# Patient Record
Sex: FEMALE | Race: WHITE | Employment: OTHER | URBAN - METROPOLITAN AREA
[De-identification: names, ages, dates, MRNs, and addresses within clinical notes are randomized per-mention and may not be internally consistent; named-entity substitution may affect disease eponyms.]

---

## 2018-09-13 ENCOUNTER — APPOINTMENT (OUTPATIENT)
Dept: CT IMAGING | Age: 73
DRG: 682 | End: 2018-09-13
Payer: MEDICARE

## 2018-09-13 ENCOUNTER — HOSPITAL ENCOUNTER (INPATIENT)
Age: 73
LOS: 4 days | Discharge: NURSING, REHAB OR LONG-TERM CARE | DRG: 682 | End: 2018-09-18
Attending: EMERGENCY MEDICINE | Admitting: FAMILY MEDICINE
Payer: MEDICARE

## 2018-09-13 ENCOUNTER — APPOINTMENT (OUTPATIENT)
Dept: GENERAL RADIOLOGY | Age: 73
DRG: 682 | End: 2018-09-13
Payer: MEDICARE

## 2018-09-13 DIAGNOSIS — R41.82 ALTERED MENTAL STATUS, UNSPECIFIED ALTERED MENTAL STATUS TYPE: Primary | ICD-10-CM

## 2018-09-13 DIAGNOSIS — R29.6 FALLS FREQUENTLY: ICD-10-CM

## 2018-09-13 DIAGNOSIS — F10.939 ALCOHOL WITHDRAWAL SYNDROME WITH COMPLICATION (HCC): ICD-10-CM

## 2018-09-13 DIAGNOSIS — E86.0 DEHYDRATION: ICD-10-CM

## 2018-09-13 DIAGNOSIS — M17.0 PRIMARY OSTEOARTHRITIS OF BOTH KNEES: ICD-10-CM

## 2018-09-13 PROBLEM — F10.10 ETOH ABUSE: Status: ACTIVE | Noted: 2018-09-13

## 2018-09-13 PROBLEM — R06.03 ACUTE RESPIRATORY DISTRESS: Status: ACTIVE | Noted: 2018-09-13

## 2018-09-13 PROBLEM — R65.10 SIRS (SYSTEMIC INFLAMMATORY RESPONSE SYNDROME) (HCC): Status: ACTIVE | Noted: 2018-09-13

## 2018-09-13 PROBLEM — I27.20 PULMONARY HYPERTENSION (HCC): Status: ACTIVE | Noted: 2018-09-13

## 2018-09-13 PROBLEM — N17.9 AKI (ACUTE KIDNEY INJURY) (HCC): Status: ACTIVE | Noted: 2018-09-13

## 2018-09-13 PROBLEM — Z87.19 HISTORY OF GI BLEED: Status: ACTIVE | Noted: 2018-09-13

## 2018-09-13 PROBLEM — I48.91 A-FIB (HCC): Status: ACTIVE | Noted: 2018-09-13

## 2018-09-13 PROBLEM — D64.9 NORMOCYTIC ANEMIA: Status: ACTIVE | Noted: 2018-09-13

## 2018-09-13 PROBLEM — J44.9 COPD (CHRONIC OBSTRUCTIVE PULMONARY DISEASE) (HCC): Status: ACTIVE | Noted: 2018-09-13

## 2018-09-13 PROBLEM — G93.40 ACUTE ENCEPHALOPATHY: Status: ACTIVE | Noted: 2018-09-13
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
## 2018-09-13 LAB
-: ABNORMAL
ABSOLUTE EOS #: 0 K/UL (ref 0–0.4)
ABSOLUTE IMMATURE GRANULOCYTE: ABNORMAL K/UL (ref 0–0.3)
ABSOLUTE LYMPH #: 0.94 K/UL (ref 1–4.8)
ABSOLUTE MONO #: 1.4 K/UL (ref 0.1–1.3)
ACETAMINOPHEN LEVEL: <5 UG/ML (ref 10–30)
ALBUMIN SERPL-MCNC: 3.7 G/DL (ref 3.5–5.2)
ALBUMIN/GLOBULIN RATIO: ABNORMAL (ref 1–2.5)
ALLEN TEST: ABNORMAL
ALP BLD-CCNC: 136 U/L (ref 35–104)
ALT SERPL-CCNC: 11 U/L (ref 5–33)
AMMONIA: 24 UMOL/L (ref 11–51)
AMORPHOUS: ABNORMAL
ANION GAP SERPL CALCULATED.3IONS-SCNC: 13 MMOL/L (ref 9–17)
AST SERPL-CCNC: 18 U/L
BACTERIA: ABNORMAL
BASOPHILS # BLD: 1 % (ref 0–2)
BASOPHILS ABSOLUTE: 0.12 K/UL (ref 0–0.2)
BILIRUB SERPL-MCNC: 0.49 MG/DL (ref 0.3–1.2)
BILIRUBIN URINE: NEGATIVE
BNP INTERPRETATION: ABNORMAL
BUN BLDV-MCNC: 37 MG/DL (ref 8–23)
BUN/CREAT BLD: ABNORMAL (ref 9–20)
C-REACTIVE PROTEIN: 255.4 MG/L (ref 0–5)
CALCIUM SERPL-MCNC: 9.2 MG/DL (ref 8.6–10.4)
CARBOXYHEMOGLOBIN: 2.4 % (ref 0–5)
CASTS UA: ABNORMAL /LPF
CHLORIDE BLD-SCNC: 96 MMOL/L (ref 98–107)
CO2: 24 MMOL/L (ref 20–31)
COLOR: YELLOW
COMMENT UA: ABNORMAL
CREAT SERPL-MCNC: 1.67 MG/DL (ref 0.5–0.9)
CRYSTALS, UA: ABNORMAL /HPF
DIFFERENTIAL TYPE: ABNORMAL
EKG ATRIAL RATE: 241 BPM
EKG Q-T INTERVAL: 290 MS
EKG QRS DURATION: 64 MS
EKG QTC CALCULATION (BAZETT): 372 MS
EKG R AXIS: 54 DEGREES
EKG T AXIS: 64 DEGREES
EKG VENTRICULAR RATE: 99 BPM
EOSINOPHILS RELATIVE PERCENT: 0 % (ref 0–4)
EPITHELIAL CELLS UA: ABNORMAL /HPF
ETHANOL PERCENT: <0.01 %
ETHANOL PERCENT: <0.01 %
ETHANOL: <10 MG/DL
ETHANOL: <10 MG/DL
FIO2: ABNORMAL
GFR AFRICAN AMERICAN: 37 ML/MIN
GFR NON-AFRICAN AMERICAN: 30 ML/MIN
GFR SERPL CREATININE-BSD FRML MDRD: ABNORMAL ML/MIN/{1.73_M2}
GFR SERPL CREATININE-BSD FRML MDRD: ABNORMAL ML/MIN/{1.73_M2}
GLUCOSE BLD-MCNC: 115 MG/DL (ref 70–99)
GLUCOSE URINE: NEGATIVE
HCO3 ARTERIAL: 24.9 MMOL/L (ref 22–26)
HCT VFR BLD CALC: 28.7 % (ref 36–46)
HCT VFR BLD CALC: 29.2 % (ref 36–46)
HCT VFR BLD CALC: 30.6 % (ref 36–46)
HEMOGLOBIN: 8.7 G/DL (ref 12–16)
HEMOGLOBIN: 8.8 G/DL (ref 12–16)
HEMOGLOBIN: 9.4 G/DL (ref 12–16)
IMMATURE GRANULOCYTES: ABNORMAL %
INR BLD: 1
KETONES, URINE: NEGATIVE
LACTIC ACID, WHOLE BLOOD: NORMAL MMOL/L (ref 0.7–2.1)
LACTIC ACID: 0.8 MMOL/L (ref 0.5–2.2)
LEUKOCYTE ESTERASE, URINE: NEGATIVE
LYMPHOCYTES # BLD: 8 % (ref 24–44)
MAGNESIUM: 2 MG/DL (ref 1.6–2.6)
MCH RBC QN AUTO: 26.4 PG (ref 26–34)
MCHC RBC AUTO-ENTMCNC: 30.6 G/DL (ref 31–37)
MCV RBC AUTO: 86.4 FL (ref 80–100)
METHEMOGLOBIN: 0.6 % (ref 0–1.9)
MODE: ABNORMAL
MONOCYTES # BLD: 12 % (ref 1–7)
MORPHOLOGY: ABNORMAL
MUCUS: ABNORMAL
NEGATIVE BASE EXCESS, ART: 1 MMOL/L (ref 0–2)
NITRITE, URINE: NEGATIVE
NOTIFICATION TIME: ABNORMAL
NOTIFICATION: ABNORMAL
NRBC AUTOMATED: ABNORMAL PER 100 WBC
O2 DEVICE/FLOW/%: ABNORMAL
O2 SAT, ARTERIAL: 93 % (ref 95–98)
OTHER OBSERVATIONS UA: ABNORMAL
OXYHEMOGLOBIN: ABNORMAL % (ref 95–98)
PARTIAL THROMBOPLASTIN TIME: 31.1 SEC (ref 23–31)
PATIENT TEMP: 37
PCO2 ARTERIAL: 47.3 MMHG (ref 35–45)
PCO2, ART, TEMP ADJ: ABNORMAL (ref 35–45)
PDW BLD-RTO: 24.3 % (ref 11.5–14.9)
PEEP/CPAP: ABNORMAL
PH ARTERIAL: 7.33 (ref 7.35–7.45)
PH UA: 5.5 (ref 5–8)
PH, ART, TEMP ADJ: ABNORMAL (ref 7.35–7.45)
PLATELET # BLD: 452 K/UL (ref 150–450)
PLATELET ESTIMATE: ABNORMAL
PMV BLD AUTO: 8.3 FL (ref 6–12)
PO2 ARTERIAL: 74.8 MMHG (ref 80–100)
PO2, ART, TEMP ADJ: ABNORMAL MMHG (ref 80–100)
POSITIVE BASE EXCESS, ART: ABNORMAL MMOL/L (ref 0–2)
POTASSIUM SERPL-SCNC: 4 MMOL/L (ref 3.7–5.3)
PRO-BNP: 2256 PG/ML
PROCALCITONIN: 0.2 NG/ML
PROTEIN UA: ABNORMAL
PROTHROMBIN TIME: 10.8 SEC (ref 9.7–12)
PSV: ABNORMAL
PT. POSITION: ABNORMAL
RBC # BLD: 3.54 M/UL (ref 4–5.2)
RBC # BLD: ABNORMAL 10*6/UL
RBC UA: ABNORMAL /HPF
RENAL EPITHELIAL, UA: ABNORMAL /HPF
RESPIRATORY RATE: 18
SALICYLATE LEVEL: <1 MG/DL (ref 3–10)
SAMPLE SITE: ABNORMAL
SEG NEUTROPHILS: 79 % (ref 36–66)
SEGMENTED NEUTROPHILS ABSOLUTE COUNT: 9.24 K/UL (ref 1.3–9.1)
SET RATE: ABNORMAL
SODIUM BLD-SCNC: 133 MMOL/L (ref 135–144)
SPECIFIC GRAVITY UA: 1.01 (ref 1–1.03)
T4 TOTAL: 4.9 UG/DL (ref 4.5–12)
TEXT FOR RESPIRATORY: ABNORMAL
TOTAL HB: ABNORMAL G/DL (ref 12–16)
TOTAL PROTEIN: 6.8 G/DL (ref 6.4–8.3)
TOTAL RATE: ABNORMAL
TRICHOMONAS: ABNORMAL
TRICYCLIC ANTIDEP,URINE: NEGATIVE
TROPONIN INTERP: NORMAL
TROPONIN T: <0.03 NG/ML
TSH SERPL DL<=0.05 MIU/L-ACNC: 2.21 MIU/L (ref 0.3–5)
TURBIDITY: ABNORMAL
URINE HGB: NEGATIVE
UROBILINOGEN, URINE: NORMAL
VT: ABNORMAL
WBC # BLD: 11.7 K/UL (ref 3.5–11)
WBC # BLD: ABNORMAL 10*3/UL
WBC UA: ABNORMAL /HPF
YEAST: ABNORMAL

## 2018-09-13 PROCEDURE — 82805 BLOOD GASES W/O2 SATURATION: CPT

## 2018-09-13 PROCEDURE — G0378 HOSPITAL OBSERVATION PER HR: HCPCS

## 2018-09-13 PROCEDURE — 99285 EMERGENCY DEPT VISIT HI MDM: CPT

## 2018-09-13 PROCEDURE — 85018 HEMOGLOBIN: CPT

## 2018-09-13 PROCEDURE — 80053 COMPREHEN METABOLIC PANEL: CPT

## 2018-09-13 PROCEDURE — 2580000003 HC RX 258: Performed by: NURSE PRACTITIONER

## 2018-09-13 PROCEDURE — 85730 THROMBOPLASTIN TIME PARTIAL: CPT

## 2018-09-13 PROCEDURE — 6360000002 HC RX W HCPCS: Performed by: NURSE PRACTITIONER

## 2018-09-13 PROCEDURE — 80307 DRUG TEST PRSMV CHEM ANLYZR: CPT

## 2018-09-13 PROCEDURE — 93970 EXTREMITY STUDY: CPT

## 2018-09-13 PROCEDURE — 87086 URINE CULTURE/COLONY COUNT: CPT

## 2018-09-13 PROCEDURE — 70450 CT HEAD/BRAIN W/O DYE: CPT

## 2018-09-13 PROCEDURE — 84484 ASSAY OF TROPONIN QUANT: CPT

## 2018-09-13 PROCEDURE — 83735 ASSAY OF MAGNESIUM: CPT

## 2018-09-13 PROCEDURE — 94761 N-INVAS EAR/PLS OXIMETRY MLT: CPT

## 2018-09-13 PROCEDURE — 96367 TX/PROPH/DG ADDL SEQ IV INF: CPT

## 2018-09-13 PROCEDURE — 85025 COMPLETE CBC W/AUTO DIFF WBC: CPT

## 2018-09-13 PROCEDURE — 83880 ASSAY OF NATRIURETIC PEPTIDE: CPT

## 2018-09-13 PROCEDURE — 81001 URINALYSIS AUTO W/SCOPE: CPT

## 2018-09-13 PROCEDURE — 82140 ASSAY OF AMMONIA: CPT

## 2018-09-13 PROCEDURE — 84443 ASSAY THYROID STIM HORMONE: CPT

## 2018-09-13 PROCEDURE — 84436 ASSAY OF TOTAL THYROXINE: CPT

## 2018-09-13 PROCEDURE — 93005 ELECTROCARDIOGRAM TRACING: CPT

## 2018-09-13 PROCEDURE — 86140 C-REACTIVE PROTEIN: CPT

## 2018-09-13 PROCEDURE — 2580000003 HC RX 258: Performed by: FAMILY MEDICINE

## 2018-09-13 PROCEDURE — 85014 HEMATOCRIT: CPT

## 2018-09-13 PROCEDURE — 36600 WITHDRAWAL OF ARTERIAL BLOOD: CPT

## 2018-09-13 PROCEDURE — G0480 DRUG TEST DEF 1-7 CLASSES: HCPCS

## 2018-09-13 PROCEDURE — 96366 THER/PROPH/DIAG IV INF ADDON: CPT

## 2018-09-13 PROCEDURE — 6370000000 HC RX 637 (ALT 250 FOR IP): Performed by: FAMILY MEDICINE

## 2018-09-13 PROCEDURE — 36415 COLL VENOUS BLD VENIPUNCTURE: CPT

## 2018-09-13 PROCEDURE — 2580000003 HC RX 258: Performed by: EMERGENCY MEDICINE

## 2018-09-13 PROCEDURE — 83605 ASSAY OF LACTIC ACID: CPT

## 2018-09-13 PROCEDURE — 94640 AIRWAY INHALATION TREATMENT: CPT

## 2018-09-13 PROCEDURE — 96365 THER/PROPH/DIAG IV INF INIT: CPT

## 2018-09-13 PROCEDURE — 94664 DEMO&/EVAL PT USE INHALER: CPT

## 2018-09-13 PROCEDURE — 71046 X-RAY EXAM CHEST 2 VIEWS: CPT

## 2018-09-13 PROCEDURE — 84145 PROCALCITONIN (PCT): CPT

## 2018-09-13 PROCEDURE — 85610 PROTHROMBIN TIME: CPT

## 2018-09-13 RX ORDER — LORAZEPAM 1 MG/1
3 TABLET ORAL
Status: DISCONTINUED | OUTPATIENT
Start: 2018-09-13 | End: 2018-09-18

## 2018-09-13 RX ORDER — LORAZEPAM 2 MG/ML
4 INJECTION INTRAMUSCULAR
Status: DISCONTINUED | OUTPATIENT
Start: 2018-09-13 | End: 2018-09-18

## 2018-09-13 RX ORDER — 0.9 % SODIUM CHLORIDE 0.9 %
1000 INTRAVENOUS SOLUTION INTRAVENOUS ONCE
Status: COMPLETED | OUTPATIENT
Start: 2018-09-13 | End: 2018-09-13

## 2018-09-13 RX ORDER — ONDANSETRON 2 MG/ML
4 INJECTION INTRAMUSCULAR; INTRAVENOUS EVERY 6 HOURS PRN
Status: DISCONTINUED | OUTPATIENT
Start: 2018-09-13 | End: 2018-09-18 | Stop reason: HOSPADM

## 2018-09-13 RX ORDER — POTASSIUM CHLORIDE 20MEQ/15ML
40 LIQUID (ML) ORAL PRN
Status: DISCONTINUED | OUTPATIENT
Start: 2018-09-13 | End: 2018-09-18 | Stop reason: HOSPADM

## 2018-09-13 RX ORDER — SODIUM CHLORIDE 0.9 % (FLUSH) 0.9 %
10 SYRINGE (ML) INJECTION PRN
Status: DISCONTINUED | OUTPATIENT
Start: 2018-09-13 | End: 2018-09-18 | Stop reason: HOSPADM

## 2018-09-13 RX ORDER — SODIUM CHLORIDE 9 MG/ML
INJECTION, SOLUTION INTRAVENOUS CONTINUOUS
Status: DISCONTINUED | OUTPATIENT
Start: 2018-09-13 | End: 2018-09-16

## 2018-09-13 RX ORDER — LORAZEPAM 2 MG/ML
3 INJECTION INTRAMUSCULAR
Status: DISCONTINUED | OUTPATIENT
Start: 2018-09-13 | End: 2018-09-18

## 2018-09-13 RX ORDER — LORAZEPAM 2 MG/ML
2 INJECTION INTRAMUSCULAR
Status: DISCONTINUED | OUTPATIENT
Start: 2018-09-13 | End: 2018-09-18

## 2018-09-13 RX ORDER — VENLAFAXINE HYDROCHLORIDE 150 MG/1
150 CAPSULE, EXTENDED RELEASE ORAL DAILY
Status: DISCONTINUED | OUTPATIENT
Start: 2018-09-13 | End: 2018-09-18 | Stop reason: HOSPADM

## 2018-09-13 RX ORDER — BACLOFEN 10 MG/1
10 TABLET ORAL 3 TIMES DAILY
COMMUNITY
End: 2018-12-13 | Stop reason: SDUPTHER

## 2018-09-13 RX ORDER — LORAZEPAM 1 MG/1
2 TABLET ORAL
Status: DISCONTINUED | OUTPATIENT
Start: 2018-09-13 | End: 2018-09-18

## 2018-09-13 RX ORDER — ACETAMINOPHEN 325 MG/1
650 TABLET ORAL EVERY 4 HOURS PRN
Status: DISCONTINUED | OUTPATIENT
Start: 2018-09-13 | End: 2018-09-18 | Stop reason: HOSPADM

## 2018-09-13 RX ORDER — FERROUS SULFATE 325(65) MG
325 TABLET ORAL
Status: ON HOLD | COMMUNITY
End: 2018-09-18

## 2018-09-13 RX ORDER — SODIUM CHLORIDE 0.9 % (FLUSH) 0.9 %
10 SYRINGE (ML) INJECTION EVERY 12 HOURS SCHEDULED
Status: DISCONTINUED | OUTPATIENT
Start: 2018-09-13 | End: 2018-09-18 | Stop reason: HOSPADM

## 2018-09-13 RX ORDER — THIAMINE HYDROCHLORIDE 100 MG/ML
100 INJECTION, SOLUTION INTRAMUSCULAR; INTRAVENOUS DAILY
Status: DISCONTINUED | OUTPATIENT
Start: 2018-09-13 | End: 2018-09-18 | Stop reason: HOSPADM

## 2018-09-13 RX ORDER — VENLAFAXINE HYDROCHLORIDE 150 MG/1
150 CAPSULE, EXTENDED RELEASE ORAL DAILY
COMMUNITY
End: 2019-04-04

## 2018-09-13 RX ORDER — IPRATROPIUM BROMIDE AND ALBUTEROL SULFATE 2.5; .5 MG/3ML; MG/3ML
1 SOLUTION RESPIRATORY (INHALATION) EVERY 4 HOURS
Status: DISCONTINUED | OUTPATIENT
Start: 2018-09-13 | End: 2018-09-18

## 2018-09-13 RX ORDER — FAMOTIDINE 20 MG/1
20 TABLET, FILM COATED ORAL 2 TIMES DAILY
Status: DISCONTINUED | OUTPATIENT
Start: 2018-09-13 | End: 2018-09-18 | Stop reason: HOSPADM

## 2018-09-13 RX ORDER — LORAZEPAM 2 MG/ML
1 INJECTION INTRAMUSCULAR
Status: DISCONTINUED | OUTPATIENT
Start: 2018-09-13 | End: 2018-09-18

## 2018-09-13 RX ORDER — POTASSIUM CHLORIDE 20 MEQ/1
40 TABLET, EXTENDED RELEASE ORAL PRN
Status: DISCONTINUED | OUTPATIENT
Start: 2018-09-13 | End: 2018-09-18 | Stop reason: HOSPADM

## 2018-09-13 RX ORDER — LORAZEPAM 1 MG/1
4 TABLET ORAL
Status: DISCONTINUED | OUTPATIENT
Start: 2018-09-13 | End: 2018-09-18

## 2018-09-13 RX ORDER — ALBUTEROL SULFATE 2.5 MG/3ML
2.5 SOLUTION RESPIRATORY (INHALATION) EVERY 4 HOURS PRN
COMMUNITY
End: 2020-08-19 | Stop reason: ALTCHOICE

## 2018-09-13 RX ORDER — LEVOTHYROXINE SODIUM 0.05 MG/1
50 TABLET ORAL DAILY
Status: DISCONTINUED | OUTPATIENT
Start: 2018-09-13 | End: 2018-09-13

## 2018-09-13 RX ORDER — FERROUS SULFATE 325(65) MG
325 TABLET ORAL
Status: DISCONTINUED | OUTPATIENT
Start: 2018-09-14 | End: 2018-09-18

## 2018-09-13 RX ORDER — LEVOTHYROXINE SODIUM 0.05 MG/1
50 TABLET ORAL DAILY
Status: DISCONTINUED | OUTPATIENT
Start: 2018-09-13 | End: 2018-09-18 | Stop reason: HOSPADM

## 2018-09-13 RX ORDER — POTASSIUM CHLORIDE 7.45 MG/ML
10 INJECTION INTRAVENOUS PRN
Status: DISCONTINUED | OUTPATIENT
Start: 2018-09-13 | End: 2018-09-18 | Stop reason: HOSPADM

## 2018-09-13 RX ORDER — LORAZEPAM 1 MG/1
1 TABLET ORAL
Status: DISCONTINUED | OUTPATIENT
Start: 2018-09-13 | End: 2018-09-18

## 2018-09-13 RX ADMIN — IPRATROPIUM BROMIDE AND ALBUTEROL SULFATE 1 AMPULE: .5; 3 SOLUTION RESPIRATORY (INHALATION) at 15:58

## 2018-09-13 RX ADMIN — SODIUM CHLORIDE 1000 ML: 9 INJECTION, SOLUTION INTRAVENOUS at 06:59

## 2018-09-13 RX ADMIN — VENLAFAXINE HYDROCHLORIDE 150 MG: 150 CAPSULE, EXTENDED RELEASE ORAL at 18:33

## 2018-09-13 RX ADMIN — CEFTRIAXONE SODIUM 1 G: 1 INJECTION, POWDER, FOR SOLUTION INTRAMUSCULAR; INTRAVENOUS at 16:53

## 2018-09-13 RX ADMIN — IPRATROPIUM BROMIDE AND ALBUTEROL SULFATE 1 AMPULE: .5; 3 SOLUTION RESPIRATORY (INHALATION) at 11:52

## 2018-09-13 RX ADMIN — AZITHROMYCIN MONOHYDRATE 500 MG: 500 INJECTION, POWDER, LYOPHILIZED, FOR SOLUTION INTRAVENOUS at 18:38

## 2018-09-13 RX ADMIN — IPRATROPIUM BROMIDE AND ALBUTEROL SULFATE 1 AMPULE: .5; 3 SOLUTION RESPIRATORY (INHALATION) at 19:11

## 2018-09-13 RX ADMIN — FAMOTIDINE 20 MG: 20 TABLET ORAL at 20:32

## 2018-09-13 RX ADMIN — SODIUM CHLORIDE: 9 INJECTION, SOLUTION INTRAVENOUS at 16:59

## 2018-09-13 RX ADMIN — LEVOTHYROXINE SODIUM 50 MCG: 0.05 TABLET ORAL at 18:33

## 2018-09-13 ASSESSMENT — ENCOUNTER SYMPTOMS
BLOOD IN STOOL: 0
TROUBLE SWALLOWING: 0
COLOR CHANGE: 0
EYE PAIN: 0
SHORTNESS OF BREATH: 0
DIARRHEA: 0
SORE THROAT: 0
CONSTIPATION: 0
RHINORRHEA: 0
SINUS PRESSURE: 0
COUGH: 0
WHEEZING: 0
ABDOMINAL PAIN: 0
EYE DISCHARGE: 0
FACIAL SWELLING: 0
VOMITING: 0
BACK PAIN: 0
EYE REDNESS: 0
NAUSEA: 0
CHEST TIGHTNESS: 0

## 2018-09-13 ASSESSMENT — PAIN SCALES - GENERAL
PAINLEVEL_OUTOF10: 0
PAINLEVEL_OUTOF10: 0

## 2018-09-13 ASSESSMENT — PAIN SCALES - WONG BAKER: WONGBAKER_NUMERICALRESPONSE: 0

## 2018-09-13 NOTE — CONSULTS
Weight: Weight: 180 lb (81.6 kg)  afebrile  General Appearance: alert, more cooperative, restless  Head: Normocephalic, without obvious abnormality, atraumatic  Neck: no adenopathy, no JVD, supple, symmetrical, trachea midline  Lungs: fair air entry bilaterally; breath sounds - vesicular, 97% on 3 l nc  Heart: : regular rate and rhythm, S1, S2 normal, no murmur, click, rub or gallop  Abdomen: soft, non-tender; bowel sounds normal; no masses,  no organomegaly  Extremities: extremities normal, atraumatic, no cyanosis or edema  Skin: skin color, texture, turgor normal. No rashes or lesions  Neurologic: Grossly normal    Recent labs, Imaging studies reviewed    Data ReviewCBC:   Recent Labs      09/13/18   0555   WBC  11.7*   RBC  3.54*   HGB  9.4*   HCT  30.6*   PLT  452*     BMP:   Recent Labs      09/13/18   0555   GLUCOSE  115*   NA  133*   K  4.0   BUN  37*   CREATININE  1.67*   CALCIUM  9.2     ABGs:   Recent Labs      09/13/18   0920   PHART  7.330*   PO2ART  74.8*   [id number]  47.3*   [id number]  24.9   [id number]  93.0*      PT/INR:  No results found for: PTINR      ASSESSMENT / PLAN:  Dx respiratory failure       Encephalopathy  - improving       Dehydration, creatinine 1.67, baseline 0.78       PNA       afib       Falls  Hx COPD, ETOH  S/P OP surgery on 9-11-18 for total left knee  Continue BD  Add ABx  Pain control  IS  PT    Plan of care discussed with Dr Kody Mercedes  Electronically signed by Renetta Kasper on 09/13/18

## 2018-09-13 NOTE — PROGRESS NOTES
Physical Therapy  DATE: 2018    NAME: Giacomo Cuellar  MRN: [id number]   : 1945    Patient not seen this date for Physical Therapy due to:  [] Blood transfusion in progress  [] Hemodialysis  []  Patient Declined  [] Spine Precautions   [] Strict Bedrest  [] Surgery/ Procedure  [] Testing      [x] Other 18 at 4151- hold PT, results are still not posted for venous scan. Will check for results tomorrow. [] PT being discontinued at this time. Patient independent. No further needs. [] PT being discontinued at this time as the patient has been transferred to palliative care. No further needs.     Abel Jeffries, PT

## 2018-09-13 NOTE — PROGRESS NOTES
Physical Therapy  DATE: 2018    NAME: Kamila Singletary  MRN: [id number]   : 1945    Patient not seen this date for Physical Therapy due to:  [] Blood transfusion in progress  [] Hemodialysis  []  Patient Declined  [] Spine Precautions   [] Strict Bedrest  [] Surgery/ Procedure  [x] Testing 18 at 930- venous dopplers ordered for bilateral LEs. Will hold PT evaluation until results are posted. [] Other        [] PT being discontinued at this time. Patient independent. No further needs. [] PT being discontinued at this time as the patient has been transferred to palliative care. No further needs.     Niharika Coates, PT

## 2018-09-13 NOTE — ED NOTES
Per son pt has had multiple falls since being home, son reports that pt has been taking one more percocet per day then she is prescribed, son reports that mother also likes to drink, son unsure if she had gotten into the alcohol at all during the evening.       Jemal Hernández RN  09/13/18 Shiv Aranda  09/13/18 0203

## 2018-09-13 NOTE — PROGRESS NOTES
Dr. Jose De Jesus Soria office has been notified of patient consult, they said patient will be seen tomorrow just make sure she is on the list.

## 2018-09-13 NOTE — H&P
History and Physical      Name: Teresa Tian  MRN: [id number]     Acct: [de-identified]  Room: [address] [place] [address]    Admit Date: 9/13/2018  PCP: Pranav Valentin MD      Chief Complaint:     Chief Complaint   Patient presents with    Altered Mental Status    Knee Pain     left       History Obtained From:     patient, family member - Son, electronic medical record    History of Present Illness: Teresa Tian is a  72 y.o.  female  with PMH as noted below as well as atrial fibrillation, hypothyroidism, and pulmonary hypertension who presents with Altered Mental Status and Knee Pain (left)   patient had recent left total knee arthroplasty osteoarthritis at [facility] on 09/11/2018 by Dr. Fernando Harrell. Apparently patient had difficult time postanesthesia and stated low be longer in the PACU than usual before being discharged home. Since that time patient has remained somnolent with altered mental status and has been falling quite frequently at home. Patient and family denies any head trauma. Apparently patient has been taking pain medication mother usual.  Patient has history of alcohol abuse even though she claims she has been cutting down, her son said he found 3   Bottles of vodka in her house. Due to worsening mental status and unable to walk and frequent falls she was brought to the ER further evaluation. Patient reports chills and knee pain, but no fever, chest pain, shortness of breath, nausea, vomiting diarrhea, constipation, headaches, palpitations. In the ER, her initial vital signs were stable except for mild tachycardia. CT of the brain showed no acute process. Labs were significant for mild leukocytosis 11.7, hemoglobin 9.4 ( worsen iron study from outside hospital was showed low iron, high TIBC and low ferritin; recent vit b12 and folate were normal on 8/28/18) creatinine elevated 1.67 down from 1.84 on 08/28/2018.   Alcohol and ammonia levels were normal.  Troponin checked this morning was CRP were elevated 255 TSH normal 2.21. EKG showed AFib RVR rate 104. ABG showed 7.33/47.3/74.8/24.9. UA was negative. Magnesium normal 2.0  Patient had history of acute anemia suspected for GI bleed last month  08/28/2018 were hemoglobin dropped to 6.7 prior to the surgery and required blood transfusion. Hemoglobin was 14.1 on 06/21/2018. Past Medical History:     Past Medical History:   Diagnosis Date    Anxiety     Chronic back pain     Hyperlipidemia     Hypertension     Leg pain     Osteoarthritis         Past Surgical History:     Past Surgical History:   Procedure Laterality Date    COLONOSCOPY      EYE SURGERY Bilateral     cataracts        Medications Prior to Admission:       Prior to Admission medications    Medication Sig Start Date End Date Taking? Authorizing Provider   HYDROcodone-acetaminophen (NORCO) 7.5-325 MG per tablet Take 1 tablet by mouth every 6 hours as needed for Pain 5/11/15   Louis Oliver, DO   carisoprodol (SOMA) 250 MG tablet TAKE ONE & ONE-HALF TABLETS BY MOUTH 4 TIMES DAILY 4/14/15   Ari Oliver DO   albuterol (PROAIR HFA) 108 (90 BASE) MCG/ACT inhaler Inhale 2 puffs into the lungs every 6 hours as needed for Wheezing. 3/3/15   Louis Oliver DO   clonazePAM (KLONOPIN) 0.5 MG tablet TAKE ONE TABLET BY MOUTH THREE TIMES DAILY AS NEEDED 12/23/14   Louis Oliver DO   lisinopril-hydrochlorothiazide (PRINZIDE;ZESTORETIC) 20-12.5 MG per tablet Take 1 tablet by mouth daily. 8/5/14   Ari Oliver DO   meloxicam (MOBIC) 15 MG tablet Take 1 tablet by mouth daily. 7/1/14   Louis Oliver, DO   levothyroxine (SYNTHROID) 50 MCG tablet Take 1 tablet by mouth daily. 5/27/14   Ari Oliver DO   traMADol (ULTRAM) 50 MG tablet Take 50 mg by mouth every 6 hours as needed for Pain. Historical Provider, MD   loperamide (IMODIUM) 2 MG capsule Take 2 mg by mouth 2 times daily as needed for Diarrhea.     Historical Provider, MD   acetaminophen (TYLENOL) 500 11 - 51 umol/L   CBC Auto Differential    Collection Time: 09/13/18  5:55 AM   Result Value Ref Range    WBC 11.7 (H) 3.5 - 11.0 k/uL    RBC 3.54 (L) 4.0 - 5.2 m/uL    Hemoglobin 9.4 (L) 12.0 - 16.0 g/dL    Hematocrit 30.6 (L) 36 - 46 %    MCV 86.4 80 - 100 fL    MCH 26.4 26 - 34 pg    MCHC 30.6 (L) 31 - 37 g/dL    RDW 24.3 (H) 11.5 - 14.9 %    Platelets 209 (H) 655 - 450 k/uL    MPV 8.3 6.0 - 12.0 fL    NRBC Automated NOT REPORTED per 100 WBC    Differential Type NOT REPORTED     Immature Granulocytes NOT REPORTED 0 %    Absolute Immature Granulocyte NOT REPORTED 0.00 - 0.30 k/uL    WBC Morphology NOT REPORTED     RBC Morphology NOT REPORTED     Platelet Estimate NOT REPORTED     Seg Neutrophils 79 (H) 36 - 66 %    Lymphocytes 8 (L) 24 - 44 %    Monocytes 12 (H) 1 - 7 %    Eosinophils % 0 0 - 4 %    Basophils 1 0 - 2 %    Segs Absolute 9.24 (H) 1.3 - 9.1 k/uL    Absolute Lymph # 0.94 (L) 1.0 - 4.8 k/uL    Absolute Mono # 1.40 (H) 0.1 - 1.3 k/uL    Absolute Eos # 0.00 0.0 - 0.4 k/uL    Basophils # 0.12 0.0 - 0.2 k/uL    Morphology ANISOCYTOSIS PRESENT    Comprehensive Metabolic Panel    Collection Time: 09/13/18  5:55 AM   Result Value Ref Range    Glucose 115 (H) 70 - 99 mg/dL    BUN 37 (H) 8 - 23 mg/dL    CREATININE 1.67 (H) 0.50 - 0.90 mg/dL    Bun/Cre Ratio NOT REPORTED 9 - 20    Calcium 9.2 8.6 - 10.4 mg/dL    Sodium 133 (L) 135 - 144 mmol/L    Potassium 4.0 3.7 - 5.3 mmol/L    Chloride 96 (L) 98 - 107 mmol/L    CO2 24 20 - 31 mmol/L    Anion Gap 13 9 - 17 mmol/L    Alkaline Phosphatase 136 (H) 35 - 104 U/L    ALT 11 5 - 33 U/L    AST 18 <32 U/L    Total Bilirubin 0.49 0.3 - 1.2 mg/dL    Total Protein 6.8 6.4 - 8.3 g/dL    Alb 3.7 3.5 - 5.2 g/dL    Albumin/Globulin Ratio NOT REPORTED 1.0 - 2.5    GFR Non-African American 30 (L) >60 mL/min    GFR  37 (L) >60 mL/min    GFR Comment          GFR Staging NOT REPORTED    TOX SCR, BLD, ED    Collection Time: 09/13/18  5:55 AM   Result Value Ref Range Ethanol <10 <10 mg/dL    Ethanol percent <6.590 %    Salicylate Lvl <1 (L) 3 - 10 mg/dL    Acetaminophen Level <5 (L) 10 - 30 ug/mL   EKG 12 Lead    Collection Time: 09/13/18  6:04 AM   Result Value Ref Range    Ventricular Rate 99 BPM    Atrial Rate 241 BPM    QRS Duration 64 ms    Q-T Interval 290 ms    QTc Calculation (Bazett) 372 ms    R Axis 54 degrees    T Axis 64 degrees   Urinalysis    Collection Time: 09/13/18  6:34 AM   Result Value Ref Range    Color, UA YELLOW YEL    Turbidity UA CLOUDY (A) CLEAR    Glucose, Ur NEGATIVE NEG    Bilirubin Urine NEGATIVE NEG    Ketones, Urine NEGATIVE NEG    Specific [place], UA 1.015 1.000 - 1.030    Urine Hgb NEGATIVE NEG    pH, UA 5.5 5.0 - 8.0    Protein, UA TRACE (A) NEG    Urobilinogen, Urine Normal NORM    Nitrite, Urine NEGATIVE NEG    Leukocyte Esterase, Urine NEGATIVE NEG    Urinalysis Comments NOT REPORTED    Drug screen, tricyclic    Collection Time: 09/13/18  6:34 AM   Result Value Ref Range    Tricyclic Antidep,Urine NEGATIVE NEG   Microscopic Urinalysis    Collection Time: 09/13/18  6:34 AM   Result Value Ref Range    -          WBC, UA 2 TO 5 /HPF    RBC, UA 0 TO 2 /HPF    Casts UA NOT REPORTED /LPF    Crystals UA NOT REPORTED NONE /HPF    Epithelial Cells UA 0 TO 2 /HPF    Renal Epithelial, Urine NOT REPORTED 0 /HPF    Bacteria, UA FEW (A) NONE    Mucus, UA NOT REPORTED NONE    Trichomonas, UA NOT REPORTED NONE    Amorphous, UA 2+ (A) NONE    Other Observations UA NOT REPORTED NREQ    Yeast, UA NOT REPORTED NONE   Arterial Blood Gases    Collection Time: 09/13/18  9:20 AM   Result Value Ref Range    pH, Arterial 7.330 (L) 7.350 - 7.450    pCO2, Arterial 47.3 (H) 35.0 - 45.0 mmHg    pO2, Arterial 74.8 (L) 80.0 - 100.0 mmHg    HCO3, Arterial 24.9 22.0 - 26.0 mmol/L    Positive Base Excess, Art NOT REPORTED 0.0 - 2.0 mmol/L    Negative Base Excess, Art 1.0 0.0 - 2.0 mmol/L    O2 Sat, Arterial 93.0 (L) 95 - 98 %    Total Hb NOT REPORTED 12.0 - 16.0 g/dl Oxyhemoglobin NOT REPORTED 95.0 - 98.0 %    Carboxyhemoglobin 2.4 0 - 5 %    Methemoglobin 0.6 0.0 - 1.9 %    Pt Temp 37.0     pH, Art, Temp Adj NOT REPORTED 7.350 - 7.450    pCO2, Art, Temp Adj NOT REPORTED 35.0 - 45.0    pO2, Art, Temp Adj NOT REPORTED 80.0 - 100.0 mmHg    O2 Device/Flow/% Cannula     Respiratory Rate 18     [place]ila Test PASS     Sample Site Right Radial Artery     Pt. Position LEFT SIDE     Mode NOT REPORTED     Set Rate NOT REPORTED     Total Rate NOT REPORTED     VT NOT REPORTED     FIO2 3 L N/C     Peep/Cpap NOT REPORTED     PSV NOT REPORTED     Text for Respiratory RESULTS TO RN     NOTIFICATION NOT REPORTED     NOTIFICATION TIME NOT REPORTED    Lactic acid, plasma    Collection Time: 09/13/18  9:29 AM   Result Value Ref Range    Lactic Acid 0.8 0.5 - 2.2 mmol/L    Lactic Acid, Whole Blood NOT REPORTED 0.7 - 2.1 mmol/L   ETHANOL    Collection Time: 09/13/18  9:29 AM   Result Value Ref Range    Ethanol <10 <10 mg/dL    Ethanol percent <0.010 %   C-Reactive Protein    Collection Time: 09/13/18  9:29 AM   Result Value Ref Range    .4 (H) 0.0 - 5.0 mg/L   Magnesium    Collection Time: 09/13/18  9:29 AM   Result Value Ref Range    Magnesium 2.0 1.6 - 2.6 mg/dL   Troponin    Collection Time: 09/13/18  9:29 AM   Result Value Ref Range    Troponin T <0.03 <0.03 ng/mL    Troponin Interp         TSH without Reflex    Collection Time: 09/13/18  9:29 AM   Result Value Ref Range    TSH 2.21 0.30 - 5.00 mIU/L   PROTIME-INR    Collection Time: 09/13/18  9:32 AM   Result Value Ref Range    Protime 10.8 9.7 - 12.0 sec    INR 1.0    APTT    Collection Time: 09/13/18  9:32 AM   Result Value Ref Range    PTT 31.1 (H) 23.0 - 31.0 sec   EKG 12 Lead    Collection Time: 09/13/18  9:35 AM   Result Value Ref Range    Ventricular Rate 104 BPM    Atrial Rate 182 BPM    QRS Duration 64 ms    Q-T Interval 318 ms    QTc Calculation (Bazett) 418 ms    R Axis 60 degrees    T Axis 77 degrees       Imaging--Reviewed:   No

## 2018-09-13 NOTE — ED PROVIDER NOTES
SOCIAL HISTORY      reports that she has quit smoking. Her smoking use included Cigarettes. She has a 78.00 pack-year smoking history. She has never used smokeless tobacco. She reports that she drinks alcohol. She reports that she does not use drugs. PHYSICAL EXAM     INITIAL VITALS: /63   Pulse 110   Temp 98.6 °F (37 °C) (Oral)   Resp 21   Ht 5' 7\" (1.702 m)   Wt 180 lb (81.6 kg)   SpO2 97%   BMI 28.19 kg/m²      Physical Exam   Constitutional: She appears well-developed and well-nourished. No distress. HENT:   Head: Normocephalic and atraumatic. Eyes: Pupils are equal, round, and reactive to light. Conjunctivae and EOM are normal. Right eye exhibits no discharge. Left eye exhibits no discharge. No scleral icterus. Cardiovascular: Normal rate, regular rhythm and normal heart sounds. Exam reveals no gallop and no friction rub. No murmur heard. Pulmonary/Chest: Effort normal and breath sounds normal. No respiratory distress. She has no wheezes. She has no rales. She exhibits no tenderness. Abdominal: Soft. Bowel sounds are normal. She exhibits no distension and no mass. There is no tenderness. There is no rebound and no guarding. Musculoskeletal: Normal range of motion. She exhibits no edema or tenderness. Left knee is fully bandaged due to the recent surgery. patient does have good pulses in that leg. There is edema. Neurological: She is alert. She is disoriented. No cranial nerve deficit or sensory deficit. She exhibits normal muscle tone. Skin: Skin is warm and dry. No rash noted. She is not diaphoretic. No erythema. No pallor. Psychiatric: She has a normal mood and affect. Her behavior is normal. Judgment and thought content normal.   Nursing note and vitals reviewed.       MEDICAL DECISION MAKING:     This point time patient is too early for postop infection to be causing this most likely she is either having effects of the anesthesia pain medication or just problems This Encounter   Medications    0.9 % sodium chloride bolus       -------------------------  7:33 AM  Patient is still in the same condition. Patient's workup is read relatively unremarkable other than some dehydration. I discussed this with the son. They are okay with the patient staying here for further evaluation. I spoke with Dr. Tez Martinez for the PCP who will admit the patient. CRITICAL CARE:   none    CONSULTS:  IP CONSULT TO PRIMARY CARE PROVIDER    PROCEDURES:  none    FINAL IMPRESSION      1. Altered mental status, unspecified altered mental status type    2. Dehydration    3. Falls frequently    4. Alcohol withdrawal syndrome with complication [facility])          DISPOSITION/PLAN   DISPOSITION Admitted 09/13/2018 07:33:35 AM      PATIENT REFERRED TO:  Raynaldo Nyhan, MD [address] Yahir [place].  [place] [address]  [phone]            DISCHARGE MEDICATIONS:  New Prescriptions    No medications on file       (Please note that portions of this note were completed with a voice recognition program.  Efforts were made to edit the dictations but occasionally words are mis-transcribed.)    Abby Jean MD  Attending Emergency Physician                      Abby Jean MD  09/13/18 [id number]

## 2018-09-13 NOTE — CONSULTS
Melody, DO   meloxicam (MOBIC) 15 MG tablet Take 1 tablet by mouth daily. 7/1/14   Ino Oliver, DO   levothyroxine (SYNTHROID) 50 MCG tablet Take 1 tablet by mouth daily. 5/27/14   Ari Oliver, DO   traMADol (ULTRAM) 50 MG tablet Take 50 mg by mouth every 6 hours as needed for Pain. Historical Provider, MD   loperamide (IMODIUM) 2 MG capsule Take 2 mg by mouth 2 times daily as needed for Diarrhea. Historical Provider, MD   acetaminophen (TYLENOL) 500 MG tablet Take 500 mg by mouth every 6 hours as needed for Pain. Historical Provider, MD   diphenhydrAMINE-APAP, sleep, (TYLENOL PM EXTRA STRENGTH)  MG tablet Take 1 tablet by mouth nightly as needed for Sleep. Historical Provider, MD   amLODIPine (NORVASC) 10 MG tablet Take 1 tablet by mouth daily.  2/27/14   Flo Lemus DO        [facility] Meds:    Current Facility-Administered Medications   Medication Dose Route Frequency Provider Last Rate Last Dose    sodium chloride flush 0.9 % injection 10 mL  10 mL Intravenous 2 times per day Erika Santos MD        sodium chloride flush 0.9 % injection 10 mL  10 mL Intravenous PRN Erika Santos MD        acetaminophen (TYLENOL) tablet 650 mg  650 mg Oral Q4H PRN Erika Santos MD        0.9 % sodium chloride infusion   Intravenous Continuous Erika Santos MD        levothyroxine (SYNTHROID) tablet 50 mcg  50 mcg Oral Daily Erika Santos MD        sodium chloride flush 0.9 % injection 10 mL  10 mL Intravenous 2 times per day Erika Santos MD        sodium chloride flush 0.9 % injection 10 mL  10 mL Intravenous PRN Erika Santos MD        magnesium hydroxide (MILK OF MAGNESIA) 400 MG/5ML suspension 30 mL  30 mL Oral Daily PRN Erika Santos MD        ondansetron [facility]) injection 4 mg  4 mg Intravenous Q6H PRN Erika Santos MD        potassium chloride (KLOR-CON M) extended release tablet 40 mEq  40 mEq Oral PRN Erika Santos MD        Or    potassium chloride 20 MEQ/15ML Topics    Smoking status: Former Smoker     Packs/day: 3.00     Years: 26.00     Types: Cigarettes    Smokeless tobacco: Never Used    Alcohol use Yes      Comment: 1-2 bears weekly    Drug use: No    Sexual activity: Not on file     Other Topics Concern    Not on file     Social History Narrative    No narrative on file         Family Histroy:                Family History   Problem Relation Age of Onset    Emphysema Sister     Cancer Other         Cousin - breast cancer    Heart Disease Mother     COPD Mother        Review of Systems:   · Constitutional: No Fevers/Chills, No recent weight gain weight loss, Positive fatigue. · Eyes: No visual changes or diplopia. · ENT: No Headaches, hearing loss or vertigo. · Cardiovascular: Per HPI  · Respiratory: No cough or wheezing, no sputum production. No hematemesis. · Gastrointestinal: No Nausea, Vomiting, Diarrhea, or Constipation  · Genitourinary: No dysuria,hematuria. · Musculoskeletal:  No gait disturbance, weakness or joint complaints. · Integumentary: No rash or pruritis. · Neurological: Positive confusion  · Psychiatric: No anxiety, or depression. · Endocrine: No temperature intolerance. · Hematologic/Lymphatic: No abnormal bruising or bleeding     Physical Exam   Vital Signs: BP (!) 118/44   Pulse 74   Temp 98.3 °F (36.8 °C) (Oral)   Resp 20   Ht 5' 7\" (1.702 m)   Wt 180 lb (81.6 kg)   SpO2 96%   BMI 28.19 kg/m²  O2 Flow Rate (L/min): 3 L/min     Admission Weight: 180 lb (81.6 kg)     General appearance: cooperative. In no acute distress    Skin:  Warm and Dry to touch    Head: Normocephalic, without obvious abnormality, atraumatic    Eyes: Conjunctivae unremarkable, EOM's intact. Neck: No JVD, No carotid bruit. Neck supple, trachea midline    Lungs: Diminished      Heart: s1, s2, irregular    Abdomen: Soft, non-tender.  Bowel sounds normal.    Extremities: No edema    Neurologic: Oriented to time, person and place, affect kidney injury) ([facility] [address]    Acute encephalopathy    Acute respiratory distress    ETOH abuse    SIRS (systemic inflammatory response syndrome) (HCC)    Normocytic anemia    Pulmonary hypertension (HCC)    COPD (chronic obstructive pulmonary disease) ([facility] [address]    History of GI bleed    Fall  Resolved Problems:    * No resolved hospital problems. *      Plan:    Impression:  #1 patient has confusion and dehydration after her knee surgery. There are no acute cardiac events. He has rate controlled atrial fibrillation and she resumed her anticoagulation. She has normal left ventricular systolic function, and a stress test that showed possible artifact but no convincing areas of large reversible perfusion defects. I do not think there is any acute cardiac issue that caused her confusion and dehydration. I would continue her cardiac medications. He can have routine follow-up with us in the office. Please call us as needed.     Electronically signed by Cato Gosselin, MD on 9/13/2018 at 12:26 PM

## 2018-09-14 PROBLEM — R41.0 ACUTE DELIRIUM: Status: ACTIVE | Noted: 2018-09-14

## 2018-09-14 PROBLEM — N17.0 ACUTE RENAL FAILURE WITH TUBULAR NECROSIS (HCC): Status: ACTIVE | Noted: 2018-09-14

## 2018-09-14 LAB
ABSOLUTE EOS #: 0.1 K/UL (ref 0–0.4)
ABSOLUTE IMMATURE GRANULOCYTE: ABNORMAL K/UL (ref 0–0.3)
ABSOLUTE LYMPH #: 0.69 K/UL (ref 1–4.8)
ABSOLUTE MONO #: 1.19 K/UL (ref 0.1–1.3)
AMPHETAMINE SCREEN URINE: NEGATIVE
ANION GAP SERPL CALCULATED.3IONS-SCNC: 12 MMOL/L (ref 9–17)
BARBITURATE SCREEN URINE: NEGATIVE
BASOPHILS # BLD: 1 % (ref 0–2)
BASOPHILS ABSOLUTE: 0.1 K/UL (ref 0–0.2)
BENZODIAZEPINE SCREEN, URINE: POSITIVE
BUN BLDV-MCNC: 15 MG/DL (ref 8–23)
BUN/CREAT BLD: ABNORMAL (ref 9–20)
BUPRENORPHINE URINE: ABNORMAL
CALCIUM SERPL-MCNC: 8.9 MG/DL (ref 8.6–10.4)
CANNABINOID SCREEN URINE: NEGATIVE
CHLORIDE BLD-SCNC: 100 MMOL/L (ref 98–107)
CO2: 26 MMOL/L (ref 20–31)
COCAINE METABOLITE, URINE: NEGATIVE
CREAT SERPL-MCNC: 0.72 MG/DL (ref 0.5–0.9)
CULTURE: NO GROWTH
DIFFERENTIAL TYPE: ABNORMAL
EKG ATRIAL RATE: 182 BPM
EKG Q-T INTERVAL: 318 MS
EKG QRS DURATION: 64 MS
EKG QTC CALCULATION (BAZETT): 418 MS
EKG R AXIS: 60 DEGREES
EKG T AXIS: 77 DEGREES
EKG VENTRICULAR RATE: 104 BPM
EOSINOPHILS RELATIVE PERCENT: 1 % (ref 0–4)
FERRITIN: 93 UG/L (ref 13–150)
GFR AFRICAN AMERICAN: >60 ML/MIN
GFR NON-AFRICAN AMERICAN: >60 ML/MIN
GFR SERPL CREATININE-BSD FRML MDRD: ABNORMAL ML/MIN/{1.73_M2}
GFR SERPL CREATININE-BSD FRML MDRD: ABNORMAL ML/MIN/{1.73_M2}
GLUCOSE BLD-MCNC: 122 MG/DL (ref 70–99)
HCT VFR BLD CALC: 28.1 % (ref 36–46)
HCT VFR BLD CALC: 28.3 % (ref 36–46)
HEMOGLOBIN: 8.7 G/DL (ref 12–16)
HEMOGLOBIN: 8.7 G/DL (ref 12–16)
IMMATURE GRANULOCYTES: ABNORMAL %
IRON SATURATION: 5 % (ref 20–55)
IRON: 16 UG/DL (ref 37–145)
LYMPHOCYTES # BLD: 7 % (ref 24–44)
Lab: NORMAL
MAGNESIUM: 2.1 MG/DL (ref 1.6–2.6)
MCH RBC QN AUTO: 26.5 PG (ref 26–34)
MCHC RBC AUTO-ENTMCNC: 30.8 G/DL (ref 31–37)
MCV RBC AUTO: 85.9 FL (ref 80–100)
MDMA URINE: ABNORMAL
METHADONE SCREEN, URINE: NEGATIVE
METHAMPHETAMINE, URINE: ABNORMAL
MONOCYTES # BLD: 12 % (ref 1–7)
MORPHOLOGY: ABNORMAL
NRBC AUTOMATED: ABNORMAL PER 100 WBC
OPIATES, URINE: NEGATIVE
OXYCODONE SCREEN URINE: POSITIVE
PDW BLD-RTO: 24.8 % (ref 11.5–14.9)
PHENCYCLIDINE, URINE: NEGATIVE
PLATELET # BLD: 428 K/UL (ref 150–450)
PLATELET ESTIMATE: ABNORMAL
PMV BLD AUTO: 8.4 FL (ref 6–12)
POTASSIUM SERPL-SCNC: 3.4 MMOL/L (ref 3.7–5.3)
PROPOXYPHENE, URINE: ABNORMAL
RBC # BLD: 3.29 M/UL (ref 4–5.2)
RBC # BLD: ABNORMAL 10*6/UL
SEG NEUTROPHILS: 79 % (ref 36–66)
SEGMENTED NEUTROPHILS ABSOLUTE COUNT: 7.82 K/UL (ref 1.3–9.1)
SODIUM BLD-SCNC: 138 MMOL/L (ref 135–144)
SPECIMEN DESCRIPTION: NORMAL
STATUS: NORMAL
TEST INFORMATION: ABNORMAL
THYROXINE, FREE: 0.99 NG/DL (ref 0.93–1.7)
TOTAL IRON BINDING CAPACITY: 303 UG/DL (ref 250–450)
TRICYCLIC ANTIDEPRESSANTS, UR: ABNORMAL
TSH SERPL DL<=0.05 MIU/L-ACNC: 1.38 MIU/L (ref 0.3–5)
UNSATURATED IRON BINDING CAPACITY: 287 UG/DL (ref 112–347)
WBC # BLD: 9.9 K/UL (ref 3.5–11)
WBC # BLD: ABNORMAL 10*3/UL

## 2018-09-14 PROCEDURE — 80048 BASIC METABOLIC PNL TOTAL CA: CPT

## 2018-09-14 PROCEDURE — 83550 IRON BINDING TEST: CPT

## 2018-09-14 PROCEDURE — G8979 MOBILITY GOAL STATUS: HCPCS

## 2018-09-14 PROCEDURE — 94760 N-INVAS EAR/PLS OXIMETRY 1: CPT

## 2018-09-14 PROCEDURE — G8988 SELF CARE GOAL STATUS: HCPCS

## 2018-09-14 PROCEDURE — 97166 OT EVAL MOD COMPLEX 45 MIN: CPT

## 2018-09-14 PROCEDURE — 6360000002 HC RX W HCPCS: Performed by: NURSE PRACTITIONER

## 2018-09-14 PROCEDURE — 2500000003 HC RX 250 WO HCPCS: Performed by: FAMILY MEDICINE

## 2018-09-14 PROCEDURE — 6370000000 HC RX 637 (ALT 250 FOR IP): Performed by: FAMILY MEDICINE

## 2018-09-14 PROCEDURE — 85025 COMPLETE CBC W/AUTO DIFF WBC: CPT

## 2018-09-14 PROCEDURE — 84443 ASSAY THYROID STIM HORMONE: CPT

## 2018-09-14 PROCEDURE — 94640 AIRWAY INHALATION TREATMENT: CPT

## 2018-09-14 PROCEDURE — 85014 HEMATOCRIT: CPT

## 2018-09-14 PROCEDURE — 85018 HEMOGLOBIN: CPT

## 2018-09-14 PROCEDURE — 97162 PT EVAL MOD COMPLEX 30 MIN: CPT

## 2018-09-14 PROCEDURE — 94761 N-INVAS EAR/PLS OXIMETRY MLT: CPT

## 2018-09-14 PROCEDURE — 6360000002 HC RX W HCPCS: Performed by: FAMILY MEDICINE

## 2018-09-14 PROCEDURE — 96366 THER/PROPH/DIAG IV INF ADDON: CPT

## 2018-09-14 PROCEDURE — 83735 ASSAY OF MAGNESIUM: CPT

## 2018-09-14 PROCEDURE — 82728 ASSAY OF FERRITIN: CPT

## 2018-09-14 PROCEDURE — 96376 TX/PRO/DX INJ SAME DRUG ADON: CPT

## 2018-09-14 PROCEDURE — 83540 ASSAY OF IRON: CPT

## 2018-09-14 PROCEDURE — 36415 COLL VENOUS BLD VENIPUNCTURE: CPT

## 2018-09-14 PROCEDURE — 2700000000 HC OXYGEN THERAPY PER DAY

## 2018-09-14 PROCEDURE — 2580000003 HC RX 258: Performed by: FAMILY MEDICINE

## 2018-09-14 PROCEDURE — 97116 GAIT TRAINING THERAPY: CPT

## 2018-09-14 PROCEDURE — 95816 EEG AWAKE AND DROWSY: CPT

## 2018-09-14 PROCEDURE — G8978 MOBILITY CURRENT STATUS: HCPCS

## 2018-09-14 PROCEDURE — 2060000000 HC ICU INTERMEDIATE R&B

## 2018-09-14 PROCEDURE — 84439 ASSAY OF FREE THYROXINE: CPT

## 2018-09-14 PROCEDURE — 2580000003 HC RX 258: Performed by: NURSE PRACTITIONER

## 2018-09-14 PROCEDURE — 97530 THERAPEUTIC ACTIVITIES: CPT

## 2018-09-14 PROCEDURE — G8987 SELF CARE CURRENT STATUS: HCPCS

## 2018-09-14 RX ORDER — OXYCODONE HYDROCHLORIDE AND ACETAMINOPHEN 5; 325 MG/1; MG/1
1 TABLET ORAL EVERY 6 HOURS PRN
Status: DISCONTINUED | OUTPATIENT
Start: 2018-09-14 | End: 2018-09-18 | Stop reason: HOSPADM

## 2018-09-14 RX ORDER — HYDROCODONE BITARTRATE AND ACETAMINOPHEN 5; 325 MG/1; MG/1
1 TABLET ORAL EVERY 6 HOURS PRN
Status: DISCONTINUED | OUTPATIENT
Start: 2018-09-14 | End: 2018-09-14

## 2018-09-14 RX ORDER — TRAMADOL HYDROCHLORIDE 50 MG/1
50 TABLET ORAL EVERY 6 HOURS PRN
Status: DISCONTINUED | OUTPATIENT
Start: 2018-09-14 | End: 2018-09-18 | Stop reason: HOSPADM

## 2018-09-14 RX ADMIN — IPRATROPIUM BROMIDE AND ALBUTEROL SULFATE 1 AMPULE: .5; 3 SOLUTION RESPIRATORY (INHALATION) at 07:30

## 2018-09-14 RX ADMIN — VENLAFAXINE HYDROCHLORIDE 150 MG: 150 CAPSULE, EXTENDED RELEASE ORAL at 11:21

## 2018-09-14 RX ADMIN — FAMOTIDINE 20 MG: 20 TABLET ORAL at 20:32

## 2018-09-14 RX ADMIN — TRAMADOL HYDROCHLORIDE 50 MG: 50 TABLET, FILM COATED ORAL at 20:32

## 2018-09-14 RX ADMIN — FOLIC ACID: 5 INJECTION, SOLUTION INTRAMUSCULAR; INTRAVENOUS; SUBCUTANEOUS at 20:27

## 2018-09-14 RX ADMIN — Medication 10 ML: at 20:34

## 2018-09-14 RX ADMIN — AZITHROMYCIN MONOHYDRATE 500 MG: 500 INJECTION, POWDER, LYOPHILIZED, FOR SOLUTION INTRAVENOUS at 14:58

## 2018-09-14 RX ADMIN — ACETAMINOPHEN 650 MG: 325 TABLET, FILM COATED ORAL at 05:46

## 2018-09-14 RX ADMIN — IPRATROPIUM BROMIDE AND ALBUTEROL SULFATE 1 AMPULE: .5; 3 SOLUTION RESPIRATORY (INHALATION) at 20:08

## 2018-09-14 RX ADMIN — FAMOTIDINE 20 MG: 20 TABLET ORAL at 11:18

## 2018-09-14 RX ADMIN — FERROUS SULFATE TAB 325 MG (65 MG ELEMENTAL FE) 325 MG: 325 (65 FE) TAB at 11:18

## 2018-09-14 RX ADMIN — Medication 10 ML: at 20:37

## 2018-09-14 RX ADMIN — IPRATROPIUM BROMIDE AND ALBUTEROL SULFATE 1 AMPULE: .5; 3 SOLUTION RESPIRATORY (INHALATION) at 23:22

## 2018-09-14 RX ADMIN — CEFTRIAXONE SODIUM 1 G: 1 INJECTION, POWDER, FOR SOLUTION INTRAMUSCULAR; INTRAVENOUS at 11:27

## 2018-09-14 RX ADMIN — POTASSIUM CHLORIDE 40 MEQ: 20 TABLET, EXTENDED RELEASE ORAL at 06:23

## 2018-09-14 RX ADMIN — Medication 10 ML: at 20:36

## 2018-09-14 RX ADMIN — IPRATROPIUM BROMIDE AND ALBUTEROL SULFATE 1 AMPULE: .5; 3 SOLUTION RESPIRATORY (INHALATION) at 11:18

## 2018-09-14 RX ADMIN — IPRATROPIUM BROMIDE AND ALBUTEROL SULFATE 1 AMPULE: .5; 3 SOLUTION RESPIRATORY (INHALATION) at 14:40

## 2018-09-14 RX ADMIN — IPRATROPIUM BROMIDE AND ALBUTEROL SULFATE 1 AMPULE: .5; 3 SOLUTION RESPIRATORY (INHALATION) at 04:29

## 2018-09-14 RX ADMIN — IPRATROPIUM BROMIDE AND ALBUTEROL SULFATE 1 AMPULE: .5; 3 SOLUTION RESPIRATORY (INHALATION) at 00:51

## 2018-09-14 RX ADMIN — LEVOTHYROXINE SODIUM 50 MCG: 0.05 TABLET ORAL at 05:43

## 2018-09-14 RX ADMIN — SODIUM CHLORIDE: 9 INJECTION, SOLUTION INTRAVENOUS at 04:18

## 2018-09-14 RX ADMIN — HYDROCODONE BITARTRATE AND ACETAMINOPHEN 1 TABLET: 5; 325 TABLET ORAL at 18:48

## 2018-09-14 ASSESSMENT — PAIN DESCRIPTION - FREQUENCY
FREQUENCY: CONTINUOUS
FREQUENCY: CONTINUOUS

## 2018-09-14 ASSESSMENT — PAIN DESCRIPTION - PAIN TYPE
TYPE: ACUTE PAIN
TYPE: ACUTE PAIN;SURGICAL PAIN
TYPE: ACUTE PAIN;SURGICAL PAIN

## 2018-09-14 ASSESSMENT — PAIN DESCRIPTION - PROGRESSION

## 2018-09-14 ASSESSMENT — PAIN DESCRIPTION - LOCATION
LOCATION: KNEE
LOCATION: KNEE
LOCATION: BACK

## 2018-09-14 ASSESSMENT — PAIN SCALES - GENERAL
PAINLEVEL_OUTOF10: 8
PAINLEVEL_OUTOF10: 8
PAINLEVEL_OUTOF10: 6
PAINLEVEL_OUTOF10: 9
PAINLEVEL_OUTOF10: 3
PAINLEVEL_OUTOF10: 8
PAINLEVEL_OUTOF10: 8

## 2018-09-14 ASSESSMENT — PAIN DESCRIPTION - ONSET: ONSET: ON-GOING

## 2018-09-14 ASSESSMENT — PAIN DESCRIPTION - ORIENTATION
ORIENTATION: LEFT
ORIENTATION: LOWER
ORIENTATION: LEFT

## 2018-09-14 ASSESSMENT — PAIN DESCRIPTION - DESCRIPTORS
DESCRIPTORS: BURNING
DESCRIPTORS: BURNING

## 2018-09-14 NOTE — PROGRESS NOTES
[address] [facility]    Occupational Therapy Evaluation  Date: 18  Patient Name: Carlito Girard       Room:   MRN: [id number]  Account: [de-identified]   : 1945  (72 y.o.) Gender: female     Discharge Recommendations:  [address]    Referring Practitioner: Dr. Chayito Adams  Diagnosis: Dehydration with L TKA on  at [facility]    Treatment Diagnosis: Impaired self-care. Past Medical History:  has a past medical history of Anxiety; Chronic back pain; Hyperlipidemia; Hypertension; Hypothyroid; Leg pain; and Osteoarthritis. Past Surgical History:   has a past surgical history that includes eye surgery (Bilateral); Colonoscopy; and joint replacement. Restrictions  Restrictions/Precautions: Weight Bearing, Surgical Protocols, Fall Risk (L TKA 18)  Implants present? : Metal implants (Left TKA)  Left Lower Extremity Weight Bearing: Weight Bearing As Tolerated  Required Braces or Orthoses?: No     Vitals  Temp: 99.3 °F (37.4 °C)  Pulse: 102  Resp: 16  BP: (!) 123/57  Height: 5' 7\" (170.2 cm)  Weight: 180 lb (81.6 kg)  BMI (Calculated): 28.3  Oxygen Therapy  SpO2: 96 %  Pulse Oximeter Device Mode: Intermittent  Pulse Oximeter Device Location: Finger  O2 Device: Nasal cannula  O2 Flow Rate (L/min): 2 L/min  Blood Gas  Performed?: No  Level of Consciousness: Alert    Subjective  Subjective: \"I fell about three times\"  Comments: Pt reports falling at home after being discharged same day of L TKA.      Vision  Vision: Impaired  Vision Exceptions: Wears glasses at all times  Hearing  Hearing: Within functional limits  Social/Functional History  Lives With: Alone  Type of Home: House  Home Layout: One level  Home Access: Stairs to enter without rails  Entrance Stairs - Number of Steps: 1  Bathroom Shower/Tub: Tub/Shower unit, Shower chair without back, Curtain  Bathroom Toilet: Standard  Bathroom Equipment: Grab bars around toilet, Toilet raiser, Grab bars in shower, [employer] chair  Bathroom Accessibility: Accessible  Home Equipment: Standard walker, Reacher, Grab bars, [employer] Help From: Family (2 sons)  ADL Assistance: Independent  Homemaking Assistance: Independent  Homemaking Responsibilities: Yes  Ambulation Assistance: Independent (Pt used cane prior to L TKA)  Transfer Assistance: Independent  Active : Yes  Mode of Transportation: SUV  Occupation: Retired  Leisure & Hobbies: Pt enjoys spending time with grandchildren  Additional Comments: Pt reports two sons and daughter-in-law can provide A PRN. Pt had L TKA at [facility] on 9/11 and left the same day. Pt reports feeling tired and \"passing out\" on Thursday prior to admission to [facility]. Pt reports falling on Thursday in kitchen, but is unsure of why she fell. Pt had home health therapy once home after surgery. Objective  Sensation  Overall Sensation Status: WFL (denies)   ADL  Feeding: Independent  Grooming: Setup  UE Bathing: Setup  LE Bathing: Moderate assistance  UE Dressing: Setup  LE Dressing: Maximum assistance  Toileting: Maximum assistance  Additional Comments: Pt donned R sock with SBA while seated EOB. Pt required A to don L sock. Pt engaged in functional mobility with RW and Min A for 30 feet, 15 feet, and 20 feet. Pt seated in recliner at end of OT session.     UE Function  LUE Strength  Gross LUE Strength: WFL  L Hand Grasp: 4/5  LUE Strength Comment: Shoulder 4-/5, elbow 4/5     LUE Tone: Normotonic     LUE AROM (degrees)  LUE AROM : WFL     Left Hand AROM (degrees)  Left Hand AROM: WFL  RUE Strength  Gross RUE Strength: WFL  R Hand Grasp: 4/5  RUE Strength Comment: Shoulder 4-/5, elbow 4/5      RUE Tone: Normotonic     RUE AROM (degrees)  RUE AROM : WFL     Right Hand AROM (degrees)  Right Hand AROM: WFL    Fine Motor Skills  Coordination  Movements Are Fluid And Coordinated: Yes     Mobility  Supine to Sit: Minimal assistance       Balance  Sitting Balance: Stand by assistance (SBA seated EOB)  Standing Balance: Minimal assistance  Standing Balance  Time: ~4 minutes  Activity: functional mobility  Sit to stand: Moderate assistance (x2)  Stand to sit: Minimal assistance  Functional Mobility  Functional - Mobility Device: Rolling Walker  Activity: Other (30 feet, 15 feet, 20 feet)  Assist Level: Minimal assistance  Functional Mobility Comments: VCs for hand placement and safety  Bed mobility  Rolling to Right: Minimal assistance  Supine to Sit: Minimal assistance  Scooting: Minimal assistance  Comment: dangled at the EOB w/ close supervision     Transfers  Sit to stand: Moderate assistance (x2)  Stand to sit: Minimal assistance      Assessment  Performance deficits / Impairments: Decreased functional mobility , Decreased ADL status, Decreased safe awareness, Decreased endurance, Decreased balance, Decreased high-level IADLs, Decreased strength  Treatment Diagnosis: Impaired self-care. Prognosis: Good  Decision Making: Medium Complexity  Patient Education: OT POC, d/c rec: SNF  REQUIRES OT FOLLOW UP: Yes  Discharge Recommendations: Subacute/Skilled Nursing Facility  Activity Tolerance: Patient Tolerated treatment well, Patient limited by fatigue, Patient limited by pain    G CODE  OT G-codes  Functional Assessment Tool Used: AM-PAC  Score: 15/24  Functional Limitation: Self care  Self Care Current Status (): At least 40 percent but less than 60 percent impaired, limited or restricted  Self Care Goal Status (): At least 20 percent but less than 40 percent impaired, limited or restricted    Goals  Patient Goals   Patient goals : To go to SNF prior to d/c home  Short term goals  Time Frame for Short term goals: By discharge  Short term goal 1: Pt will V/D good understanding of AE/DME/modified technqiues for functional ADLs. Short term goal 2: Pt will perform lower body dressing/bathing and toileting tasks with Min A.   Short term goal 3: Pt will stand for 5+ minute with 0-1 UE support and CGA while engaging in functional activity of choice. Short term goal 4: Pt will participate in 15+ minutes of therapeutic exercise/functional activities to promote increased IND with self-care and mobility. Short term goal 5: Pt will perform functional transfers and mobility with RW, CGA, and good safety awareness.     Plan  Safety Devices  Safety Devices in place: Yes  Type of devices: Call light within reach, All fall risk precautions in place, Gait belt, Left in chair, Nurse notified     Plan  Times per week: 5  Times per day: Daily  Current Treatment Recommendations: Balance Training, Functional Mobility Training, Strengthening, Endurance Training, Safety Education & Training, Patient/Caregiver Education & Training, Equipment Evaluation, Education, & procurement, Self-Care / ADL, Home Management Training    Equipment Recommendations  Equipment Needed:  (TBD)  OT Individual Minutes  Time In: 0940  Time Out: 1024  Minutes: 44    Electronically signed by Eduar Zapien OT on 9/14/18 at 11:34 AM

## 2018-09-14 NOTE — CONSULTS
[place] [address] of Neurology      Requesting Physician:  Kwaku Horne MD/Dylan Costa MD     CHIEF COMPLAINT:     Altered mental status. Multiple falls. This lady had her surgery on 9/11/2018. Left knee joint repair at the [facility].  Discharge 2 days later. On multiple sedative hypnotic analgesics. Multiple bouts of collapse. Needed help to get up. Confused disoriented and not her usual self. Now in the hospital.  No cranial or spinal injury. Medical problems listed. Previous smoker. Drinks heavy liquor in small quantities Every night. Moderate stage and pleasant middle-aged lady sitting in a chair. Awake alert and cooperative in presence of her daughter-in-law. Orientation ×3. Moderately good insight. Recall appropriate. Extraocular movements are full. Minimal flattening left nasal labial fold. Tongue palate uvula midline. Upper extremity and right lower extremity 5/5 strength. Proximal and distal left lower extremity 5 minus. 1+ reflexes. Snout palmomental and grasp reflexes absent. Cardiac and carotid auscultation is unremarkable. Assessment:  Transient encephalopathy from medication effect. According to patient's current mental status and daughter-in-law's confirmation, she is back to her usual self. Workup will include EEG to rule out any ictal activity for her collapse. CT brain already unremarkable. Pattern profile will be checked. Urinalysis is unremarkable. She may have already received IV vitamin infusion. Can be discharged at your discretion.     HISTORY OF PRESENT ILLNESS:                 The patient is a 72 y.o. female who presents with     Allergies:  Tizanidine    Current Medications:   Scheduled Meds:   sodium chloride flush  10 mL Intravenous 2 times per day    sodium chloride flush  10 mL Intravenous 2 times per day    famotidine  20 mg Oral BID    sodium chloride flush  10 mL Intravenous 2 times per day    thiamine  100 mg Intramuscular Eyes: Negative for pain, discharge and redness. Respiratory: Negative for cough, chest tightness, shortness of breath and wheezing. Cardiovascular: Negative for chest pain, palpitations and leg swelling. Gastrointestinal: Negative for abdominal pain, blood in stool, constipation, diarrhea, nausea and vomiting. Genitourinary: Negative for difficulty urinating, dysuria, flank pain, frequency, genital sores and hematuria. Musculoskeletal: Negative for arthralgias, back pain, gait problem, joint swelling, myalgias and neck pain. Multiple falls with left knee pain   Skin: Negative for color change, pallor, rash and wound. Neurological: Negative for dizziness, tremors, seizures, syncope, speech difficulty, weakness, numbness and headaches. Psychiatric/Behavioral: Positive for confusion. Negative for decreased concentration, hallucinations, self-injury, sleep disturbance and suicidal ideas.            PHYSICAL EXAM:       BP (!) 123/57   Pulse 102   Temp 99.3 °F (37.4 °C) (Oral)   Resp 16   Ht 5' 7\" (1.702 m)   Wt 180 lb (81.6 kg)   SpO2 96%   BMI 28.19 kg/m²       LABS AND IMAGING:       Admission on 09/13/2018   No results displayed because visit has over 200 results. Radiology Review:  Xr Chest Standard (2 Vw)    Result Date: 9/13/2018  EXAMINATION: TWO VIEWS OF THE CHEST 9/13/2018 11:05 am COMPARISON: Radiographs from March 18, 2011 HISTORY: ORDERING SYSTEM PROVIDED HISTORY: [facility] TECHNOLOGIST PROVIDED HISTORY: [facility] Ordering Physician Provided Reason for Exam: Pt states dizziness; Hx COPD Acuity: Acute Type of Exam: Unknown Additional signs and symptoms: Pt states dizziness; Hx COPD FINDINGS: The heart is enlarged. Mild vascular congestion is present. There is a left-sided pleural effusion. Obscured left hemidiaphragm likely due to the presence of effusion. No focal consolidation is seen on lateral view. Osseous structures are intact.      Left-sided pleural effusion obscuring the left hemidiaphragm. Cardiomegaly and vascular congestion raise the possibility of early CHF decompensation. Ct Head Wo Contrast    Result Date: 9/13/2018  EXAMINATION: CT OF THE HEAD WITHOUT CONTRAST  9/13/2018 6:46 am TECHNIQUE: CT of the head was performed without the administration of intravenous contrast. Dose modulation, iterative reconstruction, and/or weight based adjustment of the mA/kV was utilized to reduce the radiation dose to as low as reasonably achievable. COMPARISON: None. HISTORY: ORDERING SYSTEM PROVIDED HISTORY: Dizziness, fall, AMS TECHNOLOGIST PROVIDED HISTORY: Ordering Physician Provided Reason for Exam: ams Acuity: Unknown Type of Exam: Unknown FINDINGS: BRAIN/VENTRICLES: There is mild prominence of the ventricles and cortical sulci consistent with cortical volume loss. No midline shift. Basal cisterns are normally outlined. There is no evidence for acute infarct. No acute intra or extra-axial hemorrhage. ORBITS: The visualized portion of the orbits demonstrate no acute abnormality. SINUSES: The visualized paranasal sinuses and mastoid air cells demonstrate no acute abnormality. SOFT TISSUES/SKULL:  No acute abnormality of the visualized skull or soft tissues. No acute intracranial abnormality. Mild cerebral atrophy. ASSESSMENT AND PLAN:       Patient Active Problem List   Diagnosis    Hypertension    Osteoarthritis of both knees    Dehydration    A-fib ([address]    SUNSHINE (acute kidney injury) ([address]    Acute encephalopathy    Acute respiratory distress    ETOH abuse    SIRS (systemic inflammatory response syndrome) (HCC)    Normocytic anemia    Pulmonary hypertension (HCC)    COPD (chronic obstructive pulmonary disease) ([address]    History of GI bleed    Fall    Acute renal failure with tubular necrosis ([address]    Acute delirium         Mary Kate M.D.   Neurology  9/14/2018  12:30 PM

## 2018-09-14 NOTE — PROGRESS NOTES
absent    Current Laboratory, Radiologic, Microbiologic, and Diagnostic studies reviewed  Data ReviewCBC:   Recent Labs      09/13/18   0555  09/13/18   1807  09/13/18   1950  09/14/18   0508   WBC  11.7*   --    --   9.9   RBC  3.54*   --    --   3.29*   HGB  9.4*  8.7*  8.8*  8.7*   HCT  30.6*  29.2*  28.7*  28.3*   PLT  452*   --    --   428     BMP:   Recent Labs      09/13/18   0555  09/14/18   0508   GLUCOSE  115*  122*   NA  133*  138   K  4.0  3.4*   BUN  37*  15   CREATININE  1.67*  0.72   CALCIUM  9.2  8.9     ABGs:   Recent Labs      09/13/18   0920   PHART  7.330*   PO2ART  74.8*   [id number]  47.3*   [id number]  24.9   [id number]  93.0*      PT/INR:  No results found for: PTINR    ASSESSMENT / PLAN:    Dx respiratory failure       Encephalopathy  - improving       Dehydration, creatinine 1.67, baseline 0.78 - improving       PNA       afib       Falls  Hx COPD, ETOH  S/P OP surgery on 9-11-18 for total left knee  Continue BD  Suspected pneumonia - on ABx  Pain control  IS  PT    Electronically signed by J Luis Benjamin on 09/14/18

## 2018-09-14 NOTE — PROGRESS NOTES
Progress Note    9/14/2018   8:49 AM    Name:  Etelvina Soria  MRN:    [id number]     Acct:     [de-identified]   Room:  [facility] [place] [address] Day: 0     Admit Date: 9/13/2018  5:43 AM  PCP: Jerman Gomes MD    Subjective:     C/C:   Chief Complaint   Patient presents with   [facility] Altered Mental Status    Knee Pain     left       Interval History: Status: not changed. Patient still very drowsy   confused   complaining of left knee pain    ROS:  Constitutional: negative for chills, fevers and sweats  Respiratory: negative for cough, dyspnea on exertion, hemoptysis, shortness of breath and wheezing  Cardiovascular: negative for chest pain, chest pressure/discomfort, dyspnea, lower extremity edema and palpitations  Gastrointestinal: negative for abdominal pain, constipation, diarrhea, nausea and vomiting  Neurological: negative for dizziness and headaches    Medications: Allergies:    Allergies   Allergen Reactions    Tizanidine Other (See Comments)     Patient states it makes her loopy       Current Meds:   sodium chloride flush 0.9 % injection 10 mL 2 times per day   sodium chloride flush 0.9 % injection 10 mL PRN   acetaminophen (TYLENOL) tablet 650 mg Q4H PRN   0.9 % sodium chloride infusion Continuous   sodium chloride flush 0.9 % injection 10 mL 2 times per day   sodium chloride flush 0.9 % injection 10 mL PRN   magnesium hydroxide (MILK OF MAGNESIA) 400 MG/5ML suspension 30 mL Daily PRN   ondansetron (ZOFRAN) injection 4 mg Q6H PRN   potassium chloride (KLOR-CON M) extended release tablet 40 mEq PRN   Or    potassium chloride 20 MEQ/15ML (10%) oral solution 40 mEq PRN   Or    potassium chloride 10 mEq/100 mL IVPB (Peripheral Line) PRN   famotidine (PEPCID) tablet 20 mg BID   sodium chloride flush 0.9 % injection 10 mL 2 times per day   sodium chloride flush 0.9 % injection 10 mL PRN   LORazepam (ATIVAN) tablet 1 mg Q1H PRN   Or    LORazepam (ATIVAN) injection 1 mg Q1H PRN   Or    LORazepam (ATIVAN) tablet 2 mg Q1H PRN Interp         TSH without Reflex    Collection Time: 09/13/18  9:29 AM   Result Value Ref Range    TSH 2.21 0.30 - 5.00 mIU/L   PROTIME-INR    Collection Time: 09/13/18  9:32 AM   Result Value Ref Range    Protime 10.8 9.7 - 12.0 sec    INR 1.0    APTT    Collection Time: 09/13/18  9:32 AM   Result Value Ref Range    PTT 31.1 (H) 23.0 - 31.0 sec   EKG 12 Lead    Collection Time: 09/13/18  9:35 AM   Result Value Ref Range    Ventricular Rate 104 BPM    Atrial Rate 182 BPM    QRS Duration 64 ms    Q-T Interval 318 ms    QTc Calculation (Bazett) 418 ms    R Axis 60 degrees    T Axis 77 degrees   Brain Natriuretic Peptide    Collection Time: 09/13/18  6:07 PM   Result Value Ref Range    Pro-BNP 2,256 (H) <300 pg/mL    BNP Interpretation         Hgb/Hct    Collection Time: 09/13/18  6:07 PM   Result Value Ref Range    Hemoglobin 8.7 (L) 12.0 - 16.0 g/dL    Hematocrit 29.2 (L) 36 - 46 %   Hgb/Hct    Collection Time: 09/13/18  7:50 PM   Result Value Ref Range    Hemoglobin 8.8 (L) 12.0 - 16.0 g/dL    Hematocrit 28.7 (L) 36 - 46 %   Basic Metabolic Panel w/ Reflex to MG    Collection Time: 09/14/18  5:08 AM   Result Value Ref Range    Glucose 122 (H) 70 - 99 mg/dL    BUN 15 8 - 23 mg/dL    CREATININE 0.72 0.50 - 0.90 mg/dL    Bun/Cre Ratio NOT REPORTED 9 - 20    Calcium 8.9 8.6 - 10.4 mg/dL    Sodium 138 135 - 144 mmol/L    Potassium 3.4 (L) 3.7 - 5.3 mmol/L    Chloride 100 98 - 107 mmol/L    CO2 26 20 - 31 mmol/L    Anion Gap 12 9 - 17 mmol/L    GFR Non-African American >60 >60 mL/min    GFR African American >60 >60 mL/min    GFR Comment          GFR Staging NOT REPORTED    CBC auto differential    Collection Time: 09/14/18  5:08 AM   Result Value Ref Range    WBC 9.9 3.5 - 11.0 k/uL    RBC 3.29 (L) 4.0 - 5.2 m/uL    Hemoglobin 8.7 (L) 12.0 - 16.0 g/dL    Hematocrit 28.3 (L) 36 - 46 %    MCV 85.9 80 - 100 fL    MCH 26.5 26 - 34 pg    MCHC 30.8 (L) 31 - 37 g/dL    RDW 24.8 (H) 11.5 - 14.9 %    Platelets 493 392 - 986 k/uL MPV 8.4 6.0 - 12.0 fL    NRBC Automated NOT REPORTED per 100 WBC    Differential Type NOT REPORTED     Immature Granulocytes NOT REPORTED 0 %    Absolute Immature Granulocyte NOT REPORTED 0.00 - 0.30 k/uL    WBC Morphology NOT REPORTED     RBC Morphology NOT REPORTED     Platelet Estimate NOT REPORTED     Seg Neutrophils 79 (H) 36 - 66 %    Lymphocytes 7 (L) 24 - 44 %    Monocytes 12 (H) 1 - 7 %    Eosinophils % 1 0 - 4 %    Basophils 1 0 - 2 %    Segs Absolute 7.82 1.3 - 9.1 k/uL    Absolute Lymph # 0.69 (L) 1.0 - 4.8 k/uL    Absolute Mono # 1.19 0.1 - 1.3 k/uL    Absolute Eos # 0.10 0.0 - 0.4 k/uL    Basophils # 0.10 0.0 - 0.2 k/uL    Morphology 1+    Magnesium    Collection Time: 18  5:08 AM   Result Value Ref Range    Magnesium 2.1 1.6 - 2.6 mg/dL       Physical Examination:        Vitals:  BP (!) 123/57   Pulse 102   Temp 99.3 °F (37.4 °C) (Oral)   Resp 17   Ht 5' 7\" (1.702 m)   Wt 180 lb (81.6 kg)   SpO2 97%   BMI 28.19 kg/m²   Temp (24hrs), Av.7 °F (37.1 °C), Min:97.7 °F (36.5 °C), Max:99.3 °F (37.4 °C)    No results for input(s): POCGLU in the last 72 hours.     General appearance -  Very drowsy  Mental status -  confused  Head - normocephalic and atraumatic  Eyes - pupils equal and reactive, extraocular eye movements intact, conjunctiva clear  Ears - hearing appears to be intact  Nose - no drainage noted  Mouth - mucous membranes moist  Neck - supple, no carotid bruits, thyroid not palpable  Chest - clear to auscultation, normal effort  Heart - normal rate,  irregular rhythm, no murmurs  Abdomen - soft, nontender, nondistended, bowel sounds present all four quadrants, no masses, hepatomegaly or splenomegaly  Neurological - normal speech, no focal findings or movement disorder noted, cranial nerves II through XII grossly intact  Extremities - peripheral pulses palpable, no pedal edema or calf pain with palpation, status post knee surgery  Skin - no gross lesions, rashes, or induration

## 2018-09-14 NOTE — PLAN OF CARE
Problem: Safety:  Goal: Free from accidental physical injury  Free from accidental physical injury   Outcome: Ongoing  Safety maintained, call light is within reach, no s/s or c/o distress, pt medicated as ordered and PRN, bed is low/locked, side rails are up x2, bed alarm remains on

## 2018-09-14 NOTE — CARE COORDINATION
SW received info from Aishwarya that this patient is going to need skilled rehab for a short time. SW checked the chart for PT notes and found that PT has tried on two separate occasions to see this patient for an evaluation but it has yet to be completed. SW called therapy and asked that PT try again on this date ASAP to see if this patient does need placement. SW will continue to follow. ADD:  SW noted that therapy is recommending skilled rehab prior to discharging to home. SW went to speak with this patient and she reported that she was willing to go and she chose [facility] [place] [facility]. SW spoke with her about her insurance and she informed this SW that family is supposed to be bringing her insurance cards to the hospital on this date. Right at that moment, a family member walked in and she reported that she had the card. She presented SW with a Medicare card. The patient reports that she has an [employer] and SW asked that they deep looking for the card. The patient reported that her son will come later and he should bring the card. Meanwhile, ERICA did fax the referral to [facility] [place] and attempted to follow up with Jessica Fleischer to talk about the insurance issue. ERICA did request that Jessica Fleischer call this SW back. ERICA then called [place], with admissions at [facility]. She reported that she did get the referral and SW did speak with her about the insurance issue. Mert Solomon seems to think that she is a straight Medicare and that her Medicare for medications is the one that is a managed plan. ERICA will continue to follow.

## 2018-09-14 NOTE — PROGRESS NOTES
ball, O2 at 2 L    AROM RLE (degrees)  RLE General AROM: AROM 13-45 degrees supine left knee flexion, AAROM for left knee flexion 63 degrees supine  AROM LLE (degrees)  LLE AROM : WFL  AROM RUE (degrees)  RUE General AROM: see OT for UE assessment  AROM LUE (degrees)  LUE General AROM: see OT for UE assessment  Strength RLE  Comment: 5/5  Strength LLE  Strength LLE: Exception  Comment: recent left TKR on 9-11-18  L Hip Flexion: 3-/5  L Hip ABduction: 4-/5  L Hip ADduction: 4-/5  L Knee Flexion: 3-/5  L Knee Extension: 3+/5  L Ankle Dorsiflexion: 5/5  L Ankle Plantar Flexion: 5/5  Strength RUE  Comment: see OT for UE assessment  Strength LUE  Comment: see OT for UE assessment     Sensation  Overall Sensation Status: WFL (denies)  Bed mobility  Rolling to Right: Minimal assistance  Supine to Sit: Minimal assistance  Scooting: Minimal assistance  Comment: dangled at the EOB w/ close supervision  Transfers  Sit to Stand: Moderate Assistance (x2, verbal cues for hand placement, initially very unsteady w/ posterior lean- pt needed verbal cuing to stand erect and walk feet backwards underneath her to improve standing balance)  Stand to sit: Minimal Assistance (x1, verbal cues to reach back)  Stand Pivot Transfers: Minimal Assistance (used w walker, verbal cues for technique)  Ambulation  Ambulation?: Yes  WB Status: WBAT left  Ambulation 1  Surface: level tile  Device: Rolling Walker  Assistance: Minimal assistance (2nd assist for IV pole only)  Quality of Gait: , verbal cues for technique, slow gait pattern, slightly unsteady  Distance: 35', 15' and 20'  Stairs/Curb  Stairs?: No     Balance  Sitting - Static: Good  Sitting - Dynamic: Good;-  Standing - Static: Fair;- (used w walker)  Standing - Dynamic: Fair (used w walker)        Assessment   Body structures, Functions, Activity limitations: Decreased functional mobility ; Decreased safe awareness;Decreased balance;Decreased ROM; Decreased strength  Assessment: pt would Moving Around Goal Status ():  At least 40 percent but less than 60 percent impaired, limited or restricted  OutComes Score                                           AM-PAC Score  AM-PAC Inpatient Mobility Raw Score : 14  AM-PAC Inpatient T-Scale Score : 38.1  Mobility Inpatient CMS 0-100% Score: 61.29  Mobility Inpatient CMS G-Code Modifier : CL          Goals  Short term goals  Time Frame for Short term goals: 5-7 treatments/ week  Short term goal 1: pt to independently roll and transfer supine to sit  Short term goal 2: pt to transfer sit <> stand and bed <> chair using w walker left WBAT w/ CGA x 1  Short term goal 3: pt to ambulate w/ w walker left WBAT 50-80' w/ CGA x 1 demonstrating good balance and technique  Short term goal 4: pt to demonstrate good technique w/ exercises S/P left TKR  Short term goal 5: increase AAROM left knee flexion 5-80 degrees  Short term goal 6: pt to advance to 4\" platform step using w walker left WBAT and min x 2  Patient Goals   Patient goals : go to SNF until stronger       Therapy Time   Individual Concurrent Group Co-treatment   Time In 0940         Time Out 1024         Minutes 44         Timed Code Treatment Minutes: 24 Minutes       Mary Etienne, PT

## 2018-09-15 ENCOUNTER — APPOINTMENT (OUTPATIENT)
Dept: GENERAL RADIOLOGY | Age: 73
DRG: 682 | End: 2018-09-15
Payer: MEDICARE

## 2018-09-15 PROBLEM — K59.00 CONSTIPATION: Status: ACTIVE | Noted: 2018-09-15

## 2018-09-15 LAB
ANION GAP SERPL CALCULATED.3IONS-SCNC: 10 MMOL/L (ref 9–17)
BUN BLDV-MCNC: 9 MG/DL (ref 8–23)
BUN/CREAT BLD: ABNORMAL (ref 9–20)
CALCIUM SERPL-MCNC: 8.8 MG/DL (ref 8.6–10.4)
CHLORIDE BLD-SCNC: 102 MMOL/L (ref 98–107)
CO2: 28 MMOL/L (ref 20–31)
CREAT SERPL-MCNC: 0.53 MG/DL (ref 0.5–0.9)
DATE, STOOL #1: NORMAL
DATE, STOOL #2: NORMAL
DATE, STOOL #3: NORMAL
GFR AFRICAN AMERICAN: >60 ML/MIN
GFR NON-AFRICAN AMERICAN: >60 ML/MIN
GFR SERPL CREATININE-BSD FRML MDRD: ABNORMAL ML/MIN/{1.73_M2}
GFR SERPL CREATININE-BSD FRML MDRD: ABNORMAL ML/MIN/{1.73_M2}
GLUCOSE BLD-MCNC: 122 MG/DL (ref 70–99)
HCT VFR BLD CALC: 29.2 % (ref 36–46)
HEMOCCULT SP1 STL QL: NEGATIVE
HEMOCCULT SP2 STL QL: NORMAL
HEMOCCULT SP3 STL QL: NORMAL
HEMOGLOBIN: 9 G/DL (ref 12–16)
POTASSIUM SERPL-SCNC: 4.4 MMOL/L (ref 3.7–5.3)
SODIUM BLD-SCNC: 140 MMOL/L (ref 135–144)
TIME, STOOL #1: NORMAL
TIME, STOOL #2: NORMAL
TIME, STOOL #3: NORMAL

## 2018-09-15 PROCEDURE — 94761 N-INVAS EAR/PLS OXIMETRY MLT: CPT

## 2018-09-15 PROCEDURE — 85018 HEMOGLOBIN: CPT

## 2018-09-15 PROCEDURE — 6370000000 HC RX 637 (ALT 250 FOR IP): Performed by: FAMILY MEDICINE

## 2018-09-15 PROCEDURE — 80048 BASIC METABOLIC PNL TOTAL CA: CPT

## 2018-09-15 PROCEDURE — 36430 TRANSFUSION BLD/BLD COMPNT: CPT

## 2018-09-15 PROCEDURE — 2700000000 HC OXYGEN THERAPY PER DAY

## 2018-09-15 PROCEDURE — 2060000000 HC ICU INTERMEDIATE R&B

## 2018-09-15 PROCEDURE — 94640 AIRWAY INHALATION TREATMENT: CPT

## 2018-09-15 PROCEDURE — 2580000003 HC RX 258: Performed by: NURSE PRACTITIONER

## 2018-09-15 PROCEDURE — 85014 HEMATOCRIT: CPT

## 2018-09-15 PROCEDURE — 2500000003 HC RX 250 WO HCPCS: Performed by: FAMILY MEDICINE

## 2018-09-15 PROCEDURE — 36415 COLL VENOUS BLD VENIPUNCTURE: CPT

## 2018-09-15 PROCEDURE — 71045 X-RAY EXAM CHEST 1 VIEW: CPT

## 2018-09-15 PROCEDURE — 2580000003 HC RX 258: Performed by: FAMILY MEDICINE

## 2018-09-15 PROCEDURE — G0328 FECAL BLOOD SCRN IMMUNOASSAY: HCPCS

## 2018-09-15 PROCEDURE — 6360000002 HC RX W HCPCS: Performed by: NURSE PRACTITIONER

## 2018-09-15 PROCEDURE — 6360000002 HC RX W HCPCS: Performed by: FAMILY MEDICINE

## 2018-09-15 RX ORDER — SENNA PLUS 8.6 MG/1
1 TABLET ORAL 2 TIMES DAILY
Status: DISCONTINUED | OUTPATIENT
Start: 2018-09-15 | End: 2018-09-18

## 2018-09-15 RX ORDER — POLYETHYLENE GLYCOL 3350 17 G/17G
17 POWDER, FOR SOLUTION ORAL DAILY PRN
Status: DISCONTINUED | OUTPATIENT
Start: 2018-09-15 | End: 2018-09-18 | Stop reason: HOSPADM

## 2018-09-15 RX ORDER — BISACODYL 10 MG
10 SUPPOSITORY, RECTAL RECTAL DAILY PRN
Status: DISCONTINUED | OUTPATIENT
Start: 2018-09-15 | End: 2018-09-18 | Stop reason: HOSPADM

## 2018-09-15 RX ADMIN — LORAZEPAM 1 MG: 2 INJECTION INTRAMUSCULAR; INTRAVENOUS at 00:46

## 2018-09-15 RX ADMIN — FERROUS SULFATE TAB 325 MG (65 MG ELEMENTAL FE) 325 MG: 325 (65 FE) TAB at 09:35

## 2018-09-15 RX ADMIN — FOLIC ACID: 5 INJECTION, SOLUTION INTRAMUSCULAR; INTRAVENOUS; SUBCUTANEOUS at 11:11

## 2018-09-15 RX ADMIN — IPRATROPIUM BROMIDE AND ALBUTEROL SULFATE 1 AMPULE: .5; 3 SOLUTION RESPIRATORY (INHALATION) at 20:45

## 2018-09-15 RX ADMIN — IPRATROPIUM BROMIDE AND ALBUTEROL SULFATE 1 AMPULE: .5; 3 SOLUTION RESPIRATORY (INHALATION) at 15:37

## 2018-09-15 RX ADMIN — SENNOSIDES 8.6 MG: 8.6 TABLET, FILM COATED ORAL at 20:49

## 2018-09-15 RX ADMIN — THIAMINE HYDROCHLORIDE 100 MG: 100 INJECTION, SOLUTION INTRAMUSCULAR; INTRAVENOUS at 09:35

## 2018-09-15 RX ADMIN — IPRATROPIUM BROMIDE AND ALBUTEROL SULFATE 1 AMPULE: .5; 3 SOLUTION RESPIRATORY (INHALATION) at 07:32

## 2018-09-15 RX ADMIN — MAGNESIUM HYDROXIDE 30 ML: 400 SUSPENSION ORAL at 09:35

## 2018-09-15 RX ADMIN — SENNOSIDES 8.6 MG: 8.6 TABLET, FILM COATED ORAL at 09:35

## 2018-09-15 RX ADMIN — OXYCODONE HYDROCHLORIDE AND ACETAMINOPHEN 1 TABLET: 5; 325 TABLET ORAL at 21:21

## 2018-09-15 RX ADMIN — IRON SUCROSE 200 MG: 20 INJECTION, SOLUTION INTRAVENOUS at 11:11

## 2018-09-15 RX ADMIN — FAMOTIDINE 20 MG: 20 TABLET ORAL at 09:35

## 2018-09-15 RX ADMIN — TRAMADOL HYDROCHLORIDE 50 MG: 50 TABLET, FILM COATED ORAL at 13:08

## 2018-09-15 RX ADMIN — Medication 10 ML: at 20:50

## 2018-09-15 RX ADMIN — LEVOTHYROXINE SODIUM 50 MCG: 0.05 TABLET ORAL at 05:23

## 2018-09-15 RX ADMIN — AZITHROMYCIN MONOHYDRATE 500 MG: 500 INJECTION, POWDER, LYOPHILIZED, FOR SOLUTION INTRAVENOUS at 12:48

## 2018-09-15 RX ADMIN — CEFTRIAXONE SODIUM 1 G: 1 INJECTION, POWDER, FOR SOLUTION INTRAMUSCULAR; INTRAVENOUS at 15:48

## 2018-09-15 RX ADMIN — FAMOTIDINE 20 MG: 20 TABLET ORAL at 20:49

## 2018-09-15 RX ADMIN — Medication 10 ML: at 21:20

## 2018-09-15 RX ADMIN — VENLAFAXINE HYDROCHLORIDE 150 MG: 150 CAPSULE, EXTENDED RELEASE ORAL at 09:35

## 2018-09-15 RX ADMIN — OXYCODONE HYDROCHLORIDE AND ACETAMINOPHEN 1 TABLET: 5; 325 TABLET ORAL at 11:11

## 2018-09-15 RX ADMIN — THIAMINE HYDROCHLORIDE 100 MG: 100 INJECTION, SOLUTION INTRAMUSCULAR; INTRAVENOUS at 00:57

## 2018-09-15 RX ADMIN — IPRATROPIUM BROMIDE AND ALBUTEROL SULFATE 1 AMPULE: .5; 3 SOLUTION RESPIRATORY (INHALATION) at 10:55

## 2018-09-15 ASSESSMENT — PAIN DESCRIPTION - PROGRESSION
CLINICAL_PROGRESSION: GRADUALLY IMPROVING

## 2018-09-15 ASSESSMENT — PAIN SCALES - GENERAL
PAINLEVEL_OUTOF10: 9
PAINLEVEL_OUTOF10: 4
PAINLEVEL_OUTOF10: 8
PAINLEVEL_OUTOF10: 3
PAINLEVEL_OUTOF10: 6

## 2018-09-15 ASSESSMENT — PAIN DESCRIPTION - LOCATION: LOCATION: BACK

## 2018-09-15 ASSESSMENT — PAIN DESCRIPTION - PAIN TYPE: TYPE: ACUTE PAIN

## 2018-09-15 ASSESSMENT — PAIN DESCRIPTION - DESCRIPTORS: DESCRIPTORS: ACHING;SORE

## 2018-09-15 NOTE — PLAN OF CARE
Problem: Safety:  Goal: Free from accidental physical injury  Free from accidental physical injury   Outcome: Ongoing    Goal: Free from intentional harm  Free from intentional harm   Outcome: Ongoing  Pt given and utilized opportunity to voice emotional, physical, and environmental needs upon hourly rounding. Pt has been kept up to date regarding care. Problem: Pain:  Goal: Patient's pain/discomfort is manageable  Patient's pain/discomfort is manageable   Outcome: Ongoing  Pt medicated with pain medication prn. Assessed all pain characteristics including level, type, location, frequency, and onset. Non-pharmacologic interventions offered to pt as well. Pt states pain is tolerable at this time. Will continue to monitor. Goal: Pain level will decrease  Pain level will decrease   Outcome: Ongoing    Goal: Control of acute pain  Control of acute pain   Outcome: Ongoing    Goal: Control of chronic pain  Control of chronic pain   Outcome: Ongoing      Problem: Skin Integrity:  Goal: Skin integrity will stabilize  Skin integrity will stabilize   Outcome: Ongoing  Skin remains intact. No new breakdown noted this shift. Skin assessment charted in head to toe. Problem: Falls - Risk of:  Goal: Will remain free from falls  Will remain free from falls   Outcome: Ongoing  Pt assessed as a fall risk this shift. Remains free from falls and accidental injury at this time. Fall precautions in place, including falling star sign and fall risk band on pt. Floor free from obstacles, and bed is locked and in lowest position. Adequate lighting provided. Pt encouraged to call before getting OOB for any need. Bed alarm activated. Will continue to monitor needs during hourly rounding, and reinforce education on use of call light.       Goal: Absence of physical injury  Absence of physical injury   Outcome: Ongoing      Problem: Risk for Impaired Skin Integrity  Goal: Tissue integrity - skin and mucous membranes  Structural intactness and normal physiological function of skin and  mucous membranes.    Outcome: Ongoing

## 2018-09-15 NOTE — PROGRESS NOTES
ipratropium-albuterol (DUONEB) nebulizer solution 1 ampule, 1 ampule, Inhalation, Q4H, Melany Maynard MD, 1 ampule at 09/15/18 0732    azithromycin (ZITHROMAX) 500 mg in D5W 250ml addavial, 500 mg, Intravenous, Q24H, Toby Ng, APRN - CNP, Stopped at 09/14/18 1558    cefTRIAXone (ROCEPHIN) 1 g IVPB in 50 mL D5W minibag, 1 g, Intravenous, Q24H, Toby Ng, APRN - CNP, Stopped at 09/14/18 1157    levothyroxine (SYNTHROID) tablet 50 mcg, 50 mcg, Oral, Daily, Melany Maynard MD, 50 mcg at 09/15/18 0523    pneumococcal 13-valent conjugate (PREVNAR) injection 0.5 mL, 0.5 mL, Intramuscular, Once, Justine Baer MD    ferrous sulfate tablet 325 mg, 325 mg, Oral, Daily with breakfast, Melany Maynard MD, 325 mg at 09/15/18 0935    Fluticasone-Umeclidin-Vilant 100-62.5-25 MCG/INH AEPB 1 puff, 1 puff, Inhalation, Daily, Melany Maynard MD, 1 puff at 09/15/18 0935    venlafaxine (EFFEXOR XR) extended release capsule 150 mg, 150 mg, Oral, Daily, Melany Maynard MD, 150 mg at 09/15/18 0935    Data:     Code Status:  Full Code    Family History   Problem Relation Age of Onset    Heart Disease Mother     COPD Mother     Emphysema Sister     Cancer Other         Cousin - breast cancer       Social History     Social History    Marital status:      Spouse name: N/A    Number of children: N/A    Years of education: N/A     Occupational History    Not on file.      Social History Main Topics    Smoking status: Former Smoker     Packs/day: 3.00     Years: 26.00     Types: Cigarettes    Smokeless tobacco: Never Used    Alcohol use Yes      Comment: 1-2 bears weekly    Drug use: No    Sexual activity: Not on file     Other Topics Concern    Not on file     Social History Narrative    No narrative on file       Labs--Reviewed:    Hematology:  Recent Labs      09/13/18   0555  09/13/18   0929  09/13/18   0932   09/14/18   0508  09/14/18   1951  09/15/18   0922   WBC  11.7*   --    --    --   9.9   --    -- RBC  3.54*   --    --    --   3.29*   --    --    HGB  9.4*   --    --    < >  8.7*  8.7*  9.0*   HCT  30.6*   --    --    < >  28.3*  28.1*  29.2*   MCV  86.4   --    --    --   85.9   --    --    MCH  26.4   --    --    --   26.5   --    --    MCHC  30.6*   --    --    --   30.8*   --    --    RDW  24.3*   --    --    --   24.8*   --    --    PLT  452*   --    --    --   428   --    --    MPV  8.3   --    --    --   8.4   --    --    CRP   --   255.4*   --    --    --    --    --    INR   --    --   1.0   --    --    --    --     < > = values in this interval not displayed. Chemistry:  Recent Labs      09/13/18 0555 09/13/18 0929 09/13/18   1807  09/14/18   0508  09/15/18   0922   NA  133*   --    --   138  140   K  4.0   --    --   3.4*  4.4   CL  96*   --    --   100  102   CO2  24   --    --   26  28   GLUCOSE  115*   --    --   122*  122*   BUN  37*   --    --   15  9   CREATININE  1.67*   --    --   0.72  0.53   MG   --   2.0   --   2.1   --    ANIONGAP  13   --    --   12  10   LABGLOM  30*   --    --   >60  >60   GFRAA  37*   --    --   >60  >60   CALCIUM  9.2   --    --   8.9  8.8   PROBNP   --    --   2,256*   --    --    TROPONINT   --   <0.03   --    --    --    LACTACIDWB   --   NOT REPORTED   --    --    --      Recent Labs      09/13/18 0555 09/13/18 0929 09/14/18   1951   PROT  6.8   --    --    --    LABALBU  3.7   --    --    --    [id number]   --    --   4.9   --    TSH   --    < >  2.21  1.38   AST  18   --    --    --    ALT  11   --    --    --    ALKPHOS  136*   --    --    --    BILITOT  0.49   --    --    --    AMMONIA  24   --    --    --     < > = values in this interval not displayed. No results for input(s): POCGLU in the last 72 hours. Lab Results   Component Value Date/Time    SPECIAL NOT REPORTED 09/13/2018 06:34 AM     Lab Results   Component Value Date/Time    CULTURE NO GROWTH 09/13/2018 06:34 AM         I/O (24Hr):     Intake/Output Summary (Last 24 hours) Osteoarthritis of both knees [M17.0]     Hypertension [I10]        Past Medical History:   Diagnosis Date    Anxiety     Chronic back pain     Hyperlipidemia     Hypertension     Hypothyroid 1/18/2013    Leg pain     Osteoarthritis          Plan:        1. IV Venofer  2. stool softener and laxative  3. restart Eliquis if stool for occult blood is negative  4. continue antibiotics and bronchodilators  5. monitor H&H, transfuse if hemoglobin less than 7  6. pain control p.r.n.  7. recheck CRP and procalcitonin  8.  pulmonary and Neurology inputs noted  9. Replace and recheck electrolytes per sliding scale  10. Continue other current/supportive treatment  11. PT/OT  12. GI/DVT prophylaxis  13. Disposition: Discharge likely in 1-2 days pending precert  14.  Case discussed with patient and the nurse      Electronically signed by Sue Sandoval MD on 9/15/2018 at 10:43 AM

## 2018-09-15 NOTE — PROGRESS NOTES
PULMONARY PROGRESS NOTE:    Interval History: COPD, pneumonia    Shortness of Breath: c/o unable to take deep breath, thinks its because shes constipated  Cough: no  Sputum: no  Hemoptysis: no  Chest Pain: no  Fever: no  Swelling Feet: yes  Headache:   Nausea, Emesis, Abdominal Pain: denies  Diarrhea:   Constipation: reports no BM for a week  C/o being taken off norcos  C/o left knee sore    Events since last visit: more awake, had EEG yesterday    PAST MEDICAL HISTORY:    Smoking:     PHYSICAL EXAMINATION:  afebrile  General : Awake, alert, oriented, much more talkative this morning  Neck  supple, no lymphadenopathy, JVD not raised  Heart  regular rhythm, S1 and S2 normal; no additional sounds heard  Lungs  Air Entry- fair bilaterally; breath sounds : vesicular 98% on 2 l nc  Abdomen  soft, rounded, no tenderness  Upper Extremities  - no cyanosis, mottling; edema : absent  Lower Extremities: no cyanosis, mottling; edema : pitting edema L>R, left knee warm    Current Laboratory, Radiologic, Microbiologic, and Diagnostic studies reviewed    ASSESSMENT / PLAN:  Dx respiratory failure       Encephalopathy  - improving       Dehydration, creatinine 1.67, baseline 0.78 - improving       suspected PNA - abx       afib       Falls  Hx COPD, ETOH  S/P OP surgery on 9-11-18 for total left knee  Continue BD  Pain control  IS  PT  Dulcolax suppository  CXR this morning - small left effusion / atelectasis    Plan of care discussed with Dr Maura Evans, APRN - CNP

## 2018-09-15 NOTE — CARE COORDINATION
DISCHARGE PLANNING NOTE:    Plan is for this patient to discharge to [facility]  - No precert required. PT/OT recs SNF - LSW is following. Will continue to follow along.      Electronically signed by Marty Mason RN on 9/15/2018 at 3:19 PM

## 2018-09-15 NOTE — PROCEDURES
[address]                   [address] [facility], [address]                            ELECTROENCEPHALOGRAM REPORT    PATIENT NAME: Terri Egan                     :        1945  MED REC NO:   [id number]                              ROOM:       2115  ACCOUNT NO:   [de-identified]                           ADMIT DATE: 2018  PROVIDER:     Dennis Huynh    DATE OF EE2018    REFERRING PROVIDER:  Dr. Rohan Sanders. This study is done on a 53-year-old lady with confusion and disorientation  with recovery. Medication list includes Ativan, Effexor, clonazepam, Norco, and Ultram.    She has a history of alcohol use. Resting background activity consists of six to seven cycles per second  moderate amplitude dominant occipital activity. Central rhythms consists  of similar frequencies. Moderate amounts of high amplitude low frequency  theta activity seen throughout the tracing. Hyperventilation was  attempted, but not successful. Photic stimulation produced a moderate driving response. IMPRESSION:  This EEG is abnormal.  Moderate slowing seen over the  background is consistent with an encephalopathy. Clinical correlation is  recommended.         Hubert Stratton    D: 2018 19:40:56       T: 2018 19:42:25     [email or web address]  Job#: [id number]     Doc#: [id number]    CC:

## 2018-09-16 LAB
HCT VFR BLD CALC: 28.5 % (ref 36–46)
HEMOGLOBIN: 8.9 G/DL (ref 12–16)

## 2018-09-16 PROCEDURE — 2580000003 HC RX 258: Performed by: FAMILY MEDICINE

## 2018-09-16 PROCEDURE — 94640 AIRWAY INHALATION TREATMENT: CPT

## 2018-09-16 PROCEDURE — 6370000000 HC RX 637 (ALT 250 FOR IP): Performed by: FAMILY MEDICINE

## 2018-09-16 PROCEDURE — 97116 GAIT TRAINING THERAPY: CPT

## 2018-09-16 PROCEDURE — 6360000002 HC RX W HCPCS: Performed by: NURSE PRACTITIONER

## 2018-09-16 PROCEDURE — 36415 COLL VENOUS BLD VENIPUNCTURE: CPT

## 2018-09-16 PROCEDURE — 85018 HEMOGLOBIN: CPT

## 2018-09-16 PROCEDURE — 85014 HEMATOCRIT: CPT

## 2018-09-16 PROCEDURE — 94761 N-INVAS EAR/PLS OXIMETRY MLT: CPT

## 2018-09-16 PROCEDURE — 6360000002 HC RX W HCPCS: Performed by: FAMILY MEDICINE

## 2018-09-16 PROCEDURE — 2060000000 HC ICU INTERMEDIATE R&B

## 2018-09-16 PROCEDURE — 97110 THERAPEUTIC EXERCISES: CPT

## 2018-09-16 PROCEDURE — 97530 THERAPEUTIC ACTIVITIES: CPT

## 2018-09-16 PROCEDURE — 2580000003 HC RX 258: Performed by: NURSE PRACTITIONER

## 2018-09-16 RX ORDER — FUROSEMIDE 10 MG/ML
20 INJECTION INTRAMUSCULAR; INTRAVENOUS ONCE
Status: COMPLETED | OUTPATIENT
Start: 2018-09-16 | End: 2018-09-16

## 2018-09-16 RX ADMIN — VENLAFAXINE HYDROCHLORIDE 150 MG: 150 CAPSULE, EXTENDED RELEASE ORAL at 08:06

## 2018-09-16 RX ADMIN — APIXABAN 5 MG: 5 TABLET, FILM COATED ORAL at 10:37

## 2018-09-16 RX ADMIN — TRAMADOL HYDROCHLORIDE 50 MG: 50 TABLET, FILM COATED ORAL at 10:53

## 2018-09-16 RX ADMIN — APIXABAN 5 MG: 5 TABLET, FILM COATED ORAL at 21:30

## 2018-09-16 RX ADMIN — AZITHROMYCIN MONOHYDRATE 500 MG: 500 INJECTION, POWDER, LYOPHILIZED, FOR SOLUTION INTRAVENOUS at 10:37

## 2018-09-16 RX ADMIN — FAMOTIDINE 20 MG: 20 TABLET ORAL at 21:30

## 2018-09-16 RX ADMIN — FAMOTIDINE 20 MG: 20 TABLET ORAL at 08:06

## 2018-09-16 RX ADMIN — IRON SUCROSE 200 MG: 20 INJECTION, SOLUTION INTRAVENOUS at 09:17

## 2018-09-16 RX ADMIN — THIAMINE HYDROCHLORIDE 100 MG: 100 INJECTION, SOLUTION INTRAMUSCULAR; INTRAVENOUS at 08:06

## 2018-09-16 RX ADMIN — OXYCODONE HYDROCHLORIDE AND ACETAMINOPHEN 1 TABLET: 5; 325 TABLET ORAL at 21:30

## 2018-09-16 RX ADMIN — IPRATROPIUM BROMIDE AND ALBUTEROL SULFATE 1 AMPULE: .5; 3 SOLUTION RESPIRATORY (INHALATION) at 15:16

## 2018-09-16 RX ADMIN — LEVOTHYROXINE SODIUM 50 MCG: 0.05 TABLET ORAL at 05:48

## 2018-09-16 RX ADMIN — IPRATROPIUM BROMIDE AND ALBUTEROL SULFATE 1 AMPULE: .5; 3 SOLUTION RESPIRATORY (INHALATION) at 12:11

## 2018-09-16 RX ADMIN — SENNOSIDES 8.6 MG: 8.6 TABLET, FILM COATED ORAL at 21:30

## 2018-09-16 RX ADMIN — FUROSEMIDE 20 MG: 10 INJECTION, SOLUTION INTRAVENOUS at 10:37

## 2018-09-16 RX ADMIN — CEFTRIAXONE SODIUM 1 G: 1 INJECTION, POWDER, FOR SOLUTION INTRAMUSCULAR; INTRAVENOUS at 13:46

## 2018-09-16 RX ADMIN — Medication 10 ML: at 21:32

## 2018-09-16 RX ADMIN — OXYCODONE HYDROCHLORIDE AND ACETAMINOPHEN 1 TABLET: 5; 325 TABLET ORAL at 14:56

## 2018-09-16 RX ADMIN — FERROUS SULFATE TAB 325 MG (65 MG ELEMENTAL FE) 325 MG: 325 (65 FE) TAB at 08:06

## 2018-09-16 RX ADMIN — IPRATROPIUM BROMIDE AND ALBUTEROL SULFATE 1 AMPULE: .5; 3 SOLUTION RESPIRATORY (INHALATION) at 08:46

## 2018-09-16 RX ADMIN — IPRATROPIUM BROMIDE AND ALBUTEROL SULFATE 1 AMPULE: .5; 3 SOLUTION RESPIRATORY (INHALATION) at 19:56

## 2018-09-16 RX ADMIN — SENNOSIDES 8.6 MG: 8.6 TABLET, FILM COATED ORAL at 08:06

## 2018-09-16 RX ADMIN — OXYCODONE HYDROCHLORIDE AND ACETAMINOPHEN 1 TABLET: 5; 325 TABLET ORAL at 08:08

## 2018-09-16 ASSESSMENT — PAIN SCALES - GENERAL
PAINLEVEL_OUTOF10: 7
PAINLEVEL_OUTOF10: 4
PAINLEVEL_OUTOF10: 6
PAINLEVEL_OUTOF10: 9
PAINLEVEL_OUTOF10: 7
PAINLEVEL_OUTOF10: 7
PAINLEVEL_OUTOF10: 8
PAINLEVEL_OUTOF10: 4

## 2018-09-16 ASSESSMENT — PAIN DESCRIPTION - DESCRIPTORS
DESCRIPTORS: ACHING
DESCRIPTORS: ACHING
DESCRIPTORS: ACHING;DULL

## 2018-09-16 ASSESSMENT — PAIN DESCRIPTION - PAIN TYPE
TYPE: ACUTE PAIN

## 2018-09-16 ASSESSMENT — PAIN DESCRIPTION - LOCATION
LOCATION: LEG;KNEE

## 2018-09-16 ASSESSMENT — PAIN DESCRIPTION - ORIENTATION
ORIENTATION: LEFT

## 2018-09-16 ASSESSMENT — PAIN DESCRIPTION - ONSET
ONSET: ON-GOING
ONSET: ON-GOING

## 2018-09-16 ASSESSMENT — PAIN DESCRIPTION - FREQUENCY
FREQUENCY: INTERMITTENT
FREQUENCY: INTERMITTENT

## 2018-09-16 NOTE — PROGRESS NOTES
heavily relience of RW. Pt instructed in proper technique with fair follow through. Pt's SPO2 during amb is 92% on 2LPM of O2 via NC. Pt's HR is 130's during amb. Pt requires 2 standing rest breaks to complete. Distance: 60'x2 and 10'x2     Balance  Posture: Fair  Sitting - Static: Good  Sitting - Dynamic: Good;-  Standing - Static: Fair;-  Standing - Dynamic: Fair  Comments: standing balance assessed with RW. Exercises  Comments: Seated Ex: Ankle pumps, LAQ's, Marches, ABD/ADD, seated HS stretch, skates with 5 sec holds. Reps: 15 each. Pt completed standing dynamic activities. Pt requires intermittent BUE support. No LOB noted. Pt and pt's son educated on D/C recommendations with good understanding. Pt educated on HEP with good understanding. Pt and Pt's son is agreeable to SNF to increase strength, ROM, endurance, balance, and independence with functional mobility for a safe return home. Pt is left in chair with chair pull alarm on, call light in reach, and all other needs addressed. Assessment   Body structures, Functions, Activity limitations: Decreased functional mobility ; Decreased safe awareness;Decreased balance;Decreased ROM; Decreased strength  Assessment: Pt is limited by pain and decrease endurance this date. Pt would benefit from SNF at D/C due to hx of falling at home. Pt had a recent left TKR at [facility]. Treatment Diagnosis: impaired function  Prognosis: Good  Patient Education: POC  REQUIRES PT FOLLOW UP: Yes  Activity Tolerance  Activity Tolerance: Patient limited by endurance; Patient limited by fatigue;Patient limited by pain       Goals  Short term goals  Time Frame for Short term goals: 5-7 treatments/ week  Short term goal 1: pt to independently roll and transfer supine to sit  Short term goal 2: pt to transfer sit <> stand and bed <> chair using w walker left WBAT w/ CGA x 1  Short term goal 3: pt to ambulate w/ w walker left WBAT 50-80' w/ CGA x 1 demonstrating good balance and technique  Short term goal 4: pt to demonstrate good technique w/ exercises S/P left TKR  Short term goal 5: increase AAROM left knee flexion 5-80 degrees  Short term goal 6: pt to advance to 4\" platform step using w walker left WBAT and min x 2  Patient Goals   Patient goals : go to SNF until stronger    Plan    Plan  Times per week: 5-7 treatments/ week  Times per day:  (5-7 treatments/ week)  Specific instructions for Next Treatment: 9-14-18 gait w/ w walker 30', 15' and 20' left WBAT w/ min x 1 and strong verbal cues for technique, HIGH FALL RISK, limited AROM left knee S/P left TKR  Current Treatment Recommendations: Strengthening, Functional Mobility Training, Equipment Evaluation, Education, & procurement, ROM, Transfer Training, Gait Training, Safety Education & Training, Balance Training, Stair training, Patient/Caregiver Education & Training, Positioning  Safety Devices  Type of devices:  All fall risk precautions in place, Call light within reach, Patient at risk for falls, Gait belt, Left in chair, Nurse notified, Chair alarm in place     Therapy Time   Individual Concurrent Group Co-treatment   Time In 1030         Time Out 1138         Minutes [address] [place], [place]

## 2018-09-16 NOTE — PROGRESS NOTES
Hospitalist Progress Note    9/16/2018   9:19 AM    Name:  Garrick Judd  MRN:    [id number]     Acct:     [de-identified]   Room:  2115/[facility] Day: 2     Admit Date: 9/13/2018  5:43 AM  PCP: Justine Bear MD    Subjective:     C/C:   Chief Complaint   Patient presents with   [facility] Altered Mental Status    Knee Pain     left       Interval History:  Patient was seen and examined at bedside today. Hemodynamically stable. Reports shortness of breath  Aggravated by exertion. Had bowel movement yesterday, stool for occult blood negative. + pain left knee POD#5 s/p L TKA. Denies fever, chills chest pain, nausea, vomiting, headaches or palpitations. Patient complaining of not getting enough physical therapy    Chest x-ray done yesterday showed pleural effusion suspicious for pneumonia and atelectasis    ROS:  All 10 systems reviewed and found negative except as noted above. Medications: Allergies:    Allergies   Allergen Reactions    Tizanidine Other (See Comments)     Patient states it makes her loopy       Current Meds:   Current Facility-Administered Medications:     [START ON 9/17/2018] pneumococcal 13-valent conjugate (PREVNAR) injection 0.5 mL, 0.5 mL, Intramuscular, Once, Justine Baer MD    furosemide (LASIX) injection 20 mg, 20 mg, Intravenous, Once, Melany Maynard MD    apixaban (ELIQUIS) tablet 5 mg, 5 mg, Oral, BID, Melany Maynard MD    polyethylene glycol (GLYCOLAX) packet 17 g, 17 g, Oral, Daily PRN, Melany Maynard MD    [facility]) tablet 8.6 mg, 1 tablet, Oral, BID, Melany Maynard MD, 8.6 mg at 09/16/18 0806    iron sucrose (VENOFER) 200 mg in sodium chloride 0.9 % 100 mL IVPB, 200 mg, Intravenous, Q24H, Melany Maynard MD, Last Rate: 100 mL/hr at 09/16/18 0917, 200 mg at 09/16/18 0917    bisacodyl (DULCOLAX) suppository 10 mg, 10 mg, Rectal, Daily PRN, Toby Ng, APRN - CNP    traMADol Clyde Messenger) tablet 50 mg, 50 mg, Oral, Q6H PRN, Sandie Landers MD, 50 mg at 09/15/18 Labs--Reviewed:    Hematology:  Recent Labs      09/13/18 0929 09/13/18   0932   09/14/18   0508  09/14/18   1951  09/15/18   0922  09/16/18   0504   WBC   --    --    --   9.9   --    --    --    RBC   --    --    --   3.29*   --    --    --    HGB   --    --    < >  8.7*  8.7*  9.0*  8.9*   HCT   --    --    < >  28.3*  28.1*  29.2*  28.5*   MCV   --    --    --   85.9   --    --    --    MCH   --    --    --   26.5   --    --    --    MCHC   --    --    --   30.8*   --    --    --    RDW   --    --    --   24.8*   --    --    --    PLT   --    --    --   428   --    --    --    MPV   --    --    --   8.4   --    --    --    CRP  255.4*   --    --    --    --    --    --    INR   --   1.0   --    --    --    --    --     < > = values in this interval not displayed. Chemistry:  Recent Labs      09/13/18   0929  09/13/18   1807  09/14/18   0508  09/15/18   0922   NA   --    --   138  140   K   --    --   3.4*  4.4   CL   --    --   100  102   CO2   --    --   26  28   GLUCOSE   --    --   122*  122*   BUN   --    --   15  9   CREATININE   --    --   0.72  0.53   MG  2.0   --   2.1   --    ANIONGAP   --    --   12  10   LABGLOM   --    --   >60  >60   GFRAA   --    --   >60  >60   CALCIUM   --    --   8.9  8.8   PROBNP   --   2,256*   --    --    TROPONINT  <0.03   --    --    --    LACTACIDWB  NOT REPORTED   --    --    --      Recent Labs      09/13/18 0929 09/14/18 1951   [id number]  4.9   --    TSH  2.21  1.38       No results for input(s): POCGLU in the last 72 hours. Lab Results   Component Value Date/Time    SPECIAL NOT REPORTED 09/13/2018 06:34 AM     Lab Results   Component Value Date/Time    CULTURE NO GROWTH 09/13/2018 06:34 AM         I/O (24Hr):   No intake or output data in the 24 hours ending 09/16/18 0919    Imaging (48Hr)--Reviewed:    Chest x-ray done yesterday 09/15/2018 showed pleural effusion suspicious for pneumonia and atelectasis      Physical Examination:        Vitals:  BP (!) 148/71   Pulse 109   Temp 98.2 °F (36.8 °C)   Resp 20   Ht 5' 7\" (1.702 m)   Wt 179 lb 6 oz (81.4 kg)   SpO2 96%   BMI 28.09 kg/m²   Temp (24hrs), Av.5 °F (36.9 °C), Min:98.2 °F (36.8 °C), Max:99 °F (37.2 °C)        Physical exam  GEN: AAOx3, NAD  HEENT: NC/AT, mucus membrane moist, EOMI, PERRLA, no scleral icterus  NECK:  Supple, trachea is midline  CHEST:   Fair entry, diminished BS, no wheezes, no crackles  CVS:    Irregularly irregular, S1 S2 present  ABD: +BS, soft, nontender, nondistended  NEURO: no focal weakness, CN 2-12 grossly intact, no gross motor deficit noted  MUSK: ROM limited L knee with + tenderness and swelling noted  Extremities: + edema, no redness or calf tenderness  SKIN:  Dry, warm, intact, + blisters noted L thigh  PSYCH: mood  upset      Assessment:        Primary Problem  Acute renal failure with tubular necrosis [facility])     Active Hospital Problems    Diagnosis Date Noted    Constipation [K59.00] 09/15/2018    Acute renal failure with tubular necrosis ([facility] [N17.0] 2018    Acute delirium [R41.0] 2018    Dehydration [E86.0] 2018    A-fib ([facility] [I48.91] 2018    SUNSHINE (acute kidney injury) ([facility] [address] [facility] [N17.9] 2018    Acute encephalopathy [G93.40] 2018    Acute respiratory distress [R06.03] 2018    ETOH abuse [F10.10] 2018    SIRS (systemic inflammatory response syndrome) (HCC) [R65.10] 2018    Normocytic anemia [D64.9] 2018    Pulmonary hypertension ([address] [I27.20] 2018    COPD (chronic obstructive pulmonary disease) ([facility] [address] [J44.9] 2018    History of GI bleed [Z87.19] 2018    Fall [F02. XXXA] 2018    Osteoarthritis of both knees [M17.0]     Hypertension [I10]        Past Medical History:   Diagnosis Date    Anxiety     Chronic back pain     Hyperlipidemia     Hypertension     Hypothyroid 2013    Leg pain     Osteoarthritis          Plan:        1. Continue IV Venofer  2.  Lasix 20mg x1  3. Stop IVF, patient is drinking well  4. Restart Eliquis, stool for occult blood is negative  5. continue antibiotics and bronchodilators  6. monitor H&H, transfuse if hemoglobin less than 7  7. pain control p.r.n.  8. Repeat CXR in a.m.  9. Pulmonary and Neurology inputs noted  10. Replace and recheck electrolytes per sliding scale  11. Continue other current/supportive treatment  12. PT/OT  13. GI/DVT prophylaxis  14. Disposition: Discharge to SNF likely tomorrow patient remains stable  15.  Case discussed with patient and the nurse      Electronically signed by Marito Burleson MD on 9/16/2018 at 9:19 AM

## 2018-09-17 ENCOUNTER — APPOINTMENT (OUTPATIENT)
Dept: GENERAL RADIOLOGY | Age: 73
DRG: 682 | End: 2018-09-17
Payer: MEDICARE

## 2018-09-17 PROBLEM — R06.03 ACUTE RESPIRATORY DISTRESS: Status: RESOLVED | Noted: 2018-09-13 | Resolved: 2018-09-17

## 2018-09-17 LAB
ANION GAP SERPL CALCULATED.3IONS-SCNC: 11 MMOL/L (ref 9–17)
BUN BLDV-MCNC: 7 MG/DL (ref 8–23)
BUN/CREAT BLD: ABNORMAL (ref 9–20)
CALCIUM SERPL-MCNC: 9.1 MG/DL (ref 8.6–10.4)
CHLORIDE BLD-SCNC: 101 MMOL/L (ref 98–107)
CO2: 30 MMOL/L (ref 20–31)
CREAT SERPL-MCNC: 0.62 MG/DL (ref 0.5–0.9)
GFR AFRICAN AMERICAN: >60 ML/MIN
GFR NON-AFRICAN AMERICAN: >60 ML/MIN
GFR SERPL CREATININE-BSD FRML MDRD: ABNORMAL ML/MIN/{1.73_M2}
GFR SERPL CREATININE-BSD FRML MDRD: ABNORMAL ML/MIN/{1.73_M2}
GLUCOSE BLD-MCNC: 118 MG/DL (ref 70–99)
HCT VFR BLD CALC: 29.1 % (ref 36–46)
HEMOGLOBIN: 9 G/DL (ref 12–16)
POTASSIUM SERPL-SCNC: 4.4 MMOL/L (ref 3.7–5.3)
SODIUM BLD-SCNC: 142 MMOL/L (ref 135–144)

## 2018-09-17 PROCEDURE — 6360000002 HC RX W HCPCS: Performed by: FAMILY MEDICINE

## 2018-09-17 PROCEDURE — 80048 BASIC METABOLIC PNL TOTAL CA: CPT

## 2018-09-17 PROCEDURE — 97530 THERAPEUTIC ACTIVITIES: CPT

## 2018-09-17 PROCEDURE — 85018 HEMOGLOBIN: CPT

## 2018-09-17 PROCEDURE — 71046 X-RAY EXAM CHEST 2 VIEWS: CPT

## 2018-09-17 PROCEDURE — 6360000002 HC RX W HCPCS: Performed by: NURSE PRACTITIONER

## 2018-09-17 PROCEDURE — 36415 COLL VENOUS BLD VENIPUNCTURE: CPT

## 2018-09-17 PROCEDURE — 6370000000 HC RX 637 (ALT 250 FOR IP): Performed by: FAMILY MEDICINE

## 2018-09-17 PROCEDURE — 97116 GAIT TRAINING THERAPY: CPT

## 2018-09-17 PROCEDURE — 94640 AIRWAY INHALATION TREATMENT: CPT

## 2018-09-17 PROCEDURE — 97535 SELF CARE MNGMENT TRAINING: CPT

## 2018-09-17 PROCEDURE — 85014 HEMATOCRIT: CPT

## 2018-09-17 PROCEDURE — 2580000003 HC RX 258: Performed by: FAMILY MEDICINE

## 2018-09-17 PROCEDURE — 2580000003 HC RX 258: Performed by: NURSE PRACTITIONER

## 2018-09-17 PROCEDURE — 97110 THERAPEUTIC EXERCISES: CPT

## 2018-09-17 PROCEDURE — 2060000000 HC ICU INTERMEDIATE R&B

## 2018-09-17 PROCEDURE — 94761 N-INVAS EAR/PLS OXIMETRY MLT: CPT

## 2018-09-17 RX ORDER — CLONAZEPAM 0.5 MG/1
0.5 TABLET ORAL 3 TIMES DAILY PRN
Qty: 30 TABLET | Refills: 0 | Status: SHIPPED | OUTPATIENT
Start: 2018-09-17 | End: 2019-01-03 | Stop reason: SDUPTHER

## 2018-09-17 RX ORDER — AZITHROMYCIN 250 MG/1
TABLET, FILM COATED ORAL
Qty: 1 PACKET | Refills: 0 | Status: SHIPPED | OUTPATIENT
Start: 2018-09-17 | End: 2018-09-21

## 2018-09-17 RX ORDER — OXYCODONE HYDROCHLORIDE AND ACETAMINOPHEN 5; 325 MG/1; MG/1
1 TABLET ORAL EVERY 6 HOURS PRN
Qty: 30 TABLET | Refills: 0 | Status: SHIPPED | OUTPATIENT
Start: 2018-09-17 | End: 2018-09-24

## 2018-09-17 RX ORDER — FUROSEMIDE 20 MG/1
20 TABLET ORAL DAILY
Qty: 20 TABLET | Refills: 0 | Status: SHIPPED | OUTPATIENT
Start: 2018-09-17 | End: 2019-03-14

## 2018-09-17 RX ORDER — FUROSEMIDE 10 MG/ML
40 INJECTION INTRAMUSCULAR; INTRAVENOUS ONCE
Status: COMPLETED | OUTPATIENT
Start: 2018-09-17 | End: 2018-09-17

## 2018-09-17 RX ORDER — AZITHROMYCIN 250 MG/1
500 TABLET, FILM COATED ORAL DAILY
Status: DISCONTINUED | OUTPATIENT
Start: 2018-09-18 | End: 2018-09-18 | Stop reason: HOSPADM

## 2018-09-17 RX ADMIN — SENNOSIDES 8.6 MG: 8.6 TABLET, FILM COATED ORAL at 08:10

## 2018-09-17 RX ADMIN — AZITHROMYCIN MONOHYDRATE 500 MG: 500 INJECTION, POWDER, LYOPHILIZED, FOR SOLUTION INTRAVENOUS at 10:15

## 2018-09-17 RX ADMIN — LEVOTHYROXINE SODIUM 50 MCG: 0.05 TABLET ORAL at 05:37

## 2018-09-17 RX ADMIN — VENLAFAXINE HYDROCHLORIDE 150 MG: 150 CAPSULE, EXTENDED RELEASE ORAL at 08:10

## 2018-09-17 RX ADMIN — OXYCODONE HYDROCHLORIDE AND ACETAMINOPHEN 1 TABLET: 5; 325 TABLET ORAL at 08:10

## 2018-09-17 RX ADMIN — OXYCODONE HYDROCHLORIDE AND ACETAMINOPHEN 1 TABLET: 5; 325 TABLET ORAL at 16:43

## 2018-09-17 RX ADMIN — FAMOTIDINE 20 MG: 20 TABLET ORAL at 08:10

## 2018-09-17 RX ADMIN — APIXABAN 5 MG: 5 TABLET, FILM COATED ORAL at 08:10

## 2018-09-17 RX ADMIN — FERROUS SULFATE TAB 325 MG (65 MG ELEMENTAL FE) 325 MG: 325 (65 FE) TAB at 08:10

## 2018-09-17 RX ADMIN — APIXABAN 5 MG: 5 TABLET, FILM COATED ORAL at 19:37

## 2018-09-17 RX ADMIN — IPRATROPIUM BROMIDE AND ALBUTEROL SULFATE 1 AMPULE: .5; 3 SOLUTION RESPIRATORY (INHALATION) at 11:43

## 2018-09-17 RX ADMIN — FAMOTIDINE 20 MG: 20 TABLET ORAL at 19:37

## 2018-09-17 RX ADMIN — IPRATROPIUM BROMIDE AND ALBUTEROL SULFATE 1 AMPULE: .5; 3 SOLUTION RESPIRATORY (INHALATION) at 07:26

## 2018-09-17 RX ADMIN — Medication 10 ML: at 08:09

## 2018-09-17 RX ADMIN — THIAMINE HYDROCHLORIDE 100 MG: 100 INJECTION, SOLUTION INTRAMUSCULAR; INTRAVENOUS at 08:09

## 2018-09-17 RX ADMIN — OXYCODONE HYDROCHLORIDE AND ACETAMINOPHEN 1 TABLET: 5; 325 TABLET ORAL at 22:48

## 2018-09-17 RX ADMIN — FUROSEMIDE 40 MG: 10 INJECTION, SOLUTION INTRAMUSCULAR; INTRAVENOUS at 09:10

## 2018-09-17 RX ADMIN — IPRATROPIUM BROMIDE AND ALBUTEROL SULFATE 1 AMPULE: .5; 3 SOLUTION RESPIRATORY (INHALATION) at 23:36

## 2018-09-17 ASSESSMENT — PAIN SCALES - GENERAL
PAINLEVEL_OUTOF10: 10
PAINLEVEL_OUTOF10: 4
PAINLEVEL_OUTOF10: 6
PAINLEVEL_OUTOF10: 9
PAINLEVEL_OUTOF10: 5
PAINLEVEL_OUTOF10: 7
PAINLEVEL_OUTOF10: 4
PAINLEVEL_OUTOF10: 8
PAINLEVEL_OUTOF10: 5

## 2018-09-17 ASSESSMENT — PAIN DESCRIPTION - LOCATION
LOCATION: KNEE

## 2018-09-17 ASSESSMENT — PAIN DESCRIPTION - DESCRIPTORS: DESCRIPTORS: ACHING

## 2018-09-17 ASSESSMENT — PAIN SCALES - WONG BAKER: WONGBAKER_NUMERICALRESPONSE: 0

## 2018-09-17 ASSESSMENT — PAIN DESCRIPTION - ORIENTATION
ORIENTATION: LEFT

## 2018-09-17 ASSESSMENT — PAIN DESCRIPTION - PAIN TYPE
TYPE: ACUTE PAIN

## 2018-09-17 ASSESSMENT — PAIN DESCRIPTION - PROGRESSION: CLINICAL_PROGRESSION: GRADUALLY IMPROVING

## 2018-09-17 NOTE — PROGRESS NOTES
[address] [facility]    Physical Therapy Progress Note    Date: 18  Patient Name: Paige Pathak       Room:   MRN: [id number]   Account: [de-identified]   : 1945  (77 y.o.)   Gender: female     Discharge Recommendations   Subacute/Skilled Nursing Facility  Equipment Needed: No    Referring Practitioner: Dr. Laurent Mendoza  Diagnosis: Dehydration with L TKA on  at [facility]  Restrictions/Precautions: Weight Bearing, Surgical Protocols, Fall Risk  Implants present? :  (left TKR)  Left Lower Extremity Weight Bearing: Weight Bearing As Tolerated   Past Medical History:  has a past medical history of Anxiety; Chronic back pain; Hyperlipidemia; Hypertension; Hypothyroid; Leg pain; and Osteoarthritis. Past Surgical History:   has a past surgical history that includes eye surgery (Bilateral); Colonoscopy; and joint replacement. Additional Pertinent Hx: hx recent left TKR, hx alcohol abuse    Overall Orientation Status: Within Normal Limits  Restrictions/Precautions  Restrictions/Precautions: Weight Bearing;Surgical Protocols; Fall Risk  Required Braces or Orthoses?: No  Implants present? :  (left TKR)  Lower Extremity Weight Bearing Restrictions  Left Lower Extremity Weight Bearing: Weight Bearing As Tolerated    Subjective: Pt reports not doing well; pt c/o no one helping her; pt states she has been stuck in this bed and not making any progress  Comments: WOODY Phelps Child okay with PT treatment. Pt is alert sitting in bed with 1 leg over the side talking on the telephone. Pt is c/o staff isn't doing anything for her, why is pt still sitting here instead of going to rehab, the food is terrible compared to [facility]. Pt is difficult to please. Much effort in attempt to satisfy pt.     Vital Signs  Patient Currently in Pain: Yes  Pain Assessment: 0-10  Pain Level: 4  Pain Type: Acute pain  Pain Location: Knee  Pain Orientation: Left  Pain Intervention(s): Ambulation/Increased activity;Repositioned;Distraction  Response to Pain Intervention: None     Oxygen Therapy  SpO2: 96 %  Pulse Oximeter Device Mode: Intermittent  Pulse Oximeter Device Location: Finger  O2 Device: Nasal cannula  O2 Flow Rate (L/min): 2 L/min  Patient Observation  Observations: 2L       Bed Mobility:   Bed Mobility  Rolling: Modified independent  Supine to Sit: Modified independent  Sit to Supine: Modified independent  Scooting: Modified independent  Comment: Pt has been moving freely with all bed mobility  Bed mobility  Scooting: Modified independent    Transfers:  Sit to Stand: Stand by assistance  Stand to sit: Stand by assistance     Stand Pivot Transfers: Stand by assistance        WB Status: WBAT left  Ambulation 1  Surface: level tile  Device: Rolling Walker  Other Apparatus: O2 (2Lpm via NC)  Assistance: Contact guard assistance  Quality of Gait: Very narrow FABIANA feet touching at times, minimal dorsiflexion on left and good heel strike on right, steady, no LOB, slow pace  Distance: [id number]  Comments: vc's for pursed lip breathing, vc's for wider FABIANA, vc's to increase dorsiflexion on left LE, SaO2 dropped to 91% while amb on 2L        Stairs/Curb  Stairs?: No                Posture: Fair  Sitting - Static: Good (Seated EOB)  Sitting - Dynamic: Good (Seated EOB)  Standing - Static: Good;- (Standing with RW)  Standing - Dynamic: Fair;+ (Standing with RW)     Other exercises?: Yes  Other exercises 1: Seated skates bilat LE x10  Other exercises 2: Seated bilat LE therex, x10  Other exercises 3: Sit to Stand x4           Activity Tolerance: Patient limited by cognitive status (RN Zada Cogan noticed delirium today)  Activity Tolerance: Left Knee Flexion AROM 90 degrees Seated in bedside chair, Extension 0 degrees while supine in bed  PT Equipment Recommendations  Equipment Needed: No       Assessment  Activity Tolerance: Patient limited by cognitive status (RN Zada Cogan noticed delirium today)   Body structures,

## 2018-09-17 NOTE — PROGRESS NOTES
Pt intermittently confused throughout the day. Pt keeps telling writer that we gave her all this blood since she's been here and today we just keep taking it away. RN tried to reassure patient that we are not taking all of her blood. Pt seems oriented at times, but then goes off on random tangents. Pt c/o staff not doing anything with her or that ice is not being given to her for her knee. Pt has been getting ice packs consistently during stay. Also c/o food being terrible and that we are keeping her here instead of letting her go to rehab. RN told pt that we are doing what we can to get her where she needs to be as quickly as possible. Pt states that this hospital stay has been a waste because we have left her not moving for too long. RN told her that PT has been working with her daily.

## 2018-09-17 NOTE — PROGRESS NOTES
PULMONARY PROGRESS NOTE:    Interval History: COPD, pneumonia    Shortness of Breath: better  Cough: no  Sputum: no  Hemoptysis:   Chest Pain:   Fever:   Swelling Feet: yes  Headache:   Nausea, Emesis, Abdominal Pain:   Diarrhea:   Constipation:     Events since last visit:     PAST MEDICAL HISTORY:    Smoking:       PHYSICAL EXAMINATION:  afebrile  General : Awake, alert, oriented to time, place, and person  Neck  supple, no lymphadenopathy, JVD not raised  Heart  regular rhythm, S1 and S2 normal; no additional sounds heard  Lungs  Air Entry- fair bilaterally; breath sounds : vesicular/ rhonchi/ coarse;   rales/crackles   Abdomen  soft, no tenderness  Upper Extremities  - no cyanosis, mottling; edema : absent  Lower Extremities: no cyanosis, mottling; edema : absent    Current Laboratory, Radiologic, Microbiologic, and Diagnostic studies reviewed    ASSESSMENT / PLAN:  Dx respiratory failure -improving       Encephalopathy  - improving       Dehydration,        suspected PNA - abx       afib       Falls  Hx COPD, ETOH  S/P OP surgery on 9-11-18 for total left knee  Continue BD  Pain control  IS  PT  meds to PO  CXR pending for today    Plan of care discussed with Dr Loni Merlos, APRN - CNP

## 2018-09-17 NOTE — PROGRESS NOTES
grossly intact  Extremities - peripheral pulses palpable,  2+ pitting edema left lower extremity status post left total knee arthroplasty  Skin - no gross lesions, rashes, or induration noted  Musculoskeletal:normal ROM bilateral wrists and hands  Lymphatics: No cervical lymphadenopathy  Psych: Mood and affect is normal    Assessment:        Primary Problem  SUNSHINE (acute kidney injury) ([facility]     Patient Active Problem List   Diagnosis    Hypertension    Osteoarthritis of both knees    Dehydration    A-fib ([facility]    SUNSHINE (acute kidney injury) ([facility]    Acute encephalopathy    ETOH abuse    [facility]    Normocytic anemia    Pulmonary hypertension ([facility]    COPD (chronic obstructive pulmonary disease) ([facility])    History of GI bleed    Fall    Acute delirium    Constipation       Past Medical History:   Diagnosis Date    Anxiety     Chronic back pain     Hyperlipidemia     Hypertension     Hypothyroid 1/18/2013    Leg pain     Osteoarthritis         Plan:        1.  give IV Lasix  2.  plan is to discharge to ECF  3. PPI  4. DVT Prophylaxis on Eliquis  5. EPCs  6.  PT/OT to evaluate and treat      Electronically signed by Celena Gamble MD

## 2018-09-18 VITALS
RESPIRATION RATE: 19 BRPM | OXYGEN SATURATION: 95 % | DIASTOLIC BLOOD PRESSURE: 64 MMHG | BODY MASS INDEX: 28.03 KG/M2 | HEART RATE: 95 BPM | HEIGHT: 67 IN | WEIGHT: 178.57 LBS | SYSTOLIC BLOOD PRESSURE: 125 MMHG | TEMPERATURE: 98.7 F

## 2018-09-18 LAB
ANION GAP SERPL CALCULATED.3IONS-SCNC: 11 MMOL/L (ref 9–17)
BUN BLDV-MCNC: 8 MG/DL (ref 8–23)
BUN/CREAT BLD: ABNORMAL (ref 9–20)
CALCIUM SERPL-MCNC: 8.9 MG/DL (ref 8.6–10.4)
CHLORIDE BLD-SCNC: 101 MMOL/L (ref 98–107)
CO2: 33 MMOL/L (ref 20–31)
CREAT SERPL-MCNC: 0.69 MG/DL (ref 0.5–0.9)
GFR AFRICAN AMERICAN: >60 ML/MIN
GFR NON-AFRICAN AMERICAN: >60 ML/MIN
GFR SERPL CREATININE-BSD FRML MDRD: ABNORMAL ML/MIN/{1.73_M2}
GFR SERPL CREATININE-BSD FRML MDRD: ABNORMAL ML/MIN/{1.73_M2}
GLUCOSE BLD-MCNC: 115 MG/DL (ref 70–99)
HCT VFR BLD CALC: 27.9 % (ref 36–46)
HEMOGLOBIN: 8.8 G/DL (ref 12–16)
POTASSIUM SERPL-SCNC: 4.2 MMOL/L (ref 3.7–5.3)
SODIUM BLD-SCNC: 145 MMOL/L (ref 135–144)

## 2018-09-18 PROCEDURE — 85018 HEMOGLOBIN: CPT

## 2018-09-18 PROCEDURE — 97530 THERAPEUTIC ACTIVITIES: CPT

## 2018-09-18 PROCEDURE — 80048 BASIC METABOLIC PNL TOTAL CA: CPT

## 2018-09-18 PROCEDURE — 6360000002 HC RX W HCPCS: Performed by: FAMILY MEDICINE

## 2018-09-18 PROCEDURE — 85014 HEMATOCRIT: CPT

## 2018-09-18 PROCEDURE — 36415 COLL VENOUS BLD VENIPUNCTURE: CPT

## 2018-09-18 PROCEDURE — 6370000000 HC RX 637 (ALT 250 FOR IP): Performed by: NURSE PRACTITIONER

## 2018-09-18 PROCEDURE — 97535 SELF CARE MNGMENT TRAINING: CPT

## 2018-09-18 PROCEDURE — 6370000000 HC RX 637 (ALT 250 FOR IP): Performed by: FAMILY MEDICINE

## 2018-09-18 PROCEDURE — 97110 THERAPEUTIC EXERCISES: CPT

## 2018-09-18 PROCEDURE — 97116 GAIT TRAINING THERAPY: CPT

## 2018-09-18 RX ORDER — FERROUS SULFATE 325(65) MG
325 TABLET ORAL 2 TIMES DAILY WITH MEALS
Status: DISCONTINUED | OUTPATIENT
Start: 2018-09-18 | End: 2018-09-18 | Stop reason: HOSPADM

## 2018-09-18 RX ORDER — SENNA PLUS 8.6 MG/1
1 TABLET ORAL 2 TIMES DAILY PRN
Qty: 30 TABLET | Refills: 0
Start: 2018-09-18 | End: 2018-10-18

## 2018-09-18 RX ORDER — CLONAZEPAM 1 MG/1
0.5 TABLET ORAL 3 TIMES DAILY PRN
Status: DISCONTINUED | OUTPATIENT
Start: 2018-09-18 | End: 2018-09-18 | Stop reason: HOSPADM

## 2018-09-18 RX ORDER — FUROSEMIDE 10 MG/ML
40 INJECTION INTRAMUSCULAR; INTRAVENOUS ONCE
Status: DISCONTINUED | OUTPATIENT
Start: 2018-09-18 | End: 2018-09-18

## 2018-09-18 RX ORDER — FUROSEMIDE 40 MG/1
40 TABLET ORAL DAILY
Status: DISCONTINUED | OUTPATIENT
Start: 2018-09-18 | End: 2018-09-18 | Stop reason: HOSPADM

## 2018-09-18 RX ORDER — FERROUS SULFATE 325(65) MG
325 TABLET ORAL 2 TIMES DAILY WITH MEALS
Qty: 30 TABLET | Refills: 3 | Status: SHIPPED | OUTPATIENT
Start: 2018-09-18 | End: 2019-08-01

## 2018-09-18 RX ORDER — IPRATROPIUM BROMIDE AND ALBUTEROL SULFATE 2.5; .5 MG/3ML; MG/3ML
1 SOLUTION RESPIRATORY (INHALATION) EVERY 4 HOURS PRN
Status: DISCONTINUED | OUTPATIENT
Start: 2018-09-18 | End: 2018-09-18 | Stop reason: HOSPADM

## 2018-09-18 RX ORDER — SENNA PLUS 8.6 MG/1
1 TABLET ORAL 2 TIMES DAILY PRN
Status: DISCONTINUED | OUTPATIENT
Start: 2018-09-18 | End: 2018-09-18 | Stop reason: HOSPADM

## 2018-09-18 RX ADMIN — FUROSEMIDE 40 MG: 40 TABLET ORAL at 12:12

## 2018-09-18 RX ADMIN — FAMOTIDINE 20 MG: 20 TABLET ORAL at 08:03

## 2018-09-18 RX ADMIN — LEVOTHYROXINE SODIUM 50 MCG: 0.05 TABLET ORAL at 05:19

## 2018-09-18 RX ADMIN — AZITHROMYCIN 500 MG: 250 TABLET, FILM COATED ORAL at 08:03

## 2018-09-18 RX ADMIN — APIXABAN 5 MG: 5 TABLET, FILM COATED ORAL at 08:02

## 2018-09-18 RX ADMIN — VENLAFAXINE HYDROCHLORIDE 150 MG: 150 CAPSULE, EXTENDED RELEASE ORAL at 08:10

## 2018-09-18 RX ADMIN — THIAMINE HYDROCHLORIDE 100 MG: 100 INJECTION, SOLUTION INTRAMUSCULAR; INTRAVENOUS at 08:13

## 2018-09-18 RX ADMIN — OXYCODONE HYDROCHLORIDE AND ACETAMINOPHEN 1 TABLET: 5; 325 TABLET ORAL at 05:19

## 2018-09-18 RX ADMIN — FERROUS SULFATE TAB 325 MG (65 MG ELEMENTAL FE) 325 MG: 325 (65 FE) TAB at 08:03

## 2018-09-18 ASSESSMENT — PAIN DESCRIPTION - DESCRIPTORS: DESCRIPTORS: ACHING

## 2018-09-18 ASSESSMENT — PAIN DESCRIPTION - LOCATION
LOCATION: KNEE
LOCATION: KNEE

## 2018-09-18 ASSESSMENT — PAIN SCALES - GENERAL
PAINLEVEL_OUTOF10: 4
PAINLEVEL_OUTOF10: 7
PAINLEVEL_OUTOF10: 4
PAINLEVEL_OUTOF10: 4

## 2018-09-18 ASSESSMENT — PAIN DESCRIPTION - PROGRESSION: CLINICAL_PROGRESSION: GRADUALLY IMPROVING

## 2018-09-18 ASSESSMENT — PAIN DESCRIPTION - ORIENTATION
ORIENTATION: LEFT
ORIENTATION: LEFT

## 2018-09-18 ASSESSMENT — PAIN DESCRIPTION - FREQUENCY: FREQUENCY: INTERMITTENT

## 2018-09-18 ASSESSMENT — PAIN DESCRIPTION - PAIN TYPE
TYPE: ACUTE PAIN
TYPE: ACUTE PAIN

## 2018-09-18 NOTE — FLOWSHEET NOTE
09/18/18 1311   Handoff   Communication Given Transfer Handoff   Oncoming Nurse/Offgoing Nurse leo/dinah   Handoff Communication Telephone   Time Handoff Given 1311   End of Shift Check Performed Yes       Report called to Negin1 Ninfa Connors at [facility].

## 2018-09-18 NOTE — PROGRESS NOTES
Progress Note    9/18/2018   8:40 AM    Name:  Giacomo Cuellar  MRN:    [id number]     Acct:     [de-identified]   Room:  [facility] [place] [address] Day: 4     Admit Date: 9/13/2018  5:43 AM  PCP: Jose Valdovinos MD    Subjective:     C/C:   Chief Complaint   Patient presents with   [facility] Altered Mental Status    Knee Pain     left       Interval History: Status: not changed. Patient's leg swelling is improving   left knee pain is controlled   alert and oriented   no cough or chest pain    ROS:  Constitutional: negative for chills, fevers and sweats  Respiratory: negative for cough, dyspnea on exertion, hemoptysis, shortness of breath and wheezing  Cardiovascular: negative for chest pain, chest pressure/discomfort,and palpitations  Gastrointestinal: negative for abdominal pain, constipation, diarrhea, nausea and vomiting  Neurological: negative for dizziness and headaches    Medications: Allergies:    Allergies   Allergen Reactions    Tizanidine Other (See Comments)     Patient states it makes her loopy       Current Meds:   ipratropium-albuterol (DUONEB) nebulizer solution 1 ampule Q4H PRN   furosemide (LASIX) injection 40 mg Once   clonazePAM (KLONOPIN) tablet 0.5 mg TID PRN   ferrous sulfate tablet 325 mg BID WC   senna (SENOKOT) tablet 8.6 mg BID PRN   azithromycin (ZITHROMAX) tablet 500 mg Daily   pneumococcal 13-valent conjugate (PREVNAR) injection 0.5 mL Once   apixaban (ELIQUIS) tablet 5 mg BID   polyethylene glycol (GLYCOLAX) packet 17 g Daily PRN   bisacodyl (DULCOLAX) suppository 10 mg Daily PRN   traMADol (ULTRAM) tablet 50 mg Q6H PRN   oxyCODONE-acetaminophen (PERCOCET) 5-325 MG per tablet 1 tablet Q6H PRN   sodium chloride flush 0.9 % injection 10 mL 2 times per day   sodium chloride flush 0.9 % injection 10 mL PRN   acetaminophen (TYLENOL) tablet 650 mg Q4H PRN   sodium chloride flush 0.9 % injection 10 mL 2 times per day   sodium chloride flush 0.9 % injection 10 mL PRN   magnesium hydroxide (MILK OF MAGNESIA) 400 Sodium 145 (H) 135 - 144 mmol/L    Potassium 4.2 3.7 - 5.3 mmol/L    Chloride 101 98 - 107 mmol/L    CO2 33 (H) 20 - 31 mmol/L    Anion Gap 11 9 - 17 mmol/L    GFR Non-African American >60 >60 mL/min    GFR African American >60 >60 mL/min    GFR Comment          GFR Staging NOT REPORTED        Physical Examination:        Vitals:  /64   Pulse 95   Temp 98.7 °F (37.1 °C)   Resp 19   Ht 5' 7\" (1.702 m)   Wt 178 lb 9.2 oz (81 kg)   SpO2 95%   BMI 27.97 kg/m²   Temp (24hrs), Av.5 °F (36.9 °C), Min:98 °F (36.7 °C), Max:98.9 °F (37.2 °C)    No results for input(s): POCGLU in the last 72 hours.     General appearance - alert, well appearing, and in no acute distress  Mental status - oriented to person, place, and time with normal affect  Head - normocephalic and atraumatic  Eyes - pupils equal and reactive, extraocular eye movements intact, conjunctiva clear  Ears - hearing appears to be intact  Nose - no drainage noted  Mouth - mucous membranes moist  Neck - supple, no carotid bruits, thyroid not palpable  Chest - clear to auscultation, normal effort  Heart - normal rate, regular rhythm, no murmurs  Abdomen - soft, nontender, nondistended, bowel sounds present all four quadrants, no masses, hepatomegaly or splenomegaly  Neurological - normal speech, no focal findings or movement disorder noted, cranial nerves II through XII grossly intact  Extremities - peripheral pulses palpable,  2+ pitting edema in left lower extremity status post left knee arthroplasty  Skin - no gross lesions, rashes, or induration noted  Musculoskeletal:normal ROM bilateral wrists and hands  Lymphatics: No cervical lymphadenopathy  Psych: Mood and affect is normal    Assessment:        Primary Problem  Acute encephalopathy     Patient Active Problem List   Diagnosis    Hypertension    Osteoarthritis of both knees    Dehydration    A-fib ([address]    SUNSHINE (acute kidney injury) ([address]    Acute encephalopathy    ETOH abuse    SIRS

## 2018-09-18 NOTE — PROGRESS NOTES
26 PERCOCET AND 45 CLONAZEPAM PILLS COUNTED AND RETURNED TO PATIENT . Alicja Epps RN AND Nancie Benjamin RN COUNTED HER PILLS.

## 2018-09-18 NOTE — DISCHARGE SUMMARY
Discharge Summary      Patient ID: Kan Wilburn    MRN: [id number]     Acct:  [de-identified]       Patient's PCP: Kamini Gaspar MD    Admit Date: 9/13/2018     Discharge Date: 9/18/2018      Admitting Physician: Kamini Gaspar MD    Discharge Physician: Kamini Gaspar MD     Discharge Diagnoses: acute kidney injury resolved   acute encephalopathy resolved    Primary Problem  Acute encephalopathy    Patient Active Problem List   Diagnosis    Hypertension    Osteoarthritis of both knees    Dehydration    [facility]    SUNSHINE (acute kidney injury) ([facility] [facility]    Acute encephalopathy    ETOH abuse    SIRS (systemic inflammatory response syndrome) ([address]    Normocytic anemia    Pulmonary hypertension ([facility] [facility]    COPD (chronic obstructive pulmonary disease) ([facility] [address] [facility]    History of GI bleed    Fall    Acute delirium    Constipation       Past Medical History:   Diagnosis Date    Anxiety     Chronic back pain     Hyperlipidemia     Hypertension     Hypothyroid 1/18/2013    Leg pain     Osteoarthritis      The patient was seen and examined on day of discharge and this discharge summary is in conjunction with any daily progress note from day of discharge. Code Status:  Prior    Hospital Course:  Patient was admitted due to confusion altered mental status. Patient did have acute kidney injury was  Treated with IV fluid. Patient also seen by Cardiology Neurology and pulmonology. Patient is status post left total knee arthroplasty went home and was taking Percocet for pain after that patient started having confusion and drowsiness. Patient improved with treatment in the hospital and [facility].     Consults:  IP CONSULT TO PRIMARY CARE PROVIDER  IP CONSULT TO SOCIAL WORK  IP CONSULT TO PULMONOLOGY  IP CONSULT TO CARDIOLOGY  IP CONSULT TO NEUROLOGY    Significant Diagnostic Studies: as above, and as follows:     Treatments: as above    Disposition: SNF    Discharged Condition: venlafaxine (EFFEXOR XR) 150 MG extended release capsule  Take 150 mg by mouth daily                          Time Spent on discharge is more than 35 min in the examination, evaluation, counseling and review of medications and discharge plan.       Electronically signed by Ingrid Romero MD     Copy sent to Dr. Ingrid Romero MD

## 2018-09-18 NOTE — PROGRESS NOTES
[facility]   INPATIENT OCCUPATIONAL THERAPY  PROGRESS NOTE  Date: 2018  Patient Name: Radha Mcgarry      Room: 5-  MRN: [id number]    : 1945  (72 y.o.) Gender: female     Discharge Recommendations:  [address]       Referring Practitioner: Dr. Perfecto Gray  Diagnosis: Dehydration with L TKA on  at [facility]  Chart Reviewed: Yes  Patient assessed for rehabilitation services?: Yes  Family / Caregiver Present: No  Referring Practitioner: Dr. Perfecto Gray  Diagnosis: Dehydration with L TKA on  at [facility]    Restrictions  Restrictions/Precautions: Weight Bearing, Surgical Protocols, Fall Risk  Implants present? :  (left TKR)  Left Lower Extremity Weight Bearing: Weight Bearing As Tolerated  Required Braces or Orthoses?: No      Subjective  Subjective: \"I have a reacher; I don't really remember why. \"   Comments: Pt in bedside chair upon entry. Pt agreeable to therapy; likely d/c this afternoon to [facility]. Pt son is bringing her fxl clothing items for rehab facility. Patient Currently in Pain: Yes  Pain Level: 4  Pain Location: Knee  Pain Orientation: Left     Patient Observation  Observations: L knee ace wrapped; cold pack    Objective        Balance  Sitting Balance: Stand by assistance (SBA seated EOB)  Standing Balance: Contact guard assistance  Standing Balance  Time: <1min x2 c RW  Activity: functional mobility, clothing mgt  Sit to stand: Contact guard assistance  Stand to sit: Contact guard assistance  Comment: pt demo carryover c hand placement      ADL  Feeding: Independent  Grooming: Setup  UE Bathing: Setup  LE Bathing: Moderate assistance  UE Dressing: Setup  LE Dressing: Minimal assistance (per pt report)  Toileting: Maximum assistance (per pt report)  Additional Comments: LBD AE training including reacher and sock aid for foot mgt and threading on RLE for introduction to increase ease and I c LBD.  Pt able to don/doff RLE footie c VC and devices: Call light within reach, All fall risk precautions in place, Gait belt, Left in chair, Nurse notified  Plan  Times per week: 5  Times per day: Daily  Current Treatment Recommendations: Balance Training, Functional Mobility Training, Strengthening, Endurance Training, Safety Education & Training, Patient/Caregiver Education & Training, Equipment Evaluation, Education, & procurement, Self-Care / ADL, Home Management Training      Goals  Short term goals  Time Frame for Short term goals: By discharge  Short term goal 1: Pt will V/D good understanding of AE/DME/modified technqiues for functional ADLs. Short term goal 2: Pt will perform lower body dressing/bathing and toileting tasks with Min A. Short term goal 3: Pt will stand for 5+ minute with 0-1 UE support and CGA while engaging in functional activity of choice. Short term goal 4: Pt will participate in 15+ minutes of therapeutic exercise/functional activities to promote increased IND with self-care and mobility. Short term goal 5: Pt will perform functional transfers and mobility with RW, CGA, and good safety awareness.     OT Individual Minutes  Time In: 4631  Time Out: [address]  Minutes: 28      Electronically signed by Severo Mannan Leu, OTA on 9/18/18 at 1:35 PM

## 2018-09-18 NOTE — PLAN OF CARE
Problem: Pain:  Goal: Patient's pain/discomfort is manageable  Patient's pain/discomfort is manageable   Outcome: Ongoing  Pt complained of pain and received medication. Patient expressed satisfaction. Problem: Skin Integrity:  Goal: Skin integrity will stabilize  Skin integrity will stabilize   Outcome: Ongoing  Patient able to turn and reposition self throughout this shift. Problem: Falls - Risk of:  Goal: Will remain free from falls  Will remain free from falls   Outcome: Ongoing  Pt alert and oriented. Pt was instructed on how to use the call light. Pt used call light appropriately. Non-slip socks are in place. Bed is in lowest and locked position. No falls noted this shift. Problem: Risk for Impaired Skin Integrity  Goal: Tissue integrity - skin and mucous membranes  Structural intactness and normal physiological function of skin and  mucous membranes.    Outcome: Ongoing

## 2018-09-18 NOTE — PROGRESS NOTES
Physical Therapy  Facility/Department: [facility]ARE  Daily Treatment Note  NAME: Bita Baker  : 1945  MRN: [id number]    Date of Service: 2018    Discharge Recommendations:  [address]        Patient Diagnosis(es): The primary encounter diagnosis was Altered mental status, unspecified altered mental status type. Diagnoses of Dehydration, Falls frequently, Alcohol withdrawal syndrome with complication ([address] and Primary osteoarthritis of both knees were also pertinent to this visit. has a past medical history of Anxiety; Chronic back pain; Hyperlipidemia; Hypertension; Hypothyroid; Leg pain; and Osteoarthritis. has a past surgical history that includes eye surgery (Bilateral); Colonoscopy; and joint replacement. Restrictions  Restrictions/Precautions  Restrictions/Precautions: Weight Bearing, Surgical Protocols, Fall Risk  Required Braces or Orthoses?: No  Implants present? :  (left TKR)  Lower Extremity Weight Bearing Restrictions  Left Lower Extremity Weight Bearing: Weight Bearing As Tolerated  Subjective   General  Chart Reviewed: Yes  Additional Pertinent Hx: hx recent left TKR, hx alcohol abuse  Response To Previous Treatment: Patient with no complaints from previous session. Family / Caregiver Present: No  Referring Practitioner: Dr. Theron Ayers: Pt is in bathroom upon arrival. Pt is agreeable to PT. Pt demos amb out of bathroom without RW. Pt requires CGA for safety. Pt reports she forgot about the RW. General Comment  Comments: RN [facility]'s pt for PT.    Pain Screening  Patient Currently in Pain: Yes  Pain Assessment  Pain Assessment: 0-10  Pain Level: 4  Pain Type: Acute pain  Pain Location: Knee  Pain Orientation: Left  Vital Signs  Patient Currently in Pain: Yes       Orientation  Orientation  Overall Orientation Status: Within Normal Limits  Objective      Transfers  Sit to Stand: Stand by assistance  Stand to sit: Stand by assistance  Comment: All transfers completed with RW. Ambulation  Ambulation?: Yes  WB Status: WBAT left  Ambulation 1  Surface: level tile  Device: Rolling Walker  Assistance: Contact guard assistance  Quality of Gait: Pt demos a decrease akren, slight forward flexed posture, and downward gaze. Pt instructed in proper gait pattern with fair carryover. No LOB noted. Distance: 120'x2  Comments: Pt's SPO2 during amb is 91% on RA. Stairs/Curb  Stairs?: Yes  Stairs  # Steps : 4  Stairs Height: 4\" (and 6\")  Rails: None (RW)  Device: Rolling walker  Assistance: Minimal assistance  Comment: Pt educated on stair training with fair demonstration. Pt requires vc's for technique. Pt completes platform steps at different heights. Balance  Posture: Fair  Sitting - Static: Good  Sitting - Dynamic: Good  Standing - Static: Good;-  Standing - Dynamic: Fair;+  Comments: standing balance assessed with RW. Exercises  Comments: AROM 0-90 degrees seated. Other exercises  Other exercises 1: Seated skates bilat LE x10  Other exercises 2: Seated bilat LE therex, x10  Other exercises 3: Sit to Stand x4  Other exercises 4: Polar care to LLE knee to decrease pain. Assessment   Body structures, Functions, Activity limitations: Decreased functional mobility ; Decreased safe awareness;Decreased balance;Decreased ROM; Decreased strength  Assessment: pt would benefit from SNF at D/C due to hx of falling at home. Pt had a recent left TKR at [facility]. Prognosis: Good  Patient Education: POC, [facility] training. REQUIRES PT FOLLOW UP: Yes  Activity Tolerance  Activity Tolerance: Patient limited by fatigue;Patient limited by pain; Patient limited by endurance     Goals  Short term goals  Time Frame for Short term goals: 5-7 treatments/ week  Short term goal 1: pt to independently roll and transfer supine to sit  Short term goal 2: pt to transfer sit <> stand and bed <> chair using w walker left WBAT w/ CGA x 1  Short term goal 3: pt to ambulate w/ w walker left WBAT 50-80' w/ CGA x 1 demonstrating good balance and technique  Short term goal 4: pt to demonstrate good technique w/ exercises S/P left TKR  Short term goal 5: increase AAROM left knee flexion 5-80 degrees  Short term goal 6: pt to advance to 4\" platform step using w walker left WBAT and min x 2  Patient Goals   Patient goals : go to SNF until stronger    Plan    Plan  Times per week: 5-7 treatments/ week  Times per day:  (5-7 treatments/ week)  Specific instructions for Next Treatment: 9-14-18 gait w/ w walker 30', 15' and 20' left WBAT w/ min x 1 and strong verbal cues for technique, HIGH FALL RISK, limited AROM left knee S/P left TKR  Current Treatment Recommendations: Strengthening, Functional Mobility Training, Equipment Evaluation, Education, & procurement, ROM, Transfer Training, Gait Training, Safety Education & Training, Balance Training, Stair training, Patient/Caregiver Education & Training, Positioning  Safety Devices  Type of devices: Call light within reach, Gait belt, Left in chair, Nurse notified     Therapy Time   Individual Concurrent Group Co-treatment   Time In 1000         Time Out [address] [place], [place]

## 2018-09-18 NOTE — PROGRESS NOTES
Annabelle/MOHSEN did obtain pre-cert today. ERICA sent orders and completed 7000 in [employer]. SW spoke to pt who said her family will take her after lunch.
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
## 2018-10-14 PROBLEM — E86.0 DEHYDRATION: Status: RESOLVED | Noted: 2018-09-13 | Resolved: 2018-10-14

## 2018-12-13 PROBLEM — R41.0 ACUTE DELIRIUM: Status: RESOLVED | Noted: 2018-09-14 | Resolved: 2018-12-13

## 2018-12-13 PROBLEM — F41.1 GAD (GENERALIZED ANXIETY DISORDER): Status: ACTIVE | Noted: 2018-12-13

## 2019-03-04 ENCOUNTER — HOSPITAL ENCOUNTER (OUTPATIENT)
Age: 74
Setting detail: SPECIMEN
Discharge: HOME | End: 2019-03-04
Payer: MEDICARE

## 2019-03-04 DIAGNOSIS — Z11.59 ENCOUNTER FOR HEPATITIS C SCREENING TEST FOR LOW RISK PATIENT: ICD-10-CM

## 2019-03-04 DIAGNOSIS — D64.9 NORMOCYTIC ANEMIA: ICD-10-CM

## 2019-03-04 DIAGNOSIS — I10 ESSENTIAL HYPERTENSION: ICD-10-CM

## 2019-03-04 DIAGNOSIS — E03.9 ACQUIRED HYPOTHYROIDISM: ICD-10-CM

## 2019-03-04 LAB
ABSOLUTE EOS #: 0.16 K/UL (ref 0–0.44)
ABSOLUTE IMMATURE GRANULOCYTE: <0.03 K/UL (ref 0–0.3)
ABSOLUTE LYMPH #: 1.49 K/UL (ref 1.1–3.7)
ABSOLUTE MONO #: 0.56 K/UL (ref 0.1–1.2)
ALBUMIN SERPL-MCNC: 4.4 G/DL (ref 3.5–5.2)
ALBUMIN/GLOBULIN RATIO: 1.3 (ref 1–2.5)
ALP BLD-CCNC: 110 U/L (ref 35–104)
ALT SERPL-CCNC: 17 U/L (ref 5–33)
ANION GAP SERPL CALCULATED.3IONS-SCNC: 18 MMOL/L (ref 9–17)
AST SERPL-CCNC: 19 U/L
BASOPHILS # BLD: 2 % (ref 0–2)
BASOPHILS ABSOLUTE: 0.1 K/UL (ref 0–0.2)
BILIRUB SERPL-MCNC: 0.49 MG/DL (ref 0.3–1.2)
BUN BLDV-MCNC: 14 MG/DL (ref 8–23)
BUN/CREAT BLD: ABNORMAL (ref 9–20)
CALCIUM SERPL-MCNC: 9.8 MG/DL (ref 8.6–10.4)
CHLORIDE BLD-SCNC: 104 MMOL/L (ref 98–107)
CHOLESTEROL/HDL RATIO: 3.4
CHOLESTEROL: 285 MG/DL
CO2: 24 MMOL/L (ref 20–31)
CREAT SERPL-MCNC: 0.82 MG/DL (ref 0.5–0.9)
DIFFERENTIAL TYPE: ABNORMAL
EOSINOPHILS RELATIVE PERCENT: 3 % (ref 1–4)
GFR AFRICAN AMERICAN: >60 ML/MIN
GFR NON-AFRICAN AMERICAN: >60 ML/MIN
GFR SERPL CREATININE-BSD FRML MDRD: ABNORMAL ML/MIN/{1.73_M2}
GFR SERPL CREATININE-BSD FRML MDRD: ABNORMAL ML/MIN/{1.73_M2}
GLUCOSE BLD-MCNC: 109 MG/DL (ref 70–99)
HCT VFR BLD CALC: 47.1 % (ref 36.3–47.1)
HDLC SERPL-MCNC: 85 MG/DL
HEMOGLOBIN: 14.6 G/DL (ref 11.9–15.1)
HEPATITIS C ANTIBODY: NONREACTIVE
IMMATURE GRANULOCYTES: 0 %
IRON: 95 UG/DL (ref 37–145)
LDL CHOLESTEROL: 174 MG/DL (ref 0–130)
LYMPHOCYTES # BLD: 26 % (ref 24–43)
MCH RBC QN AUTO: 33.5 PG (ref 25.2–33.5)
MCHC RBC AUTO-ENTMCNC: 31 G/DL (ref 28.4–34.8)
MCV RBC AUTO: 108 FL (ref 82.6–102.9)
MONOCYTES # BLD: 10 % (ref 3–12)
NRBC AUTOMATED: 0 PER 100 WBC
PDW BLD-RTO: 14.1 % (ref 11.8–14.4)
PLATELET # BLD: 309 K/UL (ref 138–453)
PLATELET ESTIMATE: ABNORMAL
PMV BLD AUTO: 10.6 FL (ref 8.1–13.5)
POTASSIUM SERPL-SCNC: 4.9 MMOL/L (ref 3.7–5.3)
RBC # BLD: 4.36 M/UL (ref 3.95–5.11)
RBC # BLD: ABNORMAL 10*6/UL
SEG NEUTROPHILS: 59 % (ref 36–65)
SEGMENTED NEUTROPHILS ABSOLUTE COUNT: 3.48 K/UL (ref 1.5–8.1)
SODIUM BLD-SCNC: 146 MMOL/L (ref 135–144)
THYROXINE, FREE: 1 NG/DL (ref 0.93–1.7)
TOTAL PROTEIN: 7.7 G/DL (ref 6.4–8.3)
TRIGL SERPL-MCNC: 128 MG/DL
TSH SERPL DL<=0.05 MIU/L-ACNC: 3.79 MIU/L (ref 0.3–5)
VLDLC SERPL CALC-MCNC: ABNORMAL MG/DL (ref 1–30)
WBC # BLD: 5.8 K/UL (ref 3.5–11.3)
WBC # BLD: ABNORMAL 10*3/UL

## 2019-03-18 ENCOUNTER — HOSPITAL ENCOUNTER (OUTPATIENT)
Facility: CLINIC | Age: 74
Discharge: HOME | End: 2019-03-20
Payer: MEDICARE

## 2019-03-18 ENCOUNTER — HOSPITAL ENCOUNTER (OUTPATIENT)
Dept: WOMENS IMAGING | Age: 74
Discharge: HOME | End: 2019-03-20
Payer: MEDICARE

## 2019-03-18 ENCOUNTER — HOSPITAL ENCOUNTER (OUTPATIENT)
Dept: GENERAL RADIOLOGY | Facility: CLINIC | Age: 74
Discharge: HOME | End: 2019-03-20
Payer: MEDICARE

## 2019-03-18 DIAGNOSIS — Z12.39 BREAST CANCER SCREENING: ICD-10-CM

## 2019-03-18 DIAGNOSIS — G89.29 CHRONIC BILATERAL LOW BACK PAIN WITHOUT SCIATICA: ICD-10-CM

## 2019-03-18 DIAGNOSIS — Z78.0 POST-MENOPAUSAL: ICD-10-CM

## 2019-03-18 DIAGNOSIS — M54.50 CHRONIC BILATERAL LOW BACK PAIN WITHOUT SCIATICA: ICD-10-CM

## 2019-03-18 PROCEDURE — 77080 DXA BONE DENSITY AXIAL: CPT

## 2019-03-18 PROCEDURE — 72100 X-RAY EXAM L-S SPINE 2/3 VWS: CPT

## 2019-03-18 PROCEDURE — 77063 BREAST TOMOSYNTHESIS BI: CPT

## 2019-03-22 ENCOUNTER — HOSPITAL ENCOUNTER (OUTPATIENT)
Dept: PHYSICAL THERAPY | Age: 74
Setting detail: THERAPIES SERIES
Discharge: HOME | End: 2019-03-22
Payer: MEDICARE

## 2019-03-22 PROCEDURE — 97162 PT EVAL MOD COMPLEX 30 MIN: CPT

## 2019-03-22 PROCEDURE — 97110 THERAPEUTIC EXERCISES: CPT

## 2019-03-22 PROCEDURE — G0283 ELEC STIM OTHER THAN WOUND: HCPCS

## 2019-03-26 ENCOUNTER — HOSPITAL ENCOUNTER (OUTPATIENT)
Dept: PHYSICAL THERAPY | Age: 74
Setting detail: THERAPIES SERIES
Discharge: HOME | End: 2019-03-26
Payer: MEDICARE

## 2019-03-26 PROCEDURE — 97110 THERAPEUTIC EXERCISES: CPT

## 2019-03-26 PROCEDURE — G0283 ELEC STIM OTHER THAN WOUND: HCPCS

## 2019-03-29 ENCOUNTER — HOSPITAL ENCOUNTER (OUTPATIENT)
Dept: PHYSICAL THERAPY | Age: 74
Setting detail: THERAPIES SERIES
Discharge: HOME | End: 2019-03-29
Payer: MEDICARE

## 2019-03-29 NOTE — FLOWSHEET NOTE
? [facility] &  Therapy  [address]    [phone]  F: [phone] [address] [facility] [address]   [address]  P: [phone]  F: [phone] [place] [address]  Outpatient Rehabilitation &  Therapy  [address] [place] [address]  P: [phone]  F: [phone]   ? [facility] &  Therapy  [facility] [address]   P: [phone]  F: [phone]  ? [facility]) - [facility] & Therapy  [address] [facility] [address]  [place]: [phone]   F: [phone]     Physical Therapy Cancel/No Show note    Date: 3/29/2019  Patient: Sergio Breath  : 1945  MRN: [id number]    Cancels/No Shows to date:     For today's appointment patient:    X  Cancelled    ? Rescheduled appointment    ? No-show     Reason given by patient:    ?  Patient ill    ? Conflicting appointment    ? No transportation      ? Conflict with work    ? No reason given    ?  Weather related    X Other:      Comments:  Patient had to babysit her grandchildren      X  Next appointment was confirmed    Electronically signed by: Fabian Xavier PTA

## 2019-04-02 ENCOUNTER — HOSPITAL ENCOUNTER (OUTPATIENT)
Dept: PHYSICAL THERAPY | Age: 74
Setting detail: THERAPIES SERIES
Discharge: HOME | End: 2019-04-02
Payer: MEDICARE

## 2019-04-02 NOTE — FLOWSHEET NOTE
? [facility] - [facility] & Therapy  [address] [facility] [address]  [place]: [phone]   F: [phone]     Physical Therapy Cancel/No Show note    Date: 2019  Patient: Dino White  : 1945  MRN: [id number]    Cancels/No Shows to date:     For today's appointment patient:    X  Cancelled    ? Rescheduled appointment    ? No-show     Reason given by patient:    ?  Patient ill    ? Conflicting appointment    ? No transportation      ? Conflict with work    ? No reason given    ? Weather related    X Other:      Comments: Per : Raquel-Katy 26 and wants to be dc. she stopped taking her depression meds and she is in a funk.  all she wants to do is sleep and do nothing      Electronically signed by: Travon Yun PTA

## 2019-05-01 ENCOUNTER — HOSPITAL ENCOUNTER (OUTPATIENT)
Dept: PHYSICAL THERAPY | Age: 74
Setting detail: THERAPIES SERIES
Discharge: HOME | End: 2019-05-01
Payer: MEDICARE

## 2019-05-01 PROCEDURE — G0283 ELEC STIM OTHER THAN WOUND: HCPCS

## 2019-05-01 PROCEDURE — 97110 THERAPEUTIC EXERCISES: CPT

## 2019-05-01 PROCEDURE — 97164 PT RE-EVAL EST PLAN CARE: CPT

## 2019-05-01 NOTE — CONSULTS
[address] Outpatient Physical Therapy  [address] [facility]         Phone: [phone]       Fax: [phone]     Physical Therapy Spine Re-Evaluation    Date:  2019  Patient: Dino White  : 1945  MRN: [id number]  Physician: WILLIE Sam CNP   Insurance: [facility] Medicare, Medicaid  Medical Diagnosis: M54.5, U26.14 - Chronic bilateral low back pain without sciatica, M17.11 - Primary osteoarthritis of right knee   Rehab Codes: M54.5, M25.561, M62.81, R26.2  Onset Date: 2018  Next 's appt. : 4/15/19    Subjective: Arnaldo Maciel returns today with new referral following not being seen over the last month due to patient going through a hard time with depression. CC: Arnaldo Maciel is a 72year old female complaining of low back pain and anterior bilateral thigh pain into medial knees, (R) > (L). Pain increases with standing greater than 10 minutes and walking. Pain decreases with heat, lying down, and with her feet elevated. Patient notes that she is having a little easier of a time rolling over in bed. Patient reports having trouble with transfers from low surfaces, noting that she has a toilet seat with arms and grab bars in her shower. Pain is described as burning and aching. Patient denies numbness or tingling. Patient notes that she has not been wearing her compression stockings as much as she feels her feet are cold and has noticed increased bruising and vein prominence in bilateral ankles, she notes she is going to follow up with her doctor soon concerning her circulation.    HPI: Low back pain increasing following L TKA performed in 2018    PMHx: [] Unremarkable [] Diabetes [x] HTN  [] Pacemaker   [x] MI/Heart Problems [] Cancer [x] Arthritis [x] Other: COPD, depression              [] Refer to full medical chart  In EPIC   Tests: [x] X-Ray: 3/14/19 [] MRI:  [x] Other: vascular studies  Impression   Mild retrolisthesis of L2 on L3, L3 on L4, and L4 on L5 with facet   arthropathy.  Grade 1 spondylolisthesis of L5 on S1 with facet arthropathy. No acute finding by plain film imaging. An MRI could better evaluate the   spinal canal contents if indicated. Medications: [x] Refer to full medical record [] None [] Other:  Allergies:      [x] Refer to full medical record [] None [] Other:    Function:  Hand Dominance  [x] Right  [] Left  Working:[x] Retired       Pain:  [x] Yes  [] No Location: low back Pain Rating: (0-10 scale) 3-4/10  Pain altered Tx:  [x] Yes  [] No  Action: decreased with IFC and MH  Symptoms: [x] Improving  Better:    [x] Lying    [x] Other: heat, legs elevated  Worse:  [x] Sit    [x] Rise/Sit    [x]Stand    [x] Walk                Sleep: [x] OK    Objective:      STRENGTH  STRENGTH  ROM    Left Right  Left Right Cervical    C5 Shld Abd   L1-2 Hip Flex 4 4- Flexion    Shld Flexion   Hip Abd 4- 4- Extension    Shld IR   L3-4 Knee Ext 4 4 Rotation L R   Shld ER   L4 Ankle DF 5 5 Sidebend L R   C6 Elb Flex   L5 EHL   Retraction    C7 Elb Ext   S1 Plant. Flex 5 5 Lumbar    C8 EPL   Abdominals 3 3 Flexion 82   T1 Fing Abd   Erector Spinae   Extension 24         Rotation L ~ 10% impaired R~ 10% impaired      Hip ext 4- 3+ Sidebend L 52 R 56      Hip add 4 4 UE/LE        Knee flex 3+ 3+ Knee flex L 120 R 110         Knee ext L -2 R -4                                          TESTS (+/-) LEFT RIGHT Not Tested   SLR [] sit [x] supine - - []   Hamstring (SLR)   []   SKTC - - []   DKTC + + []   Slump/Dural - - []   SI JT   []   SEAMUS - - []   Joint Mobility   []   Cerv. Comp   []   Cerv. Distraction   []   Cerv. Alar/Transverse   []   Vertebral Artery   []   Adsons   []   Beatrice Ek   []   Priscilla Tests ? Pain ?  Pain No Change Not Tested   RFIS [] [] [] []   CARLOS [] [] [] []   RFIL [] [] [] []   REIL [] [] [] []   Rep Prot [] [] [] []   Rep Retract [] [] [] []       OBSERVATION No Deficit Deficit Not Tested Comments   Posture       Forward Head [] [x] []

## 2019-05-07 ENCOUNTER — TELEPHONE (OUTPATIENT)
Dept: FAMILY MEDICINE CLINIC | Age: 74
End: 2019-05-07

## 2019-05-07 ENCOUNTER — HOSPITAL ENCOUNTER (OUTPATIENT)
Dept: PHYSICAL THERAPY | Age: 74
Setting detail: THERAPIES SERIES
Discharge: HOME | End: 2019-05-07
Payer: MEDICARE

## 2019-05-07 PROCEDURE — 97110 THERAPEUTIC EXERCISES: CPT

## 2019-05-07 PROCEDURE — G0283 ELEC STIM OTHER THAN WOUND: HCPCS

## 2019-05-07 NOTE — PROGRESS NOTES
[address] Outpatient Physical Therapy   [address] [facility] [address]   Phone: [phone]   Fax: [phone]    Physical Therapy Daily Treatment Note      Date:  2019  Patient Name:  Marlene Allen    :  1945  MRN: [id number]  Physician: WILLIE Gallagher CNP                            Insurance: [facility] Medicare, Medicaid  Medical Diagnosis: M54.5, O01.23 - Chronic bilateral low back pain without sciatica, M17.11 - Primary osteoarthritis of right knee              Rehab Codes: M54.5, M25.561, M62.81, R26.2  Onset Date: 2018                       Next 's appt. : 4/15/19      Visit# / total visits:  Cancels/No Shows: 0/0    Subjective:  Patient reports attempted to [address] lawn yesterday, patient was on a riding  and the mower was very \"jerky\" causing patient to have soreness and increased back pain. Pain:  [x] Yes  [] No Location: Lower Back R>L Pain Rating: (0-10 scale) 8/10  Pain altered Tx:  [] No  [] Yes  Action:  Comments:    Objective:  Modalities: IFC + MH to low back in sitting x15 minutes  Precautions:  Exercises:  Exercise Reps/ Time Weight/ Level Comments   PPT 10 x 5\"       Hip add supine 20x ball     Hip abd supine 20x blueberry     HS stretch 2 x 30\" ea       Quad sets 10 x 5\" ea       Hip hinge sit to stand 5x   Held dowel jarek on patient's back to monitor positioning and keep back flat   SKTC/DKTC 15\"x6     LTR  10x5\"     SLR  15x      Bridge  10x        Postural education 5'   Discussed patient cuing herself to fix her posture throughout her day. Discussed computer positioning and educated patient on proper lifting techniques.    Other:     Specific Instructions for next treatment: progress core and hip strengthening, progress lifting mechanics       Treatment Charges: Mins Units   [x]  Modalities-ESTIM/HP 15 1/0   [x]  Ther Exercise 30 2   []  Manual Therapy     []  Ther Activities     []  Aquatics     []  Neuromuscular     []  Other Total Treatment time 45 3       Assessment: [x] Progressing toward goals. Patient had no increased pain symptoms with stretches added this date. [] No change. [] Other:    STG: (to be met in 6 treatments)  1. ? Pain: Pt will report decreased low back pain to 4/10 following standing for 10 minutes. - MET 5/1/19  2. ? ROM: Pt will increase lumbar extension AROM by 10 degrees in order to increase tolerance to prolonged positioning. 3. ? Strength: Pt will demonstrate ability to maintain neutral pelvis in varying functional positions and increase strength to 4/5 in order to decrease strain placed on low back with functional activities. 4. ? Function: Pt will report ability to stand for 20 minutes without increased low back pain. 5. Independent with Home Exercise Programs  6. Demonstrate Knowledge of fall prevention  LTG: (to be met in 12 treatments)  7. ? Pain: Pt will report decreased low back pain to 2/10 following standing for 10 minutes. 8. ? Strength: Pt will increase bilateral hip strength to 4+/5 globally in order to increase ambulation distance. 9. ? Function: Pt will demonstrate improved functional activity tolerance as evident by an improved score on the [place] to less than 20% impairment.        Patient goals: \"to be pain free\"    Pt. Education:  [x] Yes  [] No  [] Reviewed Prior HEP/Ed  Method of Education: [x] Verbal  [] Demo  [] Written  Comprehension of Education:  [] Verbalizes understanding. [] Demonstrates understanding. [x] Needs review. [] Demonstrates/verbalizes HEP/Ed previously given. Plan: [x] Continue per plan of care.    [] Other:      Time In:100pm           Time Out: 150pm    Electronically signed by:  Raghu Woody PTA

## 2019-05-10 ENCOUNTER — HOSPITAL ENCOUNTER (OUTPATIENT)
Dept: PHYSICAL THERAPY | Age: 74
Setting detail: THERAPIES SERIES
Discharge: HOME | End: 2019-05-10
Payer: MEDICARE

## 2019-05-10 PROCEDURE — 97110 THERAPEUTIC EXERCISES: CPT

## 2019-05-10 PROCEDURE — G0283 ELEC STIM OTHER THAN WOUND: HCPCS

## 2019-05-10 NOTE — PROGRESS NOTES
[address] [place] [address] Outpatient Physical Therapy   [address] [facility] [address]   Phone: [phone]   Fax: [phone]    Physical Therapy Daily Treatment Note      Date:  5/10/2019  Patient Name:  Alis Stringer    :  1945  MRN: [id number]  Physician: WILLIE Barboza CNP                            Insurance: [facility] Medicare, Medicaid  Medical Diagnosis: M54.5, M65.03 - Chronic bilateral low back pain without sciatica, M17.11 - Primary osteoarthritis of right knee              Rehab Codes: M54.5, M25.561, M62.81, R26.2  Onset Date: 2018                       Next 's appt. : 4/15/19      Visit# / total visits: 3/12 Cancels/No Shows: 2/0    Subjective:  Patient reports that her R knee has been hurting and she rates the pains at about 3/10. She also complains of low back R>L 3/10 when getting up. Patient reports attempted to mow lawn yesterday, patient was on a riding  and the mower was very \"jerky\" causing patient to have soreness and increased back pain. Pain:  [x] Yes  [] No Location: Lower Back R>L Pain Rating: (0-10 scale) 3/10  Pain altered Tx:  [] No  [] Yes  Action:  Comments:    Objective:  Modalities: IFC + MH to low back in sitting x15 minutes in supine today, patient preferred this position  Precautions:  Exercises:  Exercise Reps/ Time Weight/ Level Comments   PPT 10 x 5\"       Hip add supine 20x ball     Hip abd supine 20x blueberry     HS stretch 2 x 30\" ea       Quad sets 10 x 5\" ea       Hip hinge sit to stand 5x   Held dowel jarek on patient's back to monitor positioning and keep back flat   SKTC/DKTC 6x15\"  DKTC causes increased difficulty breathing   LTR  10x5\"     SLR  15x     Bridge  10x        Postural education 5'   Reviewed patient cuing herself to fix her posture throughout her day. Discussed computer positioning and educated patient on proper lifting techniques.    Other:     Specific Instructions for next treatment: progress core and hip

## 2019-05-13 ENCOUNTER — HOSPITAL ENCOUNTER (OUTPATIENT)
Dept: PHYSICAL THERAPY | Age: 74
Setting detail: THERAPIES SERIES
Discharge: HOME | End: 2019-05-13
Payer: MEDICARE

## 2019-05-13 NOTE — PROGRESS NOTES
[address]   Outpatient Physical Therapy  Cancel/No Show Note    Date: 19    Patient Name: Marleni Dean        MRN: [id number]  Account: [de-identified] : 1945    Physician: WILLIE Almanza PXY                            PPFSARTEK: [facility] Medicare, Medicaid  Medical Diagnosis: M54.5, K10.86 - Chronic bilateral low back pain without sciatica, M17.11 - Primary osteoarthritis of right knee              Rehab Codes: M54.5, M25.561, M62.81, R26.2  Onset Date: 2018                       Next 's appt. : 4/15/19        Visit# / total visits: 3/12    Cancels/No Shows: 3/0    Patient canceled due to illness.      Genny Knight, PTA

## 2019-05-15 ENCOUNTER — HOSPITAL ENCOUNTER (OUTPATIENT)
Dept: PHYSICAL THERAPY | Age: 74
Setting detail: THERAPIES SERIES
Discharge: HOME | End: 2019-05-15
Payer: MEDICARE

## 2019-05-15 NOTE — DISCHARGE SUMMARY
[] [facility]) [facility] &  Therapy  [address]  [phone]  F: [phone] [] [address]  [address]   [address]  P: [phone]  F: [phone] [] [place]  [address] [facility]  P: [phone]  F: [phone] [] [address]  [facility]   [address]   [place]: [phone]  F: [phone]     Physical Therapy Discharge Note    Date: 5/15/2019      Patient: Roxy [place]  : 1945  MRN: [id number]    Physician: WILLIE Taylor                            ODYNMWADY: [facility] Medicare, Medicaid  Medical Diagnosis: M54.5, H35.54 - Chronic bilateral low back pain without sciatica, M17.11 - Primary osteoarthritis of right knee              Rehab Codes: M54.5, M25.561, M62.81, R26.2  Onset Date: 2018                       Next 's appt. : 4/15/19  Visit# / total visits: 3/12    Cancels/No Shows: 3/1    Treatment to Date:  [x] Therapeutic Exercise    [x] Modalities:  [] Therapeutic Activity    [] Ultrasound  [x] Electrical Stimulation  [] Gait Training     [] Massage       [] Lumbar/Cervical Traction  [] Neuromuscular Re-education [x] Cold/hotpack [] Iontophoresis: 4 mg/mL  [x] Instruction in Home Exercise Program                     Dexamethasone Sodium  [] Manual Therapy             Phosphate 40-80 mAmin  [x] Aquatic Therapy                   [] Vasocompression/    [] Other:             Game Ready    Discharge Status:     [] Pt recovered from conditions. Treatment goals were met. [] Pt received maximum benefit. No further therapy indicated at this time. [x] Pt to continue exercise/home instructions independently. [] Therapy interrupted due to:    [] Pt has 2 or more no shows/cancels, is discontinued per our policy.     [] Pt has completed prescribed number of treatment sessions. [x] Other: Unplanned discharge. Patient cancelled remaining appointments stating that she does not feel that therapy is helping her much and will continue on her own at home. Fair prognosis due to patient's limited attendance to therapy. Electronically signed by Naif Viveros PT on 5/15/2019 at 11:41 AM      If you have any questions or concerns, please don't hesitate to call.   Thank you for your referral.

## 2019-05-15 NOTE — FLOWSHEET NOTE
[] [address]  [address]    [phone]  F: [phone]   [] [address]  [address] [facility]   [address]  P: [phone]  F: [phone]  [] [address]  [address] [facility]  P: [phone]  F: [phone]   [] [address]  [facility] [address]   P: [phone]  F: [phone]  [x] [place] [address] [facility] [address]  [place]: [phone]   F: [phone]     Physical Therapy Cancel/No Show note    Date: 5/15/2019  Patient: Virginia Woo  : 1945  MRN: [id number]    Cancels/No Shows to date: 3/1    For today's appointment patient:    []  Cancelled    [] Rescheduled appointment    [x] No-show     Reason given by patient:    []  Patient ill    []  Conflicting appointment    [] No transportation      [] Conflict with work    [] No reason given    [] Weather related    [x] Other:      Comments:  Patient did not show for today's appointment. Upon calling patient, she reports that she does not feel like therapy is helping her much and wants to discontinue to home program at this time.       [] Next appointment was confirmed    Electronically signed by: Malachi Quiñones PT

## 2019-07-08 ENCOUNTER — HOSPITAL ENCOUNTER (OUTPATIENT)
Dept: MRI IMAGING | Age: 74
Discharge: HOME | End: 2019-07-10
Payer: MEDICARE

## 2019-07-08 DIAGNOSIS — M54.16 LUMBAR RADICULOPATHY: ICD-10-CM

## 2019-07-08 DIAGNOSIS — G89.29 CHRONIC BILATERAL LOW BACK PAIN WITHOUT SCIATICA: ICD-10-CM

## 2019-07-08 DIAGNOSIS — M54.50 CHRONIC BILATERAL LOW BACK PAIN WITHOUT SCIATICA: ICD-10-CM

## 2019-07-08 PROCEDURE — 72148 MRI LUMBAR SPINE W/O DYE: CPT

## 2019-07-30 ENCOUNTER — HOSPITAL ENCOUNTER (OUTPATIENT)
Dept: PULMONOLOGY | Age: 74
Discharge: HOME | End: 2019-07-30
Payer: MEDICARE

## 2019-07-30 DIAGNOSIS — J44.9 CHRONIC OBSTRUCTIVE PULMONARY DISEASE, UNSPECIFIED COPD TYPE (HCC): ICD-10-CM

## 2019-07-30 PROCEDURE — 94729 DIFFUSING CAPACITY: CPT

## 2019-07-30 PROCEDURE — 94727 GAS DIL/WSHOT DETER LNG VOL: CPT

## 2019-07-30 PROCEDURE — 94726 PLETHYSMOGRAPHY LUNG VOLUMES: CPT

## 2019-07-30 PROCEDURE — 94664 DEMO&/EVAL PT USE INHALER: CPT

## 2019-07-30 PROCEDURE — 94728 AIRWY RESIST BY OSCILLOMETRY: CPT

## 2019-07-30 PROCEDURE — 6360000002 HC RX W HCPCS: Performed by: NURSE PRACTITIONER

## 2019-07-30 PROCEDURE — 94060 EVALUATION OF WHEEZING: CPT

## 2019-07-30 RX ORDER — ALBUTEROL SULFATE 2.5 MG/3ML
2.5 SOLUTION RESPIRATORY (INHALATION) ONCE
Status: COMPLETED | OUTPATIENT
Start: 2019-07-30 | End: 2019-07-30

## 2019-07-30 RX ADMIN — ALBUTEROL SULFATE 2.5 MG: 2.5 SOLUTION RESPIRATORY (INHALATION) at 13:05

## 2019-07-31 NOTE — PROCEDURES
[address]                 [address] [facility], [address]                               PULMONARY FUNCTION    PATIENT NAME: Lynn BROWNLEE                     :        1945  MED REC NO:   [id number]                              ROOM:  ACCOUNT NO:   [de-identified]                           ADMIT DATE: 2019  PROVIDER:     Evon Mcclellan    DATE OF PROCEDURE:  2019    RESULTS:  The FVC and FEV1 are decreased. The FEV1/FVC ratio is  decreased. There is no response to bronchodilators. Lung volumes show  increased residual volume. Diffusion capacity is decreased but  normalizes, when corrected for alveolar ventilation and airways  resistance is increased. IMPRESSION:  Moderate-to-severe obstructive lung disease consistent with  COPD.         Conchis Keen    D: 2019 17:54:36       T: 2019 18:00:09     [email or web address]  Job#: [id number]     Doc#: [id number]    CC:

## 2019-08-01 ENCOUNTER — HOSPITAL ENCOUNTER (OUTPATIENT)
Dept: PAIN MANAGEMENT | Age: 74
Discharge: HOME | End: 2019-08-01
Payer: MEDICARE

## 2019-08-01 VITALS
BODY MASS INDEX: 35.16 KG/M2 | TEMPERATURE: 98.2 F | SYSTOLIC BLOOD PRESSURE: 142 MMHG | RESPIRATION RATE: 20 BRPM | DIASTOLIC BLOOD PRESSURE: 79 MMHG | OXYGEN SATURATION: 98 % | HEIGHT: 65 IN | WEIGHT: 211 LBS | HEART RATE: 97 BPM

## 2019-08-01 DIAGNOSIS — M51.36 LUMBAR ADJACENT SEGMENT DISEASE WITH SPONDYLOLISTHESIS: ICD-10-CM

## 2019-08-01 DIAGNOSIS — M48.061 DEGENERATIVE LUMBAR SPINAL STENOSIS: ICD-10-CM

## 2019-08-01 DIAGNOSIS — M43.16 LUMBAR ADJACENT SEGMENT DISEASE WITH SPONDYLOLISTHESIS: ICD-10-CM

## 2019-08-01 DIAGNOSIS — M51.36 DDD (DEGENERATIVE DISC DISEASE), LUMBAR: ICD-10-CM

## 2019-08-01 DIAGNOSIS — M54.16 LUMBAR RADICULOPATHY: Primary | ICD-10-CM

## 2019-08-01 DIAGNOSIS — M47.817 LUMBOSACRAL SPONDYLOSIS WITHOUT MYELOPATHY: ICD-10-CM

## 2019-08-01 LAB
-: NORMAL
REASON FOR REJECTION: NORMAL
ZZ NTE CLEAN UP: ORDERED TEST: NORMAL
ZZ NTE WITH NAME CLEAN UP: SPECIMEN SOURCE: NORMAL

## 2019-08-01 PROCEDURE — 99204 OFFICE O/P NEW MOD 45 MIN: CPT | Performed by: PAIN MEDICINE

## 2019-08-01 PROCEDURE — 99204 OFFICE O/P NEW MOD 45 MIN: CPT

## 2019-08-01 RX ORDER — FERROUS SULFATE 325(65) MG
325 TABLET ORAL DAILY
Status: ON HOLD | COMMUNITY
Start: 2018-08-30 | End: 2021-02-28 | Stop reason: HOSPADM

## 2019-08-01 RX ORDER — VENLAFAXINE HYDROCHLORIDE 150 MG/1
150 CAPSULE, EXTENDED RELEASE ORAL
COMMUNITY
Start: 2018-02-15 | End: 2019-10-23

## 2019-08-01 RX ORDER — FUROSEMIDE 20 MG/1
20 TABLET ORAL 2 TIMES DAILY
COMMUNITY
Start: 2018-11-16 | End: 2019-08-27

## 2019-08-01 ASSESSMENT — ENCOUNTER SYMPTOMS
DIARRHEA: 0
EYES NEGATIVE: 1
NAUSEA: 0
BACK PAIN: 1
EYE REDNESS: 0
GASTROINTESTINAL NEGATIVE: 1
ABDOMINAL PAIN: 0
SHORTNESS OF BREATH: 1
CONSTIPATION: 0
SINUS PRESSURE: 1
VOMITING: 0
SINUS PAIN: 1

## 2019-08-01 ASSESSMENT — PAIN SCALES - GENERAL: PAINLEVEL_OUTOF10: 8

## 2019-08-01 ASSESSMENT — PAIN DESCRIPTION - DESCRIPTORS: DESCRIPTORS: ACHING;BURNING;CONSTANT;SHARP

## 2019-08-01 ASSESSMENT — PAIN DESCRIPTION - ONSET: ONSET: ON-GOING

## 2019-08-01 ASSESSMENT — PAIN DESCRIPTION - LOCATION: LOCATION: BACK;LEG

## 2019-08-01 ASSESSMENT — PAIN - FUNCTIONAL ASSESSMENT: PAIN_FUNCTIONAL_ASSESSMENT: PREVENTS OR INTERFERES SOME ACTIVE ACTIVITIES AND ADLS

## 2019-08-01 ASSESSMENT — PAIN DESCRIPTION - PAIN TYPE: TYPE: CHRONIC PAIN

## 2019-08-01 ASSESSMENT — PAIN DESCRIPTION - PROGRESSION: CLINICAL_PROGRESSION: GRADUALLY WORSENING

## 2019-08-01 ASSESSMENT — PAIN DESCRIPTION - FREQUENCY: FREQUENCY: CONTINUOUS

## 2019-08-01 ASSESSMENT — PAIN DESCRIPTION - ORIENTATION: ORIENTATION: RIGHT

## 2019-08-01 NOTE — PROGRESS NOTES
Inhaler 3    lidocaine-prilocaine (EMLA) 2.5-2.5 % cream Apply topically as needed. 150 g 0    DULoxetine (CYMBALTA) 60 MG extended release capsule TAKE 1 CAPSULE BY MOUTH ONCE DAILY 90 capsule 1    fluticasone (FLONASE) 50 MCG/ACT nasal spray 1 spray by Each Nare route daily 1 Spray in each nostril 1 Bottle 3    apixaban (ELIQUIS) 5 MG TABS tablet Take 1 tablet by mouth 2 times daily 180 tablet 3    lisinopril-hydrochlorothiazide (PRINZIDE;ZESTORETIC) 20-12.5 MG per tablet Take 1 tablet by mouth daily 90 tablet 3    Multiple Vitamins-Minerals (EYE VITAMINS PO) Take by mouth      atorvastatin (LIPITOR) 10 MG tablet Take 1 tablet by mouth daily 90 tablet 1    levothyroxine (SYNTHROID) 50 MCG tablet Take 1 tablet by mouth daily 90 tablet 3    albuterol (PROVENTIL) (2.5 MG/3ML) 0.083% nebulizer solution Take 2.5 mg by nebulization every 4 hours as needed for Wheezing      loperamide (IMODIUM) 2 MG capsule Take 2 mg by mouth 2 times daily as needed for Diarrhea.  acetaminophen (TYLENOL) 500 MG tablet Take 500 mg by mouth every 6 hours as needed for Pain. No current facility-administered medications for this encounter. Allergies  Tizanidine    Family History  family history includes COPD in her mother; Cancer in an other family member; Emphysema in her sister; Heart Disease in her mother.     Social History  Social History     Socioeconomic History    Marital status:      Spouse name: None    Number of children: None    Years of education: None    Highest education level: None   Occupational History    None   Social Needs    Financial resource strain: None    Food insecurity:     Worry: None     Inability: None    Transportation needs:     Medical: None     Non-medical: None   Tobacco Use    Smoking status: Former Smoker     Packs/day: 3.00     Years: 26.00     Pack years: 78.00     Types: Cigarettes    Smokeless tobacco: Never Used   Substance and Sexual Activity    Alcohol

## 2019-08-02 ASSESSMENT — ENCOUNTER SYMPTOMS
APNEA: 0
COUGH: 0

## 2019-08-12 ENCOUNTER — HOSPITAL ENCOUNTER (OUTPATIENT)
Age: 74
Setting detail: SPECIMEN
Discharge: HOME | End: 2019-08-12
Payer: MEDICARE

## 2019-08-12 DIAGNOSIS — N39.41 URGE INCONTINENCE: ICD-10-CM

## 2019-08-12 LAB
-: NORMAL
AMORPHOUS: NORMAL
BACTERIA: NORMAL
BILIRUBIN URINE: NEGATIVE
CASTS UA: NORMAL /LPF (ref 0–8)
COLOR: YELLOW
COMMENT UA: ABNORMAL
CRYSTALS, UA: NORMAL /HPF
EPITHELIAL CELLS UA: NORMAL /HPF (ref 0–5)
GLUCOSE URINE: NEGATIVE
KETONES, URINE: NEGATIVE
LEUKOCYTE ESTERASE, URINE: ABNORMAL
MUCUS: NORMAL
NITRITE, URINE: NEGATIVE
OTHER OBSERVATIONS UA: NORMAL
PH UA: 5 (ref 5–8)
PROTEIN UA: NEGATIVE
RBC UA: NORMAL /HPF (ref 0–4)
RENAL EPITHELIAL, UA: NORMAL /HPF
SPECIFIC GRAVITY UA: 1.01 (ref 1–1.03)
TRICHOMONAS: NORMAL
TURBIDITY: CLEAR
URINE HGB: NEGATIVE
UROBILINOGEN, URINE: NORMAL
WBC UA: NORMAL /HPF (ref 0–5)
YEAST: NORMAL

## 2019-08-13 LAB
CULTURE: NORMAL
Lab: NORMAL
SPECIMEN DESCRIPTION: NORMAL

## 2019-08-27 ENCOUNTER — HOSPITAL ENCOUNTER (OUTPATIENT)
Dept: PAIN MANAGEMENT | Age: 74
Discharge: HOME | End: 2019-08-27
Payer: MEDICARE

## 2019-08-27 ENCOUNTER — HOSPITAL ENCOUNTER (OUTPATIENT)
Dept: GENERAL RADIOLOGY | Age: 74
Discharge: HOME | End: 2019-08-29
Payer: MEDICARE

## 2019-08-27 VITALS
OXYGEN SATURATION: 95 % | BODY MASS INDEX: 34.99 KG/M2 | HEART RATE: 98 BPM | WEIGHT: 210 LBS | HEIGHT: 65 IN | TEMPERATURE: 98.5 F | RESPIRATION RATE: 16 BRPM | DIASTOLIC BLOOD PRESSURE: 87 MMHG | SYSTOLIC BLOOD PRESSURE: 147 MMHG

## 2019-08-27 DIAGNOSIS — M47.817 LUMBOSACRAL SPONDYLOSIS WITHOUT MYELOPATHY: ICD-10-CM

## 2019-08-27 DIAGNOSIS — M54.16 LUMBAR RADICULOPATHY: ICD-10-CM

## 2019-08-27 DIAGNOSIS — M54.16 LUMBAR RADICULOPATHY: Primary | ICD-10-CM

## 2019-08-27 DIAGNOSIS — M51.36 DDD (DEGENERATIVE DISC DISEASE), LUMBAR: ICD-10-CM

## 2019-08-27 PROCEDURE — 3209999900 FLUORO FOR SURGICAL PROCEDURES

## 2019-08-27 PROCEDURE — 6360000002 HC RX W HCPCS: Performed by: PAIN MEDICINE

## 2019-08-27 PROCEDURE — 62323 NJX INTERLAMINAR LMBR/SAC: CPT | Performed by: PAIN MEDICINE

## 2019-08-27 PROCEDURE — 62327 NJX INTERLAMINAR LMBR/SAC: CPT

## 2019-08-27 PROCEDURE — 6360000004 HC RX CONTRAST MEDICATION: Performed by: PAIN MEDICINE

## 2019-08-27 RX ORDER — TRIAMCINOLONE ACETONIDE 40 MG/ML
INJECTION, SUSPENSION INTRA-ARTICULAR; INTRAMUSCULAR
Status: COMPLETED | OUTPATIENT
Start: 2019-08-27 | End: 2019-08-27

## 2019-08-27 RX ORDER — MIDAZOLAM HYDROCHLORIDE 1 MG/ML
INJECTION INTRAMUSCULAR; INTRAVENOUS
Status: COMPLETED | OUTPATIENT
Start: 2019-08-27 | End: 2019-08-27

## 2019-08-27 RX ADMIN — IOHEXOL 2 ML: 180 INJECTION INTRAVENOUS at 10:45

## 2019-08-27 RX ADMIN — TRIAMCINOLONE ACETONIDE 80 MG: 40 INJECTION, SUSPENSION INTRA-ARTICULAR; INTRAMUSCULAR at 10:46

## 2019-08-27 RX ADMIN — MIDAZOLAM 1 MG: 1 INJECTION INTRAMUSCULAR; INTRAVENOUS at 10:42

## 2019-08-27 ASSESSMENT — PAIN DESCRIPTION - ONSET: ONSET: ON-GOING

## 2019-08-27 ASSESSMENT — PAIN DESCRIPTION - DESCRIPTORS: DESCRIPTORS: ACHING;BURNING;SHARP

## 2019-08-27 ASSESSMENT — PAIN SCALES - GENERAL: PAINLEVEL_OUTOF10: 4

## 2019-08-27 ASSESSMENT — PAIN DESCRIPTION - DIRECTION: RADIATING_TOWARDS: LEGS BILATERAL WORSE ON RIGHT

## 2019-08-27 ASSESSMENT — PAIN DESCRIPTION - ORIENTATION: ORIENTATION: LOWER;RIGHT;LEFT

## 2019-08-27 ASSESSMENT — PAIN - FUNCTIONAL ASSESSMENT
PAIN_FUNCTIONAL_ASSESSMENT: 0-10
PAIN_FUNCTIONAL_ASSESSMENT: PREVENTS OR INTERFERES SOME ACTIVE ACTIVITIES AND ADLS

## 2019-08-27 ASSESSMENT — PAIN DESCRIPTION - LOCATION: LOCATION: BACK

## 2019-08-27 ASSESSMENT — PAIN DESCRIPTION - PROGRESSION: CLINICAL_PROGRESSION: GRADUALLY WORSENING

## 2019-08-27 ASSESSMENT — PAIN DESCRIPTION - FREQUENCY: FREQUENCY: INTERMITTENT

## 2019-08-27 ASSESSMENT — PAIN DESCRIPTION - PAIN TYPE: TYPE: CHRONIC PAIN

## 2019-08-28 ENCOUNTER — TELEPHONE (OUTPATIENT)
Dept: PAIN MANAGEMENT | Age: 74
End: 2019-08-28

## 2019-09-10 ENCOUNTER — HOSPITAL ENCOUNTER (OUTPATIENT)
Dept: PAIN MANAGEMENT | Age: 74
Discharge: HOME | End: 2019-09-10
Payer: MEDICARE

## 2019-09-10 VITALS
HEART RATE: 76 BPM | BODY MASS INDEX: 34.99 KG/M2 | WEIGHT: 210 LBS | HEIGHT: 65 IN | DIASTOLIC BLOOD PRESSURE: 75 MMHG | RESPIRATION RATE: 16 BRPM | OXYGEN SATURATION: 97 % | TEMPERATURE: 98.2 F | SYSTOLIC BLOOD PRESSURE: 130 MMHG

## 2019-09-10 DIAGNOSIS — M54.16 LUMBAR RADICULOPATHY: Primary | ICD-10-CM

## 2019-09-10 DIAGNOSIS — M47.817 LUMBOSACRAL SPONDYLOSIS WITHOUT MYELOPATHY: ICD-10-CM

## 2019-09-10 PROCEDURE — 99213 OFFICE O/P EST LOW 20 MIN: CPT

## 2019-09-10 PROCEDURE — 99213 OFFICE O/P EST LOW 20 MIN: CPT | Performed by: NURSE PRACTITIONER

## 2019-09-10 ASSESSMENT — ENCOUNTER SYMPTOMS
GASTROINTESTINAL NEGATIVE: 1
BACK PAIN: 1
COLOR CHANGE: 1

## 2019-09-10 NOTE — PROGRESS NOTES
Bottle, Rfl: 3    apixaban (ELIQUIS) 5 MG TABS tablet, Take 1 tablet by mouth 2 times daily, Disp: 180 tablet, Rfl: 3    lisinopril-hydrochlorothiazide (PRINZIDE;ZESTORETIC) 20-12.5 MG per tablet, Take 1 tablet by mouth daily, Disp: 90 tablet, Rfl: 3    levothyroxine (SYNTHROID) 50 MCG tablet, Take 1 tablet by mouth daily, Disp: 90 tablet, Rfl: 3    cromolyn (OPTICROM) 4 % ophthalmic solution, Place 1 drop into both eyes 4 times daily, Disp: 1 Bottle, Rfl: 0    albuterol sulfate HFA (PROAIR HFA) 108 (90 Base) MCG/ACT inhaler, Inhale 2 puffs into the lungs every 6 hours as needed for Wheezing, Disp: 1 Inhaler, Rfl: 3    lidocaine-prilocaine (EMLA) 2.5-2.5 % cream, Apply topically as needed. , Disp: 150 g, Rfl: 0    Multiple Vitamins-Minerals (EYE VITAMINS PO), Take by mouth, Disp: , Rfl:     albuterol (PROVENTIL) (2.5 MG/3ML) 0.083% nebulizer solution, Take 2.5 mg by nebulization every 4 hours as needed for Wheezing, Disp: , Rfl:     loperamide (IMODIUM) 2 MG capsule, Take 2 mg by mouth 2 times daily as needed for Diarrhea., Disp: , Rfl:     acetaminophen (TYLENOL) 500 MG tablet, Take 500 mg by mouth every 6 hours as needed for Pain., Disp: , Rfl:     Family History   Problem Relation Age of Onset    Heart Disease Mother     COPD Mother     Emphysema Sister     Cancer Other         Cousin - breast cancer       Social History     Socioeconomic History    Marital status:      Spouse name: Not on file    Number of children: Not on file    Years of education: Not on file    Highest education level: Not on file   Occupational History    Not on file   Social Needs    Financial resource strain: Not on file    Food insecurity:     Worry: Not on file     Inability: Not on file    Transportation needs:     Medical: Not on file     Non-medical: Not on file   Tobacco Use    Smoking status: Former Smoker     Packs/day: 3.00     Years: 26.00     Pack years: 78.00     Types: Cigarettes    Smokeless

## 2019-09-26 ENCOUNTER — HOSPITAL ENCOUNTER (OUTPATIENT)
Dept: GENERAL RADIOLOGY | Facility: CLINIC | Age: 74
Discharge: HOME | End: 2019-09-28
Payer: MEDICARE

## 2019-09-26 ENCOUNTER — HOSPITAL ENCOUNTER (OUTPATIENT)
Facility: CLINIC | Age: 74
Discharge: HOME | End: 2019-09-28
Payer: MEDICARE

## 2019-09-26 ENCOUNTER — HOSPITAL ENCOUNTER (OUTPATIENT)
Age: 74
Setting detail: SPECIMEN
Discharge: HOME | End: 2019-09-26
Payer: MEDICARE

## 2019-09-26 DIAGNOSIS — E03.9 ACQUIRED HYPOTHYROIDISM: ICD-10-CM

## 2019-09-26 DIAGNOSIS — Z96.652 HISTORY OF TOTAL LEFT KNEE REPLACEMENT: ICD-10-CM

## 2019-09-26 DIAGNOSIS — E78.00 PURE HYPERCHOLESTEROLEMIA: ICD-10-CM

## 2019-09-26 DIAGNOSIS — M25.562 ACUTE PAIN OF LEFT KNEE: ICD-10-CM

## 2019-09-26 LAB
ABSOLUTE EOS #: 0.13 K/UL (ref 0–0.4)
ABSOLUTE IMMATURE GRANULOCYTE: 0 K/UL (ref 0–0.3)
ABSOLUTE LYMPH #: 1.94 K/UL (ref 1–4.8)
ABSOLUTE MONO #: 0.67 K/UL (ref 0.1–0.8)
ALBUMIN SERPL-MCNC: 4.1 G/DL (ref 3.5–5.2)
ALBUMIN/GLOBULIN RATIO: 1.3 (ref 1–2.5)
ALP BLD-CCNC: 99 U/L (ref 35–104)
ALT SERPL-CCNC: 20 U/L (ref 5–33)
ANION GAP SERPL CALCULATED.3IONS-SCNC: 16 MMOL/L (ref 9–17)
AST SERPL-CCNC: 20 U/L
BASOPHILS # BLD: 1 % (ref 0–2)
BASOPHILS ABSOLUTE: 0.07 K/UL (ref 0–0.2)
BILIRUB SERPL-MCNC: 0.34 MG/DL (ref 0.3–1.2)
BUN BLDV-MCNC: 19 MG/DL (ref 8–23)
BUN/CREAT BLD: ABNORMAL (ref 9–20)
CALCIUM SERPL-MCNC: 9.6 MG/DL (ref 8.6–10.4)
CHLORIDE BLD-SCNC: 106 MMOL/L (ref 98–107)
CHOLESTEROL, FASTING: 205 MG/DL
CHOLESTEROL/HDL RATIO: 3.3
CO2: 25 MMOL/L (ref 20–31)
CREAT SERPL-MCNC: 1 MG/DL (ref 0.5–0.9)
DIFFERENTIAL TYPE: ABNORMAL
EOSINOPHILS RELATIVE PERCENT: 2 % (ref 1–4)
GFR AFRICAN AMERICAN: >60 ML/MIN
GFR NON-AFRICAN AMERICAN: 55 ML/MIN
GFR SERPL CREATININE-BSD FRML MDRD: ABNORMAL ML/MIN/{1.73_M2}
GFR SERPL CREATININE-BSD FRML MDRD: ABNORMAL ML/MIN/{1.73_M2}
GLUCOSE BLD-MCNC: 113 MG/DL (ref 70–99)
HCT VFR BLD CALC: 41.2 % (ref 36.3–47.1)
HDLC SERPL-MCNC: 62 MG/DL
HEMOGLOBIN: 12.4 G/DL (ref 11.9–15.1)
IMMATURE GRANULOCYTES: 0 %
LDL CHOLESTEROL: 112 MG/DL (ref 0–130)
LYMPHOCYTES # BLD: 29 % (ref 24–44)
MCH RBC QN AUTO: 33.5 PG (ref 25.2–33.5)
MCHC RBC AUTO-ENTMCNC: 30.1 G/DL (ref 28.4–34.8)
MCV RBC AUTO: 111.4 FL (ref 82.6–102.9)
MONOCYTES # BLD: 10 % (ref 1–7)
MORPHOLOGY: ABNORMAL
NRBC AUTOMATED: 0 PER 100 WBC
PDW BLD-RTO: 13.3 % (ref 11.8–14.4)
PLATELET # BLD: 282 K/UL (ref 138–453)
PLATELET ESTIMATE: ABNORMAL
PMV BLD AUTO: 10.7 FL (ref 8.1–13.5)
POTASSIUM SERPL-SCNC: 5.2 MMOL/L (ref 3.7–5.3)
RBC # BLD: 3.7 M/UL (ref 3.95–5.11)
RBC # BLD: ABNORMAL 10*6/UL
SEG NEUTROPHILS: 58 % (ref 36–66)
SEGMENTED NEUTROPHILS ABSOLUTE COUNT: 3.89 K/UL (ref 1.8–7.7)
SODIUM BLD-SCNC: 147 MMOL/L (ref 135–144)
THYROXINE, FREE: 1 NG/DL (ref 0.93–1.7)
TOTAL PROTEIN: 7.3 G/DL (ref 6.4–8.3)
TRIGLYCERIDE, FASTING: 153 MG/DL
TSH SERPL DL<=0.05 MIU/L-ACNC: 2.63 MIU/L (ref 0.3–5)
VLDLC SERPL CALC-MCNC: ABNORMAL MG/DL (ref 1–30)
WBC # BLD: 6.7 K/UL (ref 3.5–11.3)
WBC # BLD: ABNORMAL 10*3/UL

## 2019-09-26 PROCEDURE — 73562 X-RAY EXAM OF KNEE 3: CPT

## 2019-10-01 ENCOUNTER — HOSPITAL ENCOUNTER (OUTPATIENT)
Facility: CLINIC | Age: 74
Discharge: HOME | End: 2019-10-03
Payer: MEDICARE

## 2019-10-01 ENCOUNTER — HOSPITAL ENCOUNTER (OUTPATIENT)
Dept: GENERAL RADIOLOGY | Facility: CLINIC | Age: 74
Discharge: HOME | End: 2019-10-03
Payer: MEDICARE

## 2019-10-01 DIAGNOSIS — J44.9 CHRONIC OBSTRUCTIVE PULMONARY DISEASE, UNSPECIFIED COPD TYPE (HCC): ICD-10-CM

## 2019-10-01 PROCEDURE — 71046 X-RAY EXAM CHEST 2 VIEWS: CPT

## 2019-11-05 ENCOUNTER — APPOINTMENT (OUTPATIENT)
Dept: GENERAL RADIOLOGY | Age: 74
End: 2019-11-05
Payer: MEDICARE

## 2019-11-05 ENCOUNTER — HOSPITAL ENCOUNTER (EMERGENCY)
Age: 74
Discharge: HOME | End: 2019-11-05
Attending: EMERGENCY MEDICINE
Payer: MEDICARE

## 2019-11-05 ENCOUNTER — APPOINTMENT (OUTPATIENT)
Dept: CT IMAGING | Age: 74
End: 2019-11-05
Payer: MEDICARE

## 2019-11-05 VITALS
DIASTOLIC BLOOD PRESSURE: 70 MMHG | HEART RATE: 68 BPM | BODY MASS INDEX: 35.45 KG/M2 | OXYGEN SATURATION: 97 % | TEMPERATURE: 98.3 F | WEIGHT: 213 LBS | SYSTOLIC BLOOD PRESSURE: 136 MMHG | RESPIRATION RATE: 15 BRPM

## 2019-11-05 DIAGNOSIS — N39.0 URINARY TRACT INFECTION WITHOUT HEMATURIA, SITE UNSPECIFIED: Primary | ICD-10-CM

## 2019-11-05 LAB
-: NORMAL
ABSOLUTE EOS #: 0.18 K/UL (ref 0–0.44)
ABSOLUTE IMMATURE GRANULOCYTE: <0.03 K/UL (ref 0–0.3)
ABSOLUTE LYMPH #: 1.55 K/UL (ref 1.1–3.7)
ABSOLUTE MONO #: 0.78 K/UL (ref 0.1–1.2)
ACETAMINOPHEN LEVEL: 6 UG/ML (ref 10–30)
ALBUMIN SERPL-MCNC: 4.2 G/DL (ref 3.5–5.2)
ALBUMIN/GLOBULIN RATIO: 1.2 (ref 1–2.5)
ALP BLD-CCNC: 102 U/L (ref 35–104)
ALT SERPL-CCNC: 16 U/L (ref 5–33)
AMMONIA: 33 UMOL/L (ref 11–51)
AMORPHOUS: NORMAL
ANION GAP SERPL CALCULATED.3IONS-SCNC: 17 MMOL/L (ref 9–17)
AST SERPL-CCNC: 20 U/L
BACTERIA: NORMAL
BASOPHILS # BLD: 2 % (ref 0–2)
BASOPHILS ABSOLUTE: 0.15 K/UL (ref 0–0.2)
BILIRUB SERPL-MCNC: 0.57 MG/DL (ref 0.3–1.2)
BILIRUBIN URINE: NEGATIVE
BUN BLDV-MCNC: 45 MG/DL (ref 8–23)
BUN/CREAT BLD: ABNORMAL (ref 9–20)
CALCIUM SERPL-MCNC: 9.6 MG/DL (ref 8.6–10.4)
CASTS UA: NORMAL /LPF (ref 0–8)
CHLORIDE BLD-SCNC: 100 MMOL/L (ref 98–107)
CO2: 22 MMOL/L (ref 20–31)
COLOR: YELLOW
COMMENT UA: ABNORMAL
CREAT SERPL-MCNC: 1.44 MG/DL (ref 0.5–0.9)
CRYSTALS, UA: NORMAL /HPF
DIFFERENTIAL TYPE: ABNORMAL
EOSINOPHILS RELATIVE PERCENT: 3 % (ref 1–4)
EPITHELIAL CELLS UA: NORMAL /HPF (ref 0–5)
ETHANOL PERCENT: <0.01 %
ETHANOL: <10 MG/DL
GFR AFRICAN AMERICAN: 43 ML/MIN
GFR NON-AFRICAN AMERICAN: 36 ML/MIN
GFR SERPL CREATININE-BSD FRML MDRD: ABNORMAL ML/MIN/{1.73_M2}
GFR SERPL CREATININE-BSD FRML MDRD: ABNORMAL ML/MIN/{1.73_M2}
GLUCOSE BLD-MCNC: 119 MG/DL (ref 70–99)
GLUCOSE URINE: NEGATIVE
HCT VFR BLD CALC: 41.1 % (ref 36.3–47.1)
HEMOGLOBIN: 13.4 G/DL (ref 11.9–15.1)
IMMATURE GRANULOCYTES: 0 %
KETONES, URINE: NEGATIVE
LEUKOCYTE ESTERASE, URINE: ABNORMAL
LYMPHOCYTES # BLD: 23 % (ref 24–43)
MCH RBC QN AUTO: 34.1 PG (ref 25.2–33.5)
MCHC RBC AUTO-ENTMCNC: 32.6 G/DL (ref 28.4–34.8)
MCV RBC AUTO: 104.6 FL (ref 82.6–102.9)
MONOCYTES # BLD: 12 % (ref 3–12)
MUCUS: NORMAL
NITRITE, URINE: NEGATIVE
NRBC AUTOMATED: 0 PER 100 WBC
OTHER OBSERVATIONS UA: NORMAL
PDW BLD-RTO: 12.4 % (ref 11.8–14.4)
PH UA: 5 (ref 5–8)
PLATELET # BLD: 331 K/UL (ref 138–453)
PLATELET ESTIMATE: ABNORMAL
PMV BLD AUTO: 11.4 FL (ref 8.1–13.5)
POTASSIUM SERPL-SCNC: 3.8 MMOL/L (ref 3.7–5.3)
PROTEIN UA: NEGATIVE
RBC # BLD: 3.93 M/UL (ref 3.95–5.11)
RBC # BLD: ABNORMAL 10*6/UL
RBC UA: NORMAL /HPF (ref 0–4)
RENAL EPITHELIAL, UA: NORMAL /HPF
SALICYLATE LEVEL: <1 MG/DL (ref 3–10)
SEG NEUTROPHILS: 61 % (ref 36–65)
SEGMENTED NEUTROPHILS ABSOLUTE COUNT: 4.12 K/UL (ref 1.5–8.1)
SODIUM BLD-SCNC: 139 MMOL/L (ref 135–144)
SPECIFIC GRAVITY UA: 1.01 (ref 1–1.03)
TOTAL PROTEIN: 7.6 G/DL (ref 6.4–8.3)
TOXIC TRICYCLIC SC,BLOOD: NEGATIVE
TRICHOMONAS: NORMAL
TROPONIN INTERP: ABNORMAL
TROPONIN INTERP: ABNORMAL
TROPONIN T: ABNORMAL NG/ML
TROPONIN T: ABNORMAL NG/ML
TROPONIN, HIGH SENSITIVITY: 16 NG/L (ref 0–14)
TROPONIN, HIGH SENSITIVITY: 19 NG/L (ref 0–14)
TURBIDITY: ABNORMAL
URINE HGB: ABNORMAL
UROBILINOGEN, URINE: NORMAL
WBC # BLD: 6.8 K/UL (ref 3.5–11.3)
WBC # BLD: ABNORMAL 10*3/UL
WBC UA: NORMAL /HPF (ref 0–5)
YEAST: NORMAL

## 2019-11-05 PROCEDURE — 99285 EMERGENCY DEPT VISIT HI MDM: CPT

## 2019-11-05 PROCEDURE — 84484 ASSAY OF TROPONIN QUANT: CPT

## 2019-11-05 PROCEDURE — G0480 DRUG TEST DEF 1-7 CLASSES: HCPCS

## 2019-11-05 PROCEDURE — 71045 X-RAY EXAM CHEST 1 VIEW: CPT

## 2019-11-05 PROCEDURE — 85025 COMPLETE CBC W/AUTO DIFF WBC: CPT

## 2019-11-05 PROCEDURE — 82140 ASSAY OF AMMONIA: CPT

## 2019-11-05 PROCEDURE — 80053 COMPREHEN METABOLIC PANEL: CPT

## 2019-11-05 PROCEDURE — 70450 CT HEAD/BRAIN W/O DYE: CPT

## 2019-11-05 PROCEDURE — 93005 ELECTROCARDIOGRAM TRACING: CPT | Performed by: EMERGENCY MEDICINE

## 2019-11-05 PROCEDURE — 87086 URINE CULTURE/COLONY COUNT: CPT

## 2019-11-05 PROCEDURE — 80307 DRUG TEST PRSMV CHEM ANLYZR: CPT

## 2019-11-05 PROCEDURE — 81001 URINALYSIS AUTO W/SCOPE: CPT

## 2019-11-05 PROCEDURE — 6370000000 HC RX 637 (ALT 250 FOR IP): Performed by: EMERGENCY MEDICINE

## 2019-11-05 RX ORDER — CEPHALEXIN 250 MG/1
500 CAPSULE ORAL ONCE
Status: COMPLETED | OUTPATIENT
Start: 2019-11-05 | End: 2019-11-05

## 2019-11-05 RX ORDER — CEPHALEXIN 500 MG/1
500 CAPSULE ORAL 4 TIMES DAILY
Qty: 28 CAPSULE | Refills: 0 | Status: SHIPPED | OUTPATIENT
Start: 2019-11-05 | End: 2019-11-12

## 2019-11-05 RX ADMIN — CEPHALEXIN 500 MG: 250 CAPSULE ORAL at 17:03

## 2019-11-05 ASSESSMENT — ENCOUNTER SYMPTOMS
VOMITING: 0
BACK PAIN: 1
ABDOMINAL PAIN: 0
SHORTNESS OF BREATH: 1
COUGH: 0
NAUSEA: 0

## 2019-11-06 LAB
AMPHETAMINE SCREEN URINE: NEGATIVE
BARBITURATE SCREEN URINE: NEGATIVE
BENZODIAZEPINE SCREEN, URINE: NEGATIVE
BUPRENORPHINE URINE: ABNORMAL
CANNABINOID SCREEN URINE: NEGATIVE
COCAINE METABOLITE, URINE: NEGATIVE
CULTURE: NORMAL
EKG ATRIAL RATE: 267 BPM
EKG Q-T INTERVAL: 372 MS
EKG QRS DURATION: 58 MS
EKG QTC CALCULATION (BAZETT): 450 MS
EKG R AXIS: 21 DEGREES
EKG T AXIS: 54 DEGREES
EKG VENTRICULAR RATE: 88 BPM
Lab: NORMAL
MDMA URINE: ABNORMAL
METHADONE SCREEN, URINE: NEGATIVE
METHAMPHETAMINE, URINE: ABNORMAL
OPIATES, URINE: POSITIVE
OXYCODONE SCREEN URINE: NEGATIVE
PHENCYCLIDINE, URINE: NEGATIVE
PROPOXYPHENE, URINE: ABNORMAL
SPECIMEN DESCRIPTION: NORMAL
TEST INFORMATION: ABNORMAL
TRICYCLIC ANTIDEPRESSANTS, UR: ABNORMAL

## 2019-11-06 PROCEDURE — 93010 ELECTROCARDIOGRAM REPORT: CPT | Performed by: INTERNAL MEDICINE

## 2019-11-11 ENCOUNTER — OFFICE VISIT (OUTPATIENT)
Dept: ORTHOPEDIC SURGERY | Age: 74
End: 2019-11-11
Payer: MEDICARE

## 2019-11-11 DIAGNOSIS — M43.10 RETROLISTHESIS OF VERTEBRAE: ICD-10-CM

## 2019-11-11 DIAGNOSIS — M47.817 LUMBOSACRAL SPONDYLOSIS WITHOUT MYELOPATHY: Primary | ICD-10-CM

## 2019-11-11 PROCEDURE — G8482 FLU IMMUNIZE ORDER/ADMIN: HCPCS | Performed by: PHYSICIAN ASSISTANT

## 2019-11-11 PROCEDURE — G8427 DOCREV CUR MEDS BY ELIG CLIN: HCPCS | Performed by: PHYSICIAN ASSISTANT

## 2019-11-11 PROCEDURE — 4040F PNEUMOC VAC/ADMIN/RCVD: CPT | Performed by: PHYSICIAN ASSISTANT

## 2019-11-11 PROCEDURE — G8399 PT W/DXA RESULTS DOCUMENT: HCPCS | Performed by: PHYSICIAN ASSISTANT

## 2019-11-11 PROCEDURE — 1123F ACP DISCUSS/DSCN MKR DOCD: CPT | Performed by: PHYSICIAN ASSISTANT

## 2019-11-11 PROCEDURE — 3017F COLORECTAL CA SCREEN DOC REV: CPT | Performed by: PHYSICIAN ASSISTANT

## 2019-11-11 PROCEDURE — G8417 CALC BMI ABV UP PARAM F/U: HCPCS | Performed by: PHYSICIAN ASSISTANT

## 2019-11-11 PROCEDURE — 1090F PRES/ABSN URINE INCON ASSESS: CPT | Performed by: PHYSICIAN ASSISTANT

## 2019-11-11 PROCEDURE — 1036F TOBACCO NON-USER: CPT | Performed by: PHYSICIAN ASSISTANT

## 2019-11-11 PROCEDURE — 99203 OFFICE O/P NEW LOW 30 MIN: CPT | Performed by: PHYSICIAN ASSISTANT

## 2019-11-12 ASSESSMENT — ENCOUNTER SYMPTOMS
VOMITING: 0
BACK PAIN: 1
COLOR CHANGE: 0
EYES NEGATIVE: 1
RHINORRHEA: 0
COUGH: 0
NAUSEA: 0

## 2019-11-15 ENCOUNTER — HOSPITAL ENCOUNTER (OUTPATIENT)
Dept: PAIN MANAGEMENT | Age: 74
Discharge: HOME | End: 2019-11-15
Payer: MEDICARE

## 2019-11-15 VITALS
DIASTOLIC BLOOD PRESSURE: 61 MMHG | SYSTOLIC BLOOD PRESSURE: 117 MMHG | HEIGHT: 65 IN | BODY MASS INDEX: 35.49 KG/M2 | WEIGHT: 213 LBS | RESPIRATION RATE: 20 BRPM | TEMPERATURE: 99 F | HEART RATE: 90 BPM

## 2019-11-15 DIAGNOSIS — M54.16 LUMBAR RADICULOPATHY: ICD-10-CM

## 2019-11-15 DIAGNOSIS — M47.817 LUMBOSACRAL SPONDYLOSIS WITHOUT MYELOPATHY: ICD-10-CM

## 2019-11-15 DIAGNOSIS — M17.11 PRIMARY OSTEOARTHRITIS OF RIGHT KNEE: Primary | ICD-10-CM

## 2019-11-15 PROCEDURE — 99213 OFFICE O/P EST LOW 20 MIN: CPT

## 2019-11-15 PROCEDURE — 99213 OFFICE O/P EST LOW 20 MIN: CPT | Performed by: NURSE PRACTITIONER

## 2019-11-15 ASSESSMENT — ENCOUNTER SYMPTOMS
GASTROINTESTINAL NEGATIVE: 1
RESPIRATORY NEGATIVE: 1
BACK PAIN: 1

## 2019-11-19 ENCOUNTER — HOSPITAL ENCOUNTER (OUTPATIENT)
Dept: PAIN MANAGEMENT | Age: 74
Discharge: HOME | End: 2019-11-19
Payer: MEDICARE

## 2019-11-19 ENCOUNTER — HOSPITAL ENCOUNTER (OUTPATIENT)
Dept: GENERAL RADIOLOGY | Age: 74
Discharge: HOME | End: 2019-11-21
Payer: MEDICARE

## 2019-11-19 VITALS
SYSTOLIC BLOOD PRESSURE: 145 MMHG | DIASTOLIC BLOOD PRESSURE: 76 MMHG | WEIGHT: 205 LBS | HEART RATE: 113 BPM | TEMPERATURE: 97.6 F | BODY MASS INDEX: 34.16 KG/M2 | HEIGHT: 65 IN | RESPIRATION RATE: 16 BRPM | OXYGEN SATURATION: 95 %

## 2019-11-19 DIAGNOSIS — M47.817 LUMBOSACRAL SPONDYLOSIS WITHOUT MYELOPATHY: ICD-10-CM

## 2019-11-19 DIAGNOSIS — M54.16 LUMBAR RADICULOPATHY: Primary | ICD-10-CM

## 2019-11-19 DIAGNOSIS — M54.16 LUMBAR RADICULOPATHY: ICD-10-CM

## 2019-11-19 DIAGNOSIS — M43.16 LUMBAR ADJACENT SEGMENT DISEASE WITH SPONDYLOLISTHESIS: ICD-10-CM

## 2019-11-19 DIAGNOSIS — M51.36 LUMBAR ADJACENT SEGMENT DISEASE WITH SPONDYLOLISTHESIS: ICD-10-CM

## 2019-11-19 DIAGNOSIS — M51.36 DDD (DEGENERATIVE DISC DISEASE), LUMBAR: ICD-10-CM

## 2019-11-19 PROCEDURE — 6360000002 HC RX W HCPCS: Performed by: PAIN MEDICINE

## 2019-11-19 PROCEDURE — 62323 NJX INTERLAMINAR LMBR/SAC: CPT | Performed by: PAIN MEDICINE

## 2019-11-19 PROCEDURE — 3209999900 FLUORO FOR SURGICAL PROCEDURES

## 2019-11-19 PROCEDURE — 6360000004 HC RX CONTRAST MEDICATION: Performed by: PAIN MEDICINE

## 2019-11-19 PROCEDURE — 62323 NJX INTERLAMINAR LMBR/SAC: CPT

## 2019-11-19 RX ORDER — TRIAMCINOLONE ACETONIDE 40 MG/ML
INJECTION, SUSPENSION INTRA-ARTICULAR; INTRAMUSCULAR
Status: COMPLETED | OUTPATIENT
Start: 2019-11-19 | End: 2019-11-19

## 2019-11-19 RX ORDER — MIDAZOLAM HYDROCHLORIDE 1 MG/ML
INJECTION INTRAMUSCULAR; INTRAVENOUS
Status: COMPLETED | OUTPATIENT
Start: 2019-11-19 | End: 2019-11-19

## 2019-11-19 RX ADMIN — MIDAZOLAM 1 MG: 1 INJECTION INTRAMUSCULAR; INTRAVENOUS at 10:08

## 2019-11-19 RX ADMIN — IOHEXOL 2 ML: 180 INJECTION INTRAVENOUS at 10:15

## 2019-11-19 RX ADMIN — TRIAMCINOLONE ACETONIDE 80 MG: 40 INJECTION, SUSPENSION INTRA-ARTICULAR; INTRAMUSCULAR at 10:15

## 2019-11-19 ASSESSMENT — PAIN DESCRIPTION - ONSET: ONSET: ON-GOING

## 2019-11-19 ASSESSMENT — PAIN DESCRIPTION - ORIENTATION: ORIENTATION: MID;LOWER

## 2019-11-19 ASSESSMENT — PAIN DESCRIPTION - DESCRIPTORS: DESCRIPTORS: ACHING;BURNING;SHARP

## 2019-11-19 ASSESSMENT — PAIN DESCRIPTION - PROGRESSION: CLINICAL_PROGRESSION: GRADUALLY WORSENING

## 2019-11-19 ASSESSMENT — PAIN DESCRIPTION - DIRECTION: RADIATING_TOWARDS: RIGHT LEG

## 2019-11-19 ASSESSMENT — PAIN - FUNCTIONAL ASSESSMENT
PAIN_FUNCTIONAL_ASSESSMENT: PREVENTS OR INTERFERES SOME ACTIVE ACTIVITIES AND ADLS
PAIN_FUNCTIONAL_ASSESSMENT: 0-10

## 2019-11-19 ASSESSMENT — PAIN DESCRIPTION - LOCATION: LOCATION: BACK

## 2019-11-19 ASSESSMENT — PAIN DESCRIPTION - FREQUENCY: FREQUENCY: INTERMITTENT

## 2019-11-19 ASSESSMENT — PAIN SCALES - GENERAL: PAINLEVEL_OUTOF10: 3

## 2019-11-20 ENCOUNTER — TELEPHONE (OUTPATIENT)
Dept: PAIN MANAGEMENT | Age: 74
End: 2019-11-20

## 2019-12-19 ENCOUNTER — HOSPITAL ENCOUNTER (OUTPATIENT)
Age: 74
Setting detail: SPECIMEN
Discharge: HOME | End: 2019-12-19
Payer: MEDICARE

## 2019-12-19 DIAGNOSIS — I10 ESSENTIAL HYPERTENSION: ICD-10-CM

## 2019-12-19 LAB
ANION GAP SERPL CALCULATED.3IONS-SCNC: 20 MMOL/L (ref 9–17)
BUN BLDV-MCNC: 49 MG/DL (ref 8–23)
BUN/CREAT BLD: ABNORMAL (ref 9–20)
CALCIUM SERPL-MCNC: 9.5 MG/DL (ref 8.6–10.4)
CHLORIDE BLD-SCNC: 96 MMOL/L (ref 98–107)
CO2: 22 MMOL/L (ref 20–31)
CREAT SERPL-MCNC: 2.11 MG/DL (ref 0.5–0.9)
GFR AFRICAN AMERICAN: 28 ML/MIN
GFR NON-AFRICAN AMERICAN: 23 ML/MIN
GFR SERPL CREATININE-BSD FRML MDRD: ABNORMAL ML/MIN/{1.73_M2}
GFR SERPL CREATININE-BSD FRML MDRD: ABNORMAL ML/MIN/{1.73_M2}
GLUCOSE BLD-MCNC: 102 MG/DL (ref 70–99)
POTASSIUM SERPL-SCNC: 4.7 MMOL/L (ref 3.7–5.3)
SODIUM BLD-SCNC: 138 MMOL/L (ref 135–144)

## 2020-01-03 ENCOUNTER — HOSPITAL ENCOUNTER (OUTPATIENT)
Age: 75
Setting detail: SPECIMEN
Discharge: HOME | End: 2020-01-03
Payer: MEDICARE

## 2020-01-03 LAB
ANION GAP SERPL CALCULATED.3IONS-SCNC: 14 MMOL/L (ref 9–17)
BUN BLDV-MCNC: 19 MG/DL (ref 8–23)
BUN/CREAT BLD: ABNORMAL (ref 9–20)
CALCIUM SERPL-MCNC: 9.2 MG/DL (ref 8.6–10.4)
CHLORIDE BLD-SCNC: 93 MMOL/L (ref 98–107)
CO2: 30 MMOL/L (ref 20–31)
CREAT SERPL-MCNC: 1.18 MG/DL (ref 0.5–0.9)
GFR AFRICAN AMERICAN: 55 ML/MIN
GFR NON-AFRICAN AMERICAN: 45 ML/MIN
GFR SERPL CREATININE-BSD FRML MDRD: ABNORMAL ML/MIN/{1.73_M2}
GFR SERPL CREATININE-BSD FRML MDRD: ABNORMAL ML/MIN/{1.73_M2}
GLUCOSE BLD-MCNC: 100 MG/DL (ref 70–99)
POTASSIUM SERPL-SCNC: 4.2 MMOL/L (ref 3.7–5.3)
SODIUM BLD-SCNC: 137 MMOL/L (ref 135–144)

## 2020-01-10 ENCOUNTER — CARE COORDINATION (OUTPATIENT)
Dept: CARE COORDINATION | Age: 75
End: 2020-01-10

## 2020-01-10 NOTE — CARE COORDINATION
Ana Bustos is a 19-year-old female that is a patient of WILLIE Harrison. Steven Mcdonald, [facility] sent  a referral to assist Netta with [employer]      called and spoke with Netta. Netta was agreeable to have  complete [employer] referral to see if Netta qualifies for [place]. Netta reports that she is interested in meals however cannot afford to pay for them. If Netta does not qualify for [employer],  will provide Netta with [employer] information who can provide one free meal a day.  and [place] discussed her being alone, lonely, and not eating the best.   and Netta discussed the local senior center. Netta reports that one day she would like to attend however in a \"rut\" now and rather now socialize. Plan:    will fax [employer] application. Netta will work with Edgar Gonzalez to enroll in senior meals. Netta will make appointment with counselor. Netta will consider going to senior center to socialize.

## 2020-01-17 ENCOUNTER — OFFICE VISIT (OUTPATIENT)
Dept: PSYCHOLOGY | Age: 75
End: 2020-01-17
Payer: MEDICARE

## 2020-01-17 PROCEDURE — 1036F TOBACCO NON-USER: CPT | Performed by: PSYCHOLOGIST

## 2020-01-17 PROCEDURE — 90791 PSYCH DIAGNOSTIC EVALUATION: CPT | Performed by: PSYCHOLOGIST
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
## 2020-01-29 ENCOUNTER — HOSPITAL ENCOUNTER (OUTPATIENT)
Age: 75
Setting detail: SPECIMEN
Discharge: HOME | End: 2020-01-29
Payer: MEDICARE

## 2020-01-29 LAB
ABSOLUTE EOS #: 0.2 K/UL (ref 0–0.44)
ABSOLUTE IMMATURE GRANULOCYTE: <0.03 K/UL (ref 0–0.3)
ABSOLUTE LYMPH #: 1.59 K/UL (ref 1.1–3.7)
ABSOLUTE MONO #: 0.73 K/UL (ref 0.1–1.2)
ALBUMIN SERPL-MCNC: 4 G/DL (ref 3.5–5.2)
ALBUMIN/GLOBULIN RATIO: 1.3 (ref 1–2.5)
ALP BLD-CCNC: 85 U/L (ref 35–104)
ALT SERPL-CCNC: 13 U/L (ref 5–33)
ANION GAP SERPL CALCULATED.3IONS-SCNC: 15 MMOL/L (ref 9–17)
AST SERPL-CCNC: 18 U/L
BASOPHILS # BLD: 2 % (ref 0–2)
BASOPHILS ABSOLUTE: 0.11 K/UL (ref 0–0.2)
BILIRUB SERPL-MCNC: 0.54 MG/DL (ref 0.3–1.2)
BUN BLDV-MCNC: 22 MG/DL (ref 8–23)
BUN/CREAT BLD: ABNORMAL (ref 9–20)
CALCIUM SERPL-MCNC: 9.6 MG/DL (ref 8.6–10.4)
CHLORIDE BLD-SCNC: 95 MMOL/L (ref 98–107)
CO2: 29 MMOL/L (ref 20–31)
CREAT SERPL-MCNC: 1.45 MG/DL (ref 0.5–0.9)
DIFFERENTIAL TYPE: ABNORMAL
EOSINOPHILS RELATIVE PERCENT: 3 % (ref 1–4)
GFR AFRICAN AMERICAN: 43 ML/MIN
GFR NON-AFRICAN AMERICAN: 35 ML/MIN
GFR SERPL CREATININE-BSD FRML MDRD: ABNORMAL ML/MIN/{1.73_M2}
GFR SERPL CREATININE-BSD FRML MDRD: ABNORMAL ML/MIN/{1.73_M2}
GLUCOSE BLD-MCNC: 102 MG/DL (ref 70–99)
HCT VFR BLD CALC: 36.8 % (ref 36.3–47.1)
HEMOGLOBIN: 11.6 G/DL (ref 11.9–15.1)
IMMATURE GRANULOCYTES: 0 %
LYMPHOCYTES # BLD: 25 % (ref 24–43)
MAGNESIUM: 1.9 MG/DL (ref 1.6–2.6)
MCH RBC QN AUTO: 33.4 PG (ref 25.2–33.5)
MCHC RBC AUTO-ENTMCNC: 31.5 G/DL (ref 28.4–34.8)
MCV RBC AUTO: 106.1 FL (ref 82.6–102.9)
MONOCYTES # BLD: 11 % (ref 3–12)
NRBC AUTOMATED: 0 PER 100 WBC
PDW BLD-RTO: 14 % (ref 11.8–14.4)
PHOSPHORUS: 3.3 MG/DL (ref 2.6–4.5)
PLATELET # BLD: 311 K/UL (ref 138–453)
PLATELET ESTIMATE: ABNORMAL
PMV BLD AUTO: 11 FL (ref 8.1–13.5)
POTASSIUM SERPL-SCNC: 3.8 MMOL/L (ref 3.7–5.3)
RBC # BLD: 3.47 M/UL (ref 3.95–5.11)
RBC # BLD: ABNORMAL 10*6/UL
SEG NEUTROPHILS: 59 % (ref 36–65)
SEGMENTED NEUTROPHILS ABSOLUTE COUNT: 3.78 K/UL (ref 1.5–8.1)
SODIUM BLD-SCNC: 139 MMOL/L (ref 135–144)
TOTAL PROTEIN: 7 G/DL (ref 6.4–8.3)
WBC # BLD: 6.4 K/UL (ref 3.5–11.3)
WBC # BLD: ABNORMAL 10*3/UL

## 2020-02-04 ENCOUNTER — CARE COORDINATION (OUTPATIENT)
Dept: CARE COORDINATION | Age: 75
End: 2020-02-04

## 2020-02-04 NOTE — CARE COORDINATION
called to follow up with Fermín Payton regarding [facility] referral.  Fermín Payton reports they called, and Fermín Payton spoke to them via phone. Fermín Payton reports that she is to independent for the program.    Fermín Payton reports that she is still seeing her psychiatrist and was recommended to see a counselor. Fermín Payton reports that she has met with her one time and is supposed to go for her second appointment this Friday. Fermín Payton reports that she thinks having someone to talk to will help her feel better. Fermín Payton states that she is interested in walking or going to a gym. Fermín Payton reports that she is going to call senior [place] and inquire about getting involved in activities. Denied questions, concerns, or needs. Plan: Fermín Payton will continue to see her psychiatrist and counselor. Fermín Payton will become more involved and attempt to socialize with others.

## 2020-02-07 ENCOUNTER — OFFICE VISIT (OUTPATIENT)
Dept: PSYCHOLOGY | Age: 75
End: 2020-02-07
Payer: MEDICARE

## 2020-02-07 PROCEDURE — 1036F TOBACCO NON-USER: CPT | Performed by: PSYCHOLOGIST

## 2020-02-07 PROCEDURE — 90837 PSYTX W PT 60 MINUTES: CPT | Performed by: PSYCHOLOGIST
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
## 2020-02-10 ENCOUNTER — HOSPITAL ENCOUNTER (OUTPATIENT)
Age: 75
Setting detail: SPECIMEN
Discharge: HOME | End: 2020-02-10
Payer: MEDICARE

## 2020-02-10 LAB
ANION GAP SERPL CALCULATED.3IONS-SCNC: 18 MMOL/L (ref 9–17)
BUN BLDV-MCNC: 13 MG/DL (ref 8–23)
BUN/CREAT BLD: ABNORMAL (ref 9–20)
CALCIUM SERPL-MCNC: 9.4 MG/DL (ref 8.6–10.4)
CHLORIDE BLD-SCNC: 102 MMOL/L (ref 98–107)
CHOLESTEROL, FASTING: 165 MG/DL
CHOLESTEROL/HDL RATIO: 2.8
CO2: 24 MMOL/L (ref 20–31)
CREAT SERPL-MCNC: 1.22 MG/DL (ref 0.5–0.9)
GFR AFRICAN AMERICAN: 53 ML/MIN
GFR NON-AFRICAN AMERICAN: 43 ML/MIN
GFR SERPL CREATININE-BSD FRML MDRD: ABNORMAL ML/MIN/{1.73_M2}
GFR SERPL CREATININE-BSD FRML MDRD: ABNORMAL ML/MIN/{1.73_M2}
GLUCOSE BLD-MCNC: 103 MG/DL (ref 70–99)
HDLC SERPL-MCNC: 60 MG/DL
LDL CHOLESTEROL: 82 MG/DL (ref 0–130)
POTASSIUM SERPL-SCNC: 3.9 MMOL/L (ref 3.7–5.3)
SODIUM BLD-SCNC: 144 MMOL/L (ref 135–144)
TRIGLYCERIDE, FASTING: 115 MG/DL
VLDLC SERPL CALC-MCNC: NORMAL MG/DL (ref 1–30)

## 2020-02-12 ENCOUNTER — APPOINTMENT (OUTPATIENT)
Dept: GENERAL RADIOLOGY | Age: 75
End: 2020-02-12
Payer: MEDICARE

## 2020-02-12 ENCOUNTER — HOSPITAL ENCOUNTER (EMERGENCY)
Age: 75
Discharge: HOME | End: 2020-02-12
Attending: EMERGENCY MEDICINE
Payer: MEDICARE

## 2020-02-12 VITALS
BODY MASS INDEX: 29.32 KG/M2 | DIASTOLIC BLOOD PRESSURE: 71 MMHG | OXYGEN SATURATION: 91 % | HEART RATE: 107 BPM | TEMPERATURE: 98 F | HEIGHT: 65 IN | WEIGHT: 176 LBS | SYSTOLIC BLOOD PRESSURE: 132 MMHG | RESPIRATION RATE: 19 BRPM

## 2020-02-12 LAB
ABSOLUTE EOS #: 0.1 K/UL (ref 0–0.4)
ABSOLUTE IMMATURE GRANULOCYTE: ABNORMAL K/UL (ref 0–0.3)
ABSOLUTE LYMPH #: 1.2 K/UL (ref 1–4.8)
ABSOLUTE MONO #: 0.8 K/UL (ref 0.1–1.3)
ANION GAP SERPL CALCULATED.3IONS-SCNC: 12 MMOL/L (ref 9–17)
BASOPHILS # BLD: 1 % (ref 0–2)
BASOPHILS ABSOLUTE: 0.1 K/UL (ref 0–0.2)
BUN BLDV-MCNC: 12 MG/DL (ref 8–23)
BUN/CREAT BLD: ABNORMAL (ref 9–20)
CALCIUM SERPL-MCNC: 9.2 MG/DL (ref 8.6–10.4)
CHLORIDE BLD-SCNC: 100 MMOL/L (ref 98–107)
CO2: 25 MMOL/L (ref 20–31)
CREAT SERPL-MCNC: 1.32 MG/DL (ref 0.5–0.9)
DIFFERENTIAL TYPE: ABNORMAL
EOSINOPHILS RELATIVE PERCENT: 2 % (ref 0–4)
GFR AFRICAN AMERICAN: 48 ML/MIN
GFR NON-AFRICAN AMERICAN: 40 ML/MIN
GFR SERPL CREATININE-BSD FRML MDRD: ABNORMAL ML/MIN/{1.73_M2}
GFR SERPL CREATININE-BSD FRML MDRD: ABNORMAL ML/MIN/{1.73_M2}
GLUCOSE BLD-MCNC: 124 MG/DL (ref 70–99)
HCT VFR BLD CALC: 34.5 % (ref 36–46)
HEMOGLOBIN: 11.5 G/DL (ref 12–16)
IMMATURE GRANULOCYTES: ABNORMAL %
LYMPHOCYTES # BLD: 19 % (ref 24–44)
MCH RBC QN AUTO: 34.5 PG (ref 26–34)
MCHC RBC AUTO-ENTMCNC: 33.5 G/DL (ref 31–37)
MCV RBC AUTO: 103 FL (ref 80–100)
MONOCYTES # BLD: 13 % (ref 1–7)
NRBC AUTOMATED: ABNORMAL PER 100 WBC
PDW BLD-RTO: 15.1 % (ref 11.5–14.9)
PLATELET # BLD: 299 K/UL (ref 150–450)
PLATELET ESTIMATE: ABNORMAL
PMV BLD AUTO: 8.5 FL (ref 6–12)
POTASSIUM SERPL-SCNC: 4 MMOL/L (ref 3.7–5.3)
RBC # BLD: 3.35 M/UL (ref 4–5.2)
RBC # BLD: ABNORMAL 10*6/UL
SEG NEUTROPHILS: 65 % (ref 36–66)
SEGMENTED NEUTROPHILS ABSOLUTE COUNT: 4.1 K/UL (ref 1.3–9.1)
SODIUM BLD-SCNC: 137 MMOL/L (ref 135–144)
WBC # BLD: 6.4 K/UL (ref 3.5–11)
WBC # BLD: ABNORMAL 10*3/UL

## 2020-02-12 PROCEDURE — 6370000000 HC RX 637 (ALT 250 FOR IP)

## 2020-02-12 PROCEDURE — 6360000002 HC RX W HCPCS: Performed by: EMERGENCY MEDICINE

## 2020-02-12 PROCEDURE — 96374 THER/PROPH/DIAG INJ IV PUSH: CPT

## 2020-02-12 PROCEDURE — 36415 COLL VENOUS BLD VENIPUNCTURE: CPT

## 2020-02-12 PROCEDURE — 80048 BASIC METABOLIC PNL TOTAL CA: CPT

## 2020-02-12 PROCEDURE — 99285 EMERGENCY DEPT VISIT HI MDM: CPT

## 2020-02-12 PROCEDURE — 85025 COMPLETE CBC W/AUTO DIFF WBC: CPT

## 2020-02-12 PROCEDURE — 71046 X-RAY EXAM CHEST 2 VIEWS: CPT

## 2020-02-12 PROCEDURE — 94760 N-INVAS EAR/PLS OXIMETRY 1: CPT

## 2020-02-12 PROCEDURE — 94640 AIRWAY INHALATION TREATMENT: CPT

## 2020-02-12 RX ORDER — PREDNISONE 20 MG/1
40 TABLET ORAL DAILY
Qty: 10 TABLET | Refills: 0 | Status: SHIPPED | OUTPATIENT
Start: 2020-02-12 | End: 2020-02-14

## 2020-02-12 RX ORDER — IPRATROPIUM BROMIDE AND ALBUTEROL SULFATE 2.5; .5 MG/3ML; MG/3ML
1 SOLUTION RESPIRATORY (INHALATION)
Status: DISCONTINUED | OUTPATIENT
Start: 2020-02-12 | End: 2020-02-12 | Stop reason: HOSPADM

## 2020-02-12 RX ORDER — AZITHROMYCIN 250 MG/1
TABLET, FILM COATED ORAL
Qty: 1 PACKET | Refills: 0 | Status: SHIPPED | OUTPATIENT
Start: 2020-02-12 | End: 2020-02-22

## 2020-02-12 RX ORDER — IPRATROPIUM BROMIDE AND ALBUTEROL SULFATE 2.5; .5 MG/3ML; MG/3ML
SOLUTION RESPIRATORY (INHALATION)
Status: DISCONTINUED
Start: 2020-02-12 | End: 2020-02-12 | Stop reason: HOSPADM

## 2020-02-12 RX ORDER — METHYLPREDNISOLONE SODIUM SUCCINATE 125 MG/2ML
125 INJECTION, POWDER, LYOPHILIZED, FOR SOLUTION INTRAMUSCULAR; INTRAVENOUS ONCE
Status: COMPLETED | OUTPATIENT
Start: 2020-02-12 | End: 2020-02-12

## 2020-02-12 RX ADMIN — METHYLPREDNISOLONE SODIUM SUCCINATE 125 MG: 125 INJECTION, POWDER, FOR SOLUTION INTRAMUSCULAR; INTRAVENOUS at 16:03

## 2020-02-12 RX ADMIN — IPRATROPIUM BROMIDE AND ALBUTEROL SULFATE 1 AMPULE: .5; 3 SOLUTION RESPIRATORY (INHALATION) at 15:11

## 2020-02-12 ASSESSMENT — ENCOUNTER SYMPTOMS
FACIAL SWELLING: 0
WHEEZING: 1
CONSTIPATION: 0
BLOOD IN STOOL: 0
NAUSEA: 0
SORE THROAT: 0
EYE DISCHARGE: 0
EYE REDNESS: 0
TROUBLE SWALLOWING: 0
DIARRHEA: 0
COLOR CHANGE: 0
RHINORRHEA: 1
SINUS PRESSURE: 0
COUGH: 1
VOMITING: 0
BACK PAIN: 0
ABDOMINAL PAIN: 0
CHEST TIGHTNESS: 0
EYE PAIN: 0
SHORTNESS OF BREATH: 1

## 2020-02-12 ASSESSMENT — PAIN DESCRIPTION - DESCRIPTORS: DESCRIPTORS: ACHING

## 2020-02-12 ASSESSMENT — PAIN DESCRIPTION - LOCATION: LOCATION: BACK

## 2020-02-12 ASSESSMENT — PAIN SCALES - GENERAL: PAINLEVEL_OUTOF10: 2

## 2020-02-12 ASSESSMENT — PAIN DESCRIPTION - PAIN TYPE: TYPE: ACUTE PAIN

## 2020-02-12 NOTE — PROGRESS NOTES
· Bronchodilator assessment   [x]    Bronchodilator Assessment        Bronchodilator assessment at level  PRN  BRONCHODILATOR ASSESSMENT SCORE  Score 1 2 3 4   Breath Sounds   []  Clear [x]  Mild Wheezing with good aeration []  Moderate I/E wheezing with adequate aeration []  Poor Aeration or diffuse wheezing   Respiratory Rate [x]  Less than 20 []  20-25 []  Greater than 25  []  Greater than 35    Dyspnea [x]  No SOB  []  SOB with minimal activity []  Speaking in partial sentences []  Acute/ At rest   Peakflow (asthma) []  80 % or greater predicted/PB  [x]  Unable []  70% or greater predicted/PB  []  Unable []  51%-70% predicted/PB  []  Unable []  Less than 50% predicted/PB  []  Unable due to distress   FEV1 % Predicted []  Greater than 69%  [x]  Unable  []  Less than 50%-69%  []  Unable  []  Less than 35%-49%  []  Unable  []  Less than 35%  []  Unable due to distress

## 2020-02-12 NOTE — ED PROVIDER NOTES
[address]  eMERGENCY dEPARTMENT eNCOUnter      Pt Name: Nikole Willard  MRN: [id number]  [place] 1945  Date of evaluation: 2/12/20      CHIEF COMPLAINT       Chief Complaint   Patient presents with    Shortness of Breath    Cough         HISTORY OF PRESENT ILLNESS    Nikole Willard is a 69 y.o. female who presents complaining of shortness of breath. Patient states that she has a history of COPD and sinus issues. Patient states that she has had a cough with production of some light yellow sputum for about a month. Patient states over the last week she has been having increasing shortness of breath. Today she took her nebulizer twice before coming in and just does not feel like she can breathe well now so she decided to be checked out. Patient has had no fevers. Patient denies any chest pain. Patient has no diarrhea or constipation. Patient has no vomiting. Patient had no pain or swelling in the legs. REVIEW OF SYSTEMS       Review of Systems   Constitutional: Negative for activity change, appetite change, chills, diaphoresis and fever. HENT: Positive for congestion and rhinorrhea. Negative for ear pain, facial swelling, nosebleeds, sinus pressure, sore throat and trouble swallowing. Eyes: Negative for pain, discharge and redness. Respiratory: Positive for cough, shortness of breath and wheezing. Negative for chest tightness. Cardiovascular: Negative for chest pain, palpitations and leg swelling. Gastrointestinal: Negative for abdominal pain, blood in stool, constipation, diarrhea, nausea and vomiting. Genitourinary: Negative for difficulty urinating, dysuria, flank pain, frequency, genital sores and hematuria. Musculoskeletal: Negative for arthralgias, back pain, gait problem, joint swelling, myalgias and neck pain. Skin: Negative for color change, pallor, rash and wound.    Neurological: Negative for dizziness, tremors, seizures, syncope, speech difficulty, weakness, into both eyes 4 times daily    DULOXETINE (CYMBALTA) 60 MG EXTENDED RELEASE CAPSULE    TAKE 1 CAPSULE BY MOUTH ONCE DAILY    ELIQUIS 5 MG TABS TABLET    TAKE ONE TABLET BY MOUTH TWICE A DAY    FERROUS SULFATE 325 (65 FE) MG TABLET    Take 325 mg by mouth 2 times daily    FLUTICASONE (FLONASE) 50 MCG/ACT NASAL SPRAY    1 spray by Each Nostril route daily 1 Spray in each nostril    FLUTICASONE-UMECLIDIN-VILANT (TRELEGY ELLIPTA) 100-62.5-25 MCG/INH AEPB    Take by mouth    FUROSEMIDE (LASIX) 20 MG TABLET    TAKE 1 TABLET BY MOUTH ONCE DAILY AS NEEDED FOR  SWELLING    LEVOTHYROXINE (SYNTHROID) 50 MCG TABLET    Take 1 tablet by mouth daily    LIDOCAINE-PRILOCAINE (EMLA) 2.5-2.5 % CREAM    Apply topically as needed. LISINOPRIL-HYDROCHLOROTHIAZIDE (PRINZIDE;ZESTORETIC) 20-25 MG PER TABLET    Take 1 tablet by mouth daily    LOPERAMIDE (IMODIUM) 2 MG CAPSULE    Take 2 mg by mouth 2 times daily as needed for Diarrhea. METOPROLOL SUCCINATE (TOPROL XL) 25 MG EXTENDED RELEASE TABLET    Take 1 tablet by mouth daily    MISC. DEVICES (ROLLER WALKER) MISC    With a seat    OMEPRAZOLE (PRILOSEC) 40 MG DELAYED RELEASE CAPSULE    Take 1 capsule by mouth every morning (before breakfast)    PROAIR  (90 BASE) MCG/ACT INHALER    INHALE 2 PUFFS BY MOUTH EVERY 6 HOURS AS NEEDED FOR WHEEZING    RESPIRATORY THERAPY SUPPLIES (NEBULIZER/TUBING/MOUTHPIECE) KIT    Use every 4-6 hours prn for copd    TIOTROPIUM BROMIDE-OLODATEROL (STIOLTO RESPIMAT) 2.5-2.5 MCG/ACT AERS    Inhale 2 puffs into the lungs daily       ALLERGIES     is allergic to tizanidine. SOCIAL HISTORY      reports that she has quit smoking. Her smoking use included cigarettes. She has a 78.00 pack-year smoking history. She has never used smokeless tobacco. She reports current alcohol use. She reports that she does not use drugs.     PHYSICAL EXAM     INITIAL VITALS: /71   Pulse 107   Temp 98 °F (36.7 °C) (Oral)   Resp 19   Ht 5' 5\" (1.651 m)   Wt 176 lb All lab results were reviewed by myself, and all abnormals are listed below. Labs Reviewed   BASIC METABOLIC PANEL - Abnormal; Notable for the following components:       Result Value    Glucose 124 (*)     CREATININE 1.32 (*)     GFR Non- 40 (*)     GFR  48 (*)     All other components within normal limits   CBC WITH AUTO DIFFERENTIAL - Abnormal; Notable for the following components:    RBC 3.35 (*)     Hemoglobin 11.5 (*)     Hematocrit 34.5 (*)     .0 (*)     MCH 34.5 (*)     RDW 15.1 (*)     Lymphocytes 19 (*)     Monocytes 13 (*)     All other components within normal limits         MEDICAL DECISION MAKING:     Patient appears to most likely just be having a COPD exacerbation but since the symptoms been around for a month I am concerned about the possibility of a developing pneumonia versus some other etiology so I will get a chest x-ray and some lab work. EMERGENCY DEPARTMENT COURSE:   Vitals:    Vitals:    02/12/20 1451 02/12/20 1512   BP: 132/71    Pulse: 107    Resp: 22 19   Temp: 98 °F (36.7 °C)    TempSrc: Oral    SpO2: 91% 91%   Weight: 176 lb (79.8 kg)    Height: 5' 5\" (1.651 m)        The patient was given the following medications while in the emergency department:  Orders Placed This Encounter   Medications    methylPREDNISolone sodium (SOLU-MEDROL) injection 125 mg    ipratropium-albuterol (DUONEB) nebulizer solution 1 ampule    ipratropium-albuterol (DUONEB) 0.5-2.5 (3) MG/3ML nebulizer solution     JOANA VEGA: cabinet override    predniSONE (DELTASONE) 20 MG tablet     Sig: Take 2 tablets by mouth daily for 5 days     Dispense:  10 tablet     Refill:  0    azithromycin (ZITHROMAX) 250 MG tablet     Sig: Take 2 tablets (500 mg) on Day 1, followed by 1 tablet (250 mg) once daily on Days 2 through 5.      Dispense:  1 packet     Refill:  0       -------------------------  4:01 PM  Patient was reevaluated and states she is feeling a little bit better but is okay being discharged home. I did update her on results and plan to go ahead and start her on antibiotic just due to the longevity of her symptoms. CONSULTS:  None    PROCEDURES:  None    FINAL IMPRESSION      1. COPD exacerbation [facility])          DISPOSITION/PLAN   DISPOSITION Decision To Discharge 02/12/2020 04:00:28 PM      PATIENT REFERREDTO:  Andria Marissa, APRN - CNP  [address] [address]  [phone]    Schedule an appointment as soon as possible for a visit in 3 days  Follow up within 3 days, Return to ED sooner if symptoms worsen    [facility] ED  [facility] [address] [phone]    If symptoms worsen      DISCHARGEMEDICATIONS:  New Prescriptions    AZITHROMYCIN (ZITHROMAX) 250 MG TABLET    Take 2 tablets (500 mg) on Day 1, followed by 1 tablet (250 mg) once daily on Days 2 through 5.     PREDNISONE (DELTASONE) 20 MG TABLET    Take 2 tablets by mouth daily for 5 days       (Please note that portions of this note were completed with a voice recognition program.  Efforts were made to edit thedictations but occasionally words are mis-transcribed.)    Zach Mahmood MD  Attending Emergency Physician                       Zach Mahmood MD  02/12/20 8090

## 2020-02-19 ENCOUNTER — HOSPITAL ENCOUNTER (OUTPATIENT)
Dept: ULTRASOUND IMAGING | Age: 75
Discharge: HOME | End: 2020-02-21
Payer: MEDICARE

## 2020-02-19 PROCEDURE — 76770 US EXAM ABDO BACK WALL COMP: CPT

## 2020-03-16 ENCOUNTER — HOSPITAL ENCOUNTER (OUTPATIENT)
Age: 75
Setting detail: SPECIMEN
Discharge: HOME | End: 2020-03-16
Payer: MEDICARE

## 2020-03-16 LAB
ABSOLUTE EOS #: 0.13 K/UL (ref 0–0.4)
ABSOLUTE IMMATURE GRANULOCYTE: 0 K/UL (ref 0–0.3)
ABSOLUTE LYMPH #: 1.02 K/UL (ref 1–4.8)
ABSOLUTE MONO #: 0.64 K/UL (ref 0.1–0.8)
ALBUMIN SERPL-MCNC: 3.9 G/DL (ref 3.5–5.2)
ALBUMIN/GLOBULIN RATIO: 1.3 (ref 1–2.5)
ALP BLD-CCNC: 113 U/L (ref 35–104)
ALT SERPL-CCNC: 17 U/L (ref 5–33)
ANION GAP SERPL CALCULATED.3IONS-SCNC: 13 MMOL/L (ref 9–17)
AST SERPL-CCNC: 28 U/L
BASOPHILS # BLD: 0 % (ref 0–2)
BASOPHILS ABSOLUTE: 0 K/UL (ref 0–0.2)
BILIRUB SERPL-MCNC: 0.5 MG/DL (ref 0.3–1.2)
BUN BLDV-MCNC: 19 MG/DL (ref 8–23)
BUN/CREAT BLD: ABNORMAL (ref 9–20)
CALCIUM SERPL-MCNC: 9.5 MG/DL (ref 8.6–10.4)
CHLORIDE BLD-SCNC: 101 MMOL/L (ref 98–107)
CO2: 27 MMOL/L (ref 20–31)
COMPLEMENT C3: 128 MG/DL (ref 90–180)
COMPLEMENT C4: 36 MG/DL (ref 10–40)
CREAT SERPL-MCNC: 1.22 MG/DL (ref 0.5–0.9)
CREATININE URINE: 14.7 MG/DL (ref 28–217)
DIFFERENTIAL TYPE: ABNORMAL
EOSINOPHILS RELATIVE PERCENT: 2 % (ref 1–4)
FREE KAPPA/LAMBDA RATIO: 2.14 (ref 0.26–1.65)
GFR AFRICAN AMERICAN: 53 ML/MIN
GFR NON-AFRICAN AMERICAN: 43 ML/MIN
GFR SERPL CREATININE-BSD FRML MDRD: ABNORMAL ML/MIN/{1.73_M2}
GFR SERPL CREATININE-BSD FRML MDRD: ABNORMAL ML/MIN/{1.73_M2}
GLUCOSE BLD-MCNC: 87 MG/DL (ref 70–99)
HCT VFR BLD CALC: 34.1 % (ref 36.3–47.1)
HEMOGLOBIN: 10.3 G/DL (ref 11.9–15.1)
IMMATURE GRANULOCYTES: 0 %
KAPPA FREE LIGHT CHAINS QNT: 3.43 MG/DL (ref 0.37–1.94)
LAMBDA FREE LIGHT CHAINS QNT: 1.6 MG/DL (ref 0.57–2.63)
LYMPHOCYTES # BLD: 16 % (ref 24–44)
MAGNESIUM: 2 MG/DL (ref 1.6–2.6)
MCH RBC QN AUTO: 34 PG (ref 25.2–33.5)
MCHC RBC AUTO-ENTMCNC: 30.2 G/DL (ref 28.4–34.8)
MCV RBC AUTO: 112.5 FL (ref 82.6–102.9)
MONOCYTES # BLD: 10 % (ref 1–7)
MORPHOLOGY: ABNORMAL
NRBC AUTOMATED: 0 PER 100 WBC
PDW BLD-RTO: 14 % (ref 11.8–14.4)
PHOSPHORUS: 4.1 MG/DL (ref 2.6–4.5)
PLATELET # BLD: 411 K/UL (ref 138–453)
PLATELET ESTIMATE: ABNORMAL
PMV BLD AUTO: 10.3 FL (ref 8.1–13.5)
POTASSIUM SERPL-SCNC: 4.6 MMOL/L (ref 3.7–5.3)
RBC # BLD: 3.03 M/UL (ref 3.95–5.11)
RBC # BLD: ABNORMAL 10*6/UL
SEDIMENTATION RATE, ERYTHROCYTE: 42 MM (ref 0–20)
SEG NEUTROPHILS: 72 % (ref 36–66)
SEGMENTED NEUTROPHILS ABSOLUTE COUNT: 4.61 K/UL (ref 1.8–7.7)
SODIUM BLD-SCNC: 141 MMOL/L (ref 135–144)
TOTAL PROTEIN, URINE: 5 MG/DL
TOTAL PROTEIN: 7 G/DL (ref 6.4–8.3)
URINE TOTAL PROTEIN CREATININE RATIO: 0.34 (ref 0–0.2)
WBC # BLD: 6.4 K/UL (ref 3.5–11.3)
WBC # BLD: ABNORMAL 10*3/UL

## 2020-03-17 LAB
ALBUMIN (CALCULATED): 4.1 G/DL (ref 3.2–5.2)
ALBUMIN PERCENT: 62 % (ref 45–65)
ALPHA 1 PERCENT: 4 % (ref 3–6)
ALPHA 2 PERCENT: 12 % (ref 6–13)
ALPHA-1-GLOBULIN: 0.3 G/DL (ref 0.1–0.4)
ALPHA-2-GLOBULIN: 0.8 G/DL (ref 0.5–0.9)
ANTI-NUCLEAR ANTIBODY (ANA): NEGATIVE
BETA GLOBULIN: 0.7 G/DL (ref 0.5–1.1)
BETA PERCENT: 10 % (ref 11–19)
GAMMA GLOBULIN %: 12 % (ref 9–20)
GAMMA GLOBULIN: 0.8 G/DL (ref 0.5–1.5)
PATHOLOGIST: ABNORMAL
PROTEIN ELECTROPHORESIS, SERUM: ABNORMAL
TOTAL PROT. SUM,%: 100 % (ref 98–102)
TOTAL PROT. SUM: 6.7 G/DL (ref 6.3–8.2)
TOTAL PROTEIN: 6.6 G/DL (ref 6.4–8.3)

## 2020-03-19 LAB — COMPLEMENT TOTAL (CH50): 132 CAE UNITS (ref 60–144)

## 2020-06-17 PROBLEM — F32.1 MODERATE MAJOR DEPRESSION (HCC): Status: ACTIVE | Noted: 2020-06-17

## 2020-06-17 PROBLEM — F10.939 ALCOHOL WITHDRAWAL SYNDROME WITH COMPLICATION (HCC): Status: ACTIVE | Noted: 2020-06-17

## 2020-06-17 PROBLEM — I73.9 PAD (PERIPHERAL ARTERY DISEASE) (HCC): Status: ACTIVE | Noted: 2020-06-17

## 2020-07-06 ENCOUNTER — HOSPITAL ENCOUNTER (OUTPATIENT)
Dept: NON INVASIVE DIAGNOSTICS | Age: 75
Discharge: HOME | End: 2020-07-06
Payer: MEDICARE

## 2020-07-06 LAB
LV EF: 55 %
LVEF MODALITY: NORMAL

## 2020-07-06 PROCEDURE — 93306 TTE W/DOPPLER COMPLETE: CPT

## 2020-07-07 ENCOUNTER — CARE COORDINATION (OUTPATIENT)
Dept: CARE COORDINATION | Age: 75
End: 2020-07-07

## 2020-07-07 ASSESSMENT — ENCOUNTER SYMPTOMS
SHORTNESS OF BREATH: 1
DIARRHEA: 0
NAUSEA: 0
VOMITING: 0
APNEA: 0
COUGH: 0
ABDOMINAL PAIN: 0
CONSTIPATION: 0
GASTROINTESTINAL NEGATIVE: 1
SINUS PAIN: 1
EYE REDNESS: 0
BACK PAIN: 1
EYES NEGATIVE: 1
SINUS PRESSURE: 1

## 2020-07-07 NOTE — PROGRESS NOTES
Prevents or limits ADLs, Increases w/activity. Increases w/prolonged sitting/standing/walking  Mood changes,depressed  Patient currently unemployed. Physical therapy did not help the pain. Are you under psychological counseling at present: No  Goals for treatment include:  Decrease in pain  Enjoy daily and recreational activities, return to previous state    ACTIVITY/SOCIAL/EMOTIONAL:  Sleep Pattern: 5 hours per night. nightime awakenings  Home Exercises: - none  Activity:decreased  Emotional Issues: moodiness.    Currently seeing a Psychiatrist or Psychologist:  No      Past Medical History:   Diagnosis Date    [facility] [address] [facility]     Acquired hypothyroidism     Acute encephalopathy     [facility] kidney injury ([facility] [address] [facility]     Anxiety     Benign hypertension with CKD [facility]chronic kidney disease) stage III ([facility])     Chronic back pain     CKD (chronic kidney disease) stage 3, GFR 30-59 ml/min ([facility])     Constipation     COPD [facility]ch[facility] obstructive pulmonary disease) [facility]     Cough     Depression     ETOH abuse     Former smoker     3 packs a day for 26 years    HA (generalized anxiety disorder)     History of GI bleed     Hyperlipidemia     Hypertension     Hypothyroid 1/18/2013    Leg pain     Normocytic anemia     Osteoarthritis     PAD [facility]peripheral artery disease) ([facility]     Primary osteoarthritis     Pulmonary hypertension ([facility] [address]     Pure hypercholesterolemia     SIRS (systemic inflammatory response syndrome) ([facility])     Urge incontinence     Wheezing        Past Surgical History:   Procedure Laterality Date    COLONOSCOPY      EYE SURGERY Bilateral     cataracts    JOINT REPLACEMENT      Left knee on 9-11-18       Family History   Problem Relation Age of Onset    Heart Disease Mother     COPD Mother     Emphysema Sister     Cancer Other         Cousin - breast cancer       Social History     Socioeconomic History    Marital status:      Spouse name: None    Number of children: None    Years of education: None    Highest education level: None   Occupational History    None   Social Needs    Financial resource strain: None    Food insecurity     Worry: None     Inability: None    Transportation needs     Medical: None     Non-medical: None   Tobacco Use    Smoking status: Former Smoker     Packs/day: 3.00     Years: 26.00     Pack years: 78.00     Types: Cigarettes    Smokeless tobacco: Never Used   Substance and Sexual Activity    Alcohol use: Yes     Comment: vodka three times a week    Drug use: No    Sexual activity: None   Lifestyle    Physical activity     Days per week: None     Minutes per session: None    Stress: None   Relationships    Social connections     Talks on phone: None     Gets together: None     Attends Yazidi service: None     Active member of club or organization: None     Attends meetings of clubs or organizations: None     Relationship status: None    Intimate partner violence     Fear of current or ex partner: None     Emotionally abused: None     Physically abused: None     Forced sexual activity: None   Other Topics Concern    None   Social History Narrative    None       Allergies   Allergen Reactions    Tizanidine Other (See Comments)     Patient states it makes her loopy       Current Outpatient Medications on File Prior to Encounter   Medication Sig Dispense Refill    HYDROcodone-acetaminophen (NORCO) 5-325 MG per tablet TAKE 1 TABLET BY MOUTH UP TO EVERY 8 HOURS AS NEEDED FOR PAIN (30-DAY SUPPLY) 90 tablet 0    Misc. Devices (ROLLATOR) MISC 1 each by Does not apply route daily 1 each 0    atorvastatin (LIPITOR) 10 MG tablet TAKE 1 TABLET BY MOUTH ONCE DAILY 90 tablet 1    Respiratory Therapy Supplies (NEBULIZER/TUBING/MOUTHPIECE) KIT Use every 4-6 hours prn for copd 1 kit 0    clonazePAM (KLONOPIN) 0.5 MG tablet Take 1 tablet by mouth 3 times daily as needed for Anxiety for up to 30 days.  90 tablet 0    FLOVENT  MCG/ACT inhaler (TYLENOL) 500 MG tablet Take 500 mg by mouth every 6 hours as needed for Pain. No current facility-administered medications on file prior to encounter. Review of Systems   Constitutional: Positive for activity change and fatigue. HENT: Positive for sinus pressure and sinus pain. Negative for congestion. Eyes: Negative. Negative for redness and visual disturbance. Respiratory: Positive for shortness of breath. Negative for apnea and cough. Cardiovascular: Negative. Negative for chest pain and palpitations. Gastrointestinal: Negative. Negative for abdominal pain, constipation, diarrhea, nausea and vomiting. Endocrine: Negative for cold intolerance and polydipsia. Genitourinary: Negative for difficulty urinating and dysuria. Dr.K Georgina Armenta notified. Patient instructed to call PCP to discuss Urinary tract symptoms   Musculoskeletal: Positive for arthralgias, back pain, gait problem, joint swelling and myalgias. Skin: Negative. Negative for rash. Allergic/Immunologic: Positive for environmental allergies. Neurological: Positive for weakness. Negative for tingling, tremors, seizures, light-headedness and numbness. Psychiatric/Behavioral: Negative for confusion and self-injury. The patient is nervous/anxious. Anxiety        Physical Exam  Skin:         Neurological:      Mental Status: She is alert and oriented to person, place, and time.    Psychiatric:         Mood and Affect: Mood normal.            DATA:  LAB.:  11/6/2019  2:57 AM - Radhames Benavides Incoming Lab Results From [employer]     Component Value Ref Range & Units Status Collected Lab   Amphetamine Screen, Ur NEGATIVE  NEGATIVE Final 11/05/2019  3:55  [address]          (Positive cutoff 1000 ng/mL)                Barbiturate Screen, Ur NEGATIVE  NEGATIVE Final 11/05/2019  3:55  [address]          (Positive cutoff 200 ng/mL)                Benzodiazepine Screen, Urine NEGATIVE NEGATIVE Final 11/05/2019  3:55 PM [address]     (Positive cutoff 200 ng/mL)                Cocaine Metabolite, Urine NEGATIVE  NEGATIVE Final 11/05/2019  3:55  [address]          (Positive cutoff 300 ng/mL)                Methadone Screen, Urine NEGATIVE  NEGATIVE Final 11/05/2019  3:55  [address]          (Positive cutoff 300 ng/mL)                Opiates, Urine POSITIVEAbnormal   NEGATIVE Final 11/05/2019  3:55  [address]          (Positive cutoff 300 ng/mL)                Phencyclidine, Urine NEGATIVE  NEGATIVE Final 11/05/2019  3:55  [address]          (Positive cutoff 25 ng/mL)                Propoxyphene, Urine NOT REPORTED  NEGATIVE Final 11/05/2019  3:55 PM [address], Ur NEGATIVE  NEGATIVE Final 11/05/2019  3:55  [address]          (Positive cutoff 50 ng/mL)                Oxycodone Screen, Ur NEGATIVE  NEGATIVE Final 11/05/2019  3:55  [address]          (Positive cutoff 100 ng/mL)                Methamphetamine, Urine NOT REPORTED  NEGATIVE Final 11/05/2019  3:55  [address]   Tricyclic Antidepressants, Urine NOT REPORTED  NEGATIVE Final 11/05/2019  3:55  [address]   MDMA, Urine NOT REPORTED  NEGATIVE Final 11/05/2019  3:55  [address]   Buprenorphine Urine NOT REPORTED  NEGATIVE Final 11/05/2019  3:55  [address]   Test Information            X-Ray reports:  Narrative   X-RAYS TAKEN IN CLINIC TODAY AND REVIEWED BY ME   Flexion and extension lumbar spine again demonstrate mild retrolisthesis    of L2-3, L3-L4 and L4-5.  Patient also with facet degenerative changes    throughout. Jerry Courser has a grade 1 spondylolisthesis at L5-S1.  These x-rays    are compared to the MRI in July as well as the x-ray lumbar spine and    3/18/2018 with no significant changes moderate narrowing of the neural foramen   and moderate to severe narrowing of the thecal sac.       L4-L5: Disc bulge and facet hypertrophy resulting in moderate narrowing of   the neural foramen and mild-to-moderate narrowing of the thecal sac.       L5-S1: Uncovering of the disc due to anterolisthesis and facet hypertrophy   with fluid in both facet joints.  There is mild narrowing of the neural   foramen.           Impression   Multilevel degenerative thecal sac narrowing and neural foraminal stenosis. Clinical  impression:  1. Lumbar radiculopathy    2. Lumbosacral spondylosis without myelopathy    3. DDD (degenerative disc disease), lumbar    4. Lumbar adjacent segment disease with spondylolisthesis        Plan of care: The following treatment plan was developed after discussion with patient:    We discussed Lumbar Epidural steroid Injections x 1  at L4 - L5. Patient tried and failed NSAIDS,Home exercises, Physical Therapy, Chiropractic manipulations without relief. Patient exhibited signs of radiculopathy on left side    Patient has not had prior Lumbar Surgery. We will see the patient in 4 weeks after the procedures and re-evaluate symptoms. PDMP Monitoring:    Last PDMP Linda Martin as Reviewed [facility]):  Review User Review Instant Review Result   Thais Willett 7/7/2020  5:38 AM Reviewed PDMP [1]     Counselling/Preventive measures for pain  Control:    [x]  Spine strengthening exercises are discussed with patient in detail.      Orders Placed This Encounter   Procedures    Lumbar Epidural Steroid Injection/Caudal     Standing Status:   Future     Standing Expiration Date:   7/8/2021    Lumbar Epidural Steroid Injection/Caudal     Standing Status:   Future     Standing Expiration Date:   7/8/2021   [facility] For [facility] Procedures     Standing Status:   Future     Standing Expiration Date:   7/8/2021   [facility] For [facility]     Standing Status:   Future     Standing Expiration Date:   7/8/2021    Saline lock IV     Standing Status:   Future     Standing Expiration Date:   1/8/2022    Saline lock IV     Standing Status:   Future     Standing Expiration Date:   1/8/2022       Decision Making Process : Patient's health history and referral records thoroughly reviewed before focused physical examination and discussion with patient. I have spent 25 mins. Over 50% of today's visit is spent on examining the patient and counseling and coordinating the care. Level of complexity of date to be reviewed is Moderate. The chart date reviewed include the following: Imaging Reports. Summary of Care. Time spent reviewing with patient the below reports:   Medication safety, Treatment options. Level of diagnosis and management options of this case is multiple: involving the following management options: Interventions as needed, medication management as appropriate, future visits, activity modification, heat/ice as needed, Urine drug screen as required. [x]The patient's questions were answered to the best of my abilities. This note was created using voice recognition software. There may be inaccuracies of transcription  that are inadvertently overlooked prior to the signature. There is any questions about the transcription please contact me. Due to the COVID-19 pandemic and the appropriate interventions by Benjamin Pena, our non-urgent pain management patients will not be seen in the office at this time for their protection and the protection of our staff.  To offer continuity of care, their prescriptions will be escribed this month after a careful chart review and review of their OARRS report  Pursuant to the emergency declaration under the [employer] [facility] waiver authority and the [employer] General Act, this Virtual Visit was conducted, with patient's consent, to

## 2020-07-07 NOTE — CARE COORDINATION
1st attempt to reach patient for possible enrollment into CC per PCP. PCP stated patient needs assistance in following through with plan of care and appointments. 2nd call ACM was successful reaching patient but she declined any need for assistance. Care coordination was explained and what ACM could help with. Per patient she is going to call pain management to get scheduled with them. She has transportation. ACM explained that if anything came up in the future ACM will be available to help, she was appreciative.

## 2020-07-08 ENCOUNTER — HOSPITAL ENCOUNTER (OUTPATIENT)
Dept: PAIN MANAGEMENT | Age: 75
Discharge: HOME | End: 2020-07-08
Payer: MEDICARE

## 2020-07-08 PROCEDURE — 99213 OFFICE O/P EST LOW 20 MIN: CPT

## 2020-07-08 PROCEDURE — 99443 PR PHYS/QHP TELEPHONE EVALUATION 21-30 MIN: CPT | Performed by: PAIN MEDICINE

## 2020-07-19 ENCOUNTER — APPOINTMENT (OUTPATIENT)
Dept: GENERAL RADIOLOGY | Age: 75
DRG: 682 | End: 2020-07-19
Payer: MEDICARE

## 2020-07-19 ENCOUNTER — HOSPITAL ENCOUNTER (INPATIENT)
Age: 75
LOS: 2 days | Discharge: HOME | DRG: 682 | End: 2020-07-22
Attending: EMERGENCY MEDICINE | Admitting: FAMILY MEDICINE
Payer: MEDICARE

## 2020-07-19 ENCOUNTER — APPOINTMENT (OUTPATIENT)
Dept: CT IMAGING | Age: 75
DRG: 682 | End: 2020-07-19
Payer: MEDICARE

## 2020-07-19 DIAGNOSIS — N17.9 AKI (ACUTE KIDNEY INJURY) (HCC): Primary | ICD-10-CM

## 2020-07-19 DIAGNOSIS — J43.8 OTHER EMPHYSEMA (HCC): ICD-10-CM

## 2020-07-19 DIAGNOSIS — N17.9 ACUTE KIDNEY INJURY (HCC): ICD-10-CM

## 2020-07-19 LAB
ABSOLUTE EOS #: 0.1 K/UL (ref 0–0.4)
ABSOLUTE IMMATURE GRANULOCYTE: ABNORMAL K/UL (ref 0–0.3)
ABSOLUTE LYMPH #: 1.1 K/UL (ref 1–4.8)
ABSOLUTE MONO #: 0.7 K/UL (ref 0.1–1.3)
ALBUMIN SERPL-MCNC: 3.7 G/DL (ref 3.5–5.2)
ALBUMIN/GLOBULIN RATIO: ABNORMAL (ref 1–2.5)
ALP BLD-CCNC: 80 U/L (ref 35–104)
ALT SERPL-CCNC: 14 U/L (ref 5–33)
ANION GAP SERPL CALCULATED.3IONS-SCNC: 13 MMOL/L (ref 9–17)
AST SERPL-CCNC: 15 U/L
BASOPHILS # BLD: 1 % (ref 0–2)
BASOPHILS ABSOLUTE: 0.1 K/UL (ref 0–0.2)
BILIRUB SERPL-MCNC: 0.43 MG/DL (ref 0.3–1.2)
BNP INTERPRETATION: ABNORMAL
BUN BLDV-MCNC: 51 MG/DL (ref 8–23)
BUN/CREAT BLD: ABNORMAL (ref 9–20)
CALCIUM SERPL-MCNC: 9.1 MG/DL (ref 8.6–10.4)
CHLORIDE BLD-SCNC: 102 MMOL/L (ref 98–107)
CO2: 27 MMOL/L (ref 20–31)
CREAT SERPL-MCNC: 2.55 MG/DL (ref 0.5–0.9)
DIFFERENTIAL TYPE: ABNORMAL
EOSINOPHILS RELATIVE PERCENT: 2 % (ref 0–4)
GFR AFRICAN AMERICAN: 22 ML/MIN
GFR NON-AFRICAN AMERICAN: 18 ML/MIN
GFR SERPL CREATININE-BSD FRML MDRD: ABNORMAL ML/MIN/{1.73_M2}
GFR SERPL CREATININE-BSD FRML MDRD: ABNORMAL ML/MIN/{1.73_M2}
GLUCOSE BLD-MCNC: 116 MG/DL (ref 70–99)
HCT VFR BLD CALC: 25.6 % (ref 36–46)
HEMOGLOBIN: 8.2 G/DL (ref 12–16)
IMMATURE GRANULOCYTES: ABNORMAL %
INR BLD: 1.4
LYMPHOCYTES # BLD: 19 % (ref 24–44)
MCH RBC QN AUTO: 31.1 PG (ref 26–34)
MCHC RBC AUTO-ENTMCNC: 31.9 G/DL (ref 31–37)
MCV RBC AUTO: 97.5 FL (ref 80–100)
MONOCYTES # BLD: 12 % (ref 1–7)
NRBC AUTOMATED: ABNORMAL PER 100 WBC
PARTIAL THROMBOPLASTIN TIME: 34.2 SEC (ref 24–36)
PDW BLD-RTO: 17.3 % (ref 11.5–14.9)
PLATELET # BLD: 355 K/UL (ref 150–450)
PLATELET ESTIMATE: ABNORMAL
PMV BLD AUTO: 7.5 FL (ref 6–12)
POTASSIUM SERPL-SCNC: 3.8 MMOL/L (ref 3.7–5.3)
PRO-BNP: 5807 PG/ML
PROTHROMBIN TIME: 17.3 SEC (ref 11.8–14.6)
RBC # BLD: 2.63 M/UL (ref 4–5.2)
RBC # BLD: ABNORMAL 10*6/UL
SEG NEUTROPHILS: 66 % (ref 36–66)
SEGMENTED NEUTROPHILS ABSOLUTE COUNT: 3.7 K/UL (ref 1.3–9.1)
SODIUM BLD-SCNC: 142 MMOL/L (ref 135–144)
TOTAL PROTEIN: 6.8 G/DL (ref 6.4–8.3)
TROPONIN INTERP: ABNORMAL
TROPONIN T: ABNORMAL NG/ML
TROPONIN, HIGH SENSITIVITY: 29 NG/L (ref 0–14)
WBC # BLD: 5.7 K/UL (ref 3.5–11)
WBC # BLD: ABNORMAL 10*3/UL

## 2020-07-19 PROCEDURE — 80053 COMPREHEN METABOLIC PANEL: CPT

## 2020-07-19 PROCEDURE — 85025 COMPLETE CBC W/AUTO DIFF WBC: CPT

## 2020-07-19 PROCEDURE — 6360000002 HC RX W HCPCS: Performed by: EMERGENCY MEDICINE

## 2020-07-19 PROCEDURE — 93005 ELECTROCARDIOGRAM TRACING: CPT | Performed by: EMERGENCY MEDICINE

## 2020-07-19 PROCEDURE — 36415 COLL VENOUS BLD VENIPUNCTURE: CPT

## 2020-07-19 PROCEDURE — 85610 PROTHROMBIN TIME: CPT

## 2020-07-19 PROCEDURE — 51702 INSERT TEMP BLADDER CATH: CPT

## 2020-07-19 PROCEDURE — 83880 ASSAY OF NATRIURETIC PEPTIDE: CPT

## 2020-07-19 PROCEDURE — 70450 CT HEAD/BRAIN W/O DYE: CPT

## 2020-07-19 PROCEDURE — 99285 EMERGENCY DEPT VISIT HI MDM: CPT

## 2020-07-19 PROCEDURE — 6370000000 HC RX 637 (ALT 250 FOR IP): Performed by: EMERGENCY MEDICINE

## 2020-07-19 PROCEDURE — 96374 THER/PROPH/DIAG INJ IV PUSH: CPT

## 2020-07-19 PROCEDURE — 71045 X-RAY EXAM CHEST 1 VIEW: CPT

## 2020-07-19 PROCEDURE — 84484 ASSAY OF TROPONIN QUANT: CPT

## 2020-07-19 PROCEDURE — 94640 AIRWAY INHALATION TREATMENT: CPT

## 2020-07-19 PROCEDURE — 85730 THROMBOPLASTIN TIME PARTIAL: CPT

## 2020-07-19 RX ORDER — METHYLPREDNISOLONE SODIUM SUCCINATE 125 MG/2ML
125 INJECTION, POWDER, LYOPHILIZED, FOR SOLUTION INTRAMUSCULAR; INTRAVENOUS ONCE
Status: COMPLETED | OUTPATIENT
Start: 2020-07-19 | End: 2020-07-19

## 2020-07-19 RX ORDER — IPRATROPIUM BROMIDE AND ALBUTEROL SULFATE 2.5; .5 MG/3ML; MG/3ML
1 SOLUTION RESPIRATORY (INHALATION) ONCE
Status: COMPLETED | OUTPATIENT
Start: 2020-07-19 | End: 2020-07-19

## 2020-07-19 RX ADMIN — IPRATROPIUM BROMIDE AND ALBUTEROL SULFATE 1 AMPULE: .5; 3 SOLUTION RESPIRATORY (INHALATION) at 22:28

## 2020-07-19 RX ADMIN — METHYLPREDNISOLONE SODIUM SUCCINATE 125 MG: 125 INJECTION, POWDER, FOR SOLUTION INTRAMUSCULAR; INTRAVENOUS at 22:39

## 2020-07-19 ASSESSMENT — PAIN DESCRIPTION - PAIN TYPE: TYPE: CHRONIC PAIN

## 2020-07-20 PROBLEM — E66.9 OBESITY, CLASS I, BMI 30-34.9: Status: ACTIVE | Noted: 2020-07-20

## 2020-07-20 PROBLEM — N17.9 ACUTE KIDNEY INJURY (HCC): Status: ACTIVE | Noted: 2020-07-20

## 2020-07-20 LAB
-: NORMAL
AMORPHOUS: NORMAL
ANION GAP SERPL CALCULATED.3IONS-SCNC: 16 MMOL/L (ref 9–17)
BACTERIA: NORMAL
BILIRUBIN URINE: NEGATIVE
BUN BLDV-MCNC: 47 MG/DL (ref 8–23)
BUN/CREAT BLD: ABNORMAL (ref 9–20)
CALCIUM SERPL-MCNC: 8.7 MG/DL (ref 8.6–10.4)
CASTS UA: NORMAL /LPF
CHLORIDE BLD-SCNC: 100 MMOL/L (ref 98–107)
CHLORIDE, UR: 43 MMOL/L
CO2: 25 MMOL/L (ref 20–31)
COLOR: YELLOW
COMMENT UA: ABNORMAL
CREAT SERPL-MCNC: 2.16 MG/DL (ref 0.5–0.9)
CREATININE URINE: 55.7 MG/DL (ref 28–217)
CRYSTALS, UA: NORMAL /HPF
EPITHELIAL CELLS UA: NORMAL /HPF
GFR AFRICAN AMERICAN: 27 ML/MIN
GFR NON-AFRICAN AMERICAN: 22 ML/MIN
GFR SERPL CREATININE-BSD FRML MDRD: ABNORMAL ML/MIN/{1.73_M2}
GFR SERPL CREATININE-BSD FRML MDRD: ABNORMAL ML/MIN/{1.73_M2}
GLUCOSE BLD-MCNC: 154 MG/DL (ref 70–99)
GLUCOSE URINE: NEGATIVE
HCT VFR BLD CALC: 28.3 % (ref 36–46)
HEMOGLOBIN: 8.8 G/DL (ref 12–16)
KETONES, URINE: NEGATIVE
LEUKOCYTE ESTERASE, URINE: NEGATIVE
MCH RBC QN AUTO: 30.8 PG (ref 26–34)
MCHC RBC AUTO-ENTMCNC: 31.2 G/DL (ref 31–37)
MCV RBC AUTO: 98.7 FL (ref 80–100)
MUCUS: NORMAL
NITRITE, URINE: NEGATIVE
NRBC AUTOMATED: ABNORMAL PER 100 WBC
OTHER OBSERVATIONS UA: NORMAL
PDW BLD-RTO: 17.7 % (ref 11.5–14.9)
PH UA: 5.5 (ref 5–8)
PLATELET # BLD: 356 K/UL (ref 150–450)
PMV BLD AUTO: 8.1 FL (ref 6–12)
POTASSIUM SERPL-SCNC: 3.9 MMOL/L (ref 3.7–5.3)
POTASSIUM, UR: 35.7 MMOL/L
PROTEIN UA: ABNORMAL
RBC # BLD: 2.87 M/UL (ref 4–5.2)
RBC UA: NORMAL /HPF
RENAL EPITHELIAL, UA: NORMAL /HPF
SODIUM BLD-SCNC: 141 MMOL/L (ref 135–144)
SODIUM,UR: 44 MMOL/L
SPECIFIC GRAVITY UA: 1.01 (ref 1–1.03)
TRICHOMONAS: NORMAL
TROPONIN INTERP: ABNORMAL
TROPONIN T: ABNORMAL NG/ML
TROPONIN, HIGH SENSITIVITY: 25 NG/L (ref 0–14)
TURBIDITY: CLEAR
URINE HGB: NEGATIVE
UROBILINOGEN, URINE: NORMAL
WBC # BLD: 4.9 K/UL (ref 3.5–11)
WBC UA: NORMAL /HPF
YEAST: NORMAL

## 2020-07-20 PROCEDURE — 84484 ASSAY OF TROPONIN QUANT: CPT

## 2020-07-20 PROCEDURE — 51798 US URINE CAPACITY MEASURE: CPT

## 2020-07-20 PROCEDURE — 2060000000 HC ICU INTERMEDIATE R&B

## 2020-07-20 PROCEDURE — 84133 ASSAY OF URINE POTASSIUM: CPT

## 2020-07-20 PROCEDURE — 81001 URINALYSIS AUTO W/SCOPE: CPT

## 2020-07-20 PROCEDURE — 6370000000 HC RX 637 (ALT 250 FOR IP): Performed by: EMERGENCY MEDICINE

## 2020-07-20 PROCEDURE — 94664 DEMO&/EVAL PT USE INHALER: CPT

## 2020-07-20 PROCEDURE — 99223 1ST HOSP IP/OBS HIGH 75: CPT | Performed by: FAMILY MEDICINE

## 2020-07-20 PROCEDURE — 6360000002 HC RX W HCPCS: Performed by: NURSE PRACTITIONER

## 2020-07-20 PROCEDURE — 82436 ASSAY OF URINE CHLORIDE: CPT

## 2020-07-20 PROCEDURE — 2580000003 HC RX 258: Performed by: EMERGENCY MEDICINE

## 2020-07-20 PROCEDURE — 6360000002 HC RX W HCPCS: Performed by: FAMILY MEDICINE

## 2020-07-20 PROCEDURE — 6370000000 HC RX 637 (ALT 250 FOR IP): Performed by: INTERNAL MEDICINE

## 2020-07-20 PROCEDURE — 82570 ASSAY OF URINE CREATININE: CPT

## 2020-07-20 PROCEDURE — 6370000000 HC RX 637 (ALT 250 FOR IP): Performed by: FAMILY MEDICINE

## 2020-07-20 PROCEDURE — 85027 COMPLETE CBC AUTOMATED: CPT

## 2020-07-20 PROCEDURE — 94761 N-INVAS EAR/PLS OXIMETRY MLT: CPT

## 2020-07-20 PROCEDURE — 80048 BASIC METABOLIC PNL TOTAL CA: CPT

## 2020-07-20 PROCEDURE — 36415 COLL VENOUS BLD VENIPUNCTURE: CPT

## 2020-07-20 PROCEDURE — 2580000003 HC RX 258: Performed by: FAMILY MEDICINE

## 2020-07-20 PROCEDURE — 94640 AIRWAY INHALATION TREATMENT: CPT

## 2020-07-20 PROCEDURE — 84300 ASSAY OF URINE SODIUM: CPT

## 2020-07-20 RX ORDER — ACETAMINOPHEN 650 MG/1
650 SUPPOSITORY RECTAL EVERY 6 HOURS PRN
Status: DISCONTINUED | OUTPATIENT
Start: 2020-07-20 | End: 2020-07-22 | Stop reason: HOSPADM

## 2020-07-20 RX ORDER — CROMOLYN SODIUM 40 MG/ML
1 SOLUTION/ DROPS OPHTHALMIC 4 TIMES DAILY
Status: DISCONTINUED | OUTPATIENT
Start: 2020-07-20 | End: 2020-07-20

## 2020-07-20 RX ORDER — FLUTICASONE PROPIONATE 110 UG/1
4 AEROSOL, METERED RESPIRATORY (INHALATION) 2 TIMES DAILY
Status: DISCONTINUED | OUTPATIENT
Start: 2020-07-20 | End: 2020-07-22 | Stop reason: HOSPADM

## 2020-07-20 RX ORDER — SODIUM CHLORIDE 0.9 % (FLUSH) 0.9 %
10 SYRINGE (ML) INJECTION EVERY 12 HOURS SCHEDULED
Status: DISCONTINUED | OUTPATIENT
Start: 2020-07-20 | End: 2020-07-22 | Stop reason: HOSPADM

## 2020-07-20 RX ORDER — FLUTICASONE PROPIONATE 50 MCG
1 SPRAY, SUSPENSION (ML) NASAL DAILY
Status: DISCONTINUED | OUTPATIENT
Start: 2020-07-20 | End: 2020-07-22 | Stop reason: HOSPADM

## 2020-07-20 RX ORDER — GUAIFENESIN 600 MG/1
600 TABLET, EXTENDED RELEASE ORAL 2 TIMES DAILY
Status: DISCONTINUED | OUTPATIENT
Start: 2020-07-20 | End: 2020-07-22 | Stop reason: HOSPADM

## 2020-07-20 RX ORDER — BACLOFEN 10 MG/1
10 TABLET ORAL 3 TIMES DAILY
Status: DISCONTINUED | OUTPATIENT
Start: 2020-07-20 | End: 2020-07-22 | Stop reason: HOSPADM

## 2020-07-20 RX ORDER — LEVALBUTEROL INHALATION SOLUTION 0.63 MG/3ML
0.63 SOLUTION RESPIRATORY (INHALATION)
Status: DISCONTINUED | OUTPATIENT
Start: 2020-07-20 | End: 2020-07-22 | Stop reason: HOSPADM

## 2020-07-20 RX ORDER — PANTOPRAZOLE SODIUM 40 MG/1
40 TABLET, DELAYED RELEASE ORAL
Status: DISCONTINUED | OUTPATIENT
Start: 2020-07-20 | End: 2020-07-22 | Stop reason: HOSPADM

## 2020-07-20 RX ORDER — SODIUM CHLORIDE 9 MG/ML
INJECTION, SOLUTION INTRAVENOUS CONTINUOUS
Status: DISCONTINUED | OUTPATIENT
Start: 2020-07-20 | End: 2020-07-22 | Stop reason: HOSPADM

## 2020-07-20 RX ORDER — ATORVASTATIN CALCIUM 10 MG/1
10 TABLET, FILM COATED ORAL DAILY
Status: DISCONTINUED | OUTPATIENT
Start: 2020-07-20 | End: 2020-07-22 | Stop reason: HOSPADM

## 2020-07-20 RX ORDER — HYDROCODONE BITARTRATE AND ACETAMINOPHEN 5; 325 MG/1; MG/1
1 TABLET ORAL EVERY 8 HOURS PRN
Status: DISCONTINUED | OUTPATIENT
Start: 2020-07-20 | End: 2020-07-22 | Stop reason: HOSPADM

## 2020-07-20 RX ORDER — ACETAMINOPHEN 325 MG/1
650 TABLET ORAL EVERY 6 HOURS PRN
Status: DISCONTINUED | OUTPATIENT
Start: 2020-07-20 | End: 2020-07-22 | Stop reason: HOSPADM

## 2020-07-20 RX ORDER — METOPROLOL SUCCINATE 25 MG/1
25 TABLET, EXTENDED RELEASE ORAL DAILY
Status: DISCONTINUED | OUTPATIENT
Start: 2020-07-20 | End: 2020-07-21

## 2020-07-20 RX ORDER — PROMETHAZINE HYDROCHLORIDE 25 MG/1
12.5 TABLET ORAL EVERY 6 HOURS PRN
Status: DISCONTINUED | OUTPATIENT
Start: 2020-07-20 | End: 2020-07-22 | Stop reason: HOSPADM

## 2020-07-20 RX ORDER — METHYLPREDNISOLONE SODIUM SUCCINATE 125 MG/2ML
60 INJECTION, POWDER, LYOPHILIZED, FOR SOLUTION INTRAMUSCULAR; INTRAVENOUS EVERY 6 HOURS
Status: DISCONTINUED | OUTPATIENT
Start: 2020-07-20 | End: 2020-07-20

## 2020-07-20 RX ORDER — METHYLPREDNISOLONE SODIUM SUCCINATE 40 MG/ML
40 INJECTION, POWDER, LYOPHILIZED, FOR SOLUTION INTRAMUSCULAR; INTRAVENOUS EVERY 12 HOURS
Status: COMPLETED | OUTPATIENT
Start: 2020-07-20 | End: 2020-07-21

## 2020-07-20 RX ORDER — ONDANSETRON 2 MG/ML
4 INJECTION INTRAMUSCULAR; INTRAVENOUS EVERY 6 HOURS PRN
Status: DISCONTINUED | OUTPATIENT
Start: 2020-07-20 | End: 2020-07-22 | Stop reason: HOSPADM

## 2020-07-20 RX ORDER — FERROUS SULFATE 325(65) MG
325 TABLET ORAL 2 TIMES DAILY
Status: DISCONTINUED | OUTPATIENT
Start: 2020-07-20 | End: 2020-07-22 | Stop reason: HOSPADM

## 2020-07-20 RX ORDER — ASPIRIN 81 MG/1
324 TABLET, CHEWABLE ORAL ONCE
Status: COMPLETED | OUTPATIENT
Start: 2020-07-20 | End: 2020-07-20

## 2020-07-20 RX ORDER — SODIUM CHLORIDE 0.9 % (FLUSH) 0.9 %
10 SYRINGE (ML) INJECTION PRN
Status: DISCONTINUED | OUTPATIENT
Start: 2020-07-20 | End: 2020-07-22 | Stop reason: HOSPADM

## 2020-07-20 RX ORDER — LEVOTHYROXINE SODIUM 0.05 MG/1
50 TABLET ORAL DAILY
Status: DISCONTINUED | OUTPATIENT
Start: 2020-07-20 | End: 2020-07-22 | Stop reason: HOSPADM

## 2020-07-20 RX ORDER — ALBUTEROL SULFATE 2.5 MG/3ML
2.5 SOLUTION RESPIRATORY (INHALATION) EVERY 4 HOURS PRN
Status: DISCONTINUED | OUTPATIENT
Start: 2020-07-20 | End: 2020-07-22 | Stop reason: HOSPADM

## 2020-07-20 RX ORDER — POLYETHYLENE GLYCOL 3350 17 G/17G
17 POWDER, FOR SOLUTION ORAL DAILY PRN
Status: DISCONTINUED | OUTPATIENT
Start: 2020-07-20 | End: 2020-07-22 | Stop reason: HOSPADM

## 2020-07-20 RX ORDER — TAMSULOSIN HYDROCHLORIDE 0.4 MG/1
0.4 CAPSULE ORAL DAILY
Status: DISCONTINUED | OUTPATIENT
Start: 2020-07-20 | End: 2020-07-22 | Stop reason: HOSPADM

## 2020-07-20 RX ORDER — 0.9 % SODIUM CHLORIDE 0.9 %
1000 INTRAVENOUS SOLUTION INTRAVENOUS ONCE
Status: COMPLETED | OUTPATIENT
Start: 2020-07-20 | End: 2020-07-20

## 2020-07-20 RX ORDER — FLUTICASONE PROPIONATE 220 UG/1
2 AEROSOL, METERED RESPIRATORY (INHALATION) 2 TIMES DAILY
Status: DISCONTINUED | OUTPATIENT
Start: 2020-07-20 | End: 2020-07-20

## 2020-07-20 RX ORDER — DULOXETIN HYDROCHLORIDE 60 MG/1
60 CAPSULE, DELAYED RELEASE ORAL DAILY
Status: DISCONTINUED | OUTPATIENT
Start: 2020-07-20 | End: 2020-07-21

## 2020-07-20 RX ORDER — CLONAZEPAM 1 MG/1
0.5 TABLET ORAL 3 TIMES DAILY PRN
Status: DISCONTINUED | OUTPATIENT
Start: 2020-07-20 | End: 2020-07-22 | Stop reason: HOSPADM

## 2020-07-20 RX ADMIN — ATORVASTATIN CALCIUM 10 MG: 10 TABLET, FILM COATED ORAL at 07:47

## 2020-07-20 RX ADMIN — GLYCOPYRROLATE AND FORMOTEROL FUMARATE 2 PUFF: 9; 4.8 AEROSOL, METERED RESPIRATORY (INHALATION) at 20:59

## 2020-07-20 RX ADMIN — ASPIRIN 324 MG: 81 TABLET, CHEWABLE ORAL at 00:34

## 2020-07-20 RX ADMIN — TAMSULOSIN HYDROCHLORIDE 0.4 MG: 0.4 CAPSULE ORAL at 15:51

## 2020-07-20 RX ADMIN — APIXABAN 2.5 MG: 2.5 TABLET, FILM COATED ORAL at 20:59

## 2020-07-20 RX ADMIN — GUAIFENESIN 600 MG: 600 TABLET, EXTENDED RELEASE ORAL at 07:47

## 2020-07-20 RX ADMIN — SODIUM CHLORIDE: 9 INJECTION, SOLUTION INTRAVENOUS at 02:56

## 2020-07-20 RX ADMIN — FLUTICASONE PROPIONATE 4 PUFF: 110 AEROSOL, METERED RESPIRATORY (INHALATION) at 12:54

## 2020-07-20 RX ADMIN — METHYLPREDNISOLONE SODIUM SUCCINATE 60 MG: 125 INJECTION, POWDER, FOR SOLUTION INTRAMUSCULAR; INTRAVENOUS at 04:10

## 2020-07-20 RX ADMIN — PANTOPRAZOLE SODIUM 40 MG: 40 TABLET, DELAYED RELEASE ORAL at 06:23

## 2020-07-20 RX ADMIN — HYDROCODONE BITARTRATE AND ACETAMINOPHEN 1 TABLET: 5; 325 TABLET ORAL at 21:10

## 2020-07-20 RX ADMIN — FERROUS SULFATE TAB 325 MG (65 MG ELEMENTAL FE) 325 MG: 325 (65 FE) TAB at 15:51

## 2020-07-20 RX ADMIN — GLYCOPYRROLATE AND FORMOTEROL FUMARATE 2 PUFF: 9; 4.8 AEROSOL, METERED RESPIRATORY (INHALATION) at 07:46

## 2020-07-20 RX ADMIN — METOPROLOL SUCCINATE 25 MG: 25 TABLET, EXTENDED RELEASE ORAL at 07:47

## 2020-07-20 RX ADMIN — LEVOTHYROXINE SODIUM 50 MCG: 0.05 TABLET ORAL at 07:47

## 2020-07-20 RX ADMIN — BACLOFEN 10 MG: 10 TABLET ORAL at 21:00

## 2020-07-20 RX ADMIN — SODIUM CHLORIDE: 9 INJECTION, SOLUTION INTRAVENOUS at 12:57

## 2020-07-20 RX ADMIN — DULOXETINE HYDROCHLORIDE 60 MG: 60 CAPSULE, DELAYED RELEASE ORAL at 07:46

## 2020-07-20 RX ADMIN — SODIUM CHLORIDE 1000 ML: 9 INJECTION, SOLUTION INTRAVENOUS at 01:00

## 2020-07-20 RX ADMIN — APIXABAN 2.5 MG: 2.5 TABLET, FILM COATED ORAL at 04:10

## 2020-07-20 RX ADMIN — FERROUS SULFATE TAB 325 MG (65 MG ELEMENTAL FE) 325 MG: 325 (65 FE) TAB at 07:46

## 2020-07-20 RX ADMIN — METHYLPREDNISOLONE SODIUM SUCCINATE 40 MG: 40 INJECTION, POWDER, FOR SOLUTION INTRAMUSCULAR; INTRAVENOUS at 15:52

## 2020-07-20 RX ADMIN — GLYCOPYRROLATE AND FORMOTEROL FUMARATE 2 PUFF: 9; 4.8 AEROSOL, METERED RESPIRATORY (INHALATION) at 04:11

## 2020-07-20 RX ADMIN — CLONAZEPAM 0.5 MG: 1 TABLET ORAL at 06:26

## 2020-07-20 RX ADMIN — BACLOFEN 10 MG: 10 TABLET ORAL at 15:51

## 2020-07-20 RX ADMIN — LEVALBUTEROL HYDROCHLORIDE 0.63 MG: 0.63 SOLUTION RESPIRATORY (INHALATION) at 18:54

## 2020-07-20 RX ADMIN — FLUTICASONE PROPIONATE 1 SPRAY: 50 SPRAY, METERED NASAL at 07:48

## 2020-07-20 RX ADMIN — LEVALBUTEROL HYDROCHLORIDE 0.63 MG: 0.63 SOLUTION RESPIRATORY (INHALATION) at 14:32

## 2020-07-20 RX ADMIN — GUAIFENESIN 600 MG: 600 TABLET, EXTENDED RELEASE ORAL at 04:10

## 2020-07-20 RX ADMIN — BACLOFEN 10 MG: 10 TABLET ORAL at 07:47

## 2020-07-20 RX ADMIN — GUAIFENESIN 600 MG: 600 TABLET, EXTENDED RELEASE ORAL at 21:00

## 2020-07-20 RX ADMIN — FLUTICASONE PROPIONATE 4 PUFF: 110 AEROSOL, METERED RESPIRATORY (INHALATION) at 20:59

## 2020-07-20 ASSESSMENT — PAIN DESCRIPTION - PAIN TYPE: TYPE: CHRONIC PAIN

## 2020-07-20 ASSESSMENT — PAIN SCALES - GENERAL
PAINLEVEL_OUTOF10: 8
PAINLEVEL_OUTOF10: 3

## 2020-07-20 NOTE — PLAN OF CARE
Problem: Falls - Risk of:  Goal: Will remain free from falls  Description: Will remain free from falls  7/20/2020 1524 by Meliton Freed RN  Outcome: Ongoing  Note: Patient remained free from falls. Call light within reach. Problem: Skin Integrity:  Goal: Will show no infection signs and symptoms  Description: Will show no infection signs and symptoms  7/20/2020 1524 by Meliton Freed RN  Outcome: Ongoing  Note: No new alterations in skin integrity.       Problem: Fluid Volume - Imbalance:  Goal: Absence of imbalanced fluid volume signs and symptoms  Description: Absence of imbalanced fluid volume signs and symptoms  7/20/2020 1524 by Meliton Freed RN  Outcome: Ongoing  Note: Patient vitals stable, lab values monitored daily, strict I&O

## 2020-07-20 NOTE — PLAN OF CARE
Problem: Falls - Risk of:  Goal: Will remain free from falls  Description: Will remain free from falls  Outcome: Ongoing  Note: Pt remains free from falls this shift. Bed in lowest position. Pt calls appropriately. Nonskid footwear in place    Goal: Absence of physical injury  Description: Absence of physical injury  Outcome: Ongoing     Problem: Skin Integrity:  Goal: Will show no infection signs and symptoms  Description: Will show no infection signs and symptoms  Outcome: Ongoing  Goal: Absence of new skin breakdown  Description: Absence of new skin breakdown  Outcome: Ongoing  Note: No new skin breakdown at this time     Problem: Fluid Volume - Imbalance:  Goal: Absence of imbalanced fluid volume signs and symptoms  Description: Absence of imbalanced fluid volume signs and symptoms  Outcome: Ongoing  Note: NS running at 75ml/hr.  Pantoja catheter preventing retention

## 2020-07-20 NOTE — CONSULTS
NEPHROLOGY CONSULT       Reason for Consult: Acute kidney injury      Chief Complaint: Difficulty with urination  History Obtained From: Patient    History of Present Illness: This is a 70 y.o. female with history of hypertension, chronic back pain, atrial fibrillation chronic kidney disease stage III with baseline creatinine of 0.9 to 1.1 mg/dL prior history of acute kidney injury December 2019 with creatinine of 2.11 on 12/19/2019 secondary to dehydration and poor oral intake. She presented to the hospital with inability to urinate for 3 days. She has had poor oral and fluid intake due to problems with her dentures. Patient's problems started after she had whole tooth extraction in December 5, 2019 and she was unable to eat. She has also had problems with her dentures which are uncomfortable. Since then she has had intermittent periods of decreased in fluid and oral intake. She reports difficulty with urination over 3 days. Prior to this she had been urinating well. She denies dysuria, recent UTI, gross hematuria or flank pain. Denies urinary incontinence  Pantoja catheter was placed with yielded 450 cc of brisk urine. Patient also complains of leg swelling but denies any facial puffiness or orthopnea. Serum creatinine was 2.55 on admission with BUN of 51. Hemoglobin was 8.2 g/dL  Patient's home medication includes lisinopril hydrochlorothiazide 20/25 milligrams daily  Patient is also on Lasix 20 mg daily.     Past Medical History:        Diagnosis Date    A-fib [facility])     Acquired hypothyroidism     Acute encephalopathy     [facility] injury ([facility] [address]     Anxiety     Benign hypertension with CKD (chronic [facility] disease) stage III ([facility])     Chronic back pain     CKD (chronic kidney disease) stage 3, GFR 30-59 ml/min ([facility])     Constipation     COPD (chronic obstructive pulmonary disease) ([facility]     Cough     Depression     ETOH abuse     Former smoker     3 packs a day for 26 years    HA (generalized anxiety disorder)     History of GI bleed     Hyperlipidemia     Hypertension     Hypothyroid 1/18/2013    Leg pain     Normocytic anemia     Osteoarthritis     PAD (peripheral artery disease) ([facility])     Primary osteoarthritis     Pulmonary hypertension ([facility])     Pure hypercholesterolemia     SIRS (systemic inflammatory response syndrome) ([facility])     Urge incontinence     Wheezing        Past Surgical History:        Procedure Laterality Date    COLONOSCOPY      EYE SURGERY Bilateral     cataracts    JOINT REPLACEMENT      Left knee on 9-11-18       Current Medications:    albuterol (PROVENTIL) nebulizer solution 2.5 mg, Q4H PRN  atorvastatin (LIPITOR) tablet 10 mg, Daily  baclofen (LIORESAL) tablet 10 mg, TID  clonazePAM (KLONOPIN) tablet 0.5 mg, TID PRN  DULoxetine (CYMBALTA) extended release capsule 60 mg, Daily  apixaban (ELIQUIS) tablet 2.5 mg, BID  ferrous sulfate (IRON 325) tablet 325 mg, BID  fluticasone (FLONASE) 50 MCG/ACT nasal spray 1 spray, Daily  guaiFENesin (MUCINEX) extended release tablet 600 mg, BID  HYDROcodone-acetaminophen (NORCO) 5-325 MG per tablet 1 tablet, Q8H PRN  levothyroxine (SYNTHROID) tablet 50 mcg, Daily  metoprolol succinate (TOPROL XL) extended release tablet 25 mg, Daily  pantoprazole (PROTONIX) tablet 40 mg, QAM AC  glycopyrrolate-formoterol (BEVESPI) 9-4.8 MCG/ACT inhaler 2 puff, BID  sodium chloride flush 0.9 % injection 10 mL, 2 times per day  sodium chloride flush 0.9 % injection 10 mL, PRN  acetaminophen (TYLENOL) tablet 650 mg, Q6H PRN    Or  acetaminophen (TYLENOL) suppository 650 mg, Q6H PRN  polyethylene glycol (GLYCOLAX) packet 17 g, Daily PRN  promethazine (PHENERGAN) tablet 12.5 mg, Q6H PRN    Or  ondansetron (ZOFRAN) injection 4 mg, Q6H PRN  0.9 % sodium chloride infusion, Continuous  methylPREDNISolone sodium (SOLU-MEDROL) injection 40 mg, Q12H  levalbuterol (XOPENEX) nebulization 0.63 mg, Q6H WA  fluticasone (FLOVENT HFA) 110 MCG/ACT throat. Cardiac:  No chest pain, dyspnea, orthopnea or PND, palpitations  Chest:  No cough, hemoptysis, pleuritic chest pain, wheezing,SOB  Abdomen:  No abdominal pain, nausea, vomiting, diarrhea, melena, dysphagia hematemesis,constipation, abdominal bloating, flank pain  Neuro:  No CVA, TIA or seizure like activity. Skin:   No rashes, no itching. :   No hematuria, no pyuria, no dysuria, no flank pain. Extremities:  No swelling or joint pains. Objective:  CURRENT TEMPERATURE:  Temp: 97.7 °F (36.5 °C)  MAXIMUM TEMPERATURE OVER 24HRS:  Temp (24hrs), Av.9 °F (36.6 °C), Min:97.6 °F (36.4 °C), Max:98.1 °F (36.7 °C)    CURRENT RESPIRATORY RATE:  Resp: 18  CURRENT PULSE:  Pulse: 80  CURRENT BLOOD PRESSURE:  BP: 131/64  24HR BLOOD PRESSURE RANGE:  Systolic (21QOX), QQM:125 , Min:105 , CWB:904   ; Diastolic (49TRR), COH:74, Min:42, Max:77    24HR INTAKE/OUTPUT:      Intake/Output Summary (Last 24 hours) at 2020 1534  Last data filed at 2020 0522  Gross per 24 hour   Intake --   Output 450 ml   Net -450 ml     Patient Vitals for the past 96 hrs (Last 3 readings):   Weight   20 2152 189 lb (85.7 kg)         Physical Exam:  GENERAL APPEARANCE: Alert and cooperative, and appears to be in no acute distress. HEAD: normocephalic  EYES: PERRL, EOMI. Not pale, anicteric   NOSE:  No nasal discharge. THROAT:  Oral cavity and pharynx normal. Moist  NECK: Neck supple, non-tender without lymphadenopathy, masses or thyromegaly. JVD-neg  CARDIAC: Normal S1 and S2. No S3, S4 or murmurs. Rhythm is regular. LUNGS: Clear to auscultation and percussion without rales, rhonchi, wheezing or diminished breath sounds. ABD-Soft non distended, BS+ Non tender no organomegally  BACK: Examination of the spine reveals  no spinal deformity, without tenderness,   MUSKULOSKELETAL: Adequately aligned spine. No joint erythema or tenderness. EXTREMITIES: 2+ edema. Peripheral pulses intact.    NEURO:Alert oriented x 3 ,power 5/5 in all extremities      Labs:   CBC:  Recent Labs     07/19/20 2222 07/20/20 0455   WBC 5.7 4.9   RBC 2.63* 2.87*   HGB 8.2* 8.8*   HCT 25.6* 28.3*   MCV 97.5 98.7   MCH 31.1 30.8   MCHC 31.9 31.2   RDW 17.3* 17.7*    356   MPV 7.5 8.1      BMP:   Recent Labs     07/19/20 2222 07/20/20 0455    141   K 3.8 3.9    100   CO2 27 25   BUN 51* 47*   CREATININE 2.55* 2.16*   GLUCOSE 116* 154*   CALCIUM 9.1 8.7        Phosphorus:  No results for input(s): PHOS in the last 72 hours. Magnesium: No results for input(s): MG in the last 72 hours. Albumin:   Recent Labs     07/19/20 2222   LABALBU 3.7       IRON:    Lab Results   Component Value Date    IRON 95 03/04/2019     Iron Saturation:  No components found for: PERCENTFE  TIBC:    Lab Results   Component Value Date    TIBC 303 09/14/2018     FERRITIN:    Lab Results   Component Value Date    FERRITIN 93 09/14/2018     SPEP:   Lab Results   Component Value Date    PROT 6.8 07/19/2020    ALBCAL 4.1 03/16/2020    ALBPCT 62 03/16/2020    LABALPH 0.3 03/16/2020    LABALPH 0.8 03/16/2020    A1PCT 4 03/16/2020    A2PCT 12 03/16/2020    LABBETA 0.7 03/16/2020    BETAPCT 10 03/16/2020    GAMGLOB 0.8 03/16/2020    GGPCT 12 03/16/2020    PATH ELECTRONICALLY SIGNED. Demetri Gamboa M.D. 03/16/2020     UPEP: No results found for: TPU     C3:   Lab Results   Component Value Date    C3 128 03/16/2020     C4:   Lab Results   Component Value Date    C4 36 03/16/2020     MPO ANCA:  No results found for: MPO .   PR3 ANCA:  No results found for: PR3  Urine Sodium:  No results found for: ANGÉLICA   Urine Potassium:  No results found for: KUR  Urine Chloride:  No components found for: CLU  Urine Ph:  No components found for: PO4U  Urine Osmolarity:  No results found for: OSMOU  Urine Creatinine:    Lab Results   Component Value Date    LABCREA 14.7 03/16/2020     Urine Eosinophils: No components found for: EOSU  Urine Protein:  No results found for: TPU  Urinalysis: U/A:   Lab Results   Component Value Date    NITRU NEGATIVE 07/20/2020    COLORU YELLOW 07/20/2020    PHUR 5.5 07/20/2020    WBCUA 0 TO 2 07/20/2020    RBCUA 2 TO 5 07/20/2020    MUCUS NOT REPORTED 07/20/2020    TRICHOMONAS NOT REPORTED 07/20/2020    YEAST NOT REPORTED 07/20/2020    BACTERIA None 07/20/2020    CLARITYU Clear 01/31/2020    SPECGRAV 1.015 07/20/2020    LEUKOCYTESUR NEGATIVE 07/20/2020    UROBILINOGEN Normal 07/20/2020    BILIRUBINUR NEGATIVE 07/20/2020    BILIRUBINUR 0 01/31/2020    BLOODU Negative 01/31/2020    GLUCOSEU NEGATIVE 07/20/2020    [address] NEGATIVE 07/20/2020    AMORPHOUS NOT REPORTED 07/20/2020         Radiology:  Reviewed as available. Assessment:  1. Acute kidney injury secondary to decreased oral and fluid intake/diuretic use as well as urinary retention and possibility of obstructive uropathy    2. Essential hypertension-controlled without orthostatic symptoms     3Lower extremity swelling secondary to heart failure.       4.Benign hypertension associated with CKD stage III      5. Urinary retention-rule out neurogenic bladder-in addition duloxetine may cause urinary retention and is contraindicated with a GFR less than 30 mils per minute. Plan:  1. Continue with gentle IV fluids with 0.9 saline at 75 cc/h  2.  Keep the  Pantoja catheter in-start Flomax 0.4 mg  3. Check Renal Ultrasound to assess for urinary obstruction and to assess the kidney size , echogenicity. 4.check serum protein electrophoresis  5.hold Lasix and lisinopril HCTZ  6. Check urine sodium and electrolytes urine creatinine  7   okay to continue baclofen at current dose. 8.  Trial of void prior to discharge and if persistent urinary retention, consult urology. 9.  Consider holding  duloxetine if renal function does not improve. Thank you for the consultation.       Electronically signed by Hong Pereira MD on 7/20/2020 at 3:34 PM

## 2020-07-20 NOTE — CARE COORDINATION
Faxed demographics/payor information and home health referral to [facility] care    Electronically signed by Taylor Cotton RN on 7/20/2020 at 1:14 PM

## 2020-07-20 NOTE — CONSULTS
puff at 07/20/20 0746    sodium chloride flush 0.9 % injection 10 mL  10 mL Intravenous 2 times per day Dior Melton MD        sodium chloride flush 0.9 % injection 10 mL  10 mL Intravenous PRN Dior Melton MD        acetaminophen (TYLENOL) tablet 650 mg  650 mg Oral Q6H PRN Dior Melton MD        Or    acetaminophen (TYLENOL) suppository 650 mg  650 mg Rectal Q6H PRN Dior Melton MD        polyethylene glycol [facility]) packet 17 g  17 g Oral Daily PRN Dior Melton MD        promethazine (PHENERGAN) tablet 12.5 mg  12.5 mg Oral Q6H PRN Dior Melton MD        Or    ondansetron [facility]) injection 4 mg  4 mg Intravenous Q6H PRN Dior Melton MD        0.9 % sodium chloride infusion   Intravenous Continuous Dior Melton MD 75 mL/hr at 07/20/20 0256      methylPREDNISolone sodium (SOLU-MEDROL) injection 60 mg  60 mg Intravenous Q6H Dior Melton MD   60 mg at 07/20/20 0410      PULMONARY  CONSULT NOTE      Date of Admission: 7/19/2020  9:51 PM    Reason for Consult: SOB    Referring Physician: Dr Marie Mendez  PCP: WILLIE Colvin CNP     History of Present Illness: Deirdre Burns is a 70 y.o. White   female with PMH CAD, CHF, CKD stage 3, afib, COPD, HTN, chronic back pain, pulmonary hypertension who presents with increasing SOB over past three weeks - worsening over past few days with associated leg swelling, recent falls at home. Previous Echo from 7-6-20 with EF > 55%, moderate TR, RVSP 49  PFT's from 7-2019 moderate to severe obstructive defect. She is a previous smoker with 78 pack yrs. She reports taking her inhalers as prescribed at home, needs her rescue inhaler with exertion and especially when outdoors. She tells me she's taking her lasix 20 mg daily. Has been feeling shaky lately and has fallen a few times at home. A CT head on admission shows no acute process. A CXR shows no acute process, proBNP on admission 5,807. ON EFFEXOR)  STIOLTO RESPIMAT 2.5-2.5 MCG/ACT AERS, INHALE 2 PUFFS INTO THE LUNGS DAILY  lisinopril-hydroCHLOROthiazide (PRINZIDE;ZESTORETIC) 20-25 MG per tablet, TAKE ONE TABLET O ONCE A DAY  ELIQUIS 5 MG TABS tablet, TAKE ONE TABLET BY MOUTH TWICE A DAY  PROAIR  (90 Base) MCG/ACT inhaler, INHALE 2 PUFFS BY MOUTH EVERY 6 HOURS AS NEEDED FOR WHEEZING  acetaminophen (TYLENOL) 500 MG tablet, Take 500 mg by mouth every 6 hours as needed for Pain. fluticasone (FLONASE) 50 MCG/ACT nasal spray, USE ONE SPRAY TO EACH NOSTRIL ONCE ADAY  Misc. Devices (ROLLATOR) MISC, 1 each by Does not apply route daily  Respiratory Therapy Supplies (NEBULIZER/TUBING/MOUTHPIECE) KIT, Use every 4-6 hours prn for copd  baclofen (LIORESAL) 10 MG tablet, TAKE ONE TABLET BY MOUTH THREE TIMES A DAY  furosemide (LASIX) 20 MG tablet, TAKE ONE TABLET BY MOUTH ONCE A DAY AS NEEDED FOR SWELLING  guaiFENesin (MUCINEX) 600 MG extended release tablet, Take 1 tablet by mouth 2 times daily  omeprazole (PRILOSEC) 40 MG delayed release capsule, TAKE 1 CAPSULE BY MOUTH EVERY MORNING BEFORE BREAKFAST  Ascorbic Acid (C-1000 PO), Take by mouth  levothyroxine (SYNTHROID) 50 MCG tablet, Take 1 tablet by mouth daily  Misc. Devices (ROLLER WALKER) MISC, With a seat  metoprolol succinate (TOPROL XL) 25 MG extended release tablet, Take 1 tablet by mouth daily  ferrous sulfate 325 (65 Fe) MG tablet, Take 325 mg by mouth 2 times daily  cromolyn (OPTICROM) 4 % ophthalmic solution, Place 1 drop into both eyes 4 times daily  lidocaine-prilocaine (EMLA) 2.5-2.5 % cream, Apply topically as needed. albuterol (PROVENTIL) (2.5 MG/3ML) 0.083% nebulizer solution, Take 2.5 mg by nebulization every 4 hours as needed for Wheezing  loperamide (IMODIUM) 2 MG capsule, Take 2 mg by mouth 2 times daily as needed for Diarrhea.     Social History:   Social History     Socioeconomic History    Marital status:      Spouse name: Not on file    Number of children: Not on file  Years of education: Not on file    Highest education level: Not on file   Occupational History    Not on file   Social Needs    Financial resource strain: Not on file    Food insecurity     Worry: Not on file     Inability: Not on file    Transportation needs     Medical: Not on file     Non-medical: Not on file   Tobacco Use    Smoking status: Former Smoker     Packs/day: 3.00     Years: 26.00     Pack years: 78.00     Types: Cigarettes    Smokeless tobacco: Never Used   Substance and Sexual Activity    Alcohol use: Yes     Comment: occ    Drug use: No    Sexual activity: Not on file   Lifestyle    Physical activity     Days per week: Not on file     Minutes per session: Not on file    Stress: Not on file   Relationships    Social connections     Talks on phone: Not on file     Gets together: Not on file     Attends [employer] service: Not on file     Active member of club or organization: Not on file     Attends meetings of clubs or organizations: Not on file     Relationship status: Not on file    Intimate partner violence     Fear of current or ex partner: Not on file     Emotionally abused: Not on file     Physically abused: Not on file     Forced sexual activity: Not on file   Other Topics Concern    Not on file   Social History Narrative    Not on file       Family History:   Family History   Problem Relation Age of Onset    Heart Disease Mother     COPD Mother     Emphysema Sister     Cancer Other         Cousin - breast cancer       Review of Systems  Fever/ chills - no  Chest pain - denies  Cough - denies  Expectoration / hemoptysis - no  shortness of breath - yes, worse on exertion  Headache - no  Sinus drainage/ sore throat - no  abdominal pain - no  Swelling feet - yes  Nausea/ vomiting/ diarrhea/ constipation - no  Trouble sleeping at night - takes tylenol PM at HS  Snores +  Leg cramps at HS     Physical Exam  Vital Signs: /67   Pulse 81   Temp 98 °F (36.7 °C) (Oral) Resp 20   Ht 5' 5\" (1.651 m)   Wt 189 lb (85.7 kg)   SpO2 92%   BMI 31.45 kg/m²       Admission Weight: Weight: 189 lb (85.7 kg)  afebrile  General Appearance: alert, active, cooperative, and in no distress  Head: Normocephalic, without obvious abnormality, atraumatic  Neck: no adenopathy, no JVD, supple, symmetrical, trachea midline  Lungs: good air entry bilaterally; breath sounds- vesicular, 92% on RA  Heart: : a fib on monitor - rate controlled  Abdomen: soft, non-tender; bowel sounds normal; no masses,  no organomegaly  Extremities: extremities normal, atraumatic, soft pitting BLE edema  Skin: skin color, texture, turgor normal. No rashes or lesions  Neurologic: Grossly normal    Recent labs, Imaging studies reviewed      Data ReviewCBC:   Recent Labs     07/19/20 2222 07/20/20  0455   WBC 5.7 4.9   RBC 2.63* 2.87*   HGB 8.2* 8.8*   HCT 25.6* 28.3*    356     BMP:   Recent Labs     07/19/20 2222 07/20/20  0455   GLUCOSE 116* 154*    141   K 3.8 3.9   BUN 51* 47*   CREATININE 2.55* 2.16*   CALCIUM 9.1 8.7     ABGs: No results for input(s): PHART, PO2ART, VFV5VKK, QVA7RLB, BEART, Q4WHPSVW, ZCZ2NFQ in the last 72 hours. PT/INR:  No results found for: PTINR    ASSESSMENT / PLAN:    COPD - BD, wean steroids  Pulm HTN  CHF / fluid overload  SUNSHINE  afib - BD to xopenex  Possible CECILIA - nox on RA tonite    Plan of care discussed with Dr Yahaira Dong  Electronically signed by Papa Cuenca on 07/20/20 at 8:35 AM.      REASON FOR VISIT:  Copd, pulm HTN, dyspnea    I examined the patient myself. I have reviewed HPI, ROS, current medications, labs and pertinent radiographic studies with patient and NP. The assessment and Plan in the note per my discussion with NP.       Electronically signed by Ayo Vitale on 07/20/20 at 10:57 AM.

## 2020-07-20 NOTE — ED PROVIDER NOTES
EMERGENCY DEPARTMENT ENCOUNTER    Pt Name: Jessica Calvillo  MRN: [id number]  [place] 1945  Date of evaluation: 7/19/20  CHIEF COMPLAINT       Chief Complaint   Patient presents with    Shortness of Breath    Dizziness    Cough    Leg Swelling    Fall     HISTORY OF PRESENT ILLNESS   HPI    HISTORY OF PRESENT ILLNESS:  Past medical history of anxiety, COPD, presents for chief complaint of shortness of breath. Patient states that she has been feeling short of breath for the past 3 weeks however got significant worse over the past couple days associated with bilateral leg edema. Denies any chest pain. No fevers or chills. Patient also did hit her head. No loss of consciousness. No blurry vision. No numbness or tingling or weakness. Severity is moderate. Timing is couple days. No aggravating or relieving factors. Course is constant. Context is multiple medical problems.   -----------------------  -----------------------  REVIEW OF SYSTEMS  ED Caveat: [none]  Gen:  No fever, no chills  CV: No CP, no palpitations  Resp: +SOB, no respiratory distress  GI: No V/D, no abd pain  : No dysuria, no increased frequency  Skin: No rash, no purulent lesions  Eyes: No blurry vision, No double vision  MSK: No back pain, no joint pain  Neuro: No HA, no sensation changes  Psych: No SI/HI  -----------------------  -----------------------  ALLERGIES  -per nursing records, reviewed    PAST MEDICAL HISTORY  -See HPI    SOCIAL HISTORY  -No daily drinking, no IV drugs  -----------------------  -----------------------  PHYSICAL EXAM  Gen: Alert, no acute distress  Skin: Warm, no rashes  Head: Normocephalic, atraumatic  Neck: No midline tenderness, no nuchal rigidity  Eye: EOMI, PERRLA, normal conjunctiva  ENT: Mucous membranes moist, no pharyngeal erythema  CV: Normal rate, no rubs  Resp: Respirations unlabored, mild diffuse wheezing  GI: Soft, non distended, no large abdominal masses, non tender  MSK: No midline back History of GI bleed     Hyperlipidemia     Hypertension     Hypothyroid 1/18/2013    Leg pain     Normocytic anemia     Osteoarthritis     PAD (peripheral artery disease) (HCC)     Primary osteoarthritis     Pulmonary hypertension (HCC)     Pure hypercholesterolemia     SIRS (systemic inflammatory response syndrome) (HCC)     Urge incontinence     Wheezing      SURGICAL HISTORY       Past Surgical History:   Procedure Laterality Date    COLONOSCOPY      EYE SURGERY Bilateral     cataracts    JOINT REPLACEMENT      Left knee on 9-11-18     CURRENT MEDICATIONS       Previous Medications    ACETAMINOPHEN (TYLENOL) 500 MG TABLET    Take 500 mg by mouth every 6 hours as needed for Pain. ALBUTEROL (PROVENTIL) (2.5 MG/3ML) 0.083% NEBULIZER SOLUTION    Take 2.5 mg by nebulization every 4 hours as needed for Wheezing    ASCORBIC ACID (C-1000 PO)    Take by mouth    ATORVASTATIN (LIPITOR) 10 MG TABLET    TAKE 1 TABLET BY MOUTH ONCE DAILY    BACLOFEN (LIORESAL) 10 MG TABLET    TAKE ONE TABLET BY MOUTH THREE TIMES A DAY    CLONAZEPAM (KLONOPIN) 0.5 MG TABLET    Take 1 tablet by mouth 3 times daily as needed for Anxiety for up to 30 days.     CROMOLYN (OPTICROM) 4 % OPHTHALMIC SOLUTION    Place 1 drop into both eyes 4 times daily    DULOXETINE (CYMBALTA) 60 MG EXTENDED RELEASE CAPSULE    TAKE ONE CAPS BY MOUTH ONCE A DAY (NO LONGER ON EFFEXOR)    ELIQUIS 5 MG TABS TABLET    TAKE ONE TABLET BY MOUTH TWICE A DAY    FERROUS SULFATE 325 (65 FE) MG TABLET    Take 325 mg by mouth 2 times daily    FLOVENT  MCG/ACT INHALER    INHALE 2 PUFFS INTO THE LUNGS 2 TIMES DAILY    FLUTICASONE (FLONASE) 50 MCG/ACT NASAL SPRAY    USE ONE SPRAY TO EACH NOSTRIL ONCE ADAY    FUROSEMIDE (LASIX) 20 MG TABLET    TAKE ONE TABLET BY MOUTH ONCE A DAY AS NEEDED FOR SWELLING    GUAIFENESIN (MUCINEX) 600 MG EXTENDED RELEASE TABLET    Take 1 tablet by mouth 2 times daily    HYDROCODONE-ACETAMINOPHEN (NORCO) 5-325 MG PER TABLET    TAKE following components:       Result Value    RBC 2.63 (*)     Hemoglobin 8.2 (*)     Hematocrit 25.6 (*)     RDW 17.3 (*)     Lymphocytes 19 (*)     Monocytes 12 (*)     All other components within normal limits   COMPREHENSIVE METABOLIC PANEL W/ REFLEX TO MG FOR LOW K - Abnormal; Notable for the following components:    Glucose 116 (*)     BUN 51 (*)     CREATININE 2.55 (*)     GFR Non- 18 (*)     GFR  22 (*)     All other components within normal limits   BRAIN NATRIURETIC PEPTIDE - Abnormal; Notable for the following components:    Pro-BNP 5,807 (*)     All other components within normal limits   TROPONIN - Abnormal; Notable for the following components:    Troponin, High Sensitivity 29 (*)     All other components within normal limits   TROPONIN - Abnormal; Notable for the following components:    Troponin, High Sensitivity 25 (*)     All other components within normal limits   PROTIME-INR - Abnormal; Notable for the following components:    Protime 17.3 (*)     All other components within normal limits   APTT     EMERGENCY DEPARTMENTCOURSE:         Vitals:    Vitals:    07/19/20 2152 07/19/20 2228   BP: 128/69    Pulse: 80    Resp: 20 21   Temp: 98.1 °F (36.7 °C)    TempSrc: Oral    SpO2: 96% 93%   Weight: 189 lb (85.7 kg)    Height: 5' 5\" (1.651 m)        The patient was given the following medications while in the emergency department:  Orders Placed This Encounter   Medications    methylPREDNISolone sodium (SOLU-MEDROL) injection 125 mg    ipratropium-albuterol (DUONEB) nebulizer solution 1 ampule    aspirin chewable tablet 324 mg    0.9 % sodium chloride bolus     CONSULTS:  IP CONSULT TO INTERNAL MEDICINE  IP CONSULT TO INTERNAL MEDICINE  IP CONSULT TO CARDIOLOGY  IP CONSULT TO PULMONOLOGY  IP CONSULT TO NEPHROLOGY    FINAL IMPRESSION      1.  SUNSHINE (acute kidney injury) ([facility] [address]          DISPOSITION/PLAN   DISPOSITION Admitted 07/20/2020 01:08:07 AM      PATIENT REFERRED TO:  Pretty Kilpatrick, APRN - CNP  [address] [facility]  [address]  [address] [facility] [address]  [phone]          DISCHARGE MEDICATIONS:  New Prescriptions    No medications on file     Paul Ernandez MD  Attending Emergency Physician                   Darolyn Merlin, MD  07/20/20 9934

## 2020-07-20 NOTE — ED NOTES
Bed: D  Expected date:   Expected time:   Means of arrival:   Comments:  Alejandro [id number], WOODY  07/19/20 6215
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
## 2020-07-20 NOTE — CARE COORDINATION
CASE MANAGEMENT NOTE:    Admission Date:  7/19/2020 Bernabe Richardson is a 70 y.o.  female    Admitted for : Acute kidney injury ([facility] [N17.9]    Met with:  Patient    PCP:  Koffi Clark [place]:  [employer] Medicare      Current Residence/ Living Arrangements:  independently at home             Current Services PTA:  No    Is patient agreeable to VNS: Yes    Freedom of choice provided:  Yes    List of [address] provided: Yes    VNS chosen:  Yes, [address], [place] them before. DME:  straight cane and walker    Home Oxygen: No    Nebulizer: No    CPAP/BIPAP: No    Supplier: N/A    Potential Assistance Needed: No    SNF needed: No    Freedom of choice and list provided: NA    Pharmacy:  [address] [facility] [id number]       Does Patient want to use MEDS to BEDS? No    Is the Patient an [facility] with Readmission Risk Score greater than 14%? No  If yes, pt needs a follow up appointment made within 7 days. Family Members/Caregivers that pt would like involved in their care:    Yes    If yes, list name here:  Son Florina Phillip:  Family             Is patient in Isolation/One on One/Altered Mental Status? No  If yes, skip next question. If no, would they like an I-Pad to  use? No  If yes, call [phone]. Discharge Plan:  7/20/20 - [facility] medicare - Patient is from home with son, Lives in ranch home with one step into home. Ha a cane, walker, Would like VNS again, had  [facility] before and will take them again. On Eliquis PTA,  On admit cre was 2.55 now Cre is 2.16. Plan is to return home with VNS. Will continue to follow.  //pf                 Electronically signed by: Yaritza Reed RN on 7/20/2020 at 12:14 PM

## 2020-07-20 NOTE — FLOWSHEET NOTE
07/20/20 1126   Encounter Summary   Services provided to: Patient   Referral/Consult From: [facility];Family members   Continue Visiting   (7/20/20)   Complexity of Encounter Moderate   Length of Encounter 15 minutes   Spiritual Assessment Completed Yes   Routine   Intervention [place]   Spiritual/Sikhism   Type Spiritual support   Assessment Coping;Concerns with suffering; Anxious; Hopeful; Approachable   Intervention Active listening;Discussed relationship with God;Discussed meaning/purpose;Explored feelings, thoughts, concerns;Explored coping resources;Nurtured hope;Prayer   Outcome Shared reminiscences; Shared life review;Expressed feelings/needs/concerns;Expressed gratitude;Comfort   Sacraments   Communion Patient is currently unable

## 2020-07-20 NOTE — PROGRESS NOTES
Contacted Dr. Anum Carrillo through Elieser Alonso about new consult regarding SOB. Dr. Anum Carrillo confirmed that he's aware of consult.

## 2020-07-20 NOTE — ED NOTES
Mode of arrival (squad #, walk in, police, etc) : walk in from home        Chief complaint(s): shortness of breath, dizziness, legs swelling        Arrival Note (brief scenario, treatment PTA, etc). : pt presented to the ED c/o shortness of breath, legs swelling, dizziness, and multiple falls within the past few weeks. Pt states within the past few weeks, she felt like her knees has been giving out on her and she has had multiple falls at home. Pt states she lives at home by herself. Pt states that she noticed her legs has been having increased swelling, more so on the right leg. Pt states she experienced dizziness today. Pt states she has a Hx of A-fib. Pt states she is on a water pill. Pt denies fever and chills. C= \"Have you ever felt that you should Cut down on your drinking? \"  No  A= \"Have people Annoyed you by criticizing your drinking? \"  No  G= \"Have you ever felt bad or Guilty about your drinking? \"  No  E= \"Have you ever had a drink as an Eye-opener first thing in the morning to steady your nerves or to help a hangover? \"  No      Deferred []      Reason for deferring: N/A    *If yes to two or more: probable alcohol abuse. Guanakito Bella RN  07/20/20 1621

## 2020-07-20 NOTE — PROGRESS NOTES
Writer contacted Dr. Gunjan Bernal regarding pt labs and lack of urine output despite fluid bolus and continuous NS running at 75ml/hr.  Orders received to bladder scan patient and insert ahn if scan results greater than 250ml

## 2020-07-20 NOTE — DISCHARGE INSTR - COC
Continuity of Care Form    Patient Name: Stephon Donnelly   :  1945  MRN:  [id number]    Admit date:  2020  Discharge date:  2020    Code Status Order: Full Code   Advance Directives:   Advance Care Flowsheet Documentation     Date/Time Healthcare Directive Type of Healthcare Directive Copy in [address] Agent's Name Healthcare Agent's Phone Number    [phone]  No, patient does not have an advance directive for healthcare treatment -- -- -- -- --          Admitting Physician:  Anthony Chavez MD  PCP: WILLIE Ramsey CNP    Discharging Nurse: [facility] Unit/Room#: 2112/2112-01  Discharging Unit Phone Number: [phone]    Emergency Contact:   Extended Emergency Contact Information  Primary Emergency Contact: [address] Phone: [phone]  Mobile Phone: [phone]  Relation: Child    Past Surgical History:  Past Surgical History:   Procedure Laterality Date    COLONOSCOPY      EYE SURGERY Bilateral     cataracts    JOINT REPLACEMENT      Left knee on 18       Immunization History:   Immunization History   Administered Date(s) Administered    Influenza Vaccine, unspecified formulation 2018    Influenza Virus Vaccine 2017    Influenza, High Dose (Fluzone 65 yrs and older) 10/19/2012    Influenza, Triv, inactivated, subunit, adjuvanted, IM (Fluad 65 yrs and older) 2019    Pneumococcal Conjugate 13-valent ([id number]) 2018    Pneumococcal Polysaccharide ([id number]) 2015    Tetanus 1998    Tetanus Toxoid, absorbed 1998    Zoster Recombinant (Shingrix) 2020, 2020       Active Problems:  Patient Active Problem List   Diagnosis Code    Acquired hypothyroidism E03.9    Hypertension I10    Primary osteoarthritis of right knee M17.11    A-fib ([facility] I48.91    SUNSHINE (acute kidney injury) ([facility] N17.9    Acute encephalopathy G93.40    SIRS (systemic inflammatory response syndrome) ([facility]) R65.10    Normocytic anemia D64.9    Pulmonary hypertension ([facility]) I27.20    COPD (chronic obstructive pulmonary disease) ([facility]) J44.9    History of GI bleed Z87.19    Constipation K59.00    HA (generalized anxiety disorder) F41.1    Lumbar radiculopathy M54.16    Lumbosacral spondylosis without myelopathy M47.817    Benign hypertension with CKD (chronic kidney disease) stage III ([facility]) I12.9, N18.3    CKD (chronic kidney disease) stage 3, GFR 30-59 ml/min ([facility]) N18.3    Alcohol withdrawal syndrome with complication ([facility]) F24.607    Moderate major depression ([facility]) F32.1    PAD (peripheral artery disease) ([facility]) I73.9    Acute kidney injury ([facility]) N17.9    Obesity, Class I, BMI 30-34.9 E66.9       Isolation/Infection:   Isolation          No Isolation        Patient Infection Status     None to display          Nurse Assessment:  Last Vital Signs: /64   Pulse 80   Temp 97.7 °F (36.5 °C) (Oral)   Resp 18   Ht 5' 5\" (1.651 m)   Wt 189 lb (85.7 kg)   SpO2 97%   BMI 31.45 kg/m²     Last documented pain score (0-10 scale): Pain Level: 7  Last Weight:   Wt Readings from Last 1 Encounters:   07/19/20 189 lb (85.7 kg)     Mental Status:  oriented and alert    IV Access:  - None    Nursing Mobility/ADLs:  Walking   Assisted  Transfer  Assisted  Bathing  Assisted  Dressing  Assisted  Toileting  Assisted  Feeding  Independent  Med Admin  Independent  Med Delivery   whole    Wound Care Documentation and Therapy:  Incision 09/13/18 Knee Left (Active)   Number of days: 676        Elimination:  Continence:   · Bowel:  Yes  · Bladder: Yes  Urinary Catheter: None   Colostomy/Ileostomy/Ileal Conduit: No       Date of Last BM:     Intake/Output Summary (Last 24 hours) at 7/20/2020 1315  Last data filed at 7/20/2020 0522  Gross per 24 hour   Intake --   Output 450 ml   Net -450 ml     I/O last 3 completed shifts:  In: -   Out: 450 [Urine:450]    Safety Concerns: At Risk for Falls    Impairments/Disabilities:      None    Nutrition Therapy:  Current Nutrition Therapy:   - Oral Diet:  General , her dentures are not fitting well and fall out of her mouth,  Patient has been advised to call [address] at [phone] to get new impressions completed. Dental office as left patient a voice ail message today. Routes of Feeding: Oral  Liquids: No Restrictions  Daily Fluid Restriction: no  Last Modified Barium Swallow with Video (Video Swallowing Test): not done    Treatments at the Time of Hospital Discharge:   Respiratory Treatments:   Oxygen Therapy:  is on oxygen at 2 L/min per nasal cannula. Ventilator:    - No ventilator support    Rehab Therapies: Physical Therapy and Occupational Therapy  Weight Bearing Status/Restrictions: No weight bearing restirctions  Other Medical Equipment (for information only, NOT a DME order):  cane and walker  Other Treatments: Skilled Nursing Assessment and monitoring, Medication education    Patient's personal belongings (please select all that are sent with patient):  None    RN SIGNATURE:  Electronically signed by Ed Bob RN on 7/22/20 at 4:35 PM EDT    CASE MANAGEMENT/SOCIAL WORK SECTION    Inpatient Status Date: 7/19/2020    Readmission Risk Assessment Score:  Readmission Risk              Risk of Unplanned Readmission:        22           Discharging to Facility/ [address] home health care  Phone [phone]  Fax [phone]      / signature: Electronically signed by Ed Bob RN on 7/20/20 at 1:15 PM EDT    PHYSICIAN SECTION    Prognosis: Fair    Condition at Discharge: Stable    Rehab Potential (if transferring to Rehab):  Fair    Recommended Labs or Other Treatments After Discharge:     Physician Certification: I certify the above information and transfer of Ammon Rubio  is necessary for the continuing treatment of the diagnosis listed and that she requires Home Care for greater 30 days.      Update Admission H&P: No change in H&P    PHYSICIAN SIGNATURE:  Electronically signed by Porsha Mace MD on 7/22/20 at 4:17 PM EDT

## 2020-07-20 NOTE — H&P
Family Medicine Admit Note    PCP: WILLIE Lopez CNP    Date of Admission: 7/19/2020    Date of Service: Pt seen/examined on 7/20/2020 and Admitted to Inpatient. Chief Complaint:  Shortness of Breath                                  Cough                                  Leg Swelling      History Of Present Illness: The patient is a 70 y.o. female who presents to [address] [place] [address] with complaints of shortness of breath for the past few weeks but significantly worse the past few days. She reports associated leg edema and a fall at home due to dizziness. She has multiple chronic medical conditions. She denies any fevers, chills, chest pain, palpitations, abdominal pain, N/V, diarrhea, dysuria, hematuria, headaches, vision changes or hallucinations.     Past Medical History:        Diagnosis Date    A-fib [facility])     Acquired hypothyroidism     Acute encephalopathy     [email or web address]     Anxiety     Benign hypertension with CKD (chronic kidney disease) stage III [facility])     Chronic back pain     CKD (chronic kidney disease) stage 3, GFR 30-59 ml/min ([facility])     Constipation     COPD (chronic obstructive pulmonary disease) ([facility])     Cough     Depression     ETOH abuse     Former smoker     3 packs a day for 26 years    HA (generalized anxiety disorder)     History of GI bleed     Hyperlipidemia     Hypertension     Hypothyroid 1/18/2013    Leg pain     Normocytic anemia     Osteoarthritis     PAD (peripheral artery disease) ([facility])     Primary osteoarthritis     Pulmonary hypertension ([email or web address]     Pure hypercholesterolemia     SIRS (systemic inflammatory response syndrome) ([facility])     Urge incontinence     Wheezing        Past Surgical History:        Procedure Laterality Date    COLONOSCOPY      EYE SURGERY Bilateral     cataracts    JOINT REPLACEMENT      Left knee on 9-11-18       Medications Prior to Admission:    Prior to Admission medications    Medication Sig Start Date End Date Taking? Authorizing Provider   clonazePAM (KLONOPIN) 0.5 MG tablet Take 1 tablet by mouth 3 times daily as needed for Anxiety for up to 30 days. 7/9/20 8/8/20 Yes WILLIE Sawant CNP   HYDROcodone-acetaminophen (NORCO) 5-325 MG per tablet TAKE 1 TABLET BY MOUTH UP TO EVERY 8 HOURS AS NEEDED FOR PAIN (30-DAY SUPPLY) 7/2/20 8/1/20 Yes WILLIE Sawant CNP   atorvastatin (LIPITOR) 10 MG tablet TAKE 1 TABLET BY MOUTH ONCE DAILY 6/15/20  Yes WILLIE Sawant CNP   FLOVENT  MCG/ACT inhaler INHALE 2 PUFFS INTO THE LUNGS 2 TIMES DAILY 5/12/20  Yes WILLIE Sawant CNP   DULoxetine (CYMBALTA) 60 MG extended release capsule TAKE ONE CAPS BY MOUTH ONCE A DAY (NO LONGER ON EFFEXOR) 4/29/20  Yes WILLIE Sawant CNP   STIOLTO RESPIMAT 2.5-2.5 MCG/ACT AERS INHALE 2 PUFFS INTO THE LUNGS DAILY 4/27/20  Yes WILLIE Sawant CNP   lisinopril-hydroCHLOROthiazide (PRINZIDE;ZESTORETIC) 20-25 MG per tablet TAKE ONE TABLET O ONCE A DAY 3/27/20  Yes WILLIE Sawant CNP   ELIQUIS 5 MG TABS tablet TAKE ONE TABLET BY MOUTH TWICE A DAY 1/13/20  Yes WILLIE Sawant CNP   PROAIR  (90 Base) MCG/ACT inhaler INHALE 2 PUFFS BY MOUTH EVERY 6 HOURS AS NEEDED FOR WHEEZING 12/2/19  Yes WILLIE Dawkins CNP   acetaminophen (TYLENOL) 500 MG tablet Take 500 mg by mouth every 6 hours as needed for Pain. Yes Historical Provider, MD   fluticasone (FLONASE) 50 MCG/ACT nasal spray USE ONE SPRAY TO EACH NOSTRIL ONCE ADAY 7/13/20   WILLEI Kevin CNP   Misc.  Devices (ROLLATOR) MISC 1 each by Does not apply route daily 6/19/20   WILLIE Kevin CNP   Respiratory Therapy Supplies (NEBULIZER/TUBING/MOUTHPIECE) KIT Use every 4-6 hours prn for copd 6/15/20   WILLIE Sawant CNP   baclofen (LIORESAL) 10 MG tablet TAKE ONE TABLET BY MOUTH THREE TIMES A DAY 4/22/20   WILLIE Sawant CNP   furosemide (LASIX) 20 MG tablet cough, shortness of breath and COPD  Cardiovascular ROS: positive for - edema  Gastrointestinal ROS: negative  Musculoskeletal ROS: positive for - joint stiffness and muscle pain  Neurological ROS: negative      Family History:          Problem Relation Age of Onset    Heart Disease Mother     COPD Mother     Emphysema Sister     Cancer Other         Cousin - breast cancer       PHYSICAL EXAM:    /77   Pulse 97   Temp 97.6 °F (36.4 °C) (Oral)   Resp 20   Ht 5' 5\" (1.651 m)   Wt 189 lb (85.7 kg)   SpO2 98%   BMI 31.45 kg/m²     General appearance: No apparent distress appears stated age and cooperative. HEENT Normal cephalic, atraumatic without obvious deformity. Pupils equal, round, and reactive to light. Extra ocular muscles intact. Conjunctivae/corneas clear. Neck: Supple, No jugular venous distention/bruits. Trachea midline without thyromegaly or adenopathy with full range of motion. Lungs: Clear to auscultation, bilaterally without Rales/Wheezes/Rhonchi with good respiratory effort. Heart: Regular rate and rhythm with Normal S1/S2 without murmurs, rubs or gallops, point of maximum impulse non-displaced  Abdomen: Soft, non-tender or non-distended without rigidity or guarding and positive bowel sounds all four quadrants. Extremities: No clubbing or cyanosis, +LE edema bilaterally. Full range of motion without deformity and normal gait intact. Skin: Skin color, texture, turgor normal.  No rashes or lesions. Neurologic: Alert and oriented X 3, neurovascularly intact with sensory/motor intact upper extremities/lower extremities, bilaterally. Cranial nerves: II-XII intact, grossly non-focal.  Mental status: Alert, oriented, thought content appropriate.     CXR:  I have reviewed the CXR with the following interpretation: no acute changes  EKG:  I have reviewed the EKG with the following interpretation: A. Fib, rate controlled    CBC   Recent Labs     07/19/20  2222 07/20/20  0455   WBC 5.7 4. 9   HGB 8.2* 8.8*   HCT 25.6* 28.3*    356      RENAL  Recent Labs     07/19/20 2222 07/20/20  0455    141   K 3.8 3.9    100   CO2 27 25   BUN 51* 47*   CREATININE 2.55* 2.16*     LFT'S  Recent Labs     07/19/20  2222   AST 15   ALT 14   BILITOT 0.43   ALKPHOS 80     COAG  Recent Labs     07/19/20 2222   INR 1.4     CARDIAC ENZYMES  No results for input(s): CKTOTAL, CKMB, CKMBINDEX, TROPONINI in the last 72 hours.     U/A:    Lab Results   Component Value Date    NITRITE Negative 01/31/2020    COLORU Yellow 01/31/2020    COLORU YELLOW 11/05/2019    WBCUA 20 TO 50 11/05/2019    RBCUA 10 TO 20 08/12/2019    MUCUS NOT REPORTED 11/05/2019    BACTERIA NOT REPORTED 11/05/2019    CLARITYU Clear 01/31/2020    SPECGRAV 1.015 01/31/2020    SPECGRAV 1.009 11/05/2019    LEUKOCYTESUR Positive 01/31/2020    LEUKOCYTESUR LARGE 11/05/2019    BLOODU Negative 01/31/2020    GLUCOSEU Negative 01/31/2020    GLUCOSEU NEGATIVE 11/05/2019    AMORPHOUS NOT REPORTED 11/05/2019       ABG    Lab Results   Component Value Date    CDL0OKF 24.9 09/13/2018    O4DKVNXT 93.0 09/13/2018    PHART 7.330 09/13/2018    FZS7VBN 47.3 09/13/2018    PO2ART 74.8 09/13/2018           Active Hospital Problems    Diagnosis Date Noted    Acute kidney injury ([facility] [N17.9] 07/20/2020    Obesity, Class I, BMI 30-34.9 [E66.9] 07/20/2020    Moderate major depression ([facility] [address] [F32.1] 06/17/2020    PAD (peripheral artery disease) ([facility] [I73.9] 06/17/2020    Benign hypertension with CKD (chronic kidney disease) stage III ([facility] [I12.9, N18.3]     CKD (chronic kidney disease) stage 3, GFR 30-59 ml/min ([facility]) [N18.3]     Lumbosacral spondylosis without myelopathy [M47.817]     Lumbar radiculopathy [M54.16]     HA (generalized anxiety disorder) [F41.1] 12/13/2018    A-fib ([facility] [I48.91] 09/13/2018    COPD (chronic obstructive pulmonary disease) ([facility] [J44.9] 09/13/2018    Pulmonary hypertension ([facility] [I27.20] 09/13/2018    Acquired hypothyroidism [E03.9] 01/18/2013         ASSESSMENT/PLAN:  Patient admitted with SUNSHINE. Co-morbidities as above. Orders Placed This Encounter   Procedures    XR CHEST PORTABLE    CT Head WO Contrast    CBC Auto Differential    Comprehensive Metabolic Panel w/ Reflex to MG    Brain Natriuretic Peptide    Troponin    Protime-INR    APTT    BLOOD OCCULT STOOL #1    Basic Metabolic Panel w/ Reflex to MG    CBC    Urinalysis Reflex to Culture    Microscopic Urinalysis    DIET CARDIAC;    Vital signs per unit routine    Notify physician    Daily weights    Intake and output    Place intermittent pneumatic compression device    Up as tolerated    Telemetry Monitoring    Bladder scan    Misc nursing order (specify)    Insert indwelling urinary catheter    Discontinue indwelling urinary catheter when documented indications no longer apply per hospital policy    Full Code    Inpatient consult to Internal Medicine    Inpatient consult to Internal Medicine    Consult to Cardiology    Consult to Pulmonology    Consult to Nephrology   [facility] Treatment    Initiate Oxygen Therapy Protocol    Pulse oximetry, overnight    HHN Treatment    MDI Treatment    EKG 12 Lead    Insert peripheral IV    PATIENT STATUS (FROM ED OR OR/PROCEDURAL) Inpatient         DVT Prophylaxis:   Diet: DIET CARDIAC;  Code Status: Full Code      Dispo - Admitted to [facility].        @[facility]@

## 2020-07-21 PROBLEM — I50.33 ACUTE ON CHRONIC DIASTOLIC CHF (CONGESTIVE HEART FAILURE) (HCC): Status: ACTIVE | Noted: 2020-07-21

## 2020-07-21 LAB
ANION GAP SERPL CALCULATED.3IONS-SCNC: 12 MMOL/L (ref 9–17)
BUN BLDV-MCNC: 34 MG/DL (ref 8–23)
BUN/CREAT BLD: ABNORMAL (ref 9–20)
CALCIUM SERPL-MCNC: 8.8 MG/DL (ref 8.6–10.4)
CHLORIDE BLD-SCNC: 100 MMOL/L (ref 98–107)
CO2: 25 MMOL/L (ref 20–31)
CREAT SERPL-MCNC: 1.46 MG/DL (ref 0.5–0.9)
GFR AFRICAN AMERICAN: 43 ML/MIN
GFR NON-AFRICAN AMERICAN: 35 ML/MIN
GFR SERPL CREATININE-BSD FRML MDRD: ABNORMAL ML/MIN/{1.73_M2}
GFR SERPL CREATININE-BSD FRML MDRD: ABNORMAL ML/MIN/{1.73_M2}
GLUCOSE BLD-MCNC: 141 MG/DL (ref 70–99)
HCT VFR BLD CALC: 25.7 % (ref 36–46)
HEMOGLOBIN: 8.1 G/DL (ref 12–16)
MCH RBC QN AUTO: 30.5 PG (ref 26–34)
MCHC RBC AUTO-ENTMCNC: 31.5 G/DL (ref 31–37)
MCV RBC AUTO: 96.7 FL (ref 80–100)
NRBC AUTOMATED: ABNORMAL PER 100 WBC
PDW BLD-RTO: 17.1 % (ref 11.5–14.9)
PLATELET # BLD: 360 K/UL (ref 150–450)
PMV BLD AUTO: 7.9 FL (ref 6–12)
POTASSIUM SERPL-SCNC: 3.8 MMOL/L (ref 3.7–5.3)
RBC # BLD: 2.65 M/UL (ref 4–5.2)
SODIUM BLD-SCNC: 137 MMOL/L (ref 135–144)
WBC # BLD: 7.1 K/UL (ref 3.5–11)

## 2020-07-21 PROCEDURE — 99232 SBSQ HOSP IP/OBS MODERATE 35: CPT | Performed by: FAMILY MEDICINE

## 2020-07-21 PROCEDURE — 94640 AIRWAY INHALATION TREATMENT: CPT

## 2020-07-21 PROCEDURE — 80048 BASIC METABOLIC PNL TOTAL CA: CPT

## 2020-07-21 PROCEDURE — 85027 COMPLETE CBC AUTOMATED: CPT

## 2020-07-21 PROCEDURE — 36415 COLL VENOUS BLD VENIPUNCTURE: CPT

## 2020-07-21 PROCEDURE — 2060000000 HC ICU INTERMEDIATE R&B

## 2020-07-21 PROCEDURE — 6370000000 HC RX 637 (ALT 250 FOR IP): Performed by: INTERNAL MEDICINE

## 2020-07-21 PROCEDURE — 6370000000 HC RX 637 (ALT 250 FOR IP): Performed by: FAMILY MEDICINE

## 2020-07-21 PROCEDURE — 2580000003 HC RX 258: Performed by: INTERNAL MEDICINE

## 2020-07-21 PROCEDURE — 6360000002 HC RX W HCPCS: Performed by: NURSE PRACTITIONER

## 2020-07-21 PROCEDURE — 94761 N-INVAS EAR/PLS OXIMETRY MLT: CPT

## 2020-07-21 RX ORDER — DILTIAZEM HYDROCHLORIDE 120 MG/1
120 CAPSULE, COATED, EXTENDED RELEASE ORAL DAILY
Status: DISCONTINUED | OUTPATIENT
Start: 2020-07-21 | End: 2020-07-22

## 2020-07-21 RX ORDER — METOPROLOL SUCCINATE 50 MG/1
50 TABLET, EXTENDED RELEASE ORAL DAILY
Status: DISCONTINUED | OUTPATIENT
Start: 2020-07-22 | End: 2020-07-22 | Stop reason: HOSPADM

## 2020-07-21 RX ORDER — METOPROLOL SUCCINATE 25 MG/1
25 TABLET, EXTENDED RELEASE ORAL ONCE
Status: COMPLETED | OUTPATIENT
Start: 2020-07-21 | End: 2020-07-21

## 2020-07-21 RX ORDER — BUPROPION HYDROCHLORIDE 150 MG/1
150 TABLET ORAL DAILY
Status: DISCONTINUED | OUTPATIENT
Start: 2020-07-22 | End: 2020-07-22 | Stop reason: HOSPADM

## 2020-07-21 RX ADMIN — GLYCOPYRROLATE AND FORMOTEROL FUMARATE 2 PUFF: 9; 4.8 AEROSOL, METERED RESPIRATORY (INHALATION) at 21:11

## 2020-07-21 RX ADMIN — GUAIFENESIN 600 MG: 600 TABLET, EXTENDED RELEASE ORAL at 08:25

## 2020-07-21 RX ADMIN — LEVALBUTEROL HYDROCHLORIDE 0.63 MG: 0.63 SOLUTION RESPIRATORY (INHALATION) at 07:32

## 2020-07-21 RX ADMIN — ATORVASTATIN CALCIUM 10 MG: 10 TABLET, FILM COATED ORAL at 08:25

## 2020-07-21 RX ADMIN — SODIUM CHLORIDE: 9 INJECTION, SOLUTION INTRAVENOUS at 22:14

## 2020-07-21 RX ADMIN — APIXABAN 2.5 MG: 2.5 TABLET, FILM COATED ORAL at 21:11

## 2020-07-21 RX ADMIN — GUAIFENESIN 600 MG: 600 TABLET, EXTENDED RELEASE ORAL at 21:11

## 2020-07-21 RX ADMIN — ACETAMINOPHEN 650 MG: 325 TABLET, FILM COATED ORAL at 03:49

## 2020-07-21 RX ADMIN — FERROUS SULFATE TAB 325 MG (65 MG ELEMENTAL FE) 325 MG: 325 (65 FE) TAB at 17:48

## 2020-07-21 RX ADMIN — BACLOFEN 10 MG: 10 TABLET ORAL at 21:11

## 2020-07-21 RX ADMIN — LEVALBUTEROL HYDROCHLORIDE 0.63 MG: 0.63 SOLUTION RESPIRATORY (INHALATION) at 13:16

## 2020-07-21 RX ADMIN — METHYLPREDNISOLONE SODIUM SUCCINATE 40 MG: 40 INJECTION, POWDER, FOR SOLUTION INTRAMUSCULAR; INTRAVENOUS at 03:46

## 2020-07-21 RX ADMIN — LEVOTHYROXINE SODIUM 50 MCG: 0.05 TABLET ORAL at 08:25

## 2020-07-21 RX ADMIN — LEVALBUTEROL HYDROCHLORIDE 0.63 MG: 0.63 SOLUTION RESPIRATORY (INHALATION) at 19:43

## 2020-07-21 RX ADMIN — METOPROLOL SUCCINATE 25 MG: 25 TABLET, EXTENDED RELEASE ORAL at 08:25

## 2020-07-21 RX ADMIN — TAMSULOSIN HYDROCHLORIDE 0.4 MG: 0.4 CAPSULE ORAL at 08:25

## 2020-07-21 RX ADMIN — FERROUS SULFATE TAB 325 MG (65 MG ELEMENTAL FE) 325 MG: 325 (65 FE) TAB at 08:25

## 2020-07-21 RX ADMIN — HYDROCODONE BITARTRATE AND ACETAMINOPHEN 1 TABLET: 5; 325 TABLET ORAL at 08:41

## 2020-07-21 RX ADMIN — FLUTICASONE PROPIONATE 4 PUFF: 110 AEROSOL, METERED RESPIRATORY (INHALATION) at 21:18

## 2020-07-21 RX ADMIN — BACLOFEN 10 MG: 10 TABLET ORAL at 08:25

## 2020-07-21 RX ADMIN — GLYCOPYRROLATE AND FORMOTEROL FUMARATE 2 PUFF: 9; 4.8 AEROSOL, METERED RESPIRATORY (INHALATION) at 08:26

## 2020-07-21 RX ADMIN — METOPROLOL SUCCINATE 25 MG: 25 TABLET, EXTENDED RELEASE ORAL at 09:25

## 2020-07-21 RX ADMIN — FLUTICASONE PROPIONATE 1 SPRAY: 50 SPRAY, METERED NASAL at 08:26

## 2020-07-21 RX ADMIN — CLONAZEPAM 0.5 MG: 1 TABLET ORAL at 08:41

## 2020-07-21 RX ADMIN — HYDROCODONE BITARTRATE AND ACETAMINOPHEN 1 TABLET: 5; 325 TABLET ORAL at 21:19

## 2020-07-21 RX ADMIN — PANTOPRAZOLE SODIUM 40 MG: 40 TABLET, DELAYED RELEASE ORAL at 05:42

## 2020-07-21 RX ADMIN — DULOXETINE HYDROCHLORIDE 60 MG: 60 CAPSULE, DELAYED RELEASE ORAL at 08:25

## 2020-07-21 RX ADMIN — BACLOFEN 10 MG: 10 TABLET ORAL at 14:42

## 2020-07-21 RX ADMIN — DILTIAZEM HYDROCHLORIDE 120 MG: 120 CAPSULE, COATED, EXTENDED RELEASE ORAL at 12:35

## 2020-07-21 RX ADMIN — APIXABAN 2.5 MG: 2.5 TABLET, FILM COATED ORAL at 08:25

## 2020-07-21 RX ADMIN — FLUTICASONE PROPIONATE 4 PUFF: 110 AEROSOL, METERED RESPIRATORY (INHALATION) at 08:25

## 2020-07-21 ASSESSMENT — PAIN DESCRIPTION - LOCATION: LOCATION: HEAD;NECK

## 2020-07-21 ASSESSMENT — PAIN DESCRIPTION - PAIN TYPE: TYPE: ACUTE PAIN

## 2020-07-21 ASSESSMENT — PAIN SCALES - GENERAL
PAINLEVEL_OUTOF10: 3
PAINLEVEL_OUTOF10: 4

## 2020-07-21 NOTE — CARE COORDINATION
SW received information from the dietician that this patient is having issues with her dentures which is causing her to not eat correctly which may be some of her issues here at the hospital.  ERICA informed her that SW/Case Management would look into the issue. ERICA called Dania RN, DC planner and informed her of this issue. Dania will speak with the patient and see if there is anything that can be done to assist her with her denture issues. No other SW issues at this time.

## 2020-07-21 NOTE — PROGRESS NOTES
FAMILY MEDICINE  - PROGRESS NOTE    Date:  7/21/2020  Letty Ramírez  [id number]      Chief Complaint   Patient presents with    Shortness of Breath    Dizziness    Cough    Leg Swelling    Fall         Interval History:  Improved, she feels better. Her BUN/CR have improved and her leg swelling has decreased. She reports that she is not sleeping well.       Subjective  Respiratory: positive for emphysema  Cardiovascular: positive for irregular heart beat and lower extremity edema  Musculoskeletal:positive for myalgias  Behavioral/Psych: positive for anxiety and obesity:    Objective:    /79   Pulse 82   Temp 98.4 °F (36.9 °C) (Oral)   Resp 16   Ht 5' 5\" (1.651 m)   Wt 189 lb (85.7 kg)   SpO2 95%   BMI 31.45 kg/m²   General appearance - alert, well appearing, and in no distress and overweight  Mental status - alert, oriented to person, place, and time  Eyes - pupils equal and reactive, extraocular eye movements intact  Ears - hearing grossly normal bilaterally  Nose - normal and patent, no erythema, discharge or polyps  Mouth - mucous membranes moist, pharynx normal without lesions  Neck - supple, no significant adenopathy  Lymphatics - no palpable lymphadenopathy, no hepatosplenomegaly  Chest - clear to auscultation, no wheezes, rales or rhonchi, symmetric air entry, decreased air entry noted posteriorly  Heart - irregularly irregular rhythm with rate 82  Abdomen - soft, nontender, nondistended, no masses or organomegaly  Breasts - not examined  Back exam - full range of motion, no tenderness, palpable spasm or pain on motion  Neurological - alert, oriented, normal speech, no focal findings or movement disorder noted  Musculoskeletal - osteoarthritic changes noted in both hands  Extremities - pedal edema trace +  Skin - normal coloration and turgor, no rashes, no suspicious skin lesions noted    Data:   Medications:   Current Facility-Administered Medications   Medication Dose Route Frequency Provider Last Rate Last Dose    albuterol (PROVENTIL) nebulizer solution 2.5 mg  2.5 mg Nebulization Q4H PRN Michelle Sofia MD        atorvastatin (LIPITOR) tablet 10 mg  10 mg Oral Daily Michelle Sofia MD   10 mg at 07/20/20 0747    baclofen (LIORESAL) tablet 10 mg  10 mg Oral TID Michelle Sofia MD   10 mg at 07/20/20 2100    clonazePAM (KLONOPIN) tablet 0.5 mg  0.5 mg Oral TID PRN Michelle Sofia MD   0.5 mg at 07/20/20 8004    DULoxetine (CYMBALTA) extended release capsule 60 mg  60 mg Oral Daily Michelle Sofia MD   60 mg at 07/20/20 0746    apixaban (ELIQUIS) tablet 2.5 mg  2.5 mg Oral BID Michelle Sofia MD   2.5 mg at 07/20/20 2059    ferrous sulfate (IRON 325) tablet 325 mg  325 mg Oral BID Michelle Sofia MD   325 mg at 07/20/20 1551    fluticasone (FLONASE) 50 MCG/ACT nasal spray 1 spray  1 spray Each Nostril Daily Michelle Sofia MD   1 spray at 07/20/20 0748    guaiFENesin (MUCINEX) extended release tablet 600 mg  600 mg Oral BID Michelle Sofia MD   600 mg at 07/20/20 2100    HYDROcodone-acetaminophen (Dorenda Black) 5-325 MG per tablet 1 tablet  1 tablet Oral Q8H PRN Michelle Sofia MD   1 tablet at 07/20/20 2110    levothyroxine (SYNTHROID) tablet 50 mcg  50 mcg Oral Daily Michelle Sofia MD   50 mcg at 07/20/20 0747    metoprolol succinate (TOPROL XL) extended release tablet 25 mg  25 mg Oral Daily Michelle Sofia MD   25 mg at 07/20/20 0747    pantoprazole (PROTONIX) tablet 40 mg  40 mg Oral QAM AC Michelle Sofia MD   40 mg at 07/21/20 0542    glycopyrrolate-formoterol (BEVESPI) 9-4.8 MCG/ACT inhaler 2 puff  2 puff Inhalation BID Michelle Sofia MD   2 puff at 07/20/20 2059    sodium chloride flush 0.9 % injection 10 mL  10 mL Intravenous 2 times per day Michelle Sofia MD        sodium chloride flush 0.9 % injection 10 mL  10 mL Intravenous PRN Michelle Sofia MD  acetaminophen (TYLENOL) tablet 650 mg  650 mg Oral Q6H PRN Pallavi Logan MD   650 mg at 07/21/20 8667    Or    acetaminophen (TYLENOL) suppository 650 mg  650 mg Rectal Q6H PRN Pallavi Logan MD        polyethylene glycol [facility]) packet 17 g  17 g Oral Daily PRN Pallavi Logan MD        promethazine (PHENERGAN) tablet 12.5 mg  12.5 mg Oral Q6H PRN Pallavi Logan MD        Or    ondansetron [facility]) injection 4 mg  4 mg Intravenous Q6H PRN Pallavi Logan MD        0.9 % sodium chloride infusion   Intravenous Continuous Pallavi Logan MD 75 mL/hr at 07/20/20 1257      levalbuterol (XOPENEX) nebulization 0.63 mg  0.63 mg Nebulization Q6H WA Manuel Wiseman APRN - CNP   0.63 mg at 07/20/20 1854    fluticasone (FLOVENT HFA) 110 MCG/ACT inhaler 4 puff  4 puff Inhalation BID Pallavi Logan MD   4 puff at 07/20/20 2059    tamsulosin (FLOMAX) capsule 0.4 mg  0.4 mg Oral Daily [facility] Ken Hutchison MD   0.4 mg at 07/20/20 1551       Intake/Output Summary (Last 24 hours) at 7/21/2020 9792  Last data filed at 7/21/2020 0520  Gross per 24 hour   Intake 2384 ml   Output 1500 ml   Net 884 ml     Recent Results (from the past 24 hour(s))   Electrolytes urine random    Collection Time: 07/20/20  4:08 PM   Result Value Ref Range    Chloride, Ur 43 mmol/L    Potassium, Ur 35.7 mmol/L    Sodium,Ur 44 mmol/L   Creatinine, Random Urine    Collection Time: 07/20/20  4:08 PM   Result Value Ref Range    Creatinine, Ur 55.7 28.0 - 217.0 mg/dL   Basic Metabolic Panel w/ Reflex to MG    Collection Time: 07/21/20  4:53 AM   Result Value Ref Range    Glucose 141 (H) 70 - 99 mg/dL    BUN 34 (H) 8 - 23 mg/dL    CREATININE 1.46 (H) 0.50 - 0.90 mg/dL    Bun/Cre Ratio NOT REPORTED 9 - 20    Calcium 8.8 8.6 - 10.4 mg/dL    Sodium 137 135 - 144 mmol/L    Potassium 3.8 3.7 - 5.3 mmol/L    Chloride 100 98 - 107 mmol/L    CO2 25 20 - 31 mmol/L    Anion Gap 12 9 - 17 mmol/L    GFR Non-African American 35 (L) >60 mL/min    GFR  43 (L) >60 mL/min    GFR Comment          GFR Staging NOT REPORTED    CBC    Collection Time: 07/21/20  4:53 AM   Result Value Ref Range    WBC 7.1 3.5 - 11.0 k/uL    RBC 2.65 (L) 4.0 - 5.2 m/uL    Hemoglobin 8.1 (L) 12.0 - 16.0 g/dL    Hematocrit 25.7 (L) 36 - 46 %    MCV 96.7 80 - 100 fL    MCH 30.5 26 - 34 pg    MCHC 31.5 31 - 37 g/dL    RDW 17.1 (H) 11.5 - 14.9 %    Platelets 703 223 - 082 k/uL    MPV 7.9 6.0 - 12.0 fL    NRBC Automated NOT REPORTED per 100 WBC     -----------------------------------------------------------------  RAD:  EKG:  Micro:     Assessment & Plan:    Patient Active Problem List:     Acquired hypothyroidism     Hypertension     Primary osteoarthritis of right knee     A-fib (HCC)     SUNSHINE (acute kidney injury) ([facility] [address]     Acute encephalopathy     SIRS (systemic inflammatory response syndrome) ([facility])     Normocytic anemia     Pulmonary hypertension ([facility])     COPD (chronic obstructive pulmonary disease) ([facility])     History of GI bleed     Constipation     HA (generalized anxiety disorder)     Lumbar radiculopathy     Lumbosacral spondylosis without myelopathy     Benign hypertension with CKD (chronic kidney disease) stage III (HCC)     CKD (chronic kidney disease) stage 3, GFR 30-59 ml/min ([facility])     Alcohol withdrawal syndrome with complication ([facility])     Moderate major depression ([facility])     PAD (peripheral artery disease) ([facility])     Acute kidney injury ([facility])     Obesity, Class I, BMI 30-34.9           Plan:  -SUNSHINE on CKD3 - improving, Renal following.  -Anxiety and Depression - discontinuing Duloxetine upon Renal service suggestion, starting Bupropion. -COPD/Pulmonary HTN - Pulmonology following, weaning steroids & BD.  -Acute on chronic diastolic CHF - preserved EF, edema improving.  -A. Fib - Toprol XL increased to 50 mg daily per Cardiology.  -Continue current treatments.  -Complete orders per chart.     See orders Disposition:    Electronically signed by Billie Gomez MD on 7/21/2020 at 6:29 AM

## 2020-07-21 NOTE — PLAN OF CARE
Problem: Falls - Risk of:  Goal: Will remain free from falls  Description: Will remain free from falls  7/21/2020 0309 by Elizabeth Teresa RN  Outcome: Ongoing  7/20/2020 1524 by April Rain RN  Outcome: Ongoing  Note: Patient remained free from falls. Call light within reach. Goal: Absence of physical injury  Description: Absence of physical injury  Outcome: Ongoing     Problem: Skin Integrity:  Goal: Will show no infection signs and symptoms  Description: Will show no infection signs and symptoms  7/21/2020 0309 by Elizabeth Teresa RN  Outcome: Ongoing  7/20/2020 1524 by April Rain RN  Outcome: Ongoing  Note: No new alterations in skin integrity.    Goal: Absence of new skin breakdown  Description: Absence of new skin breakdown  Outcome: Ongoing     Problem: Fluid Volume - Imbalance:  Goal: Absence of imbalanced fluid volume signs and symptoms  Description: Absence of imbalanced fluid volume signs and symptoms  7/21/2020 0309 by Elizabeth Teresa RN  Outcome: Ongoing  7/20/2020 1524 by April Rain RN  Outcome: Ongoing  Note: Patient vitals stable, lab values monitored daily, strict I&O

## 2020-07-21 NOTE — PLAN OF CARE
Problem: Falls - Risk of:  Goal: Will remain free from falls  Description: Will remain free from falls  7/21/2020 1620 by April Rain RN  Outcome: Ongoing  Note: Patient remained free from falls. Call light within reach. Problem: Skin Integrity:  Goal: Will show no infection signs and symptoms  Description: Will show no infection signs and symptoms  7/21/2020 1620 by April Rain RN  Outcome: Ongoing  Note: No new alterations in skin integrity.       Problem: Fluid Volume - Imbalance:  Goal: Absence of imbalanced fluid volume signs and symptoms  Description: Absence of imbalanced fluid volume signs and symptoms  7/21/2020 1620 by April Rain RN  Outcome: Ongoing  Note: Patient vitals stable, lab values monitored daily, strict I&O

## 2020-07-21 NOTE — PLAN OF CARE
Nutrition Problem #1: Inadequate oral intake  Intervention: Food and/or Nutrient Delivery: Start Oral Nutrition Supplement, Modify Current Diet, Continue Current Diet  Nutritional Goals: Meet greater than 75% of estimated needs

## 2020-07-21 NOTE — PROGRESS NOTES
[facility] CARDIOLOGY Progress Note    2020 7:05 AM      Subjective:  Ms. Sayra Green of heart rate going up and down with rapid ventricular rate at times. Patient denies any chest pain or shortness of breath or palpitations or lightheadedness or dizziness. Review of systems:  No fever or chills. No headaches. Nurse updated overnight events. LABS:     Recent Results (from the past 24 hour(s))   Electrolytes urine random    Collection Time: 20  4:08 PM   Result Value Ref Range    Chloride, Ur 43 mmol/L    Potassium, Ur 35.7 mmol/L    Sodium,Ur 44 mmol/L   Creatinine, Random Urine    Collection Time: 20  4:08 PM   Result Value Ref Range    Creatinine, Ur 55.7 28.0 - 217.0 mg/dL   Basic Metabolic Panel w/ Reflex to MG    Collection Time: 20  4:53 AM   Result Value Ref Range    Glucose 141 (H) 70 - 99 mg/dL    BUN 34 (H) 8 - 23 mg/dL    CREATININE 1.46 (H) 0.50 - 0.90 mg/dL    Bun/Cre Ratio NOT REPORTED 9 - 20    Calcium 8.8 8.6 - 10.4 mg/dL    Sodium 137 135 - 144 mmol/L    Potassium 3.8 3.7 - 5.3 mmol/L    Chloride 100 98 - 107 mmol/L    CO2 25 20 - 31 mmol/L    Anion Gap 12 9 - 17 mmol/L    GFR Non-African American 35 (L) >60 mL/min    GFR  43 (L) >60 mL/min    GFR Comment          GFR Staging NOT REPORTED    CBC    Collection Time: 20  4:53 AM   Result Value Ref Range    WBC 7.1 3.5 - 11.0 k/uL    RBC 2.65 (L) 4.0 - 5.2 m/uL    Hemoglobin 8.1 (L) 12.0 - 16.0 g/dL    Hematocrit 25.7 (L) 36 - 46 %    MCV 96.7 80 - 100 fL    MCH 30.5 26 - 34 pg    MCHC 31.5 31 - 37 g/dL    RDW 17.1 (H) 11.5 - 14.9 %    Platelets 022 622 - 127 k/uL    MPV 7.9 6.0 - 12.0 fL    NRBC Automated NOT REPORTED per 100 WBC       Pulse Ox:  SpO2  Av.7 %  Min: 92 %  Max: 97 %    Supplemental O2:       Current Meds:    atorvastatin  10 mg Oral Daily    baclofen  10 mg Oral TID    DULoxetine  60 mg Oral Daily    apixaban  2.5 mg Oral BID    ferrous sulfate  325 mg Oral BID    fluticasone  1 spray Each Nostril Daily    guaiFENesin  600 mg Oral BID    levothyroxine  50 mcg Oral Daily    metoprolol succinate  25 mg Oral Daily    pantoprazole  40 mg Oral QAM AC    glycopyrrolate-formoterol  2 puff Inhalation BID    sodium chloride flush  10 mL Intravenous 2 times per day    levalbuterol  0.63 mg Nebulization Q6H WA    fluticasone  4 puff Inhalation BID    tamsulosin  0.4 mg Oral Daily         Continuous Infusions:    sodium chloride 75 mL/hr at 07/20/20 1257              VITAL SIGNS:    /79   Pulse 82   Temp 98.4 °F (36.9 °C) (Oral)   Resp 16   Ht 5' 5\" (1.651 m)   Wt 189 lb (85.7 kg)   SpO2 95%   BMI 31.45 kg/m²         Admit Weight:  189 lb (85.7 kg)    Last 3 weights: Wt Readings from Last 3 Encounters:   07/19/20 189 lb (85.7 kg)   06/17/20 189 lb (85.7 kg)   03/11/20 199 lb (90.3 kg)       BMI: Body mass index is 31.45 kg/m². INPUT/OUTPUT:          Intake/Output Summary (Last 24 hours) at 7/21/2020 0705  Last data filed at 7/21/2020 0520  Gross per 24 hour   Intake 2384 ml   Output 2500 ml   Net -116 ml         Telemetry shows  Atrial fibrillation. EXAM:     General appearance: awake, alert. Pleasant. Neck: No JVD or thyromegaly  Chest:  Fair air entry bilaterally. No rhonchi or wheezing. Cardiac: Irregular rate and rhythm. No S3 gallop or rubs. Abdomen: soft, non-tender. Extremities: no cyanosis, no clubbing, no calf tenderness, no leg edema. Skin:  warm and dry. Neuro:  Able to move all 4 extremities. 2 D ECHOCARDIOGRAM July 6, 2020:     Summary  Technically difficult study. Left ventricle is normal in size. Mild left ventricular hypertrophy. Global left ventricular systolic function is normal. Estimated LV EF >55 %. Left atrium is normal in size. Moderate tricuspid regurgitation. Mild pulmonary hypertension. Estimated right ventricular systolic pressure  is 33LUHF.     ASSESSMENT:    Nonspecific elevated

## 2020-07-21 NOTE — PROGRESS NOTES
Nephrology Progress Note    Subjective/   70y.o. year old female who we are seeing in consultation for Acute kidney injury. Interval history:  Patient feels better and appetite is improving. She still feels slightly dizzy on standing. She denies nausea vomiting or diarrhea. She has a Pantoja catheter with good urine output. Renal function is improving with creatinine of 1.4 currently. Lower extremity swelling is better. HPI:  This is a 70 y.o. female with history of hypertension, chronic back pain, atrial fibrillation chronic kidney disease stage III with baseline creatinine of 0.9 to 1.1 mg/dL prior history of acute kidney injury December 2019 with creatinine of 2.11 on 12/19/2019 secondary to dehydration and poor oral intake. She presented to the hospital with inability to urinate for 3 days. She has had poor oral and fluid intake due to problems with her dentures. Patient's problems started after she had whole tooth extraction in December 5, 2019 and she was unable to eat. Galileo Hernandez has also had problems with her dentures which are uncomfortable. Since then she has had intermittent periods of decreased in fluid and oral intake. She reports difficulty with urination over 3 days. Prior to this she had been urinating well. She denies dysuria, recent UTI, gross hematuria or flank pain. Denies urinary incontinence  Pantoja catheter was placed with yielded 450 cc of brisk urine. Patient also complains of leg swelling but denies any facial puffiness or orthopnea. Serum creatinine was 2.55 on admission with BUN of 51. Hemoglobin was 8.2 g/dL  Patient's home medication includes lisinopril hydrochlorothiazide 20/25 milligrams daily  Patient is also on Lasix 20 mg daily.   Objective/     Vitals:    07/21/20 1230 07/21/20 1315 07/21/20 1318 07/21/20 1440   BP: 135/75 113/62     Pulse: 107 106     Resp:  16     Temp:  98.3 °F (36.8 °C)     TempSrc:  Oral     SpO2:  94% 94%    Weight:       Height:    5' 5\" (1.651 m)     24HR INTAKE/OUTPUT:      Intake/Output Summary (Last 24 hours) at 7/21/2020 1545  Last data filed at 7/21/2020 0520  Gross per 24 hour   Intake 2384 ml   Output 2500 ml   Net -116 ml     Patient Vitals for the past 96 hrs (Last 3 readings):   Weight   07/19/20 2152 189 lb (85.7 kg)       Constitutional:  Alert, awake, no apparent distress  Cardiovascular:  S1, S2 without m/r/g  Respiratory:  CTA B without w/r/r  Abdomen: +bs, soft, nt  Ext: 2+LE edema    Data/  Recent Labs     07/19/20 2222 07/20/20  0455 07/21/20  0453   WBC 5.7 4.9 7.1   HGB 8.2* 8.8* 8.1*   HCT 25.6* 28.3* 25.7*   MCV 97.5 98.7 96.7    356 360     Recent Labs     07/19/20 2222 07/20/20 0455 07/21/20  0453    141 137   K 3.8 3.9 3.8    100 100   CO2 27 25 25   GLUCOSE 116* 154* 141*   BUN 51* 47* 34*   CREATININE 2.55* 2.16* 1.46*   LABGLOM 18* 22* 35*   GFRAA 22* 27* 43*         Assessment/   1. Acute kidney injury secondary to decreased oral and fluid intake/diuretic use as well as urinary retention and possibility of obstructive uropathy-renal function improving -creatinine 1.4 mg/dl     2. Essential hypertension-controlled without orthostatic symptoms     3Lower extremity swelling secondary to heart failure-stable      4. Benign hypertension associated with CKD stage III      5. Urinary retention-rule out neurogenic bladder-in addition duloxetine may cause urinary retention and is contraindicated with a GFR less than 30 mils per minute.       Plan/   1. Decrease  IV fluids with 0.9 saline to 30 cc/hr  2.  trial of void today-remove the ahn catheter. 3.  Lasix on hold  4. Check orthostatic BP ad pulse. 5. okay to continue baclofen at current dose.   6. .Consider holding  duloxetine            Starr Emery

## 2020-07-21 NOTE — PROGRESS NOTES
Comprehensive Nutrition Assessment    Type and Reason for Visit:  Positive Nutrition Screen(weight loss, poor intakes, missing teeth, poor dentition)    Nutrition Recommendations/Plan:   1. Continue cardiac diet. Changed diet consistency to Dental Soft. 2. Started Ensure Enlive (Chocolate) 2x/day on 7/20. Nutrition Assessment:  Patient admitted with diagnosis of acute kidney injury. Per Nephrology note it's likely due to poor PO intakes and dehydration. Patient reports she can't eat because her teeth keep falling off, she lost 11 lbs weight since December. Writer offered to change diet consistency to pureed but patient refused, accepted Dental Soft and oral supplements 2x/day. Referred patient to  [facility] for Denture assistance upon discharge. Malnutrition Assessment:  Malnutrition Status: At risk for malnutrition (Comment)    Context:  Acute Illness     Findings of the 6 clinical characteristics of malnutrition:  Energy Intake:  1 - 75% or less of estimated energy requirements for 7 or more days  Weight Loss:  7 - Greater than 5% over 1 month     Body Fat Loss:  Unable to assess     Muscle Mass Loss:  Unable to assess    Fluid Accumulation:  No significant fluid accumulation     Strength:  Not Performed    Estimated Daily Nutrient Needs:  Energy (kcal):  1636 kcal based on [place] - [place]. Jeor and 1.2 factor; Weight Used for Energy Requirements:  Admission     Protein (g):  57-68 gm based on 1-1.2 gm/kg of ideal body weight (CKD III); Weight Used for Protein Requirements:  Ideal          Nutrition Related Findings:  Edema: +2 pitting RLE, +1 LLE. GI: bowel sounds active      Wounds:  None       Current Nutrition Therapies:    DIET CARDIAC;  Dental Soft  Dietary Nutrition Supplements: Standard High Calorie Oral Supplement    Anthropometric Measures:  · Height: 5' 5\" (165.1 cm)  · Current Body Weight: 189 lb (85.7 kg)   · Admission Body Weight: 189 lb (85.7 kg)    · Usual Body Weight: 200 lb (90.7 kg)     · Ideal Body Weight: 125 lbs; % Ideal Body Weight 151.2 %   · BMI: 31.5  · BMI Categories: Obese Class 1 (BMI 30.0-34. 9)       Nutrition Diagnosis:   · Inadequate oral intake related to partial or complete edentulism, renal dysfunction as evidenced by poor intake prior to admission, intake 0-25%, intake 26-50%, weight loss, lab values    Nutrition Interventions:   Food and/or Nutrient Delivery:  Start Oral Nutrition Supplement, Modify Current Diet, Continue Current Diet  Nutrition Education/Counseling:  Education not indicated   Coordination of Nutrition Care:  Continued Inpatient Monitoring    Goals:  Meet greater than 75% of estimated needs       Nutrition Monitoring and Evaluation:   Food/Nutrient Intake Outcomes:  Food and Nutrient Intake, Supplement Intake  Physical Signs/Symptoms Outcomes:  Biochemical Data, Weight, Skin, Hemodynamic Status     Discharge Planning:    Continue Oral Nutrition Supplement, Continue current diet(Refer to  for dentures assistance.)       Some areas of assessment may be incomplete due to COVID-19 precautions    Ama Cordoba RD, LD  Office phone [phone]

## 2020-07-21 NOTE — CARE COORDINATION
ONGOING DISCHARGE PLAN:    Spoke with patient regarding discharge plan and patient confirms that plan is still to go home with [facility]    On eliquis PTA    Following for possible Home oxygen. Probable discharge tomorrow    Remains on IV solu medrol, IV fluids    Will continue to follow for additional discharge needs.     Electronically signed by Dominique Lopez RN on 7/21/2020 at 2:05 PM

## 2020-07-21 NOTE — PROGRESS NOTES
PULMONARY PROGRESS NOTE:      Interval History:Copd, pulm HTN, dyspnea    Shortness of Breath: no  Cough: yes  Sputum: no          Hemoptysis: no  Chest Pain: no  Fever: no                   Swelling Feet: yes, mild   Headache: no                                           Nausea, Emesis, Abdominal Pain: no  Diarrhea: no         Constipation: no  +fatigue     Events since last visit: States she is tired and hasn't gotten much sleep. BUN/Creat significantly improved today. NOX did not have any significant desats- about 3 mins.      PAST MEDICAL HISTORY:      Scheduled Meds:   [START ON 7/22/2020] metoprolol succinate  50 mg Oral Daily    dilTIAZem  120 mg Oral Daily    atorvastatin  10 mg Oral Daily    baclofen  10 mg Oral TID    DULoxetine  60 mg Oral Daily    apixaban  2.5 mg Oral BID    ferrous sulfate  325 mg Oral BID    fluticasone  1 spray Each Nostril Daily    guaiFENesin  600 mg Oral BID    levothyroxine  50 mcg Oral Daily    pantoprazole  40 mg Oral QAM AC    glycopyrrolate-formoterol  2 puff Inhalation BID    sodium chloride flush  10 mL Intravenous 2 times per day    levalbuterol  0.63 mg Nebulization Q6H WA    fluticasone  4 puff Inhalation BID    tamsulosin  0.4 mg Oral Daily     Continuous Infusions:   sodium chloride 30 mL/hr at 07/21/20 0824     PRN Meds:albuterol, clonazePAM, HYDROcodone-acetaminophen, sodium chloride flush, acetaminophen **OR** acetaminophen, polyethylene glycol, promethazine **OR** ondansetron        PHYSICAL EXAMINATION:  BP (!) 145/78   Pulse 100   Temp 98.1 °F (36.7 °C) (Oral)   Resp 16   Ht 5' 5\" (1.651 m)   Wt 189 lb (85.7 kg)   SpO2 95%   BMI 31.45 kg/m²     General : Awake, alert, oriented to time, place, and person, NAD   Neck - supple, no lymphadenopathy, JVD not raised  Heart - A fib  Lungs - Air Entry- fair bilaterally; breath sounds : vesicular;   rales/crackles - absent, 95% on RA   Abdomen - soft, no tenderness  Upper Extremities  - no cyanosis, mottling; edema : absent  Lower Extremities: no cyanosis, mottling; edema : mild BLE     Current Laboratory, Radiologic, Microbiologic, and Diagnostic studies reviewed  Data ReviewCBC:   Recent Labs     07/19/20 2222 07/20/20 0455 07/21/20 0453   WBC 5.7 4.9 7.1   RBC 2.63* 2.87* 2.65*   HGB 8.2* 8.8* 8.1*   HCT 25.6* 28.3* 25.7*    356 360     BMP:   Recent Labs     07/19/20 2222 07/20/20 0455 07/21/20 0453   GLUCOSE 116* 154* 141*    141 137   K 3.8 3.9 3.8   BUN 51* 47* 34*   CREATININE 2.55* 2.16* 1.46*   CALCIUM 9.1 8.7 8.8     ABGs: No results for input(s): PHART, PO2ART, SNR7VVB, ZVY8JJV, BEART, [id number], NZC0XSJ in the last 72 hours. PT/INR:  No results found for: PTINR    ASSESSMENT / PLAN:    COPD - BD, wean steroids  Pulm HTN  CHF / fluid overload  SUNSHINE- improving   afib - BD to xopenex  NOX last night- about 3 mins of desat   Discussed with Dr. Jerry Frank     Electronically signed by Brian Steel on 07/21/20 at 10:49 AM.    REASON FOR VISIT: copd, pulm HTN    I examined the patient myself. I have reviewed HPI, ROS, current medications, labs and pertinent radiographic studies with patient and NP. The assessment and Plan in the note per my discussion with NP.       Electronically signed by Audley Peabody on 07/21/20 at 8:02 PM.

## 2020-07-22 VITALS
HEIGHT: 65 IN | SYSTOLIC BLOOD PRESSURE: 130 MMHG | DIASTOLIC BLOOD PRESSURE: 50 MMHG | TEMPERATURE: 98.3 F | WEIGHT: 190.26 LBS | RESPIRATION RATE: 16 BRPM | HEART RATE: 101 BPM | OXYGEN SATURATION: 91 % | BODY MASS INDEX: 31.7 KG/M2

## 2020-07-22 LAB
ANION GAP SERPL CALCULATED.3IONS-SCNC: 11 MMOL/L (ref 9–17)
BUN BLDV-MCNC: 30 MG/DL (ref 8–23)
BUN/CREAT BLD: ABNORMAL (ref 9–20)
CALCIUM SERPL-MCNC: 9 MG/DL (ref 8.6–10.4)
CHLORIDE BLD-SCNC: 101 MMOL/L (ref 98–107)
CO2: 27 MMOL/L (ref 20–31)
CREAT SERPL-MCNC: 1.07 MG/DL (ref 0.5–0.9)
EKG ATRIAL RATE: 88 BPM
EKG Q-T INTERVAL: 370 MS
EKG QRS DURATION: 54 MS
EKG QTC CALCULATION (BAZETT): 434 MS
EKG R AXIS: 33 DEGREES
EKG T AXIS: 121 DEGREES
EKG VENTRICULAR RATE: 83 BPM
GFR AFRICAN AMERICAN: >60 ML/MIN
GFR NON-AFRICAN AMERICAN: 50 ML/MIN
GFR SERPL CREATININE-BSD FRML MDRD: ABNORMAL ML/MIN/{1.73_M2}
GFR SERPL CREATININE-BSD FRML MDRD: ABNORMAL ML/MIN/{1.73_M2}
GLUCOSE BLD-MCNC: 119 MG/DL (ref 70–99)
HCT VFR BLD CALC: 27.3 % (ref 36–46)
HEMOGLOBIN: 8.4 G/DL (ref 12–16)
MCH RBC QN AUTO: 29.8 PG (ref 26–34)
MCHC RBC AUTO-ENTMCNC: 30.8 G/DL (ref 31–37)
MCV RBC AUTO: 96.6 FL (ref 80–100)
NRBC AUTOMATED: ABNORMAL PER 100 WBC
PDW BLD-RTO: 17.7 % (ref 11.5–14.9)
PLATELET # BLD: 423 K/UL (ref 150–450)
PMV BLD AUTO: 7.8 FL (ref 6–12)
POTASSIUM SERPL-SCNC: 3.6 MMOL/L (ref 3.7–5.3)
RBC # BLD: 2.83 M/UL (ref 4–5.2)
SODIUM BLD-SCNC: 139 MMOL/L (ref 135–144)
WBC # BLD: 11 K/UL (ref 3.5–11)

## 2020-07-22 PROCEDURE — 51798 US URINE CAPACITY MEASURE: CPT

## 2020-07-22 PROCEDURE — 85027 COMPLETE CBC AUTOMATED: CPT

## 2020-07-22 PROCEDURE — 6370000000 HC RX 637 (ALT 250 FOR IP): Performed by: FAMILY MEDICINE

## 2020-07-22 PROCEDURE — 36415 COLL VENOUS BLD VENIPUNCTURE: CPT

## 2020-07-22 PROCEDURE — 6370000000 HC RX 637 (ALT 250 FOR IP): Performed by: INTERNAL MEDICINE

## 2020-07-22 PROCEDURE — 6360000002 HC RX W HCPCS: Performed by: NURSE PRACTITIONER

## 2020-07-22 PROCEDURE — 94640 AIRWAY INHALATION TREATMENT: CPT

## 2020-07-22 PROCEDURE — 94761 N-INVAS EAR/PLS OXIMETRY MLT: CPT

## 2020-07-22 PROCEDURE — 2700000000 HC OXYGEN THERAPY PER DAY

## 2020-07-22 PROCEDURE — 80048 BASIC METABOLIC PNL TOTAL CA: CPT

## 2020-07-22 PROCEDURE — 99239 HOSP IP/OBS DSCHRG MGMT >30: CPT | Performed by: FAMILY MEDICINE

## 2020-07-22 RX ORDER — TAMSULOSIN HYDROCHLORIDE 0.4 MG/1
0.4 CAPSULE ORAL DAILY
Qty: 30 CAPSULE | Refills: 3 | Status: SHIPPED | OUTPATIENT
Start: 2020-07-23 | End: 2020-12-15

## 2020-07-22 RX ORDER — DILTIAZEM HYDROCHLORIDE 180 MG/1
180 CAPSULE, COATED, EXTENDED RELEASE ORAL DAILY
Qty: 30 CAPSULE | Refills: 3 | Status: SHIPPED | OUTPATIENT
Start: 2020-07-23 | End: 2020-10-19 | Stop reason: SDUPTHER

## 2020-07-22 RX ORDER — DILTIAZEM HYDROCHLORIDE 180 MG/1
180 CAPSULE, COATED, EXTENDED RELEASE ORAL DAILY
Status: DISCONTINUED | OUTPATIENT
Start: 2020-07-22 | End: 2020-07-22 | Stop reason: HOSPADM

## 2020-07-22 RX ORDER — BUPROPION HYDROCHLORIDE 150 MG/1
150 TABLET ORAL DAILY
Qty: 30 TABLET | Refills: 3 | Status: SHIPPED | OUTPATIENT
Start: 2020-07-23 | End: 2020-11-09

## 2020-07-22 RX ADMIN — DILTIAZEM HYDROCHLORIDE 180 MG: 180 CAPSULE, COATED, EXTENDED RELEASE ORAL at 09:07

## 2020-07-22 RX ADMIN — FLUTICASONE PROPIONATE 4 PUFF: 110 AEROSOL, METERED RESPIRATORY (INHALATION) at 09:09

## 2020-07-22 RX ADMIN — FERROUS SULFATE TAB 325 MG (65 MG ELEMENTAL FE) 325 MG: 325 (65 FE) TAB at 15:02

## 2020-07-22 RX ADMIN — BUPROPION HYDROCHLORIDE 150 MG: 150 TABLET, EXTENDED RELEASE ORAL at 09:08

## 2020-07-22 RX ADMIN — APIXABAN 2.5 MG: 2.5 TABLET, FILM COATED ORAL at 09:07

## 2020-07-22 RX ADMIN — BACLOFEN 10 MG: 10 TABLET ORAL at 15:02

## 2020-07-22 RX ADMIN — FERROUS SULFATE TAB 325 MG (65 MG ELEMENTAL FE) 325 MG: 325 (65 FE) TAB at 09:07

## 2020-07-22 RX ADMIN — LEVOTHYROXINE SODIUM 50 MCG: 0.05 TABLET ORAL at 09:08

## 2020-07-22 RX ADMIN — METOPROLOL SUCCINATE 50 MG: 50 TABLET, EXTENDED RELEASE ORAL at 09:08

## 2020-07-22 RX ADMIN — BACLOFEN 10 MG: 10 TABLET ORAL at 09:07

## 2020-07-22 RX ADMIN — ATORVASTATIN CALCIUM 10 MG: 10 TABLET, FILM COATED ORAL at 09:08

## 2020-07-22 RX ADMIN — GLYCOPYRROLATE AND FORMOTEROL FUMARATE 2 PUFF: 9; 4.8 AEROSOL, METERED RESPIRATORY (INHALATION) at 09:10

## 2020-07-22 RX ADMIN — TAMSULOSIN HYDROCHLORIDE 0.4 MG: 0.4 CAPSULE ORAL at 09:07

## 2020-07-22 RX ADMIN — GUAIFENESIN 600 MG: 600 TABLET, EXTENDED RELEASE ORAL at 09:08

## 2020-07-22 RX ADMIN — LEVALBUTEROL HYDROCHLORIDE 0.63 MG: 0.63 SOLUTION RESPIRATORY (INHALATION) at 06:49

## 2020-07-22 RX ADMIN — PANTOPRAZOLE SODIUM 40 MG: 40 TABLET, DELAYED RELEASE ORAL at 06:58

## 2020-07-22 RX ADMIN — HYDROCODONE BITARTRATE AND ACETAMINOPHEN 1 TABLET: 5; 325 TABLET ORAL at 11:05

## 2020-07-22 RX ADMIN — FLUTICASONE PROPIONATE 1 SPRAY: 50 SPRAY, METERED NASAL at 09:08

## 2020-07-22 ASSESSMENT — PAIN DESCRIPTION - PAIN TYPE: TYPE: ACUTE PAIN

## 2020-07-22 ASSESSMENT — PAIN SCALES - GENERAL: PAINLEVEL_OUTOF10: 8

## 2020-07-22 NOTE — PROGRESS NOTES
Nephrology Progress Note    Subjective/   70y.o. year old female who we are seeing in consultation for Acute kidney injury. Interval history:  Patient feels better and appetite is improving. She denies nausea vomiting or diarrhea. Renal function is improving with creatinine of 1.07 mg/dl currently. Pantoja catheter was removed yesterday and patient is voiding well. Lower extremity swelling is better. HPI:  This is a 70 y.o. female with history of hypertension, chronic back pain, atrial fibrillation chronic kidney disease stage III with baseline creatinine of 0.9 to 1.1 mg/dL prior history of acute kidney injury December 2019 with creatinine of 2.11 on 12/19/2019 secondary to dehydration and poor oral intake. She presented to the hospital with inability to urinate for 3 days. She has had poor oral and fluid intake due to problems with her dentures. Patient's problems started after she had whole tooth extraction in December 5, 2019 and she was unable to eat. Sanjuanita Carr has also had problems with her dentures which are uncomfortable. Since then she has had intermittent periods of decreased in fluid and oral intake. She reports difficulty with urination over 3 days. Prior to this she had been urinating well. She denies dysuria, recent UTI, gross hematuria or flank pain. Denies urinary incontinence  Pantoja catheter was placed with yielded 450 cc of brisk urine. Patient also complains of leg swelling but denies any facial puffiness or orthopnea. Serum creatinine was 2.55 on admission with BUN of 51. Hemoglobin was 8.2 g/dL  Patient's home medication includes lisinopril hydrochlorothiazide 20/25 milligrams daily  Patient is also on Lasix 20 mg daily.   Objective/     Vitals:    07/22/20 0806 07/22/20 0900 07/22/20 1022 07/22/20 1308   BP:    (!) 130/50   Pulse:    101   Resp:    16   Temp:    98.3 °F (36.8 °C)   TempSrc:    Oral   SpO2: (!) 89% 97% 90% 91%   Weight:       Height:         24HR

## 2020-07-22 NOTE — PROGRESS NOTES
[facility] Progress Note    2020 6:52 AM      Subjective:  Ms. Poonam Paiz of \"can not breathe \". Also states that Mucinex is breaking it lose but cannot spit it upward. Nose is running. Patient  denies any chest pain or palpitations or lightheadedness or dizziness. Review of systems:  No fever. LABS:     Recent Results (from the past 24 hour(s))   Basic Metabolic Panel w/ Reflex to MG    Collection Time: 20  5:37 AM   Result Value Ref Range    Glucose 119 (H) 70 - 99 mg/dL    BUN 30 (H) 8 - 23 mg/dL    CREATININE 1.07 (H) 0.50 - 0.90 mg/dL    Bun/Cre Ratio NOT REPORTED 9 - 20    Calcium 9.0 8.6 - 10.4 mg/dL    Sodium 139 135 - 144 mmol/L    Potassium 3.6 (L) 3.7 - 5.3 mmol/L    Chloride 101 98 - 107 mmol/L    CO2 27 20 - 31 mmol/L    Anion Gap 11 9 - 17 mmol/L    GFR Non-African American 50 (L) >60 mL/min    GFR African American >60 >60 mL/min    GFR Comment          GFR Staging NOT REPORTED    CBC    Collection Time: 20  5:37 AM   Result Value Ref Range    WBC 11.0 3.5 - 11.0 k/uL    RBC 2.83 (L) 4.0 - 5.2 m/uL    Hemoglobin 8.4 (L) 12.0 - 16.0 g/dL    Hematocrit 27.3 (L) 36 - 46 %    MCV 96.6 80 - 100 fL    MCH 29.8 26 - 34 pg    MCHC 30.8 (L) 31 - 37 g/dL    RDW 17.7 (H) 11.5 - 14.9 %    Platelets 489 078 - 750 k/uL    MPV 7.8 6.0 - 12.0 fL    NRBC Automated NOT REPORTED per 100 WBC       Pulse Ox:  SpO2  Av.9 %  Min: 94 %  Max: 100 %    Supplemental O2:       Current Meds:    metoprolol succinate  50 mg Oral Daily    dilTIAZem  120 mg Oral Daily    buPROPion  150 mg Oral Daily    atorvastatin  10 mg Oral Daily    baclofen  10 mg Oral TID    apixaban  2.5 mg Oral BID    ferrous sulfate  325 mg Oral BID    fluticasone  1 spray Each Nostril Daily    guaiFENesin  600 mg Oral BID    levothyroxine  50 mcg Oral Daily    pantoprazole  40 mg Oral QAM AC    glycopyrrolate-formoterol  2 puff Inhalation BID    sodium chloride flush  10 mL Intravenous 2 times per day    levalbuterol  0.63 mg Nebulization Q6H WA    fluticasone  4 puff Inhalation BID    tamsulosin  0.4 mg Oral Daily         Continuous Infusions:    sodium chloride 30 mL/hr at 07/21/20 2214              VITAL SIGNS:    /66   Pulse 82   Temp 98.3 °F (36.8 °C) (Oral)   Resp 16   Ht 5' 5\" (1.651 m)   Wt 190 lb 4.1 oz (86.3 kg)   SpO2 95%   BMI 31.66 kg/m²         Admit Weight:  189 lb (85.7 kg)    Last 3 weights: Wt Readings from Last 3 Encounters:   07/22/20 190 lb 4.1 oz (86.3 kg)   06/17/20 189 lb (85.7 kg)   03/11/20 199 lb (90.3 kg)       BMI: Body mass index is 31.66 kg/m². INPUT/OUTPUT:          Intake/Output Summary (Last 24 hours) at 7/22/2020 0652  Last data filed at 7/22/2020 0431  Gross per 24 hour   Intake 1333.04 ml   Output 850 ml   Net 483.04 ml         Telemetry shows atrial fibrillation with rapid ventricular rate. EXAM:     General appearance: awake, alert. Pleasant. Neck: No JVD  Chest:  [address] air entry bilaterally. No tenderness. No wheezing. Cardiac:  Tachycardic. Irregularly irregular rate and rhythm. No S3 gallop or rubs. Abdomen: soft. Extremities: no cyanosis, no clubbing. Skin:  warm and dry. Neuro:  Able to move all 4 extremities. 2 D ECHOCARDIOGRAM July 6, 2020:     Summary  Technically difficult study. Left ventricle is normal in size. Mild left ventricular hypertrophy. Global left ventricular systolic function is normal. Estimated LV EF >55 %. Left atrium is normal in size. Moderate tricuspid regurgitation. Mild pulmonary hypertension. Estimated right ventricular systolic pressure  is [id number].     ASSESSMENT:    Nonspecific elevated high-sensitivity troponin with peak level only 29 and subsequently down to 25.  Does not represent acute myocardial infarction or acute coronary syndrome.  Most likely due to chronic kidney disease.     Chronic atrial fibrillation - rapid ventricular rate at times. Roro Abreu EKG.     Normal LV systolic function.     Moderate tricuspid regurgitation, moderate pulmonary hypertension with RVSP 49 mmHg.     Acute on chronic diastolic heart failure with preserved LV ejection fraction.     Acute on chronic kidney disease stage III.     Shortness of breath-multifactorial in etiology.  Rule out CECILIA.     COPD.     Other problems as charted. REC/PLAN:    Will increase diltiazem CD to 180 mg daily for better ventricular rate control. Will add oxygen 2-4 L per nasal cannula. Continue on other current cardiac medications and continued supportive care. Discussed with the nurses. Will follow. Electronically signed by Bhanu Rodrigues MD, [facility] [place]        PLEASE NOTE:  This progress note was completed using a voice transcription system. Every effort was made to ensure accuracy. However, inadvertent computerized transcription errors may be present.

## 2020-07-22 NOTE — PROGRESS NOTES
FAMILY MEDICINE  - PROGRESS NOTE    Date:  7/22/2020  Rach Weller  [id number]      Chief Complaint   Patient presents with    Shortness of Breath    Dizziness    Cough    Leg Swelling    Fall         Interval History:  Improved, her BUN/CR have decreased further. She has no new complaints.       Subjective  Constitutional: positive for fatigue and obesity  Eyes: positive for contacts/glasses  Ears, nose, mouth, throat, and face: negative  Respiratory: positive for emphysema  Cardiovascular: positive for irregular heart beat  Gastrointestinal: positive for reflux symptoms  Musculoskeletal:positive for arthralgias and myalgias  Neurological: negative  Behavioral/Psych: positive for anxiety and depression  Endocrine: positive for hypothyroid:    Objective:    /66   Pulse 82   Temp 98.3 °F (36.8 °C) (Oral)   Resp 16   Ht 5' 5\" (1.651 m)   Wt 190 lb 4.1 oz (86.3 kg)   SpO2 95%   BMI 31.66 kg/m²   General appearance - alert, well appearing, and in no distress and overweight  Mental status - alert, oriented to person, place, and time  Eyes - pupils equal and reactive, extraocular eye movements intact  Ears - hearing grossly normal bilaterally  Nose - normal and patent, no erythema, discharge or polyps  Mouth - mucous membranes moist, pharynx normal without lesions  Neck - supple, no significant adenopathy  Lymphatics - no palpable lymphadenopathy, no hepatosplenomegaly  Chest - clear to auscultation, no wheezes, rales or rhonchi, symmetric air entry, decreased air entry noted posteriorly  Heart - irregularly irregular rhythm with rate 82  Abdomen - soft, nontender, nondistended, no masses or organomegaly  Breasts - not examined  Back exam - not examined  Neurological - alert, oriented, normal speech, no focal findings or movement disorder noted  Musculoskeletal - osteoarthritic changes noted in both hands  Extremities - pedal edema trace +  Skin - normal coloration and turgor, no rashes, no suspicious skin lesions noted    Data:   Medications:   Current Facility-Administered Medications   Medication Dose Route Frequency Provider Last Rate Last Dose    metoprolol succinate (TOPROL XL) extended release tablet 50 mg  50 mg Oral Daily Maranda Parisi MD        dilTIAZem (CARDIZEM CD) extended release capsule 120 mg  120 mg Oral Daily Chuck Parisi MD   120 mg at 07/21/20 1235    buPROPion (WELLBUTRIN XL) extended release tablet 150 mg  150 mg Oral Daily Allen Hannah MD        albuterol (PROVENTIL) nebulizer solution 2.5 mg  2.5 mg Nebulization Q4H PRN Bijal Blancas MD        atorvastatin (LIPITOR) tablet 10 mg  10 mg Oral Daily Bijal Blancas MD   10 mg at 07/21/20 0825    baclofen (LIORESAL) tablet 10 mg  10 mg Oral TID Bijal Blancas MD   10 mg at 07/21/20 2111    clonazePAM (KLONOPIN) tablet 0.5 mg  0.5 mg Oral TID PRN Bijal Blancas MD   0.5 mg at 07/21/20 0841    apixaban (ELIQUIS) tablet 2.5 mg  2.5 mg Oral BID Bijal Blancas MD   2.5 mg at 07/21/20 2111    ferrous sulfate (IRON 325) tablet 325 mg  325 mg Oral BID Bijal Blancas MD   325 mg at 07/21/20 1748    fluticasone (FLONASE) 50 MCG/ACT nasal spray 1 spray  1 spray Each Nostril Daily Bijal Blancas MD   1 spray at 07/21/20 0826    guaiFENesin (MUCINEX) extended release tablet 600 mg  600 mg Oral BID Bijal Blancas MD   600 mg at 07/21/20 2111    HYDROcodone-acetaminophen (NORCO) 5-325 MG per tablet 1 tablet  1 tablet Oral Q8H PRN Bijal Blancas MD   1 tablet at 07/21/20 2119    levothyroxine (SYNTHROID) tablet 50 mcg  50 mcg Oral Daily Bijal Blancas MD   50 mcg at 07/21/20 0825    pantoprazole (PROTONIX) tablet 40 mg  40 mg Oral QAM AC Bijal Blancas MD   40 mg at 07/21/20 0542    glycopyrrolate-formoterol (BEVESPI) 9-4.8 MCG/ACT inhaler 2 puff  2 puff Inhalation BID Bijal Blancas MD   2 puff at 07/21/20 2111    sodium chloride flush 0.9 % injection 10 mL  10 mL Intravenous 2 times per day Alexander Huff MD        sodium chloride flush 0.9 % injection 10 mL  10 mL Intravenous PRN Alexander Huff MD        acetaminophen (TYLENOL) tablet 650 mg  650 mg Oral Q6H PRN Alexander Huff MD   650 mg at 07/21/20 3092    Or    acetaminophen (TYLENOL) suppository 650 mg  650 mg Rectal Q6H PRN Alexander Huff MD        polyethylene glycol [facility]) packet 17 g  17 g Oral Daily PRN Alexander Huff MD        promethazine (PHENERGAN) tablet 12.5 mg  12.5 mg Oral Q6H PRN Alexander Huff MD        Or    [facility]etron [facility]) injection 4 mg  4 mg Intravenous Q6H PRN Alexander Huff MD        0.9 % sodium chloride infusion   Intravenous Continuous Starr Garcia MD 30 mL/hr at 07/21/20 2214      levalbuterol [facility]) nebulization 0.63 mg  0.63 mg Nebulization Q6H WA WILLIE Gabriel - CNP   0.63 mg at 07/21/20 1943    fluticasone (FLOVENT HFA) 110 MCG/ACT inhaler 4 puff  4 puff Inhalation BID Alexander Huff MD   4 puff at 07/21/20 2118    tamsulosin (FLOMAX) capsule 0.4 mg  0.4 mg Oral Daily Starr Emery MD   0.4 mg at 07/21/20 0825       Intake/Output Summary (Last 24 hours) at 7/22/2020 0603  Last data filed at 7/22/2020 0431  Gross per 24 hour   Intake 1333.04 ml   Output 850 ml   Net 483.04 ml     No results found for this or any previous visit (from the past 24 hour(s)).  -----------------------------------------------------------------  RAD:  EKG:  Micro:     Assessment & Plan:    Patient Active Problem List:     Acquired hypothyroidism     Hypertension     Primary osteoarthritis of right knee     A-fib ([address]     SUNSHINE (acute kidney injury) ([address]     Acute encephalopathy     SIRS (systemic inflammatory response syndrome) (HCC)     Normocytic anemia     Pulmonary hypertension (HCC)     COPD (chronic obstructive pulmonary disease) ([address]     History of

## 2020-07-22 NOTE — CARE COORDINATION
[id number]         [facility]                                    Req/Control # [Problem retrieving Specimen ID]                                   Order Date:  2020  [id number]                                          Patient Information      Name:  Amelia García  :  1945  Age:  70 y.o. Address:  [address]   Zip:  [address]  PCP: WILLIE Rodriguez CNP Sex:  F  SSN: [id number]  Home Phone: [phone]  Work Phone:    Patient MRN:  [id number]    Alt Patient ID:  [id number]  PCP Phone: [phone]       Authorizing Provider Information       AUTHORIZING PROVIDER: WILLIE Haines CNP  Physician ID: [id number]  NPI:  [id number]  Site:   Address: [address] [place] [address]  Phone: [phone]  Fax: [phone]               EQUIPMENT ORDERED  DME Order for Home Oxygen as OP [APD098] (ORD   #:   [id number]) Priority  Routine Class  Hospital Performed        Associated Diagnosis:  Other emphysema ([facility] (J43.8 [ICD-10-CM])        Comments:    You must complete the order parameters below and add the medical necessity documentation for this DME in a separate note.     Stationary Oxygen Concentrator at 2 lpm via Nasal Cannula     Stationary Prescribed at:  Non Continuous while walking or exercise     Portable Gaseous O2 System and contents at 2 lpm via Nasal Cannula     To wear 10-18 hrs a day with activity     Diagnosis: acute hypoxic respiratory failure  Length of need: 12 Months            Scheduling Instructions:                                 Specimen Source             Collection Date    Collection Time    Order Status    Expected Date                Electronically Signed By  WILLIE Haines CNP Date  2020           Responsible Party John Haley-ActID   Relationship Account Type Home Phone   Napoleon Ring  [id number]* [facility]  [facility], [address] Self P/F [phone]   Employer   Work Phone PHART, PO2ART, [id number], [id number], BEART, [id number], [id number] in the last 72 hours. PT/INR:  No results found for: PTINR     ASSESSMENT / PLAN:     COPD - BD, wean steroids  Pulm HTN  CHF / fluid overload  SUNSHINE- improving   afib -  xopenex  NOX last night- about 3 mins of desat   + cough and mucous  Has nebs and inhalers at home  Patient was evaluated today for the diagnosis of COPD. I entered a DME order for home oxygen because the diagnosis and testing requires the patient to have supplemental oxygen. Condition will improve or be benefited by oxygen use. The patient is  able to perform good mobility in a home setting and therefore does require the use of a portable oxygen system.   The need for this equipment was discussed with the patient and she understands and is in agreement.     Plan of care discussed with Dr Yahaira Dong  Electronically signed by Papa Cuenca on 07/22/20 at 12:11 PM.                     Revision History

## 2020-07-22 NOTE — DISCHARGE INSTR - DIET

## 2020-07-22 NOTE — CARE COORDINATION
SIRS (systemic inflammatory response syndrome) ([facility]) R65.10    Normocytic anemia D64.9    Pulmonary hypertension ([facility]) I27.20    COPD (chronic obstructive pulmonary disease) ([facility]) J44.9    History of GI bleed Z87.19    Constipation K59.00    HA (generalized anxiety disorder) F41.1    Lumbar radiculopathy M54.16    Lumbosacral spondylosis without myelopathy M47.817    Benign hypertension with CKD (chronic kidney disease) stage III ([facility]) I12.9, N18.3    CKD (chronic kidney disease) stage 3, GFR 30-59 ml/min ([facility]) N18.3    Alcohol withdrawal syndrome with complication ([facility]) W73.000    Moderate major depression ([facility]) F32.1    PAD (peripheral artery disease) ([facility]) I73.9    Acute kidney injury ([facility]) N17.9    Obesity, Class I, BMI 30-34.9 E66.9       Isolation/Infection:   Isolation          No Isolation        Patient Infection Status     None to display          Nurse Assessment:  Last Vital Signs: /64   Pulse 80   Temp 97.7 °F (36.5 °C) (Oral)   Resp 18   Ht 5' 5\" (1.651 m)   Wt 189 lb (85.7 kg)   SpO2 97%   BMI 31.45 kg/m²     Last documented pain score (0-10 scale): Pain Level: 7  Last Weight:   Wt Readings from Last 1 Encounters:   07/19/20 189 lb (85.7 kg)     Mental Status:  oriented and alert    IV Access:  - None    Nursing Mobility/ADLs:  Walking   Assisted  Transfer  Assisted  Bathing  Assisted  Dressing  Assisted  Toileting  Assisted  Feeding  Independent  Med Admin  Independent  Med Delivery   whole    Wound Care Documentation and Therapy:  Incision 09/13/18 Knee Left (Active)   Number of days: 676        Elimination:  Continence:   · Bowel:  Yes  · Bladder: Yes  Urinary Catheter: None   Colostomy/Ileostomy/Ileal Conduit: No       Date of Last BM:     Intake/Output Summary (Last 24 hours) at 7/20/2020 1315  Last data filed at 7/20/2020 0522  Gross per 24 hour   Intake --   Output 450 ml   Net -450 ml     I/O last 3 completed shifts:  In: -   Out: 450 [Urine:450]    Safety Concerns: At Risk for Falls    Impairments/Disabilities:      None    Nutrition Therapy:  Current Nutrition Therapy:   - Oral Diet:  General , her dentures are not fitting well and fall out of her mouth,  Patient has been advised to call [address] at [phone] to get new impressions completed. Dental office as left patient a voice ail message today. Routes of Feeding: Oral  Liquids: No Restrictions  Daily Fluid Restriction: no  Last Modified Barium Swallow with Video (Video Swallowing Test): not done    Treatments at the Time of Hospital Discharge:   Respiratory Treatments:   Oxygen Therapy:  is on oxygen at 2 L/min per nasal cannula. Ventilator:    - No ventilator support    Rehab Therapies: Physical Therapy and Occupational Therapy  Weight Bearing Status/Restrictions: No weight bearing restirctions  Other Medical Equipment (for information only, NOT a DME order):  cane and walker  Other Treatments: Skilled Nursing Assessment and monitoring, Medication education    Patient's personal belongings (please select all that are sent with patient):  None    RN SIGNATURE:  Electronically signed by Stella Hatch RN on 7/22/20 at 4:35 PM EDT    CASE MANAGEMENT/SOCIAL WORK SECTION    Inpatient Status Date: 7/19/2020    Readmission Risk Assessment Score:  Readmission Risk              Risk of Unplanned Readmission:        22           Discharging to Facility/ [address] home health care  Phone [phone]  Fax [phone]      / signature: Electronically signed by Stella Hatch RN on 7/20/20 at 1:15 PM EDT    PHYSICIAN SECTION    Prognosis: Fair    Condition at Discharge: Stable    Rehab Potential (if transferring to Rehab):  Fair    Recommended Labs or Other Treatments After Discharge:     Physician Certification: I certify the above information and transfer of Mindy Vivar  is necessary for the continuing treatment of the diagnosis listed and that she requires Home Care for greater 30 days. Update Admission H&P: No change in H&P    PHYSICIAN SIGNATURE:  Electronically signed by Nikole Li MD on 7/22/20 at 4:17 PM EDT  Current Discharge Medication List     START taking these medications     Medication  Dose    buPROPion (WELLBUTRIN XL) 150 MG extended release tablet  150 mg    Take 1 tablet by mouth daily    Quantity: 30 tablet  Refills: 3        dilTIAZem (CARDIZEM CD) 180 MG extended release capsule  180 mg    Take 1 capsule by mouth daily    Quantity: 30 capsule  Refills: 3        tamsulosin (FLOMAX) 0.4 MG capsule  0.4 mg    Take 1 capsule by mouth daily    Quantity: 30 capsule  Refills: 3            CONTINUE these medications which have NOT CHANGED     Medication  Dose    clonazePAM (KLONOPIN) 0.5 MG tablet  0.5 mg    Take 1 tablet by mouth 3 times daily as needed for Anxiety for up to 30 days. Quantity: 90 tablet  Refills: 0        HYDROcodone-acetaminophen (NORCO) 5-325 MG per tablet     TAKE 1 TABLET BY MOUTH UP TO EVERY 8 HOURS AS NEEDED FOR PAIN (30-DAY SUPPLY)    Quantity: 90 tablet  Refills: 0        Comments: Reduce doses taken as pain becomes manageable       atorvastatin (LIPITOR) 10 MG tablet     TAKE 1 TABLET BY MOUTH ONCE DAILY    Quantity: 90 tablet  Refills: 1        FLOVENT  MCG/ACT inhaler     INHALE 2 PUFFS INTO THE LUNGS 2 TIMES DAILY    Quantity: 1 Inhaler  Refills: 5        STIOLTO RESPIMAT 2.5-2.5 MCG/ACT AERS     INHALE 2 PUFFS INTO THE LUNGS DAILY    Quantity: 12 g  Refills: 5        ELIQUIS 5 MG TABS tablet     TAKE ONE TABLET BY MOUTH TWICE A DAY    Quantity: 180 tablet  Refills: 3        PROAIR  (90 Base) MCG/ACT inhaler     INHALE 2 PUFFS BY MOUTH EVERY 6 HOURS AS NEEDED FOR WHEEZING    Quantity: 9 Inhaler  Refills: 1        Comments: Please consider 90 day supplies to promote better adherence       acetaminophen (TYLENOL) 500 MG tablet  500 mg    Take 500 mg by mouth every 6 hours as needed for Pain. fluticasone (FLONASE) 50 MCG/ACT nasal spray     USE ONE SPRAY TO EACH NOSTRIL ONCE ADAY    Quantity: 16 g  Refills: 2        !! Misc. Devices (ROLLATOR) MISC  1 each    1 each by Does not apply route daily    Quantity: 1 each  Refills: 0        Respiratory Therapy Supplies (NEBULIZER/TUBING/MOUTHPIECE) KIT     Use every 4-6 hours prn for copd    Quantity: 1 kit  Refills: 0        baclofen (LIORESAL) 10 MG tablet     TAKE ONE TABLET BY MOUTH THREE TIMES A DAY    Quantity: 270 tablet  Refills: 3        furosemide (LASIX) 20 MG tablet     TAKE ONE TABLET BY MOUTH ONCE A DAY AS NEEDED FOR SWELLING    Quantity: 90 tablet  Refills: 1        guaiFENesin (MUCINEX) 600 MG extended release tablet  600 mg    Take 1 tablet by mouth 2 times daily    Quantity: 60 tablet  Refills: 3        omeprazole (PRILOSEC) 40 MG delayed release capsule     TAKE 1 CAPSULE BY MOUTH EVERY MORNING BEFORE BREAKFAST    Quantity: 90 capsule  Refills: 1        Ascorbic Acid (C-1000 PO)     Take by mouth        levothyroxine (SYNTHROID) 50 MCG tablet  50 mcg    Take 1 tablet by mouth daily    Quantity: 90 tablet  Refills: 3        !! Misc. Devices (ROLLER Julian) MISC     With a seat    Quantity: 1 each  Refills: 0        metoprolol succinate (TOPROL XL) 25 MG extended release tablet  25 mg    Take 1 tablet by mouth daily    Quantity: 90 tablet  Refills: 1        ferrous sulfate 325 (65 Fe) MG tablet  325 mg    Take 325 mg by mouth 2 times daily        cromolyn (OPTICROM) 4 % ophthalmic solution  1 drop    Place 1 drop into both eyes 4 times daily    Quantity: 1 Bottle  Refills: 0        lidocaine-prilocaine (EMLA) 2.5-2.5 % cream     Apply topically as needed. Quantity: 150 g  Refills: 0        albuterol (PROVENTIL) (2.5 MG/3ML) 0.083% nebulizer solution  2.5 mg    Take 2.5 mg by nebulization every 4 hours as needed for Wheezing        loperamide (IMODIUM) 2 MG capsule  2 mg    Take 2 mg by mouth 2 times daily as needed for Diarrhea.          !! Potential duplicate medications found. Please discuss with provider.     STOP taking these previous medications     Medication  Dose  Reason for Stopping  Comments    (STOP TAKING) DULoxetine (CYMBALTA) 60 MG extended release capsule             (STOP TAKING) lisinopril-hydroCHLOROthiazide (PRINZIDE;ZESTORETIC) 20-25 MG per tablet

## 2020-07-22 NOTE — PLAN OF CARE
Problem: Falls - Risk of:  Goal: Will remain free from falls  Description: Will remain free from falls  Outcome: Ongoing  Note: Patient remained free from falls. Call light within reach. Problem: Skin Integrity:  Goal: Absence of new skin breakdown  Description: Absence of new skin breakdown  Outcome: Ongoing  Note: No new alterations in skin integrity.

## 2020-07-22 NOTE — CARE COORDINATION
Notified Harshad Dean from [place] for home oxygen order. Orders given to Harshad Dean and she will be delivering Portable oxygen tank to patients room for discharge.     Electronically signed by Mary Rider RN on 7/22/2020 at 1:32 PM

## 2020-07-22 NOTE — PROGRESS NOTES
PULMONARY PROGRESS NOTE:      Interval History:Copd, pulm HTN, dyspnea    Shortness of Breath: +  Cough: yes - trouble with phlegm losening and getting coughed up  Sputum: +  Hemoptysis: no  Chest Pain: no  Fever: no                   Swelling Feet: yes, mild   Headache: no                                           Nausea, Emesis, Abdominal Pain: no  Diarrhea: no         Constipation: no    Events since last visit: qualifies for 02 with activity  Wants to go home today as she wont have transportation tomorrow  NOX did not have any significant desats- about 3 mins.      PAST MEDICAL HISTORY:      Scheduled Meds:   dilTIAZem  180 mg Oral Daily    metoprolol succinate  50 mg Oral Daily    buPROPion  150 mg Oral Daily    atorvastatin  10 mg Oral Daily    baclofen  10 mg Oral TID    apixaban  2.5 mg Oral BID    ferrous sulfate  325 mg Oral BID    fluticasone  1 spray Each Nostril Daily    guaiFENesin  600 mg Oral BID    levothyroxine  50 mcg Oral Daily    pantoprazole  40 mg Oral QAM AC    glycopyrrolate-formoterol  2 puff Inhalation BID    sodium chloride flush  10 mL Intravenous 2 times per day    levalbuterol  0.63 mg Nebulization Q6H WA    fluticasone  4 puff Inhalation BID    tamsulosin  0.4 mg Oral Daily     Continuous Infusions:   sodium chloride 30 mL/hr at 07/21/20 2214     PRN Meds:albuterol, clonazePAM, HYDROcodone-acetaminophen, sodium chloride flush, acetaminophen **OR** acetaminophen, polyethylene glycol, promethazine **OR** ondansetron        PHYSICAL EXAMINATION:  /63   Pulse 94   Temp 98.4 °F (36.9 °C) (Oral)   Resp 20   Ht 5' 5\" (1.651 m)   Wt 190 lb 4.1 oz (86.3 kg)   SpO2 97%   BMI 31.66 kg/m²   afebrile  General : Awake, alert, oriented to time, place, and person, NAD   Neck - supple, no lymphadenopathy, JVD not raised  Heart - A fib  Lungs - Air Entry- fair bilaterally; breath sounds : wheezing, 97% on 1 l  Abdomen - soft, no tenderness  Upper Extremities  - no

## 2020-07-23 ENCOUNTER — CARE COORDINATION (OUTPATIENT)
Dept: CASE MANAGEMENT | Age: 75
End: 2020-07-23

## 2020-07-23 PROCEDURE — 1111F DSCHRG MED/CURRENT MED MERGE: CPT | Performed by: NURSE PRACTITIONER

## 2020-07-23 NOTE — CARE COORDINATION
[facility] Transitions Initial Follow Up Call    Call within 2 business days of discharge: Yes    Patient: Dre Jeong Patient : 1945   MRN: [id number]  Reason for Admission: -2020 [facility] SUNSHINE  Discharge Date: 20 RARS: Readmission Risk Score: 25  NR  Dr Masood Allen  1:00 canceled by pt. Pt advised to reschedule, v/u. PCP VV  10:45. No CV-19 lab performed this admission. [facility] 2020  Med rec/1111F order completed. Patient contacted regarding [facility]. Discussed COVID-19 related testing which was not done at this time. Test results were not done. Patient informed of results, if available? NA Reports her son was exposed to positive CV-19 person and son tested negative for CV-19 . Care Transition Nurse/ Ambulatory Care Manager contacted the patient by telephone to perform post discharge assessment. Verified name and  with patient as identifiers. Provided introduction to self, and explanation of the CTN/ACM role, and reason for call due to risk factors for infection and/or exposure to COVID-19. Symptoms reviewed with patient who verbalized the following symptoms: Pt reports no elimination concerns and denies urinary pain/urgency. Has c/o nasal congestion and \"shooting bullets out of my nose\". Reports mucus clear but copious. Denies any facial pain. Reports feeling \"fluid in my ears\" and feeling occasionally dizzy. Denies any hearing loss. Has wheezy cough and using neb aerosols. Admits exertional SOB. Sleeping on 2 pillows. Is unable to tolerate laying on back and sleeping on side. Has new O2 and states tolerating well. States she fell at home tripping over her hospital socks. Denies injury or new acute pain. States her home is cluttered and strongly advised to clear walk areas for home safety. Advised on symptoms of sinus infection to report. Pt unable to eat well d/t missing teeth and pending getting dentures.  Has freg gag responses when eating. Due to worsening symptoms encounter was routed to provider for escalation. Discussed follow-up appointments. If no appointment was previously scheduled, appointment scheduling offered: [address] follow up appointment(s):   Future Appointments   Date Time Provider Christian Gutiérrez   7/23/2020  1:00 PM Hector Lucero MD Resp Spec [facility]   8/3/2020 11:30 AM Herrera Carter, APRN - [address]     [id number] [place] Lucero follow up appointment(s):      Advance Care Planning:   Does patient have an Advance Directive:  not on file. Patient has following risk factors of: p smoker, AF, SIRS, COPD, CKD, CHF. . CTN/ACM reviewed discharge instructions, medical action plan and red flags such as increased shortness of breath, increasing fever and signs of decompensation with patient who verbalized understanding. Discussed exposure protocols and quarantine with CDC Guidelines What to do if you are sick with coronavirus disease 2019.  Patient was given an opportunity for questions and concerns. The patient agrees to contact the [facility] exposure line [phone], local health department [facility] Department of Health: ([phone]) and PCP office for questions related to their healthcare. CTN/ACM provided contact information for future needs. Reviewed and educated patient on any new and changed medications related to discharge diagnosis     Patient/family/caregiver given information for GetWell Loop and agrees to enroll yes  Patient's preferred e-mail:    Patient's preferred phone number:   Based on Loop alert triggers, patient will be contacted by nurse care manager for worsening symptoms. Pt will be further monitored by COVID Loop Team based on severity of symptoms and risk factors. Advance Care Planning  People with COVID-19 may have no symptoms, mild symptoms, such as fever, cough, and shortness of breath or they may have more severe illness, developing severe and fatal pneumonia.   As a result, Advance Care Planning with attention to naming a health care decision maker (someone you trust to make healthcare decisions for you if you could not speak for yourself) and sharing other health care preferences is important BEFORE a possible health crisis. Please contact your Primary Care Provider to discuss Advance Care Planning. Preventing the Spread of Coronavirus Disease 2019 in Homes and Residential Communities  For the most recent information go to [email or web address]    Prevention steps for People with confirmed or suspected COVID-19 (including persons under investigation) who do not need to be hospitalized  and   People with confirmed COVID-19 who were hospitalized and determined to be medically stable to go home    Your healthcare provider and public health staff will evaluate whether you can be cared for at home. If it is determined that you do not need to be hospitalized and can be isolated at home, you will be monitored by staff from your local or state health department. You should follow the prevention steps below until a healthcare provider or local or state health department says you can return to your normal activities. Stay home except to get medical care  People who are mildly ill with COVID-19 are able to isolate at home during their illness. You should restrict activities outside your home, except for getting medical care. Do not go to work, school, or public areas. Avoid using public transportation, ride-sharing, or taxis. Separate yourself from other people and animals in your home  People: As much as possible, you should stay in a specific room and away from other people in your home. Also, you should use a separate bathroom, if available. Animals: You should restrict contact with pets and other animals while you are sick with COVID-19, just like you would around other people.  Although there have not been reports of pets or other animals becoming sick with COVID-19, it is still recommended that people sick with COVID-19 limit contact with animals until more information is known about the virus. When possible, have another member of your household care for your animals while you are sick. If you are sick with COVID-19, avoid contact with your pet, including petting, snuggling, being kissed or licked, and sharing food. If you must care for your pet or be around animals while you are sick, wash your hands before and after you interact with pets and wear a facemask. Call ahead before visiting your doctor  If you have a medical appointment, call the healthcare provider and tell them that you have or may have COVID-19. This will help the healthcare providers office take steps to keep other people from getting infected or exposed. Wear a facemask  You should wear a facemask when you are around other people (e.g., sharing a room or vehicle) or pets and before you enter a healthcare providers office. If you are not able to wear a facemask (for example, because it causes trouble breathing), then people who live with you should not stay in the same room with you, or they should wear a facemask if they enter your room. Cover your coughs and sneezes  Cover your mouth and nose with a tissue when you cough or sneeze. Throw used tissues in a lined trash can. Immediately wash your hands with soap and water for at least 20 seconds or, if soap and water are not available, clean your hands with an alcohol-based hand  that contains at least 60% alcohol. Clean your hands often  Wash your hands often with soap and water for at least 20 seconds, especially after blowing your nose, coughing, or sneezing; going to the bathroom; and before eating or preparing food.  If soap and water are not readily available, use an alcohol-based hand  with at least 60% alcohol, covering all surfaces of your hands and rubbing them together until they feel dry.  Soap and water are the best option if hands are visibly dirty. Avoid touching your eyes, nose, and mouth with unwashed hands. Avoid sharing personal household items  You should not share dishes, drinking glasses, cups, eating utensils, towels, or bedding with other people or pets in your home. After using these items, they should be washed thoroughly with soap and water. Clean all high-touch surfaces everyday  High touch surfaces include counters, tabletops, doorknobs, bathroom fixtures, toilets, phones, keyboards, tablets, and bedside tables. Also, clean any surfaces that may have blood, stool, or body fluids on them. Use a household cleaning spray or wipe, according to the label instructions. Labels contain instructions for safe and effective use of the cleaning product including precautions you should take when applying the product, such as wearing gloves and making sure you have good ventilation during use of the product. Monitor your symptoms  Seek prompt medical attention if your illness is worsening (e.g., difficulty breathing). Before seeking care, call your healthcare provider and tell them that you have, or are being evaluated for, COVID-19. Put on a facemask before you enter the facility. These steps will help the healthcare providers office to keep other people in the office or waiting room from getting infected or exposed. Ask your healthcare provider to call the local or state health department. Persons who are placed under active monitoring or facilitated self-monitoring should follow instructions provided by their local health department or occupational health professionals, as appropriate. When working with your local health department check their available hours. If you have a medical emergency and need to call 911, notify the dispatch personnel that you have, or are being evaluated for COVID-19. If possible, put on a facemask before emergency medical services arrive.   Discontinuing home isolation  Patients with confirmed COVID-19 should remain under home isolation precautions until the risk of secondary transmission to others is thought to be low. The decision to discontinue home isolation precautions should be made on a case-by-case basis, in consultation with healthcare providers and state and local health departments.

## 2020-07-24 NOTE — DISCHARGE SUMMARY
[facility] Discharge Summary      Patient ID: Letty Ramírez    MRN: [id number]     Acct:  [de-identified]       Patient's PCP: WILLIE Santizo CNP    Admit Date: 7/19/2020     Discharge Date: 7/22/2020      Admitting Physician: Regan Coombs MD    Discharge Physician: Regan Coombs MD     Discharge Diagnoses:    Primary Problem  Acute kidney injury [facility])    C/Betito Goodson [address] Problems    Diagnosis Date Noted    Acute on chronic diastolic CHF (congestive heart failure) ([facility] [address] [I50.33] 07/21/2020    Acute kidney injury ([facility] [address] [N17.9] 07/20/2020    Obesity, Class I, BMI 30-34.9 [E66.9] 07/20/2020    Moderate major depression ([facility] [address] [F32.1] 06/17/2020    PAD (peripheral artery disease) ([facility] [address] [I73.9] 06/17/2020    Benign hypertension with CKD (chronic kidney disease) stage III ([facility] [address] [I12.9, N18.3]     CKD (chronic kidney disease) stage 3, GFR 30-59 ml/min (HCC) [N18.3]     Lumbosacral spondylosis without myelopathy [M47.817]     Lumbar radiculopathy [M54.16]     HA (generalized anxiety disorder) [F41.1] 12/13/2018    A-fib ([facility] [address] [I48.91] 09/13/2018    COPD (chronic obstructive pulmonary disease) ([facility] [address] [J44.9] 09/13/2018    Pulmonary hypertension ([facility] [address] [I27.20] 09/13/2018    Acquired hypothyroidism [E03.9] 01/18/2013     Past Medical History:   Diagnosis Date    A-fib ([facility] [address]     Acquired hypothyroidism     Acute encephalopathy     Acute kidney injury ([facility] [address]     Anxiety     Benign hypertension with CKD (chronic kidney disease) stage III (HCC)     Chronic back pain     CKD (chronic kidney disease) stage 3, GFR 30-59 ml/min (HCC)     Constipation     COPD (chronic obstructive pulmonary disease) ([address]     Cough     Depression     ETOH abuse     Former smoker     3 packs a day for 26 years    HA (generalized anxiety disorder)     History of GI bleed     Hyperlipidemia     Hypertension     Hypothyroid 1/18/2013    Leg pain     Normocytic anemia     Osteoarthritis     PAD (peripheral artery disease) ([facility] [address]     Primary osteoarthritis     Pulmonary hypertension (HCC)     Pure hypercholesterolemia     SIRS (systemic inflammatory response syndrome) (HCC)     Urge incontinence     Wheezing      The patient was seen and examined on day of discharge and this discharge summary is in conjunction with any daily progress note from day of discharge. Code Status:  Prior    Hospital Course: uncomplicated    Consults:  cardiology, pulmonary/intensive care and nephrology    Significant Diagnostic Studies: as above, and as follows: see chart    Treatments: as above    Disposition: home    Discharged Condition: Stable    Follow Up:  WILLIE Ramsey CNP in one week    Discharge Medications:    Josiane Do Juliet\"   Home Medication Instructions [id number]    Printed on:07/24/20 1925   Medication Information                      acetaminophen (TYLENOL) 500 MG tablet  Take 500 mg by mouth every 6 hours as needed for Pain. albuterol (PROVENTIL) (2.5 MG/3ML) 0.083% nebulizer solution  Take 2.5 mg by nebulization every 4 hours as needed for Wheezing             Ascorbic Acid (C-1000 PO)  Take 1 capsule by mouth daily              atorvastatin (LIPITOR) 10 MG tablet  TAKE 1 TABLET BY MOUTH ONCE DAILY             baclofen (LIORESAL) 10 MG tablet  TAKE ONE TABLET BY MOUTH THREE TIMES A DAY             buPROPion (WELLBUTRIN XL) 150 MG extended release tablet  Take 1 tablet by mouth daily             clonazePAM (KLONOPIN) 0.5 MG tablet  Take 1 tablet by mouth 3 times daily as needed for Anxiety for up to 30 days.              cromolyn (OPTICROM) 4 % ophthalmic solution  Place 1 drop into both eyes 4 times daily             dilTIAZem (CARDIZEM CD) 180 MG extended release capsule  Take 1 capsule by mouth daily             ELIQUIS 5 MG TABS tablet  TAKE ONE TABLET BY MOUTH TWICE A DAY             ferrous sulfate 325 (65 Fe) MG tablet  Take 325 mg by mouth 2 times daily             FLOVENT  MCG/ACT inhaler  INHALE 2 PUFFS INTO THE LUNGS 2 TIMES DAILY             fluticasone (FLONASE) 50 MCG/ACT nasal spray  USE ONE SPRAY TO EACH NOSTRIL ONCE ADAY             furosemide (LASIX) 20 MG tablet  TAKE ONE TABLET BY MOUTH ONCE A DAY AS NEEDED FOR SWELLING             guaiFENesin (MUCINEX) 600 MG extended release tablet  Take 1 tablet by mouth 2 times daily             HYDROcodone-acetaminophen (NORCO) 5-325 MG per tablet  TAKE 1 TABLET BY MOUTH UP TO EVERY 8 HOURS AS NEEDED FOR PAIN (30-DAY SUPPLY)             levothyroxine (SYNTHROID) 50 MCG tablet  Take 1 tablet by mouth daily             lidocaine-prilocaine (EMLA) 2.5-2.5 % cream  Apply topically as needed. loperamide (IMODIUM) 2 MG capsule  Take 2 mg by mouth 2 times daily as needed for Diarrhea.             metoprolol succinate (TOPROL XL) 25 MG extended release tablet  Take 1 tablet by mouth daily             Misc. Devices (ROLLATOR) MISC  1 each by Does not apply route daily             Misc. Devices (ROLLER WALKER) MISC  With a seat             omeprazole (PRILOSEC) 40 MG delayed release capsule  TAKE 1 CAPSULE BY MOUTH EVERY MORNING BEFORE BREAKFAST             PROAIR  (90 Base) MCG/ACT inhaler  INHALE 2 PUFFS BY MOUTH EVERY 6 HOURS AS NEEDED FOR WHEEZING             Respiratory Therapy Supplies (NEBULIZER/TUBING/MOUTHPIECE) KIT  Use every 4-6 hours prn for copd             STIOLTO RESPIMAT 2.5-2.5 MCG/ACT AERS  INHALE 2 PUFFS INTO THE LUNGS DAILY             tamsulosin (FLOMAX) 0.4 MG capsule  Take 1 capsule by mouth daily                  Activity: activity as tolerated    Diet: cardiac diet    Time Spent on discharge is more than 45 minutes in the examination, evaluation, counseling and review of medications and discharge plan.       Electronically signed by Porsha Mace MD on 7/24/2020 at 7:25 PM

## 2020-08-20 ENCOUNTER — HOSPITAL ENCOUNTER (OUTPATIENT)
Age: 75
Setting detail: SPECIMEN
Discharge: HOME | End: 2020-08-20
Payer: MEDICARE

## 2020-08-20 LAB
ABSOLUTE EOS #: 0.08 K/UL (ref 0–0.4)
ABSOLUTE IMMATURE GRANULOCYTE: 0 K/UL (ref 0–0.3)
ABSOLUTE LYMPH #: 0.88 K/UL (ref 1–4.8)
ABSOLUTE MONO #: 0.8 K/UL (ref 0.1–0.8)
ALBUMIN SERPL-MCNC: 3.5 G/DL (ref 3.5–5.2)
ALBUMIN/GLOBULIN RATIO: 1.3 (ref 1–2.5)
ALP BLD-CCNC: 107 U/L (ref 35–104)
ALT SERPL-CCNC: 18 U/L (ref 5–33)
ANION GAP SERPL CALCULATED.3IONS-SCNC: 14 MMOL/L (ref 9–17)
ANION GAP SERPL CALCULATED.3IONS-SCNC: 15 MMOL/L (ref 9–17)
AST SERPL-CCNC: 28 U/L
BASOPHILS # BLD: 0 % (ref 0–2)
BASOPHILS ABSOLUTE: 0 K/UL (ref 0–0.2)
BILIRUB SERPL-MCNC: 0.66 MG/DL (ref 0.3–1.2)
BNP INTERPRETATION: ABNORMAL
BUN BLDV-MCNC: 27 MG/DL (ref 8–23)
BUN BLDV-MCNC: 27 MG/DL (ref 8–23)
BUN/CREAT BLD: ABNORMAL (ref 9–20)
BUN/CREAT BLD: ABNORMAL (ref 9–20)
CALCIUM SERPL-MCNC: 8.7 MG/DL (ref 8.6–10.4)
CALCIUM SERPL-MCNC: 9 MG/DL (ref 8.6–10.4)
CHLORIDE BLD-SCNC: 98 MMOL/L (ref 98–107)
CHLORIDE BLD-SCNC: 98 MMOL/L (ref 98–107)
CO2: 31 MMOL/L (ref 20–31)
CO2: 31 MMOL/L (ref 20–31)
CREAT SERPL-MCNC: 1.37 MG/DL (ref 0.5–0.9)
CREAT SERPL-MCNC: 1.37 MG/DL (ref 0.5–0.9)
DIFFERENTIAL TYPE: ABNORMAL
EOSINOPHILS RELATIVE PERCENT: 1 % (ref 1–4)
GFR AFRICAN AMERICAN: 46 ML/MIN
GFR AFRICAN AMERICAN: 46 ML/MIN
GFR NON-AFRICAN AMERICAN: 38 ML/MIN
GFR NON-AFRICAN AMERICAN: 38 ML/MIN
GFR SERPL CREATININE-BSD FRML MDRD: ABNORMAL ML/MIN/{1.73_M2}
GLUCOSE BLD-MCNC: 87 MG/DL (ref 70–99)
GLUCOSE BLD-MCNC: 88 MG/DL (ref 70–99)
HCT VFR BLD CALC: 29.6 % (ref 36.3–47.1)
HEMOGLOBIN: 8.2 G/DL (ref 11.9–15.1)
IMMATURE GRANULOCYTES: 0 %
LYMPHOCYTES # BLD: 11 % (ref 24–44)
MCH RBC QN AUTO: 28.7 PG (ref 25.2–33.5)
MCHC RBC AUTO-ENTMCNC: 27.7 G/DL (ref 28.4–34.8)
MCV RBC AUTO: 103.5 FL (ref 82.6–102.9)
MONOCYTES # BLD: 10 % (ref 1–7)
MORPHOLOGY: ABNORMAL
NRBC AUTOMATED: 0 PER 100 WBC
PDW BLD-RTO: 18.3 % (ref 11.8–14.4)
PLATELET # BLD: 381 K/UL (ref 138–453)
PLATELET ESTIMATE: ABNORMAL
PMV BLD AUTO: 10 FL (ref 8.1–13.5)
POTASSIUM SERPL-SCNC: 3.8 MMOL/L (ref 3.7–5.3)
POTASSIUM SERPL-SCNC: 3.8 MMOL/L (ref 3.7–5.3)
PRO-BNP: 4313 PG/ML
RBC # BLD: 2.86 M/UL (ref 3.95–5.11)
RBC # BLD: ABNORMAL 10*6/UL
SEG NEUTROPHILS: 78 % (ref 36–66)
SEGMENTED NEUTROPHILS ABSOLUTE COUNT: 6.24 K/UL (ref 1.8–7.7)
SODIUM BLD-SCNC: 143 MMOL/L (ref 135–144)
SODIUM BLD-SCNC: 144 MMOL/L (ref 135–144)
TOTAL PROTEIN: 6.2 G/DL (ref 6.4–8.3)
WBC # BLD: 8 K/UL (ref 3.5–11.3)
WBC # BLD: ABNORMAL 10*3/UL

## 2020-09-09 ENCOUNTER — HOSPITAL ENCOUNTER (OUTPATIENT)
Age: 75
Setting detail: SPECIMEN
Discharge: HOME | End: 2020-09-09
Payer: MEDICARE

## 2020-09-09 LAB
ABSOLUTE EOS #: 0 K/UL (ref 0–0.4)
ABSOLUTE IMMATURE GRANULOCYTE: 0 K/UL (ref 0–0.3)
ABSOLUTE LYMPH #: 1.01 K/UL (ref 1–4.8)
ABSOLUTE MONO #: 0.31 K/UL (ref 0.1–0.8)
BASOPHILS # BLD: 1 % (ref 0–2)
BASOPHILS ABSOLUTE: 0.08 K/UL (ref 0–0.2)
DIFFERENTIAL TYPE: ABNORMAL
EOSINOPHILS RELATIVE PERCENT: 0 % (ref 1–4)
HCT VFR BLD CALC: 32.6 % (ref 36.3–47.1)
HEMOGLOBIN: 8.4 G/DL (ref 11.9–15.1)
IMMATURE GRANULOCYTES: 0 %
LYMPHOCYTES # BLD: 13 % (ref 24–44)
MCH RBC QN AUTO: 29.2 PG (ref 25.2–33.5)
MCHC RBC AUTO-ENTMCNC: 25.8 G/DL (ref 28.4–34.8)
MCV RBC AUTO: 113.2 FL (ref 82.6–102.9)
MONOCYTES # BLD: 4 % (ref 1–7)
MORPHOLOGY: ABNORMAL
NRBC AUTOMATED: 0 PER 100 WBC
PDW BLD-RTO: 19.5 % (ref 11.8–14.4)
PLATELET # BLD: 421 K/UL (ref 138–453)
PLATELET ESTIMATE: ABNORMAL
PMV BLD AUTO: 10.3 FL (ref 8.1–13.5)
RBC # BLD: 2.88 M/UL (ref 3.95–5.11)
RBC # BLD: ABNORMAL 10*6/UL
SEG NEUTROPHILS: 82 % (ref 36–66)
SEGMENTED NEUTROPHILS ABSOLUTE COUNT: 6.4 K/UL (ref 1.8–7.7)
WBC # BLD: 7.8 K/UL (ref 3.5–11.3)
WBC # BLD: ABNORMAL 10*3/UL

## 2020-09-10 ENCOUNTER — APPOINTMENT (OUTPATIENT)
Dept: GENERAL RADIOLOGY | Age: 75
DRG: 683 | End: 2020-09-10
Payer: MEDICARE

## 2020-09-10 ENCOUNTER — APPOINTMENT (OUTPATIENT)
Dept: CT IMAGING | Age: 75
DRG: 683 | End: 2020-09-10
Payer: MEDICARE

## 2020-09-10 ENCOUNTER — HOSPITAL ENCOUNTER (INPATIENT)
Age: 75
LOS: 5 days | Discharge: NURSING, REHAB OR LONG-TERM CARE | DRG: 683 | End: 2020-09-15
Attending: EMERGENCY MEDICINE | Admitting: FAMILY MEDICINE
Payer: MEDICARE

## 2020-09-10 LAB
-: ABNORMAL
ALBUMIN SERPL-MCNC: 3.3 G/DL (ref 3.5–5.2)
ALBUMIN/GLOBULIN RATIO: 1.2 (ref 1–2.5)
ALP BLD-CCNC: 109 U/L (ref 35–104)
ALT SERPL-CCNC: 21 U/L (ref 5–33)
AMORPHOUS: ABNORMAL
ANION GAP SERPL CALCULATED.3IONS-SCNC: 17 MMOL/L (ref 9–17)
ANION GAP SERPL CALCULATED.3IONS-SCNC: 17 MMOL/L (ref 9–17)
AST SERPL-CCNC: 23 U/L
BACTERIA: ABNORMAL
BILIRUB SERPL-MCNC: 0.69 MG/DL (ref 0.3–1.2)
BILIRUBIN URINE: ABNORMAL
BNP INTERPRETATION: ABNORMAL
BUN BLDV-MCNC: 21 MG/DL (ref 8–23)
BUN BLDV-MCNC: 28 MG/DL (ref 8–23)
BUN/CREAT BLD: ABNORMAL (ref 9–20)
BUN/CREAT BLD: ABNORMAL (ref 9–20)
CALCIUM SERPL-MCNC: 8.9 MG/DL (ref 8.6–10.4)
CALCIUM SERPL-MCNC: 8.9 MG/DL (ref 8.6–10.4)
CASTS UA: ABNORMAL /LPF
CASTS UA: ABNORMAL /LPF
CHLORIDE BLD-SCNC: 98 MMOL/L (ref 98–107)
CHLORIDE BLD-SCNC: 99 MMOL/L (ref 98–107)
CO2: 25 MMOL/L (ref 20–31)
CO2: 25 MMOL/L (ref 20–31)
COLOR: YELLOW
COMMENT UA: ABNORMAL
CREAT SERPL-MCNC: 1.79 MG/DL (ref 0.5–0.9)
CREAT SERPL-MCNC: 2.06 MG/DL (ref 0.5–0.9)
CREATININE URINE: 168.2 MG/DL (ref 28–217)
CRYSTALS, UA: ABNORMAL /HPF
EPITHELIAL CELLS UA: ABNORMAL /HPF
GFR AFRICAN AMERICAN: 29 ML/MIN
GFR AFRICAN AMERICAN: 34 ML/MIN
GFR NON-AFRICAN AMERICAN: 24 ML/MIN
GFR NON-AFRICAN AMERICAN: 28 ML/MIN
GFR SERPL CREATININE-BSD FRML MDRD: ABNORMAL ML/MIN/{1.73_M2}
GLUCOSE BLD-MCNC: 96 MG/DL (ref 70–99)
GLUCOSE BLD-MCNC: 99 MG/DL (ref 70–99)
GLUCOSE URINE: NEGATIVE
IRON SATURATION: 27 % (ref 20–55)
IRON: 72 UG/DL (ref 37–145)
KETONES, URINE: NEGATIVE
LEUKOCYTE ESTERASE, URINE: ABNORMAL
MUCUS: ABNORMAL
NITRITE, URINE: NEGATIVE
OTHER OBSERVATIONS UA: ABNORMAL
PH UA: 5.5 (ref 5–8)
POTASSIUM SERPL-SCNC: 3.4 MMOL/L (ref 3.7–5.3)
POTASSIUM SERPL-SCNC: 4 MMOL/L (ref 3.7–5.3)
PRO-BNP: 6047 PG/ML
PROTEIN UA: ABNORMAL
RBC UA: ABNORMAL /HPF
RENAL EPITHELIAL, UA: ABNORMAL /HPF
SODIUM BLD-SCNC: 140 MMOL/L (ref 135–144)
SODIUM BLD-SCNC: 141 MMOL/L (ref 135–144)
SODIUM,UR: 27 MMOL/L
SPECIFIC GRAVITY UA: 1.02 (ref 1–1.03)
THYROXINE, FREE: 1.39 NG/DL (ref 0.93–1.7)
TOTAL IRON BINDING CAPACITY: 269 UG/DL (ref 250–450)
TOTAL PROTEIN: 6.1 G/DL (ref 6.4–8.3)
TRICHOMONAS: ABNORMAL
TROPONIN INTERP: ABNORMAL
TROPONIN T: ABNORMAL NG/ML
TROPONIN, HIGH SENSITIVITY: 43 NG/L (ref 0–14)
TSH SERPL DL<=0.05 MIU/L-ACNC: 8.2 MIU/L (ref 0.3–5)
TURBIDITY: CLEAR
UNSATURATED IRON BINDING CAPACITY: 197 UG/DL (ref 112–347)
UREA NITROGEN, UR: 352 MG/DL
URINE HGB: NEGATIVE
UROBILINOGEN, URINE: NORMAL
WBC UA: ABNORMAL /HPF
YEAST: ABNORMAL

## 2020-09-10 PROCEDURE — 71045 X-RAY EXAM CHEST 1 VIEW: CPT

## 2020-09-10 PROCEDURE — 2580000003 HC RX 258: Performed by: EMERGENCY MEDICINE

## 2020-09-10 PROCEDURE — 2060000000 HC ICU INTERMEDIATE R&B

## 2020-09-10 PROCEDURE — 93005 ELECTROCARDIOGRAM TRACING: CPT | Performed by: EMERGENCY MEDICINE

## 2020-09-10 PROCEDURE — 99284 EMERGENCY DEPT VISIT MOD MDM: CPT

## 2020-09-10 PROCEDURE — G0378 HOSPITAL OBSERVATION PER HR: HCPCS

## 2020-09-10 PROCEDURE — 80048 BASIC METABOLIC PNL TOTAL CA: CPT

## 2020-09-10 PROCEDURE — 84300 ASSAY OF URINE SODIUM: CPT

## 2020-09-10 PROCEDURE — 6370000000 HC RX 637 (ALT 250 FOR IP): Performed by: FAMILY MEDICINE

## 2020-09-10 PROCEDURE — 84540 ASSAY OF URINE/UREA-N: CPT

## 2020-09-10 PROCEDURE — 2580000003 HC RX 258: Performed by: FAMILY MEDICINE

## 2020-09-10 PROCEDURE — 81001 URINALYSIS AUTO W/SCOPE: CPT

## 2020-09-10 PROCEDURE — 70450 CT HEAD/BRAIN W/O DYE: CPT

## 2020-09-10 PROCEDURE — 82570 ASSAY OF URINE CREATININE: CPT

## 2020-09-10 PROCEDURE — 84484 ASSAY OF TROPONIN QUANT: CPT

## 2020-09-10 PROCEDURE — 51798 US URINE CAPACITY MEASURE: CPT

## 2020-09-10 PROCEDURE — 36415 COLL VENOUS BLD VENIPUNCTURE: CPT

## 2020-09-10 RX ORDER — BACLOFEN 10 MG/1
5 TABLET ORAL 3 TIMES DAILY PRN
Status: DISCONTINUED | OUTPATIENT
Start: 2020-09-10 | End: 2020-09-15 | Stop reason: HOSPADM

## 2020-09-10 RX ORDER — CIPROFLOXACIN 500 MG/1
500 TABLET, FILM COATED ORAL
Status: DISCONTINUED | OUTPATIENT
Start: 2020-09-10 | End: 2020-09-14

## 2020-09-10 RX ORDER — METOPROLOL SUCCINATE 25 MG/1
25 TABLET, EXTENDED RELEASE ORAL DAILY
Status: DISCONTINUED | OUTPATIENT
Start: 2020-09-10 | End: 2020-09-15 | Stop reason: HOSPADM

## 2020-09-10 RX ORDER — BUPROPION HYDROCHLORIDE 150 MG/1
150 TABLET ORAL DAILY
Status: DISCONTINUED | OUTPATIENT
Start: 2020-09-10 | End: 2020-09-15 | Stop reason: HOSPADM

## 2020-09-10 RX ORDER — LEVOTHYROXINE SODIUM 0.05 MG/1
50 TABLET ORAL DAILY
Status: DISCONTINUED | OUTPATIENT
Start: 2020-09-10 | End: 2020-09-15 | Stop reason: HOSPADM

## 2020-09-10 RX ORDER — LIDOCAINE AND PRILOCAINE 25; 25 MG/G; MG/G
CREAM TOPICAL PRN
Status: DISCONTINUED | OUTPATIENT
Start: 2020-09-10 | End: 2020-09-15 | Stop reason: HOSPADM

## 2020-09-10 RX ORDER — DILTIAZEM HYDROCHLORIDE 180 MG/1
180 CAPSULE, COATED, EXTENDED RELEASE ORAL DAILY
Status: DISCONTINUED | OUTPATIENT
Start: 2020-09-10 | End: 2020-09-15 | Stop reason: HOSPADM

## 2020-09-10 RX ORDER — GUAIFENESIN 600 MG/1
600 TABLET, EXTENDED RELEASE ORAL 2 TIMES DAILY
Status: DISCONTINUED | OUTPATIENT
Start: 2020-09-10 | End: 2020-09-15 | Stop reason: HOSPADM

## 2020-09-10 RX ORDER — FUROSEMIDE 40 MG/1
40 TABLET ORAL DAILY
Status: DISCONTINUED | OUTPATIENT
Start: 2020-09-10 | End: 2020-09-10

## 2020-09-10 RX ORDER — ALBUTEROL SULFATE 2.5 MG/3ML
2.5 SOLUTION RESPIRATORY (INHALATION) EVERY 6 HOURS PRN
Status: DISCONTINUED | OUTPATIENT
Start: 2020-09-10 | End: 2020-09-10 | Stop reason: SDUPTHER

## 2020-09-10 RX ORDER — FUROSEMIDE 40 MG/1
40 TABLET ORAL DAILY
COMMUNITY
End: 2020-10-06 | Stop reason: SDUPTHER

## 2020-09-10 RX ORDER — ATORVASTATIN CALCIUM 10 MG/1
10 TABLET, FILM COATED ORAL DAILY
Status: DISCONTINUED | OUTPATIENT
Start: 2020-09-10 | End: 2020-09-15 | Stop reason: HOSPADM

## 2020-09-10 RX ORDER — ASCORBIC ACID 500 MG
1000 TABLET ORAL DAILY
Status: DISCONTINUED | OUTPATIENT
Start: 2020-09-10 | End: 2020-09-15 | Stop reason: HOSPADM

## 2020-09-10 RX ORDER — TAMSULOSIN HYDROCHLORIDE 0.4 MG/1
0.4 CAPSULE ORAL DAILY
Status: DISCONTINUED | OUTPATIENT
Start: 2020-09-10 | End: 2020-09-15 | Stop reason: HOSPADM

## 2020-09-10 RX ORDER — ACETAMINOPHEN 325 MG/1
650 TABLET ORAL EVERY 4 HOURS PRN
Status: DISCONTINUED | OUTPATIENT
Start: 2020-09-10 | End: 2020-09-14 | Stop reason: SDUPTHER

## 2020-09-10 RX ORDER — FERROUS SULFATE 325(65) MG
325 TABLET ORAL DAILY
Status: DISCONTINUED | OUTPATIENT
Start: 2020-09-10 | End: 2020-09-15 | Stop reason: HOSPADM

## 2020-09-10 RX ORDER — CROMOLYN SODIUM 40 MG/ML
1 SOLUTION/ DROPS OPHTHALMIC 4 TIMES DAILY
Status: DISCONTINUED | OUTPATIENT
Start: 2020-09-10 | End: 2020-09-10

## 2020-09-10 RX ORDER — PANTOPRAZOLE SODIUM 40 MG/1
40 TABLET, DELAYED RELEASE ORAL
Status: DISCONTINUED | OUTPATIENT
Start: 2020-09-11 | End: 2020-09-15 | Stop reason: HOSPADM

## 2020-09-10 RX ORDER — SODIUM CHLORIDE 0.9 % (FLUSH) 0.9 %
10 SYRINGE (ML) INJECTION EVERY 12 HOURS SCHEDULED
Status: DISCONTINUED | OUTPATIENT
Start: 2020-09-10 | End: 2020-09-15 | Stop reason: HOSPADM

## 2020-09-10 RX ORDER — 0.9 % SODIUM CHLORIDE 0.9 %
1000 INTRAVENOUS SOLUTION INTRAVENOUS ONCE
Status: COMPLETED | OUTPATIENT
Start: 2020-09-10 | End: 2020-09-10

## 2020-09-10 RX ORDER — ACETAMINOPHEN 500 MG
500 TABLET ORAL EVERY 6 HOURS PRN
Status: DISCONTINUED | OUTPATIENT
Start: 2020-09-10 | End: 2020-09-15 | Stop reason: HOSPADM

## 2020-09-10 RX ORDER — SODIUM CHLORIDE 9 MG/ML
INJECTION, SOLUTION INTRAVENOUS CONTINUOUS
Status: DISCONTINUED | OUTPATIENT
Start: 2020-09-10 | End: 2020-09-12

## 2020-09-10 RX ORDER — SODIUM CHLORIDE 0.9 % (FLUSH) 0.9 %
10 SYRINGE (ML) INJECTION PRN
Status: DISCONTINUED | OUTPATIENT
Start: 2020-09-10 | End: 2020-09-15 | Stop reason: HOSPADM

## 2020-09-10 RX ORDER — FLUTICASONE PROPIONATE 50 MCG
1 SPRAY, SUSPENSION (ML) NASAL DAILY
Status: DISCONTINUED | OUTPATIENT
Start: 2020-09-10 | End: 2020-09-15 | Stop reason: HOSPADM

## 2020-09-10 RX ORDER — FLUTICASONE PROPIONATE 110 UG/1
2 AEROSOL, METERED RESPIRATORY (INHALATION) 2 TIMES DAILY
Status: DISCONTINUED | OUTPATIENT
Start: 2020-09-10 | End: 2020-09-15 | Stop reason: HOSPADM

## 2020-09-10 RX ORDER — CLONAZEPAM 1 MG/1
0.5 TABLET ORAL 3 TIMES DAILY PRN
Status: DISCONTINUED | OUTPATIENT
Start: 2020-09-10 | End: 2020-09-15 | Stop reason: HOSPADM

## 2020-09-10 RX ORDER — ALBUTEROL SULFATE 90 UG/1
2 AEROSOL, METERED RESPIRATORY (INHALATION) EVERY 6 HOURS PRN
Status: ON HOLD | COMMUNITY
End: 2021-01-23 | Stop reason: HOSPADM

## 2020-09-10 RX ORDER — HYDROCODONE BITARTRATE AND ACETAMINOPHEN 5; 325 MG/1; MG/1
1 TABLET ORAL EVERY 8 HOURS PRN
Status: DISCONTINUED | OUTPATIENT
Start: 2020-09-10 | End: 2020-09-15 | Stop reason: HOSPADM

## 2020-09-10 RX ORDER — LEVALBUTEROL 1.25 MG/.5ML
1.25 SOLUTION, CONCENTRATE RESPIRATORY (INHALATION) EVERY 6 HOURS PRN
Status: DISCONTINUED | OUTPATIENT
Start: 2020-09-10 | End: 2020-09-15 | Stop reason: HOSPADM

## 2020-09-10 RX ADMIN — FERROUS SULFATE TAB 325 MG (65 MG ELEMENTAL FE) 325 MG: 325 (65 FE) TAB at 16:53

## 2020-09-10 RX ADMIN — GUAIFENESIN 600 MG: 600 TABLET, EXTENDED RELEASE ORAL at 21:00

## 2020-09-10 RX ADMIN — SODIUM CHLORIDE: 9 INJECTION, SOLUTION INTRAVENOUS at 21:07

## 2020-09-10 RX ADMIN — SODIUM CHLORIDE 1000 ML: 9 INJECTION, SOLUTION INTRAVENOUS at 13:24

## 2020-09-10 RX ADMIN — Medication 10 ML: at 21:00

## 2020-09-10 RX ADMIN — LEVOTHYROXINE SODIUM 50 MCG: 0.05 TABLET ORAL at 16:53

## 2020-09-10 RX ADMIN — APIXABAN 5 MG: 5 TABLET, FILM COATED ORAL at 21:00

## 2020-09-10 RX ADMIN — BUPROPION HYDROCHLORIDE 150 MG: 150 TABLET, EXTENDED RELEASE ORAL at 16:53

## 2020-09-10 RX ADMIN — ATORVASTATIN CALCIUM 10 MG: 10 TABLET, FILM COATED ORAL at 16:53

## 2020-09-10 RX ADMIN — TAMSULOSIN HYDROCHLORIDE 0.4 MG: 0.4 CAPSULE ORAL at 16:53

## 2020-09-10 RX ADMIN — HYDROCODONE BITARTRATE AND ACETAMINOPHEN 1 TABLET: 5; 325 TABLET ORAL at 16:58

## 2020-09-10 RX ADMIN — CIPROFLOXACIN HYDROCHLORIDE 500 MG: 500 TABLET, FILM COATED ORAL at 21:00

## 2020-09-10 RX ADMIN — FLUTICASONE PROPIONATE 2 PUFF: 110 AEROSOL, METERED RESPIRATORY (INHALATION) at 21:00

## 2020-09-10 RX ADMIN — OXYCODONE HYDROCHLORIDE AND ACETAMINOPHEN 1000 MG: 500 TABLET ORAL at 16:53

## 2020-09-10 RX ADMIN — DILTIAZEM HYDROCHLORIDE 180 MG: 180 CAPSULE, COATED, EXTENDED RELEASE ORAL at 16:53

## 2020-09-10 ASSESSMENT — ENCOUNTER SYMPTOMS
BLOOD IN STOOL: 0
VOMITING: 0
EYE PAIN: 0
RHINORRHEA: 0
CONSTIPATION: 0
EYE REDNESS: 0
FACIAL SWELLING: 0
SORE THROAT: 0
WHEEZING: 0
CHEST TIGHTNESS: 0
SINUS PRESSURE: 0
EYE DISCHARGE: 0
NAUSEA: 0
TROUBLE SWALLOWING: 0
COLOR CHANGE: 0
ABDOMINAL PAIN: 0
BACK PAIN: 0
SHORTNESS OF BREATH: 0
COUGH: 0
DIARRHEA: 0

## 2020-09-10 ASSESSMENT — PAIN DESCRIPTION - PAIN TYPE
TYPE: CHRONIC PAIN

## 2020-09-10 ASSESSMENT — PAIN DESCRIPTION - DESCRIPTORS: DESCRIPTORS: CONSTANT

## 2020-09-10 ASSESSMENT — PAIN DESCRIPTION - LOCATION
LOCATION: BACK

## 2020-09-10 ASSESSMENT — PAIN SCALES - GENERAL
PAINLEVEL_OUTOF10: 4
PAINLEVEL_OUTOF10: 4

## 2020-09-10 ASSESSMENT — PAIN DESCRIPTION - ONSET: ONSET: ON-GOING

## 2020-09-10 ASSESSMENT — PAIN DESCRIPTION - FREQUENCY: FREQUENCY: CONTINUOUS

## 2020-09-10 NOTE — CONSULTS
NEPHROLOGY CONSULT     Patient : Chidi Hoover; 70 y.o. MRN# [id number]  Location:  2087/2087-01  Attending: Dyan Hansen MD  Admit Date:  9/10/2020   Hospital Day: 0      Reason for Consult: Acute kidney injury      Chief Complaint: Fall  History Obtained From: Patient    History of Present Illness: This is a 70 y.o. female with past medical history of essential hypertension, A. fib, chronic kidney disease stage III with baseline creatinine 1.3 mg/dL, COPD, CHF with preserved EF diastolic dysfunction moderate tricuspid regurgitation mild pulmonary hypertension. Patient presented to the hospital because of dizziness and fall, patient complains that she has been feeling dizzy and frequently falling, she fell last night on the floor and hit her head. Patient also had blood work done which showed elevated creatinine and elevated proBNP and patient was advised to go to the hospital.  Initial lab investigation showed serum creatinine of 2.0 mg/dL which is increased from 1.3 mg/dL and therefore nephrology consultation has been requested. Patient denies any chest pain nausea vomiting no shortness of breath no difficulty urination. Bladder scan showed urinary retention and Pantoja catheter was placed in the ER      Pt denies any history of  prolonged NSAID use. Patient denies dysuria, gross hematuria, flank pain, nocturia, urgency, passing frothy urine or urinary incontinence. There has been no recent exposure to IV contrast.   There is no history  of paraprotein disease. Pt denies any history of recurrent UTI or kidney stones. Medication review shows use of diuretics.     Past Medical History:        Diagnosis Date    A-fib [facility])     Acquired hypothyroidism     Acute encephalopathy     [facility] [address]     Anxiety     Benign hypertension with CKD (chronic kidney disease) stage III ([facility])     Chronic back pain     CKD (chronic kidney disease) stage 3, GFR 30-59 ml/min ([facility])     Constipation  COPD (ch[facility] obstructive pulmonary disease) ([facility])     Cough     Depression     ETOH abuse     Former smoker     3 packs a day for 26 years    HA (generalized anxiety disorder)     History of GI bleed     Hyperlipidemia     Hypertension     Hypothyroid 1/18/2013    Leg pain     Normocytic anemia     Osteoarthritis     PAD (peripheral artery disease) ([facility])     Primary osteoarthritis     Pulmonary hypertension ([facility] [address]     Pure hypercholesterolemia     SIRS (systemic inflammatory response syndrome) ([facility])     Urge incontinence     Wheezing        Past Surgical History:        Procedure Laterality Date    COLONOSCOPY      EYE SURGERY Bilateral     cataracts    JOINT REPLACEMENT      Left knee on 9-11-18       Current Medications:    sodium chloride flush 0.9 % injection 10 mL, 2 times per day  sodium chloride flush 0.9 % injection 10 mL, PRN  acetaminophen (TYLENOL) tablet 650 mg, Q4H PRN  acetaminophen (TYLENOL) tablet 500 mg, Q6H PRN  lidocaine-prilocaine (EMLA) cream, PRN  ferrous sulfate (IRON 325) tablet 325 mg, Daily  metoprolol succinate (TOPROL XL) extended release tablet 25 mg, Daily  levothyroxine (SYNTHROID) tablet 50 mcg, Daily  vitamin C (ASCORBIC ACID) tablet 1,000 mg, Daily  apixaban (ELIQUIS) tablet 5 mg, BID  [START ON 9/11/2020] pantoprazole (PROTONIX) tablet 40 mg, QAM AC  guaiFENesin (MUCINEX) extended release tablet 600 mg, BID  baclofen (LIORESAL) tablet 5 mg, TID PRN  buPROPion (WELLBUTRIN XL) extended release tablet 150 mg, Daily  dilTIAZem (CARDIZEM CD) extended release capsule 180 mg, Daily  tamsulosin (FLOMAX) capsule 0.4 mg, Daily  levalbuterol (XOPENEX) nebulizer solution 1.25 mg, Q6H PRN  clonazePAM (KLONOPIN) tablet 0.5 mg, TID PRN  HYDROcodone-acetaminophen (NORCO) 5-325 MG per tablet 1 tablet, Q8H PRN  fluticasone (FLOVENT HFA) 110 MCG/ACT inhaler 2 puff, BID  fluticasone (FLONASE) 50 MCG/ACT nasal spray 1 spray, Daily  atorvastatin (LIPITOR) tablet 10 mg, Daily  furosemide (LASIX) tablet 40 mg, Daily        Allergies:  Tizanidine    Social History:   Social History     Socioeconomic History    Marital status:      Spouse name: Not on file    Number of children: Not on file    Years of education: Not on file    Highest education level: Not on file   Occupational History    Not on file   Social Needs    Financial resource strain: Not on file    Food insecurity     Worry: Not on file     Inability: Not on file   [employer] needs     Medical: Not on file     Non-medical: Not on file   Tobacco Use    Smoking status: Former Smoker     Packs/day: 3.00     Years: 26.00     Pack years: 78.00     Types: Cigarettes    Smokeless tobacco: Never Used   Substance and Sexual Activity    Alcohol use: Yes     Comment: occ    Drug use: No    Sexual activity: Not on file   Lifestyle    Physical activity     Days per week: Not on file     Minutes per session: Not on file    Stress: Not on file   Relationships    Social connections     Talks on phone: Not on file     Gets together: Not on file     Attends Sabianist service: Not on file     Active member of club or organization: Not on file     Attends meetings of clubs or organizations: Not on file     Relationship status: Not on file    Intimate partner violence     Fear of current or ex partner: Not on file     Emotionally abused: Not on file     Physically abused: Not on file     Forced sexual activity: Not on file   Other Topics Concern    Not on file   Social History Narrative    Not on file       Family History:   Family History   Problem Relation Age of Onset    Heart Disease Mother     COPD Mother     Emphysema Sister     Cancer Other         Cousin - breast cancer       Review of Systems:    Constitutional: No fever, no chills, no night sweats, fatigue, generalized weakness, loss of appetite  HEENT:  No headache, otalgia, itchy eyes, epistaxis, nasal discharge or sore throat.   Cardiac:  No chest pain, dyspnea, orthopnea or PND, palpitations  Chest:  No cough, hemoptysis, pleuritic chest pain, wheezing,SOB  Abdomen:  No abdominal pain, nausea, vomiting, diarrhea, melena, dysphagia hematemesis,constipation, abdominal bloating, flank pain  Neuro:  No CVA, TIA or seizure like activity. Complains of dizziness lightheadedness  Skin:   No rashes, no itching. :   No hematuria, no pyuria, no dysuria, no flank pain. Extremities:  No swelling or joint pains. Objective:  CURRENT TEMPERATURE:  Temp: 97.7 °F (36.5 °C)  MAXIMUM TEMPERATURE OVER 24HRS:  Temp (24hrs), Av.9 °F (36.6 °C), Min:97.7 °F (36.5 °C), Max:98.1 °F (36.7 °C)    CURRENT RESPIRATORY RATE:  Resp: 18  CURRENT PULSE:  Pulse: 73  CURRENT BLOOD PRESSURE:  BP: 111/61  24HR BLOOD PRESSURE RANGE:  Systolic (45JMY), DRQ:941 , Min:104 , FSO:512   ; Diastolic (17BOE), YAL:14, Min:44, Max:61    24HR INTAKE/OUTPUT:  No intake or output data in the 24 hours ending 09/10/20 1638  Patient Vitals for the past 96 hrs (Last 3 readings):   Weight   09/10/20 1228 187 lb (84.8 kg)       Physical Exam:  GENERAL APPEARANCE: Alert and cooperative, and appears to be in no acute distress. HEAD: normocephalic  EYES: PERRL, EOMI. Not pale, anicteric   NOSE:  No nasal discharge. THROAT:  Oral cavity and pharynx normal. Moist  CARDIAC: Normal S1 and S2. No S3, S4 or murmurs. IRRegular rhythm  LUNGS: Clear to auscultation and percussion without rales, rhonchi, wheezing or diminished breath sounds. NECK: Neck supple  BACK: Examination of the spine reveals normal gait and posture, no spinal deformity, symmetry of spinal muscles, without tenderness, decreased range of motion or muscular spasm  MUSKULOSKELETAL: Adequately aligned spine. No joint erythema or tenderness. EXTREMITIES: +edema. Peripheral pulses intact.    NEURO: Nonfocal      Labs:   CBC:  Recent Labs     20  1237   WBC 7.8   RBC 2.88*   HGB 8.4*   HCT 32.6*   .2*   MCH 29.2   MCHC visualized. Right Ventricle  Difficult visualization of the right ventricle. TAPSE value suggests normal  RV systolic function. Mitral Valve  No obvious valvular abnormality seen. Trivial mitral regurgitation. Aortic Valve  No obvious valvular abnormality seen. No evidence of aortic insufficiency or stenosis. Tricuspid Valve  No obvious valvular abnormality. Moderate tricuspid regurgitation. Mild pulmonary hypertension. Estimated right ventricular systolic pressure  is 85GINV. Pulmonic Valve  Pulmonic valve was not well visualized. No evidence of pulmonic insufficiency or stenosis. Pericardial Effusion  No significant pericardial effusion is seen. Pleural Effusion  No pleural effusion seen. Miscellaneous    Radiology:  Reviewed as available. Assessment:  1. SUNSHINE on CKD , most likely secondary to prerenal azotemia due to use of diuretics, in combination with urinary retention rule out orthostatic hypotension  2. CKD stage III most likely secondary to hypertensive nephropathy  3. Essential hypertension well-controlled rule out orthostatic hypotension  4. Frequent fall rule out orthostatic hypotension--CT head negative  5. CHF with preserved EF diastolic dysfunction, moderate TR, mild pulmonary hypertension       Plan:  1. Urine electrolytes, renal ultrasound  2. Check orthostatic blood pressure  3. Hold Lasix        Thank you for the consultation.       Electronically signed by Ortiz Brewer MD on 9/10/2020 at 4:38 PM

## 2020-09-10 NOTE — ED NOTES
Mode of arrival (squad #, walk in, police, etc) : [facility]modesta to ED by grandson        Chief complaint(s): Recent falls, weakness        Arrival Note (brief scenario, treatment PTA, etc).: Pt. States she has fallen a few times in the past couple of weeks, pt. Denies any injuries with falls, pt states she has just felt very weak and dizzy while ambulating, pt. Is alert and oriented sitting in her wheelchair in room, pt. Refusing to sit on cot, states it is uncomfortable. C= \"Have you ever felt that you should Cut down on your drinking? \" No  A= \"Have people Annoyed you by criticizing your drinking? \"  No  G= \"Have you ever felt bad or Guilty about your drinking? \"  No  E= \"Have you ever had a drink as an Eye-opener first thing in the morning to steady your nerves or to help a hangover? \"  No      Deferred []      Reason for deferring: N/A    *If yes to two or more: probable alcohol abuse. Verna Lofton RN  09/10/20 8830

## 2020-09-10 NOTE — ED PROVIDER NOTES
[address]  eMERGENCY dEPARTMENT eNCOUnter      Pt Name: Baron Ingram  MRN: [id number]  [place] 1945  Date of evaluation: 9/10/20      CHIEF COMPLAINT       Chief Complaint   Patient presents with    Fall    Dizziness         HISTORY OF PRESENT ILLNESS    Baron Ingram is a 70 y.o. female who presents complaining of syncope. Patient was sent in by her doctor. Patient states for the last couple days when she stands up she will get real dizzy and she has passed out a couple times. Patient states last night when she fell she did hit the back of her head pretty hard. Patient is on blood thinners so she was also sent in to get a CT scan. Patient states otherwise she really did not hurt her self on any of her falls. Patient states she does not really eat or drink well. Patient denies having any chest pain palpitations or shortness of breath before she passes out she just feels very dizzy and lightheaded and knows that she is going to pass out. Patient did have a bunch of blood work done yesterday. Family also mentioned that she is on muscle relaxers and they have noticed that she takes a lot of them especially at night more than what she is directed to help with her pain. REVIEW OF SYSTEMS       Review of Systems   Constitutional: Negative for activity change, appetite change, chills, diaphoresis and fever. HENT: Negative for congestion, ear pain, facial swelling, nosebleeds, rhinorrhea, sinus pressure, sore throat and trouble swallowing. Eyes: Negative for pain, discharge and redness. Respiratory: Negative for cough, chest tightness, shortness of breath and wheezing. Cardiovascular: Negative for chest pain, palpitations and leg swelling. Gastrointestinal: Negative for abdominal pain, blood in stool, constipation, diarrhea, nausea and vomiting. Genitourinary: Negative for difficulty urinating, dysuria, flank pain, frequency, genital sores and hematuria.    Musculoskeletal: Negative for arthralgias, back pain, gait problem, joint swelling, myalgias and neck pain. Skin: Negative for color change, pallor, rash and wound. Neurological: Positive for dizziness, syncope and light-headedness. Negative for tremors, seizures, speech difficulty, weakness, numbness and headaches. Psychiatric/Behavioral: Negative for confusion, decreased concentration, hallucinations, self-injury, sleep disturbance and suicidal ideas. PAST MEDICAL HISTORY     Past Medical History:   Diagnosis Date    A-fib [facility])     Acquired hypothyroidism     Acute encephalopathy     [facility] kidney [facility] ([facility] [address]     Anxiety     Benign hypertension with CKD (chronic kidney disease) stage III ([facility])     Chronic back pain     CKD (chronic kidney disease) stage 3, GFR 30-59 ml/min ([facility])     Constipation     COPD (chronic obstructive pulmonary disease) ([facility])     Cough     Depression     ETOH abuse     Former smoker     3 packs a day for 26 years    HA (generalized anxiety disorder)     History of GI bleed     Hyperlipidemia     Hypertension     Hypothyroid 1/18/2013    Leg pain     Normocytic anemia     Osteoarthritis     PAD (peripheral artery disease) ([facility])     Primary osteoarthritis     [facility]     Pure hypercholesterolemia     SIRS (systemic inflammatory response syndrome) ([facility])     Urge incontinence     Wheezing        SURGICAL HISTORY       Past Surgical History:   Procedure Laterality Date    COLONOSCOPY      EYE SURGERY Bilateral     cataracts    JOINT REPLACEMENT      Left knee on 9-11-18       CURRENT MEDICATIONS       Previous Medications    ACETAMINOPHEN (TYLENOL) 500 MG TABLET    Take 500 mg by mouth every 6 hours as needed for Pain.     ALBUTEROL SULFATE HFA (PROAIR HFA) 108 (90 BASE) MCG/ACT INHALER    Inhale 2 puffs into the lungs every 6 hours as needed for Wheezing    ASCORBIC ACID (C-1000 PO)    Take 1 capsule by mouth daily     ATORVASTATIN (LIPITOR) 10 MG TABLET    TAKE 1 TABLET BY MOUTH ONCE DAILY    BACLOFEN (LIORESAL) 10 MG TABLET    TAKE ONE TABLET BY MOUTH THREE TIMES A DAY    BUPROPION (WELLBUTRIN XL) 150 MG EXTENDED RELEASE TABLET    Take 1 tablet by mouth daily    CLONAZEPAM (KLONOPIN) 0.5 MG TABLET    Take 1 tablet by mouth 3 times daily as needed for Anxiety for up to 30 days. CROMOLYN (OPTICROM) 4 % OPHTHALMIC SOLUTION    Place 1 drop into both eyes 4 times daily    DILTIAZEM (CARDIZEM CD) 180 MG EXTENDED RELEASE CAPSULE    Take 1 capsule by mouth daily    ELIQUIS 5 MG TABS TABLET    TAKE ONE TABLET BY MOUTH TWICE A DAY    FERROUS SULFATE 325 (65 FE) MG TABLET    Take 325 mg by mouth daily     FLOVENT  MCG/ACT INHALER    INHALE 2 PUFFS INTO THE LUNGS 2 TIMES DAILY    FLUTICASONE (FLONASE) 50 MCG/ACT NASAL SPRAY    USE ONE SPRAY TO EACH NOSTRIL ONCE ADAY    FUROSEMIDE (LASIX) 40 MG TABLET    Take 40 mg by mouth daily    GUAIFENESIN (MUCINEX) 600 MG EXTENDED RELEASE TABLET    Take 1 tablet by mouth 2 times daily    HYDROCODONE-ACETAMINOPHEN (NORCO) 5-325 MG PER TABLET    Take 1 tablet by mouth every 8 hours as needed for Pain for up to 30 days. LEVALBUTEROL (XOPENEX HFA) 45 MCG/ACT INHALER    Inhale 1 puff into the lungs every 4 hours as needed for Wheezing    LEVALBUTEROL (XOPENEX) 1.25 MG/3ML NEBULIZER SOLUTION    Take 3 mLs by nebulization every 6 hours as needed for Wheezing    LEVOTHYROXINE (SYNTHROID) 50 MCG TABLET    Take 1 tablet by mouth daily    LIDOCAINE-PRILOCAINE (EMLA) 2.5-2.5 % CREAM    Apply topically as needed. METOPROLOL SUCCINATE (TOPROL XL) 25 MG EXTENDED RELEASE TABLET    Take 1 tablet by mouth daily    MUPIROCIN (BACTROBAN) 2 % OINTMENT    Apply 3 times daily.     OMEPRAZOLE (PRILOSEC) 40 MG DELAYED RELEASE CAPSULE    TAKE 1 CAPSULE BY MOUTH EVERY MORNING BEFORE BREAKFAST    RESPIRATORY THERAPY SUPPLIES (NEBULIZER/TUBING/MOUTHPIECE) KIT    Use every 4-6 hours prn for copd    TAMSULOSIN (FLOMAX) 0.4 MG CAPSULE    Take 1 capsule by mouth daily       ALLERGIES     is allergic to tizanidine. FAMILY HISTORY     [unfilled]     SOCIAL HISTORY      reports that she has quit smoking. Her smoking use included cigarettes. She has a 78.00 pack-year smoking history. She has never used smokeless tobacco. She reports current alcohol use. She reports that she does not use drugs. PHYSICAL EXAM     INITIAL VITALS: BP (!) 104/49   Pulse 72   Temp 98.1 °F (36.7 °C)   Resp 20   Ht 5' 5\" (1.651 m)   Wt 187 lb (84.8 kg)   SpO2 98%   BMI 31.12 kg/m²      Physical Exam  Vitals signs and nursing note reviewed. Constitutional:       General: She is not in acute distress. Appearance: She is well-developed. She is not diaphoretic. HENT:      Head: Normocephalic and atraumatic. Eyes:      General: No scleral icterus. Right eye: No discharge. Left eye: No discharge. Conjunctiva/sclera: Conjunctivae normal.      Pupils: Pupils are equal, round, and reactive to light. Cardiovascular:      Rate and Rhythm: Normal rate and regular rhythm. Heart sounds: Normal heart sounds. No murmur. No friction rub. No gallop. Pulmonary:      Effort: Pulmonary effort is normal. No respiratory distress. Breath sounds: Normal breath sounds. No wheezing or rales. Chest:      Chest wall: No tenderness. Abdominal:      General: Bowel sounds are normal. There is no distension. Palpations: Abdomen is soft. There is no mass. Tenderness: There is no abdominal tenderness. There is no guarding or rebound. Musculoskeletal: Normal range of motion. General: No tenderness. Skin:     General: Skin is warm and dry. Coloration: Skin is not pale. Findings: No erythema or rash. Neurological:      Mental Status: She is alert and oriented to person, place, and time. Cranial Nerves: No cranial nerve deficit. Sensory: No sensory deficit. Motor: No abnormal muscle tone.       Coordination: Exam: Patient states she passes out and falls a lot Acuity: Acute Type of Exam: Initial FINDINGS: BRAIN/VENTRICLES: There is no acute intracranial hemorrhage, mass effect or midline shift. No abnormal extra-axial fluid collection. The gray-white differentiation is maintained without evidence of an acute infarct. There is mild prominence of the ventricles and sulci due to global parenchymal volume loss. Ventricles are not dilated significantly out of proportion to the degree of atrophy. Calcific plaque intracranial vessels. ORBITS: The visualized portion of the orbits demonstrate no acute abnormality. SINUSES: The visualized paranasal sinuses and mastoid air cells demonstrate no acute abnormality. Mild mucosal thickening in the maxillary sinuses. SOFT TISSUES/SKULL: No acute abnormality of the visualized skull or soft tissues. No acute intracranial abnormality. Xr Chest Portable    Result Date: 9/10/2020  EXAMINATION: ONE XRAY VIEW OF THE CHEST 9/10/2020 12:56 pm COMPARISON: 07/19/2020, 02/12/2020 HISTORY: ORDERING SYSTEM PROVIDED HISTORY: syncope TECHNOLOGIST PROVIDED HISTORY: syncope Reason for Exam: abnormal lab work, dr told pt to come to er Acuity: Unknown Type of Exam: Unknown FINDINGS: Prominent cardiac silhouette enlargement again demonstrated. Increased density in the left costophrenic angle is noted. No pneumothorax. No evidence for edema or evidence for significant effusion. No acute osseous abnormality identified. 1.  Left basilar opacity may represent subsegmental atelectasis or small effusion. No evidence for edema. 2.  Prominent cardiac silhouette enlargement again noted. LABS: All lab results were reviewed bymyself, and all abnormals are listed below.   Labs Reviewed   BASIC METABOLIC PANEL - Abnormal; Notable for the following components:       Result Value    BUN 28 (*)     CREATININE 2.06 (*)     GFR Non- 24 (*)     GFR  29 (*)     All other components within normal limits   TROPONIN - Abnormal; Notable for the following components:    Troponin, High Sensitivity 43 (*)     All other components within normal limits         EMERGENCY DEPARTMENT COURSE:   Vitals:    Vitals:    09/10/20 1342 09/10/20 1343 09/10/20 1344 09/10/20 1345   BP:       Pulse:       Resp:       Temp:       SpO2: 95% 95% 98% 98%   Weight:       Height:           The patient was given the following medications while in the emergency department:     Orders Placed This Encounter   Medications    0.9 % sodium chloride bolus       -------------------------  2:23 PM EDT  Patient has been reevaluated and updated on results. I did speak with Dr. Rebecca Gamez for the PCP who does want the patient admitted and have nephrology see this patient. CRITICAL CARE:   None    CONSULTS:  IP CONSULT TO PRIMARY CARE PROVIDER  IP CONSULT TO NEPHROLOGY    PROCEDURES:  None    FINAL IMPRESSION      1. SUNSHINE (acute kidney injury) ([facility] [address]    2. Syncope and collapse    3.  Protein-calorie malnutrition, unspecified severity ([facility] [address]          DISPOSITION/PLAN   DISPOSITION Admitted 09/10/2020 02:22:50 PM      PATIENT REFERRED TO:  Toño Portillo, APRN - CNP  [facility]  [address]  [address] [facility] [address]  [phone]            DISCHARGE MEDICATIONS:  New Prescriptions    No medications on file       (Please note that portions of this note were completed with a voice recognition program.  Efforts were made to edit the dictations but occasionally words are mis-transcribed.)    Briana Erazo MD  Attending Thornton Baumgarten, MD  09/10/20 1825

## 2020-09-10 NOTE — ED NOTES
Pt. Has indwelling catheter placed per Dr. Indira Wooten.  400 ml out, urine specimine collected      Darryl Allen RN  09/10/20 3529

## 2020-09-11 LAB
ALBUMIN SERPL-MCNC: 3.3 G/DL (ref 3.5–5.2)
ALBUMIN/GLOBULIN RATIO: ABNORMAL (ref 1–2.5)
ALP BLD-CCNC: 111 U/L (ref 35–104)
ALT SERPL-CCNC: 20 U/L (ref 5–33)
ANION GAP SERPL CALCULATED.3IONS-SCNC: 15 MMOL/L (ref 9–17)
AST SERPL-CCNC: 21 U/L
BILIRUB SERPL-MCNC: 0.46 MG/DL (ref 0.3–1.2)
BUN BLDV-MCNC: 26 MG/DL (ref 8–23)
BUN/CREAT BLD: ABNORMAL (ref 9–20)
CALCIUM SERPL-MCNC: 8.3 MG/DL (ref 8.6–10.4)
CHLORIDE BLD-SCNC: 104 MMOL/L (ref 98–107)
CO2: 25 MMOL/L (ref 20–31)
CREAT SERPL-MCNC: 1.43 MG/DL (ref 0.5–0.9)
EKG ATRIAL RATE: 62 BPM
EKG Q-T INTERVAL: 412 MS
EKG QRS DURATION: 64 MS
EKG QTC CALCULATION (BAZETT): 421 MS
EKG R AXIS: 31 DEGREES
EKG T AXIS: -16 DEGREES
EKG VENTRICULAR RATE: 63 BPM
GFR AFRICAN AMERICAN: 44 ML/MIN
GFR NON-AFRICAN AMERICAN: 36 ML/MIN
GFR SERPL CREATININE-BSD FRML MDRD: ABNORMAL ML/MIN/{1.73_M2}
GFR SERPL CREATININE-BSD FRML MDRD: ABNORMAL ML/MIN/{1.73_M2}
GLUCOSE BLD-MCNC: 140 MG/DL (ref 70–99)
HCT VFR BLD CALC: 26.3 % (ref 36–46)
HEMOGLOBIN: 8.2 G/DL (ref 12–16)
MCH RBC QN AUTO: 29.1 PG (ref 26–34)
MCHC RBC AUTO-ENTMCNC: 31.4 G/DL (ref 31–37)
MCV RBC AUTO: 92.9 FL (ref 80–100)
NRBC AUTOMATED: ABNORMAL PER 100 WBC
PDW BLD-RTO: 20.8 % (ref 11.5–14.9)
PLATELET # BLD: 396 K/UL (ref 150–450)
PMV BLD AUTO: 8 FL (ref 6–12)
POTASSIUM SERPL-SCNC: 3.2 MMOL/L (ref 3.7–5.3)
RBC # BLD: 2.83 M/UL (ref 4–5.2)
SODIUM BLD-SCNC: 144 MMOL/L (ref 135–144)
TOTAL PROTEIN: 5.8 G/DL (ref 6.4–8.3)
WBC # BLD: 5.8 K/UL (ref 3.5–11)

## 2020-09-11 PROCEDURE — 85027 COMPLETE CBC AUTOMATED: CPT

## 2020-09-11 PROCEDURE — 80053 COMPREHEN METABOLIC PANEL: CPT

## 2020-09-11 PROCEDURE — 2580000003 HC RX 258: Performed by: FAMILY MEDICINE

## 2020-09-11 PROCEDURE — 6370000000 HC RX 637 (ALT 250 FOR IP): Performed by: INTERNAL MEDICINE

## 2020-09-11 PROCEDURE — 6370000000 HC RX 637 (ALT 250 FOR IP): Performed by: FAMILY MEDICINE

## 2020-09-11 PROCEDURE — 99223 1ST HOSP IP/OBS HIGH 75: CPT | Performed by: FAMILY MEDICINE

## 2020-09-11 PROCEDURE — 36415 COLL VENOUS BLD VENIPUNCTURE: CPT

## 2020-09-11 PROCEDURE — G0378 HOSPITAL OBSERVATION PER HR: HCPCS

## 2020-09-11 PROCEDURE — 2060000000 HC ICU INTERMEDIATE R&B

## 2020-09-11 RX ORDER — POTASSIUM CHLORIDE 20 MEQ/1
40 TABLET, EXTENDED RELEASE ORAL PRN
Status: DISCONTINUED | OUTPATIENT
Start: 2020-09-11 | End: 2020-09-15 | Stop reason: HOSPADM

## 2020-09-11 RX ORDER — POTASSIUM CHLORIDE 7.45 MG/ML
10 INJECTION INTRAVENOUS PRN
Status: DISCONTINUED | OUTPATIENT
Start: 2020-09-11 | End: 2020-09-15 | Stop reason: HOSPADM

## 2020-09-11 RX ORDER — DIPHENHYDRAMINE HCL 25 MG
25 TABLET ORAL NIGHTLY PRN
Status: COMPLETED | OUTPATIENT
Start: 2020-09-11 | End: 2020-09-11

## 2020-09-11 RX ADMIN — APIXABAN 5 MG: 5 TABLET, FILM COATED ORAL at 20:46

## 2020-09-11 RX ADMIN — BUPROPION HYDROCHLORIDE 150 MG: 150 TABLET, EXTENDED RELEASE ORAL at 07:50

## 2020-09-11 RX ADMIN — CIPROFLOXACIN HYDROCHLORIDE 500 MG: 500 TABLET, FILM COATED ORAL at 07:50

## 2020-09-11 RX ADMIN — DIPHENHYDRAMINE HCL 25 MG: 25 TABLET ORAL at 23:16

## 2020-09-11 RX ADMIN — FLUTICASONE PROPIONATE 2 PUFF: 110 AEROSOL, METERED RESPIRATORY (INHALATION) at 20:46

## 2020-09-11 RX ADMIN — ATORVASTATIN CALCIUM 10 MG: 10 TABLET, FILM COATED ORAL at 07:51

## 2020-09-11 RX ADMIN — FERROUS SULFATE TAB 325 MG (65 MG ELEMENTAL FE) 325 MG: 325 (65 FE) TAB at 07:51

## 2020-09-11 RX ADMIN — OXYCODONE HYDROCHLORIDE AND ACETAMINOPHEN 1000 MG: 500 TABLET ORAL at 07:50

## 2020-09-11 RX ADMIN — POTASSIUM BICARBONATE 40 MEQ: 782 TABLET, EFFERVESCENT ORAL at 12:36

## 2020-09-11 RX ADMIN — APIXABAN 5 MG: 5 TABLET, FILM COATED ORAL at 07:50

## 2020-09-11 RX ADMIN — GUAIFENESIN 600 MG: 600 TABLET, EXTENDED RELEASE ORAL at 20:46

## 2020-09-11 RX ADMIN — SODIUM CHLORIDE: 9 INJECTION, SOLUTION INTRAVENOUS at 12:39

## 2020-09-11 RX ADMIN — CLONAZEPAM 0.5 MG: 1 TABLET ORAL at 21:33

## 2020-09-11 RX ADMIN — DILTIAZEM HYDROCHLORIDE 180 MG: 180 CAPSULE, COATED, EXTENDED RELEASE ORAL at 07:50

## 2020-09-11 RX ADMIN — TAMSULOSIN HYDROCHLORIDE 0.4 MG: 0.4 CAPSULE ORAL at 07:50

## 2020-09-11 RX ADMIN — PANTOPRAZOLE SODIUM 40 MG: 40 TABLET, DELAYED RELEASE ORAL at 05:16

## 2020-09-11 RX ADMIN — FLUTICASONE PROPIONATE 1 SPRAY: 50 SPRAY, METERED NASAL at 07:52

## 2020-09-11 RX ADMIN — GUAIFENESIN 600 MG: 600 TABLET, EXTENDED RELEASE ORAL at 07:51

## 2020-09-11 RX ADMIN — LEVOTHYROXINE SODIUM 50 MCG: 0.05 TABLET ORAL at 07:51

## 2020-09-11 RX ADMIN — FLUTICASONE PROPIONATE 2 PUFF: 110 AEROSOL, METERED RESPIRATORY (INHALATION) at 07:51

## 2020-09-11 NOTE — PLAN OF CARE
Nutrition Problem #1: Inadequate oral intake  Intervention: Food and/or Nutrient Delivery: Modify Current Diet, Start Oral Nutrition Supplement  Nutritional Goals: PO intakes to meet % of estimated nutrition needs

## 2020-09-11 NOTE — FLOWSHEET NOTE
09/11/20 1146   Encounter Summary   Services provided to: Patient   Referral/Consult From: [facility] Family members; Religion/mickey community   Continue Visiting   (9/11/20)   Complexity of Encounter Low   Length of Encounter 15 minutes   Spiritual Assessment Completed Yes   Routine   Type Initial   Spiritual/Denominational   Type Spiritual support   Assessment Coping; Approachable   Intervention Sustaining presence/ Ministry of presence;Nurtured hope;Prayer   Outcome Expressed gratitude;Comfort

## 2020-09-11 NOTE — H&P
Family Medicine Admit Note    PCP: WILLIE Rios CNP    Date of Admission: 9/10/2020    Date of Service: Pt seen/examined on 9/11/2020 and Admitted to Inpatient. Chief Complaint:  Fall                                  Dizziness      History Of Present Illness: The patient is a 70 y.o. female who presents to [address] [place] [address] with complaints of syncope. I discussed the patient with her PCP, Mick Hayes CNP and we decided that she needed to report to the ED for admission. She has been having dizziness with syncopal episodes at home. She lives alone and has strained relationships with her children. She reports that she hit her head in the back and is on blood thinners. Her CT brain was negative. She has not been eating or drinking much. She reports feeling lightheaded and dizzy. Family reported to the ED that she takes a muscle relaxer and she takes extra at night to help her sleep. She denies any fevers, chills, cough, congestion, chest pain, SOB, abdominal pain, N/V, diarrhea, melena, dysuria, hematuria, rash, tremors or confusion.  No family at bedside this am.    Past Medical History:        Diagnosis Date    A-fib [facility])     Acquired hypothyroidism     Acute encephalopathy     Acute kidney [facility] [address]     Anxiety     Benign hypertension with CKD (chronic kidney disease) stage III ([facility])     Chronic back pain     CKD (chronic kidney disease) stage 3, GFR 30-59 ml/min ([facility])     Constipation     COPD [facility]ch[facility]) [facility]     Cough     Depression     ETOH abuse     Former smoker     3 packs a day for 26 years    HA (generalized anxiety disorder)     History of GI bleed     Hyperlipidemia     Hypertension     Hypothyroid 1/18/2013    Leg pain     Normocytic anemia     Osteoarthritis     PAD (peripheral artery disease) ([facility] [facility]     Primary osteoarthritis     Pulmonary hypertension ([facility] [address]     Pure hypercholesterolemia     SIRS (systemic inflammatory response syndrome) ([facility])     Urge incontinence     Wheezing        Past Surgical History:        Procedure Laterality Date    COLONOSCOPY      EYE SURGERY Bilateral     cataracts    JOINT REPLACEMENT      Left knee on 9-11-18       Medications Prior to Admission:    Prior to Admission medications    Medication Sig Start Date End Date Taking? Authorizing Provider   albuterol sulfate HFA (PROAIR HFA) 108 (90 Base) MCG/ACT inhaler Inhale 2 puffs into the lungs every 6 hours as needed for Wheezing   Yes Historical Provider, MD   furosemide (LASIX) 40 MG tablet Take 40 mg by mouth daily   Yes Historical Provider, MD   FLOVENT  MCG/ACT inhaler INHALE 2 PUFFS INTO THE LUNGS 2 TIMES DAILY 9/4/20  Yes Omkar WILLIE Coffey CNP   mupirocin (BACTROBAN) 2 % ointment Apply 3 times daily. 9/4/20 9/11/20 Yes WILLIE Shaw CNP   fluticasone (FLONASE) 50 MCG/ACT nasal spray USE ONE SPRAY TO EACH NOSTRIL ONCE ADAY 9/4/20  Yes Omkar WILLIE Coffey CNP   cromolyn (OPTICROM) 4 % ophthalmic solution Place 1 drop into both eyes 4 times daily 9/3/20  Yes Omkar WILLIE Coffey CNP   HYDROcodone-acetaminophen (NORCO) 5-325 MG per tablet Take 1 tablet by mouth every 8 hours as needed for Pain for up to 30 days. 9/3/20 10/3/20 Yes Omkar WILLIE Coffey CNP   clonazePAM (KLONOPIN) 0.5 MG tablet Take 1 tablet by mouth 3 times daily as needed for Anxiety for up to 30 days.  8/24/20 9/23/20 Yes WILLIE Shaw CNP   levalbuterol [place]) 1.25 MG/3ML nebulizer solution Take 3 mLs by nebulization every 6 hours as needed for Wheezing 8/19/20  Yes WILLIE Shaw CNP   levalbuterol [place] HFA) 45 MCG/ACT inhaler Inhale 1 puff into the lungs every 4 hours as needed for Wheezing 8/19/20  Yes WILLIE Shaw CNP   buPROPion (WELLBUTRIN XL) 150 MG extended release tablet Take 1 tablet by mouth daily 7/23/20  Yes Marlon Kemp MD   dilTIAZem (CARDIZEM CD) 180 MG extended release capsule Take 1 capsule by mouth daily 7/23/20  Yes Reid Day MD   tamsulosin [facility]) 0.4 MG capsule Take 1 capsule by mouth daily 7/23/20  Yes Reid Day MD   baclofen (LIORESAL) 10 MG tablet TAKE ONE TABLET BY MOUTH THREE TIMES A DAY 4/22/20  Yes WILLIE Harris CNP   guaiFENesin (MUCINEX) 600 MG extended release tablet Take 1 tablet by mouth 2 times daily 4/3/20  Yes WILLIE Harris CNP   omeprazole (PRILOSEC) 40 MG delayed release capsule TAKE 1 CAPSULE BY MOUTH EVERY MORNING BEFORE BREAKFAST 3/16/20  Yes Docia MillinWILLIE CNP   ELIQUIS 5 MG TABS tablet TAKE ONE TABLET BY MOUTH TWICE A DAY 1/13/20  Yes WILLIE Harris CNP   Ascorbic Acid (C-1000 PO) Take 1 capsule by mouth daily    Yes Historical Provider, MD   levothyroxine (SYNTHROID) 50 MCG tablet Take 1 tablet by mouth daily 10/23/19  Yes WILLIE Harris CNP   metoprolol succinate (TOPROL XL) 25 MG extended release tablet Take 1 tablet by mouth daily 8/12/19  Yes WILLIE Harris CNP   ferrous sulfate 325 (65 Fe) MG tablet Take 325 mg by mouth daily  8/30/18  Yes Historical Provider, MD   acetaminophen (TYLENOL) 500 MG tablet Take 500 mg by mouth every 6 hours as needed for Pain. Yes Historical Provider, MD   PROAIR  (69 Base) MCG/ACT inhaler INHALE 2 PUFFS INTO THE LUNGS EVERY 6 HOURS AS NEEDED FOR WHEEZING 9/10/20   WILLIE Harris CNP   atorvastatin (LIPITOR) 10 MG tablet TAKE 1 TABLET BY MOUTH ONCE DAILY 9/4/20   WILLIE Friend CNP   Respiratory Therapy Supplies (NEBULIZER/TUBING/MOUTHPIECE) KIT Use every 4-6 hours prn for copd 6/15/20   WILLIE Harris CNP   lidocaine-prilocaine (EMLA) 2.5-2.5 % cream Apply topically as needed. 6/5/19   WILLIE Harris CNP       Allergies:  Tizanidine    Social History:  The patient currently lives at home    TOBACCO:   reports that she has quit smoking. Her smoking use included cigarettes.  She has a 78.00 pack-year smoking history. She has never used smokeless tobacco.  ETOH:   reports current alcohol use. Review of Systems - General ROS: positive for  - fatigue  Psychological ROS: positive for - depression  Ophthalmic ROS: negative  ENT ROS: negative  Endocrine ROS: negative  Respiratory ROS: positive for - shortness of breath and COPD  Cardiovascular ROS: negative  Gastrointestinal ROS: negative  Musculoskeletal ROS: positive for - joint stiffness and muscle pain  Neurological ROS: positive for - dizziness      Family History:          Problem Relation Age of Onset    Heart Disease Mother     COPD Mother     Emphysema Sister     Cancer Other         Cousin - breast cancer       PHYSICAL EXAM:    /67   Pulse 74   Temp 98 °F (36.7 °C) (Oral)   Resp 16   Ht 5' 5\" (1.651 m)   Wt 197 lb 5 oz (89.5 kg)   SpO2 91%   BMI 32.83 kg/m²     General appearance: No apparent distress appears stated age and cooperative. HEENT Normal cephalic, atraumatic without obvious deformity. Pupils equal, round, and reactive to light. Extra ocular muscles intact. Conjunctivae/corneas clear. Neck: Supple, No jugular venous distention/bruits. Trachea midline without thyromegaly or adenopathy with full range of motion. Lungs: Clear to auscultation, bilaterally without Rales/Wheezes/Rhonchi with good respiratory effort. Heart: Regular rate and irregular rhythm with Normal S1/S2 without murmurs, rubs or gallops, point of maximum impulse non-displaced  Abdomen: Soft, non-tender or non-distended without rigidity or guarding and positive bowel sounds all four quadrants. Extremities: No clubbing, cyanosis, or edema bilaterally. Full range of motion without deformity and normal gait intact. Skin: Skin color, texture, turgor normal.  No rashes or lesions. Neurologic: Alert and oriented X 3, neurovascularly intact with sensory/motor intact upper extremities/lower extremities, bilaterally.   Cranial nerves: II-XII intact, grossly non-focal.  Mental status: Alert, oriented, thought content appropriate. CXR:  I have reviewed the CXR with the following interpretation: left basilar opacity  EKG:  I have reviewed the EKG with the following interpretation: A. Fib    CBC   Recent Labs     09/09/20  1237   WBC 7.8   HGB 8.4*   HCT 32.6*         RENAL  Recent Labs     09/09/20  1237 09/10/20  1305    140   K 3.4* 4.0   CL 99 98   CO2 25 25   BUN 21 28*   CREATININE 1.79* 2.06*     LFT'S  Recent Labs     09/09/20  1237   AST 23   ALT 21   BILITOT 0.69   ALKPHOS 109*     COAG  No results for input(s): INR in the last 72 hours. CARDIAC ENZYMES  No results for input(s): CKTOTAL, CKMB, CKMBINDEX, TROPONINI in the last 72 hours.     U/A:    Lab Results   Component Value Date    NITRITE Negative 01/31/2020    COLORU YELLOW 09/10/2020    WBCUA 2 TO 5 09/10/2020    RBCUA 0 TO 2 09/10/2020    MUCUS NOT REPORTED 09/10/2020    BACTERIA FEW 09/10/2020    CLARITYU Clear 01/31/2020    SPECGRAV 1.021 09/10/2020    LEUKOCYTESUR SMALL 09/10/2020    BLOODU Negative 01/31/2020    GLUCOSEU NEGATIVE 09/10/2020    AMORPHOUS 2+ 09/10/2020       ABG    Lab Results   Component Value Date    SBF2GAO 24.9 09/13/2018    K9AIUFIM 93.0 09/13/2018    PHART 7.330 09/13/2018    ZUS8UEZ 47.3 09/13/2018    PO2ART 74.8 09/13/2018           Active Hospital Problems    Diagnosis Date Noted    Obesity, Class I, BMI 30-34.9 [E66.9] 07/20/2020    Moderate major depression ([facility] [address] [F32.1] 06/17/2020    PAD (peripheral artery disease) ([facility] [I73.9] 06/17/2020    Benign hypertension with CKD (chronic kidney disease) stage III ([facility]) [I12.9, N18.3]     CKD (chronic kidney disease) stage 3, GFR 30-59 ml/min ([facility]) [N18.3]     HA (generalized anxiety disorder) [F41.1] 12/13/2018    SUNSHINE (acute kidney injury) ([facility] [N17.9] 09/13/2018    A-fib ([facility] [I48.91] 09/13/2018    COPD (chronic obstructive pulmonary disease) ([facility] [J44.9] 09/13/2018    Pulmonary hypertension ([facility] [I27.20] 09/13/2018    Hypertension [I10]     Acquired hypothyroidism [E03.9] 01/18/2013         ASSESSMENT/PLAN:  Patient admitted for SUNSHINE. Co-morbidities as above.     Orders Placed This Encounter   Procedures    CT HEAD WO CONTRAST    XR CHEST PORTABLE    Basic Metabolic Panel    Troponin    Urinalysis Reflex to Culture    SODIUM, URINE, RANDOM    Urea nitrogen, urine    Creatinine, Random Urine    Microscopic Urinalysis    CBC    Comprehensive Metabolic Panel    DIET GENERAL; Dental Soft    Vital signs per unit routine    Notify physician    Notify physician    Up with assistance    Telemetry monitoring    Bladder scan    Insert indwelling urinary catheter    Discontinue indwelling urinary catheter when documented indications no longer apply per hospital policy    Telemetry monitoring - 24 hour duration    Full Code    Inpatient consult to Primary Care Provider    Inpatient consult to Nephrology    Dietary Nutrition Supplements: Low Calorie High Protein Supplement    EKG 12 Lead    EKG REPORT    Insert peripheral IV    PATIENT STATUS (FROM ED OR OR/PROCEDURAL) Inpatient         DVT Prophylaxis:   Diet: DIET GENERAL;  Code Status: Full Code      Dispo - admitted       @[facility]@

## 2020-09-11 NOTE — PROGRESS NOTES
Nephrology Progress Note    Subjective/   70y.o. year old female who we are seeing in consultation for Acute kidney injury. Interval history:  Patient denies any dizziness, shortness of breath or nausea. She experiences a cough and choking sensation when drinking water this morning. Serum potassium is 3.2 and renal function is improving creatinine of 1.43 mg/dLAlbumin is 3.3 g/dL. History of Present Illness: This is a 70 y.o. female with past medical history of essential hypertension, A. fib, chronic kidney disease stage III with baseline creatinine 1.3 mg/dL, COPD, CHF with preserved EF diastolic dysfunction moderate tricuspid regurgitation mild pulmonary hypertension. Patient presented to the hospital because of dizziness and fall, patient complains that she has been feeling dizzy and frequently falling, she fell last night on the floor and hit her head. Patient also had blood work done which showed elevated creatinine and elevated proBNP and patient was advised to go to the hospital.  Initial lab investigation showed serum creatinine of 2.0 mg/dL which is increased from 1.3 mg/dL and therefore nephrology consultation has been requested.  Jerman Pablo denies any chest pain nausea vomiting no shortness of breath no difficulty urination.   Bladder scan showed urinary retention and Pantoja catheter was placed in the ER.        Objective/     Vitals:    09/10/20 2323 09/11/20 0135 09/11/20 0800 09/11/20 1300   BP: 114/67  119/62 112/70   Pulse: 74  74 72   Resp: 16  17 18   Temp: 98 °F (36.7 °C)  97.8 °F (36.6 °C) 97.7 °F (36.5 °C)   TempSrc: Oral  Oral Oral   SpO2: 91%  90%    Weight:  197 lb 5 oz (89.5 kg)     Height:         24HR INTAKE/OUTPUT:      Intake/Output Summary (Last 24 hours) at 9/11/2020 1520  Last data filed at 9/11/2020 0553  Gross per 24 hour   Intake 583 ml   Output 650 ml   Net -67 ml     Patient Vitals for the past 96 hrs (Last 3 readings):   Weight   09/11/20 0135 197 lb 5 oz (89.5 kg)   09/10/20 1228 187 lb (84.8 kg)       Constitutional:  Alert, awake, no apparent distress  Cardiovascular:  S1, S2 without m/r/g  Respiratory:  CTA B without w/r/r  Abdomen: +bs, soft, nt  Ext: 1+ LE edema    Data/  Recent Labs     09/09/20  1237 09/11/20  0831   WBC 7.8 5.8   HGB 8.4* 8.2*   HCT 32.6* 26.3*   .2* 92.9    396     Recent Labs     09/09/20  1237 09/10/20  1305 09/11/20  0831    140 144   K 3.4* 4.0 3.2*   CL 99 98 104   CO2 25 25 25   GLUCOSE 96 99 140*   BUN 21 28* 26*   CREATININE 1.79* 2.06* 1.43*   LABGLOM 28* 24* 36*   GFRAA 34* 29* 44*         Assessment/   1. Acute kidney injury on CKD , most likely secondary to prerenal azotemia due to use of diuretics, in combination with urinary retention rule out orthostatic hypotension. 2. CKD stage III most likely secondary to hypertensive nephropathy    3. Essential hypertension well-controlled rule out orthostatic hypotension    Frequent fall rule out orthostatic hypotension--CT head negative-Echocardiogram July 2020 showed Global left ventricular systolic function is normal. Estimated LV EF >55 %. Moderate tricuspid regurgitation. Mild pulmonary hypertension. Estimated right ventricular systolic pressure is [id number]    4. CHF with preserved EF diastolic dysfunction, moderate TR, mild pulmonary hypertension       5. .Acute hypokalemia-Potassium sliding scale Replacement        Plan/   1-Follow orthostatic blood pressure pulse every shift x2 call nephrology if positive result  2-Sliding-scale replacement of Potassium  3-Consider swallow test if Patient continues to cough with fluid intake  4. Basic metabolic panel a.m.   5.Lasix is on hold    [employer]

## 2020-09-11 NOTE — CARE COORDINATION
CASE MANAGEMENT NOTE:    Admission Date:  9/10/2020 Dereck Alexis is a 70 y.o.  female    Admitted for : SUNSHINE (acute kidney injury) ([facility] [address] [N17.9]    Met with:  Patient    PCP:  David Virk                                Insurance:  [facility] Medicare      Current Residence/ Living Arrangements:  independently at home             Current Services PTA:  Yes, had [employer] care , but they never came out, she would like to try [facility] if possible. She did say that [employer] care did try to cll her but she forget to write the telephone number down and they never called her back     Is patient agreeable to VNS: Yes    Freedom of choice provided:  Yes    List of [address] provided: Yes    VNS chosen:  Yes    DME:  straight cane and walker    Home Oxygen: Yes on 2 l/nc at hs and prn, she says her portable tank leaks and hoping she may qualify for a portable concentrator. Notified Garret Houstonist from [facility] regarding leaking portable tank    Nebulizer: Yes    CPAP/BIPAP: No    Supplier: [employer]    Potential Assistance Needed: No    SNF needed: refuses    Freedom of choice and list provided: NA    Pharmacy:  [facility]       Does Patient want to use MEDS to BEDS? No    Is patient currently receiving oral anticoagulation therapy? Yes Eliquis 5 mg BID    Is the Patient an JEREMIAH VERA [facility] with Readmission Risk Score greater than 14%? No  If yes, pt needs a follow up appointment made within 7 days. Family Members/Caregivers that pt would like involved in their care:    Yes    If yes, list name here:  Son Sam Andujar:  Family             Is patient in Isolation/One on One/Altered Mental Status? No  If yes, skip next question. If no, would they like an I-Pad to  use? No  If yes, call [phone]. Discharge Plan:  9/11/20 - [address] - Patient is from home alone.   Has a Rolator and cane, Wants VNS - had [facility] but they never came to house because she couldn't answer

## 2020-09-11 NOTE — CARE COORDINATION
[address] Encounter Date/Time: 9/10/2020 [facility] Account: [de-identified]    MRN: [id number]    Patient: [address] Serial #: [id number]      ENCOUNTER          Patient Class: I Private Enc? No Unit RM BD: [facility]    Hospital Service: Intermediate   ADM DX: SUNSHINE (acute kidney injury*   ADM Provider: Jaime Roberts MD   Procedure:     ATT Provider: Jaime Roberts MD   REF Provider:        PATIENT                 Name: María Macario : 1945 (75 yrs)   Address: [place] [address] Sex: Female   [facility] [address]         Marital Status:    Employer: RETIRED         Zoroastrian: [employer]   Primary Care Provider: Tran Watkins APR*         Primary Phone: [phone]   EMERGENCY CONTACT   Contact Name Legal Guardian? Relationship to Patient Home Phone Work Phone   1. Topher Paz  2. *No Contact Specified*      Child    [phone]                 GUARANTOR            Guarantor: María Macario     : 1945   Address: [address] Sex: Female     [place] [address]     Relation to Patient: Self       Home Phone: [phone]   Guarantor ID: [id number]       Work Phone:     Guarantor Employer: RETIRED         Status: RETIRED      COVERAGE        PRIMARY INSURANCE   Payor: [facility] MEDICARE Plan: Mariam IGNACIO*   Payor Address: ,          Group Number: [id number] Insurance Type: INDEMNITY   Subscriber Name: Carolyn Connor : 1945   Subscriber ID: [id number] Pat. Rel. to Sub: Self   SECONDARY INSURANCE   Payor:   Plan:     Payor Address:  ,           Group Number:   Insurance Type:     Subscriber Name:   Subscriber :     Subscriber ID:   Pat.  Rel. to Sub:

## 2020-09-11 NOTE — PLAN OF CARE
Problem: Falls - Risk of:  Goal: Will remain free from falls  Description: Will remain free from falls  Outcome: Ongoing   Pt remained free from falls this shift. 2/4 side rails up, bed wheels locked and in lowest position, nonskid footwear on, and proper safety measures put in place. Problem: Skin Integrity:  Goal: Absence of new skin breakdown  Description: Absence of new skin breakdown  Outcome: Ongoing   Pt remains free from skin breakdown this shift. Pt encouraged to turn and reposition q2h skin, assessed and maintained with turns and PRN. Feet elevated.        Problem: Urinary Elimination:  Goal: Skin integrity will improve  Description: Skin integrity will improve  Outcome: Ongoing  Ahn cath remains in place, monitor output, antibiotics administered, and ahn care done    Goal: Ability to recognize the need to void and respond appropriately will improve  Description: Ability to recognize the need to void and respond appropriately will improve  Outcome: Ongoing  Goal: Will remain free from infection  Description: Will remain free from infection  Outcome: Ongoing  Goal: Incidence of incontinence will decrease  Description: Incidence of incontinence will decrease  Outcome: Ongoing

## 2020-09-11 NOTE — PLAN OF CARE
Problem: Falls - Risk of:  Goal: Will remain free from falls  Description: Will remain free from falls  9/11/2020 0245 by Sergio Guan RN  Outcome: Met This Shift  Note: Patient remained free from falls this shift. Bed locked and in lowest position, call light within reach. Bed alarm in use. Patient calls out appropriately for assistance.       Problem: Falls - Risk of:  Goal: Absence of physical injury  Description: Absence of physical injury  9/11/2020 0245 by Sergio Guan RN  Outcome: Met This Shift     Problem: Physical Regulation:  Goal: Complications related to the disease process, condition or treatment will be avoided or minimized  Description: Complications related to the disease process, condition or treatment will be avoided or minimized  9/11/2020 0245 by Sergio Guan RN  Outcome: Ongoing

## 2020-09-11 NOTE — PROGRESS NOTES
Comprehensive Nutrition Assessment    Type and Reason for Visit:  Positive Nutrition Screen(weight loss, poor appetite and poor intake)    Nutrition Recommendations/Plan: Change diet to dental soft. Start Ensure High Protein 2 x daily. Nutrition Assessment:  Patient seen for nutrition assessment due to weight loss and poor appetite poor intake. No weight loss per patient and weight history. Patient reports appetite has been poor for few days. Patient is edentulous and has difficulty chewing food. Will change diet to dental soft, start Ensure high protein. Malnutrition Assessment:  Malnutrition Status: At risk for malnutrition (Comment)    Context:  Acute Illness     Findings of the 6 clinical characteristics of malnutrition:  Energy Intake:  1 - 75% or less of estimated energy requirements for 7 or more days  Weight Loss:  No significant weight loss     Body Fat Loss:  Unable to assess     Muscle Mass Loss:  Unable to assess    Fluid Accumulation:  1 - Mild     Strength:  Not Performed    Estimated Daily Nutrient Needs:  Energy (kcal):  1680 kcal based on [address] [address] Jeor using 1.2 factor; Weight Used for Energy Requirements:  Current     Protein (g):  80-91 gm based on 1.4-1.6 gm/kg of ideal; Weight Used for Protein Requirements:  Ideal          Nutrition Related Findings:  +2 pitting edema RLE, LLE      Wounds:  None       Current Nutrition Therapies:    DIET GENERAL; Anthropometric Measures:  · Height: 5' 5\" (165.1 cm)  · Current Body Weight: 197 lb (89.4 kg)   · Admission Body Weight: 197 lb (89.4 kg)    · Usual Body Weight: 190 lb (86.2 kg)     · Ideal Body Weight: 125 lbs; % Ideal Body Weight 157.6 %   · BMI: 32.8  · BMI Categories: Obese Class 1 (BMI 30.0-34. 9)       Nutrition Diagnosis:   · Inadequate oral intake related to biting/chewing (masticatory) difficulty, renal dysfunction, inadequate protein-energy intake as evidenced by intake 26-50%, localized or generalized fluid accumulation    Nutrition Interventions:   Food and/or Nutrient Delivery:  Modify Current Diet, Start Oral Nutrition Supplement  Nutrition Education/Counseling:  Education not indicated   Coordination of Nutrition Care:  Continued Inpatient Monitoring    Goals:  PO intakes to meet % of estimated nutrition needs       Nutrition Monitoring and Evaluation:     Physical Signs/Symptoms Outcomes:  Biochemical Data, Chewing or Swallowing     Discharge Planning:     Too soon to determine, Continue current diet       Some areas of assessment may be incomplete due to COVID-19 precautions    Alicia Ravi RD, LD  Office phone [phone]

## 2020-09-11 NOTE — PROGRESS NOTES
Dr. Mary Carmen Singh notified of + orthostatics. Orders to continue checking orthostatics q shift. Continue [email or web address]. Hold diuretics. Apply compression stockings, 20-30mmhg, knee high. TORB.

## 2020-09-12 LAB
ANION GAP SERPL CALCULATED.3IONS-SCNC: 9 MMOL/L (ref 9–17)
BUN BLDV-MCNC: 16 MG/DL (ref 8–23)
BUN/CREAT BLD: ABNORMAL (ref 9–20)
CALCIUM SERPL-MCNC: 8.9 MG/DL (ref 8.6–10.4)
CHLORIDE BLD-SCNC: 105 MMOL/L (ref 98–107)
CO2: 28 MMOL/L (ref 20–31)
CREAT SERPL-MCNC: 0.89 MG/DL (ref 0.5–0.9)
GFR AFRICAN AMERICAN: >60 ML/MIN
GFR NON-AFRICAN AMERICAN: >60 ML/MIN
GFR SERPL CREATININE-BSD FRML MDRD: ABNORMAL ML/MIN/{1.73_M2}
GFR SERPL CREATININE-BSD FRML MDRD: ABNORMAL ML/MIN/{1.73_M2}
GLUCOSE BLD-MCNC: 150 MG/DL (ref 70–99)
MAGNESIUM: 2.4 MG/DL (ref 1.6–2.6)
POTASSIUM SERPL-SCNC: 3.9 MMOL/L (ref 3.7–5.3)
SODIUM BLD-SCNC: 142 MMOL/L (ref 135–144)

## 2020-09-12 PROCEDURE — 96374 THER/PROPH/DIAG INJ IV PUSH: CPT

## 2020-09-12 PROCEDURE — 6370000000 HC RX 637 (ALT 250 FOR IP): Performed by: FAMILY MEDICINE

## 2020-09-12 PROCEDURE — 36415 COLL VENOUS BLD VENIPUNCTURE: CPT

## 2020-09-12 PROCEDURE — 2580000003 HC RX 258: Performed by: FAMILY MEDICINE

## 2020-09-12 PROCEDURE — 83735 ASSAY OF MAGNESIUM: CPT

## 2020-09-12 PROCEDURE — 6360000002 HC RX W HCPCS: Performed by: INTERNAL MEDICINE

## 2020-09-12 PROCEDURE — 99233 SBSQ HOSP IP/OBS HIGH 50: CPT | Performed by: FAMILY MEDICINE

## 2020-09-12 PROCEDURE — 80048 BASIC METABOLIC PNL TOTAL CA: CPT

## 2020-09-12 PROCEDURE — 2060000000 HC ICU INTERMEDIATE R&B

## 2020-09-12 PROCEDURE — G0378 HOSPITAL OBSERVATION PER HR: HCPCS

## 2020-09-12 RX ORDER — FUROSEMIDE 10 MG/ML
20 INJECTION INTRAMUSCULAR; INTRAVENOUS ONCE
Status: COMPLETED | OUTPATIENT
Start: 2020-09-12 | End: 2020-09-12

## 2020-09-12 RX ADMIN — BUPROPION HYDROCHLORIDE 150 MG: 150 TABLET, EXTENDED RELEASE ORAL at 08:09

## 2020-09-12 RX ADMIN — DILTIAZEM HYDROCHLORIDE 180 MG: 180 CAPSULE, COATED, EXTENDED RELEASE ORAL at 08:09

## 2020-09-12 RX ADMIN — APIXABAN 5 MG: 5 TABLET, FILM COATED ORAL at 08:09

## 2020-09-12 RX ADMIN — PANTOPRAZOLE SODIUM 40 MG: 40 TABLET, DELAYED RELEASE ORAL at 05:11

## 2020-09-12 RX ADMIN — GUAIFENESIN 600 MG: 600 TABLET, EXTENDED RELEASE ORAL at 08:09

## 2020-09-12 RX ADMIN — FLUTICASONE PROPIONATE 2 PUFF: 110 AEROSOL, METERED RESPIRATORY (INHALATION) at 21:53

## 2020-09-12 RX ADMIN — Medication 10 ML: at 08:08

## 2020-09-12 RX ADMIN — OXYCODONE HYDROCHLORIDE AND ACETAMINOPHEN 1000 MG: 500 TABLET ORAL at 08:09

## 2020-09-12 RX ADMIN — TAMSULOSIN HYDROCHLORIDE 0.4 MG: 0.4 CAPSULE ORAL at 08:10

## 2020-09-12 RX ADMIN — HYDROCODONE BITARTRATE AND ACETAMINOPHEN 1 TABLET: 5; 325 TABLET ORAL at 08:20

## 2020-09-12 RX ADMIN — FLUTICASONE PROPIONATE 1 SPRAY: 50 SPRAY, METERED NASAL at 08:09

## 2020-09-12 RX ADMIN — FERROUS SULFATE TAB 325 MG (65 MG ELEMENTAL FE) 325 MG: 325 (65 FE) TAB at 08:10

## 2020-09-12 RX ADMIN — CIPROFLOXACIN HYDROCHLORIDE 500 MG: 500 TABLET, FILM COATED ORAL at 08:09

## 2020-09-12 RX ADMIN — Medication 10 ML: at 21:53

## 2020-09-12 RX ADMIN — LEVOTHYROXINE SODIUM 50 MCG: 0.05 TABLET ORAL at 08:09

## 2020-09-12 RX ADMIN — FLUTICASONE PROPIONATE 2 PUFF: 110 AEROSOL, METERED RESPIRATORY (INHALATION) at 08:09

## 2020-09-12 RX ADMIN — ATORVASTATIN CALCIUM 10 MG: 10 TABLET, FILM COATED ORAL at 08:09

## 2020-09-12 RX ADMIN — FUROSEMIDE 20 MG: 10 INJECTION, SOLUTION INTRAMUSCULAR; INTRAVENOUS at 18:05

## 2020-09-12 RX ADMIN — METOPROLOL SUCCINATE 25 MG: 25 TABLET, EXTENDED RELEASE ORAL at 08:10

## 2020-09-12 RX ADMIN — APIXABAN 5 MG: 5 TABLET, FILM COATED ORAL at 21:52

## 2020-09-12 RX ADMIN — SODIUM CHLORIDE: 9 INJECTION, SOLUTION INTRAVENOUS at 04:14

## 2020-09-12 RX ADMIN — GUAIFENESIN 600 MG: 600 TABLET, EXTENDED RELEASE ORAL at 21:52

## 2020-09-12 ASSESSMENT — PAIN SCALES - GENERAL
PAINLEVEL_OUTOF10: 0
PAINLEVEL_OUTOF10: 0
PAINLEVEL_OUTOF10: 6

## 2020-09-12 NOTE — PROGRESS NOTES
FAMILY MEDICINE  - PROGRESS NOTE    Date:  9/12/2020  Isabel Jaime  [id number]      Chief Complaint   Patient presents with   Gladystine Mower Fall    Dizziness         Interval History:  Improved, her kidney function has returned to normal limits. She is feeling anxious this am though.        Subjective  Constitutional: positive for fatigue  Respiratory: positive for emphysema  Musculoskeletal:positive for arthralgias and myalgias  Behavioral/Psych: positive for anxiety, depression and obesity  Endocrine: positive for hypothyroid:    Objective:    BP (!) 142/66   Pulse 87   Temp 98 °F (36.7 °C) (Oral)   Resp 16   Ht 5' 5\" (1.651 m)   Wt 197 lb 5 oz (89.5 kg)   SpO2 95%   BMI 32.83 kg/m²   General appearance - alert, well appearing, and in no distress and overweight  Mental status - alert, oriented to person, place, and time  Eyes - pupils equal and reactive, extraocular eye movements intact  Ears - hearing grossly normal bilaterally  Nose - normal and patent, no erythema, discharge or polyps  Mouth - mucous membranes moist, pharynx normal without lesions  Neck - supple, no significant adenopathy  Lymphatics - no palpable lymphadenopathy, no hepatosplenomegaly  Chest - clear to auscultation, no wheezes, rales or rhonchi, symmetric air entry, decreased air entry noted posteriorly  Heart - normal rate, regular rhythm, normal S1, S2, no murmurs, rubs, clicks or gallops  Abdomen - soft, nontender, nondistended, no masses or organomegaly  Breasts - not examined  Back exam - not examined  Neurological - alert, oriented, normal speech, no focal findings or movement disorder noted  Musculoskeletal - osteoarthritic changes noted in both hands  Extremities - pedal edema 1 +  Skin - normal coloration and turgor, no rashes, no suspicious skin lesions noted    Data:   Medications:   Current Facility-Administered Medications   Medication Dose Route Frequency Provider Last Rate Last 180 mg at 09/12/20 0809    tamsulosin (FLOMAX) capsule 0.4 mg  0.4 mg Oral Daily Kiran Rawls MD   0.4 mg at 09/12/20 0810    levalbuterol (XOPENEX) nebulizer solution 1.25 mg  1.25 mg Nebulization Q6H PRN Kiran Rawls MD        clonazePAM [employer]) tablet 0.5 mg  0.5 mg Oral TID PRN Kiran Rawls MD   0.5 mg at 09/11/20 2133    HYDROcodone-acetaminophen (NORCO) 5-325 MG per tablet 1 tablet  1 tablet Oral Q8H PRN Kiran Rawls MD   1 tablet at 09/10/20 1658    fluticasone (FLOVENT HFA) 110 MCG/ACT inhaler 2 puff  2 puff Inhalation BID Kiran Rawls MD   2 puff at 09/12/20 0809    fluticasone (FLONASE) 50 MCG/ACT nasal spray 1 spray  1 spray Each Nostril Daily Krian Rawls MD   1 spray at 09/12/20 0809    atorvastatin (LIPITOR) tablet 10 mg  10 mg Oral Daily Kiran Rawls MD   10 mg at 09/12/20 0809    ciprofloxacin (CIPRO) tablet 500 mg  500 mg Oral Daily Kiran Rawls MD   500 mg at 09/12/20 0809    0.9 % sodium chloride infusion   Intravenous Continuous Kiran Rawls MD 75 mL/hr at 09/12/20 0414         Intake/Output Summary (Last 24 hours) at 9/12/2020 0811  Last data filed at 9/11/2020 1800  Gross per 24 hour   Intake 859 ml   Output 450 ml   Net 409 ml     Recent Results (from the past 24 hour(s))   CBC    Collection Time: 09/11/20  8:31 AM   Result Value Ref Range    WBC 5.8 3.5 - 11.0 k/uL    RBC 2.83 (L) 4.0 - 5.2 m/uL    Hemoglobin 8.2 (L) 12.0 - 16.0 g/dL    Hematocrit 26.3 (L) 36 - 46 %    MCV 92.9 80 - 100 fL    MCH 29.1 26 - 34 pg    MCHC 31.4 31 - 37 g/dL    RDW 20.8 (H) 11.5 - 14.9 %    Platelets 644 852 - 852 k/uL    MPV 8.0 6.0 - 12.0 fL    NRBC Automated NOT REPORTED per 100 WBC   Comprehensive Metabolic Panel    Collection Time: 09/11/20  8:31 AM   Result Value Ref Range    Glucose 140 (H) 70 - 99 mg/dL    BUN 26 (H) 8 - 23 mg/dL    CREATININE 1.43 (H) 0.50 - 0.90 mg/dL    Bun/Cre Ratio NOT REPORTED 9 - 20    Calcium 8.3 (L) 8.6 - 10.4 mg/dL    Sodium 144 135 - 144 mmol/L    Potassium 3.2 (L) 3.7 - 5.3 mmol/L    Chloride 104 98 - 107 mmol/L    CO2 25 20 - 31 mmol/L    Anion Gap 15 9 - 17 mmol/L    Alkaline Phosphatase 111 (H) 35 - 104 U/L    ALT 20 5 - 33 U/L    AST 21 <32 U/L    Total Bilirubin 0.46 0.3 - 1.2 mg/dL    Total Protein 5.8 (L) 6.4 - 8.3 g/dL    Alb 3.3 (L) 3.5 - 5.2 g/dL    Albumin/Globulin Ratio NOT REPORTED 1.0 - 2.5    GFR Non- 36 (L) >60 mL/min    GFR  44 (L) >60 mL/min    GFR Comment          GFR Staging NOT REPORTED      -----------------------------------------------------------------  RAD:  EKG:  Micro:     Assessment & Plan:    Patient Active Problem List:     Acquired hypothyroidism     Hypertension     Primary osteoarthritis of right knee     A-fib (HCC)     SUNSHINE (acute kidney injury) ([facility] [address]     Acute encephalopathy     SIRS (systemic inflammatory response syndrome) (HCC)     Normocytic anemia     Pulmonary hypertension (HCC)     COPD (chronic obstructive pulmonary disease) (HCC)     History of GI bleed     Constipation     HA (generalized anxiety disorder)     Lumbar radiculopathy     Lumbosacral spondylosis without myelopathy     Benign hypertension with CKD (chronic kidney disease) stage III (HCC)     CKD (chronic kidney disease) stage 3, GFR 30-59 ml/min (HCC)     Alcohol withdrawal syndrome with complication (HCC)     Moderate major depression (HCC)     PAD (peripheral artery disease) (HCC)     Acute kidney injury (HCC)     Obesity, Class I, BMI 30-34.9     Acute on chronic diastolic CHF (congestive heart failure) ([facility])           Plan:  -SUNSHINE on CKD - improved, Renal following.  -A. Fib. - rate controlled. -HTN - stable. -COPD - stable. -Continue current treatments.  -Complete orders per chart.     See orders   Disposition:    Electronically signed by Maggie Flores MD on 9/12/2020 at 8:11 AM

## 2020-09-12 NOTE — DISCHARGE INSTR - COC
Continuity of Care Form    Patient Name: Tisha Damico   :  1945  MRN:  [id number]    Admit date:  9/10/2020  Discharge date:  09/15/2020    Code Status Order: Full Code   Advance Directives:   Advance Care Flowsheet Documentation       Date/Time Healthcare Directive Type of Healthcare Directive Copy in [address] Agent's Name Healthcare Agent's Phone Number    09/10/20 1600  No, patient does not have an advance directive for healthcare treatment -- -- -- -- --            Admitting Physician:  Chas Eddy MD  PCP: WILLIE Gonzalez - CNP    Discharging Nurse: Veda Shone, [address] Unit/Room#: 7/2087  Discharging Unit Phone Number: [phone]    Emergency Contact:   Extended Emergency Contact Information  Primary Emergency Contact: Hayden [address] Phone: [phone]  Mobile Phone: [phone]  Relation: Child    Past Surgical History:  Past Surgical History:   Procedure Laterality Date    COLONOSCOPY      EYE SURGERY Bilateral     cataracts    JOINT REPLACEMENT      Left knee on 20       Immunization History:   Immunization History   Administered Date(s) Administered    Influenza Vaccine, unspecified formulation 2018    Influenza Virus Vaccine 2017    Influenza, High Dose (Fluzone 65 yrs and older) 10/19/2012    Influenza, Triv, inactivated, subunit, adjuvanted, IM (Fluad 65 yrs and older) 2019    Pneumococcal Conjugate 13-valent (Juklwol88) 2018    Pneumococcal Polysaccharide (Ahildizew79) 2015    Tetanus 1998    Tetanus Toxoid, absorbed 1998    Zoster Recombinant (Shingrix) 2020, 2020       Active Problems:  Patient Active Problem List   Diagnosis Code    Acquired hypothyroidism E03.9    Hypertension I10    Primary osteoarthritis of right knee M17.11    A-fib ([facility] [address] I48.91    SUNSHINE (acute kidney injury) ([facility] [address] N17.9    Acute encephalopathy G93.40    SIRS (systemic inflammatory response syndrome) ([facility]) R65.10    Normocytic anemia D64.9    Pulmonary hypertension ([facility]) I27.20    COPD (chronic obstructive pulmonary disease) ([facility]) J44.9    History of GI bleed Z87.19    Constipation K59.00    HA (generalized anxiety disorder) F41.1    Lumbar radiculopathy M54.16    Lumbosacral spondylosis without myelopathy M47.817    Benign hypertension with CKD (chronic kidney disease) stage III ([facility]) I12.9, N18.3    CKD (chronic kidney disease) stage 3, GFR 30-59 ml/min ([facility]) N18.3    Alcohol withdrawal syndrome with complication ([facility]) D84.473    Moderate major depression ([facility]) F32.1    PAD (peripheral artery disease) ([facility]) I73.9    Acute kidney injury ([facility]) N17.9    Obesity, Class I, BMI 30-34.9 E66.9    Acute on chronic diastolic CHF (congestive heart failure) ([facility]) I50.33       Isolation/Infection:   Isolation            No Isolation          Patient Infection Status       None to display            Nurse Assessment:  Last Vital Signs: BP (!) 151/72   Pulse 101   Temp 98.4 °F (36.9 °C) (Oral)   Resp 24   Ht 5' 5\" (1.651 m)   Wt 197 lb 5 oz (89.5 kg)   SpO2 95%   BMI 32.83 kg/m²     Last documented pain score (0-10 scale): Pain Level: 6  Last Weight:   Wt Readings from Last 1 Encounters:   09/11/20 197 lb 5 oz (89.5 kg)     Mental Status:  oriented and alert    IV Access:  - None    Nursing Mobility/ADLs:  Walking   Assisted  Transfer  Assisted  Bathing  Assisted  Dressing  Independent  Toileting  Assisted  Feeding  Independent  Med Admin  Independent  Med Delivery   whole    Wound Care Documentation and Therapy:  Incision 09/13/18 Knee Left (Active)   Number of days: 730        Elimination:  Continence:   · Bowel:  Yes  · Bladder: Yes  Urinary Catheter: Removal Date 09/13/2020   Colostomy/Ileostomy/Ileal Conduit: No       Date of Last BM: 09/13/2020    Intake/Output Summary (Last 24 hours) at 9/12/2020 1304  Last data filed at 9/11/2020 health services. [facility]  [address]  [address] [address]  Phone [phone]  Fax  [phone]    Home health care agency's  to evaluate patient two weeks prior to discharge from home health to determine post-discharge services. / signature: Electronically signed by Herman Carvalho RN on 9/12/20 at 1:05 PM EDT    PHYSICIAN SECTION    Prognosis: Fair    Condition at Discharge: Stable    Rehab Potential (if transferring to Rehab): Fair    Recommended Labs or Other Treatments After Discharge: Home O2 eval upon discharge home - Contact [employer] as patient would like portable oxygen concentrator    Physician Certification: I certify the above information and transfer of John Backbone  is necessary for the continuing treatment of the diagnosis listed and that she requires Matthew Huertauel for greater 30 days.      Update Admission H&P: No change in H&P    PHYSICIAN SIGNATURE:  Electronically signed by Patrick Cardenas MD on 9/13/20 at 11:11 AM EDT

## 2020-09-12 NOTE — PLAN OF CARE
Problem: Falls - Risk of:  Goal: Will remain free from falls  Description: Will remain free from falls  9/12/2020 0437 by Cheryle Plowman, RN  Outcome: Ongoing   No falls noted. Call light within reach and 2 out of 4 side rails raised. Bed in lowest position. Patient called for assistance approprietly. Fall sign posted, non-slip socks on, and bed alarm set. Problem: Skin Integrity:  Goal: Absence of new skin breakdown  Description: Absence of new skin breakdown  9/12/2020 0437 by Cheryle Plowman, RN  Outcome: Ongoing   No signs of new skin breakdown noted this shift. Patient has scattered abrasions on both legs. Patient stated these abrasions are due to her legs swelling.

## 2020-09-12 NOTE — PROGRESS NOTES
09/11/20 0135 197 lb 5 oz (89.5 kg)   09/10/20 1228 187 lb (84.8 kg)       Constitutional:  Alert, awake, no apparent distress  Cardiovascular:  S1, S2 without m/r/g  Respiratory:  CTA B without w/r/r  Abdomen: +bs, soft, nt  Ext: 1+ LE edema    Data/  Recent Labs     09/11/20  0831   WBC 5.8   HGB 8.2*   HCT 26.3*   MCV 92.9        Recent Labs     09/10/20  1305 09/11/20  0831 09/12/20  0838    144 142   K 4.0 3.2* 3.9   CL 98 104 105   CO2 25 25 28   GLUCOSE 99 140* 150*   MG  --   --  2.4   BUN 28* 26* 16   CREATININE 2.06* 1.43* 0.89   LABGLOM 24* 36* >60   GFRAA 29* 44* >60         Assessment/   1. Acute kidney injury on CKD , most likely secondary to prerenal azotemia due to use of diuretics, in combination with urinary retention rule out orthostatic hypotension. 2. CKD stage III most likely secondary to hypertensive nephropathy    3. Essential hypertension well-controlled rule out orthostatic hypotension      4. Frequent fall rule out orthostatic hypotension--    5. CHF with preserved EF diastolic dysfunction, moderate TR, mild pulmonary hypertension       6. .Acute hypokalemia-resolved. Plan/   1-D/C IVF -give IV lasix 20 mg x one. 2-Sliding-scale replacement of Potassium  3-. Basic metabolic panel a.m.  4.  Oral fluid restriction of 1500 mils per day.     Anselmo Winn

## 2020-09-12 NOTE — CARE COORDINATION
ONGOING DISCHARGE PLAN:    Spoke with patient regarding discharge plan and patient confirms that plan is still to go home with [facility]    Patient wants Portable Concentrator    Remains on Iv fluids    Labs today cre 1.43,BUN 26    Will continue to follow for additional discharge needs.     Electronically signed by Taj Middleton RN on 9/12/2020 at 1:00 PM

## 2020-09-12 NOTE — PLAN OF CARE
Problem: Falls - Risk of:  Goal: Will remain free from falls  Description: Will remain free from falls  9/12/2020 1755 by Oriana Cantu RN  Outcome: Ongoing  9/12/2020 0437 by Vicki Weems RN  Outcome: Ongoing  Goal: Absence of physical injury  Description: Absence of physical injury  9/12/2020 1755 by Oriana Cantu RN  Outcome: Ongoing  9/12/2020 0437 by Vicki Weems RN  Outcome: Ongoing     Problem: Physical Regulation:  Goal: Complications related to the disease process, condition or treatment will be avoided or minimized  Description: Complications related to the disease process, condition or treatment will be avoided or minimized  9/12/2020 1755 by Oriana Cantu RN  Outcome: Ongoing  9/12/2020 0437 by Vicki Weems RN  Outcome: Ongoing     Problem: Nutrition  Goal: Optimal nutrition therapy  9/12/2020 1755 by Oriana Cantu RN  Outcome: Ongoing  9/12/2020 0437 by Vicki Weems RN  Outcome: Ongoing     Problem: Skin Integrity:  Goal: Absence of new skin breakdown  Description: Absence of new skin breakdown  9/12/2020 1755 by Oriana Cantu RN  Outcome: Ongoing  9/12/2020 0437 by Vicki Weems RN  Outcome: Ongoing     Problem: Urinary Elimination:  Goal: Skin integrity will improve  Description: Skin integrity will improve  9/12/2020 1755 by Oriana Cantu RN  Outcome: Ongoing  9/12/2020 0437 by Vicki Weems RN  Outcome: Ongoing  Goal: Ability to recognize the need to void and respond appropriately will improve  Description: Ability to recognize the need to void and respond appropriately will improve  9/12/2020 1755 by Oriana Cantu RN  Outcome: Ongoing  9/12/2020 0437 by Vicki Weems RN  Outcome: Ongoing  Goal: Will remain free from infection  Description: Will remain free from infection  9/12/2020 1755 by Oriana Cantu RN  Outcome: Ongoing  9/12/2020 0437 by Vicki Weems RN  Outcome: Ongoing  Goal: Incidence of incontinence will decrease  Description: Incidence of incontinence will decrease  9/12/2020 1755 by Elaine Allen RN  Outcome: Ongoing  9/12/2020 0437 by Cheryle Plowman, RN  Outcome: Ongoing

## 2020-09-13 LAB
ANION GAP SERPL CALCULATED.3IONS-SCNC: 6 MMOL/L (ref 9–17)
CHLORIDE BLD-SCNC: 103 MMOL/L (ref 98–107)
CO2: 35 MMOL/L (ref 20–31)
POTASSIUM SERPL-SCNC: 3.6 MMOL/L (ref 3.7–5.3)
SODIUM BLD-SCNC: 144 MMOL/L (ref 135–144)

## 2020-09-13 PROCEDURE — 97116 GAIT TRAINING THERAPY: CPT

## 2020-09-13 PROCEDURE — 99233 SBSQ HOSP IP/OBS HIGH 50: CPT | Performed by: FAMILY MEDICINE

## 2020-09-13 PROCEDURE — 96376 TX/PRO/DX INJ SAME DRUG ADON: CPT

## 2020-09-13 PROCEDURE — 97110 THERAPEUTIC EXERCISES: CPT

## 2020-09-13 PROCEDURE — 80051 ELECTROLYTE PANEL: CPT

## 2020-09-13 PROCEDURE — G0378 HOSPITAL OBSERVATION PER HR: HCPCS

## 2020-09-13 PROCEDURE — 36415 COLL VENOUS BLD VENIPUNCTURE: CPT

## 2020-09-13 PROCEDURE — 2060000000 HC ICU INTERMEDIATE R&B

## 2020-09-13 PROCEDURE — 97162 PT EVAL MOD COMPLEX 30 MIN: CPT

## 2020-09-13 PROCEDURE — 2580000003 HC RX 258: Performed by: FAMILY MEDICINE

## 2020-09-13 PROCEDURE — 6370000000 HC RX 637 (ALT 250 FOR IP): Performed by: FAMILY MEDICINE

## 2020-09-13 PROCEDURE — 6360000002 HC RX W HCPCS: Performed by: INTERNAL MEDICINE

## 2020-09-13 RX ORDER — FUROSEMIDE 10 MG/ML
20 INJECTION INTRAMUSCULAR; INTRAVENOUS ONCE
Status: COMPLETED | OUTPATIENT
Start: 2020-09-13 | End: 2020-09-13

## 2020-09-13 RX ORDER — BACLOFEN 10 MG/1
TABLET ORAL
Qty: 270 TABLET | Refills: 3 | Status: SHIPPED
Start: 2020-09-13 | End: 2020-11-30 | Stop reason: SDUPTHER

## 2020-09-13 RX ORDER — CIPROFLOXACIN 500 MG/1
500 TABLET, FILM COATED ORAL
Qty: 5 TABLET | Refills: 0 | Status: SHIPPED | OUTPATIENT
Start: 2020-09-14 | End: 2020-09-19

## 2020-09-13 RX ORDER — ZOLPIDEM TARTRATE 5 MG/1
5 TABLET ORAL NIGHTLY PRN
Status: DISCONTINUED | OUTPATIENT
Start: 2020-09-13 | End: 2020-09-15 | Stop reason: HOSPADM

## 2020-09-13 RX ADMIN — TAMSULOSIN HYDROCHLORIDE 0.4 MG: 0.4 CAPSULE ORAL at 09:00

## 2020-09-13 RX ADMIN — FLUTICASONE PROPIONATE 2 PUFF: 110 AEROSOL, METERED RESPIRATORY (INHALATION) at 21:50

## 2020-09-13 RX ADMIN — CIPROFLOXACIN HYDROCHLORIDE 500 MG: 500 TABLET, FILM COATED ORAL at 09:00

## 2020-09-13 RX ADMIN — BACLOFEN 5 MG: 10 TABLET ORAL at 23:16

## 2020-09-13 RX ADMIN — HYDROCODONE BITARTRATE AND ACETAMINOPHEN 1 TABLET: 5; 325 TABLET ORAL at 22:07

## 2020-09-13 RX ADMIN — ATORVASTATIN CALCIUM 10 MG: 10 TABLET, FILM COATED ORAL at 09:00

## 2020-09-13 RX ADMIN — LEVOTHYROXINE SODIUM 50 MCG: 0.05 TABLET ORAL at 09:00

## 2020-09-13 RX ADMIN — PANTOPRAZOLE SODIUM 40 MG: 40 TABLET, DELAYED RELEASE ORAL at 06:34

## 2020-09-13 RX ADMIN — Medication 10 ML: at 21:51

## 2020-09-13 RX ADMIN — FUROSEMIDE 20 MG: 10 INJECTION, SOLUTION INTRAMUSCULAR; INTRAVENOUS at 13:01

## 2020-09-13 RX ADMIN — FLUTICASONE PROPIONATE 2 PUFF: 110 AEROSOL, METERED RESPIRATORY (INHALATION) at 09:00

## 2020-09-13 RX ADMIN — APIXABAN 5 MG: 5 TABLET, FILM COATED ORAL at 21:51

## 2020-09-13 RX ADMIN — FERROUS SULFATE TAB 325 MG (65 MG ELEMENTAL FE) 325 MG: 325 (65 FE) TAB at 09:00

## 2020-09-13 RX ADMIN — BUPROPION HYDROCHLORIDE 150 MG: 150 TABLET, EXTENDED RELEASE ORAL at 08:59

## 2020-09-13 RX ADMIN — FLUTICASONE PROPIONATE 1 SPRAY: 50 SPRAY, METERED NASAL at 09:01

## 2020-09-13 RX ADMIN — OXYCODONE HYDROCHLORIDE AND ACETAMINOPHEN 1000 MG: 500 TABLET ORAL at 09:00

## 2020-09-13 RX ADMIN — Medication 10 ML: at 09:01

## 2020-09-13 RX ADMIN — ZOLPIDEM TARTRATE 5 MG: 5 TABLET ORAL at 23:16

## 2020-09-13 RX ADMIN — CLONAZEPAM 0.5 MG: 1 TABLET ORAL at 11:40

## 2020-09-13 RX ADMIN — GUAIFENESIN 600 MG: 600 TABLET, EXTENDED RELEASE ORAL at 08:59

## 2020-09-13 RX ADMIN — APIXABAN 5 MG: 5 TABLET, FILM COATED ORAL at 09:00

## 2020-09-13 RX ADMIN — GUAIFENESIN 600 MG: 600 TABLET, EXTENDED RELEASE ORAL at 21:51

## 2020-09-13 RX ADMIN — DILTIAZEM HYDROCHLORIDE 180 MG: 180 CAPSULE, COATED, EXTENDED RELEASE ORAL at 09:00

## 2020-09-13 RX ADMIN — CLONAZEPAM 0.5 MG: 1 TABLET ORAL at 23:16

## 2020-09-13 RX ADMIN — METOPROLOL SUCCINATE 25 MG: 25 TABLET, EXTENDED RELEASE ORAL at 09:00

## 2020-09-13 ASSESSMENT — PAIN SCALES - GENERAL
PAINLEVEL_OUTOF10: 5
PAINLEVEL_OUTOF10: 2
PAINLEVEL_OUTOF10: 0

## 2020-09-13 NOTE — PROGRESS NOTES
Nephrology Progress Note    Subjective/   70y.o. year old female who we are seeing in consultation for acute kidney injury. Interval history:  Patient denies any dizziness, shortness of breath or nausea. Patient diuresed well with IV Lasix yesterday. Orthostatic blood pressure and pulse have improved. Renal function is stable. History of Present Illness: This is a 70 y.o. female with past medical history of essential hypertension, A. fib, chronic kidney disease stage III with baseline creatinine 1.3 mg/dL, COPD, CHF with preserved EF diastolic dysfunction moderate tricuspid regurgitation mild pulmonary hypertension. Patient presented to the hospital because of dizziness and fall, patient complains that she has been feeling dizzy and frequently falling, she fell last night on the floor and hit her head. Patient also had blood work done which showed elevated creatinine and elevated proBNP and patient was advised to go to the hospital.  Initial lab investigation showed serum creatinine of 2.0 mg/dL which is increased from 1.3 mg/dL and therefore nephrology consultation has been requested.  Heriberto Ventura denies any chest pain nausea vomiting no shortness of breath no difficulty urination.   Bladder scan showed urinary retention and Pantoja catheter was placed in the ER.        Objective/     Vitals:    09/12/20 1930 09/12/20 2259 09/13/20 0732 09/13/20 1259   BP: 126/76 118/76 (!) 142/72 106/62   Pulse: 79 86 97 84   Resp: 26 20 20 18   Temp: 98.8 °F (37.1 °C) 98.6 °F (37 °C) 97.7 °F (36.5 °C) 98.8 °F (37.1 °C)   TempSrc: Oral Oral Oral Oral   SpO2: 98% 98% 98% 99%   Weight:       Height:         24HR INTAKE/OUTPUT:      Intake/Output Summary (Last 24 hours) at 9/13/2020 1533  Last data filed at 9/13/2020 1346  Gross per 24 hour   Intake 1150 ml   Output 1650 ml   Net -500 ml     Patient Vitals for the past 96 hrs (Last 3 readings):   Weight   09/11/20 0135 197 lb 5 oz (89.5 kg)   09/10/20 1228 187 lb (84.8 kg)       Constitutional:  Alert, awake, no apparent distress  Cardiovascular:  S1, S2 without m/r/g  Respiratory:  CTA B without w/r/r  Abdomen: +bs, soft, nt  Ext: 1+ LE edema    Data/  Recent Labs     09/11/20  0831   WBC 5.8   HGB 8.2*   HCT 26.3*   MCV 92.9        Recent Labs     09/11/20  0831 09/12/20  0838    142   K 3.2* 3.9    105   CO2 25 28   GLUCOSE 140* 150*   MG  --  2.4   BUN 26* 16   CREATININE 1.43* 0.89   LABGLOM 36* >60   GFRAA 44* >60         Assessment/   1. Acute kidney injury on CKD , most likely secondary to prerenal azotemia due to use of diuretics, in combination with urinary retention-patient orthostatic on admission and improving. 2. CKD stage 3 most likely secondary to hypertensive nephropathy    3. Essential hypertension well-controlled rule out orthostatic hypotension      4. Frequent fall and orthostatic hypotension    5. CHF with preserved EF diastolic dysfunction, moderate TR, mild pulmonary hypertension       6. .Acute hypokalemia-resolved. Plan/   1- IV lasix 20 mg x one today-resume p.o. Lasix 20 mg daily(patient was on 40 mg daily at home)  2-Sliding-scale replacement of Potassium  3-. Basic metabolic panel a.m.  4.  Oral fluid restriction of 1500 mils per day.   5.  Plans for skilled nursing facility        Jennifer Rodriguez M.D, [facility]  Nephrologist

## 2020-09-13 NOTE — CARE COORDINATION
ONGOING DISCHARGE PLAN:    Writer spoke with patient today after her Pt/Ot eval.  patient now agreeable to go to ECF,Wants [facility] as this is close to her home. Pre cert is needed    Will continue to follow for additional discharge needs.     Electronically signed by Nagi Felipe RN on 9/13/2020 at 2:52 PM

## 2020-09-13 NOTE — CARE COORDINATION
ONGOING DISCHARGE PLAN:    Spoke with patient regarding discharge plan and patient confirms that plan is still to go home with [facility] care. Patient wants  A life alert. Will have  consult for home care to assess for safety within home    Probable discharge today    Patient wants Portable Concentrator, however she states she only has oxygen PRN. Dr Sandra Jackson suggests ECF, however patient is refusing. PT/OT to determine if patient is safe to go home. Will continue to follow for additional discharge needs.     Electronically signed by Candance Chol, RN on 9/13/2020 at 10:46 AM

## 2020-09-13 NOTE — PROGRESS NOTES
Physical Therapy    Facility/Department: [facility] PROGRESSIVE CARE  Initial Assessment    NAME: John Jimenez  : 1945  MRN: [id number]    Date of Service: 2020    Discharge Recommendations:  Patient would benefit from continued therapy after discharge        Assessment   Body structures, Functions, Activity limitations: Decreased functional mobility ; Decreased strength;Decreased endurance;Decreased balance;Decreased posture;Decreased safe awareness  Assessment: Pt with generalized weakness, poor tolerance to activity, pt will not be safe to return to her home at this level, she lives alone, and has 2 falls in last month. Will benefit from continued therapy at discharge. Pt was hesitant to consider SNF, and wanted to go home. Therapist discussed her fall risk and how therapy will benefit her to improve endurance/strength, for her to be able to perform her own ADL/self care, home management and reducing fall risk. Pt agreeable to consider SNF for short time. Treatment Diagnosis: Impaired function, poor tolerance to activity. Specific instructions for Next Treatment: Monitor sao2, wean o2 as able, pt does not use o2 at home at baseline during day time, only uses it at night. Prognosis: Fair  Decision Making: Medium Complexity  History: past medical history of essential hypertension, A. fib, chronic kidney disease stage III with baseline creatinine 1.3 mg/dL, COPD, CHF with preserved EF diastolic dysfunction moderate tricuspid regurgitation mild pulmonary hypertension. Exam: ROM, MMT, fucntion, activity tolerance  Clinical Presentation: Increased dyspnea with minimal activity, desats without use of o2. REQUIRES PT FOLLOW UP: Yes  Activity Tolerance  Activity Tolerance: Patient limited by fatigue;Patient limited by endurance       Patient Diagnosis(es): The primary encounter diagnosis was SUNSHINE (acute kidney injury) ([facility] [address]  Diagnoses of Syncope and collapse and Protein-calorie malnutrition, unspecified severity Comment  Comments: Pt in recliner, o2 off, sao2 85%, put o2 back on pt, sao2 98%, educated, not to take o2 off as she desats without use of o2. Subjective  Subjective: \"My legs where so swollen, I fell and I could not get up\". \"I had fallen a month ago too\" Pt reports she is itching all over, waiting for nurse to come and give her something for itching. Pain Screening  Patient Currently in Pain: Denies  Vital Signs  Patient Currently in Pain: Denies       Orientation  Orientation  Overall Orientation Status: Within Functional Limits  Social/Functional History  Social/Functional History  Lives With: Alone  Type of Home: House  Home Layout: One level  Home Access: Stairs to enter with rails(Grab bar)  Entrance Stairs - Number of Steps: 1  Entrance Stairs - Rails: Right(going in [facility] house.)  Bathroom Shower/Tub: Tub/Shower unit  Bathroom Toilet: Handicap height  Bathroom Equipment: Tub transfer bench, Grab bars around toilet, Hand-held shower  Bathroom Accessibility: Accessible(Tight squeeze for RW to [facility] bathroom)  Home Equipment: Oxygen, Quad cane, Rolling walker, 4 wheeled walker(Home oxygen at night, SBQC)  ADL Assistance: Independent  Ambulation Assistance: Independent(SBQC)  Transfer Assistance: Independent  Active : Yes  Mode of Transportation: SUV  Additional Comments: Pt does own shopping, cook on own meals, does own laundry, has hard time keeping up with the cleaning, does own dishes.   Cognition        Objective          AROM RLE (degrees)  RLE AROM: WFL  AROM LLE (degrees)  LLE AROM : WFL  AROM RUE (degrees)  RUE AROM : WFL  AROM LUE (degrees)  LUE AROM : WFL  Strength RLE  Comment: Hip 3/5, Knee/ankle 4/5  Strength LLE  Comment: Hip 3/5, Knee/ankle 4/5     Sensation  Overall Sensation Status: Impaired  Additional Comments: Numbness belwo ankles, R > L.  Bed mobility  Rolling to Left: Stand by assistance  Rolling to Right: Stand by assistance  Supine to Sit: Stand by assistance  Sit to Supine: Contact guard assistance  Comment: Pt unable to lye on flat bed, immediately HOB elevated to 45 to 50 degrees due to breathing difficulty, Pt usually sleeps  in a flat bed at home. Pt able to come out of bed at SBA with head of elevated. Transfers  Sit to Stand: Contact guard assistance  Stand to sit: Contact guard assistance  Bed to Chair: Contact guard assistance  Ambulation  Ambulation?: Yes  Ambulation 1  Surface: level tile  Device: Small Prabhjot Taft  Other Apparatus: O2(2.5 lt)  Assistance: Contact guard assistance  Quality of Gait: Pt slight dizzy with mobility, slight unsteady, fatiques very easily. Gait Deviations: Decreased step length;Decreased step height;Slow Hanna  Distance: 15 ft x2  Comments: Extended rest in between 2 walks. Pt with decreased tolerance to activity. Balance  Posture: Fair  Sitting - Static: Good  Sitting - Dynamic: Good;-  Standing - Static: Fair;+  Standing - Dynamic: Fair(SBQC)        Plan   Plan  Times per week: 5 to 6x/week  Specific instructions for Next Treatment: Monitor sao2, wean o2 as able, pt does not use o2 at home at baseline during day time, only uses it at night.   Current Treatment Recommendations: Strengthening, Balance Training, Functional Mobility Training, Transfer Training, Gait Training, Home Exercise Program, Safety Education & Training, Patient/Caregiver Education & Training, Endurance Training  Safety Devices  Type of devices: Gait belt, Left in chair, Call light within reach, Chair alarm in place, Nurse notified    G-Code       OutComes Score                                                  AM-PAC Score  AM-PAC Inpatient Mobility Raw Score : 17 (09/13/20 1256)  AM-PAC Inpatient T-Scale Score : 42.13 (09/13/20 1256)  Mobility Inpatient CMS 0-100% Score: 50.57 (09/13/20 1256)  Mobility Inpatient CMS G-Code Modifier : CK (09/13/20 1256)          Goals  Short term goals  Time Frame for Short term goals: 5 visits  Short term goal 1: Pt able to perform supine<>sit from bed, HOB elevted ~25 degrees. Short term goal 2: Pt able to transfers SBA   Short term goal 3:  Pt able to ambulalte 30 to 50 ft SBQC, SBA, with least amount of supplemental o2.  Pt to toelrate activity for 20 to 25 minutes with pacing., sao2 > 90%  Patient Goals   Patient goals : Go home       Therapy Time   Individual Concurrent Group Co-treatment   Time In 1120         Time Out 1158         Minutes 38         Timed Code Treatment Minutes: 25 Minutes       Maeve Segal, PT

## 2020-09-13 NOTE — FLOWSHEET NOTE
09/13/20 1244   Provider Notification   Reason for Communication New orders   Provider Name Dr. Christian Messer   Provider Notification Physician   Method of Communication Secure Message   Response Waiting for response   Notification Time [phone]     Patient requesting something to aid with sleep if she is going to be at the hospital another night, as well as at a skilled nursing facility. Awaiting response.

## 2020-09-13 NOTE — PROGRESS NOTES
Patient spontaneously urinated 300 mL at this time (following catheter removal this morning), post void residual was measured and it was 25 mL. Patient in bed resting comfortably at this time.

## 2020-09-13 NOTE — PROGRESS NOTES
FAMILY MEDICINE  - PROGRESS NOTE    Date:  9/13/2020  [facility]  [id number]      Chief Complaint   Patient presents with   Quinton Bylas Fall    Dizziness         Interval History:  Improved, she is sitting in the chair. She has no new complaints. She is ready to go home. I am concerned about her self care while at home alone.       Subjective  Constitutional: positive for obesity  Eyes: negative  Ears, nose, mouth, throat, and face: negative  Respiratory: positive for emphysema  Cardiovascular: positive for lower extremity edema  Gastrointestinal: negative  Musculoskeletal:positive for arthralgias, myalgias and stiff joints  Neurological: negative  Behavioral/Psych: positive for anxiety, depression and obesity  Endocrine: positive for hypothyroid:    Objective:    BP (!) 142/72   Pulse 97   Temp 97.7 °F (36.5 °C) (Oral)   Resp 20   Ht 5' 5\" (1.651 m)   Wt 197 lb 5 oz (89.5 kg)   SpO2 98%   BMI 32.83 kg/m²   General appearance - alert, well appearing, and in no distress and overweight  Mental status - alert, oriented to person, place, and time  Eyes - pupils equal and reactive, extraocular eye movements intact  Ears - hearing grossly normal bilaterally  Nose - normal and patent, no erythema, discharge or polyps  Mouth - mucous membranes moist, pharynx normal without lesions  Neck - supple, no significant adenopathy  Lymphatics - no palpable lymphadenopathy, no hepatosplenomegaly  Chest - clear to auscultation, no wheezes, rales or rhonchi, symmetric air entry, decreased air entry noted posteriorly  Heart - normal rate, regular rhythm, normal S1, S2, no murmurs, rubs, clicks or gallops  Abdomen - soft, nontender, nondistended, no masses or organomegaly  Breasts - not examined  Back exam - full range of motion, no tenderness, palpable spasm or pain on motion  Neurological - alert, oriented, normal speech, no focal findings or movement disorder noted  Musculoskeletal - osteoarthritic changes noted in both hands  Extremities - pedal edema 1 +  Skin - normal coloration and turgor, no rashes, no suspicious skin lesions noted    Data:   Medications:   Current Facility-Administered Medications   Medication Dose Route Frequency Provider Last Rate Last Dose    potassium chloride (KLOR-CON M) extended release tablet 40 mEq  40 mEq Oral PRN Nedra Hay MD        Or   Odette Jeanjessy potassium bicarb-citric acid (EFFER-K) effervescent tablet 40 mEq  40 mEq Oral PRN Shreyas Land MD   40 mEq at 09/11/20 1236    Or    potassium chloride 10 mEq/100 mL IVPB (Peripheral Line)  10 mEq Intravenous PRN Shreyas Land MD        sodium chloride flush 0.9 % injection 10 mL  10 mL Intravenous 2 times per day Paulo Davalos MD   10 mL at 09/12/20 2153    sodium chloride flush 0.9 % injection 10 mL  10 mL Intravenous PRN Paulo Davalos MD        acetaminophen (TYLENOL) tablet 650 mg  650 mg Oral Q4H PRN Paulo Davalos MD        acetaminophen (TYLENOL) tablet 500 mg  500 mg Oral Q6H PRN Paulo Davalos MD        lidocaine-prilocaine (EMLA) cream   Topical PRN Paulo Davalos MD        ferrous sulfate (IRON 325) tablet 325 mg  325 mg Oral Daily Paulo Davalos MD   325 mg at 09/12/20 0810    metoprolol succinate (TOPROL XL) extended release tablet 25 mg  25 mg Oral Daily Paulo Davalos MD   25 mg at 09/12/20 0810    levothyroxine (SYNTHROID) tablet 50 mcg  50 mcg Oral Daily Paulo Davalos MD   50 mcg at 09/12/20 0809    vitamin C (ASCORBIC ACID) tablet 1,000 mg  1,000 mg Oral Daily Paulo Davalos MD   1,000 mg at 09/12/20 0809    apixaban (ELIQUIS) tablet 5 mg  5 mg Oral BID Paulo Davalos MD   5 mg at 09/12/20 2152    pantoprazole (PROTONIX) tablet 40 mg  40 mg Oral QAM AC Paulo Davalos MD   40 mg at 09/13/20 0634    guaiFENesin (MUCINEX) extended release tablet 600 mg  600 mg Oral BID Paulo Davalos MD   600 mg at 09/12/20 2152    baclofen (LIORESAL) tablet 5 mg  5 mg Oral TID PRN Katie Sharif MD        buPROPion [facility]) extended release tablet 150 mg  150 mg Oral Daily Katie Sharif MD   150 mg at 09/12/20 0809    dilTIAZem (CARDIZEM CD) extended release capsule 180 mg  180 mg Oral Daily Katie Sharif MD   180 mg at 09/12/20 0809    tamsulosin (FLOMAX) capsule 0.4 mg  0.4 mg Oral Daily Katie Sharif MD   0.4 mg at 09/12/20 0810    levalbuterol (Fanta Capellan) nebulizer solution 1.25 mg  1.25 mg Nebulization Q6H PRN Katie Sharif MD        clonazePAM Gemma Toro) tablet 0.5 mg  0.5 mg Oral TID PRN Katie Sharif MD   0.5 mg at 09/11/20 2133    HYDROcodone-acetaminophen (NORCO) 5-325 MG per tablet 1 tablet  1 tablet Oral Q8H PRN Katie Sharif MD   1 tablet at 09/12/20 0820    fluticasone (FLOVENT HFA) 110 MCG/ACT inhaler 2 puff  2 puff Inhalation BID Katie Sharif MD   2 puff at 09/12/20 2153    fluticasone (FLONASE) 50 MCG/ACT nasal spray 1 spray  1 spray Each Nostril Daily Katie Sharif MD   1 spray at 09/12/20 0809    atorvastatin (LIPITOR) tablet 10 mg  10 mg Oral Daily Katie Sharif MD   10 mg at 09/12/20 0809    ciprofloxacin (CIPRO) tablet 500 mg  500 mg Oral Daily Katie Sharif MD   500 mg at 09/12/20 0809       Intake/Output Summary (Last 24 hours) at 9/13/2020 0819  Last data filed at 9/13/2020 0639  Gross per 24 hour   Intake 800 ml   Output 1350 ml   Net -550 ml     Recent Results (from the past 24 hour(s))   Basic Metabolic Panel    Collection Time: 09/12/20  8:38 AM   Result Value Ref Range    Glucose 150 (H) 70 - 99 mg/dL    BUN 16 8 - 23 mg/dL    CREATININE 0.89 0.50 - 0.90 mg/dL    Bun/Cre Ratio NOT REPORTED 9 - 20    Calcium 8.9 8.6 - 10.4 mg/dL    Sodium 142 135 - 144 mmol/L    Potassium 3.9 3.7 - 5.3 mmol/L    Chloride 105 98 - 107 mmol/L    CO2 28 20 - 31 mmol/L    Anion Gap 9 9 - 17 mmol/L    GFR Non-African American >60 >60 mL/min    GFR African American >60 >60 mL/min    GFR Comment          GFR

## 2020-09-13 NOTE — PLAN OF CARE
Problem: Falls - Risk of:  Goal: Will remain free from falls  Description: Will remain free from falls  9/13/2020 1656 by Faisal Larose RN  Outcome: Met This Shift  Note: The patient remained free from falls this shift, call light within reach, bed in locked and lowest position. Side rails up x2. Continue to monitor closely. Problem: Skin Integrity:  Goal: Absence of new skin breakdown  Description: Absence of new skin breakdown  9/13/2020 1656 by Faisal Larose RN  Outcome: Ongoing  Note: The patient has multiple abrasions scattered throughout, lotion provided. Problem: Urinary Elimination:  Goal: Skin integrity will improve  Description: Skin integrity will improve  Outcome: Met This Shift  Note: Indwelling Pantoja catheter discontinued and patient voiding without retention. Problem: Skin Integrity:  Goal: Absence of new skin breakdown  Description: Absence of new skin breakdown  9/13/2020 1656 by Faisal Larose RN  Outcome: Ongoing  Note: The patient has multiple abrasions scattered throughout, lotion provided. Problem: Physical Regulation:  Goal: Complications related to the disease process, condition or treatment will be avoided or minimized  Description: Complications related to the disease process, condition or treatment will be avoided or minimized  Outcome: Met This Shift  Note: Patient remains hemodynamically stable.

## 2020-09-14 LAB
ANION GAP SERPL CALCULATED.3IONS-SCNC: 5 MMOL/L (ref 9–17)
BUN BLDV-MCNC: 14 MG/DL (ref 8–23)
BUN/CREAT BLD: ABNORMAL (ref 9–20)
CALCIUM SERPL-MCNC: 9.4 MG/DL (ref 8.6–10.4)
CHLORIDE BLD-SCNC: 102 MMOL/L (ref 98–107)
CO2: 36 MMOL/L (ref 20–31)
CREAT SERPL-MCNC: 0.9 MG/DL (ref 0.5–0.9)
GFR AFRICAN AMERICAN: >60 ML/MIN
GFR NON-AFRICAN AMERICAN: >60 ML/MIN
GFR SERPL CREATININE-BSD FRML MDRD: ABNORMAL ML/MIN/{1.73_M2}
GFR SERPL CREATININE-BSD FRML MDRD: ABNORMAL ML/MIN/{1.73_M2}
GLUCOSE BLD-MCNC: 131 MG/DL (ref 70–99)
POTASSIUM SERPL-SCNC: 3.8 MMOL/L (ref 3.7–5.3)
SODIUM BLD-SCNC: 143 MMOL/L (ref 135–144)

## 2020-09-14 PROCEDURE — G0378 HOSPITAL OBSERVATION PER HR: HCPCS

## 2020-09-14 PROCEDURE — 80048 BASIC METABOLIC PNL TOTAL CA: CPT

## 2020-09-14 PROCEDURE — 2060000000 HC ICU INTERMEDIATE R&B

## 2020-09-14 PROCEDURE — 36415 COLL VENOUS BLD VENIPUNCTURE: CPT

## 2020-09-14 PROCEDURE — G0008 ADMIN INFLUENZA VIRUS VAC: HCPCS | Performed by: FAMILY MEDICINE

## 2020-09-14 PROCEDURE — 97166 OT EVAL MOD COMPLEX 45 MIN: CPT

## 2020-09-14 PROCEDURE — 6360000002 HC RX W HCPCS: Performed by: FAMILY MEDICINE

## 2020-09-14 PROCEDURE — 99232 SBSQ HOSP IP/OBS MODERATE 35: CPT | Performed by: FAMILY MEDICINE

## 2020-09-14 PROCEDURE — 6370000000 HC RX 637 (ALT 250 FOR IP): Performed by: FAMILY MEDICINE

## 2020-09-14 PROCEDURE — 90686 IIV4 VACC NO PRSV 0.5 ML IM: CPT | Performed by: FAMILY MEDICINE

## 2020-09-14 PROCEDURE — 2580000003 HC RX 258: Performed by: FAMILY MEDICINE

## 2020-09-14 RX ORDER — CIPROFLOXACIN 500 MG/1
500 TABLET, FILM COATED ORAL EVERY 12 HOURS SCHEDULED
Status: DISCONTINUED | OUTPATIENT
Start: 2020-09-14 | End: 2020-09-15 | Stop reason: HOSPADM

## 2020-09-14 RX ADMIN — GUAIFENESIN 600 MG: 600 TABLET, EXTENDED RELEASE ORAL at 07:52

## 2020-09-14 RX ADMIN — LEVOTHYROXINE SODIUM 50 MCG: 0.05 TABLET ORAL at 07:52

## 2020-09-14 RX ADMIN — BUPROPION HYDROCHLORIDE 150 MG: 150 TABLET, EXTENDED RELEASE ORAL at 07:51

## 2020-09-14 RX ADMIN — HYDROCODONE BITARTRATE AND ACETAMINOPHEN 1 TABLET: 5; 325 TABLET ORAL at 21:25

## 2020-09-14 RX ADMIN — FERROUS SULFATE TAB 325 MG (65 MG ELEMENTAL FE) 325 MG: 325 (65 FE) TAB at 07:52

## 2020-09-14 RX ADMIN — Medication 10 ML: at 07:55

## 2020-09-14 RX ADMIN — PANTOPRAZOLE SODIUM 40 MG: 40 TABLET, DELAYED RELEASE ORAL at 06:24

## 2020-09-14 RX ADMIN — FLUTICASONE PROPIONATE 1 SPRAY: 50 SPRAY, METERED NASAL at 07:52

## 2020-09-14 RX ADMIN — APIXABAN 5 MG: 5 TABLET, FILM COATED ORAL at 20:23

## 2020-09-14 RX ADMIN — APIXABAN 5 MG: 5 TABLET, FILM COATED ORAL at 07:52

## 2020-09-14 RX ADMIN — OXYCODONE HYDROCHLORIDE AND ACETAMINOPHEN 1000 MG: 500 TABLET ORAL at 07:51

## 2020-09-14 RX ADMIN — CIPROFLOXACIN HYDROCHLORIDE 500 MG: 500 TABLET, FILM COATED ORAL at 20:23

## 2020-09-14 RX ADMIN — INFLUENZA A VIRUS A/VICTORIA/2454/2019 IVR-207 (H1N1) ANTIGEN (PROPIOLACTONE INACTIVATED), INFLUENZA A VIRUS A/HONG KONG/2671/2019 IVR-208 (H3N2) ANTIGEN (PROPIOLACTONE INACTIVATED), INFLUENZA B VIRUS B/VICTORIA/705/2018 BVR-11 ANTIGEN (PROPIOLACTONE INACTIVATED), INFLUENZA B VIRUS B/PHUKET/3073/2013 BVR-1B ANTIGEN (PROPIOLACTONE INACTIVATED) 0.5 ML: 15; 15; 15; 15 INJECTION, SUSPENSION INTRAMUSCULAR at 14:35

## 2020-09-14 RX ADMIN — FLUTICASONE PROPIONATE 2 PUFF: 110 AEROSOL, METERED RESPIRATORY (INHALATION) at 07:52

## 2020-09-14 RX ADMIN — CIPROFLOXACIN HYDROCHLORIDE 500 MG: 500 TABLET, FILM COATED ORAL at 07:52

## 2020-09-14 RX ADMIN — TAMSULOSIN HYDROCHLORIDE 0.4 MG: 0.4 CAPSULE ORAL at 07:52

## 2020-09-14 RX ADMIN — DILTIAZEM HYDROCHLORIDE 180 MG: 180 CAPSULE, COATED, EXTENDED RELEASE ORAL at 07:52

## 2020-09-14 RX ADMIN — GUAIFENESIN 600 MG: 600 TABLET, EXTENDED RELEASE ORAL at 20:24

## 2020-09-14 RX ADMIN — Medication 10 ML: at 20:24

## 2020-09-14 RX ADMIN — METOPROLOL SUCCINATE 25 MG: 25 TABLET, EXTENDED RELEASE ORAL at 07:52

## 2020-09-14 RX ADMIN — ATORVASTATIN CALCIUM 10 MG: 10 TABLET, FILM COATED ORAL at 07:51

## 2020-09-14 ASSESSMENT — PAIN DESCRIPTION - PROGRESSION
CLINICAL_PROGRESSION: NOT CHANGED
CLINICAL_PROGRESSION: NOT CHANGED

## 2020-09-14 ASSESSMENT — PAIN SCALES - GENERAL
PAINLEVEL_OUTOF10: 6
PAINLEVEL_OUTOF10: 5
PAINLEVEL_OUTOF10: 4
PAINLEVEL_OUTOF10: 0
PAINLEVEL_OUTOF10: 0

## 2020-09-14 ASSESSMENT — PAIN DESCRIPTION - FREQUENCY
FREQUENCY: CONTINUOUS
FREQUENCY: CONTINUOUS

## 2020-09-14 ASSESSMENT — PAIN DESCRIPTION - DESCRIPTORS
DESCRIPTORS: ACHING
DESCRIPTORS: ACHING

## 2020-09-14 ASSESSMENT — PAIN DESCRIPTION - ONSET
ONSET: ON-GOING
ONSET: ON-GOING

## 2020-09-14 ASSESSMENT — PAIN DESCRIPTION - PAIN TYPE
TYPE: CHRONIC PAIN
TYPE: CHRONIC PAIN

## 2020-09-14 ASSESSMENT — PAIN DESCRIPTION - LOCATION
LOCATION: BACK
LOCATION: BACK

## 2020-09-14 NOTE — FLOWSHEET NOTE
09/13/20 2010   Provider Notification   Reason for Communication Review case   Provider Name Dr. Alonso Maharaj   Provider Notification Physician   Method of Communication Call   Response No new orders   Notification Time 2010   RN informed Dr. Alonso Maharaj that patient CO2 went from 28 to 28. No new orders at this time.

## 2020-09-14 NOTE — PROGRESS NOTES
[facility]: JEFRY MORTON   Occupational Therapy Evaluation  Date: 20  Patient Name: Vazquez Zheng       Room: 2234/9540-66  MRN: [id number]  Account: [de-identified]   : 1945  (80 y.o.) Gender: female     Discharge Recommendations:  Further Occupational Therapy is recommended upon facility discharge. Referring Practitioner: Dr. Maurisio Garcia  Diagnosis: SUNSHINE    Treatment Diagnosis: Impaired self-care status. Past Medical History:  has a past medical history of A-fib ([address] Acquired hypothyroidism, Acute encephalopathy, Acute kidney injury ([address] Anxiety, Benign hypertension with CKD (chronic kidney disease) stage III ([facility] Chronic back pain, CKD (chronic kidney disease) stage 3, GFR 30-59 ml/min ([facility]), Constipation, COPD (chronic obstructive pulmonary disease) ([address] Cough, Depression, ETOH abuse, Former smoker, HA (generalized anxiety disorder), History of GI bleed, Hyperlipidemia, Hypertension, Hypothyroid, Leg pain, Normocytic anemia, Osteoarthritis, PAD (peripheral artery disease) ([address] Primary osteoarthritis, Pulmonary hypertension ([address] Pure hypercholesterolemia, SIRS (systemic inflammatory response syndrome) ([address] Urge incontinence, and Wheezing. Past Surgical History:   has a past surgical history that includes eye surgery (Bilateral); Colonoscopy; and joint replacement.     Restrictions  Restrictions/Precautions: Fall Risk(telesitter)  Implants present? : Metal implants(L TKA)  Required Braces or Orthoses?: No     Vitals  Temp: 97.6 °F (36.4 °C)  Pulse: 74  Resp: 16  BP: (!) 140/82  Height: 5' 5\" (165.1 cm)  Weight: 197 lb 5 oz (89.5 kg)  BMI (Calculated): 32.9  Oxygen Therapy  SpO2: 98 %  Pulse Oximeter Device Mode: Intermittent  Pulse Oximeter Device Location: Finger  O2 Device: Nasal cannula  O2 Flow Rate (L/min): 2 L/min  Blood Pressure Lyin/72  Pulse Lyin PER MINUTE  Blood Pressure Sittin/63  Pulse Sittin PER MINUTE  Blood Pressure Standin/55  Pulse Standin PER MINUTE  Level of Consciousness: Alert    Subjective  Subjective: \"I fell down and I get real scared when I fall\" Pt recalls the reason for her admission. Overall Orientation Status: Within Functional Limits  Vision  Vision: Impaired  Vision Exceptions: Wears glasses at all times(L eye surgery 3 weeks ago, sees floaters)  Hearing  Hearing: Within functional limits  Social/Functional History  Lives With: Alone  Type of Home: House  Home Layout: One level  Home Access: Stairs to enter with rails(grab bar)  Entrance Stairs - Number of Steps: 1  Entrance Stairs - Rails: Right(going into the house)  Bathroom Shower/Tub: Tub/Shower unit  Bathroom Toilet: Handicap height  Bathroom Equipment: Tub transfer bench, Grab bars around toilet, Hand-held shower, Grab bars in shower  Bathroom Accessibility: Accessible(tight squeeze for rolling walker into the bathroom)  Home Equipment: Oxygen, Quad cane, Rolling walker, 4 wheeled walker(Home oxygen at night, small base quad cane)  ADL Assistance: [address] [place] [address] Avenue: Independent  Homemaking Responsibilities: Yes  Ambulation Assistance: Independent(small base quad cane)  Transfer Assistance: Independent  Active : Yes  Mode of Transportation: [employer]  Occupation: Retired  Type of occupation:   Leisure & Hobbies: Garden (does not do anymore); watch TV  Additional Comments: Pt reports that she thinks her two sons could help PRN upon discharge. Objective  Vision - Basic Assessment  Prior Vision: Wears glasses all the time   Cognition  Overall Cognitive Status: Impaired  Attention Span: Attends with cues to redirect  Memory: Decreased short term memory, Decreased recall of recent events  Following Commands:  Follows multistep commands with repetition  Safety Judgement: Decreased awareness of need for assistance  Awareness of Errors: Assistance required to identify errors made  Insights: Decreased awareness of deficits  Sequencing and Organization: Assistance required to identify errors made, Assistance required to generate solutions   Perception  Overall Perceptual Status: WFL  Sensation  Overall Sensation Status: Impaired  Additional Comments: Numbness below ankles, R > L.   ADL  Feeding: Setup, Increased time to complete  Grooming: Contact guard assistance, Setup(while standing at sink to wash hands)  UE Bathing: Stand by assistance, Setup, Increased time to complete  LE Bathing: Minimal assistance, Setup, Increased time to complete  UE Dressing: Stand by assistance, Setup, Increased time to complete  LE Dressing: Moderate assistance, Setup, Increased time to complete  Toileting: Contact guard assistance, Setup(after urinating)  Additional Comments: ADL scores based on skilled observations and clinical reasoning unless otherwise noted. Pt required assist to doff and don bilateral socks this date.     UE Function  LUE Strength  Gross LUE Strength: WFL  L Hand General: 4-/5  LUE Strength Comment: Shoulder 4-/5, elbow 4-/5     LUE Tone: Normotonic     LUE AROM (degrees)  LUE AROM : [facility]     Left Hand AROM (degrees)  Left Hand AROM: [facility]  RUE Strength  Gross RUE Strength: [facility]  R Hand General: 4-/5  RUE Strength Comment: Shoulder 4-/5, elbow 4-/5      RUE Tone: Normotonic     RUE AROM (degrees)  RUE AROM : [facility]     Right Hand AROM (degrees)  Right Hand AROM: WFL    Fine Motor Skills  Coordination  Movements Are Fluid And Coordinated: No  Coordination and Movement description: Left UE, Right UE, Fine motor impairments, Decreased speed     Mobility  Supine to Sit: Stand by assistance       Balance  Sitting Balance: Stand by assistance  Standing Balance: Contact guard assistance  Standing Balance  Time: 1-2 mintues x 2  Activity: toileting tasks, grooming tasks  Comment: 0-1 UE support  Functional Mobility  Functional - Mobility Device: Cane  Activity: To/from bathroom  Assist Level: Minimal assistance  Functional Mobility Comments: with small base quad cane; mild unsteadiness noted  Bed mobility  Rolling to Right: Stand by assistance  Supine to Sit: Stand by assistance  Scooting: Stand by assistance  Comment: Pt seated in recliner at end of session with chair alarm on and RN notified     Transfers  Sit to stand: Contact guard assistance  Stand to sit: Contact guard assistance  Transfer Comments: Minimal verbal cues for hand placement and safety  Toilet Transfers  Toilet - Technique: Ambulating  Equipment Used: Grab bars  Toilet Transfer: Minimal assistance  Toilet Transfers Comments: with small base quad cane  Functional Activity Tolerance  Functional Activity Tolerance: Tolerates 10 - 20 min exercise with multiple rests   Assessment  Performance deficits / Impairments: Decreased functional mobility , Decreased ADL status, Decreased strength, Decreased safe awareness, Decreased cognition, Decreased endurance, Decreased balance, Decreased high-level IADLs, Decreased fine motor control, Decreased posture  Treatment Diagnosis: Impaired self-care status. Prognosis: Good  Decision Making: Medium Complexity  REQUIRES OT FOLLOW UP: Yes  Discharge Recommendations: Patient would benefit from continued therapy after discharge  Activity Tolerance: Patient Tolerated treatment well    Functional Outcome Measures  AM-PAC Daily Activity Inpatient   How much help for putting on and taking off regular lower body clothing?: A Lot  How much help for Bathing?: A Lot  How much help for Toileting?: A Little  How much help for putting on and taking off regular upper body clothing?: A Little  How much help for taking care of personal grooming?: A Little  How much help for eating meals?: A Little  AM-[facility] Inpatient Daily Activity Raw Score: 16  AM-PAC Inpatient ADL T-Scale Score : 35.96  ADL Inpatient CMS 0-100% Score: 53.32  ADL Inpatient CMS G-Code Modifier : CK       Goals  Patient Goals   Patient goals :  To discharge to SNF prior to return home  Short term goals  Time Frame for Short term goals: By discharge  Short term goal 1: Pt will verbalize/demonstrate Good understanding of assistive equipment/durable medical equipment/modified techniques for increased independence with self-care and mobility. Short term goal 2: Pt will perform upper body bathing/dressing with setup and lower body bathing/dressing with Minimal assist and Good safety. Short term goal 3: Pt will perform functional mobility and transfers during self-care with contact guard assist, least restrictive mobility device, and Good safety. Short term goal 4: Pt will stand for 3+ minutes with 0-1 UE support, contact guard assist, and no loss of balance while engaging in self-care/functional task. Short term goal 5: Pt will actively participate in 15-20 minutes of therapeutic exercise/functional activities to promote increased independence with self-care and mobility. Plan  Safety Devices  Safety Devices in place: Yes  Type of devices:  All fall risk precautions in place, Call light within reach, Chair alarm in place, Gait belt, Patient at risk for falls, Left in chair, Nurse notified(telesitter)     Plan  Times per week: 4-6  Times per day: Daily  Current Treatment Recommendations: Self-Care / ADL, Home Management Training, Strengthening, Balance Training, Functional Mobility Training, Endurance Training, Cognitive Reorientation, Safety Education & Training, Patient/Caregiver Education & Training, Equipment Evaluation, Education, & procurement, Cognitive/Perceptual Training       OT Individual Minutes  Time In: 4140  Time Out: 1250  Minutes: 21    Electronically signed by Boo Flores OT on 9/14/20 at 1:25 PM EDT

## 2020-09-14 NOTE — PROGRESS NOTES
Chloride 102 98 - 107 mmol/L    CO2 36 (H) 20 - 31 mmol/L    Anion Gap 5 (L) 9 - 17 mmol/L    GFR Non-African American >60 >60 mL/min    GFR African American >60 >60 mL/min    GFR Comment          GFR Staging NOT REPORTED      -----------------------------------------------------------------  RAD:  EKG:  Micro:     Assessment & Plan:    Patient Active Problem List:     Acquired hypothyroidism     Hypertension     Primary osteoarthritis of right knee     A-fib (HCC)     SUNSHINE (acute kidney injury) ([facility] [address]     Acute encephalopathy     SIRS (systemic inflammatory response syndrome) ([facility])     Normocytic anemia     Pulmonary hypertension (HCC)     COPD (chronic obstructive pulmonary disease) ([facility])     History of GI bleed     Constipation     HA (generalized anxiety disorder)     Lumbar radiculopathy     Lumbosacral spondylosis without myelopathy     Benign hypertension with CKD (chronic kidney disease) stage III (HCC)     CKD (chronic kidney disease) stage 3, GFR 30-59 ml/min ([facility])     Alcohol withdrawal syndrome with complication ([facility])     Moderate major depression (HCC)     PAD (peripheral artery disease) ([facility])     Acute kidney injury ([facility])     Obesity, Class I, BMI 30-34.9     Acute on chronic diastolic CHF (congestive heart failure) ([facility])           Plan:  -SUNSHINE on CKD - improved, Renal following.  -A. Fib. - rate controlled. -HTN - stable. -COPD - stable. -D/C plan is to SNF - awaiting pre-cert.  -Continue current treatments.  -Complete orders per chart.     See orders   Disposition:    Electronically signed by Patrica eLon MD on 9/14/2020 at 6:49 AM

## 2020-09-14 NOTE — CARE COORDINATION
ERICA spoke with  and they are out of network for this patient 's insurance. ERICA spoke with the patient and she is agreeable to [facility]. ERICA faxed the referral and followed up via phone with Kerry Ernandez in admissions. SW following.

## 2020-09-14 NOTE — PROGRESS NOTES
Nephrology Progress Note    Interval history: Patient was seen and examined today she feels well with no shortness of breath, chest pain, palpitations or urinary symptoms. She is nonoliguric. History of Present Illness: This is a 70 y.o. female with past medical history of essential hypertension, A. fib, chronic kidney disease stage III with baseline creatinine 1.3 mg/dL, COPD, CHF with preserved EF diastolic dysfunction moderate tricuspid regurgitation mild pulmonary hypertension. Patient presented to the hospital because of dizziness and fall, patient complains that she has been feeling dizzy and frequently falling, she fell last night on the floor and hit her head. Patient also had blood work done which showed elevated creatinine and elevated proBNP and patient was advised to go to the hospital.  Initial lab investigation showed serum creatinine of 2.0 mg/dL which is increased from 1.3 mg/dL and therefore nephrology consultation has been requested.  Osvaldo Sharp denies any chest pain nausea vomiting no shortness of breath no difficulty urination. Bladder scan showed urinary retention and Pantoja catheter was placed in the ER.        Objective/     Vitals:    09/13/20 1259 09/13/20 1945 09/14/20 0045 09/14/20 0745   BP: 106/62 119/72 (!) 105/59 (!) 140/82   Pulse: 84 81 81 74   Resp: 18 20 20 16   Temp: 98.8 °F (37.1 °C) 97.5 °F (36.4 °C) 97.3 °F (36.3 °C) 97.6 °F (36.4 °C)   TempSrc: Oral Oral Axillary    SpO2: 99% 98% 94% 98%   Weight:       Height:         24HR INTAKE/OUTPUT:      Intake/Output Summary (Last 24 hours) at 9/14/2020 0918  Last data filed at 9/14/2020 0521  Gross per 24 hour   Intake 550 ml   Output 1100 ml   Net -550 ml     Patient Vitals for the past 96 hrs (Last 3 readings):   Weight   09/11/20 0135 197 lb 5 oz (89.5 kg)   09/10/20 1228 187 lb (84.8 kg)       Constitutional:  Alert, awake, no apparent distress  Cardiovascular: S1, S2 with no pericardial rub or gallop.   Respiratory: Clinically clear. Abdomen: Soft with normal bowel sounds. Ext: 1+ LE edema    Data/  No results for input(s): WBC, HGB, HCT, MCV, PLT in the last 72 hours. Recent Labs     09/12/20  0838 09/13/20  1823 09/14/20  0552    144 143   K 3.9 3.6* 3.8    103 102   CO2 28 35* 36*   GLUCOSE 150*  --  131*   MG 2.4  --   --    BUN 16  --  14   CREATININE 0.89  --  0.90   LABGLOM >60  --  >60   GFRAA >60  --  >60     Assessment/plan:    1. Acute kidney injury - most consistent with prerenal azotemia secondary to diuretic use. Renal function has returned to normal.    2.  Systemic hypertension - controlled. 3.  Recurrent falls - secondary to orthostatic hypotension. Check orthostatics. 4.  Heart failure with preserved ejection fraction - clinically compensated at this time. We will restart furosemide 20 mg p.o. daily at discharge. Prognosis is guarded. She is awaiting preset for ECF discharge.     Dilcia Cabrera MD FACP  Attending nephrologist

## 2020-09-15 VITALS
BODY MASS INDEX: 32.87 KG/M2 | TEMPERATURE: 98.2 F | HEIGHT: 65 IN | OXYGEN SATURATION: 96 % | DIASTOLIC BLOOD PRESSURE: 75 MMHG | WEIGHT: 197.31 LBS | HEART RATE: 114 BPM | SYSTOLIC BLOOD PRESSURE: 127 MMHG | RESPIRATION RATE: 24 BRPM

## 2020-09-15 LAB
ALBUMIN SERPL-MCNC: 3.4 G/DL (ref 3.5–5.2)
ALBUMIN/GLOBULIN RATIO: ABNORMAL (ref 1–2.5)
ALP BLD-CCNC: 123 U/L (ref 35–104)
ALT SERPL-CCNC: 26 U/L (ref 5–33)
ANION GAP SERPL CALCULATED.3IONS-SCNC: 6 MMOL/L (ref 9–17)
AST SERPL-CCNC: 23 U/L
BILIRUB SERPL-MCNC: 0.62 MG/DL (ref 0.3–1.2)
BUN BLDV-MCNC: 11 MG/DL (ref 8–23)
BUN/CREAT BLD: ABNORMAL (ref 9–20)
CALCIUM SERPL-MCNC: 9.7 MG/DL (ref 8.6–10.4)
CHLORIDE BLD-SCNC: 103 MMOL/L (ref 98–107)
CO2: 36 MMOL/L (ref 20–31)
CREAT SERPL-MCNC: 0.76 MG/DL (ref 0.5–0.9)
GFR AFRICAN AMERICAN: >60 ML/MIN
GFR NON-AFRICAN AMERICAN: >60 ML/MIN
GFR SERPL CREATININE-BSD FRML MDRD: ABNORMAL ML/MIN/{1.73_M2}
GFR SERPL CREATININE-BSD FRML MDRD: ABNORMAL ML/MIN/{1.73_M2}
GLUCOSE BLD-MCNC: 135 MG/DL (ref 70–99)
HCT VFR BLD CALC: 29.4 % (ref 36–46)
HEMOGLOBIN: 8.8 G/DL (ref 12–16)
MCH RBC QN AUTO: 28.9 PG (ref 26–34)
MCHC RBC AUTO-ENTMCNC: 29.9 G/DL (ref 31–37)
MCV RBC AUTO: 96.6 FL (ref 80–100)
NRBC AUTOMATED: ABNORMAL PER 100 WBC
PDW BLD-RTO: 20.9 % (ref 11.5–14.9)
PLATELET # BLD: 424 K/UL (ref 150–450)
PMV BLD AUTO: 7.7 FL (ref 6–12)
POTASSIUM SERPL-SCNC: 3.9 MMOL/L (ref 3.7–5.3)
RBC # BLD: 3.04 M/UL (ref 4–5.2)
SODIUM BLD-SCNC: 145 MMOL/L (ref 135–144)
TOTAL PROTEIN: 6.3 G/DL (ref 6.4–8.3)
WBC # BLD: 7.1 K/UL (ref 3.5–11)

## 2020-09-15 PROCEDURE — 97110 THERAPEUTIC EXERCISES: CPT

## 2020-09-15 PROCEDURE — G0378 HOSPITAL OBSERVATION PER HR: HCPCS

## 2020-09-15 PROCEDURE — 99239 HOSP IP/OBS DSCHRG MGMT >30: CPT | Performed by: FAMILY MEDICINE

## 2020-09-15 PROCEDURE — 94640 AIRWAY INHALATION TREATMENT: CPT

## 2020-09-15 PROCEDURE — 97116 GAIT TRAINING THERAPY: CPT

## 2020-09-15 PROCEDURE — 80053 COMPREHEN METABOLIC PANEL: CPT

## 2020-09-15 PROCEDURE — 94760 N-INVAS EAR/PLS OXIMETRY 1: CPT

## 2020-09-15 PROCEDURE — 6370000000 HC RX 637 (ALT 250 FOR IP): Performed by: FAMILY MEDICINE

## 2020-09-15 PROCEDURE — 6360000002 HC RX W HCPCS: Performed by: FAMILY MEDICINE

## 2020-09-15 PROCEDURE — 85027 COMPLETE CBC AUTOMATED: CPT

## 2020-09-15 PROCEDURE — 6370000000 HC RX 637 (ALT 250 FOR IP): Performed by: INTERNAL MEDICINE

## 2020-09-15 PROCEDURE — 2580000003 HC RX 258: Performed by: FAMILY MEDICINE

## 2020-09-15 PROCEDURE — 2700000000 HC OXYGEN THERAPY PER DAY

## 2020-09-15 PROCEDURE — 36415 COLL VENOUS BLD VENIPUNCTURE: CPT

## 2020-09-15 RX ORDER — FUROSEMIDE 40 MG/1
40 TABLET ORAL DAILY
Status: DISCONTINUED | OUTPATIENT
Start: 2020-09-15 | End: 2020-09-15 | Stop reason: HOSPADM

## 2020-09-15 RX ADMIN — APIXABAN 5 MG: 5 TABLET, FILM COATED ORAL at 10:53

## 2020-09-15 RX ADMIN — OXYCODONE HYDROCHLORIDE AND ACETAMINOPHEN 1000 MG: 500 TABLET ORAL at 10:51

## 2020-09-15 RX ADMIN — FLUTICASONE PROPIONATE 2 PUFF: 110 AEROSOL, METERED RESPIRATORY (INHALATION) at 11:39

## 2020-09-15 RX ADMIN — PANTOPRAZOLE SODIUM 40 MG: 40 TABLET, DELAYED RELEASE ORAL at 10:52

## 2020-09-15 RX ADMIN — METOPROLOL SUCCINATE 25 MG: 25 TABLET, EXTENDED RELEASE ORAL at 07:40

## 2020-09-15 RX ADMIN — LEVOTHYROXINE SODIUM 50 MCG: 0.05 TABLET ORAL at 12:09

## 2020-09-15 RX ADMIN — FLUTICASONE PROPIONATE 1 SPRAY: 50 SPRAY, METERED NASAL at 07:41

## 2020-09-15 RX ADMIN — CLONAZEPAM 0.5 MG: 1 TABLET ORAL at 10:52

## 2020-09-15 RX ADMIN — BACLOFEN 5 MG: 10 TABLET ORAL at 10:52

## 2020-09-15 RX ADMIN — FERROUS SULFATE TAB 325 MG (65 MG ELEMENTAL FE) 325 MG: 325 (65 FE) TAB at 10:51

## 2020-09-15 RX ADMIN — ATORVASTATIN CALCIUM 10 MG: 10 TABLET, FILM COATED ORAL at 10:53

## 2020-09-15 RX ADMIN — BUPROPION HYDROCHLORIDE 150 MG: 150 TABLET, EXTENDED RELEASE ORAL at 10:52

## 2020-09-15 RX ADMIN — CIPROFLOXACIN HYDROCHLORIDE 500 MG: 500 TABLET, FILM COATED ORAL at 10:52

## 2020-09-15 RX ADMIN — Medication 10 ML: at 07:40

## 2020-09-15 RX ADMIN — LEVALBUTEROL 1.25 MG: 1.25 SOLUTION, CONCENTRATE RESPIRATORY (INHALATION) at 01:52

## 2020-09-15 RX ADMIN — FUROSEMIDE 40 MG: 40 TABLET ORAL at 16:18

## 2020-09-15 RX ADMIN — TAMSULOSIN HYDROCHLORIDE 0.4 MG: 0.4 CAPSULE ORAL at 10:51

## 2020-09-15 RX ADMIN — DILTIAZEM HYDROCHLORIDE 180 MG: 180 CAPSULE, COATED, EXTENDED RELEASE ORAL at 10:52

## 2020-09-15 RX ADMIN — GUAIFENESIN 600 MG: 600 TABLET, EXTENDED RELEASE ORAL at 10:51

## 2020-09-15 ASSESSMENT — PAIN DESCRIPTION - ONSET
ONSET: ON-GOING

## 2020-09-15 ASSESSMENT — PAIN DESCRIPTION - PAIN TYPE
TYPE: CHRONIC PAIN

## 2020-09-15 ASSESSMENT — PAIN DESCRIPTION - PROGRESSION
CLINICAL_PROGRESSION: NOT CHANGED

## 2020-09-15 ASSESSMENT — PAIN DESCRIPTION - DESCRIPTORS
DESCRIPTORS: ACHING

## 2020-09-15 ASSESSMENT — PAIN SCALES - GENERAL
PAINLEVEL_OUTOF10: 6
PAINLEVEL_OUTOF10: 5
PAINLEVEL_OUTOF10: 6
PAINLEVEL_OUTOF10: 5

## 2020-09-15 ASSESSMENT — PAIN DESCRIPTION - LOCATION
LOCATION: BACK

## 2020-09-15 ASSESSMENT — PAIN DESCRIPTION - FREQUENCY
FREQUENCY: CONTINUOUS

## 2020-09-15 NOTE — PLAN OF CARE
Problem: Falls - Risk of:  Goal: Will remain free from falls  Description: Will remain free from falls  9/15/2020 0458 by Ankur Muhammad RN  Outcome: Ongoing  Note: Pt bed is in lowest position with 2/4 side rails up and call light within reach. No falls at this time. Goal: Absence of physical injury  Description: Absence of physical injury  9/15/2020 0458 by Ankur Muhammad RN  Outcome: Ongoing     Problem: Physical Regulation:  Goal: Complications related to the disease process, condition or treatment will be avoided or minimized  Description: Complications related to the disease process, condition or treatment will be avoided or minimized  9/15/2020 0458 by Ankur Muhammad RN  Outcome: Ongoing     Problem: Nutrition  Goal: Optimal nutrition therapy  9/15/2020 0458 by Ankur Muhammad RN  Outcome: Ongoing     Problem: Skin Integrity:  Goal: Absence of new skin breakdown  Description: Absence of new skin breakdown  9/15/2020 0458 by Ankur Muhammad RN  Outcome: Ongoing  Note: Skin integrity checked & maintained this shift. No evidence of new skin breakdown noted, will continue to monitor.      Problem: Urinary Elimination:  Goal: Skin integrity will improve  Description: Skin integrity will improve  9/15/2020 0458 by Ankur Muhammad RN  Outcome: Ongoing  Goal: Ability to recognize the need to void and respond appropriately will improve  Description: Ability to recognize the need to void and respond appropriately will improve  9/15/2020 0458 by Ankur Muhammad RN  Outcome: Ongoing  Goal: Will remain free from infection  Description: Will remain free from infection  9/15/2020 0458 by Ankur Muhammad RN  Outcome: Ongoing  Goal: Incidence of incontinence will decrease  Description: Incidence of incontinence will decrease  9/15/2020 0458 by Ankur Muhammad RN  Outcome: Ongoing     Problem: Musculor/Skeletal Functional Status  Goal: Highest potential functional level  9/15/2020 0458 by Ankur Muhammad RN  Outcome: Ongoing  Goal: Absence of falls  9/15/2020 0458 by Claudetta Luz, RN  Outcome: Ongoing     Problem: Musculor/Skeletal Functional Status  Goal: Highest potential functional level  9/15/2020 0458 by Claudetta Luz, RN  Outcome: Ongoing  Goal: Absence of falls  9/15/2020 0458 by Claudetta Luz, RN  Outcome: Ongoing

## 2020-09-15 NOTE — PLAN OF CARE
Problem: Falls - Risk of:  Goal: Will remain free from falls  Description: Will remain free from falls  9/15/2020 0458 by Ember Castillo RN  Outcome: Ongoing  Note: Pt bed is in lowest position with 2/4 side rails up and call light within reach. No falls at this time. 9/14/2020 1541 by Lola Franco RN  Outcome: Met This Shift  Note: Bed in lowest position. Call light within reach. Goal: Absence of physical injury  Description: Absence of physical injury  Outcome: Ongoing     Problem: Physical Regulation:  Goal: Complications related to the disease process, condition or treatment will be avoided or minimized  Description: Complications related to the disease process, condition or treatment will be avoided or minimized  9/15/2020 0458 by Ember Castillo RN  Outcome: Ongoing  9/14/2020 1541 by Lola Franco RN  Outcome: Ongoing     Problem: Nutrition  Goal: Optimal nutrition therapy  9/15/2020 0458 by Ember Castillo RN  Outcome: Ongoing  9/14/2020 1541 by Lola Franco RN  Outcome: Met This Shift  Note: Nutrition therapy option     Problem: Skin Integrity:  Goal: Absence of new skin breakdown  Description: Absence of new skin breakdown  9/15/2020 0458 by Ember Castillo RN  Outcome: Ongoing  Note: Skin integrity checked & maintained this shift. No evidence of new skin breakdown noted, will continue to monitor. 9/14/2020 1541 by Lola Franco RN  Outcome: Met This Shift  Note: No skin breakdown      Problem: Urinary Elimination:  Goal: Skin integrity will improve  Description: Skin integrity will improve  9/15/2020 0458 by Ember Castillo RN  Outcome: Ongoing  9/14/2020 1541 by Lola Franco RN  Outcome: Met This Shift  Note: Rest and repositioned. Turning q2.  Up to chair and bathroom  Goal: Ability to recognize the need to void and respond appropriately will improve  Description: Ability to recognize the need to void and respond appropriately will improve  Outcome: Ongoing  Goal: Will remain free from infection  Description: Will remain free from infection  Outcome: Ongoing  Goal: Incidence of incontinence will decrease  Description: Incidence of incontinence will decrease  Outcome: Ongoing     Problem: Musculor/Skeletal Functional Status  Goal: Highest potential functional level  9/15/2020 0458 by Kathie Kehr, RN  Outcome: Ongoing  9/14/2020 1541 by Kiran West RN  Outcome: Ongoing  Goal: Absence of falls  Outcome: Ongoing     Problem: Musculor/Skeletal Functional Status  Goal: Highest potential functional level  9/15/2020 0458 by Kathie Kehr, RN  Outcome: Ongoing  9/14/2020 1541 by Kiran West RN  Outcome: Ongoing  Goal: Absence of falls  Outcome: Ongoing

## 2020-09-15 NOTE — PROGRESS NOTES
FAMILY MEDICINE  - PROGRESS NOTE    Date:  9/15/2020  Selina Garcia  [id number]      Chief Complaint   Patient presents with   [employer] Fall    Dizziness         Interval History:  Improved, she has no new complaints but she is ready to be discharged.       Subjective  Constitutional: negative  Eyes: negative  Ears, nose, mouth, throat, and face: negative  Respiratory: positive for emphysema  Cardiovascular: positive for irregular heart beat  Gastrointestinal: negative  Musculoskeletal:positive for arthralgias, myalgias and stiff joints  Neurological: negative  Behavioral/Psych: positive for anxiety, depression and obesity  Endocrine: positive for hypothyroid:    Objective:    /67   Pulse 77   Temp 98.2 °F (36.8 °C) (Oral)   Resp 18   Ht 5' 5\" (1.651 m)   Wt 197 lb 5 oz (89.5 kg)   SpO2 95%   BMI 32.83 kg/m²   General appearance - alert, well appearing, and in no distress and overweight  Mental status - alert, oriented to person, place, and time, normal mood, behavior, speech, dress, motor activity, and thought processes  Eyes - pupils equal and reactive, extraocular eye movements intact  Ears - bilateral TM's and external ear canals normal  Nose - normal and patent, no erythema, discharge or polyps  Mouth - mucous membranes moist, pharynx normal without lesions  Neck - supple, no significant adenopathy  Lymphatics - no palpable lymphadenopathy, no hepatosplenomegaly  Chest - clear to auscultation, no wheezes, rales or rhonchi, symmetric air entry, decreased air entry noted posteriorly  Heart - irregularly irregular rhythm with rate 77  Abdomen - soft, nontender, nondistended, no masses or organomegaly  Breasts - not examined  Back exam - not examined  Neurological - alert, oriented, normal speech, no focal findings or movement disorder noted  Musculoskeletal - osteoarthritic changes noted in both hands  Extremities - peripheral pulses normal, no pedal edema, no clubbing or cyanosis  Skin - normal coloration and turgor, no rashes, no suspicious skin lesions noted    Data:   Medications:   Current Facility-Administered Medications   Medication Dose Route Frequency Provider Last Rate Last Dose    ciprofloxacin (CIPRO) tablet 500 mg  500 mg Oral 2 times per day Dyan Hansen MD   500 mg at 09/14/20 2023    zolpidem (AMBIEN) tablet 5 mg  5 mg Oral Nightly PRN Dyan Hansen MD   5 mg at 09/13/20 2316    potassium chloride (KLOR-CON M) extended release tablet 40 mEq  40 mEq Oral PRN Saumya Dooley MD        Or   [address] [place] [address] potassium bicarb-citric acid (EFFER-K) effervescent tablet 40 mEq  40 mEq Oral PRN Saumya Dooley MD   40 mEq at 09/11/20 1236    Or    potassium chloride 10 mEq/100 mL IVPB (Peripheral Line)  10 mEq Intravenous PRN Saumya Dooley MD        sodium chloride flush 0.9 % injection 10 mL  10 mL Intravenous 2 times per day Dyan Hansen MD   10 mL at 09/14/20 2024    sodium chloride flush 0.9 % injection 10 mL  10 mL Intravenous PRN Dyan Hansen MD        acetaminophen (TYLENOL) tablet 500 mg  500 mg Oral Q6H PRN Dyan Hansen MD        lidocaine-prilocaine (EMLA) cream   Topical PRN Dyan Hansen MD        ferrous sulfate (IRON 325) tablet 325 mg  325 mg Oral Daily Dyan Hansen MD   325 mg at 09/14/20 3512    metoprolol succinate (TOPROL XL) extended release tablet 25 mg  25 mg Oral Daily Dyan Hansen MD   25 mg at 09/14/20 0752    levothyroxine (SYNTHROID) tablet 50 mcg  50 mcg Oral Daily Dyan Hansen MD   50 mcg at 09/14/20 4470    vitamin C (ASCORBIC ACID) tablet 1,000 mg  1,000 mg Oral Daily Dyan Hansen MD   1,000 mg at 09/14/20 0751    apixaban (ELIQUIS) tablet 5 mg  5 mg Oral BID Dyan Hansen MD   5 mg at 09/14/20 2023    pantoprazole (PROTONIX) tablet 40 mg  40 mg Oral QAM AC Dyan Hansen MD   40 mg at 09/14/20 0624    guaiFENesin (MUCINEX) extended release tablet 600 mg  600 mg Oral BID Patrick Cardenas MD   600 mg at 09/14/20 2024    baclofen (LIORESAL) tablet 5 mg  5 mg Oral TID PRN Patrick Cardenas MD   5 mg at 09/13/20 2316    buPROPion (WELLBUTRIN XL) extended release tablet 150 mg  150 mg Oral Daily Patrick Cardenas MD   150 mg at 09/14/20 0751    dilTIAZem (CARDIZEM CD) extended release capsule 180 mg  180 mg Oral Daily Patrick Cardenas MD   180 mg at 09/14/20 0752    tamsulosin (FLOMAX) capsule 0.4 mg  0.4 mg Oral Daily Patrick Cardenas MD   0.4 mg at 09/14/20 0752    levalbuterol (XOPENEX) nebulizer solution 1.25 mg  1.25 mg Nebulization Q6H PRN Patrick Cardenas MD   1.25 mg at 09/15/20 0152    clonazePAM (KLONOPIN) tablet 0.5 mg  0.5 mg Oral TID PRN Patrick Cardenas MD   0.5 mg at 09/13/20 2316    HYDROcodone-acetaminophen (NORCO) 5-325 MG per tablet 1 tablet  1 tablet Oral Q8H PRN Patrick Cardenas MD   1 tablet at 09/14/20 2125    fluticasone (FLOVENT HFA) 110 MCG/ACT inhaler 2 puff  2 puff Inhalation BID Patrick Cardenas MD   2 puff at 09/14/20 0752    fluticasone (FLONASE) 50 MCG/ACT nasal spray 1 spray  1 spray Each Nostril Daily Patrick Cardenas MD   1 spray at 09/14/20 0752    atorvastatin (LIPITOR) tablet 10 mg  10 mg Oral Daily Patrick Cardenas MD   10 mg at 09/14/20 0751       Intake/Output Summary (Last 24 hours) at 9/15/2020 0705  Last data filed at 9/15/2020 5281  Gross per 24 hour   Intake 100 ml   Output 400 ml   Net -300 ml     No results found for this or any previous visit (from the past 24 hour(s)).  -----------------------------------------------------------------  RAD:  EKG:  Micro:     Assessment & Plan:    Patient Active Problem List:     Acquired hypothyroidism     Hypertension     Primary osteoarthritis of right knee     A-fib ([facility]     SUNSHINE (acute kidney injury) ([facility]     Acute encephalopathy     SIRS (systemic inflammatory response syndrome) ([facility]     Normocytic anemia     Pulmonary hypertension (HCC)     COPD (chronic obstructive pulmonary disease) ([facility]     History of GI bleed     Constipation     HA (generalized anxiety disorder)     Lumbar radiculopathy     Lumbosacral spondylosis without myelopathy     Benign hypertension with CKD (chronic kidney disease) stage III (HCC)     CKD (chronic kidney disease) stage 3, GFR 30-59 ml/min (HCC)     Alcohol withdrawal syndrome with complication (HCC)     Moderate major depression (HCC)     PAD (peripheral artery disease) ([facility])     Acute kidney injury ([facility]     Obesity, Class I, BMI 30-34.9     Acute on chronic diastolic CHF (congestive heart failure) ([facility]           Plan:  -SUNSHINE - resolved. -A. Fib. - rate controlled. -COPD - stable.  -Okay to D/C to SNF. -Complete orders per chart.  -Follow up with Brett Ramesh CNP, after D/C form SNF.     See orders   Disposition:    Electronically signed by Paulo Davalos MD on 9/15/2020 at 7:05 AM

## 2020-09-15 NOTE — FLOWSHEET NOTE
On returning from bathroom pt c/o nausea and weakness,seeing floaters; slight tachycardia; some SOB on exertion but pt insistant that she wants to go home to rest; states that her son will stay with her;also concerned about her two dogs

## 2020-09-15 NOTE — CARE COORDINATION
ERICA received a phone call from Tristan Flores with [facility] and she reported that they had received authorization for this patient to admit to them. ERICA informed staff and SW received final DC orders for this patient. ERICA set up transportation and informed the staff here, the facility, and ERICA spoke with her son Matt Dinh and informed him of the DC transport time. The patient is scheduled to be picked up via wheelchair with O2 by Devora Jaimes. ERICA provided the number for report to this patient 's nurseCher ([phone]).

## 2020-09-15 NOTE — FLOWSHEET NOTE
Pt was informed that she has been accepted at [facility] and the [facility]EM[facility] H[facility]MON[facility] ad CM are arranging discharge.

## 2020-09-15 NOTE — PROGRESS NOTES
Nephrology Progress Note    Subjective/   70y.o. year old female who we are seeing in consultation for SUNSHINE. Patient was seen and examined at bedside. No acute events overnight. Patient is sitting up comfortably. Patient did not have any acute concerns today. Patient stated she feels weak and wants to get up. Patient creatinine back to baseline 0.76 today. Patient does look slightly edematous today. Objective/     Vitals:    09/15/20 0335 09/15/20 0715 09/15/20 0907 09/15/20 1054   BP: 132/67 136/80 127/75    Pulse: 77 83 114    Resp: 18 24     Temp: 98.2 °F (36.8 °C) 98.2 °F (36.8 °C)     TempSrc: Oral Oral     SpO2:  100% 95% 96%   Weight:       Height:         24HR INTAKE/OUTPUT:      Intake/Output Summary (Last 24 hours) at 9/15/2020 1520  Last data filed at 9/15/2020 3128  Gross per 24 hour   Intake 100 ml   Output 400 ml   Net -300 ml     No data found.     Constitutional:  Alert, awake, no apparent distress  Cardiovascular:  S1, S2 without m/r/g  Respiratory:  CTA B without w/r/r  Abdomen: +bs, soft, nt  Ext: Mild LE edema bilaterally    Data/  Recent Labs     09/15/20  0801   WBC 7.1   HGB 8.8*   HCT 29.4*   MCV 96.6        Recent Labs     09/13/20  1823 09/14/20  0552 09/15/20  0801    143 145*   K 3.6* 3.8 3.9    102 103   CO2 35* 36* 36*   GLUCOSE  --  131* 135*   BUN  --  14 11   CREATININE  --  0.90 0.76   LABGLOM  --  >60 >60   GFRAA  --  >60 >60     [unfilled]    DATA:    CBC with Differential:    Lab Results   Component Value Date    WBC 7.1 09/15/2020    RBC 3.04 09/15/2020    RBC 4.50 02/18/2012    HGB 8.8 09/15/2020    HCT 29.4 09/15/2020     09/15/2020     02/18/2012    MCV 96.6 09/15/2020    MCH 28.9 09/15/2020    MCHC 29.9 09/15/2020    RDW 20.9 09/15/2020    LYMPHOPCT 13 09/09/2020    MONOPCT 4 09/09/2020    BASOPCT 1 09/09/2020    MONOSABS 0.31 09/09/2020    LYMPHSABS 1.01 09/09/2020    EOSABS 0.00 09/09/2020    BASOSABS 0.08 09/09/2020 DIFFTYPE NOT REPORTED 09/09/2020     BMP:    Lab Results   Component Value Date     09/15/2020    K 3.9 09/15/2020     09/15/2020    CO2 36 09/15/2020    BUN 11 09/15/2020    LABALBU 3.4 09/15/2020    LABALBU 4.5 02/18/2012    CREATININE 0.76 09/15/2020    CALCIUM 9.7 09/15/2020    GFRAA >60 09/15/2020    LABGLOM >60 09/15/2020    GLUCOSE 135 09/15/2020    GLUCOSE 113 02/18/2012     Assessment/     Patient Active Problem List   Diagnosis    Acquired hypothyroidism    Hypertension    Primary osteoarthritis of right knee    [facility]    SUNSHINE (acute kidney injury) ([facility]    Acute encephalopathy    SIRS [facility]ic inflammatory response syndrome) ([facility]    Normocytic anemia    Pulmonary hypertension (HCC)    COPD (chronic obstructive pulmonary disease) ([facility]    History of GI bleed    Constipation    HA (generalized anxiety disorder)    Lumbar radiculopathy    Lumbosacral spondylosis without myelopathy    Benign hypertension with CKD (chronic kidney disease) stage III ([facility])    CKD (chronic kidney disease) stage 3, GFR 30-59 ml/min ([facility])    Alcohol withdrawal syndrome with complication ([facility])    Moderate major depression ([facility]    PAD (peripheral artery disease) ([facility])    Acute kidney injury ([address]    Obesity, Class I, BMI 30-34.9    Acute on chronic diastolic CHF (congestive heart failure) ([facility]         Plan/   1. SUNSHINE on CKD likely 2/2 to prerenal azotemia due to diuretic use and urinary retention  - SUNSHINE resolved, creatinine backt o baseline  - will restart lasix 40 mg PO daily. 2. Chronic diastolic CHF  - some mild LE edema bilaterally today  - will restart lasix 40 mg PO daily    3. Essential Hypertension - controlled      Spencer Sellers    Addendum to Resident [place] note:   Pt seen,examined and evaluated. I have reviewed the current history, physical findings, labs and assessment and plan and agree with note as documented by resident physician.     Patient Feeling better,complains of shortness of breath and congestion  Kidney function improved serum creatinine is back to baseline within normal limits  Patient is lower extremity edema is increased    Plan  Start p.o. Lasix 40 mg daily  Low-salt diet. Fluid restriction less than 1500 MLS per day    No objection on discharge from nephrology standpoint follow-up in 3 to 4 weeks    Sami Covarrubias MD  Nephrology attending.   [facility] [place]

## 2020-09-15 NOTE — PROGRESS NOTES
Physical Therapy  Facility/Department: [facility] PROGRESSIVE CARE  Daily Treatment Note  NAME: Miguel Henriquez  : 1945  MRN: [id number]    Date of Service: 9/15/2020    Discharge Recommendations:  Patient would benefit from continued therapy after discharge        Assessment   Body structures, Functions, Activity limitations: Decreased functional mobility ; Decreased strength;Decreased endurance;Decreased balance;Decreased posture;Decreased safe awareness  Treatment Diagnosis: Impaired function, poor tolerance to activity. Prognosis: Fair  REQUIRES PT FOLLOW UP: Yes  Activity Tolerance  Activity Tolerance: Patient limited by fatigue;Patient limited by endurance     Patient Diagnosis(es): The primary encounter diagnosis was SUNSHINE (acute kidney injury) ([facility] Diagnoses of Syncope and collapse and Protein-calorie malnutrition, unspecified severity ([facility] were also pertinent to this visit. has a past medical history of A-fib ([address] Acquired hypothyroidism, Acute encephalopathy, Acute kidney injury ([address] Anxiety, Benign hypertension with CKD (chronic kidney disease) stage III ([facility]), Chronic back pain, CKD (chronic kidney disease) stage 3, GFR 30-59 ml/min ([facility]), Constipation, COPD (chronic obstructive pulmonary disease) ([address] Cough, Depression, ETOH abuse, Former smoker, HA (generalized anxiety disorder), History of GI bleed, Hyperlipidemia, Hypertension, Hypothyroid, Leg pain, Normocytic anemia, Osteoarthritis, PAD (peripheral artery disease) ([address] Primary osteoarthritis, Pulmonary hypertension ([address] Pure hypercholesterolemia, SIRS (systemic inflammatory response syndrome) ([address] Urge incontinence, and Wheezing. has a past surgical history that includes eye surgery (Bilateral); Colonoscopy; and joint replacement.     Restrictions  Restrictions/Precautions  Restrictions/Precautions: Fall Risk  Required Braces or Orthoses?: No  Implants present? : Metal implants(L TKA)  Subjective   Subjective  Subjective: Pt report feeling very weak, agreeable to therapy. Pt informed D/C'd nurse that she wanted to go home. PT LATER TALKED TO THERAPIST REGARDING FALLING FIVE CONSECUTIVE TIMES. Therapist informed pt that it would be good for her to go for more rehab before going home. Pt in agreement and information given to discharged nurse. General Comment  Comments: Pt very pleasant and  cooperative, Pt sitting @ the EOB O2 @ 3 L pt [id number]%, with quick recovery following walk. Pain Assessment  Pain Assessment: 0-10  Pain Level: 5  Patient's Stated Pain Goal: No pain  Pain Type: Chronic pain  Pain Location: Back  Response to Pain Intervention: Patient Satisfied  Oxygen Therapy  SpO2: 96 %  Pulse Oximeter Device Mode: Intermittent  Pulse Oximeter Device Location: Finger  O2 Device: Nasal cannula  O2 Flow Rate (L/min): 3 L/min       Orientation  Orientation  Overall Orientation Status: Within Functional Limits  Cognition      Objective   Bed mobility  Scooting: Stand by assistance(Pt report feeling dizzy, pt instructed to sit until she's feeling better.)  Transfers  Sit to Stand: Contact guard assistance  Stand to sit: Contact guard assistance  Bed to Chair: Contact guard assistance  Ambulation  Ambulation?: Yes  Ambulation 1  Surface: level tile  Device: Small [employer]ing  Other Apparatus: O2(3 L)  Assistance: Contact guard assistance  Quality of Gait: Decreased dizziness, report increased weakness. Gait Deviations: Decreased step length;Decreased step height;Slow Hanna  Distance: 35' x 1  Comments: Extended rest in following amb. Pt easily fatigued.   Stairs/Curb  Stairs?: No     Balance  Posture: Fair  Sitting - Static: Good  Sitting - Dynamic: Good;-  Standing - Static: Fair;+  Standing - Dynamic: Fair(SBQC)  Other exercises  Other exercises?: Yes  Other exercises 1: Issued  and reviewed HEP both L/E supine and seated x 10  Other exercises 2: Issued and reviewed HEP and MOD (Lime) t-band x 10 both U/E   Other exercises 3: Sit<>Stand x 3(v.c for hand placement)                        G-Code     OutComes Score                                                     AM-PAC Score             Goals  Short term goals  Time Frame for Short term goals: 5 visits  Short term goal 1: Pt able to perform supine<>sit from bed, HOB elevted ~25 degrees. Short term goal 2: Pt able to transfers SBA   Short term goal 3:  Pt able to ambulalte 30 to 50 ft SBQC, SBA, with least amount of supplemental o2. Pt to toelrate activity for 20 to 25 minutes with pacing., sao2 > 90%  Patient Goals   Patient goals : Go home    Plan    Plan  Times per week: 5 to 6x/week  Specific instructions for Next Treatment: Monitor sao2, wean o2 as able, pt does not use o2 at home at baseline during day time, only uses it at night.   Current Treatment Recommendations: Strengthening, Balance Training, Functional Mobility Training, Transfer Training, Gait Training, Home Exercise Program, Safety Education & Training, Patient/Caregiver Education & Training, Endurance Training  Safety Devices  Type of devices: Gait belt, Left in chair, Call light within reach, Chair alarm in place, Nurse notified(Nati MCKAY)     Therapy Time   Individual Concurrent Group Co-treatment   Time In 1054         Time Out 7215         Minutes [address], PTA   Electronically signed by Arden Mohr PTA on 9/15/2020 at 12:59 PM

## 2020-09-17 ENCOUNTER — HOSPITAL ENCOUNTER (OUTPATIENT)
Age: 75
Setting detail: SPECIMEN
Discharge: HOME | End: 2020-09-17
Payer: MEDICARE

## 2020-09-17 LAB
ANION GAP SERPL CALCULATED.3IONS-SCNC: 13 MMOL/L (ref 9–17)
BUN BLDV-MCNC: 7 MG/DL (ref 8–23)
BUN/CREAT BLD: ABNORMAL (ref 9–20)
CALCIUM SERPL-MCNC: 9.3 MG/DL (ref 8.6–10.4)
CHLORIDE BLD-SCNC: 95 MMOL/L (ref 98–107)
CO2: 38 MMOL/L (ref 20–31)
CREAT SERPL-MCNC: 0.83 MG/DL (ref 0.5–0.9)
GFR AFRICAN AMERICAN: >60 ML/MIN
GFR NON-AFRICAN AMERICAN: >60 ML/MIN
GFR SERPL CREATININE-BSD FRML MDRD: ABNORMAL ML/MIN/{1.73_M2}
GFR SERPL CREATININE-BSD FRML MDRD: ABNORMAL ML/MIN/{1.73_M2}
GLUCOSE BLD-MCNC: 120 MG/DL (ref 70–99)
HCT VFR BLD CALC: 32.1 % (ref 36.3–47.1)
HEMOGLOBIN: 8.7 G/DL (ref 11.9–15.1)
MCH RBC QN AUTO: 28.4 PG (ref 25.2–33.5)
MCHC RBC AUTO-ENTMCNC: 27.1 G/DL (ref 28.4–34.8)
MCV RBC AUTO: 104.9 FL (ref 82.6–102.9)
NRBC AUTOMATED: 0 PER 100 WBC
PDW BLD-RTO: 19.6 % (ref 11.8–14.4)
PLATELET # BLD: 486 K/UL (ref 138–453)
PMV BLD AUTO: 10.2 FL (ref 8.1–13.5)
POTASSIUM SERPL-SCNC: 2.8 MMOL/L (ref 3.7–5.3)
RBC # BLD: 3.06 M/UL (ref 3.95–5.11)
SODIUM BLD-SCNC: 146 MMOL/L (ref 135–144)
WBC # BLD: 7.4 K/UL (ref 3.5–11.3)

## 2020-09-17 PROCEDURE — 36415 COLL VENOUS BLD VENIPUNCTURE: CPT

## 2020-09-17 PROCEDURE — 85027 COMPLETE CBC AUTOMATED: CPT

## 2020-09-17 PROCEDURE — 80048 BASIC METABOLIC PNL TOTAL CA: CPT

## 2020-09-17 PROCEDURE — P9603 ONE-WAY ALLOW PRORATED MILES: HCPCS

## 2020-09-18 ENCOUNTER — HOSPITAL ENCOUNTER (OUTPATIENT)
Age: 75
Setting detail: SPECIMEN
Discharge: HOME | End: 2020-09-18
Payer: MEDICARE

## 2020-09-18 LAB
BNP INTERPRETATION: ABNORMAL
POTASSIUM SERPL-SCNC: 3.7 MMOL/L (ref 3.7–5.3)
PRO-BNP: 1779 PG/ML

## 2020-09-18 PROCEDURE — 36415 COLL VENOUS BLD VENIPUNCTURE: CPT

## 2020-09-18 PROCEDURE — 84132 ASSAY OF SERUM POTASSIUM: CPT

## 2020-09-18 PROCEDURE — P9603 ONE-WAY ALLOW PRORATED MILES: HCPCS

## 2020-09-18 PROCEDURE — 83880 ASSAY OF NATRIURETIC PEPTIDE: CPT

## 2020-09-18 NOTE — DISCHARGE SUMMARY
[facility] Discharge Summary      Patient ID: Chaka Foster    MRN: [id number]     Acct:  [de-identified]       Patient's PCP: WILLIE Sellers CNP    Admit Date: 9/10/2020     Discharge Date: 9/15/2020      Admitting Physician: Michelle Chambers MD    Discharge Physician: Michelle Chambers MD     Discharge Diagnoses:    Primary Problem  SUNSHINE (acute kidney injury) [facility])    C/Bet[address] Problems    Diagnosis Date Noted    Obesity, Class I, BMI 30-34.9 [E66.9] 07/20/2020    Moderate major depression ([facility] [F32.1] 06/17/2020    PAD (peripheral artery disease) ([facility] [I73.9] 06/17/2020    Benign hypertension with CKD (chronic kidney disease) stage III ([address] [I12.9, N18.3]     CKD (chronic kidney disease) stage 3, GFR 30-59 ml/min ([facility] [N18.3]     HA (generalized anxiety disorder) [F41.1] 12/13/2018    SUNSHINE (acute kidney injury) ([facility] [N17.9] 09/13/2018    A-fib ([facility] [I48.91] 09/13/2018    COPD (chronic obstructive pulmonary disease) ([facility] [J44.9] 09/13/2018    Pulmonary hypertension ([facility] [I27.20] 09/13/2018    Hypertension [I10]     Acquired hypothyroidism [E03.9] 01/18/2013     Past Medical History:   Diagnosis Date    A-fib ([facility]     Acquired hypothyroidism     Acute encephalopathy     Acute kidney injury ([facility]     Anxiety     Benign hypertension with CKD (chronic kidney disease) stage III (HCC)     Chronic back pain     CKD (chronic kidney disease) stage 3, GFR 30-59 ml/min (HCC)     Constipation     COPD (chronic obstructive pulmonary disease) ([address]     Cough     Depression     ETOH abuse     Former smoker     3 packs a day for 26 years    HA (generalized anxiety disorder)     History of GI bleed     Hyperlipidemia     Hypertension     Hypothyroid 1/18/2013    Leg pain     Normocytic anemia     Osteoarthritis     PAD (peripheral artery disease) ([address]     Primary osteoarthritis     [facility]     Pure hypercholesterolemia     SIRS 2 TIMES DAILY             fluticasone (FLONASE) 50 MCG/ACT nasal spray  USE ONE SPRAY TO EACH NOSTRIL ONCE ADAY             furosemide (LASIX) 40 MG tablet  Take 40 mg by mouth daily             guaiFENesin (MUCINEX) 600 MG extended release tablet  Take 1 tablet by mouth 2 times daily             HYDROcodone-acetaminophen (NORCO) 5-325 MG per tablet  Take 1 tablet by mouth every 8 hours as needed for Pain for up to 30 days. levalbuterol (XOPENEX HFA) 45 MCG/ACT inhaler  Inhale 1 puff into the lungs every 4 hours as needed for Wheezing             levalbuterol (XOPENEX) 1.25 MG/3ML nebulizer solution  Take 3 mLs by nebulization every 6 hours as needed for Wheezing             levothyroxine (SYNTHROID) 50 MCG tablet  Take 1 tablet by mouth daily             lidocaine-prilocaine (EMLA) 2.5-2.5 % cream  Apply topically as needed. metoprolol succinate (TOPROL XL) 25 MG extended release tablet  Take 1 tablet by mouth daily             mupirocin (BACTROBAN) 2 % ointment  Apply 3 times daily. omeprazole (PRILOSEC) 40 MG delayed release capsule  TAKE 1 CAPSULE BY MOUTH EVERY MORNING BEFORE BREAKFAST             PROAIR  (90 Base) MCG/ACT inhaler  INHALE 2 PUFFS INTO THE LUNGS EVERY 6 HOURS AS NEEDED FOR WHEEZING             Respiratory Therapy Supplies (NEBULIZER/TUBING/MOUTHPIECE) KIT  Use every 4-6 hours prn for copd             tamsulosin (FLOMAX) 0.4 MG capsule  Take 1 capsule by mouth daily                  Activity: activity as tolerated    Diet: cardiac diet and renal diet    Time Spent on discharge is more than 30 minutes in the examination, evaluation, counseling and review of medications and discharge plan.       Electronically signed by Ling Man MD on 9/18/2020 at 2:40 PM

## 2020-09-21 ENCOUNTER — CARE COORDINATION (OUTPATIENT)
Dept: CARE COORDINATION | Age: 75
End: 2020-09-21

## 2020-09-22 ENCOUNTER — HOSPITAL ENCOUNTER (OUTPATIENT)
Age: 75
Setting detail: SPECIMEN
Discharge: HOME | End: 2020-09-22
Payer: MEDICARE

## 2020-09-22 LAB
ANION GAP SERPL CALCULATED.3IONS-SCNC: 13 MMOL/L (ref 9–17)
BNP INTERPRETATION: ABNORMAL
BUN BLDV-MCNC: 14 MG/DL (ref 8–23)
BUN/CREAT BLD: ABNORMAL (ref 9–20)
CALCIUM SERPL-MCNC: 8.9 MG/DL (ref 8.6–10.4)
CHLORIDE BLD-SCNC: 102 MMOL/L (ref 98–107)
CO2: 32 MMOL/L (ref 20–31)
CREAT SERPL-MCNC: 0.9 MG/DL (ref 0.5–0.9)
GFR AFRICAN AMERICAN: >60 ML/MIN
GFR NON-AFRICAN AMERICAN: >60 ML/MIN
GFR SERPL CREATININE-BSD FRML MDRD: ABNORMAL ML/MIN/{1.73_M2}
GFR SERPL CREATININE-BSD FRML MDRD: ABNORMAL ML/MIN/{1.73_M2}
GLUCOSE BLD-MCNC: 142 MG/DL (ref 70–99)
POTASSIUM SERPL-SCNC: 4.3 MMOL/L (ref 3.7–5.3)
PRO-BNP: 2596 PG/ML
SODIUM BLD-SCNC: 147 MMOL/L (ref 135–144)

## 2020-09-22 PROCEDURE — 80048 BASIC METABOLIC PNL TOTAL CA: CPT

## 2020-09-22 PROCEDURE — 83880 ASSAY OF NATRIURETIC PEPTIDE: CPT

## 2020-09-22 PROCEDURE — 36415 COLL VENOUS BLD VENIPUNCTURE: CPT

## 2020-09-22 PROCEDURE — P9603 ONE-WAY ALLOW PRORATED MILES: HCPCS

## 2020-10-08 ENCOUNTER — HOSPITAL ENCOUNTER (OUTPATIENT)
Age: 75
Setting detail: SPECIMEN
Discharge: HOME | End: 2020-10-08
Payer: MEDICARE

## 2020-10-08 LAB
ANION GAP SERPL CALCULATED.3IONS-SCNC: 16 MMOL/L (ref 9–17)
BUN BLDV-MCNC: 29 MG/DL (ref 8–23)
BUN/CREAT BLD: ABNORMAL (ref 9–20)
CALCIUM SERPL-MCNC: 9.1 MG/DL (ref 8.6–10.4)
CHLORIDE BLD-SCNC: 96 MMOL/L (ref 98–107)
CO2: 28 MMOL/L (ref 20–31)
CREAT SERPL-MCNC: 1.96 MG/DL (ref 0.5–0.9)
GFR AFRICAN AMERICAN: 30 ML/MIN
GFR NON-AFRICAN AMERICAN: 25 ML/MIN
GFR SERPL CREATININE-BSD FRML MDRD: ABNORMAL ML/MIN/{1.73_M2}
GFR SERPL CREATININE-BSD FRML MDRD: ABNORMAL ML/MIN/{1.73_M2}
GLUCOSE BLD-MCNC: 65 MG/DL (ref 70–99)
POTASSIUM SERPL-SCNC: 4 MMOL/L (ref 3.7–5.3)
SODIUM BLD-SCNC: 140 MMOL/L (ref 135–144)

## 2020-11-03 PROBLEM — N17.9 AKI (ACUTE KIDNEY INJURY) (HCC): Status: RESOLVED | Noted: 2018-09-13 | Resolved: 2020-11-03

## 2020-11-30 ENCOUNTER — HOSPITAL ENCOUNTER (INPATIENT)
Age: 75
LOS: 2 days | Discharge: HOME HEALTH | DRG: 378 | End: 2020-12-02
Attending: EMERGENCY MEDICINE | Admitting: FAMILY MEDICINE
Payer: MEDICARE

## 2020-11-30 ENCOUNTER — HOSPITAL ENCOUNTER (OUTPATIENT)
Age: 75
Setting detail: SPECIMEN
Discharge: HOME | End: 2020-11-30
Payer: MEDICARE

## 2020-11-30 PROBLEM — K92.2 GI BLEED: Status: ACTIVE | Noted: 2020-11-30

## 2020-11-30 LAB
ABSOLUTE EOS #: 0.18 K/UL (ref 0–0.4)
ABSOLUTE IMMATURE GRANULOCYTE: 0 K/UL (ref 0–0.3)
ABSOLUTE LYMPH #: 1.26 K/UL (ref 1–4.8)
ABSOLUTE MONO #: 0.63 K/UL (ref 0.1–0.8)
ALBUMIN SERPL-MCNC: 3.5 G/DL (ref 3.5–5.2)
ALBUMIN/GLOBULIN RATIO: 1.3 (ref 1–2.5)
ALP BLD-CCNC: 122 U/L (ref 35–104)
ALT SERPL-CCNC: 8 U/L (ref 5–33)
ANION GAP SERPL CALCULATED.3IONS-SCNC: 14 MMOL/L (ref 9–17)
AST SERPL-CCNC: 12 U/L
BASOPHILS # BLD: 2 % (ref 0–2)
BASOPHILS ABSOLUTE: 0.18 K/UL (ref 0–0.2)
BILIRUB SERPL-MCNC: 0.27 MG/DL (ref 0.3–1.2)
BNP INTERPRETATION: ABNORMAL
BUN BLDV-MCNC: 20 MG/DL (ref 8–23)
BUN/CREAT BLD: ABNORMAL (ref 9–20)
CALCIUM SERPL-MCNC: 8.7 MG/DL (ref 8.6–10.4)
CHLORIDE BLD-SCNC: 102 MMOL/L (ref 98–107)
CO2: 25 MMOL/L (ref 20–31)
CREAT SERPL-MCNC: 1.74 MG/DL (ref 0.5–0.9)
DIFFERENTIAL TYPE: ABNORMAL
EOSINOPHILS RELATIVE PERCENT: 2 % (ref 1–4)
GFR AFRICAN AMERICAN: 35 ML/MIN
GFR NON-AFRICAN AMERICAN: 29 ML/MIN
GFR SERPL CREATININE-BSD FRML MDRD: ABNORMAL ML/MIN/{1.73_M2}
GFR SERPL CREATININE-BSD FRML MDRD: ABNORMAL ML/MIN/{1.73_M2}
GLUCOSE BLD-MCNC: 105 MG/DL (ref 70–99)
HCT VFR BLD CALC: 19.7 % (ref 36–46)
HCT VFR BLD CALC: 22.5 % (ref 36.3–47.1)
HEMOGLOBIN: 6 G/DL (ref 11.9–15.1)
HEMOGLOBIN: 6.2 G/DL (ref 12–16)
IMMATURE GRANULOCYTES: 0 %
LYMPHOCYTES # BLD: 14 % (ref 24–44)
MCH RBC QN AUTO: 27.8 PG (ref 25.2–33.5)
MCHC RBC AUTO-ENTMCNC: 26.7 G/DL (ref 28.4–34.8)
MCV RBC AUTO: 104.2 FL (ref 82.6–102.9)
MONOCYTES # BLD: 7 % (ref 1–7)
MORPHOLOGY: ABNORMAL
NRBC AUTOMATED: 0 PER 100 WBC
PDW BLD-RTO: 16.9 % (ref 11.8–14.4)
PLATELET # BLD: 506 K/UL (ref 138–453)
PLATELET ESTIMATE: ABNORMAL
PMV BLD AUTO: 10.1 FL (ref 8.1–13.5)
POTASSIUM SERPL-SCNC: 4.8 MMOL/L (ref 3.7–5.3)
PRO-BNP: 2485 PG/ML
RBC # BLD: 2.16 M/UL (ref 3.95–5.11)
RBC # BLD: ABNORMAL 10*6/UL
SEG NEUTROPHILS: 75 % (ref 36–66)
SEGMENTED NEUTROPHILS ABSOLUTE COUNT: 6.75 K/UL (ref 1.8–7.7)
SODIUM BLD-SCNC: 141 MMOL/L (ref 135–144)
THYROXINE, FREE: 1.08 NG/DL (ref 0.93–1.7)
TOTAL PROTEIN: 6.2 G/DL (ref 6.4–8.3)
TROPONIN INTERP: ABNORMAL
TROPONIN T: ABNORMAL NG/ML
TROPONIN, HIGH SENSITIVITY: 21 NG/L (ref 0–14)
TSH SERPL DL<=0.05 MIU/L-ACNC: 7.37 MIU/L (ref 0.3–5)
WBC # BLD: 9 K/UL (ref 3.5–11.3)
WBC # BLD: ABNORMAL 10*3/UL

## 2020-11-30 PROCEDURE — 84484 ASSAY OF TROPONIN QUANT: CPT

## 2020-11-30 PROCEDURE — G0378 HOSPITAL OBSERVATION PER HR: HCPCS

## 2020-11-30 PROCEDURE — 36430 TRANSFUSION BLD/BLD COMPNT: CPT

## 2020-11-30 PROCEDURE — 93005 ELECTROCARDIOGRAM TRACING: CPT | Performed by: STUDENT IN AN ORGANIZED HEALTH CARE EDUCATION/TRAINING PROGRAM

## 2020-11-30 PROCEDURE — C9113 INJ PANTOPRAZOLE SODIUM, VIA: HCPCS | Performed by: STUDENT IN AN ORGANIZED HEALTH CARE EDUCATION/TRAINING PROGRAM

## 2020-11-30 PROCEDURE — 1200000000 HC SEMI PRIVATE

## 2020-11-30 PROCEDURE — 6360000002 HC RX W HCPCS: Performed by: STUDENT IN AN ORGANIZED HEALTH CARE EDUCATION/TRAINING PROGRAM

## 2020-11-30 PROCEDURE — 2580000003 HC RX 258: Performed by: STUDENT IN AN ORGANIZED HEALTH CARE EDUCATION/TRAINING PROGRAM

## 2020-11-30 PROCEDURE — P9016 RBC LEUKOCYTES REDUCED: HCPCS

## 2020-11-30 PROCEDURE — 99285 EMERGENCY DEPT VISIT HI MDM: CPT

## 2020-11-30 PROCEDURE — 85014 HEMATOCRIT: CPT

## 2020-11-30 PROCEDURE — 96376 TX/PRO/DX INJ SAME DRUG ADON: CPT

## 2020-11-30 PROCEDURE — 96375 TX/PRO/DX INJ NEW DRUG ADDON: CPT

## 2020-11-30 PROCEDURE — 86920 COMPATIBILITY TEST SPIN: CPT

## 2020-11-30 PROCEDURE — 85018 HEMOGLOBIN: CPT

## 2020-11-30 PROCEDURE — 86850 RBC ANTIBODY SCREEN: CPT

## 2020-11-30 PROCEDURE — 36415 COLL VENOUS BLD VENIPUNCTURE: CPT

## 2020-11-30 PROCEDURE — 86901 BLOOD TYPING SEROLOGIC RH(D): CPT

## 2020-11-30 PROCEDURE — 86900 BLOOD TYPING SEROLOGIC ABO: CPT

## 2020-11-30 RX ORDER — 0.9 % SODIUM CHLORIDE 0.9 %
500 INTRAVENOUS SOLUTION INTRAVENOUS ONCE
Status: COMPLETED | OUTPATIENT
Start: 2020-11-30 | End: 2020-11-30

## 2020-11-30 RX ORDER — 0.9 % SODIUM CHLORIDE 0.9 %
20 INTRAVENOUS SOLUTION INTRAVENOUS ONCE
Status: DISCONTINUED | OUTPATIENT
Start: 2020-11-30 | End: 2020-12-02 | Stop reason: HOSPADM

## 2020-11-30 RX ADMIN — PANTOPRAZOLE SODIUM 8 MG/HR: 40 INJECTION, POWDER, FOR SOLUTION INTRAVENOUS at 22:54

## 2020-11-30 RX ADMIN — SODIUM CHLORIDE 500 ML: 0.9 INJECTION, SOLUTION INTRAVENOUS at 20:45

## 2020-11-30 RX ADMIN — PANTOPRAZOLE SODIUM 80 MG: 40 INJECTION, POWDER, FOR SOLUTION INTRAVENOUS at 22:43

## 2020-11-30 ASSESSMENT — ENCOUNTER SYMPTOMS
ABDOMINAL PAIN: 0
RHINORRHEA: 0
COUGH: 0
SHORTNESS OF BREATH: 1
VOMITING: 0
NAUSEA: 0

## 2020-12-01 LAB
EKG ATRIAL RATE: 277 BPM
EKG Q-T INTERVAL: 410 MS
EKG QRS DURATION: 58 MS
EKG QTC CALCULATION (BAZETT): 429 MS
EKG R AXIS: 42 DEGREES
EKG T AXIS: 46 DEGREES
EKG VENTRICULAR RATE: 66 BPM
HCT VFR BLD CALC: 22.8 % (ref 36–46)
HCT VFR BLD CALC: 24.1 % (ref 36–46)
HEMOGLOBIN: 7.2 G/DL (ref 12–16)
HEMOGLOBIN: 7.6 G/DL (ref 12–16)

## 2020-12-01 PROCEDURE — 85014 HEMATOCRIT: CPT

## 2020-12-01 PROCEDURE — 84075 ASSAY ALKALINE PHOSPHATASE: CPT

## 2020-12-01 PROCEDURE — 36415 COLL VENOUS BLD VENIPUNCTURE: CPT

## 2020-12-01 PROCEDURE — U0003 INFECTIOUS AGENT DETECTION BY NUCLEIC ACID (DNA OR RNA); SEVERE ACUTE RESPIRATORY SYNDROME CORONAVIRUS 2 (SARS-COV-2) (CORONAVIRUS DISEASE [COVID-19]), AMPLIFIED PROBE TECHNIQUE, MAKING USE OF HIGH THROUGHPUT TECHNOLOGIES AS DESCRIBED BY CMS-2020-01-R: HCPCS

## 2020-12-01 PROCEDURE — 99222 1ST HOSP IP/OBS MODERATE 55: CPT | Performed by: INTERNAL MEDICINE

## 2020-12-01 PROCEDURE — 2580000003 HC RX 258: Performed by: FAMILY MEDICINE

## 2020-12-01 PROCEDURE — 2060000000 HC ICU INTERMEDIATE R&B

## 2020-12-01 PROCEDURE — G0378 HOSPITAL OBSERVATION PER HR: HCPCS

## 2020-12-01 PROCEDURE — 96365 THER/PROPH/DIAG IV INF INIT: CPT

## 2020-12-01 PROCEDURE — 84080 ASSAY ALKALINE PHOSPHATASES: CPT

## 2020-12-01 PROCEDURE — 6370000000 HC RX 637 (ALT 250 FOR IP): Performed by: NURSE PRACTITIONER

## 2020-12-01 PROCEDURE — 6370000000 HC RX 637 (ALT 250 FOR IP): Performed by: FAMILY MEDICINE

## 2020-12-01 PROCEDURE — 85018 HEMOGLOBIN: CPT

## 2020-12-01 PROCEDURE — 6360000002 HC RX W HCPCS: Performed by: NURSE PRACTITIONER

## 2020-12-01 PROCEDURE — 2580000003 HC RX 258: Performed by: NURSE PRACTITIONER

## 2020-12-01 PROCEDURE — 93010 ELECTROCARDIOGRAM REPORT: CPT | Performed by: INTERNAL MEDICINE

## 2020-12-01 PROCEDURE — APPNB30 APP NON BILLABLE TIME 0-30 MINS: Performed by: NURSE PRACTITIONER

## 2020-12-01 PROCEDURE — 99223 1ST HOSP IP/OBS HIGH 75: CPT | Performed by: FAMILY MEDICINE

## 2020-12-01 RX ORDER — LISINOPRIL 10 MG/1
10 TABLET ORAL DAILY
Status: DISCONTINUED | OUTPATIENT
Start: 2020-12-01 | End: 2020-12-02 | Stop reason: HOSPADM

## 2020-12-01 RX ORDER — FERROUS SULFATE 325(65) MG
325 TABLET ORAL DAILY
Status: DISCONTINUED | OUTPATIENT
Start: 2020-12-01 | End: 2020-12-01

## 2020-12-01 RX ORDER — LEVOTHYROXINE SODIUM 0.12 MG/1
125 TABLET ORAL DAILY
Status: DISCONTINUED | OUTPATIENT
Start: 2020-12-01 | End: 2020-12-02 | Stop reason: HOSPADM

## 2020-12-01 RX ORDER — SODIUM CHLORIDE 0.9 % (FLUSH) 0.9 %
10 SYRINGE (ML) INJECTION EVERY 12 HOURS SCHEDULED
Status: DISCONTINUED | OUTPATIENT
Start: 2020-12-01 | End: 2020-12-02 | Stop reason: HOSPADM

## 2020-12-01 RX ORDER — FUROSEMIDE 40 MG/1
40 TABLET ORAL DAILY
Status: DISCONTINUED | OUTPATIENT
Start: 2020-12-01 | End: 2020-12-02 | Stop reason: HOSPADM

## 2020-12-01 RX ORDER — HYDROCHLOROTHIAZIDE 12.5 MG/1
12.5 CAPSULE, GELATIN COATED ORAL DAILY
Status: DISCONTINUED | OUTPATIENT
Start: 2020-12-01 | End: 2020-12-02 | Stop reason: HOSPADM

## 2020-12-01 RX ORDER — HYDROCODONE BITARTRATE AND ACETAMINOPHEN 5; 325 MG/1; MG/1
1 TABLET ORAL EVERY 8 HOURS PRN
Status: DISCONTINUED | OUTPATIENT
Start: 2020-12-01 | End: 2020-12-02 | Stop reason: HOSPADM

## 2020-12-01 RX ORDER — ATORVASTATIN CALCIUM 10 MG/1
10 TABLET, FILM COATED ORAL DAILY
Status: DISCONTINUED | OUTPATIENT
Start: 2020-12-01 | End: 2020-12-02 | Stop reason: HOSPADM

## 2020-12-01 RX ORDER — SODIUM CHLORIDE 0.9 % (FLUSH) 0.9 %
10 SYRINGE (ML) INJECTION PRN
Status: DISCONTINUED | OUTPATIENT
Start: 2020-12-01 | End: 2020-12-02 | Stop reason: HOSPADM

## 2020-12-01 RX ORDER — FLUTICASONE PROPIONATE 220 UG/1
2 AEROSOL, METERED RESPIRATORY (INHALATION) 2 TIMES DAILY
Status: DISCONTINUED | OUTPATIENT
Start: 2020-12-01 | End: 2020-12-02 | Stop reason: HOSPADM

## 2020-12-01 RX ORDER — DILTIAZEM HYDROCHLORIDE 120 MG/1
120 CAPSULE, COATED, EXTENDED RELEASE ORAL DAILY
Status: DISCONTINUED | OUTPATIENT
Start: 2020-12-01 | End: 2020-12-02 | Stop reason: HOSPADM

## 2020-12-01 RX ORDER — LEVALBUTEROL TARTRATE 45 UG/1
1 AEROSOL, METERED ORAL EVERY 4 HOURS PRN
Status: DISCONTINUED | OUTPATIENT
Start: 2020-12-01 | End: 2020-12-01

## 2020-12-01 RX ORDER — CLONAZEPAM 0.5 MG/1
0.5 TABLET ORAL 3 TIMES DAILY PRN
Status: DISCONTINUED | OUTPATIENT
Start: 2020-12-01 | End: 2020-12-02 | Stop reason: HOSPADM

## 2020-12-01 RX ORDER — BACLOFEN 10 MG/1
10 TABLET ORAL 3 TIMES DAILY PRN
Status: DISCONTINUED | OUTPATIENT
Start: 2020-12-01 | End: 2020-12-02 | Stop reason: HOSPADM

## 2020-12-01 RX ORDER — METOPROLOL SUCCINATE 25 MG/1
25 TABLET, EXTENDED RELEASE ORAL DAILY
Status: DISCONTINUED | OUTPATIENT
Start: 2020-12-01 | End: 2020-12-02 | Stop reason: HOSPADM

## 2020-12-01 RX ORDER — PANTOPRAZOLE SODIUM 40 MG/1
40 TABLET, DELAYED RELEASE ORAL
Status: DISCONTINUED | OUTPATIENT
Start: 2020-12-01 | End: 2020-12-02 | Stop reason: HOSPADM

## 2020-12-01 RX ORDER — TAMSULOSIN HYDROCHLORIDE 0.4 MG/1
0.4 CAPSULE ORAL DAILY
Status: DISCONTINUED | OUTPATIENT
Start: 2020-12-01 | End: 2020-12-02 | Stop reason: HOSPADM

## 2020-12-01 RX ORDER — DULOXETIN HYDROCHLORIDE 60 MG/1
60 CAPSULE, DELAYED RELEASE ORAL DAILY
Status: DISCONTINUED | OUTPATIENT
Start: 2020-12-01 | End: 2020-12-02 | Stop reason: HOSPADM

## 2020-12-01 RX ORDER — ALBUTEROL SULFATE 2.5 MG/3ML
2.5 SOLUTION RESPIRATORY (INHALATION) EVERY 6 HOURS PRN
Status: DISCONTINUED | OUTPATIENT
Start: 2020-12-01 | End: 2020-12-02 | Stop reason: HOSPADM

## 2020-12-01 RX ORDER — LISINOPRIL AND HYDROCHLOROTHIAZIDE 12.5; 1 MG/1; MG/1
1 TABLET ORAL DAILY
Status: DISCONTINUED | OUTPATIENT
Start: 2020-12-01 | End: 2020-12-01

## 2020-12-01 RX ORDER — ONDANSETRON 2 MG/ML
4 INJECTION INTRAMUSCULAR; INTRAVENOUS EVERY 8 HOURS PRN
Status: DISCONTINUED | OUTPATIENT
Start: 2020-12-01 | End: 2020-12-02 | Stop reason: HOSPADM

## 2020-12-01 RX ORDER — FLUTICASONE PROPIONATE 50 MCG
1 SPRAY, SUSPENSION (ML) NASAL DAILY
Status: DISCONTINUED | OUTPATIENT
Start: 2020-12-01 | End: 2020-12-02 | Stop reason: HOSPADM

## 2020-12-01 RX ORDER — ACETAMINOPHEN 325 MG/1
650 TABLET ORAL EVERY 4 HOURS PRN
Status: DISCONTINUED | OUTPATIENT
Start: 2020-12-01 | End: 2020-12-02 | Stop reason: HOSPADM

## 2020-12-01 RX ADMIN — TAMSULOSIN HYDROCHLORIDE 0.4 MG: 0.4 CAPSULE ORAL at 09:12

## 2020-12-01 RX ADMIN — LISINOPRIL 10 MG: 10 TABLET ORAL at 09:12

## 2020-12-01 RX ADMIN — Medication 10 ML: at 21:18

## 2020-12-01 RX ADMIN — POLYETHYLENE GLYCOL 3350, SODIUM SULFATE ANHYDROUS, SODIUM BICARBONATE, SODIUM CHLORIDE, POTASSIUM CHLORIDE 2000 ML: 236; 22.74; 6.74; 5.86; 2.97 POWDER, FOR SOLUTION ORAL at 16:33

## 2020-12-01 RX ADMIN — FLUTICASONE PROPIONATE 2 PUFF: 220 AEROSOL, METERED RESPIRATORY (INHALATION) at 12:33

## 2020-12-01 RX ADMIN — FUROSEMIDE 40 MG: 40 TABLET ORAL at 09:12

## 2020-12-01 RX ADMIN — PANTOPRAZOLE SODIUM 40 MG: 40 TABLET, DELAYED RELEASE ORAL at 09:16

## 2020-12-01 RX ADMIN — ATORVASTATIN CALCIUM 10 MG: 10 TABLET, FILM COATED ORAL at 09:12

## 2020-12-01 RX ADMIN — METOPROLOL SUCCINATE 25 MG: 25 TABLET, EXTENDED RELEASE ORAL at 09:13

## 2020-12-01 RX ADMIN — SODIUM CHLORIDE 100 MG: 9 INJECTION, SOLUTION INTRAVENOUS at 14:01

## 2020-12-01 RX ADMIN — DILTIAZEM HYDROCHLORIDE 120 MG: 120 CAPSULE, COATED, EXTENDED RELEASE ORAL at 09:12

## 2020-12-01 RX ADMIN — LEVOTHYROXINE SODIUM 125 MCG: 125 TABLET ORAL at 09:12

## 2020-12-01 RX ADMIN — FERROUS SULFATE TAB 325 MG (65 MG ELEMENTAL FE) 325 MG: 325 (65 FE) TAB at 09:12

## 2020-12-01 RX ADMIN — DULOXETINE HYDROCHLORIDE 60 MG: 60 CAPSULE, DELAYED RELEASE ORAL at 09:12

## 2020-12-01 RX ADMIN — POLYETHYLENE GLYCOL 3350, SODIUM SULFATE ANHYDROUS, SODIUM BICARBONATE, SODIUM CHLORIDE, POTASSIUM CHLORIDE 2000 ML: 236; 22.74; 6.74; 5.86; 2.97 POWDER, FOR SOLUTION ORAL at 23:51

## 2020-12-01 RX ADMIN — FLUTICASONE PROPIONATE 1 SPRAY: 50 SPRAY, METERED NASAL at 12:32

## 2020-12-01 RX ADMIN — HYDROCHLOROTHIAZIDE 12.5 MG: 12.5 CAPSULE ORAL at 09:12

## 2020-12-01 RX ADMIN — Medication 10 ML: at 09:14

## 2020-12-01 RX ADMIN — FLUTICASONE PROPIONATE 2 PUFF: 220 AEROSOL, METERED RESPIRATORY (INHALATION) at 21:18

## 2020-12-01 NOTE — ED PROVIDER NOTES
[address]  Emergency Department Encounter  Emergency Medicine Resident     Pt Name: Morro Duran  MRN: [id number]  [place] 1945  Date of evaluation: 11/30/20  PCP:  Maxine Ernandez [address] [place] [address] Cynthia       Chief Complaint   Patient presents with    Abnormal Lab     Per pt she was informed that her Hgb was low - pt stated that she had labs drawn today and was sent in per her PCP.  Shortness of Breath       HISTORY OF PRESENT ILLNESS  (Location/Symptom, Timing/Onset, Context/Setting, Quality, Duration, Modifying Factors, Severity.)    Morro Duran is a 70 y.o. female who presents today with low hemoglobin. Patient was at her primary care's office today for a routine assessment and had blood work completed. Hemoglobin completed was 6.0. Patient also been complaining of intermittent shortness of breath, fatigue. Patient was contacted by her primary care provider instructed to come to the emergency room due to the low hemoglobin. Patient states that she has been feeling weaker over the past few weeks, has been having some intermittent shortness of breath. Patient also endorses some dark stools over the past several months however she also states she is on an iron supplement. States that she has not been able to tell the difference to between dark stools from the iron or bloody stools. States she is on Eliquis for atrial fibrillation. States that she does bruise easily also has a history of hemorrhoids. Denies any hematemesis, denies any hematuria. Denies any abdominal pain.     PPE Worn:  Gloves: Yes  Eye Protection: Goggles  Mask: Surgical Mask  Gown: NO    PAST MEDICAL / SURGICAL / SOCIAL / FAMILY HISTORY    has a past medical history of A-fib ([facility] Acquired hypothyroidism, Acute encephalopathy, Acute kidney injury ([address] Anxiety, Benign hypertension with CKD (chronic kidney disease) stage III, Chronic back pain, CKD (chronic kidney disease) stage 3, GFR 30-59 ml/min, Constipation, COPD (chronic obstructive pulmonary disease) (HCC), Cough, Depression, ETOH abuse, Former smoker, HA (generalized anxiety disorder), History of GI bleed, Hyperlipidemia, Hypertension, Hypothyroid, Leg pain, Normocytic anemia, Osteoarthritis, PAD (peripheral artery disease) ([facility] Primary osteoarthritis, Pulmonary hypertension ([facility] [address] [facility] Pure hypercholesterolemia, SIRS (systemic inflammatory response syndrome) [facility] Urge incontinence, and Wheezing. has a past surgical history that includes eye surgery (Bilateral); Colonoscopy; and joint replacement.     Social History     Socioeconomic History    Marital status:      Spouse name: Not on file    Number of children: Not on file    Years of education: Not on file    Highest education level: Not on file   Occupational History    Not on file   Social Needs    Financial resource strain: Not on file    Food insecurity     Worry: Not on file     Inability: Not on file    Transportation needs     Medical: Not on file     Non-medical: Not on file   Tobacco Use    Smoking status: Former Smoker     Packs/day: 3.00     Years: 26.00     Pack years: 78.00     Types: Cigarettes    Smokeless tobacco: Never Used   Substance and Sexual Activity    Alcohol use: Yes     Comment: occ    Drug use: No    Sexual activity: Not on file   Lifestyle    Physical activity     Days per week: Not on file     Minutes per session: Not on file    Stress: Not on file   Relationships    Social connections     Talks on phone: Not on file     Gets together: Not on file     Attends Hinduism service: Not on file     Active member of club or organization: Not on file     Attends meetings of clubs or organizations: Not on file     Relationship status: Not on file    Intimate partner violence     Fear of current or ex partner: Not on file     Emotionally abused: Not on file     Physically abused: Not on file     Forced sexual activity: Not on file   Other Topics Concern    Not on file   Social History Narrative    Not on file       Family History   Problem Relation Age of Onset    Heart Disease Mother     COPD Mother     Emphysema Sister     Cancer Other         Cousin - breast cancer       Allergies:    Tizanidine    Home Medications:  Prior to Admission medications    Medication Sig Start Date End Date Taking? Authorizing Provider   furosemide (LASIX) 40 MG tablet TAKE 1 TABLET BY MOUTH DAILY 11/30/20   WILLIE Ngo CNP   DULoxetine (CYMBALTA) 60 MG extended release capsule Take 60 mg by mouth daily    Historical Provider, MD   baclofen (LIORESAL) 10 MG tablet TAKE ONE TABLET BY MOUTH THREE TIMES A DAY AS NEEDED. 11/30/20   WILLIE Ngo CNP   HYDROcodone-acetaminophen (NORCO) 5-325 MG per tablet Take 1 tablet by mouth every 8 hours as needed for Pain for up to 30 days. 11/30/20 12/30/20  WILLIE Ngo CNP   lisinopril-hydroCHLOROthiazide (PRINZIDE;ZESTORETIC) 10-12.5 MG per tablet Take 1 tablet by mouth daily 11/30/20   WILLIE Ngo CNP   ELIQUIS 5 MG TABS tablet TAKE ONE TABLET BY MOUTH TWICE A DAY 11/30/20   WILLIE Ngo CNP   Lift Chair MISC Use daily for comfort 11/30/20   WILLIE Ngo CNP   levothyroxine (SYNTHROID) 125 MCG tablet Take 1 tablet by mouth daily 11/30/20   WILLIE Ngo CNP   metoprolol succinate (TOPROL XL) 25 MG extended release tablet TAKE 1 TABLET BY MOUTH DAILY. (PLEASE MAKE  APPT FOR FUTURE REFILLS) 11/23/20   WILLIE Ngo CNP   clonazePAM (KLONOPIN) 0.5 MG tablet Take 1 tablet by mouth 3 times daily as needed for Anxiety for up to 30 days.  11/17/20 12/17/20  WILLIE Ngo CNP   PROAIR  (20 Base) MCG/ACT inhaler Inhale 2 puffs into the lungs every 6 hours as needed for Wheezing 11/17/20   WILLIE Ngo CNP   fluticasone (FLOVENT HFA) 220 MCG/ACT inhaler Inhale 2 puffs into the lungs 2 times daily 11/17/20   Roberto Carlos Chain, APRN - CNP   albuterol (PROVENTIL) (2.5 MG/3ML) 0.083% nebulizer solution Take 3 mLs by nebulization every 6 hours as needed for Wheezing 11/5/20   Roberto Carlos ChainWILLIE CNP   dilTIAZem (CARDIZEM CD) 120 MG extended release capsule Take 1 capsule by mouth daily 10/19/20   Roberto Carlos ChainWILLIE CNP   albuterol sulfate HFA (PROAIR HFA) 108 (90 Base) MCG/ACT inhaler Inhale 2 puffs into the lungs every 6 hours as needed for Wheezing    Historical Provider, MD   fluticasone (FLONASE) 50 MCG/ACT nasal spray USE ONE SPRAY TO EACH NOSTRIL ONCE ADAY 9/4/20   WILLIE Cortés CNP   atorvastatin (LIPITOR) 10 MG tablet TAKE 1 TABLET BY MOUTH ONCE DAILY 9/4/20   glenn BroadwayWILLIE siu CNP   levalbuterol [facility]) 45 MCG/ACT inhaler Inhale 1 puff into the lungs every 4 hours as needed for Wheezing 8/19/20   Roberto Carlos ChainWILLIE CNP   tamsulosin [facility]) 0.4 MG capsule Take 1 capsule by mouth daily 7/23/20   Jovanna Hurd MD   Respiratory Therapy Supplies (NEBULIZER/TUBING/MOUTHPIECE) KIT Use every 4-6 hours prn for copd 6/15/20   Roberto Carlos ChainWILLIE CNP   omeprazole (PRILOSEC) 40 MG delayed release capsule TAKE 1 CAPSULE BY MOUTH EVERY MORNING BEFORE BREAKFAST 3/16/20   WILLIE Henry CNP   Ascorbic Acid (C-1000 PO) Take 1 capsule by mouth daily     Historical Provider, MD   ferrous sulfate 325 (65 Fe) MG tablet Take 325 mg by mouth daily  8/30/18   Historical Provider, MD       REVIEW OF SYSTEMS    (2-9 systems for level 4, 10 or more for level 5)    Review of Systems   Constitutional: Positive for fatigue. Negative for chills and fever. HENT: Negative for congestion and rhinorrhea. Respiratory: Positive for shortness of breath. Negative for cough. Cardiovascular: Negative for chest pain. Gastrointestinal: Negative for abdominal pain, nausea and vomiting. Genitourinary: Negative for hematuria and vaginal bleeding.    Musculoskeletal: Negative for myalgias. Skin: Positive for pallor. Neurological: Negative for headaches. All other systems reviewed and are negative. PHYSICAL EXAM   (up to 7 for level 4, 8 or more for level 5)    INITIAL VITALS:   ED Triage Vitals   BP Temp Temp Source Pulse Resp SpO2 Height Weight   11/30/20 1815 11/30/20 1815 11/30/20 1815 11/30/20 1815 11/30/20 1815 11/30/20 1815 11/30/20 1812 11/30/20 1812   (!) 90/45 97.2 °F (36.2 °C) Temporal 104 16 91 % 5' 5\" (1.651 m) 187 lb (84.8 kg)       Physical Exam  Vitals signs and nursing note reviewed. Constitutional:       Appearance: She is well-developed. She is ill-appearing. She is not diaphoretic. Cardiovascular:      Rate and Rhythm: Normal rate. Rhythm irregular. Pulses:           Radial pulses are 2+ on the right side and 2+ on the left side. Heart sounds: No murmur. Pulmonary:      Effort: Pulmonary effort is normal. No respiratory distress. Breath sounds: Normal breath sounds. No decreased breath sounds or wheezing. Abdominal:      General: There is no distension. Palpations: Abdomen is soft. Tenderness: There is no abdominal tenderness. Genitourinary:     Rectum: Guaiac result positive. Skin:     General: Skin is warm and dry. Capillary Refill: Capillary refill takes less than 2 seconds. Coloration: Skin is pale. Neurological:      General: No focal deficit present. Mental Status: She is alert and oriented to person, place, and time. Psychiatric:         Behavior: Behavior is cooperative.          DIFFERENTIAL  DIAGNOSIS   PLAN (LABS / IMAGING / EKG):  Orders Placed This Encounter   Procedures    HEMOGLOBIN AND HEMATOCRIT, BLOOD    Hemoglobin and Hematocrit, Blood, Post Transfusion    Troponin    Diet NPO Effective Now    VITAL SIGNS PER TRANSFUSION PROTOCOL    Verify hospital blood consent form has been signed and witnessed   [address] Inpatient consult to GI    Inpatient consult to Internal Medicine    EKG 12 Lead    TYPE AND SCREEN    PREPARE RBC (CROSSMATCH), 1 Units    PATIENT STATUS (FROM ED OR OR/PROCEDURAL) Inpatient       MEDICATIONS ORDERED:  Orders Placed This Encounter   Medications    0.9 % sodium chloride bolus    0.9 % sodium chloride bolus    pantoprazole (PROTONIX) 80 mg in sodium chloride 0.9 % 50 mL bolus    pantoprazole (PROTONIX) 80 mg in sodium chloride 0.9 % 100 mL infusion         DIAGNOSTIC RESULTS / EMERGENCYDEPARTMENT COURSE / MDM   LABS:  Labs Reviewed   HEMOGLOBIN AND HEMATOCRIT, BLOOD - Abnormal; Notable for the following components:       Result Value    Hemoglobin 6.2 (*)     Hematocrit 19.7 (*)     All other components within normal limits   TROPONIN - Abnormal; Notable for the following components:    Troponin, High Sensitivity 21 (*)     All other components within normal limits   TYPE AND SCREEN   PREPARE RBC (CROSSMATCH)       RADIOLOGY:  No results found. Impression:  Patient presents, low hemoglobin, shortness of breath. Is on Eliquis for atrial fibrillation control. Has been having dark stools but is also on iron. Will confirm low hemoglobin with checking an H&H. We will plan for transfusion at this time however was H&H's somehow normal.  Will check fecal occult screen. We will also check an EKG and troponin. Plan for admission. EMERGENCY DEPARTMENT COURSE:  ED Course as of Dec 01 0113   Mon Nov 30, 2020   2039 Fecal occult blood screen positive    [AP]      ED Course User Index  [AP] Jose Manuel Christiansen MD     Spoke with GI who regarding patient's positive fecal occult blood screen. They recommended keeping patient n.p.o. and holding Eliquis. States that they will see the patient in the morning. Patient has been having blood pressure is relatively softer side, systolics in the 65W and 72E. Patient is fluid responsive and blood pressure has been improving with blood transfusion and fluid administration.   Patient states that she is feeling better and less short of breath during the blood transfusion. Started on Protonix bolus and infusion due to concern for possible upper GI bleed. Spoke with patient's PCP and patient was admitted to the floor for further evaluation. MDM  Number of Diagnoses or Management Options  Gastrointestinal hemorrhage, unspecified gastrointestinal hemorrhage type: new, needed workup  Symptomatic anemia: new, needed workup     Amount and/or Complexity of Data Reviewed  Clinical lab tests: ordered and reviewed  Review and summarize past medical records: yes  Discuss the patient with other providers: yes  Independent visualization of images, tracings, or specimens: yes    Risk of Complications, Morbidity, and/or Mortality  Presenting problems: moderate  Diagnostic procedures: moderate  Management options: moderate    Patient Progress  Patient progress: stable      PROCEDURES:  none    CONSULTS:  IP CONSULT TO GI  IP CONSULT TO INTERNAL MEDICINE    CRITICAL CARE:  Please see attending note    FINAL IMPRESSION     1. Gastrointestinal hemorrhage, unspecified gastrointestinal hemorrhage type    2. Symptomatic anemia          DISPOSITION / PLAN   DISPOSITION Admitted 11/30/2020 10:55:55 PM        PATIENT REFERRED TO:  No follow-up provider specified.     DISCHARGE MEDICATIONS:  Current Discharge Medication List          Sher Yi DO  Emergency Medicine Resident Physician, PGY-2    (Please note that portions of this note were completed with a voice recognition program.  Efforts were made to edit the dictations but occasionally words are mis-transcribed.)          Sher Yi MD  12/01/20 0237

## 2020-12-01 NOTE — CARE COORDINATION
CASE MANAGEMENT NOTE:    Admission Date:  11/30/2020 Trish Lopez is a 70 y.o.  female    Admitted for : GI bleed [K92.2]  GI bleed [K92.2]    Met with:  Patient    PCP:  Piter Cruz                                Insurance:  [employer]      Current Residence/ Living Arrangements:  independently at home             Current Services PTA:  Yes    Is patient agreeable to VNS: Yes    Freedom of choice provided:  Yes    List of [address] provided: Yes    VNS chosen:  Current with vns not sure what company yet    DME:  straight cane and walker    Home Oxygen: Yes    Nebulizer: Yes    CPAP/BIPAP: No    Supplier: not sure    Potential Assistance Needed: No    SNF needed: No    Freedom of choice and list provided: No    [facility]        Does Patient want to use MEDS to BEDS? No    Is patient currently receiving oral anticoagulation therapy? No    Is the Patient an [facility] with Readmission Risk Score greater than 14%? No  If yes, pt needs a follow up appointment made within 7 days. Family Members/Caregivers that pt would like involved in their care:    Yes    If yes, list name here:  Claudia Farris    Transportation Provider:  Family             Is patient in Isolation/One on One/Altered Mental Status? No  If yes, skip next question. If no, would they like an I-Pad to  use? No  If yes, call [phone]. Discharge Plan:  12/1/20 [facility] Medicare Pt is from home in a one story home alone she uses a walker, cane, home ox and has a neb pt is current with vns is not sure of what company [employer] is started and needs signed will continue to follow for needs. //tv                    Electronically signed by:  Hobert Sever, RN on 12/1/2020 at 9:54 AM

## 2020-12-01 NOTE — DISCHARGE INSTR - COC
Continuity of Care Form    Patient Name: Lawrence Pardo   :  1945  MRN:  [id number]    Admit date:  2020  Discharge date:  2020    Code Status Order: Full Code   Advance Directives:   [facility] Documentation       Date/Time Healthcare Directive Type of Healthcare Directive Copy in [facility] [address] Agent's Name Healthcare Agent's Phone Number    20 0451  No, patient does not have an advance directive for healthcare treatment -- -- -- -- --            Admitting Physician:  Blanca Pride MD  PCP: WILLIE Mayer CNP    [facility] Unit[facility]   Discharging Unit Phone Number: [phone]    Emergency Contact:   Extended Emergency Contact Information  Primary Emergency Contact: Relmarbin Mcclain [address] Phone: [phone]  Mobile Phone: [phone]  Relation: Child  Secondary Emergency Contact: Arabella Sharp [address] Phone: [phone]  Relation: Child   needed?  No    Past Surgical History:  Past Surgical History:   Procedure Laterality Date    COLONOSCOPY      EYE SURGERY Bilateral     cataracts    JOINT REPLACEMENT      Left knee on 20       Immunization History:   Immunization History   Administered Date(s) Administered    Influenza Vaccine, unspecified formulation 2018    Influenza Virus Vaccine 2017    Influenza, High Dose (Fluzone 65 yrs and older) 10/19/2012    Influenza, Quadv, IM, PF (6 mo and older Fluzone, Flulaval, Fluarix, and 3 yrs and older Afluria) 2020    Influenza, Triv, inactivated, subunit, adjuvanted, IM (Fluad 65 yrs and older) 2019    Pneumococcal Conjugate 13-valent (Eizmrtr05) 2018    Pneumococcal Polysaccharide (Dizafmbyt80) 2015    Tetanus 1998    Tetanus Toxoid, absorbed 1998    Zoster Recombinant (Shingrix) 2020, 2020       Active Problems:  Patient Active Problem List   Diagnosis Code    Acquired hypothyroidism E03.9    Hypertension I10    Primary osteoarthritis of right knee M17.11    A-fib ([facility]) I48.91    Acute encephalopathy G93.40    SIRS (systemic inflammatory response syndrome) ([facility]) R65.10    Normocytic anemia D64.9    Pulmonary hypertension ([facility]) I27.20    COPD (chronic obstructive pulmonary disease) ([facility]) J44.9    History of GI bleed Z87.19    Constipation K59.00    HA (generalized anxiety disorder) F41.1    Lumbar radiculopathy M54.16    Lumbosacral spondylosis without myelopathy M47.817    Benign hypertension with CKD (chronic kidney disease) stage III I12.9, N18.30    CKD (chronic kidney disease) stage 3, GFR 30-59 ml/min N18.30    Alcohol withdrawal syndrome with complication ([facility]) R47.176    Moderate major depression ([facility]) F32.1    PAD (peripheral artery disease) ([facility]) I73.9    Acute kidney injury ([facility] N17.9    Obesity, Class I, BMI 30-34.9 E66.9    Acute on chronic diastolic CHF (congestive heart failure) ([facility]) I50.33    GI bleed K92.2       Isolation/Infection:   Isolation            No Isolation          Patient Infection Status       None to display            Nurse Assessment:  Last Vital Signs: /67   Pulse 78   Temp 98.2 °F (36.8 °C) (Oral)   Resp 20   Ht 5' 5\" (1.651 m)   Wt 187 lb (84.8 kg)   SpO2 100%   BMI 31.12 kg/m²     Last documented pain score (0-10 scale):    Last Weight:   Wt Readings from Last 1 Encounters:   11/30/20 187 lb (84.8 kg)     Mental Status:  oriented and alert    IV Access:  - None    Nursing Mobility/ADLs:  Walking   Independent  Transfer  Independent  Bathing  Independent  Dressing  Independent  Toileting  Independent  Feeding  Independent  Med [address]  Med Delivery   whole    Wound Care Documentation and Therapy:  Incision 09/13/18 Knee Left (Active)   Number of days: 810        Elimination:  Continence:    Bowel: Yes  Bladder: Yes  Urinary Catheter: None Colostomy/Ileostomy/Ileal Conduit: No       Date of Last BM: 12/2/2020    Intake/Output Summary (Last 24 hours) at 12/1/2020 1011  Last data filed at 12/1/2020 0618  Gross per 24 hour   Intake 915 ml   Output --   Net 915 ml     I/O last 3 completed shifts: In: 65 [I. V.:415; IV Piggyback:500]  Out: -     Safety Concerns: At Risk for Falls    Impairments/Disabilities:      Vision    Nutrition Therapy:  Current Nutrition Therapy:   - Oral Diet:  General    Routes of Feeding: Oral  Liquids: Thin Liquids  Daily Fluid Restriction: no  Last Modified Barium Swallow with Video (Video Swallowing Test): not done    Treatments at the Time of Hospital Discharge:   Respiratory Treatments: breathing treatments  Oxygen Therapy:  is on oxygen at 3 L/min per nasal cannula. Ventilator:    - No ventilator support    Rehab Therapies: Physical Therapy and Occupational Therapy  Weight Bearing Status/Restrictions: Other Medical Equipment (for information only, NOT a DME order):     Other Treatments: Skilled nursing assessment per protocol continue previous services     Patient's personal belongings (please select all that are sent with patient):  Glasses, Dentures upper and lower    RN SIGNATURE:  Electronically signed by Pop Arevalo RN on 12/2/20 at 3:14 PM EST    CASE MANAGEMENT/SOCIAL WORK SECTION    Inpatient Status Date: 11/30/20    Readmission Risk Assessment Score:  Readmission Risk              Risk of Unplanned Readmission:        21           Discharging to Facility/ [address]  [address] [address]  Phone [phone]  Fax  [phone]        Dialysis Facility (if applicable)   Name:  Address:  Dialysis Schedule:  Phone:  Fax:    / signature: Electronically signed by Richard Heart RN on 12/1/20 at 10:13 AM EST    PHYSICIAN SECTION    Prognosis: Fair    Condition at Discharge: Stable    Rehab Potential (if transferring to Rehab): Fair    Recommended Labs or Other Treatments After Discharge: ***    Physician Certification: I certify the above information and transfer of [place]  is necessary for the continuing treatment of the diagnosis listed and that she requires {Admit to Appropriate Level of Care:57812} for {GREATER/LESS:440227340} 30 days.      Update Admission H&P: No change in H&P    PHYSICIAN SIGNATURE:  Electronically signed by Juan Luna MD on 12/2/20 at 3:37 PM EST

## 2020-12-01 NOTE — ED NOTES
Rectal exam completed per resident physician.  Hemoccult obtained and positive result is noted      Merlene Ball RN  11/30/20 2803

## 2020-12-01 NOTE — PROGRESS NOTES
Pt arrived to floor. Pt ambulated to the bed with assistance. Gait steady. Vitals taken and pt hooked up to 4L O2 via NC. IV protonix going.  Will monitor    Electronically signed by Paty Reyez RN on 12/1/2020 at 3188

## 2020-12-01 NOTE — ED NOTES
Report given to WOODY Conner from ED . Report method in person   The following was reviewed with receiving RN:   Current vital signs:  BP (!) 96/59   Pulse 77   Temp 98.1 °F (36.7 °C) (Oral)   Resp 23   Ht 5' 5\" (1.651 m)   Wt 187 lb (84.8 kg)   SpO2 92%   BMI 31.12 kg/m²                MEWS Score: 3     Any medication or safety alerts were reviewed. Any pending diagnostics and notifications were also reviewed, as well as any safety concerns or issues, abnormal labs, abnormal imaging, and abnormal assessment findings. Questions were answered.             Latia Jacinto RN  11/30/20 0658

## 2020-12-01 NOTE — CONSULTS
INITIAL CONSULTATION     HISTORY OF PRESENT ILLNESS: Stephie Roth is a 70 y.o. female admitted to the hospital on 11/30/2020 for shortness of breath. She was found to have iron deficiency anemia. She has had issues with iron deficiency anemia. EGD and colonoscopy couple of years ago did not show any source of bleeding as per patient. We will have access to these records. She reports no blood in the stool or melena. She reports a 20 pound weight loss over the past few months. No abdominal pain. She is on blood thinners. No family history of GI pathology. Ex-smoker. Rare alcohol.      PAST MEDICAL HISTORY:     Past Medical History:   Diagnosis Date    A-fib [facility])     Acquired hypothyroidism     Acute encephalopathy     [facility] [address]     Anxiety     Benign hypertension with CKD (chronic kidney disease) stage III     Chronic back pain     CKD (chronic kidney disease) stage 3, GFR 30-59 ml/min     Constipation     [facility]) ([facility]     Cough     Depression     ETOH abuse     Former smoker     3 packs a day for 26 years    HA (generalized anxiety disorder)     History of GI bleed     Hyperlipidemia     Hypertension     Hypothyroid 1/18/2013    Leg pain     Normocytic anemia     Osteoarthritis     PAD (peripheral artery disease) (HCC)     Primary osteoarthritis     Pulmonary hypertension ([facility] [address]     Pure hypercholesterolemia     SIRS (systemic inflammatory response syndrome) ([facility])     Urge incontinence     Wheezing         Past Surgical History:   Procedure Laterality Date    COLONOSCOPY      EYE SURGERY Bilateral     cataracts    JOINT REPLACEMENT      Left knee on 9-11-18          CURRENT MEDICATIONS:       Current Facility-Administered Medications:     sodium chloride flush 0.9 % injection 10 mL, 10 mL, Intravenous, 2 times per day, Emeka Mcpherson MD, 10 mL at 12/01/20 0914    sodium chloride flush 0.9 % injection 10 mL, 10 mL, Intravenous, PRN, Felisa Garcia MD    acetaminophen (TYLENOL) tablet 650 mg, 650 mg, Oral, Q4H PRN, Felisa Garcia MD    ondansetron [facility]) injection 4 mg, 4 mg, Intravenous, Q8H PRN, Felisa Garcia MD    pantoprazole (PROTONIX) tablet 40 mg, 40 mg, Oral, QAM AC, Felisa Garcia MD, 40 mg at 12/01/20 0916    tamsulosin (FLOMAX) capsule 0.4 mg, 0.4 mg, Oral, Daily, Felisa Garcia MD, 0.4 mg at 12/01/20 0912    fluticasone (FLONASE) 50 MCG/ACT nasal spray 1 spray, 1 spray, Each Nostril, Daily, Felisa Garcia MD, 1 spray at 12/01/20 1232    atorvastatin (LIPITOR) tablet 10 mg, 10 mg, Oral, Daily, Felisa Garcia MD, 10 mg at 12/01/20 0912    dilTIAZem (CARDIZEM CD) extended release capsule 120 mg, 120 mg, Oral, Daily, Felisa Garcia MD, 120 mg at 12/01/20 0912    albuterol (PROVENTIL) nebulizer solution 2.5 mg, 2.5 mg, Nebulization, Q6H PRN, Felisa Garcia MD    clonazePAM [facility]) tablet 0.5 mg, 0.5 mg, Oral, TID PRN, Felisa Garcia MD    fluticasone (FLOVENT HFA) 220 MCG/ACT inhaler 2 puff, 2 puff, Inhalation, BID, Felisa Garcia MD, 2 puff at 12/01/20 1233    metoprolol succinate (TOPROL XL) extended release tablet 25 mg, 25 mg, Oral, Daily, Felisa Garcia MD, 25 mg at 12/01/20 0913    furosemide (LASIX) tablet 40 mg, 40 mg, Oral, Daily, Felisa Garcia MD, 40 mg at 12/01/20 0912    DULoxetine (CYMBALTA) extended release capsule 60 mg, 60 mg, Oral, Daily, Felisa Garcia MD, 60 mg at 12/01/20 0912    baclofen (LIORESAL) tablet 10 mg, 10 mg, Oral, TID PRN, Felisa Garcia MD    HYDROcodone-acetaminophen (NORCO) 5-325 MG per tablet 1 tablet, 1 tablet, Oral, Q8H PRN, Felisa Garcia MD    levothyroxine (SYNTHROID) tablet 125 mcg, 125 mcg, Oral, Daily, Felisa Garcia MD, 125 mcg at 12/01/20 0912    lisinopril (PRINIVIL;ZESTRIL) tablet 10 mg, 10 mg, Oral, Daily, 10 mg at 12/01/20 0912 **AND** hydroCHLOROthiazide (MICROZIDE) capsule 12.5 mg, 12.5 mg, Oral, Daily, Angélica LEO 16.9 (H) 11/30/2020    MPV 10.1 11/30/2020    BASOPCT 2 11/30/2020    LYMPHSABS 1.26 11/30/2020    MONOSABS 0.63 11/30/2020    NEUTROABS 6.75 11/30/2020    EOSABS 0.18 11/30/2020    BASOSABS 0.18 11/30/2020          DIAGNOSTIC TESTING:   No results found. IMPRESSION: Ms. Fausto Pardo is a 70 y.o. female with iron deficiency anemia. Weight loss. Hold blood thinners. Plan for EGD and colonoscopy tomorrow. Monitor H&H closely. IV iron. Thank you for allowing me to participate in the care of Ms. Paz. For any further questions please do not hesitate to contact me.        MD Prosper Hirsch

## 2020-12-01 NOTE — PLAN OF CARE
Problem: Falls - Risk of:  Goal: Will remain free from falls  Description: Will remain free from falls  Outcome: Ongoing  Note: Pt assessed as a fall risk this shift. Remains free from falls and accidental injury at this time. Fall precautions in place, including falling star sign and fall risk band on pt. Floor free from obstacles, and bed is locked and in lowest position. Adequate lighting provided. Pt encouraged to call before getting OOB for any need. Bed alarm activated. Will continue to monitor needs during hourly rounding, and reinforce education on use of call light. Problem: Bleeding:  Goal: Will show no signs and symptoms of excessive bleeding  Description: Will show no signs and symptoms of excessive bleeding  Outcome: Ongoing  Note: Pt is admitted with a GI bleed. Pt came in and hgb was 6.0. Pt received 1 unit PRBC in ER and hgb recheck was 7.2. Will continue to monitor      Problem: Physical Regulation:  Goal: Will remain free from infection  Description: Will remain free from infection  Outcome: Ongoing  Note: Pt shows no signs or symptoms of infection at this time. VS stable, alert and oriented x4. Hand hygiene utilized upon entry and exit. Will continue to monitor. Problem: Pain:  Goal: Pain level will decrease  Description: Pain level will decrease  Outcome: Ongoing  Note: Pt not complaining of any pain for this RN.  Will continue to monitor

## 2020-12-01 NOTE — H&P
Family Medicine Admit Note    PCP: WILLIE Ron CNP    Date of Admission: 11/30/2020    Date of Service: Pt seen/examined on 12/1/2020 and Admitted to Inpatient. Chief Complaint:  Shortness of Breath                                  Abnormal Lab - low hgb      History Of Present Illness: The patient is a 70 y.o. female who presents to [facility] with complaints of shortness of breath and fatigue. She was seen in our office recently and labs were ordered. She was informed that her hemoglobin was 6.0. She was instructed to report to the ED for evaluation. She reports intermittent SOB and progressive weakness over the past few weeks. She also reports some dark stools and she is on an iron supplement but no bright red blood. She is on Eliquis for A. Fib. She also reports a hx of hemorrhoids. She denies any fevers, chills, cough, rhinorrhea, chest pain, abdominal pain, N/V, diarrhea, dysuria, hematuria, headaches, dizziness or confusion.      Past Medical History:        Diagnosis Date    A-fib [facility])     Acquired hypothyroidism     Acute encephalopathy     [facility] [address]     Anxiety     Benign hypertension with CKD (chronic kidney disease) stage III     Chronic back pain     CKD (chronic kidney disease) stage 3, GFR 30-59 ml/min     Constipation     COPD (chronic obstructive pulmonary disease) (HCC)     Cough     Depression     ETOH abuse     Former smoker     3 packs a day for 26 years    HA (generalized anxiety disorder)     History of GI bleed     Hyperlipidemia     Hypertension     Hypothyroid 1/18/2013    Leg pain     Normocytic anemia     Osteoarthritis     PAD (peripheral artery disease) (HCC)     Primary osteoarthritis     Pulmonary hypertension ([facility] [address]     Pure hypercholesterolemia     SIRS (systemic inflammatory response syndrome) (HCC)     Urge incontinence     Wheezing        Past Surgical History:        Procedure Laterality Date    COLONOSCOPY  EYE SURGERY Bilateral     cataracts    JOINT REPLACEMENT      Left knee on 9-11-18       Medications Prior to Admission:    Prior to Admission medications    Medication Sig Start Date End Date Taking? Authorizing Provider   furosemide (LASIX) 40 MG tablet TAKE 1 TABLET BY MOUTH DAILY 11/30/20  Yes WILLIE Kwan CNP   baclofen (LIORESAL) 10 MG tablet TAKE ONE TABLET BY MOUTH THREE TIMES A DAY AS NEEDED. 11/30/20  Yes WILLIE Kwan CNP   HYDROcodone-acetaminophen (NORCO) 5-325 MG per tablet Take 1 tablet by mouth every 8 hours as needed for Pain for up to 30 days. 11/30/20 12/30/20 Yes WILLIE Kwan CNP   lisinopril-hydroCHLOROthiazide (PRINZIDE;ZESTORETIC) 10-12.5 MG per tablet Take 1 tablet by mouth daily 11/30/20  Yes WILLIE Kwan CNP   ELIQUIS 5 MG TABS tablet TAKE ONE TABLET BY MOUTH TWICE A DAY 11/30/20  Yes WILLIE Kwan CNP   levothyroxine (SYNTHROID) 125 MCG tablet Take 1 tablet by mouth daily 11/30/20  Yes WILLIE Kwan - CNP   clonazePAM (KLONOPIN) 0.5 MG tablet Take 1 tablet by mouth 3 times daily as needed for Anxiety for up to 30 days.  11/17/20 12/17/20 Yes WILLIE Kwan CNP   fluticasone (FLOVENT HFA) 220 MCG/ACT inhaler Inhale 2 puffs into the lungs 2 times daily 11/17/20  Yes WILLIE Kwan CNP   albuterol (PROVENTIL) (2.5 MG/3ML) 0.083% nebulizer solution Take 3 mLs by nebulization every 6 hours as needed for Wheezing 11/5/20  Yes WILLIE Kwan - BOBY   dilTIAZem (CARDIZEM CD) 120 MG extended release capsule Take 1 capsule by mouth daily 10/19/20  Yes WILLIE Kwan CNP   albuterol sulfate HFA (PROAIR HFA) 108 (90 Base) MCG/ACT inhaler Inhale 2 puffs into the lungs every 6 hours as needed for Wheezing   Yes Historical Provider, MD   fluticasone (FLONASE) 50 MCG/ACT nasal spray USE ONE SPRAY TO EACH NOSTRIL ONCE ADAY 9/4/20  Yes Dimple Neumann, APRN - CNP   atorvastatin (LIPITOR) 10 MG tablet TAKE 1 TABLET BY MOUTH ONCE DAILY 9/4/20  Yes WILLIE Funes CNP   levalbuterol [facility] HFA) 45 MCG/ACT inhaler Inhale 1 puff into the lungs every 4 hours as needed for Wheezing 8/19/20  Yes WILLIE Mendoza CNP   Respiratory Therapy Supplies (NEBULIZER/TUBING/MOUTHPIECE) KIT Use every 4-6 hours prn for copd 6/15/20  Yes WILLIE Mendoza CNP   ferrous sulfate 325 (65 Fe) MG tablet Take 325 mg by mouth daily  8/30/18  Yes Historical Provider, MD   DULoxetine (CYMBALTA) 60 MG extended release capsule Take 60 mg by mouth daily    Historical Provider, MD   Lift Chair MISC Use daily for comfort 11/30/20   WILLIE Mendoza CNP   metoprolol succinate (TOPROL XL) 25 MG extended release tablet TAKE 1 TABLET BY MOUTH DAILY. (PLEASE MAKE  APPT FOR FUTURE REFILLS) 11/23/20   WILLIE Mendoza CNP   PROAIR  (09 Base) MCG/ACT inhaler Inhale 2 puffs into the lungs every 6 hours as needed for Wheezing 11/17/20   WILLIE Mendoza CNP   tamsulosin [facility]) 0.4 MG capsule Take 1 capsule by mouth daily 7/23/20   Aleksandra Schultz MD   omeprazole (PRILOSEC) 40 MG delayed release capsule TAKE 1 CAPSULE BY MOUTH EVERY MORNING BEFORE BREAKFAST 3/16/20   WILLIE Hinton CNP   Ascorbic Acid (C-1000 PO) Take 1 capsule by mouth daily     Historical Provider, MD       Allergies:  Tizanidine    Social History:  The patient currently lives at home    TOBACCO:   reports that she has quit smoking. Her smoking use included cigarettes. She has a 78.00 pack-year smoking history. She has never used smokeless tobacco.  ETOH:   reports current alcohol use.     Review of Systems - General ROS: positive for  - fatigue  Psychological ROS: positive for - anxiety  Ophthalmic ROS: positive for - uses glasses  ENT ROS: negative  Hematological and Lymphatic ROS: positive for - pallor  Endocrine ROS: positive for - hypothyroid  Respiratory ROS: positive for - COPD  Cardiovascular ROS: negative  Gastrointestinal ROS: negative  Musculoskeletal ROS: positive for - muscle pain      Family History:          Problem Relation Age of Onset    Heart Disease Mother     COPD Mother     Emphysema Sister     Cancer Other         Cousin - breast cancer       PHYSICAL EXAM:    /67   Pulse 78   Temp 98.2 °F (36.8 °C) (Oral)   Resp 20   Ht 5' 5\" (1.651 m)   Wt 187 lb (84.8 kg)   SpO2 100%   BMI 31.12 kg/m²     General appearance: No apparent distress appears stated age and cooperative. HEENT Normal cephalic, atraumatic without obvious deformity. Pupils equal, round, and reactive to light. Extra ocular muscles intact. Conjunctivae/corneas clear. Neck: Supple, No jugular venous distention/bruits. Trachea midline without thyromegaly or adenopathy with full range of motion. Lungs: Clear to auscultation, bilaterally without Rales/Wheezes/Rhonchi with good respiratory effort. Heart: Regular rate and rhythm with Normal S1/S2 without murmurs, rubs or gallops, point of maximum impulse non-displaced  Abdomen: Soft, non-tender or non-distended without rigidity or guarding and positive bowel sounds all four quadrants. Extremities: No clubbing, cyanosis, or edema bilaterally. Full range of motion without deformity and normal gait intact. Skin: Skin color, texture, turgor normal.  No rashes or lesions. Neurologic: Alert and oriented X 3, neurovascularly intact with sensory/motor intact upper extremities/lower extremities, bilaterally. Cranial nerves: II-XII intact, grossly non-focal.  Mental status: Alert, oriented, thought content appropriate.     CXR:  I have reviewed the CXR with the following interpretation: not done  EKG:  I have reviewed the EKG with the following interpretation: A. Fib, rate controlled    CBC   Recent Labs     11/30/20  1110 11/30/20  2019 12/01/20  0205   WBC 9.0  --   --    HGB 6.0* 6.2* 7.2*   HCT 22.5* 19.7* 22.8*   *  --   -- RENAL  Recent Labs     11/30/20  1110      K 4.8      CO2 25   BUN 20   CREATININE 1.74*     LFT'S  Recent Labs     11/30/20  1110   AST 12   ALT 8   BILITOT 0.27*   ALKPHOS 122*     COAG  No results for input(s): INR in the last 72 hours. CARDIAC ENZYMES  No results for input(s): CKTOTAL, CKMB, CKMBINDEX, TROPONINI in the last 72 hours. U/A:    Lab Results   Component Value Date    NITRITE Negative 01/31/2020    COLORU YELLOW 09/10/2020    WBCUA 2 TO 5 09/10/2020    RBCUA 0 TO 2 09/10/2020    MUCUS NOT REPORTED 09/10/2020    BACTERIA FEW 09/10/2020    CLARITYU Clear 01/31/2020    SPECGRAV 1.021 09/10/2020    LEUKOCYTESUR SMALL 09/10/2020    BLOODU Negative 01/31/2020    GLUCOSEU NEGATIVE 09/10/2020    AMORPHOUS 2+ 09/10/2020       ABG    Lab Results   Component Value Date    IFS6TYN 24.9 09/13/2018    N8GRXYPQ 93.0 09/13/2018    PHART 7.330 09/13/2018    RKY5DZL 47.3 09/13/2018    PO2ART 74.8 09/13/2018           Active Hospital Problems    Diagnosis Date Noted    GI bleed [K92.2] 11/30/2020    Obesity, Class I, BMI 30-34.9 [E66.9] 07/20/2020    Moderate major depression ([facility] [address] [F32.1] 06/17/2020    PAD (peripheral artery disease) ([facility] [address] [I73.9] 06/17/2020    Benign hypertension with CKD (chronic kidney disease) stage III [I12.9, N18.30]     CKD (chronic kidney disease) stage 3, GFR 30-59 ml/min [N18.30]     HA (generalized anxiety disorder) [F41.1] 12/13/2018    COPD (chronic obstructive pulmonary disease) ([facility] [address] [J44.9] 09/13/2018    Normocytic anemia [D64.9] 09/13/2018    Pulmonary hypertension ([facility] [address] [I27.20] 09/13/2018    Hypertension [I10]     Acquired hypothyroidism [E03.9] 01/18/2013         ASSESSMENT/PLAN:  Patient admitted with GI bleeding. Co-morbidities as above.     Orders Placed This Encounter   Procedures    HEMOGLOBIN AND HEMATOCRIT, BLOOD    Hemoglobin and Hematocrit, Blood, Post Transfusion    Troponin    Hgb/Hct    Hgb/Hct    Protime-INR    ALKALINE PHOSPHATASE, ISOENZYMES    COVID-19    DIET CLEAR LIQUID;    Diet NPO Time Specified Exceptions are: Sips with Meds    VITAL SIGNS PER TRANSFUSION PROTOCOL    Verify hospital blood consent form has been signed and witnessed    TRANSFUSION REACTION MANAGEMENT    Vital signs per unit routine    Notify physician    Notify physician    Up with assistance    Place intermittent pneumatic compression device    Telemetry Monitoring    Endo Procedure    Full Code    Inpatient consult to GI    Inpatient consult to Internal Medicine    EKG 12 Lead    EKG REPORT    TYPE AND SCREEN    PREPARE RBC (CROSSMATCH), 1 Units    PATIENT STATUS (DIRECT) Inpatient    PATIENT STATUS (FROM ED OR OR/PROCEDURAL) Inpatient         DVT Prophylaxis: none due to GI bleed  Diet: Diet NPO Effective Now  Code Status: Full Code      Dispo - admitted to [facility]       @[email or web address]@

## 2020-12-01 NOTE — PLAN OF CARE
PRE CONSULT ROUNDING NOTE  HPI  70year old female with pmh of afib on eliquis, hypothyroidism, CKD PAD COPD pulmonary hypertension anxiety who presented to the ED for abnormal lab work. She was told that her hgb was 6. Our service is consulted for anemia. hgb was repeated and was 6.2 improved to 7.2 with transfusion. She has had shortness of breath and fatigue in the last few weeks but denies hematochezia hematemesis. She has had intermittent black tarry stools but does take ferrous sulfate intermittently. She has had blood transfusions in the past for iron deficiency. She reports 20# weight loss in the last several months that she contributes to taking diuretics. she denies NSAID use. Creat 1.74 plt 506 alk phos 122 no abdominal imaging has been done. She denies fevers chills abdominal pain nausea change in the bowel habits dysphagia odynophagia rash new joint pain.      Endoscopy pt states she had egd and colonoscopy 2 years ago but no records are in [facility]  Family reports no colon liver pancreatic or stomach cancer no UC/crohns  Social quit smoking rare etoh use or illicit drugs   /50   Pulse 78   Temp 98.2 °F (36.8 °C) (Oral)   Resp 20   Ht 5' 5\" (1.651 m)   Wt 187 lb (84.8 kg)   SpO2 100%   BMI 31.12 kg/m²      ROS as above meds labs imaging and past medical records were reviewed    Exam  General Appearance: alert and oriented to person, place and time, well-developed and well-nourished, in no acute distress  Skin: warm and dry, no rash or erythema  Head: normocephalic and atraumatic  Eyes: pupils equal, round, and reactive to light, extraocular eye movements intact, conjunctivae normal  ENT: hearing grossly normal bilaterally  Neck: neck supple and non tender without mass, no thyromegaly or thyroid nodules, no cervical lymphadenopathy   Pulmonary/Chest: clear to auscultation bilaterally- no wheezes, rales or rhonchi, normal air movement, no respiratory distress  Cardiovascular: normal rate, regular rhythm, normal S1 and S2, no murmurs, rubs, clicks or gallops, distal pulses intact, no carotid bruits  Abdomen: soft, obese non tender, non-distended, normal bowel sounds, no masses or organomegaly no ascites  Extremities: no cyanosis, clubbing plus 1 edema to both lower legs  Musculoskeletal: normal range of motion, no joint swelling, deformity or tenderness  Neurologic: no cranial nerve deficit and muscle strength normal    Assessment  Iron deficiency anemia  Weight loss  Thrombocytosis  Elevated alk phos-chronic    Plan  Case was discussed with dr Jeannette Burkett, and will discuss with dr Luis Felipe Mehta, will likely need egd and colonoscopy  Hold the eliquis  Clear diet today  Trend hh and keep hgb >7  ppi daily  Continue ferrous sulfate  Will order alk phos isoenzyme  Formal gi consult to follow  . Billy Schafer, APRN - CNP

## 2020-12-01 NOTE — ED PROVIDER NOTES
[address] ED  eMERGENCY dEPARTMENT eNCOUnter   Attending Attestation     Pt Name: Morro Duran  MRN: [id number]  [place] 1945  Date of evaluation: 11/30/20    History, EXAM, MDM:    Morro Duran is a 70 y.o. female who presents with Abnormal Lab (Per pt she was informed that her Hgb was low - pt stated that she had labs drawn today and was sent in per her PCP.) and Shortness of Breath    71 yo f on eliquis for atrial fibrillation presents with anemia. Hemoglobin dropped approxiamtely 3 grams over the last 3 months. Pt reports chronic black stool because she is on iron supplements. BP low 90's 40's. Respirations even and unlabored. No tachycardia. Abdomen is non-tender. Mental status appropriate. Guiac +. EKG shows atrial fibrillation HR is 66, QRS 58, , no WAYNE, No STD, No TWI, the axis is normal.      Hemoglobin 6    BUN 20  Cr. 1.74    Starting fluids, protonix drip, transfusion. Admitting to hospital. GI consult. Vitals:   Vitals:    11/30/20 2127 11/30/20 2130 11/30/20 2149 11/30/20 2200   BP: (!) 90/48 (!) 94/50 (!) 88/46 (!) 101/50   Pulse: 72 73 71 67   Resp: 30 24 22 24   Temp: 98 °F (36.7 °C)  98.1 °F (36.7 °C)    TempSrc: Oral  Oral    SpO2: 97% 100% 98% 100%   Weight:       Height:         I performed a history and physical examination of the patient and discussed management with the resident. I reviewed the residents note and agree with the documented findings and plan of care. Any areas of disagreement are noted on the chart. I was personally present for the key portions of any procedures. I have documented in the chart those procedures where I was not present during the key portions. I have personally reviewed all images and agree with the resident's interpretation. I have reviewed the emergency nurses triage note.  I agree with the chief complaint, past medical history, past surgical history, allergies, medications, social and family history as documented unless otherwise noted below. Documentation of the HPI, Physical Exam and Medical Decision Making performed by medical students or scribes is based on my personal performance of the HPI, PE and MDM. For Phys Assistant/ Nurse Practitioner cases/documentation I have had a face to face evaluation of this patient and have completed at least one if not all key elements of the E/M (history, physical exam, and MDM). Additional findings are as noted.     Sid Balderrama MD  Attending Emergency  Physician               Sid Balderrama MD  11/30/20 [id number]

## 2020-12-01 NOTE — PLAN OF CARE
Problem: Falls - Risk of:  Goal: Will remain free from falls  Description: Will remain free from falls  12/1/2020 1607 by Kathryn Kaplan RN  Outcome: Met This Shift  Note: Pt assessed as a fall risk this shift. Remains free from falls and accidental injury at this time. Fall precautions in place, including falling star sign and fall risk band on pt. Floor free from obstacles, and bed is locked and in lowest position. Adequate lighting provided. Pt encouraged to call before getting OOB for any need. Bed alarm activated. Will continue to monitor needs during hourly rounding, and reinforce education on use of call light. 12/1/2020 0440 by Wendi Cain RN  Outcome: Ongoing  Note: Pt assessed as a fall risk this shift. Remains free from falls and accidental injury at this time. Fall precautions in place, including falling star sign and fall risk band on pt. Floor free from obstacles, and bed is locked and in lowest position. Adequate lighting provided. Pt encouraged to call before getting OOB for any need. Bed alarm activated. Will continue to monitor needs during hourly rounding, and reinforce education on use of call light. Problem: Bleeding:  Goal: Will show no signs and symptoms of excessive bleeding  Description: Will show no signs and symptoms of excessive bleeding  12/1/2020 1607 by Kathryn Kaplan RN  Outcome: Met This Shift  Note: No active signs of bleeding   12/1/2020 0440 by Wendi Cain RN  Outcome: Ongoing  Note: Pt is admitted with a GI bleed. Pt came in and hgb was 6.0. Pt received 1 unit PRBC in ER and hgb recheck was 7.2. Will continue to monitor      Problem: Physical Regulation:  Goal: Will remain free from infection  Description: Will remain free from infection  12/1/2020 1607 by Kathryn Kaplan RN  Outcome: Met This Shift  Note: Patient remains afebrile; WBC count is within normal limits; no signs of erythema, edema, or warmth.  Will continue to monitor for signs/symptoms of infection. 12/1/2020 0440 by Sheldon Toribio RN  Outcome: Ongoing  Note: Pt shows no signs or symptoms of infection at this time. VS stable, alert and oriented x4. Hand hygiene utilized upon entry and exit. Will continue to monitor. Problem: Pain:  Goal: Pain level will decrease  Description: Pain level will decrease  12/1/2020 1607 by Adrian Kulkarni RN  Outcome: Met This Shift  Note: No pain at this time. 0/10 pain scale. 12/1/2020 0440 by Sheldon Toribio RN  Outcome: Ongoing  Note: Pt not complaining of any pain for this RN.  Will continue to monitor

## 2020-12-01 NOTE — PLAN OF CARE
Case d/w dr Hiwot Montes will plan for egd and colonoscopy tomorrow, iron infusions ordered. Malu Moreno Po, APRN - CNP

## 2020-12-02 ENCOUNTER — ANESTHESIA EVENT (OUTPATIENT)
Dept: ENDOSCOPY | Age: 75
DRG: 378 | End: 2020-12-02
Payer: MEDICARE

## 2020-12-02 ENCOUNTER — ANESTHESIA (OUTPATIENT)
Dept: ENDOSCOPY | Age: 75
DRG: 378 | End: 2020-12-02
Payer: MEDICARE

## 2020-12-02 VITALS
BODY MASS INDEX: 29.79 KG/M2 | DIASTOLIC BLOOD PRESSURE: 77 MMHG | HEIGHT: 65 IN | SYSTOLIC BLOOD PRESSURE: 125 MMHG | HEART RATE: 89 BPM | TEMPERATURE: 97.7 F | RESPIRATION RATE: 18 BRPM | OXYGEN SATURATION: 91 % | WEIGHT: 178.79 LBS

## 2020-12-02 VITALS — OXYGEN SATURATION: 97 % | SYSTOLIC BLOOD PRESSURE: 86 MMHG | DIASTOLIC BLOOD PRESSURE: 50 MMHG

## 2020-12-02 LAB
HCT VFR BLD CALC: 23.1 % (ref 36–46)
HCT VFR BLD CALC: 23.4 % (ref 36–46)
HCT VFR BLD CALC: 23.5 % (ref 36–46)
HCT VFR BLD CALC: 24.4 % (ref 36–46)
HEMOGLOBIN: 7.3 G/DL (ref 12–16)
HEMOGLOBIN: 7.4 G/DL (ref 12–16)
HEMOGLOBIN: 7.7 G/DL (ref 12–16)
HEMOGLOBIN: 7.8 G/DL (ref 12–16)
PARTIAL THROMBOPLASTIN TIME: 32.4 SEC (ref 24–36)
SARS-COV-2, RAPID: NORMAL
SARS-COV-2, RAPID: NOT DETECTED
SARS-COV-2: NORMAL
SARS-COV-2: NOT DETECTED
SOURCE: NORMAL
SOURCE: NORMAL

## 2020-12-02 PROCEDURE — 99233 SBSQ HOSP IP/OBS HIGH 50: CPT | Performed by: FAMILY MEDICINE

## 2020-12-02 PROCEDURE — 2500000003 HC RX 250 WO HCPCS: Performed by: NURSE ANESTHETIST, CERTIFIED REGISTERED

## 2020-12-02 PROCEDURE — 2580000003 HC RX 258: Performed by: FAMILY MEDICINE

## 2020-12-02 PROCEDURE — G0378 HOSPITAL OBSERVATION PER HR: HCPCS

## 2020-12-02 PROCEDURE — 2580000003 HC RX 258: Performed by: ANESTHESIOLOGY

## 2020-12-02 PROCEDURE — 7100000001 HC PACU RECOVERY - ADDTL 15 MIN: Performed by: INTERNAL MEDICINE

## 2020-12-02 PROCEDURE — 6370000000 HC RX 637 (ALT 250 FOR IP): Performed by: INTERNAL MEDICINE

## 2020-12-02 PROCEDURE — 0DJ08ZZ INSPECTION OF UPPER INTESTINAL TRACT, VIA NATURAL OR ARTIFICIAL OPENING ENDOSCOPIC: ICD-10-PCS | Performed by: INTERNAL MEDICINE

## 2020-12-02 PROCEDURE — 2580000003 HC RX 258: Performed by: NURSE ANESTHETIST, CERTIFIED REGISTERED

## 2020-12-02 PROCEDURE — 3609009900 HC COLONOSCOPY W/CONTROL BLEEDING ANY METHOD: Performed by: INTERNAL MEDICINE

## 2020-12-02 PROCEDURE — 2709999900 HC NON-CHARGEABLE SUPPLY: Performed by: INTERNAL MEDICINE

## 2020-12-02 PROCEDURE — 43235 EGD DIAGNOSTIC BRUSH WASH: CPT | Performed by: INTERNAL MEDICINE

## 2020-12-02 PROCEDURE — 85730 THROMBOPLASTIN TIME PARTIAL: CPT

## 2020-12-02 PROCEDURE — 85014 HEMATOCRIT: CPT

## 2020-12-02 PROCEDURE — 36415 COLL VENOUS BLD VENIPUNCTURE: CPT

## 2020-12-02 PROCEDURE — 6370000000 HC RX 637 (ALT 250 FOR IP): Performed by: FAMILY MEDICINE

## 2020-12-02 PROCEDURE — 3700000001 HC ADD 15 MINUTES (ANESTHESIA): Performed by: INTERNAL MEDICINE

## 2020-12-02 PROCEDURE — U0002 COVID-19 LAB TEST NON-CDC: HCPCS

## 2020-12-02 PROCEDURE — 2720000010 HC SURG SUPPLY STERILE: Performed by: INTERNAL MEDICINE

## 2020-12-02 PROCEDURE — 3609017100 HC EGD: Performed by: INTERNAL MEDICINE

## 2020-12-02 PROCEDURE — 3700000000 HC ANESTHESIA ATTENDED CARE: Performed by: INTERNAL MEDICINE

## 2020-12-02 PROCEDURE — 45382 COLONOSCOPY W/CONTROL BLEED: CPT | Performed by: INTERNAL MEDICINE

## 2020-12-02 PROCEDURE — 85018 HEMOGLOBIN: CPT

## 2020-12-02 PROCEDURE — 7100000000 HC PACU RECOVERY - FIRST 15 MIN: Performed by: INTERNAL MEDICINE

## 2020-12-02 PROCEDURE — 6360000002 HC RX W HCPCS: Performed by: NURSE ANESTHETIST, CERTIFIED REGISTERED

## 2020-12-02 PROCEDURE — 0W3P8ZZ CONTROL BLEEDING IN GASTROINTESTINAL TRACT, VIA NATURAL OR ARTIFICIAL OPENING ENDOSCOPIC: ICD-10-PCS | Performed by: INTERNAL MEDICINE

## 2020-12-02 RX ORDER — LIDOCAINE HYDROCHLORIDE 20 MG/ML
INJECTION, SOLUTION INFILTRATION; PERINEURAL PRN
Status: DISCONTINUED | OUTPATIENT
Start: 2020-12-02 | End: 2020-12-02 | Stop reason: SDUPTHER

## 2020-12-02 RX ORDER — PROPOFOL 10 MG/ML
INJECTION, EMULSION INTRAVENOUS PRN
Status: DISCONTINUED | OUTPATIENT
Start: 2020-12-02 | End: 2020-12-02 | Stop reason: SDUPTHER

## 2020-12-02 RX ORDER — SODIUM CHLORIDE 9 MG/ML
INJECTION, SOLUTION INTRAVENOUS CONTINUOUS
Status: DISCONTINUED | OUTPATIENT
Start: 2020-12-02 | End: 2020-12-02

## 2020-12-02 RX ORDER — SODIUM CHLORIDE 9 MG/ML
INJECTION, SOLUTION INTRAVENOUS CONTINUOUS PRN
Status: DISCONTINUED | OUTPATIENT
Start: 2020-12-02 | End: 2020-12-02 | Stop reason: SDUPTHER

## 2020-12-02 RX ADMIN — LISINOPRIL 10 MG: 10 TABLET ORAL at 14:25

## 2020-12-02 RX ADMIN — FLUTICASONE PROPIONATE 1 SPRAY: 50 SPRAY, METERED NASAL at 08:58

## 2020-12-02 RX ADMIN — METOPROLOL SUCCINATE 25 MG: 25 TABLET, EXTENDED RELEASE ORAL at 08:59

## 2020-12-02 RX ADMIN — DILTIAZEM HYDROCHLORIDE 120 MG: 120 CAPSULE, COATED, EXTENDED RELEASE ORAL at 14:25

## 2020-12-02 RX ADMIN — HYDROCHLOROTHIAZIDE 12.5 MG: 12.5 CAPSULE ORAL at 14:25

## 2020-12-02 RX ADMIN — FUROSEMIDE 40 MG: 40 TABLET ORAL at 14:25

## 2020-12-02 RX ADMIN — ATORVASTATIN CALCIUM 10 MG: 10 TABLET, FILM COATED ORAL at 14:24

## 2020-12-02 RX ADMIN — LIDOCAINE HYDROCHLORIDE 60 MG: 20 INJECTION, SOLUTION INFILTRATION; PERINEURAL at 12:44

## 2020-12-02 RX ADMIN — Medication 10 ML: at 08:59

## 2020-12-02 RX ADMIN — FLUTICASONE PROPIONATE 2 PUFF: 220 AEROSOL, METERED RESPIRATORY (INHALATION) at 09:01

## 2020-12-02 RX ADMIN — PROPOFOL 250 MG: 10 INJECTION, EMULSION INTRAVENOUS at 12:44

## 2020-12-02 RX ADMIN — LEVOTHYROXINE SODIUM 125 MCG: 125 TABLET ORAL at 14:26

## 2020-12-02 RX ADMIN — DULOXETINE HYDROCHLORIDE 60 MG: 60 CAPSULE, DELAYED RELEASE ORAL at 14:24

## 2020-12-02 RX ADMIN — SODIUM CHLORIDE: 9 INJECTION, SOLUTION INTRAVENOUS at 12:38

## 2020-12-02 RX ADMIN — TAMSULOSIN HYDROCHLORIDE 0.4 MG: 0.4 CAPSULE ORAL at 14:26

## 2020-12-02 RX ADMIN — SODIUM CHLORIDE: 9 INJECTION, SOLUTION INTRAVENOUS at 12:36

## 2020-12-02 ASSESSMENT — PULMONARY FUNCTION TESTS
PIF_VALUE: 1

## 2020-12-02 ASSESSMENT — PAIN DESCRIPTION - LOCATION: LOCATION: ABDOMEN

## 2020-12-02 ASSESSMENT — PAIN DESCRIPTION - PAIN TYPE: TYPE: ACUTE PAIN

## 2020-12-02 ASSESSMENT — ENCOUNTER SYMPTOMS: STRIDOR: 0

## 2020-12-02 ASSESSMENT — COPD QUESTIONNAIRES: CAT_SEVERITY: NO INTERVAL CHANGE

## 2020-12-02 ASSESSMENT — PAIN SCALES - GENERAL
PAINLEVEL_OUTOF10: 0
PAINLEVEL_OUTOF10: 0

## 2020-12-02 ASSESSMENT — PAIN DESCRIPTION - DESCRIPTORS: DESCRIPTORS: CRAMPING

## 2020-12-02 ASSESSMENT — PAIN - FUNCTIONAL ASSESSMENT: PAIN_FUNCTIONAL_ASSESSMENT: 0-10

## 2020-12-02 NOTE — PROGRESS NOTES
FAMILY MEDICINE  - PROGRESS NOTE    Date:  12/2/2020  Shyam Religion  [id number]      Chief Complaint   Patient presents with    Abnormal Lab     Per pt she was informed that her Hgb was low - pt stated that she had labs drawn today and was sent in per her PCP.  Shortness of Breath         Interval History:  Improved, she is sleeping soundly this am. She will have an EGD and Colonoscopy today.       Subjective  Constitutional: negative  Eyes: positive for contacts/glasses  Ears, nose, mouth, throat, and face: negative  Respiratory: positive for emphysema and shortness of breath  Cardiovascular: negative  Gastrointestinal: negative  Hematologic/lymphatic: positive for pallor  Musculoskeletal:positive for myalgias  Neurological: negative  Behavioral/Psych: positive for overweight  Endocrine: positive for hypothyroid:    Objective:    BP (!) 113/58   Pulse 75   Temp 97.3 °F (36.3 °C) (Oral)   Resp 16   Ht 5' 5\" (1.651 m)   Wt 178 lb 12.7 oz (81.1 kg)   SpO2 91%   BMI 29.75 kg/m²   General appearance - overweight  Mental status - drowsy  Eyes - not examined  Ears - bilateral TM's and external ear canals normal  Nose - normal and patent, no erythema, discharge or polyps  Mouth - mucous membranes moist, pharynx normal without lesions  Neck - supple, no significant adenopathy  Lymphatics - no palpable lymphadenopathy, no hepatosplenomegaly  Chest - clear to auscultation, no wheezes, rales or rhonchi, symmetric air entry  Heart - normal rate, regular rhythm, normal S1, S2, no murmurs, rubs, clicks or gallops  Abdomen - soft, nontender, nondistended, no masses or organomegaly  Breasts - not examined  Back exam - not examined  Neurological - neck supple without rigidity  Musculoskeletal - osteoarthritic changes noted in both hands  Extremities - peripheral pulses normal, no pedal edema, no clubbing or cyanosis  Skin - normal coloration and turgor, no rashes, no suspicious skin lesions noted    Data:   Medications:   Current Facility-Administered Medications   Medication Dose Route Frequency Provider Last Rate Last Dose    sodium chloride flush 0.9 % injection 10 mL  10 mL Intravenous 2 times per day Sulaiman Hedrick MD   10 mL at 12/01/20 2118    sodium chloride flush 0.9 % injection 10 mL  10 mL Intravenous PRN Sulaiman Hedrick MD        acetaminophen (TYLENOL) tablet 650 mg  650 mg Oral Q4H PRN Sulaiman Hedrick MD        ondansetron [facility]) injection 4 mg  4 mg Intravenous Q8H PRN Sulaiman Hedrick MD        pantoprazole (PROTONIX) tablet 40 mg  40 mg Oral QAM AC Sulaiman Hedrick MD   40 mg at 12/01/20 0916    tamsulosin (FLOMAX) capsule 0.4 mg  0.4 mg Oral Daily Sulaiman Hedrick MD   0.4 mg at 12/01/20 0912    fluticasone (FLONASE) 50 MCG/ACT nasal spray 1 spray  1 spray Each Nostril Daily Sulaiman Hedrick MD   1 spray at 12/01/20 1232    atorvastatin (LIPITOR) tablet 10 mg  10 mg Oral Daily Sulaiman Hedrick MD   10 mg at 12/01/20 0912    dilTIAZem (CARDIZEM CD) extended release capsule 120 mg  120 mg Oral Daily Sulaiman Hedrick MD   120 mg at 12/01/20 0912    albuterol (PROVENTIL) nebulizer solution 2.5 mg  2.5 mg Nebulization Q6H PRN Sulaiman Hedrick MD        clonazePAM Elizabeth Coup) tablet 0.5 mg  0.5 mg Oral TID PRN Sulaiman Hedrick MD        fluticasone (FLOVENT HFA) 220 MCG/ACT inhaler 2 puff  2 puff Inhalation BID Sulaiman Hedrick MD   2 puff at 12/01/20 2118    metoprolol succinate (TOPROL XL) extended release tablet 25 mg  25 mg Oral Daily Sulaiman Hedrick MD   25 mg at 12/01/20 0913    furosemide (LASIX) tablet 40 mg  40 mg Oral Daily Sulaiman Hedrick MD   40 mg at 12/01/20 0912    DULoxetine (CYMBALTA) extended release capsule 60 mg  60 mg Oral Daily Sulaiman Hedrick MD   60 mg at 12/01/20 0912    baclofen (LIORESAL) tablet 10 mg  10 mg Oral TID PRN Sulaiman Hedrick MD        HYDROcodone-acetaminophen (NORCO) 5-325 MG per tablet 1 tablet  1 tablet Oral Q8H PRN Aleksandra Schultz MD        levothyroxine (SYNTHROID) tablet 125 mcg  125 mcg Oral Daily Aleksandra Schultz MD   125 mcg at 12/01/20 0912    lisinopril (PRINIVIL;ZESTRIL) tablet 10 mg  10 mg Oral Daily Aleksandra Schultz MD   10 mg at 12/01/20 4259    And    hydroCHLOROthiazide (MICROZIDE) capsule 12.5 mg  12.5 mg Oral Daily Aleksandra Schultz MD   12.5 mg at 12/01/20 0912    iron sucrose (VENOFER) 100 mg in sodium chloride 0.9 % 100 mL IVPB  100 mg Intravenous Q24H Ke , APRN - CNP   Stopped at 12/01/20 1505    0.9 % sodium chloride bolus  20 mL Intravenous Once Aleksandra Schultz MD           Intake/Output Summary (Last 24 hours) at 12/2/2020 7620  Last data filed at 12/1/2020 9526  Gross per 24 hour   Intake 365 ml   Output --   Net 365 ml     Recent Results (from the past 24 hour(s))   COVID-19    Collection Time: 12/01/20  2:06 PM    Specimen: Other   Result Value Ref Range    SARS-CoV-2 PENDING     SARS-CoV-2, Rapid          Source . NASOPHARYNGEAL SWAB     SARS-CoV-2 PENDING    Hgb/Hct    Collection Time: 12/01/20  4:54 PM   Result Value Ref Range    Hemoglobin 7.6 (L) 12.0 - 16.0 g/dL    Hematocrit 24.1 (L) 36 - 46 %   Hgb/Hct    Collection Time: 12/01/20 10:55 PM   Result Value Ref Range    Hemoglobin 7.3 (L) 12.0 - 16.0 g/dL    Hematocrit 23.4 (L) 36 - 46 %   Hgb/Hct    Collection Time: 12/02/20  5:39 AM   Result Value Ref Range    Hemoglobin 7.4 (L) 12.0 - 16.0 g/dL    Hematocrit 23.1 (L) 36 - 46 %   APTT    Collection Time: 12/02/20  5:39 AM   Result Value Ref Range    PTT 32.4 24.0 - 36.0 sec     -----------------------------------------------------------------  RAD:  EKG:  Micro:     Assessment & Plan:    Patient Active Problem List:     Acquired hypothyroidism     Hypertension     Primary osteoarthritis of right knee     A-fib (HCC)     Acute encephalopathy     SIRS (systemic inflammatory response syndrome) (HCC)     Symptomatic anemia     Pulmonary hypertension (HCC)     COPD (chronic obstructive pulmonary disease) (HCC)     History of GI bleed     Constipation     HA (generalized anxiety disorder)     Lumbar radiculopathy     Lumbosacral spondylosis without myelopathy     Benign hypertension with CKD (chronic kidney disease) stage III     CKD (chronic kidney disease) stage 3, GFR 30-59 ml/min     Alcohol withdrawal syndrome with complication (HCC)     Moderate major depression (HCC)     PAD (peripheral artery disease) (HCC)     Acute kidney injury ([facility] [address]     Obesity, Class I, BMI 30-34.9     Acute on chronic diastolic CHF (congestive heart failure) (HCC)     GI bleed     Weight loss     Elevated alkaline phosphatase level     Thrombocytosis (HCC)           Plan:  -GI Bleed - no BRBPR or hematemesis, await results of EGD & Colonoscopy. -A. Fib. - Eliquis held due to hemoglobin drop.  -Continue current treatments.  -Complete orders per chart.     See orders   Disposition:    Electronically signed by Radha Germain MD on 12/2/2020 at 6:08 AM

## 2020-12-02 NOTE — CARE COORDINATION
ONGOING DISCHARGE PLAN:    Reviewed patients chart regarding discharge plan and chart still confirms the plan is to discharge to home with mosheenrique Susenia   Patient will have a EGD/ Colonoscopy done today   Iron infusions       Will review plan with patient and or family      Will continue to follow for additional discharge needs.      Electronically signed by Marla Sesay RN on 12/2/2020 at 2:06 PM

## 2020-12-02 NOTE — ANESTHESIA PRE PROCEDURE
Department of Anesthesiology  Preprocedure Note       Name:  Salvador Scott   Age:  70 y.o.  :  1945                                          MRN:  [id number]         Date:  2020      Surgeon: Elver Egan):  Stefan Nguyen MD    Procedure: Procedure(s):  EGD  COLONOSCOPY    Medications prior to admission:   Prior to Admission medications    Medication Sig Start Date End Date Taking? Authorizing Provider   furosemide (LASIX) 40 MG tablet TAKE 1 TABLET BY MOUTH DAILY 20  Yes Roberto Carlos Cobian, APRN - CNP   baclofen (LIORESAL) 10 MG tablet TAKE ONE TABLET BY MOUTH THREE TIMES A DAY AS NEEDED. 20  Yes Roberto Carlos Chain, APRN - BOBY   HYDROcodone-acetaminophen (NORCO) 5-325 MG per tablet Take 1 tablet by mouth every 8 hours as needed for Pain for up to 30 days. 20 Yes Roberto Carlos Cobian, APRN - CNP   lisinopril-hydroCHLOROthiazide (PRINZIDE;ZESTORETIC) 10-12.5 MG per tablet Take 1 tablet by mouth daily 20  Yes Roberto Carlos Cobian, APRN - CNP   ELIQUIS 5 MG TABS tablet TAKE ONE TABLET BY MOUTH TWICE A DAY 20  Yes Roberto Carlos Cobian, APRN - CNP   levothyroxine (SYNTHROID) 125 MCG tablet Take 1 tablet by mouth daily 20  Yes Roberto Carlos Cobian, APRN - CNP   clonazePAM (KLONOPIN) 0.5 MG tablet Take 1 tablet by mouth 3 times daily as needed for Anxiety for up to 30 days.  20 Yes Roberto Carlos Cobian, APRN - CNP   fluticasone (FLOVENT HFA) 220 MCG/ACT inhaler Inhale 2 puffs into the lungs 2 times daily 20  Yes Roberto Carlos Cobian, APRN - CNP   albuterol (PROVENTIL) (2.5 MG/3ML) 0.083% nebulizer solution Take 3 mLs by nebulization every 6 hours as needed for Wheezing 20  Yes Roberto Carlos Cobian, APRN - CNP   dilTIAZem (CARDIZEM CD) 120 MG extended release capsule Take 1 capsule by mouth daily 10/19/20  Yes Roberto Carlos Cobian, APRN - BOBY   albuterol sulfate HFA (PROAIR HFA) 108 (90 Base) MCG/ACT inhaler Inhale 2 puffs into the lungs every 6 hours as needed for Wheezing   Yes Historical Provider, MD   fluticasone (FLONASE) 50 MCG/ACT nasal spray USE ONE SPRAY TO EACH NOSTRIL ONCE ADAY 9/4/20  Yes WILLIE Tejeda CNP   atorvastatin (LIPITOR) 10 MG tablet TAKE 1 TABLET BY MOUTH ONCE DAILY 9/4/20  Yes WILLIE Tejeda CNP   levalbuterol [facility] HFA) 45 MCG/ACT inhaler Inhale 1 puff into the lungs every 4 hours as needed for Wheezing 8/19/20  Yes WILLIE Villa CNP   Respiratory Therapy Supplies (NEBULIZER/TUBING/MOUTHPIECE) KIT Use every 4-6 hours prn for copd 6/15/20  Yes WILLIE Villa CNP   ferrous sulfate 325 (65 Fe) MG tablet Take 325 mg by mouth daily  8/30/18  Yes Historical Provider, MD   DULoxetine (CYMBALTA) 60 MG extended release capsule Take 60 mg by mouth daily    Historical Provider, MD   Lift Chair MISC Use daily for comfort 11/30/20   WILLIE Villa CNP   metoprolol succinate (TOPROL XL) 25 MG extended release tablet TAKE 1 TABLET BY MOUTH DAILY.  (PLEASE MAKE  APPT FOR FUTURE REFILLS) 11/23/20   WILLIE iVlla CNP   PROAIR  (12 Base) MCG/ACT inhaler Inhale 2 puffs into the lungs every 6 hours as needed for Wheezing 11/17/20   WILLIE Villa CNP   tamsulosin [facility]) 0.4 MG capsule Take 1 capsule by mouth daily 7/23/20   Patti Macario MD   omeprazole (PRILOSEC) 40 MG delayed release capsule TAKE 1 CAPSULE BY MOUTH EVERY MORNING BEFORE BREAKFAST 3/16/20   WILLIE South CNP   Ascorbic Acid (C-1000 PO) Take 1 capsule by mouth daily     Historical Provider, MD       Current medications:    Current Facility-Administered Medications   Medication Dose Route Frequency Provider Last Rate Last Dose    sodium chloride flush 0.9 % injection 10 mL  10 mL Intravenous 2 times per day Patti Macario MD   10 mL at 12/02/20 0859    sodium chloride flush 0.9 % injection 10 mL  10 mL Intravenous PRN Patti Macario MD        acetaminophen (TYLENOL) tablet 650 mg  650 mg Oral Q4H PRN Enriqueta Bourgeois MD        ondansetron [facility]) injection 4 mg  4 mg Intravenous Q8H PRN Enriqueta Bourgeois MD        pantoprazole (PROTONIX) tablet 40 mg  40 mg Oral QAM AC Enriqueta Bourgeois MD   40 mg at 12/01/20 0916    tamsulosin (FLOMAX) capsule 0.4 mg  0.4 mg Oral Daily Enriqueta Bourgeois MD   0.4 mg at 12/01/20 0912    fluticasone (FLONASE) 50 MCG/ACT nasal spray 1 spray  1 spray Each Nostril Daily Enriqueta Bourgeois MD   1 spray at 12/02/20 0858    atorvastatin (LIPITOR) tablet 10 mg  10 mg Oral Daily Enriqueta Bourgeois MD   10 mg at 12/01/20 0912    dilTIAZem (CARDIZEM CD) extended release capsule 120 mg  120 mg Oral Daily Enriqueta Bourgeois MD   120 mg at 12/01/20 0912    albuterol (PROVENTIL) nebulizer solution 2.5 mg  2.5 mg Nebulization Q6H PRN Enriqueta Bourgeois MD        clonazePAM [place]) tablet 0.5 mg  0.5 mg Oral TID PRN Enriqueta Bourgeois MD        fluticasone (FLOVENT HFA) 220 MCG/ACT inhaler 2 puff  2 puff Inhalation BID Enriqueta Bourgeois MD   2 puff at 12/02/20 0901    metoprolol succinate (TOPROL XL) extended release tablet 25 mg  25 mg Oral Daily Enriqueta Bourgeois MD   25 mg at 12/02/20 0859    furosemide (LASIX) tablet 40 mg  40 mg Oral Daily Enriqueta Bourgeois MD   40 mg at 12/01/20 0912    DULoxetine (CYMBALTA) extended release capsule 60 mg  60 mg Oral Daily Enriqueta Bourgeois MD   60 mg at 12/01/20 0912    baclofen (LIORESAL) tablet 10 mg  10 mg Oral TID PRN Enriqueta Bourgeois MD        HYDROcodone-acetaminophen (NORCO) 5-325 MG per tablet 1 tablet  1 tablet Oral Q8H PRN Enriqueta Bourgeois MD        levothyroxine (SYNTHROID) tablet 125 mcg  125 mcg Oral Daily Enriqueta Bourgeois MD   125 mcg at 12/01/20 0912    lisinopril (PRINIVIL;ZESTRIL) tablet 10 mg  10 mg Oral Daily Enriqueta Bourgeois MD   10 mg at 12/01/20 7795    And    hydroCHLOROthiazide (MICROZIDE) capsule 12.5 mg  12.5 mg Oral Daily Enriqueta Bourgeois MD   12.5 mg at 12/01/20 0912    iron sucrose (VENOFER) 100 mg in sodium chloride 0.9 % 100 mL IVPB  100 mg Intravenous Q24H Emanuel Guerra, APRN - CNP   Stopped at 12/01/20 1505    0.9 % sodium chloride bolus  20 mL Intravenous Once Orlene Hatchet, MD           Allergies:     Allergies   Allergen Reactions    Tizanidine Other (See Comments)     Patient states it makes her loopy       Problem List:    Patient Active Problem List   Diagnosis Code    Acquired hypothyroidism E03.9    Hypertension I10    Primary osteoarthritis of right knee M17.11    A-fib ([facility] I48.91    Acute encephalopathy G93.40    SIRS (systemic inflammatory response syndrome) ([facility]) R65.10    Symptomatic anemia D64.9    Pulmonary hypertension ([facility]) I27.20    COPD (chronic obstructive pulmonary disease) ([facility]) J44.9    History of GI bleed Z87.19    Constipation K59.00    HA (generalized anxiety disorder) F41.1    Lumbar radiculopathy M54.16    Lumbosacral spondylosis without myelopathy M47.817    Benign hypertension with CKD (chronic kidney disease) stage III I12.9, N18.30    CKD (chronic kidney disease) stage 3, GFR 30-59 ml/min N18.30    Alcohol withdrawal syndrome with complication ([facility]) O96.363    Moderate major depression ([facility]) F32.1    PAD (peripheral artery disease) ([facility]) I73.9    Acute kidney injury ([facility] [address] N17.9    Obesity, Class I, BMI 30-34.9 E66.9    Acute on chronic diastolic CHF (congestive heart failure) ([facility]) I50.33    GI bleed K92.2    Weight loss R63.4    Elevated alkaline phosphatase level R74.8    Thrombocytosis ([facility]) D47.3       Past Medical History:        Diagnosis Date    A-fib ([facility] [address]     Acquired hypothyroidism     Acute encephalopathy     Acute kidney injury ([facility] [address]     Anxiety     Benign hypertension with CKD (chronic kidney disease) stage III     Chronic back pain     CKD (chronic kidney disease) stage 3, GFR 30-59 ml/min     Constipation     COPD (chronic obstructive pulmonary disease) ([facility])     Cough     Depression     ETOH abuse     Former smoker     3 packs a day for 26 years    HA (generalized anxiety disorder)     History of GI bleed     Hyperlipidemia     Hypertension     Hypothyroid 1/18/2013    Leg pain     Normocytic anemia     Osteoarthritis     PAD (peripheral artery disease) (HCC)     Primary osteoarthritis     Pulmonary hypertension (HCC)     Pure hypercholesterolemia     SIRS (systemic inflammatory response syndrome) (HCC)     Urge incontinence     Wheezing        Past Surgical History:        Procedure Laterality Date    COLONOSCOPY      EYE SURGERY Bilateral     cataracts    JOINT REPLACEMENT      Left knee on 9-11-18       Social History:    Social History     Tobacco Use    Smoking status: Former Smoker     Packs/day: 3.00     Years: 26.00     Pack years: 78.00     Types: Cigarettes    Smokeless tobacco: Never Used   Substance Use Topics    Alcohol use: Yes     Comment: occ                                Counseling given: Not Answered      Vital Signs (Current):   Vitals:    12/01/20 2121 12/02/20 0130 12/02/20 0416 12/02/20 0854   BP: (!) 110/41 (!) 113/58  (!) 110/56   Pulse: 79 75  85   Resp: 18 16  16   Temp: 97.2 °F (36.2 °C) 97.3 °F (36.3 °C)  98.1 °F (36.7 °C)   TempSrc: Oral Oral  Oral   SpO2: 100% 91%  100%   Weight:   178 lb 12.7 oz (81.1 kg)    Height:                                                  BP Readings from Last 3 Encounters:   12/02/20 (!) 110/56   11/30/20 96/62   09/15/20 127/75       NPO Status:                                                                                 BMI:   Wt Readings from Last 3 Encounters:   12/02/20 178 lb 12.7 oz (81.1 kg)   11/30/20 178 lb (80.7 kg)   09/11/20 197 lb 5 oz (89.5 kg)     Body mass index is 29.75 kg/m².     CBC:   Lab Results   Component Value Date    WBC 9.0 11/30/2020    RBC 2.16 11/30/2020    RBC 4.50 02/18/2012    HGB 7.4 12/02/2020    HCT 23.1 12/02/2020    .2 11/30/2020    RDW 16.9 11/30/2020     11/30/2020     02/18/2012       CMP:   Lab Results   Component Value Date     11/30/2020    K 4.8 11/30/2020     11/30/2020    CO2 25 11/30/2020    BUN 20 11/30/2020    CREATININE 1.74 11/30/2020    GFRAA 35 11/30/2020    LABGLOM 29 11/30/2020    GLUCOSE 105 11/30/2020    GLUCOSE 113 02/18/2012    PROT 6.2 11/30/2020    CALCIUM 8.7 11/30/2020    BILITOT 0.27 11/30/2020    ALKPHOS 122 11/30/2020    AST 12 11/30/2020    ALT 8 11/30/2020       POC Tests: No results for input(s): POCGLU, POCNA, POCK, POCCL, POCBUN, POCHEMO, POCHCT in the last 72 hours.     Coags:   Lab Results   Component Value Date    PROTIME 17.3 07/19/2020    INR 1.4 07/19/2020    APTT 32.4 12/02/2020       HCG (If Applicable): No results found for: PREGTESTUR, PREGSERUM, HCG, HCGQUANT     ABGs:   Lab Results   Component Value Date    PHART 7.330 09/13/2018    PO2ART 74.8 09/13/2018    UXE1OBF 47.3 09/13/2018    DBK9ZIN 24.9 09/13/2018    Q7IEFWLZ 93.0 09/13/2018        Type & Screen (If Applicable):  No results found for: LABABO, [address]    Drug/Infectious Status (If Applicable):  Lab Results   Component Value Date    HEPCAB NONREACTIVE 03/04/2019       COVID-19 Screening (If Applicable):   Lab Results   Component Value Date    COVID19 PENDING 12/01/2020    COVID19 PENDING 12/01/2020         Anesthesia Evaluation  Patient summary reviewed and Nursing notes reviewed no history of anesthetic complications:   Airway: Mallampati: II  TM distance: >3 FB   Neck ROM: full  Mouth opening: > = 3 FB Dental:    (+) edentulous      Pulmonary:   (+) COPD: no interval change,  decreased breath sounds,      (-) rhonchi, wheezes, rales and stridor                          ROS comment: Home O2 3 L   Cardiovascular:    (+) hypertension: no interval change, dysrhythmias: atrial fibrillation, CHF:,     (-) murmur, weak pulses,  friction rub, systolic click, carotid bruit,  JVD and peripheral edema    ECG reviewed  Rhythm: irregular  Rate: normal  Echocardiogram reviewed               ROS comment: Global left ventricular systolic function is normal. Estimated LV EF >55 %. Neuro/Psych:   (+) neuromuscular disease:, psychiatric history:            GI/Hepatic/Renal:             Endo/Other:    (+) hypothyroidism::., .                 Abdominal:           Vascular: negative vascular ROS. Anesthesia Plan      MAC     ASA 3       Induction: intravenous. Anesthetic plan and risks discussed with patient. Plan discussed with CRNA.                 Yolanda Yeager MD   12/2/2020

## 2020-12-02 NOTE — PROGRESS NOTES
Physician Progress Note      Gopal James  CSN #:                  [id number]  :                       1945  ADMIT DATE:       2020 7:28 PM  [address] [place] DATE:  RESPONDING  PROVIDER #:        Tamar Donohue MD          QUERY TEXT:    Patient admitted with GI bleed and is on chronic anticoagulation. If   possible, please document in the progress notes and discharge summary if you   are evaluating and/or treating any of the following: The medical record reflects the following:  Risk Factors: 74yo, dark stools  Clinical Indicators: Hgb 6.2-->7.2-->7.4  Treatment: 1 unit PRBC, holding Eliquis, GI consult, EGD and colonoscopy   planned, h/h q6h, monitoring, hospital admission  Options provided:  -- GI Bleeding due to Eliquis. -- Bleeding unrelated to anticoagulation  -- Other - I will add my own diagnosis  -- Disagree - Not applicable / Not valid  -- Disagree - Clinically unable to determine / Unknown  -- Refer to Clinical Documentation Reviewer    PROVIDER RESPONSE TEXT:    Patient bleeding is unrelated to anticoagulation. Query created by: Nav Mcpherson on 2020 9:55 AM      QUERY TEXT:    Patient admitted with GI bleed, noted to have atrial fibrillation. If   possible, please document in progress notes and discharge summary further   specificity regarding the type of atrial fibrillation: The medical record reflects the following:  Risk Factors: 74yo, Hx Afib  Clinical Indicators: ekg this admission-> afib  Treatment: Cardizem PO home med, Toprol XL home med, monitoring, 12 lead ekg    Chronic: nonspecific term that could be referring to paroxysmal, persistent,   or permanent  Longstanding persistent: persistent and continuous, lasting > 1 year. Paroxysmal - self-terminating or intermittent; resolves with or without   intervention within 7 days of onset; may recur with various frequency. Persistent - Fails to terminate within 7 days;  Often requires meds or cardioversion to restore to NSR. Permanent - longstanding & persistent; Medication has been ineffective in   restoring NSR &/or cardioversion is contraindicated    Definitions per MS-[employer] Training Guide and Quick Reference Guide, Aida john [address] 112 5   Diseases and Disorders of the Circulatory System; 2019; . Software content   from the [employer]? Advanced CDI Transformation Program  Options provided:  -- Paroxysmal Atrial Fibrillation  -- Longstanding Persistent Atrial Fibrillation  -- Permanent Atrial Fibrillation  -- Persistent Atrial Fibrillation  -- Chronic Atrial Fibrillation, unspecified  -- Other - I will add my own diagnosis  -- Disagree - Not applicable / Not valid  -- Disagree - Clinically unable to determine / Unknown  -- Refer to Clinical Documentation Reviewer    PROVIDER RESPONSE TEXT:    This patient has persistent atrial fibrillation.     Query created by: Ravi Loredo on 12/2/2020 10:00 AM      Electronically signed by:  Arvin Randolph MD 12/2/2020 3:35 PM

## 2020-12-02 NOTE — OP NOTE
Once maximally inserted, the endoscope was withdrawn and the mucosa was carefully inspected. The mucosal exam was normal.  4 to 5 mm AVM was noted in ascending colon. Hemostasis was achieved using APC. Another 4 mm AVM was noted in descending colon. Hemostasis was also achieved using APC. A few scattered diverticula were noted in left colon. Retroflexion was performed in the rectum and showed no pathology. RECOMMENDATIONS:   1) Transfer back to the floor for further management as per primary care team.   2) Okay to restart blood thinners. 3) Advance diet as tolerated. 4) Okay discharge home from GI standpoint. We will sign off.    5) IV iron infusion periodically. 6) Monitor H&H on outpatient basis. 7) In case of further drop in H&H she may need small bowel evaluation.       Kay Orozco

## 2020-12-02 NOTE — ANESTHESIA POSTPROCEDURE EVALUATION
POST- ANESTHESIA EVALUATION       Pt Name: Hipolito Sorensen  MRN: [id number]  YOB: 1945  Date of evaluation: 12/2/2020  Time:  1:56 PM      BP (!) 117/45   Pulse 80   Temp 98.2 °F (36.8 °C) (Infrared)   Resp 18   Ht 5' 5\" (1.651 m)   Wt 178 lb 12.7 oz (81.1 kg)   SpO2 92%   BMI 29.75 kg/m²      Consciousness Level  Awake  Cardiopulmonary Status  Stable  Pain Adequately Treated YES  Nausea / Vomiting  NO  Adequate Hydration  YES  Anesthesia Related Complications NONE      Electronically signed by Padmini Pool MD on 12/2/2020 at 1:56 PM       Department of Anesthesiology  Postprocedure Note    Patient: Hipolito Sorensen  MRN: [id number]  YOB: 1945  Date of evaluation: 12/2/2020  Time:  1:56 PM     Procedure Summary     Date:  12/02/20 Room / Location:  [place] [address] / [facility]    Anesthesia Start:  2601 Anesthesia Stop:  1355    Procedures:       EGD (N/A Esophagus)      COLONOSCOPY (N/A ) Diagnosis:  (ANEMIA)    Surgeon:  Haseeb Pat MD Responsible Provider:  Padmini Pool MD    Anesthesia Type:  MAC ASA Status:  3          Anesthesia Type: MAC    Ethan Phase I: Ethan Score: 9    Ethan Phase II:      Last vitals: Reviewed and per EMR flowsheets.        Anesthesia Post Evaluation

## 2020-12-02 NOTE — PLAN OF CARE
Problem: Falls - Risk of:  Goal: Will remain free from falls  Description: Will remain free from falls  12/2/2020 0452 by Edu Camarillo RN  Outcome: Ongoing  Note: Patient remained free from falls. Call light within reach. Problem: Bleeding:  Goal: Will show no signs and symptoms of excessive bleeding  Description: Will show no signs and symptoms of excessive bleeding  12/2/2020 0452 by Edu Camarillo RN  Outcome: Ongoing  Note: Hemoglobin remained in stable range throughout this shift. Problem: Physical Regulation:  Goal: Will remain free from infection  Description: Will remain free from infection  12/2/2020 0452 by Edu Camarillo RN  Outcome: Ongoing  Note: No new signs or symptoms of infection noted this shift.

## 2020-12-04 LAB
ABO/RH: NORMAL
ALK PHOS BONE SPECIFIC: 32 U/L (ref 0–55)
ALK PHOS OTHER CALC: 0 U/L
ALK PHOSPHATASE: 114 U/L (ref 40–120)
ALKALINE PHOSPHATASE LIVER FRACTION: 82 U/L (ref 0–94)
ANTIBODY SCREEN: NEGATIVE
ARM BAND NUMBER: NORMAL
BLD PROD TYP BPU: NORMAL
BLOOD BANK COMMENT: NORMAL
CROSSMATCH RESULT: NORMAL
DISPENSE STATUS BLOOD BANK: NORMAL
EXPIRATION DATE: NORMAL
TRANSFUSION STATUS: NORMAL
UNIT DIVISION: 0
UNIT NUMBER: NORMAL

## 2020-12-07 NOTE — DISCHARGE SUMMARY
[facility] Discharge Summary      Patient ID: Adin Garza    MRN: [id number]     Acct:  [de-identified]       Patient's PCP: WILLIE Barclay CNP    Admit Date: 11/30/2020     Discharge Date: 12/2/2020      Admitting Physician: Kristina Bejarano MD    Discharge Physician: Kristina Bejarano MD     Discharge Diagnoses:    Primary Problem  GI bleed    Active Hospital Problems    Diagnosis Date Noted    Weight loss [R63.4]     Elevated alkaline phosphatase level [R74.8]     Thrombocytosis ([facility] [D47.3]     GI bleed [K92.2] 11/30/2020    Obesity, Class I, BMI 30-34.9 [E66.9] 07/20/2020    Moderate major depression ([facility] [F32.1] 06/17/2020    PAD (peripheral artery disease) ([facility] [facility] [I73.9] 06/17/2020    Benign hypertension with CKD (chronic kidney disease) stage III [I12.9, N18.30]     CKD (chronic kidney disease) stage 3, GFR 30-59 ml/min [N18.30]     HA (generalized anxiety disorder) [F41.1] 12/13/2018    COPD (chronic obstructive pulmonary disease) ([facility] [J44.9] 09/13/2018    Normocytic anemia [D64.9] 09/13/2018    Pulmonary hypertension ([facility] [facility] [I27.20] 09/13/2018    Hypertension [I10]     Acquired hypothyroidism [E03.9] 01/18/2013     Past Medical History:   Diagnosis Date    A-fib ([facility]     Acquired hypothyroidism     Acute encephalopathy     Acute kidney injury ([facility] [address] [facility]     Anxiety     Benign hypertension with CKD (chronic kidney disease) stage III     Chronic back pain     CKD (chronic kidney disease) stage 3, GFR 30-59 ml/min     Constipation     COPD (chronic obstructive pulmonary disease) (HCC)     Cough     Depression     ETOH abuse     Former smoker     3 packs a day for 26 years    HA (generalized anxiety disorder)     History of GI bleed     Hyperlipidemia     Hypertension     Hypothyroid 1/18/2013    Leg pain     Normocytic anemia     Osteoarthritis     PAD (peripheral artery disease) ([facility]     Primary osteoarthritis     Pulmonary hypertension ([facility] [facility]  Pure hypercholesterolemia     SIRS (systemic inflammatory response syndrome) (HCC)     Urge incontinence     Wheezing      The patient was seen and examined on day of discharge and this discharge summary is in conjunction with any daily progress note from day of discharge. Code Status:  Prior    Hospital Course: uncomplicated    Consults:  GI    Significant Diagnostic Studies: as above, and as follows: see chart    Treatments: as above    Disposition: home    Discharged Condition: Stable    Follow Up:  WILLIE Villa CNP in one week    Discharge Medications:    Wolfgang Chung\"   Home Medication Instructions [facility]:[id number]    Printed on:12/06/20 3851   Medication Information                      albuterol (PROVENTIL) (2.5 MG/3ML) 0.083% nebulizer solution  Take 3 mLs by nebulization every 6 hours as needed for Wheezing             albuterol sulfate HFA (PROAIR HFA) 108 (90 Base) MCG/ACT inhaler  Inhale 2 puffs into the lungs every 6 hours as needed for Wheezing             Ascorbic Acid (C-1000 PO)  Take 1 capsule by mouth daily              atorvastatin (LIPITOR) 10 MG tablet  TAKE 1 TABLET BY MOUTH ONCE DAILY             baclofen (LIORESAL) 10 MG tablet  TAKE ONE TABLET BY MOUTH THREE TIMES A DAY AS NEEDED. clonazePAM (KLONOPIN) 0.5 MG tablet  Take 1 tablet by mouth 3 times daily as needed for Anxiety for up to 30 days.              dilTIAZem (CARDIZEM CD) 120 MG extended release capsule  Take 1 capsule by mouth daily             DULoxetine (CYMBALTA) 60 MG extended release capsule  Take 60 mg by mouth daily             ELIQUIS 5 MG TABS tablet  TAKE ONE TABLET BY MOUTH TWICE A DAY             ferrous sulfate 325 (65 Fe) MG tablet  Take 325 mg by mouth daily              fluticasone (FLONASE) 50 MCG/ACT nasal spray  USE ONE SPRAY TO EACH NOSTRIL ONCE ADAY             fluticasone (FLOVENT HFA) 220 MCG/ACT inhaler  Inhale 2 puffs into the lungs 2 times daily furosemide (LASIX) 40 MG tablet  TAKE 1 TABLET BY MOUTH DAILY             HYDROcodone-acetaminophen (NORCO) 5-325 MG per tablet  Take 1 tablet by mouth every 8 hours as needed for Pain for up to 30 days. levalbuterol (XOPENEX HFA) 45 MCG/ACT inhaler  Inhale 1 puff into the lungs every 4 hours as needed for Wheezing             levothyroxine (SYNTHROID) 125 MCG tablet  Take 1 tablet by mouth daily             Lift Chair MISC  Use daily for comfort             lisinopril-hydroCHLOROthiazide (PRINZIDE;ZESTORETIC) 10-12.5 MG per tablet  Take 1 tablet by mouth daily             metoprolol succinate (TOPROL XL) 25 MG extended release tablet  TAKE 1 TABLET BY MOUTH DAILY. (PLEASE MAKE DR APPT FOR FUTURE REFILLS)             omeprazole (PRILOSEC) 40 MG delayed release capsule  TAKE 1 CAPSULE BY MOUTH EVERY MORNING BEFORE BREAKFAST             PROAIR  (90 Base) MCG/ACT inhaler  Inhale 2 puffs into the lungs every 6 hours as needed for Wheezing             Respiratory Therapy Supplies (NEBULIZER/TUBING/MOUTHPIECE) KIT  Use every 4-6 hours prn for copd             tamsulosin (FLOMAX) 0.4 MG capsule  Take 1 capsule by mouth daily                  Activity: activity as tolerated    Diet: cardiac diet    Time Spent on discharge is more than 30 minutes in the examination, evaluation, counseling and review of medications and discharge plan.       Electronically signed by Octavia Doe MD on 12/6/2020 at 10:32 PM

## 2020-12-14 ENCOUNTER — HOSPITAL ENCOUNTER (OUTPATIENT)
Age: 75
Discharge: HOME | End: 2020-12-14
Payer: MEDICARE

## 2020-12-14 ENCOUNTER — HOSPITAL ENCOUNTER (OUTPATIENT)
Age: 75
Discharge: HOME | End: 2020-12-16
Payer: MEDICARE

## 2020-12-14 LAB
ABSOLUTE EOS #: 0.2 K/UL (ref 0–0.4)
ABSOLUTE IMMATURE GRANULOCYTE: ABNORMAL K/UL (ref 0–0.3)
ABSOLUTE LYMPH #: 0.7 K/UL (ref 1–4.8)
ABSOLUTE MONO #: 0.6 K/UL (ref 0.1–1.3)
ALBUMIN SERPL-MCNC: 3.6 G/DL (ref 3.5–5.2)
ALBUMIN/GLOBULIN RATIO: ABNORMAL (ref 1–2.5)
ALP BLD-CCNC: 137 U/L (ref 35–104)
ALT SERPL-CCNC: 12 U/L (ref 5–33)
ANION GAP SERPL CALCULATED.3IONS-SCNC: 10 MMOL/L (ref 9–17)
AST SERPL-CCNC: 19 U/L
BASOPHILS # BLD: 1 % (ref 0–2)
BASOPHILS ABSOLUTE: 0.1 K/UL (ref 0–0.2)
BILIRUB SERPL-MCNC: 0.3 MG/DL (ref 0.3–1.2)
BNP INTERPRETATION: ABNORMAL
BUN BLDV-MCNC: 23 MG/DL (ref 8–23)
BUN/CREAT BLD: ABNORMAL (ref 9–20)
CALCIUM SERPL-MCNC: 9.2 MG/DL (ref 8.6–10.4)
CHLORIDE BLD-SCNC: 102 MMOL/L (ref 98–107)
CO2: 32 MMOL/L (ref 20–31)
CREAT SERPL-MCNC: 1.35 MG/DL (ref 0.5–0.9)
DIFFERENTIAL TYPE: ABNORMAL
EOSINOPHILS RELATIVE PERCENT: 2 % (ref 0–4)
GFR AFRICAN AMERICAN: 47 ML/MIN
GFR NON-AFRICAN AMERICAN: 38 ML/MIN
GFR SERPL CREATININE-BSD FRML MDRD: ABNORMAL ML/MIN/{1.73_M2}
GFR SERPL CREATININE-BSD FRML MDRD: ABNORMAL ML/MIN/{1.73_M2}
GLUCOSE BLD-MCNC: 110 MG/DL (ref 70–99)
HCT VFR BLD CALC: 22.4 % (ref 36–46)
HEMOGLOBIN: 7.1 G/DL (ref 12–16)
IMMATURE GRANULOCYTES: ABNORMAL %
IRON: 38 UG/DL (ref 37–145)
LYMPHOCYTES # BLD: 8 % (ref 24–44)
MCH RBC QN AUTO: 30.3 PG (ref 26–34)
MCHC RBC AUTO-ENTMCNC: 31.8 G/DL (ref 31–37)
MCV RBC AUTO: 95.2 FL (ref 80–100)
MONOCYTES # BLD: 7 % (ref 1–7)
NRBC AUTOMATED: ABNORMAL PER 100 WBC
PDW BLD-RTO: 18.5 % (ref 11.5–14.9)
PLATELET # BLD: 333 K/UL (ref 150–450)
PLATELET ESTIMATE: ABNORMAL
PMV BLD AUTO: 7.5 FL (ref 6–12)
POTASSIUM SERPL-SCNC: 4.5 MMOL/L (ref 3.7–5.3)
PRO-BNP: 4108 PG/ML
RBC # BLD: 2.36 M/UL (ref 4–5.2)
RBC # BLD: ABNORMAL 10*6/UL
SEG NEUTROPHILS: 82 % (ref 36–66)
SEGMENTED NEUTROPHILS ABSOLUTE COUNT: 7.3 K/UL (ref 1.3–9.1)
SODIUM BLD-SCNC: 144 MMOL/L (ref 135–144)
TOTAL PROTEIN: 6.6 G/DL (ref 6.4–8.3)
WBC # BLD: 8.9 K/UL (ref 3.5–11)
WBC # BLD: ABNORMAL 10*3/UL

## 2020-12-14 PROCEDURE — 93005 ELECTROCARDIOGRAM TRACING: CPT

## 2020-12-14 PROCEDURE — 80053 COMPREHEN METABOLIC PANEL: CPT

## 2020-12-14 PROCEDURE — 36415 COLL VENOUS BLD VENIPUNCTURE: CPT

## 2020-12-14 PROCEDURE — 83540 ASSAY OF IRON: CPT

## 2020-12-14 PROCEDURE — 85025 COMPLETE CBC W/AUTO DIFF WBC: CPT

## 2020-12-14 PROCEDURE — 83880 ASSAY OF NATRIURETIC PEPTIDE: CPT

## 2020-12-15 ENCOUNTER — HOSPITAL ENCOUNTER (OUTPATIENT)
Facility: MEDICAL CENTER | Age: 75
End: 2020-12-15
Payer: MEDICARE

## 2020-12-15 LAB
EKG ATRIAL RATE: 208 BPM
EKG Q-T INTERVAL: 354 MS
EKG QRS DURATION: 58 MS
EKG QTC CALCULATION (BAZETT): 425 MS
EKG R AXIS: 42 DEGREES
EKG T AXIS: 7 DEGREES
EKG VENTRICULAR RATE: 87 BPM

## 2020-12-18 ENCOUNTER — INITIAL CONSULT (OUTPATIENT)
Dept: ONCOLOGY | Age: 75
End: 2020-12-18
Payer: MEDICARE

## 2020-12-18 ENCOUNTER — APPOINTMENT (OUTPATIENT)
Dept: GENERAL RADIOLOGY | Age: 75
DRG: 377 | End: 2020-12-18
Payer: MEDICARE

## 2020-12-18 ENCOUNTER — HOSPITAL ENCOUNTER (OUTPATIENT)
Age: 75
Discharge: HOME | DRG: 377 | End: 2020-12-20
Payer: MEDICARE

## 2020-12-18 ENCOUNTER — HOSPITAL ENCOUNTER (OUTPATIENT)
Facility: MEDICAL CENTER | Age: 75
Discharge: HOME | End: 2020-12-18
Payer: MEDICARE

## 2020-12-18 ENCOUNTER — TELEPHONE (OUTPATIENT)
Dept: ONCOLOGY | Age: 75
End: 2020-12-18

## 2020-12-18 ENCOUNTER — HOSPITAL ENCOUNTER (OUTPATIENT)
Dept: GENERAL RADIOLOGY | Age: 75
Discharge: HOME | DRG: 377 | End: 2020-12-20
Payer: MEDICARE

## 2020-12-18 ENCOUNTER — HOSPITAL ENCOUNTER (OUTPATIENT)
Age: 75
Discharge: HOME | DRG: 377 | End: 2020-12-18
Payer: MEDICARE

## 2020-12-18 ENCOUNTER — TELEPHONE (OUTPATIENT)
Dept: INFUSION THERAPY | Facility: MEDICAL CENTER | Age: 75
End: 2020-12-18

## 2020-12-18 ENCOUNTER — HOSPITAL ENCOUNTER (INPATIENT)
Age: 75
LOS: 10 days | Discharge: NURSING, REHAB OR LONG-TERM CARE | DRG: 377 | End: 2020-12-29
Attending: EMERGENCY MEDICINE | Admitting: FAMILY MEDICINE
Payer: MEDICARE

## 2020-12-18 VITALS
HEART RATE: 92 BPM | TEMPERATURE: 98.1 F | HEIGHT: 65 IN | BODY MASS INDEX: 31.22 KG/M2 | RESPIRATION RATE: 18 BRPM | SYSTOLIC BLOOD PRESSURE: 113 MMHG | WEIGHT: 187.4 LBS | DIASTOLIC BLOOD PRESSURE: 51 MMHG

## 2020-12-18 DIAGNOSIS — J18.9 PNEUMONIA DUE TO ORGANISM: ICD-10-CM

## 2020-12-18 DIAGNOSIS — D64.9 SYMPTOMATIC ANEMIA: Primary | ICD-10-CM

## 2020-12-18 DIAGNOSIS — D50.0 IRON DEFICIENCY ANEMIA DUE TO CHRONIC BLOOD LOSS: ICD-10-CM

## 2020-12-18 DIAGNOSIS — K90.9 IRON MALABSORPTION: ICD-10-CM

## 2020-12-18 LAB
ABSOLUTE EOS #: 0.19 K/UL (ref 0–0.44)
ABSOLUTE IMMATURE GRANULOCYTE: 0.06 K/UL (ref 0–0.3)
ABSOLUTE LYMPH #: 0.88 K/UL (ref 1.1–3.7)
ABSOLUTE MONO #: 0.69 K/UL (ref 0.1–1.2)
ABSOLUTE RETIC #: 0.07 M/UL (ref 0.03–0.08)
ANION GAP SERPL CALCULATED.3IONS-SCNC: 9 MMOL/L (ref 9–17)
BASOPHILS # BLD: 1 % (ref 0–2)
BASOPHILS ABSOLUTE: 0.06 K/UL (ref 0–0.2)
BNP INTERPRETATION: ABNORMAL
BUN BLDV-MCNC: 22 MG/DL (ref 8–23)
BUN/CREAT BLD: ABNORMAL (ref 9–20)
CALCIUM SERPL-MCNC: 8.7 MG/DL (ref 8.6–10.4)
CHLORIDE BLD-SCNC: 103 MMOL/L (ref 98–107)
CO2: 33 MMOL/L (ref 20–31)
CREAT SERPL-MCNC: 1.45 MG/DL (ref 0.5–0.9)
DIFFERENTIAL TYPE: ABNORMAL
EOSINOPHILS RELATIVE PERCENT: 3 % (ref 1–4)
FERRITIN: 37 UG/L (ref 13–150)
FOLATE: 11.9 NG/ML
GFR AFRICAN AMERICAN: 43 ML/MIN
GFR NON-AFRICAN AMERICAN: 35 ML/MIN
GFR SERPL CREATININE-BSD FRML MDRD: ABNORMAL ML/MIN/{1.73_M2}
GFR SERPL CREATININE-BSD FRML MDRD: ABNORMAL ML/MIN/{1.73_M2}
GLUCOSE BLD-MCNC: 115 MG/DL (ref 70–99)
HAPTOGLOBIN: 198 MG/DL (ref 30–200)
HCT VFR BLD CALC: 21.7 % (ref 36–46)
HCT VFR BLD CALC: 24.1 % (ref 36.3–47.1)
HEMOGLOBIN: 6.6 G/DL (ref 11.9–15.1)
HEMOGLOBIN: 6.6 G/DL (ref 12–16)
IMMATURE GRANULOCYTES: 1 %
IMMATURE RETIC FRACT: 35.1 % (ref 2.7–18.3)
IRON SATURATION: 29 % (ref 20–55)
IRON: 92 UG/DL (ref 37–145)
LACTATE DEHYDROGENASE: 212 U/L (ref 135–214)
LYMPHOCYTES # BLD: 14 % (ref 24–43)
MCH RBC QN AUTO: 28.9 PG (ref 25.2–33.5)
MCH RBC QN AUTO: 29.5 PG (ref 26–34)
MCHC RBC AUTO-ENTMCNC: 27.4 G/DL (ref 28.4–34.8)
MCHC RBC AUTO-ENTMCNC: 30.5 G/DL (ref 31–37)
MCV RBC AUTO: 105.7 FL (ref 82.6–102.9)
MCV RBC AUTO: 96.5 FL (ref 80–100)
MONOCYTES # BLD: 11 % (ref 3–12)
MORPHOLOGY: ABNORMAL
NRBC AUTOMATED: 0 PER 100 WBC
NRBC AUTOMATED: ABNORMAL PER 100 WBC
PDW BLD-RTO: 17.6 % (ref 11.8–14.4)
PDW BLD-RTO: 18.8 % (ref 11.5–14.9)
PLATELET # BLD: 354 K/UL (ref 138–453)
PLATELET # BLD: 390 K/UL (ref 150–450)
PLATELET ESTIMATE: ABNORMAL
PMV BLD AUTO: 7.8 FL (ref 6–12)
PMV BLD AUTO: 9.7 FL (ref 8.1–13.5)
POTASSIUM SERPL-SCNC: 3.5 MMOL/L (ref 3.7–5.3)
PRO-BNP: 3163 PG/ML
RBC # BLD: 2.24 M/UL (ref 4–5.2)
RBC # BLD: 2.28 M/UL (ref 3.95–5.11)
RBC # BLD: ABNORMAL 10*6/UL
RETIC %: 2.9 % (ref 0.5–1.9)
RETIC HEMOGLOBIN: 31.2 PG (ref 28.2–35.7)
SEG NEUTROPHILS: 70 % (ref 36–65)
SEGMENTED NEUTROPHILS ABSOLUTE COUNT: 4.42 K/UL (ref 1.5–8.1)
SODIUM BLD-SCNC: 145 MMOL/L (ref 135–144)
TOTAL IRON BINDING CAPACITY: 318 UG/DL (ref 250–450)
UNSATURATED IRON BINDING CAPACITY: 226 UG/DL (ref 112–347)
VITAMIN B-12: 369 PG/ML (ref 232–1245)
WBC # BLD: 6.3 K/UL (ref 3.5–11.3)
WBC # BLD: 6.7 K/UL (ref 3.5–11)
WBC # BLD: ABNORMAL 10*3/UL

## 2020-12-18 PROCEDURE — G8482 FLU IMMUNIZE ORDER/ADMIN: HCPCS | Performed by: INTERNAL MEDICINE

## 2020-12-18 PROCEDURE — 82668 ASSAY OF ERYTHROPOIETIN: CPT

## 2020-12-18 PROCEDURE — 83540 ASSAY OF IRON: CPT

## 2020-12-18 PROCEDURE — G8399 PT W/DXA RESULTS DOCUMENT: HCPCS | Performed by: INTERNAL MEDICINE

## 2020-12-18 PROCEDURE — 86900 BLOOD TYPING SEROLOGIC ABO: CPT

## 2020-12-18 PROCEDURE — 87040 BLOOD CULTURE FOR BACTERIA: CPT

## 2020-12-18 PROCEDURE — G8417 CALC BMI ABV UP PARAM F/U: HCPCS | Performed by: INTERNAL MEDICINE

## 2020-12-18 PROCEDURE — 85730 THROMBOPLASTIN TIME PARTIAL: CPT

## 2020-12-18 PROCEDURE — 82607 VITAMIN B-12: CPT

## 2020-12-18 PROCEDURE — 83010 ASSAY OF HAPTOGLOBIN QUANT: CPT

## 2020-12-18 PROCEDURE — 96374 THER/PROPH/DIAG INJ IV PUSH: CPT

## 2020-12-18 PROCEDURE — 36415 COLL VENOUS BLD VENIPUNCTURE: CPT

## 2020-12-18 PROCEDURE — 86901 BLOOD TYPING SEROLOGIC RH(D): CPT

## 2020-12-18 PROCEDURE — 85025 COMPLETE CBC W/AUTO DIFF WBC: CPT

## 2020-12-18 PROCEDURE — 85610 PROTHROMBIN TIME: CPT

## 2020-12-18 PROCEDURE — 83615 LACTATE (LD) (LDH) ENZYME: CPT

## 2020-12-18 PROCEDURE — 86850 RBC ANTIBODY SCREEN: CPT

## 2020-12-18 PROCEDURE — 86140 C-REACTIVE PROTEIN: CPT

## 2020-12-18 PROCEDURE — 83605 ASSAY OF LACTIC ACID: CPT

## 2020-12-18 PROCEDURE — 84484 ASSAY OF TROPONIN QUANT: CPT

## 2020-12-18 PROCEDURE — 83880 ASSAY OF NATRIURETIC PEPTIDE: CPT

## 2020-12-18 PROCEDURE — 82746 ASSAY OF FOLIC ACID SERUM: CPT

## 2020-12-18 PROCEDURE — 3017F COLORECTAL CA SCREEN DOC REV: CPT | Performed by: INTERNAL MEDICINE

## 2020-12-18 PROCEDURE — 83550 IRON BINDING TEST: CPT

## 2020-12-18 PROCEDURE — 85045 AUTOMATED RETICULOCYTE COUNT: CPT

## 2020-12-18 PROCEDURE — 1090F PRES/ABSN URINE INCON ASSESS: CPT | Performed by: INTERNAL MEDICINE

## 2020-12-18 PROCEDURE — 80053 COMPREHEN METABOLIC PANEL: CPT

## 2020-12-18 PROCEDURE — 36556 INSERT NON-TUNNEL CV CATH: CPT

## 2020-12-18 PROCEDURE — 80048 BASIC METABOLIC PNL TOTAL CA: CPT

## 2020-12-18 PROCEDURE — 99214 OFFICE O/P EST MOD 30 MIN: CPT | Performed by: INTERNAL MEDICINE

## 2020-12-18 PROCEDURE — 84145 PROCALCITONIN (PCT): CPT

## 2020-12-18 PROCEDURE — 85027 COMPLETE CBC AUTOMATED: CPT

## 2020-12-18 PROCEDURE — 99283 EMERGENCY DEPT VISIT LOW MDM: CPT

## 2020-12-18 PROCEDURE — G8428 CUR MEDS NOT DOCUMENT: HCPCS | Performed by: INTERNAL MEDICINE

## 2020-12-18 PROCEDURE — 99201 HC NEW PT, E/M LEVEL 1: CPT | Performed by: INTERNAL MEDICINE

## 2020-12-18 PROCEDURE — 1111F DSCHRG MED/CURRENT MED MERGE: CPT | Performed by: INTERNAL MEDICINE

## 2020-12-18 PROCEDURE — 71045 X-RAY EXAM CHEST 1 VIEW: CPT

## 2020-12-18 PROCEDURE — 82728 ASSAY OF FERRITIN: CPT

## 2020-12-18 PROCEDURE — 1123F ACP DISCUSS/DSCN MKR DOCD: CPT | Performed by: INTERNAL MEDICINE

## 2020-12-18 PROCEDURE — 86920 COMPATIBILITY TEST SPIN: CPT

## 2020-12-18 PROCEDURE — 02HV33Z INSERTION OF INFUSION DEVICE INTO SUPERIOR VENA CAVA, PERCUTANEOUS APPROACH: ICD-10-PCS | Performed by: EMERGENCY MEDICINE

## 2020-12-18 PROCEDURE — 1036F TOBACCO NON-USER: CPT | Performed by: INTERNAL MEDICINE

## 2020-12-18 PROCEDURE — 71046 X-RAY EXAM CHEST 2 VIEWS: CPT

## 2020-12-18 PROCEDURE — 4040F PNEUMOC VAC/ADMIN/RCVD: CPT | Performed by: INTERNAL MEDICINE

## 2020-12-18 RX ORDER — HEPARIN SODIUM (PORCINE) LOCK FLUSH IV SOLN 100 UNIT/ML 100 UNIT/ML
500 SOLUTION INTRAVENOUS PRN
Status: CANCELLED | OUTPATIENT
Start: 2021-02-15

## 2020-12-18 RX ORDER — SODIUM CHLORIDE 9 MG/ML
INJECTION, SOLUTION INTRAVENOUS CONTINUOUS
Status: CANCELLED | OUTPATIENT
Start: 2021-02-15

## 2020-12-18 RX ORDER — FENTANYL CITRATE 50 UG/ML
25 INJECTION, SOLUTION INTRAMUSCULAR; INTRAVENOUS ONCE
Status: COMPLETED | OUTPATIENT
Start: 2020-12-18 | End: 2020-12-19

## 2020-12-18 RX ORDER — DIPHENHYDRAMINE HYDROCHLORIDE 50 MG/ML
50 INJECTION INTRAMUSCULAR; INTRAVENOUS ONCE
Status: CANCELLED | OUTPATIENT
Start: 2021-02-15 | End: 2021-01-08

## 2020-12-18 RX ORDER — EPINEPHRINE 1 MG/ML
0.3 INJECTION, SOLUTION, CONCENTRATE INTRAVENOUS PRN
Status: CANCELLED | OUTPATIENT
Start: 2021-02-15

## 2020-12-18 RX ORDER — METHYLPREDNISOLONE SODIUM SUCCINATE 125 MG/2ML
125 INJECTION, POWDER, LYOPHILIZED, FOR SOLUTION INTRAMUSCULAR; INTRAVENOUS ONCE
Status: CANCELLED | OUTPATIENT
Start: 2021-02-15 | End: 2021-01-08

## 2020-12-18 RX ORDER — SODIUM CHLORIDE 0.9 % (FLUSH) 0.9 %
10 SYRINGE (ML) INJECTION PRN
Status: CANCELLED | OUTPATIENT
Start: 2021-02-15

## 2020-12-18 RX ORDER — SODIUM CHLORIDE 0.9 % (FLUSH) 0.9 %
5 SYRINGE (ML) INJECTION PRN
Status: CANCELLED | OUTPATIENT
Start: 2021-02-15

## 2020-12-18 ASSESSMENT — ENCOUNTER SYMPTOMS
COLOR CHANGE: 0
COUGH: 1
EYE PAIN: 0
BACK PAIN: 0
ABDOMINAL PAIN: 0
SHORTNESS OF BREATH: 1

## 2020-12-18 NOTE — PROGRESS NOTES
DIAGNOSIS:   1. Iron deficiency anemia, malabsorption component   2. AV malformation  3. COPD      CURRENT THERAPY:  Plan for work up and IV iron    BRIEF CASE HISTORY:   Rainelle Riedel is a very pleasant 70 y.o. female who is referred to us for iron deficiency anemia. She reports she has been taking oral iron. She had knee replacement 3 years ago and required 3 units of blood prior to surgery. She was stable until 02/2020 when her HGB began to decrease again. She has had frequent hospitalizations in 2020 with COPD exacerbation and poor kidney function with bruised kidney. She has followed with nephrology in the past but no recent follow up and has pulmonology referral. She has had some compliance issues with showing up for appointments. She has poor sleep with breathing issues and feels very fatigued. She has had several falls this year and has needed family to come pick her up, her legs are weak. She reports she had some bleeding following EGD and colonoscopy, AV malformation was found. She is on Eliquis for Afib but has not followed up with cardiology in the last year. PAST MEDICAL HISTORY: has a past medical history of A-fib ([address] Acquired hypothyroidism, Acute encephalopathy, Acute kidney injury ([address] Anxiety, Benign hypertension with CKD (chronic kidney disease) stage III, Chronic back pain, CKD (chronic kidney disease) stage 3, GFR 30-59 ml/min, Constipation, COPD (chronic obstructive pulmonary disease) ([address] Cough, Depression, ETOH abuse, Former smoker, HA (generalized anxiety disorder), History of GI bleed, Hyperlipidemia, Hypertension, Hypothyroid, Leg pain, Normocytic anemia, Osteoarthritis, PAD (peripheral artery disease) ([address] Primary osteoarthritis, Pulmonary hypertension ([address] Pure hypercholesterolemia, SIRS (systemic inflammatory response syndrome) ([address] Urge incontinence, and Wheezing. PAST SURGICAL HISTORY: has a past surgical history that includes eye surgery (Bilateral); Colonoscopy; joint replacement; Upper gastrointestinal endoscopy (N/A, 12/2/2020); and Colonoscopy (N/A, 12/2/2020). CURRENT MEDICATIONS:  has a current medication list which includes the following prescription(s): oxygen concentrator, tamsulosin, omeprazole, duloxetine, furosemide, baclofen, hydrocodone-acetaminophen, lisinopril-hydrochlorothiazide, eliquis, levothyroxine, metoprolol succinate, clonazepam, proair hfa, fluticasone, albuterol, diltiazem, albuterol sulfate hfa, fluticasone, atorvastatin, levalbuterol, nebulizer/tubing/mouthpiece, ascorbic acid, ferrous sulfate, and lift chair. ALLERGIES:  is allergic to tizanidine. FAMILY HISTORY: Negative for any hematological or oncological conditions. SOCIAL HISTORY:  reports that she has quit smoking. Her smoking use included cigarettes. She has a 78.00 pack-year smoking history. She has never used smokeless tobacco. She reports current alcohol use. She reports that she does not use drugs. REVIEW OF SYSTEMS:   General: No fever or night sweats. Weight is stable. +fatigue with insomnia  ENT: No double or blurred vision, no tinnitus or hearing problem, no dysphagia or sore throat   Respiratory: No chest pain, no shortness of breath, no cough or hemoptysis. Cardiovascular: Denies chest pain, PND or orthopnea. No L E swelling or palpitations. Gastrointestinal: No nausea or vomiting, abdominal pain, diarrhea or constipation. Genitourinary: Denies dysuria, hematuria, frequency, urgency or incontinence. Neurological: Denies headaches, decreased LOC, no sensory or motor focal deficits. Musculoskeletal:  No arthralgia no back pain or joint swelling. +weakness resulting in falls  Skin: There are no rashes or bleeding. Psychiatric:  No anxiety, no depression. Endocrine: no diabetes or thyroid disease. Hematologic: no bleeding , no adenopathy. 5. I asked her to continue with oral iron for now but with her AF malformation I suspect malabsorption component and am writing for IV iron to be given 01/2021.   6. I believe Eliquis is a contributing factor to her anemia and I asked her to follow up with her cardiologist regarding possibility for discontinuing. 7. Return in 02/2020 with repeat lab work to assess response.

## 2020-12-19 PROBLEM — D64.9 SYMPTOMATIC ANEMIA: Status: ACTIVE | Noted: 2020-12-19

## 2020-12-19 LAB
-: NORMAL
ABSOLUTE EOS #: 0.2 K/UL (ref 0–0.4)
ABSOLUTE IMMATURE GRANULOCYTE: ABNORMAL K/UL (ref 0–0.3)
ABSOLUTE LYMPH #: 1 K/UL (ref 1–4.8)
ABSOLUTE MONO #: 0.6 K/UL (ref 0.1–1.3)
ALBUMIN SERPL-MCNC: 3.3 G/DL (ref 3.5–5.2)
ALBUMIN/GLOBULIN RATIO: ABNORMAL (ref 1–2.5)
ALP BLD-CCNC: 102 U/L (ref 35–104)
ALT SERPL-CCNC: 10 U/L (ref 5–33)
ANION GAP SERPL CALCULATED.3IONS-SCNC: 11 MMOL/L (ref 9–17)
ANION GAP SERPL CALCULATED.3IONS-SCNC: 9 MMOL/L (ref 9–17)
AST SERPL-CCNC: 14 U/L
BASOPHILS # BLD: 1 % (ref 0–2)
BASOPHILS ABSOLUTE: 0.1 K/UL (ref 0–0.2)
BILIRUB SERPL-MCNC: 0.29 MG/DL (ref 0.3–1.2)
BNP INTERPRETATION: ABNORMAL
BUN BLDV-MCNC: 21 MG/DL (ref 8–23)
BUN BLDV-MCNC: 24 MG/DL (ref 8–23)
BUN/CREAT BLD: ABNORMAL (ref 9–20)
BUN/CREAT BLD: ABNORMAL (ref 9–20)
C-REACTIVE PROTEIN: 19.1 MG/L (ref 0–5)
CALCIUM SERPL-MCNC: 8.2 MG/DL (ref 8.6–10.4)
CALCIUM SERPL-MCNC: 8.6 MG/DL (ref 8.6–10.4)
CHLORIDE BLD-SCNC: 103 MMOL/L (ref 98–107)
CHLORIDE BLD-SCNC: 103 MMOL/L (ref 98–107)
CO2: 31 MMOL/L (ref 20–31)
CO2: 32 MMOL/L (ref 20–31)
CREAT SERPL-MCNC: 1.24 MG/DL (ref 0.5–0.9)
CREAT SERPL-MCNC: 1.45 MG/DL (ref 0.5–0.9)
DATE, STOOL #1: NORMAL
DATE, STOOL #2: NORMAL
DATE, STOOL #3: NORMAL
DIFFERENTIAL TYPE: ABNORMAL
EKG ATRIAL RATE: 92 BPM
EKG Q-T INTERVAL: 328 MS
EKG QRS DURATION: 60 MS
EKG QTC CALCULATION (BAZETT): 381 MS
EKG R AXIS: 47 DEGREES
EKG T AXIS: -16 DEGREES
EKG VENTRICULAR RATE: 81 BPM
EOSINOPHILS RELATIVE PERCENT: 3 % (ref 0–4)
GFR AFRICAN AMERICAN: 43 ML/MIN
GFR AFRICAN AMERICAN: 51 ML/MIN
GFR NON-AFRICAN AMERICAN: 35 ML/MIN
GFR NON-AFRICAN AMERICAN: 42 ML/MIN
GFR SERPL CREATININE-BSD FRML MDRD: ABNORMAL ML/MIN/{1.73_M2}
GLUCOSE BLD-MCNC: 115 MG/DL (ref 70–99)
GLUCOSE BLD-MCNC: 94 MG/DL (ref 70–99)
HCT VFR BLD CALC: 20.4 % (ref 36–46)
HCT VFR BLD CALC: 23.3 % (ref 36–46)
HEMOCCULT SP1 STL QL: POSITIVE
HEMOCCULT SP2 STL QL: NORMAL
HEMOCCULT SP3 STL QL: NORMAL
HEMOGLOBIN: 6.3 G/DL (ref 12–16)
HEMOGLOBIN: 7.6 G/DL (ref 12–16)
IMMATURE GRANULOCYTES: ABNORMAL %
INR BLD: 1.5
LACTATE DEHYDROGENASE: 197 U/L (ref 135–214)
LACTATE DEHYDROGENASE: 208 U/L (ref 135–214)
LACTIC ACID: 0.6 MMOL/L (ref 0.5–2.2)
LYMPHOCYTES # BLD: 18 % (ref 24–44)
MAGNESIUM: 2.1 MG/DL (ref 1.6–2.6)
MCH RBC QN AUTO: 29.1 PG (ref 26–34)
MCH RBC QN AUTO: 31.2 PG (ref 26–34)
MCHC RBC AUTO-ENTMCNC: 30.6 G/DL (ref 31–37)
MCHC RBC AUTO-ENTMCNC: 32.7 G/DL (ref 31–37)
MCV RBC AUTO: 95.1 FL (ref 80–100)
MCV RBC AUTO: 95.2 FL (ref 80–100)
MONOCYTES # BLD: 11 % (ref 1–7)
NRBC AUTOMATED: ABNORMAL PER 100 WBC
NRBC AUTOMATED: ABNORMAL PER 100 WBC
PARTIAL THROMBOPLASTIN TIME: 36.9 SEC (ref 24–36)
PATHOLOGIST REVIEW: NORMAL
PDW BLD-RTO: 17.9 % (ref 11.5–14.9)
PDW BLD-RTO: 18.8 % (ref 11.5–14.9)
PLATELET # BLD: 364 K/UL (ref 150–450)
PLATELET # BLD: 364 K/UL (ref 150–450)
PLATELET ESTIMATE: ABNORMAL
PMV BLD AUTO: 7.6 FL (ref 6–12)
PMV BLD AUTO: 7.8 FL (ref 6–12)
POTASSIUM SERPL-SCNC: 3.5 MMOL/L (ref 3.7–5.3)
POTASSIUM SERPL-SCNC: 4.1 MMOL/L (ref 3.7–5.3)
PRO-BNP: 4104 PG/ML
PROCALCITONIN: 0.11 NG/ML
PROTHROMBIN TIME: 18 SEC (ref 11.8–14.6)
RBC # BLD: 2.15 M/UL (ref 4–5.2)
RBC # BLD: 2.45 M/UL (ref 4–5.2)
RBC # BLD: ABNORMAL 10*6/UL
REASON FOR REJECTION: NORMAL
SARS-COV-2, RAPID: NOT DETECTED
SARS-COV-2: NORMAL
SARS-COV-2: NORMAL
SEG NEUTROPHILS: 67 % (ref 36–66)
SEGMENTED NEUTROPHILS ABSOLUTE COUNT: 3.6 K/UL (ref 1.3–9.1)
SODIUM BLD-SCNC: 144 MMOL/L (ref 135–144)
SODIUM BLD-SCNC: 145 MMOL/L (ref 135–144)
SOURCE: NORMAL
SPECIMEN DESCRIPTION: NORMAL
TIME, STOOL #1: 2323
TIME, STOOL #2: NORMAL
TIME, STOOL #3: NORMAL
TOTAL PROTEIN: 5.8 G/DL (ref 6.4–8.3)
TROPONIN INTERP: ABNORMAL
TROPONIN T: ABNORMAL NG/ML
TROPONIN, HIGH SENSITIVITY: 20 NG/L (ref 0–14)
WBC # BLD: 5.4 K/UL (ref 3.5–11)
WBC # BLD: 7.6 K/UL (ref 3.5–11)
WBC # BLD: ABNORMAL 10*3/UL
ZZ NTE CLEAN UP: ORDERED TEST: NORMAL
ZZ NTE WITH NAME CLEAN UP: SPECIMEN SOURCE: NORMAL

## 2020-12-19 PROCEDURE — 6360000002 HC RX W HCPCS: Performed by: EMERGENCY MEDICINE

## 2020-12-19 PROCEDURE — P9016 RBC LEUKOCYTES REDUCED: HCPCS

## 2020-12-19 PROCEDURE — 84484 ASSAY OF TROPONIN QUANT: CPT

## 2020-12-19 PROCEDURE — 86900 BLOOD TYPING SEROLOGIC ABO: CPT

## 2020-12-19 PROCEDURE — 97162 PT EVAL MOD COMPLEX 30 MIN: CPT

## 2020-12-19 PROCEDURE — 83735 ASSAY OF MAGNESIUM: CPT

## 2020-12-19 PROCEDURE — 80053 COMPREHEN METABOLIC PANEL: CPT

## 2020-12-19 PROCEDURE — 82272 OCCULT BLD FECES 1-3 TESTS: CPT

## 2020-12-19 PROCEDURE — 82668 ASSAY OF ERYTHROPOIETIN: CPT

## 2020-12-19 PROCEDURE — 80048 BASIC METABOLIC PNL TOTAL CA: CPT

## 2020-12-19 PROCEDURE — 36415 COLL VENOUS BLD VENIPUNCTURE: CPT

## 2020-12-19 PROCEDURE — U0002 COVID-19 LAB TEST NON-CDC: HCPCS

## 2020-12-19 PROCEDURE — 2580000003 HC RX 258: Performed by: EMERGENCY MEDICINE

## 2020-12-19 PROCEDURE — U0003 INFECTIOUS AGENT DETECTION BY NUCLEIC ACID (DNA OR RNA); SEVERE ACUTE RESPIRATORY SYNDROME CORONAVIRUS 2 (SARS-COV-2) (CORONAVIRUS DISEASE [COVID-19]), AMPLIFIED PROBE TECHNIQUE, MAKING USE OF HIGH THROUGHPUT TECHNOLOGIES AS DESCRIBED BY CMS-2020-01-R: HCPCS

## 2020-12-19 PROCEDURE — 85730 THROMBOPLASTIN TIME PARTIAL: CPT

## 2020-12-19 PROCEDURE — 97530 THERAPEUTIC ACTIVITIES: CPT

## 2020-12-19 PROCEDURE — 99223 1ST HOSP IP/OBS HIGH 75: CPT | Performed by: FAMILY MEDICINE

## 2020-12-19 PROCEDURE — 93005 ELECTROCARDIOGRAM TRACING: CPT | Performed by: EMERGENCY MEDICINE

## 2020-12-19 PROCEDURE — 99222 1ST HOSP IP/OBS MODERATE 55: CPT | Performed by: INTERNAL MEDICINE

## 2020-12-19 PROCEDURE — 83880 ASSAY OF NATRIURETIC PEPTIDE: CPT

## 2020-12-19 PROCEDURE — 2580000003 HC RX 258: Performed by: FAMILY MEDICINE

## 2020-12-19 PROCEDURE — 85025 COMPLETE CBC W/AUTO DIFF WBC: CPT

## 2020-12-19 PROCEDURE — 2060000000 HC ICU INTERMEDIATE R&B

## 2020-12-19 PROCEDURE — 83615 LACTATE (LD) (LDH) ENZYME: CPT

## 2020-12-19 PROCEDURE — 86140 C-REACTIVE PROTEIN: CPT

## 2020-12-19 PROCEDURE — 2500000003 HC RX 250 WO HCPCS: Performed by: EMERGENCY MEDICINE

## 2020-12-19 PROCEDURE — 93010 ELECTROCARDIOGRAM REPORT: CPT | Performed by: INTERNAL MEDICINE

## 2020-12-19 PROCEDURE — 84145 PROCALCITONIN (PCT): CPT

## 2020-12-19 PROCEDURE — 6360000002 HC RX W HCPCS: Performed by: INTERNAL MEDICINE

## 2020-12-19 PROCEDURE — 36430 TRANSFUSION BLD/BLD COMPNT: CPT

## 2020-12-19 PROCEDURE — 85610 PROTHROMBIN TIME: CPT

## 2020-12-19 PROCEDURE — 6370000000 HC RX 637 (ALT 250 FOR IP): Performed by: FAMILY MEDICINE

## 2020-12-19 PROCEDURE — 85027 COMPLETE CBC AUTOMATED: CPT

## 2020-12-19 RX ORDER — LEVOTHYROXINE SODIUM 0.12 MG/1
125 TABLET ORAL DAILY
Status: DISCONTINUED | OUTPATIENT
Start: 2020-12-19 | End: 2020-12-29 | Stop reason: HOSPADM

## 2020-12-19 RX ORDER — ASCORBIC ACID 500 MG
1000 TABLET ORAL DAILY
Status: DISCONTINUED | OUTPATIENT
Start: 2020-12-19 | End: 2020-12-29 | Stop reason: HOSPADM

## 2020-12-19 RX ORDER — SODIUM CHLORIDE 0.9 % (FLUSH) 0.9 %
10 SYRINGE (ML) INJECTION PRN
Status: DISCONTINUED | OUTPATIENT
Start: 2020-12-19 | End: 2020-12-29 | Stop reason: HOSPADM

## 2020-12-19 RX ORDER — PANTOPRAZOLE SODIUM 40 MG/1
40 TABLET, DELAYED RELEASE ORAL
Status: DISCONTINUED | OUTPATIENT
Start: 2020-12-19 | End: 2020-12-29 | Stop reason: HOSPADM

## 2020-12-19 RX ORDER — HYDROCHLOROTHIAZIDE 25 MG/1
12.5 TABLET ORAL DAILY
Status: DISCONTINUED | OUTPATIENT
Start: 2020-12-19 | End: 2020-12-24

## 2020-12-19 RX ORDER — BACLOFEN 10 MG/1
10 TABLET ORAL 3 TIMES DAILY PRN
Status: DISCONTINUED | OUTPATIENT
Start: 2020-12-19 | End: 2020-12-29 | Stop reason: HOSPADM

## 2020-12-19 RX ORDER — SODIUM CHLORIDE 9 MG/ML
10 INJECTION INTRAVENOUS DAILY
Status: DISCONTINUED | OUTPATIENT
Start: 2020-12-19 | End: 2020-12-19 | Stop reason: CLARIF

## 2020-12-19 RX ORDER — LISINOPRIL AND HYDROCHLOROTHIAZIDE 12.5; 1 MG/1; MG/1
1 TABLET ORAL DAILY
Status: DISCONTINUED | OUTPATIENT
Start: 2020-12-19 | End: 2020-12-19 | Stop reason: CLARIF

## 2020-12-19 RX ORDER — METOPROLOL SUCCINATE 25 MG/1
25 TABLET, EXTENDED RELEASE ORAL DAILY
Status: DISCONTINUED | OUTPATIENT
Start: 2020-12-19 | End: 2020-12-24

## 2020-12-19 RX ORDER — ACETAMINOPHEN 325 MG/1
650 TABLET ORAL EVERY 4 HOURS PRN
Status: DISCONTINUED | OUTPATIENT
Start: 2020-12-19 | End: 2020-12-29 | Stop reason: HOSPADM

## 2020-12-19 RX ORDER — FERROUS SULFATE 325(65) MG
325 TABLET ORAL DAILY
Status: DISCONTINUED | OUTPATIENT
Start: 2020-12-19 | End: 2020-12-29 | Stop reason: HOSPADM

## 2020-12-19 RX ORDER — FUROSEMIDE 10 MG/ML
40 INJECTION INTRAMUSCULAR; INTRAVENOUS 2 TIMES DAILY
Status: DISCONTINUED | OUTPATIENT
Start: 2020-12-19 | End: 2020-12-20

## 2020-12-19 RX ORDER — SODIUM CHLORIDE 9 MG/ML
10 INJECTION INTRAVENOUS
Status: DISCONTINUED | OUTPATIENT
Start: 2020-12-19 | End: 2020-12-19 | Stop reason: SDUPTHER

## 2020-12-19 RX ORDER — FUROSEMIDE 40 MG/1
80 TABLET ORAL DAILY
Status: DISCONTINUED | OUTPATIENT
Start: 2020-12-19 | End: 2020-12-19

## 2020-12-19 RX ORDER — ATORVASTATIN CALCIUM 10 MG/1
10 TABLET, FILM COATED ORAL DAILY
Status: DISCONTINUED | OUTPATIENT
Start: 2020-12-19 | End: 2020-12-29 | Stop reason: HOSPADM

## 2020-12-19 RX ORDER — HYDROCODONE BITARTRATE AND ACETAMINOPHEN 5; 325 MG/1; MG/1
1 TABLET ORAL EVERY 8 HOURS PRN
Status: DISCONTINUED | OUTPATIENT
Start: 2020-12-19 | End: 2020-12-29 | Stop reason: HOSPADM

## 2020-12-19 RX ORDER — LISINOPRIL 10 MG/1
10 TABLET ORAL DAILY
Status: DISCONTINUED | OUTPATIENT
Start: 2020-12-19 | End: 2020-12-24

## 2020-12-19 RX ORDER — DULOXETIN HYDROCHLORIDE 60 MG/1
60 CAPSULE, DELAYED RELEASE ORAL DAILY
Status: DISCONTINUED | OUTPATIENT
Start: 2020-12-19 | End: 2020-12-29 | Stop reason: HOSPADM

## 2020-12-19 RX ORDER — DILTIAZEM HYDROCHLORIDE 120 MG/1
120 CAPSULE, COATED, EXTENDED RELEASE ORAL DAILY
Status: DISCONTINUED | OUTPATIENT
Start: 2020-12-19 | End: 2020-12-29 | Stop reason: HOSPADM

## 2020-12-19 RX ORDER — ESOMEPRAZOLE SODIUM 40 MG/5ML
20 INJECTION INTRAVENOUS
Status: DISCONTINUED | OUTPATIENT
Start: 2020-12-19 | End: 2020-12-19 | Stop reason: SDUPTHER

## 2020-12-19 RX ORDER — SODIUM CHLORIDE 0.9 % (FLUSH) 0.9 %
10 SYRINGE (ML) INJECTION EVERY 12 HOURS SCHEDULED
Status: DISCONTINUED | OUTPATIENT
Start: 2020-12-19 | End: 2020-12-29 | Stop reason: HOSPADM

## 2020-12-19 RX ORDER — TAMSULOSIN HYDROCHLORIDE 0.4 MG/1
0.4 CAPSULE ORAL DAILY
Status: DISCONTINUED | OUTPATIENT
Start: 2020-12-19 | End: 2020-12-29 | Stop reason: HOSPADM

## 2020-12-19 RX ORDER — FLUTICASONE PROPIONATE 50 MCG
1 SPRAY, SUSPENSION (ML) NASAL DAILY
Status: DISCONTINUED | OUTPATIENT
Start: 2020-12-19 | End: 2020-12-20

## 2020-12-19 RX ORDER — POTASSIUM CHLORIDE 7.45 MG/ML
10 INJECTION INTRAVENOUS PRN
Status: DISCONTINUED | OUTPATIENT
Start: 2020-12-19 | End: 2020-12-29 | Stop reason: HOSPADM

## 2020-12-19 RX ORDER — FLUTICASONE PROPIONATE 110 UG/1
2 AEROSOL, METERED RESPIRATORY (INHALATION) 2 TIMES DAILY
Status: DISCONTINUED | OUTPATIENT
Start: 2020-12-19 | End: 2020-12-29 | Stop reason: HOSPADM

## 2020-12-19 RX ORDER — CLONAZEPAM 1 MG/1
0.5 TABLET ORAL 3 TIMES DAILY PRN
Status: DISCONTINUED | OUTPATIENT
Start: 2020-12-19 | End: 2020-12-29 | Stop reason: HOSPADM

## 2020-12-19 RX ORDER — PANTOPRAZOLE SODIUM 40 MG/10ML
40 INJECTION, POWDER, LYOPHILIZED, FOR SOLUTION INTRAVENOUS DAILY
Status: DISCONTINUED | OUTPATIENT
Start: 2020-12-19 | End: 2020-12-19 | Stop reason: CLARIF

## 2020-12-19 RX ORDER — ALBUTEROL SULFATE 2.5 MG/3ML
2.5 SOLUTION RESPIRATORY (INHALATION) EVERY 6 HOURS PRN
Status: DISCONTINUED | OUTPATIENT
Start: 2020-12-19 | End: 2020-12-29 | Stop reason: HOSPADM

## 2020-12-19 RX ORDER — 0.9 % SODIUM CHLORIDE 0.9 %
20 INTRAVENOUS SOLUTION INTRAVENOUS ONCE
Status: COMPLETED | OUTPATIENT
Start: 2020-12-19 | End: 2020-12-19

## 2020-12-19 RX ORDER — POTASSIUM CHLORIDE 20 MEQ/1
40 TABLET, EXTENDED RELEASE ORAL PRN
Status: DISCONTINUED | OUTPATIENT
Start: 2020-12-19 | End: 2020-12-29 | Stop reason: HOSPADM

## 2020-12-19 RX ADMIN — FLUTICASONE PROPIONATE 1 SPRAY: 50 SPRAY, METERED NASAL at 11:03

## 2020-12-19 RX ADMIN — PANTOPRAZOLE SODIUM 40 MG: 40 TABLET, DELAYED RELEASE ORAL at 10:51

## 2020-12-19 RX ADMIN — LISINOPRIL 10 MG: 10 TABLET ORAL at 10:51

## 2020-12-19 RX ADMIN — FLUTICASONE PROPIONATE 2 PUFF: 110 AEROSOL, METERED RESPIRATORY (INHALATION) at 10:50

## 2020-12-19 RX ADMIN — FUROSEMIDE 40 MG: 10 INJECTION, SOLUTION INTRAMUSCULAR; INTRAVENOUS at 17:18

## 2020-12-19 RX ADMIN — TAMSULOSIN HYDROCHLORIDE 0.4 MG: 0.4 CAPSULE ORAL at 10:51

## 2020-12-19 RX ADMIN — Medication 10 ML: at 21:00

## 2020-12-19 RX ADMIN — FUROSEMIDE 80 MG: 40 TABLET ORAL at 10:51

## 2020-12-19 RX ADMIN — METOPROLOL SUCCINATE 25 MG: 25 TABLET, EXTENDED RELEASE ORAL at 10:51

## 2020-12-19 RX ADMIN — HYDROCHLOROTHIAZIDE 12.5 MG: 25 TABLET ORAL at 10:51

## 2020-12-19 RX ADMIN — HYDROCODONE BITARTRATE AND ACETAMINOPHEN 1 TABLET: 5; 325 TABLET ORAL at 14:37

## 2020-12-19 RX ADMIN — FERROUS SULFATE TAB 325 MG (65 MG ELEMENTAL FE) 325 MG: 325 (65 FE) TAB at 10:51

## 2020-12-19 RX ADMIN — VANCOMYCIN HYDROCHLORIDE 2000 MG: 1 INJECTION, POWDER, LYOPHILIZED, FOR SOLUTION INTRAVENOUS at 05:24

## 2020-12-19 RX ADMIN — SODIUM CHLORIDE 20 ML: 0.9 INJECTION, SOLUTION INTRAVENOUS at 03:20

## 2020-12-19 RX ADMIN — DILTIAZEM HYDROCHLORIDE 120 MG: 120 CAPSULE, COATED, EXTENDED RELEASE ORAL at 10:51

## 2020-12-19 RX ADMIN — ESOMEPRAZOLE SODIUM 20 MG: 40 INJECTION INTRAVENOUS at 05:25

## 2020-12-19 RX ADMIN — OXYCODONE HYDROCHLORIDE AND ACETAMINOPHEN 1000 MG: 500 TABLET ORAL at 10:51

## 2020-12-19 RX ADMIN — CEFEPIME HYDROCHLORIDE 2 G: 2 INJECTION, POWDER, FOR SOLUTION INTRAVENOUS at 02:08

## 2020-12-19 RX ADMIN — Medication 10 ML: at 05:25

## 2020-12-19 RX ADMIN — FENTANYL CITRATE 25 MCG: 50 INJECTION INTRAMUSCULAR; INTRAVENOUS at 01:06

## 2020-12-19 RX ADMIN — DULOXETINE HYDROCHLORIDE 60 MG: 60 CAPSULE, DELAYED RELEASE ORAL at 11:03

## 2020-12-19 RX ADMIN — Medication 10 ML: at 10:51

## 2020-12-19 RX ADMIN — ATORVASTATIN CALCIUM 10 MG: 10 TABLET, FILM COATED ORAL at 10:51

## 2020-12-19 RX ADMIN — LEVOTHYROXINE SODIUM 125 MCG: 125 TABLET ORAL at 10:51

## 2020-12-19 ASSESSMENT — PAIN DESCRIPTION - PAIN TYPE
TYPE: CHRONIC PAIN
TYPE: CHRONIC PAIN

## 2020-12-19 ASSESSMENT — PAIN SCALES - GENERAL
PAINLEVEL_OUTOF10: 0
PAINLEVEL_OUTOF10: 6
PAINLEVEL_OUTOF10: 8
PAINLEVEL_OUTOF10: 5

## 2020-12-19 ASSESSMENT — PAIN DESCRIPTION - DESCRIPTORS
DESCRIPTORS: ACHING
DESCRIPTORS: ACHING;SORE

## 2020-12-19 ASSESSMENT — PAIN DESCRIPTION - ONSET: ONSET: ON-GOING

## 2020-12-19 ASSESSMENT — PAIN DESCRIPTION - FREQUENCY
FREQUENCY: CONTINUOUS
FREQUENCY: INTERMITTENT

## 2020-12-19 ASSESSMENT — PAIN DESCRIPTION - LOCATION
LOCATION: GENERALIZED
LOCATION: BACK

## 2020-12-19 ASSESSMENT — PAIN DESCRIPTION - PROGRESSION: CLINICAL_PROGRESSION: NOT CHANGED

## 2020-12-19 ASSESSMENT — PAIN - FUNCTIONAL ASSESSMENT: PAIN_FUNCTIONAL_ASSESSMENT: ACTIVITIES ARE NOT PREVENTED

## 2020-12-19 NOTE — H&P
Family Medicine Admit Note    PCP: Erinn Gomez, APRN - CNP    Date of Admission: 12/18/2020    Date of Service: Pt seen/examined on 12/18/2020 and Admitted to Inpatient. Chief Complaint:  Shortness of Breath                                  Abnormal lab - low hgb      History Of Present Illness: The patient is a 70 y.o. female who presents to [facility] with complaints of shortness of breath and abnormal labs. The patient was recently an inpatient for a GI bleed. She was at a follow up visit with her hematologist this week and had labs ordered. She reports that she got a call from her PCP's office yesterday to inform her that her hgb was low and she needed to go to the ED for evaluation. She also reports that for the past few days she has been feeling increasingly SOB. She states that it is worse with exertion. She reports that she is wearing oxygen at 2L but recently had to go up to 3L. She reports that she had a CXR recently that showed pneumonia per her PCP. Antibiotics were ordered but she had not started them yet. She also reports some lower extremity swelling and orthopnea. She denies any fevers, chills, congestion, sore throat, chest pain, abdominal pain, N/V, dysuria, rashes, headaches or dizziness.     Past Medical History:        Diagnosis Date    A-fib [facility])     Acquired hypothyroidism     Acute encephalopathy     Acute kidney [facility]     Anxiety     Benign hypertension with CKD (chronic kidney disease) stage III     Chronic back pain     CKD (chronic kidney disease) stage 3, GFR 30-59 ml/min     Constipation     COPD (chronic obstructive pulmonary disease) (HCC)     Cough     Depression     ETOH abuse     Former smoker     3 packs a day for 26 years    HA (generalized anxiety disorder)     History of GI bleed     Hyperlipidemia     Hypertension     Hypothyroid 1/18/2013    Leg pain     Normocytic anemia     Osteoarthritis  PAD (peripheral artery disease) (HCC)     Primary osteoarthritis     Pulmonary hypertension (HCC)     Pure hypercholesterolemia     SIRS (systemic inflammatory response syndrome) (HCC)     Urge incontinence     Wheezing        Past Surgical History:        Procedure Laterality Date    COLONOSCOPY      COLONOSCOPY N/A 12/2/2020    COLONOSCOPY CONTROL OF BLEEDING performed by Cara Vieyra MD at [address] [place] [address] Bilateral     cataracts    JOINT REPLACEMENT      Left knee on 9-11-18    UPPER GASTROINTESTINAL ENDOSCOPY N/A 12/2/2020    EGD performed by [facility]ra [facility]       Medications Prior to Admission:    Prior to Admission medications    Medication Sig Start Date End Date Taking? Authorizing Provider   tamsulosin (FLOMAX) 0.4 MG capsule TAKE 1 CAPSULE BY MOUTH DAILY 12/15/20  Yes Joycelyn , APRN - CNP   omeprazole (PRILOSEC) 40 MG delayed release capsule TAKE 1 CAPSULE BY MOUTH EVERY MORNING BEFORE BREAKFAST 12/14/20  Yes Joycelyn , APRN - CNP   DULoxetine (CYMBALTA) 60 MG extended release capsule Take 1 capsule by mouth daily 12/14/20  Yes Joycelyn , APRN - CNP   furosemide (LASIX) 40 MG tablet TAKE 1 TABLET BY MOUTH DAILY  Patient taking differently: Take 40 mg by mouth daily Currently taking 2 tablets daily  12/18/20 11/30/20  Yes Joycelyn , APRN - CNP   baclofen (LIORESAL) 10 MG tablet TAKE ONE TABLET BY MOUTH THREE TIMES A DAY AS NEEDED. 11/30/20  Yes Joycelyn , APRN - BOBY   HYDROcodone-acetaminophen (NORCO) 5-325 MG per tablet Take 1 tablet by mouth every 8 hours as needed for Pain for up to 30 days.  11/30/20 12/30/20 Yes Joycelyn , APRN - BOBY   lisinopril-hydroCHLOROthiazide (PRINZIDE;ZESTORETIC) 10-12.5 MG per tablet Take 1 tablet by mouth daily 11/30/20  Yes Joycelyn , WILLIE - CNP   ELIQUIS 5 MG TABS tablet TAKE ONE TABLET BY MOUTH TWICE A DAY 11/30/20  Yes Joycelyn Cassidy, WILLIE - BOBY levothyroxine (SYNTHROID) 125 MCG tablet Take 1 tablet by mouth daily 11/30/20  Yes Bryn Boast, APRN - CNP   metoprolol succinate (TOPROL XL) 25 MG extended release tablet TAKE 1 TABLET BY MOUTH DAILY. (PLEASE MAKE DR HER FOR FUTURE REFILLS) 11/23/20  Yes Bryn Boast, APRN - CNP   clonazePAM (KLONOPIN) 0.5 MG tablet Take 1 tablet by mouth 3 times daily as needed for Anxiety for up to 30 days.  11/17/20 12/19/20 Yes Bryn Boast, APRN - CNP   fluticasone (FLOVENT HFA) 220 MCG/ACT inhaler Inhale 2 puffs into the lungs 2 times daily 11/17/20  Yes Bryn Boast, APRN - CNP   albuterol (PROVENTIL) (2.5 MG/3ML) 0.083% nebulizer solution Take 3 mLs by nebulization every 6 hours as needed for Wheezing 11/5/20  Yes Bryn Boast, APRN - CNP   dilTIAZem (CARDIZEM CD) 120 MG extended release capsule Take 1 capsule by mouth daily 10/19/20  Yes Bryn Boast, APRN - CNP   albuterol sulfate HFA (PROAIR HFA) 108 (90 Base) MCG/ACT inhaler Inhale 2 puffs into the lungs every 6 hours as needed for Wheezing   Yes Historical Provider, MD   fluticasone (FLONASE) 50 MCG/ACT nasal spray USE ONE SPRAY TO EACH NOSTRIL ONCE ADAY 9/4/20  Yes WILLIE Huber CNP   atorvastatin (LIPITOR) 10 MG tablet TAKE 1 TABLET BY MOUTH ONCE DAILY 9/4/20  Yes WILLIE Huber CNP   Ascorbic Acid (C-1000 PO) Take 1 capsule by mouth daily    Yes Historical Provider, MD   ferrous sulfate 325 (65 Fe) MG tablet Take 325 mg by mouth daily  8/30/18  Yes Historical Provider, MD   Oxygen Concentrator continuous    Historical Provider, MD   Lift Chair MISC Use daily for comfort  Patient not taking: Reported on 12/18/2020 11/30/20   Bryn Boast, APRN - CNP   PROAIR  (90 Base) MCG/ACT inhaler Inhale 2 puffs into the lungs every 6 hours as needed for Wheezing 11/17/20   Bryn Boast, APRN - CNP levalbuterol (XOPENEX HFA) 45 MCG/ACT inhaler Inhale 1 puff into the lungs every 4 hours as needed for Wheezing 8/19/20   Forrestine Plaster, APRN - CNP   Respiratory Therapy Supplies (NEBULIZER/TUBING/MOUTHPIECE) KIT Use every 4-6 hours prn for copd 6/15/20   Forrestine Plaster, APRN - CNP       Allergies:  Tizanidine    Social History:  The patient currently lives at home    TOBACCO:   reports that she has quit smoking. Her smoking use included cigarettes. She has a 78.00 pack-year smoking history. She has never used smokeless tobacco.  ETOH:   reports current alcohol use. Review of Systems - General ROS: positive for  - obesity  Psychological ROS: positive for - anxiety and depression  Ophthalmic ROS: negative  ENT ROS: negative  Hematological and Lymphatic ROS: positive for - pallor  Endocrine ROS: positive for - hypothyroid  Respiratory ROS: positive for - shortness of breath  Cardiovascular ROS: negative  Gastrointestinal ROS: negative  Musculoskeletal ROS: positive for - joint stiffness and muscle pain      Family History:          Problem Relation Age of Onset    Heart Disease Mother     COPD Mother     Emphysema Sister     Cancer Other         Cousin - breast cancer       PHYSICAL EXAM:    BP (!) 109/54   Pulse 83   Temp 98.1 °F (36.7 °C) (Oral)   Resp 18   Ht 5' 5\" (1.651 m)   Wt 193 lb 2 oz (87.6 kg)   SpO2 100%   BMI 32.14 kg/m²     General appearance: No apparent distress appears stated age and cooperative. HEENT Normal cephalic, atraumatic without obvious deformity. Pupils equal, round, and reactive to light. Extra ocular muscles intact. Conjunctivae/corneas clear. Neck: Supple, No jugular venous distention/bruits. Trachea midline without thyromegaly or adenopathy with full range of motion. Lungs: Bilateral basilar rales with good respiratory effort.   Heart: Regular rate and rhythm with Normal S1/S2 without murmurs, rubs or gallops, point of maximum impulse non-displaced  Symptomatic anemia [D64.9] 12/19/2020    Iron deficiency anemia due to chronic blood loss [D50.0] 12/18/2020    Acute on chronic diastolic CHF (congestive heart failure) ([facility] [I50.33] 07/21/2020    Obesity, Class I, BMI 30-34.9 [E66.9] 07/20/2020    Moderate major depression ([facility] [F32.1] 06/17/2020    PAD (peripheral artery disease) ([facility]) [I73.9] 06/17/2020    CKD (chronic kidney disease) stage 3, GFR 30-59 ml/min [N18.30]     HA (generalized anxiety disorder) [F41.1] 12/13/2018    A-fib ([facility] [I48.91] 09/13/2018    Pulmonary hypertension ([facility] [I27.20] 09/13/2018    COPD (chronic obstructive pulmonary disease) ([facility] [J44.9] 09/13/2018    Acquired hypothyroidism [E03.9] 01/18/2013         ASSESSMENT/PLAN:  Patient admitted with symptomatic anemia. Co-morbidities as above.     Orders Placed This Encounter   Procedures    Culture, Blood 1    Culture, Blood 2    XR CHEST PORTABLE    Lactic Acid    Procalcitonin    COVID-19    SPECIMEN REJECTION    Brain Natriuretic Peptide    CBC Auto Differential    Comprehensive Metabolic Panel w/ Reflex to MG    C-Reactive Protein    Lactate Dehydrogenase    Procalcitonin    Protime-INR    APTT    Troponin    Magnesium    Hemoglobin and Hematocrit, Blood, Post Transfusion    CBC    BASIC METABOLIC PANEL    ERYTHROPOIETIN    PERIPHERAL BLOOD SMEAR, PATH REVIEW    Lactate Dehydrogenase    DIET CARDIAC;    VITAL SIGNS PER TRANSFUSION PROTOCOL    Verify hospital blood consent form has been signed and witnessed    TRANSFUSION REACTION MANAGEMENT    Vital signs per unit routine    Notify physician    Notify physician    Up as tolerated    Telemetry Monitoring    Full Code    Inpatient consult to [facility]    Inpatient consult to Pulmonology    Inpatient consult to Hem/Onc    Inpatient consult to GI    OT eval and treat    PT eval and treat    EKG 12 Lead    EKG REPORT    TYPE AND SCREEN    PREPARE RBC (CROSSMATCH), 1 Units  Insert peripheral IV    PATIENT STATUS (DIRECT) Inpatient    PATIENT STATUS (FROM ED OR OR/PROCEDURAL) Inpatient         DVT Prophylaxis:   Diet: Diet NPO Effective Now  Code Status: Full Code      Dispo - admitted to [facility]       @[facility]@

## 2020-12-19 NOTE — PROGRESS NOTES
Will initiate Vancomycin with a one time loading dose of 2000 mg x1, followed by 1250 mg IV every 24 hours. Timing of trough level will be determined based on culture results, renal function, and clinical response. Thank you for the consult. Will continue to follow.     Bonita Altamirano [facility]     -12/19/2020 at 1:46 AM

## 2020-12-19 NOTE — ED PROVIDER NOTES
EMERGENCY DEPARTMENT ENCOUNTER    Pt Name: Morro Duran  MRN: [id number]  [place] 1945  Date of evaluation: 12/18/20  CHIEF COMPLAINT       Chief Complaint   Patient presents with    Abnormal Lab     Patient comes to the ER with a hemoglobin of 6.6, per her physician, to get a blood transfusion.  Shortness of Breath     Dx with pneumonia today by her physician after having a chest x-ray Thursday. Has not started her antibiotics. Says the drug store has yet to deliver it. HISTORY OF PRESENT ILLNESS   68-year-old female presents with complaint of shortness of breath as well as abnormal lab values. Was recently admitted for a GI bleed, patient states she followed up with her hematologist this week and had lab work drawn. Patient states she was called by her PCP today and was told that her hemoglobin is below and she needed to go to the ED. Patient states that over the last couple days she is also been feeling increasingly short of breath. Patient states that the shortness of breath is worse with exertion. Patient states she is wearing 2 L of oxygen at home normally and had to go up to 3 recently. Patient states she was recently diagnosed with pneumonia by her PCP as well. Patient has not started her antibiotics yet. Patient denies any chest pain at this time, does admit lower extremity swelling as well as orthopnea. Patient states that she has not noticed any change in her bowel habits. The history is provided by the patient. REVIEW OF SYSTEMS     Review of Systems   Constitutional: Negative for fever. HENT: Negative for congestion and ear pain. Eyes: Negative for pain. Respiratory: Positive for cough and shortness of breath. Cardiovascular: Positive for leg swelling. Negative for chest pain and palpitations. Gastrointestinal: Negative for abdominal pain. Genitourinary: Negative for dysuria and flank pain. Musculoskeletal: Negative for back pain. Skin: Negative for color change. Neurological: Negative for numbness and headaches. Psychiatric/Behavioral: Negative for confusion. All other systems reviewed and are negative.     PASTMEDICAL HISTORY     Past Medical History:   Diagnosis Date    A-fib [facility])     Acquired hypothyroidism     Acute encephalopathy     Acute [facility]     Anxiety     Benign hypertension with CKD (chronic kidney disease) stage III     Chronic back pain     CKD (chronic kidney disease) stage 3, GFR 30-59 ml/min     Constipation     COPD [facility]ch[facility] disease) ([facility]     Cough     Depression     ETOH abuse     Former smoker     3 packs a day for 26 years    HA (generalized anxiety disorder)     History of GI bleed     Hyperlipidemia     Hypertension     Hypothyroid 1/18/2013    Leg pain     Normocytic anemia     Osteoarthritis     PAD (peripheral artery disease) ([facility])     Primary osteoarthritis     Pulmonary hypertension ([facility]     Pure hypercholesterolemia     SIRS (systemic inflammatory response syndrome) ([facility])     Urge incontinence     Wheezing      Past Problem List  Patient Active Problem List   Diagnosis Code    Acquired hypothyroidism E03.9    Hypertension I10    Primary osteoarthritis of right knee M17.11    A-fib ([facility]) I48.91    Acute encephalopathy G93.40    SIRS (systemic inflammatory response syndrome) ([facility]) R65.10    Normocytic anemia D64.9    Pulmonary hypertension ([facility]) I27.20    COPD (chronic obstructive pulmonary disease) ([facility]) J44.9    History of GI bleed Z87.19    Constipation K59.00    HA (generalized anxiety disorder) F41.1    Lumbar radiculopathy M54.16    Lumbosacral spondylosis without myelopathy M47.817    Benign hypertension with CKD (chronic kidney disease) stage III I12.9, N18.30    CKD (chronic kidney disease) stage 3, GFR 30-59 ml/min N18.30    Alcohol withdrawal syndrome with complication ([facility]) P36.250  Moderate major depression ([facility]) F32.1    PAD (peripheral artery disease) ([facility]) I73.9    Acute kidney injury ([facility] N17.9    Obesity, Class I, BMI 30-34.9 E66.9    Acute on chronic diastolic CHF (congestive heart failure) ([facility]) I50.33    GI bleed K92.2    Weight loss R63.4    Elevated alkaline phosphatase level R74.8    Thrombocytosis ([facility]) D47.3    Iron deficiency anemia due to chronic blood loss D50.0    Iron malabsorption K90.9    Symptomatic anemia D64.9     SURGICAL HISTORY       Past Surgical History:   Procedure Laterality Date    COLONOSCOPY      COLONOSCOPY N/A 12/2/2020    COLONOSCOPY CONTROL OF BLEEDING performed by Santa Novak MD at [address] Bilateral     cataracts    JOINT REPLACEMENT      Left knee on 9-11-18    UPPER GASTROINTESTINAL ENDOSCOPY N/A 12/2/2020    EGD performed by Santa Novak MD at [address] [facility]       Previous Medications    ALBUTEROL (PROVENTIL) (2.5 MG/3ML) 0.083% NEBULIZER SOLUTION    Take 3 mLs by nebulization every 6 hours as needed for Wheezing    ALBUTEROL SULFATE HFA (PROAIR HFA) 108 (90 BASE) MCG/ACT INHALER    Inhale 2 puffs into the lungs every 6 hours as needed for Wheezing    ASCORBIC ACID (C-1000 PO)    Take 1 capsule by mouth daily     ATORVASTATIN (LIPITOR) 10 MG TABLET    TAKE 1 TABLET BY MOUTH ONCE DAILY    BACLOFEN (LIORESAL) 10 MG TABLET    TAKE ONE TABLET BY MOUTH THREE TIMES A DAY AS NEEDED. CLONAZEPAM (KLONOPIN) 0.5 MG TABLET    Take 1 tablet by mouth 3 times daily as needed for Anxiety for up to 30 days.     DILTIAZEM (CARDIZEM CD) 120 MG EXTENDED RELEASE CAPSULE    Take 1 capsule by mouth daily    DULOXETINE (CYMBALTA) 60 MG EXTENDED RELEASE CAPSULE    Take 1 capsule by mouth daily    ELIQUIS 5 MG TABS TABLET    TAKE ONE TABLET BY MOUTH TWICE A DAY    FERROUS SULFATE 325 (65 FE) MG TABLET    Take 325 mg by mouth daily FLUTICASONE (FLONASE) 50 MCG/ACT NASAL SPRAY    USE ONE SPRAY TO EACH NOSTRIL ONCE ADAY    FLUTICASONE (FLOVENT HFA) 220 MCG/ACT INHALER    Inhale 2 puffs into the lungs 2 times daily    FUROSEMIDE (LASIX) 40 MG TABLET    TAKE 1 TABLET BY MOUTH DAILY    HYDROCODONE-ACETAMINOPHEN (NORCO) 5-325 MG PER TABLET    Take 1 tablet by mouth every 8 hours as needed for Pain for up to 30 days. LEVALBUTEROL (XOPENEX HFA) 45 MCG/ACT INHALER    Inhale 1 puff into the lungs every 4 hours as needed for Wheezing    LEVOTHYROXINE (SYNTHROID) 125 MCG TABLET    Take 1 tablet by mouth daily    LIFT CHAIR MISC    Use daily for comfort    LISINOPRIL-HYDROCHLOROTHIAZIDE (PRINZIDE;ZESTORETIC) 10-12.5 MG PER TABLET    Take 1 tablet by mouth daily    METOPROLOL SUCCINATE (TOPROL XL) 25 MG EXTENDED RELEASE TABLET    TAKE 1 TABLET BY MOUTH DAILY. (PLEASE MAKE DR APPT FOR FUTURE REFILLS)    OMEPRAZOLE (PRILOSEC) 40 MG DELAYED RELEASE CAPSULE    TAKE 1 CAPSULE BY MOUTH EVERY MORNING BEFORE BREAKFAST    OXYGEN CONCENTRATOR    continuous    PROAIR  (90 BASE) MCG/ACT INHALER    Inhale 2 puffs into the lungs every 6 hours as needed for Wheezing    RESPIRATORY THERAPY SUPPLIES (NEBULIZER/TUBING/MOUTHPIECE) KIT    Use every 4-6 hours prn for copd    TAMSULOSIN (FLOMAX) 0.4 MG CAPSULE    TAKE 1 CAPSULE BY MOUTH DAILY     ALLERGIES     is allergic to tizanidine. FAMILY HISTORY     She indicated that her mother is . She indicated that her father is . She indicated that the status of her sister is unknown. She indicated that her brother is alive. She indicated that the status of her other is unknown.      SOCIAL HISTORY       Social History     Tobacco Use    Smoking status: Former Smoker     Packs/day: 3.00     Years: 26.00     Pack years: 78.00     Types: Cigarettes    Smokeless tobacco: Never Used   Substance Use Topics    Alcohol use: Yes     Comment: occ    Drug use: No     PHYSICAL EXAM INITIAL VITALS: BP (!) 124/56   Pulse 80   Temp 98.3 °F (36.8 °C) (Oral)   Resp 20   Ht 5' 5\" (1.651 m)   Wt 187 lb (84.8 kg)   SpO2 100%   BMI 31.12 kg/m²    Physical Exam  Vitals signs and nursing note reviewed. Constitutional:       General: She is not in acute distress. Appearance: Normal appearance. She is not toxic-appearing. HENT:      Head: Normocephalic and atraumatic. Nose: Nose normal.      Mouth/Throat:      Mouth: Mucous membranes are moist.      Pharynx: Oropharynx is clear. Eyes:      Extraocular Movements: Extraocular movements intact. Conjunctiva/sclera: Conjunctivae normal.   Neck:      Musculoskeletal: Normal range of motion. Cardiovascular:      Rate and Rhythm: Normal rate. Rhythm irregularly irregular. Pulses: Normal pulses. Heart sounds: Normal heart sounds. Pulmonary:      Effort: Pulmonary effort is normal.      Breath sounds: Normal breath sounds. Abdominal:      General: Bowel sounds are normal. There is no distension. Palpations: Abdomen is soft. Tenderness: There is no abdominal tenderness. Musculoskeletal: Normal range of motion. Right lower leg: Edema present. Left lower leg: Edema present. Skin:     General: Skin is warm and dry. Capillary Refill: Capillary refill takes less than 2 seconds. Neurological:      General: No focal deficit present. Mental Status: She is alert. Psychiatric:         Mood and Affect: Mood normal.         MEDICAL DECISION MAKIN-year-old female presents with complaint of shortness of breath as well as abnormal lab values. Initial exam patient is in no acute distress, will obtain sepsis work-up as well as Covid swab, patient will likely require admission. EKG reviewed showing A. fib, rate of 80 wound, normal axis, normal intervals, no significant ST segment elevation or depression, nonspecific T wave changes      RADIOLOGY:All plain film, CT, MRI, and formal ultrasound images (except ED bedside ultrasound) are read by the radiologist, see reports below, unless otherwisenoted in MDM or here. XR CHEST PORTABLE   Final Result   1. Left IJ central line in place, tip overlying the mid SVC   2. No evidence of a pneumothorax   3. Small left pleural effusion, and left basilar opacity, differential   considerations include atelectasis versus pneumonia           LABS: All lab results were reviewed by myself, and all abnormals are listed below.   Labs Reviewed   BRAIN NATRIURETIC PEPTIDE - Abnormal; Notable for the following components:       Result Value    Pro-BNP 4,104 (*)     All other components within normal limits   CBC WITH AUTO DIFFERENTIAL - Abnormal; Notable for the following components:    RBC 2.15 (*)     Hemoglobin 6.3 (*)     Hematocrit 20.4 (*)     MCHC 30.6 (*)     RDW 18.8 (*)     Seg Neutrophils 67 (*)     Lymphocytes 18 (*)     Monocytes 11 (*)     All other components within normal limits   COMPREHENSIVE METABOLIC PANEL W/ REFLEX TO MG FOR LOW K - Abnormal; Notable for the following components:    BUN 24 (*)     CREATININE 1.45 (*)     Potassium 3.5 (*)     CO2 32 (*)     Total Bilirubin 0.29 (*)     Total Protein 5.8 (*)     Alb 3.3 (*)     GFR Non- 35 (*)     GFR  43 (*)     All other components within normal limits   C-REACTIVE PROTEIN - Abnormal; Notable for the following components:    CRP 19.1 (*)     All other components within normal limits   PROCALCITONIN - Abnormal; Notable for the following components:    Procalcitonin 0.11 (*)     All other components within normal limits   PROTIME-INR - Abnormal; Notable for the following components:    Protime 18.0 (*)     All other components within normal limits DISPOSITION Admitted 12/19/2020 02:05:03 AM      PATIENT REFERRED TO:  No follow-up provider specified.   DISCHARGE MEDICATIONS:  New Prescriptions    No medications on file     Jackson Hope DO  Attending Emergency Physician                  Jackson Hope DO  12/19/20 0057

## 2020-12-19 NOTE — CARE COORDINATION
CASE MANAGEMENT NOTE:    Admission Date:  12/18/2020 Cristy Greenberg is a 70 y.o.  female    Admitted for : Symptomatic anemia [D64.9]    Met with:  Patient    PCP:  James Alvarez NP                                Insurance:  [facility] Medicare      Current Residence/ Living Arrangements:  independently at home alone and drives. Current Services PTA:  Yes, current with [facility] and would like resumed. Is patient agreeable to [facility]: Yes    Freedom of choice provided:  Yes    List of [address] provided: No- current with [facility] and satisfied with their care. [facility] chosen:  [facility]. Referral faxed to notify of pt's admission    DME:  straight cane, walker and hospital bed    Home Oxygen: Yes 2L NC 24/7    Nebulizer: Yes    CPAP/BIPAP: No    Supplier: [employer]    Potential Assistance Needed: resume [facility]. Pt states [employer] is involved and is working on getting services set up. SNF needed: No    Freedom of choice and list provided: NA    Pharmacy:  [address] on [facility]       Does Patient want to use MEDS to BEDS? No    Is patient currently receiving oral anticoagulation therapy? Yes - Eliquis    Is the Patient an [facility] with Readmission Risk Score greater than 14%? No  If yes, pt needs a follow up appointment made within 7 days. Family Members/Caregivers that pt would like involved in their care:    Yes    If yes, list name here:  Kenneth Francis    Transportation Provider:  Patient and Family                     Discharge Plan:  Home with [facility].                  Electronically signed by: Emi Ochoa RN on 12/19/2020 at 1:07 PM

## 2020-12-19 NOTE — PLAN OF CARE
Problem: Falls - Risk of:  Goal: Will remain free from falls  Description: Will remain free from falls  Outcome: Ongoing  Note: Pt assessed as a fall risk this shift. Remains free from falls and accidental injury at this time. Fall precautions in place, including falling star sign and fall risk band on pt. Floor free from obstacles, and bed is locked and in lowest position. Adequate lighting provided. Pt encouraged to call before getting OOB for any need. Bed alarm activated. Will continue to monitor needs during hourly rounding, and reinforce education on use of call light.        Problem: Bleeding:  Goal: Will show no signs and symptoms of excessive bleeding  Description: Will show no signs and symptoms of excessive bleeding  Outcome: Ongoing  Note: Pt admitted with low Hgb of 6.3, pt received 1 unit of blood, waiting on recheck

## 2020-12-19 NOTE — ED NOTES
Shasta Baker RN 2 unsuccessful IV attempts. Bruna Wetzel RN 2 unsuccessful IV attempts. yMrna Garcia 2 unsuccessful IV attempts. Will consult Dr. Maryann Jeffers.      Chandrika Pagan RN  12/18/20 5722

## 2020-12-19 NOTE — PROGRESS NOTES
Physical Therapy    Facility/Department: [facility] PROGRESSIVE CARE  Initial Assessment    NAME: Adin Garza  : 1945  MRN: [id number]    Date of Service: 2020    Discharge Recommendations:  Patient would benefit from continued therapy after discharge, Continue to assess pending progress   PT Equipment Recommendations  Equipment Needed: No    Assessment   Body structures, Functions, Activity limitations: Decreased functional mobility ; Decreased ADL status; Decreased strength;Decreased endurance;Decreased posture; Increased pain;Decreased balance  Assessment: Pt most limited by endurance this date. Pt has increased shortness of breath with activity in room. Pt's O2 sats WNL. Pt would benefit from continued PT. Will continue to assess. Treatment Diagnosis: Impaired functional mobility 2* anemia  Specific instructions for Next Treatment: check Hgb, progress gait (SBQC vs walker), platform step, HEP, endurance activities  Prognosis: Good  Decision Making: Medium Complexity  History: 17-year-old female presents with complaint of shortness of breath as well as abnormal lab values. Was recently admitted for a GI bleed, patient states she followed up with her hematologist this week and had lab work drawn. Patient states she was called by her PCP today and was told that her hemoglobin is below and she needed to go to the ED. Patient states that over the last couple days she is also been feeling increasingly short of breath. Patient states that the shortness of breath is worse with exertion. Patient states she is wearing 2 L of oxygen at home normally and had to go up to 3 recently. Patient states she was recently diagnosed with pneumonia by her PCP as well. Exam: ROM, MMT, bed mobility, transfers, amb, balance, activity tolerance  Clinical Presentation: Pt alert, cooperative, pleasant, talkative.   Barriers to Learning: none  REQUIRES PT FOLLOW UP: Yes  Activity Tolerance Activity Tolerance: Patient limited by endurance  Other Comments  Comments: Micah Gardner removed - pt educated on  use of toilet with nursing staff to promote mobility and healthy bowel/bladder program.       Patient Diagnosis(es): The primary encounter diagnosis was Symptomatic anemia. A diagnosis of Pneumonia due to organism was also pertinent to this visit. has a past medical history of A-fib ([address] Acquired hypothyroidism, Acute encephalopathy, Acute kidney injury ([address] Anxiety, Benign hypertension with CKD (chronic kidney disease) stage III, Chronic back pain, CKD (chronic kidney disease) stage 3, GFR 30-59 ml/min, Constipation, COPD (chronic obstructive pulmonary disease) ([address] Cough, Depression, ETOH abuse, Former smoker, HA (generalized anxiety disorder), History of GI bleed, Hyperlipidemia, Hypertension, Hypothyroid, Leg pain, Normocytic anemia, Osteoarthritis, PAD (peripheral artery disease) ([address] Primary osteoarthritis, Pulmonary hypertension ([address] Pure hypercholesterolemia, SIRS (systemic inflammatory response syndrome) ([address] Urge incontinence, and Wheezing. has a past surgical history that includes eye surgery (Bilateral); Colonoscopy; joint replacement; Upper gastrointestinal endoscopy (N/A, 12/2/2020); and Colonoscopy (N/A, 12/2/2020).     Restrictions  Restrictions/Precautions  Restrictions/Precautions: General Precautions, Fall Risk(Fell last week after letting dogs out)  Required Braces or Orthoses?: No  Implants present? : Metal implants(L TKA)  Position Activity Restriction  Other position/activity restrictions: up as tolerated  Vision/Hearing  Vision: Impaired  Vision Exceptions: Wears glasses at all times  Hearing: Within functional limits     Subjective  General  Chart Reviewed: Yes  Patient assessed for rehabilitation services?: Yes  Additional Pertinent Hx: COPD, Afib, L TKA, 2-3 L O2 at home  Family / Caregiver Present: No  Referring Practitioner: Jerald Mendez MD Referral Date : 12/19/20  Diagnosis: symptomatic anemia  Follows Commands: Within Functional Limits  Subjective  Subjective: Pt very pleasant and talkative. Agreeable to PT. WOODY Rees. Pt's Hgb is 7.6 today  Pain Screening  Patient Currently in Pain: Yes  Pain Assessment  Pain Assessment: 0-10  Pain Level: 5  Pain Type: Chronic pain  Pain Location: Back  Pain Descriptors: Aching; Sore  Pain Frequency: Continuous  Pain Onset: On-going  Clinical Progression: Not changed  Functional Pain Assessment: Activities are not prevented  Non-Pharmaceutical Pain Intervention(s): Ambulation/Increased Activity; Distraction;Repositioned; Rest  Response to Pain Intervention: Patient Satisfied  Vital Signs  Patient Currently in Pain: Yes  Oxygen Therapy  SpO2: 100 %  O2 Device: Nasal cannula  O2 Flow Rate (L/min): 1 L/min  Patient Observation  Observations: O2 sats dropped to 94% post amb on 1L, B LE edema/erythema, audible wheezing toward end of session       Orientation  Orientation  Overall Orientation Status: Within Functional Limits  Social/Functional History  Social/Functional History  Lives With: Alone(w/ 2 dogs)  Type of Home: House  Home Layout: One level  Home Access: Stairs to enter with rails  Entrance Stairs - Number of Steps: 1 small threshold  Entrance Stairs - Rails: Left(grab bar)  Bathroom Shower/Tub: Tub/Shower unit, Curtain  Bathroom Toilet: Standard  Bathroom Equipment: Shower chair, Grab bars in shower, Grab bars around toilet  Bathroom Accessibility: Accessible  Home Equipment: Oxygen, Quad cane, Rolling walker, 4 wheeled walker, Hospital bed(in process of getting alert button this week)  ADL Assistance: [facility] [address]: Independent  Homemaking Responsibilities: Yes  Ambulation Assistance: Independent(uses SBQC - walker too large for home)  Transfer Assistance: Independent  Active : Yes  Mode of Transportation: [facility]  Occupation: Retired Type of occupation: customer service,   IADL Comments: sleeps in chair due to back  Additional Comments: Son lives 3 blocks away - works at [employer] full time first shift. Daughter in law works in [employer]. 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 Pt reports [employer] working on getting her an aide to assist with cleaning. Cognition        Objective          AROM RLE (degrees)  RLE AROM: WFL  AROM LLE (degrees)  LLE AROM : WFL  AROM RUE (degrees)  RUE AROM : WFL  AROM LUE (degrees)  LUE AROM : WFL  Strength RLE  Strength RLE: WFL  Comment: Grossly 4-/5  Strength LLE  Strength LLE: WFL  Comment: Grossly 4-/5  Strength RUE  Strength RUE: WFL  Strength LUE  Strength LUE: WFL     Sensation  Overall Sensation Status: Impaired(numbness and tingling in B hands/feet)  Bed mobility  Rolling to Right: Stand by assistance  Supine to Sit: Stand by assistance  Sit to Supine: Unable to assess  Scooting: Stand by assistance  Comment: SBA with HOB elevated - pt sleeps in recliner chair due to back. Pt denies dizzinesss. pt up in chair at end of session. Transfers  Sit to Stand: Contact guard assistance  Stand to sit: Contact guard assistance  Bed to Chair: Contact guard assistance;Stand by assistance  Comment: CGA/SBA for transfers with [facility]. Ambulation  Ambulation?: Yes  Ambulation 1  Surface: level tile  Device: Small Prabhjot Grzegorz  Other Apparatus: O2(1L)  Assistance: Stand by assistance;Contact guard assistance  Quality of Gait: increased lateral sway, slow kraen, no LOB  Gait Deviations: Slow Karen  Distance: 30'  Comments: Pt using SBQC appropriately. No LOB. Pt having some audible wheezing/shortness of breathe post amb. O2 sats 94% on 1L. HR slightly elevated.   Stairs/Curb  Stairs?: No     Balance  Posture: Fair  Sitting - Static: Good;-  Sitting - Dynamic: Good;-  Standing - Static: Fair;+  Standing - Dynamic: Fair Comments: Fall risk, standing balance with SBQC. Pt may benefit from use of rollator/RW instead - however pt unable to advance walker through home. Plan   Plan  Times per week: 5-6x/week  Specific instructions for Next Treatment: check Hgb, progress gait (SBQC vs walker), platform step, HEP, endurance activities  Current Treatment Recommendations: Strengthening, ROM, Balance Training, Functional Mobility Training, Transfer Training, Endurance Training, Gait Training, Stair training, Equipment Evaluation, Education, & procurement, Patient/Caregiver Education & Training, Safety Education & Training, Home Exercise Program  Safety Devices  Type of devices: All fall risk precautions in place, Call light within reach, Gait belt, Patient at risk for falls, Left in chair, Nurse notified(WOODY Toribio)    G-Code       OutComes Score                                                  AM-PAC Score     AM-PAC Inpatient Mobility without Stair Climbing Raw Score : 15 (12/19/20 1320)  AM-PAC Inpatient without Stair Climbing T-Scale Score : 43.03 (12/19/20 1320)  Mobility Inpatient CMS 0-100% Score: 47.43 (12/19/20 1320)  Mobility Inpatient without Stair CMS G-Code Modifier : CK (12/19/20 1320)       Goals  Short term goals  Time Frame for Short term goals: pt to demo bed mobility Mod I. Short term goal 1: Pt to demo transfers Mod I. Short term goal 2: Pt to amb 75'-100' Mod I. Short term goal 3: pt to ascend/descend 1 threshold for home entry, SBA/CGA. Short term goal 4: pt to demo good technique for HEP with B LE strengthening by 1/2 MMG. Short term goal 5: Pt to tolerate 30-45 minute PT session with O2 sats WNL.    Patient Goals   Patient goals : 4-5 days       Therapy Time   Individual Concurrent Group Co-treatment   Time In 1320         Time Out 1356         Minutes 36         Timed Code Treatment Minutes: [address], PT

## 2020-12-19 NOTE — PROGRESS NOTES
RN notified Dr. Isabel Hill team of new consult via [facility], message was sent to on call doctor Ruth Glover.

## 2020-12-19 NOTE — PROGRESS NOTES
Pt arrived to floor via stretcher from ED and was transfered to bed. Vitals taken. No distress noted. Call light within reach, and pt educated on its use. Bed in lowest position, and locked. Side rails up x 2. Denied further questions or needs at this time.

## 2020-12-19 NOTE — CONSULTS
[facility]   CONSULT NOTE:      DATE OF CONSULT 12/19/2020    REASON FOR CONSULTATION:  Dyspnea      PCP WILLIE Sullivan CNP     CHIEF COMPLAINT: Dyspnea    HISTORY OF PRESENT ILLNESS:   Mishel Lewis is a pleasant 68-year-old female who has complained of shortness of breath for \"years\" but recently over the last month she has noticed increasing dyspnea especially with exertion, lower extremity edema, and inability to lay flat. She was found to be anemic on routine blood work and subsequently was admitted. Her hemoglobin was 6.6. She reports no bleeding. She stated that her problems began several years ago when she had a knee replacement and was found to be anemic and was given 3 units of packed red blood cells. He carries a diagnosis of \"COPD\" but she is never had pulmonary function test.  She used to be a heavy smoker 3 to 4 packs a day for about 20 years but quit 36 years ago. She never had a history of asthma. She admits to not taking her inhalers or nebulizers regularly. Denies any chest pain, palpitations, nausea, vomiting, or abdominal pain. She denies any change in her sense of taste or smell. Her proBNP was elevated on admission at 3000 and states 4000. Says that she has a cough but it is for the most part clear. She denies any hemoptysis. She does complain of postnasal drip. Echo done in July showed a normal EF of 55% but an RVSP of 49.       ALLERGIES:  Allergies   Allergen Reactions    Tizanidine Other (See Comments)     Patient states it makes her loopy       HOME MEDICATIONS:  Medications Prior to Admission: tamsulosin (FLOMAX) 0.4 MG capsule, TAKE 1 CAPSULE BY MOUTH DAILY  omeprazole (PRILOSEC) 40 MG delayed release capsule, TAKE 1 CAPSULE BY MOUTH EVERY MORNING BEFORE BREAKFAST  DULoxetine (CYMBALTA) 60 MG extended release capsule, Take 1 capsule by mouth daily furosemide (LASIX) 40 MG tablet, TAKE 1 TABLET BY MOUTH DAILY (Patient taking differently: Take 40 mg by mouth daily Currently taking 2 tablets daily  12/18/20)  baclofen (LIORESAL) 10 MG tablet, TAKE ONE TABLET BY MOUTH THREE TIMES A DAY AS NEEDED. HYDROcodone-acetaminophen (NORCO) 5-325 MG per tablet, Take 1 tablet by mouth every 8 hours as needed for Pain for up to 30 days. lisinopril-hydroCHLOROthiazide (PRINZIDE;ZESTORETIC) 10-12.5 MG per tablet, Take 1 tablet by mouth daily  ELIQUIS 5 MG TABS tablet, TAKE ONE TABLET BY MOUTH TWICE A DAY  levothyroxine (SYNTHROID) 125 MCG tablet, Take 1 tablet by mouth daily  metoprolol succinate (TOPROL XL) 25 MG extended release tablet, TAKE 1 TABLET BY MOUTH DAILY. (PLEASE MAKE DR APPT FOR FUTURE REFILLS)  clonazePAM (KLONOPIN) 0.5 MG tablet, Take 1 tablet by mouth 3 times daily as needed for Anxiety for up to 30 days.   fluticasone (FLOVENT HFA) 220 MCG/ACT inhaler, Inhale 2 puffs into the lungs 2 times daily  albuterol (PROVENTIL) (2.5 MG/3ML) 0.083% nebulizer solution, Take 3 mLs by nebulization every 6 hours as needed for Wheezing  dilTIAZem (CARDIZEM CD) 120 MG extended release capsule, Take 1 capsule by mouth daily  albuterol sulfate HFA (PROAIR HFA) 108 (90 Base) MCG/ACT inhaler, Inhale 2 puffs into the lungs every 6 hours as needed for Wheezing  fluticasone (FLONASE) 50 MCG/ACT nasal spray, USE ONE SPRAY TO EACH NOSTRIL ONCE ADAY  atorvastatin (LIPITOR) 10 MG tablet, TAKE 1 TABLET BY MOUTH ONCE DAILY  Ascorbic Acid (C-1000 PO), Take 1 capsule by mouth daily   ferrous sulfate 325 (65 Fe) MG tablet, Take 325 mg by mouth daily   Oxygen Concentrator, continuous  Lift Chair MISC, Use daily for comfort (Patient not taking: Reported on 12/18/2020)  PROAIR  (90 Base) MCG/ACT inhaler, Inhale 2 puffs into the lungs every 6 hours as needed for Wheezing  levalbuterol (XOPENEX HFA) 45 MCG/ACT inhaler, Inhale 1 puff into the lungs every 4 hours as needed for Wheezing Respiratory Therapy Supplies (NEBULIZER/TUBING/MOUTHPIECE) KIT, Use every 4-6 hours prn for copd      PAST MEDICAL HISTORY:  Past Medical History:   Diagnosis Date    [facility]     Acquired hypothyroidism     Acute encephalopathy     [facility]     Anxiety     Benign hypertension with CKD (chronic kidney disease) stage III     Chronic back pain     CKD (chronic kidney disease) stage 3, GFR 30-59 ml/min     Constipation     COPD [facility]) [facility]     Cough     Depression     ETOH abuse     Former smoker     3 packs a day for 26 years    HA (generalized anxiety disorder)     History of GI bleed     Hyperlipidemia     Hypertension     Hypothyroid 1/18/2013    Leg pain     Normocytic anemia     Osteoarthritis     PAD (peripheral artery disease) ([facility]     Primary osteoarthritis     [facility]     Pure hypercholesterolemia     SIRS (systemic inflammatory response syndrome) ([facility])     Urge incontinence     Wheezing        PAST SURGICAL HISTORY:  Past Surgical History:   Procedure Laterality Date    COLONOSCOPY      COLONOSCOPY N/A 12/2/2020    COLONOSCOPY CONTROL OF BLEEDING performed by Jason Goldsmith MD at [address] [place] [address] Bilateral     cataracts    JOINT REPLACEMENT      Left knee on 9-11-18    UPPER GASTROINTESTINAL ENDOSCOPY N/A 12/2/2020    EGD performed by Jason Goldsmith MD at [facility]:  Social History     Socioeconomic History    Marital status:      Spouse name: Not on file    Number of children: Not on file    Years of education: Not on file    Highest education level: Not on file   Occupational History    Not on file   Social Needs    Financial resource strain: Not on file    Food insecurity     Worry: Not on file     Inability: Not on file    Transportation needs     Medical: Not on file     Non-medical: Not on file   Tobacco Use    Smoking status: Former Smoker Packs/day: 3.00     Years: 26.00     Pack years: 78.00     Types: Cigarettes    Smokeless tobacco: Never Used   Substance and Sexual Activity    Alcohol use: Yes     Comment: occ    Drug use: No    Sexual activity: Not on file   Lifestyle    Physical activity     Days per week: Not on file     Minutes per session: Not on file    Stress: Not on file   Relationships    Social connections     Talks on phone: Not on file     Gets together: Not on file     Attends [employer] service: Not on file     Active member of club or organization: Not on file     Attends meetings of clubs or organizations: Not on file     Relationship status: Not on file    Intimate partner violence     Fear of current or ex partner: Not on file     Emotionally abused: Not on file     Physically abused: Not on file     Forced sexual activity: Not on file   Other Topics Concern    Not on file   Social History Narrative    Not on file       FAMILY HISTORY:  Family History   Problem Relation Age of Onset    Heart Disease Mother     COPD Mother     Emphysema Sister     Cancer Other         Cousin - breast cancer       REVIEW OF SYSTEMS:  All other systems reviewed and are negative. PHYSICAL EXAM:  Vital Signs Blood pressure (!) 109/54, pulse 83, temperature 98.1 °F (36.7 °C), temperature source Oral, resp. rate 18, height 5' 5\" (1.651 m), weight 193 lb 2 oz (87.6 kg), SpO2 100 %. Oxygen Amount and Delivery: O2 Flow Rate (L/min): 2 L/min    Admission Weight Weight: 187 lb (84.8 kg)    General Appearance   Pleasant elderly female in no acute respiratory distress  Head  Normocephalic, without obvious abnormality, atraumatic    Eyes  conjunctivae/corneas clear. PERRL, EOM's intact. Fundi benign.     ENT clear  Neck  no adenopathy, no carotid bruit, no JVD, supple, symmetrical, trachea midline and thyroid not enlarged, symmetric, no tenderness/mass/nodules  Lungs bibasilar crackles no wheezes or rhonchi Heart: regular rate and rhythm, S1, S2 normal, no murmur, click, rub or gallop  Abdomen  soft, non-tender; bowel sounds normal; no masses,  no organomegaly  Extremities  1+ lower extremity edema  Skin  Skin color, texture, turgor normal. No rashes or lesions  Neurologic: Alert and oriented X 3, normal strength and tone.          Imaging      Lab Review  CBC     Lab Results   Component Value Date    WBC 7.6 12/19/2020    RBC 2.45 12/19/2020    RBC 4.50 02/18/2012    HGB 7.6 12/19/2020    HCT 23.3 12/19/2020     12/19/2020     02/18/2012    MCV 95.2 12/19/2020    MCH 31.2 12/19/2020    MCHC 32.7 12/19/2020    RDW 17.9 12/19/2020    LYMPHOPCT 18 12/19/2020    MONOPCT 11 12/19/2020    BASOPCT 1 12/19/2020    MONOSABS 0.60 12/19/2020    LYMPHSABS 1.00 12/19/2020    EOSABS 0.20 12/19/2020    BASOSABS 0.10 12/19/2020    DIFFTYPE NOT REPORTED 12/19/2020       BMP   Lab Results   Component Value Date     12/19/2020    K 4.1 12/19/2020     12/19/2020    CO2 31 12/19/2020    BUN 21 12/19/2020    CREATININE 1.24 12/19/2020    GLUCOSE 115 12/19/2020    GLUCOSE 113 02/18/2012    CALCIUM 8.2 12/19/2020       LFTS  Lab Results   Component Value Date    ALKPHOS 102 12/19/2020    ALT 10 12/19/2020    AST 14 12/19/2020    PROT 5.8 12/19/2020    BILITOT 0.29 12/19/2020    BILIDIR 0.08 10/10/2012    IBILI 0.19 10/10/2012    LABALBU 3.3 12/19/2020    LABALBU 4.5 02/18/2012       INR  Recent Labs     12/19/20  0040   PROTIME 18.0*   INR 1.5       APTT  Recent Labs     12/19/20  0040   APTT 36.9*       Lactic Acid  Lab Results   Component Value Date    LACTA 0.6 12/18/2020    LACTA 0.8 09/13/2018        PRO-BNP   Recent Labs     12/18/20  1310 12/19/20  0040   PROBNP 3,163* 4,104*           ABGs:   Lab Results   Component Value Date    PHART 7.330 09/13/2018    PO2ART 74.8 09/13/2018    OZE1UFE 47.3 09/13/2018       Lab Results   Component Value Date    MODE NOT REPORTED 09/13/2018         Impression Dyspnea secondary to severe anemia  Appears to be fluid overloaded on clinical exam and by x-ray as well as elevated proBNP  Clinically, she does not appear to have severe COPD  Chronic hypoxic respiratory failure      Plan:      Agree with transfusion to keep hemoglobin greater than 7  Would start diuresing her with IV Lasix  Do not feel that she has pneumonia  Discontinue antibiotics and observe  Will need work-up as an outpatient including pulmonary function testing  Suggest cardiology consult  Anemia work-up per hematology

## 2020-12-19 NOTE — DISCHARGE INSTR - COC
Continuity of Care Form    Patient Name: Shyam Elizabeth   :  1945  MRN:  [id number]    Admit date:  2020  Discharge date:  2020  Code Status Order: Full Code   Advance Directives:   [place] Care Flowsheet Documentation       Date/Time Healthcare Directive Type of Healthcare Directive Copy in [address] Agent's Name Healthcare Agent's Phone Number    [phone]  No, patient does not have an advance directive for healthcare treatment -- -- -- -- --            Admitting Physician:  Enriqueta Bourgeois MD  PCP: WILLIE Tomlinson - CNP    Discharging Nurse: KATRIN [facility] Unit/Room#: 2097/2097-01  Discharging Unit Phone Number: [phone]      Emergency Contact:   Extended Emergency Contact Information  Primary Emergency Contact: Radha Edouard [address] Phone: [phone]  Relation: Child   needed?  No    Past Surgical History:  Past Surgical History:   Procedure Laterality Date    COLONOSCOPY      COLONOSCOPY N/A 2020    COLONOSCOPY CONTROL OF BLEEDING performed by Ilsa Aase, MD at [address] [place] [address] Bilateral     cataracts    JOINT REPLACEMENT      Left knee on 18    UPPER GASTROINTESTINAL ENDOSCOPY N/A 2020    EGD performed by Ilsa Aase, MD at [facility]       Immunization History:   Immunization History   Administered Date(s) Administered    Influenza Vaccine, unspecified formulation 2018    Influenza Virus Vaccine 2017    Influenza, High Dose (Fluzone 65 yrs and older) 10/19/2012    Influenza, Quadv, IM, PF (6 mo and older Fluzone, Flulaval, Fluarix, and 3 yrs and older Afluria) 2020    Influenza, Triv, inactivated, subunit, adjuvanted, IM (Fluad 65 yrs and older) 2019    Pneumococcal Conjugate 13-valent (Thicugo74) 2018    Pneumococcal Polysaccharide ([id number]) 2015    Tetanus 1998    Tetanus Toxoid, absorbed 1998  Zoster Recombinant (Shingrix) 01/31/2020, 02/01/2020       Active Problems:  Patient Active Problem List   Diagnosis Code    Acquired hypothyroidism E03.9    Hypertension I10    Primary osteoarthritis of right knee M17.11    A-fib ([facility]) I48.91    Acute encephalopathy G93.40    SIRS (systemic inflammatory response syndrome) ([facility]) R65.10    Normocytic anemia D64.9    Pulmonary hypertension ([facility]) I27.20    COPD (chronic obstructive pulmonary disease) ([facility]) J44.9    History of GI bleed Z87.19    Constipation K59.00    HA (generalized anxiety disorder) F41.1    Lumbar radiculopathy M54.16    Lumbosacral spondylosis without myelopathy M47.817    Benign hypertension with CKD (chronic kidney disease) stage III I12.9, N18.30    CKD (chronic kidney disease) stage 3, GFR 30-59 ml/min N18.30    Alcohol withdrawal syndrome with complication ([facility]) U45.402    Moderate major depression ([facility]) F32.1    PAD (peripheral artery disease) ([facility]) I73.9    Acute kidney injury ([facility]) N17.9    Obesity, Class I, BMI 30-34.9 E66.9    Acute on chronic diastolic CHF (congestive heart failure) ([facility]) I50.33    GI bleed K92.2    Weight loss R63.4    Elevated alkaline phosphatase level R74.8    Thrombocytosis ([facility]) D47.3    Iron deficiency anemia due to chronic blood loss D50.0    Iron malabsorption K90.9    Symptomatic anemia D64.9    Pneumonia due to organism J18.9       Isolation/Infection:   Isolation            No Isolation          Patient Infection Status       Infection Onset Added Last Indicated Last Indicated By Review Planned Expiration Resolved Resolved By    None active    Resolved    COVID-19 Rule Out 12/18/20 12/18/20 12/19/20 COVID-19 (Ordered)   12/19/20 Rule-Out Test Resulted            Nurse Assessment:  Last Vital Signs: BP (!) 115/59   Pulse 88   Temp 98.2 °F (36.8 °C) (Oral)   Resp 16   Ht 5' 5\" (1.651 m)   Wt 193 lb 2 oz (87.6 kg)   SpO2 99%   BMI 32.14 kg/m² Last documented pain score (0-10 scale): Pain Level: 0  Last Weight:   Wt Readings from Last 1 Encounters:   12/19/20 193 lb 2 oz (87.6 kg)     Mental Status:  alert and confused at times     IV Access:  - None    Nursing Mobility/ADLs:  Walking   Assisted  Transfer  Assisted  Bathing  Assisted  Dressing  Assisted  Toileting  Assisted  Feeding  Assisted  Med Admin  Assisted  Med Delivery   whole    Wound Care Documentation and Therapy:        Elimination:  Continence:   · Bowel: Yes  · Bladder: Yes  Urinary Catheter: None   Colostomy/Ileostomy/Ileal Conduit: No       Date of Last BM: 12/28      Intake/Output Summary (Last 24 hours) at 12/19/2020 1306  Last data filed at 12/19/2020 1051  Gross per 24 hour   Intake 1080 ml   Output 500 ml   Net 580 ml     I/O last 3 completed shifts: In: 270 [Blood:270]  Out: -     Safety Concerns:     History of Falls (last 30 days)    Impairments/Disabilities:      None    Nutrition Therapy:  Current Nutrition Therapy:   - Oral Diet:  General    Routes of Feeding: Oral  Liquids: No Restrictions  Daily Fluid Restriction: no  Last Modified Barium Swallow with Video (Video Swallowing Test): not done    Treatments at the Time of Hospital Discharge:   Respiratory Treatments: n/a    Oxygen Therapy:  is on oxygen at 2 L/min per nasal cannula. Ventilator:    - No ventilator support    Rehab Therapies: Physical Therapy and Occupational Therapy  Weight Bearing Status/Restrictions: No weight bearing restirctions  Other Medical Equipment (for information only, NOT a DME order):     Other Treatments: skilled nursing assessment, medication education and monitoring, resume previous orders    Patient's personal belongings (please select all that are sent with patient):  Glasses    RN SIGNATURE:  Electronically signed by Michael Jang RN on 12/29/20 at 6:17 PM EST    CASE MANAGEMENT/SOCIAL WORK SECTION    Inpatient Status Date: 12/19/20    Readmission Risk Assessment Score:  Readmission Risk Risk of Unplanned Readmission:        29           Discharging to [facility]  [phone]  ? [address]  ? [address]  ? [address] [address]  ? Phone [phone] Fax  [phone]  ·   ·     / signature: Electronically signed by Apoorva James RN on 12/19/20 at 1:06 PM EST    PHYSICIAN SECTION    Prognosis: Fair    Condition at Discharge: Stable    Rehab Potential (if transferring to Rehab): Fair    Recommended Labs or Other Treatments After Discharge:     Physician Certification: I certify the above information and transfer of Yeimi Victoria  is necessary for the continuing treatment of the diagnosis listed and that she requires [facility] for less 30 days.      Update Admission H&P: No change in H&P    PHYSICIAN SIGNATURE:  Electronically signed by Kun Mcrae MD on 12/27/20 at 12:52 PM EST

## 2020-12-19 NOTE — FLOWSHEET NOTE
12/19/20 0503   Provider Notification   Reason for Communication Evaluate   Provider Name Dr. Cynthia Velarde   Provider Notification Physician   Method of Communication Secure Message   Response Waiting for response   Notification Time 0503     RN notified Dr. Cynthia Velarde of pt's arrival to unit and asked for comprehensive orders.

## 2020-12-19 NOTE — CONSULTS
Past Medical History:   has a past medical history of A-fib ([facility] Acquired hypothyroidism, Acute encephalopathy, Acute kidney injury ([facility] Anxiety, Benign hypertension with CKD (chronic kidney disease) stage III, Chronic back pain, CKD (chronic kidney disease) stage 3, GFR 30-59 ml/min, Constipation, COPD (chronic obstructive pulmonary disease) ([facility] Cough, Depression, ETOH abuse, Former smoker, HA (generalized anxiety disorder), History of GI bleed, Hyperlipidemia, Hypertension, Hypothyroid, Leg pain, Normocytic anemia, Osteoarthritis, PAD (peripheral artery disease) ([facility] Primary osteoarthritis, Pulmonary hypertension ([facility] Pure hypercholesterolemia, SIRS (systemic inflammatory response syndrome) ([facility] Urge incontinence, and Wheezing. Past Surgical History:   has a past surgical history that includes eye surgery (Bilateral); Colonoscopy; joint replacement; Upper gastrointestinal endoscopy (N/A, 12/2/2020); and Colonoscopy (N/A, 12/2/2020). Medications:    Prior to Admission medications    Medication Sig Start Date End Date Taking?  Authorizing Provider   tamsulosin (FLOMAX) 0.4 MG capsule TAKE 1 CAPSULE BY MOUTH DAILY 12/15/20  Yes WILLIE Albert - CNP   omeprazole (PRILOSEC) 40 MG delayed release capsule TAKE 1 CAPSULE BY MOUTH EVERY MORNING BEFORE BREAKFAST 12/14/20  Yes Jessica Aquino APRDAGMAR - CNP   DULoxetine (CYMBALTA) 60 MG extended release capsule Take 1 capsule by mouth daily 12/14/20  Yes WILLIE Albert - CNP   furosemide (LASIX) 40 MG tablet TAKE 1 TABLET BY MOUTH DAILY  Patient taking differently: Take 40 mg by mouth daily Currently taking 2 tablets daily  12/18/20 11/30/20  Yes Jessica Aquino APRN - CNP   baclofen (LIORESAL) 10 MG tablet TAKE ONE TABLET BY MOUTH THREE TIMES A DAY AS NEEDED. 11/30/20  Yes WILLIE Albert - BOBY HYDROcodone-acetaminophen (NORCO) 5-325 MG per tablet Take 1 tablet by mouth every 8 hours as needed for Pain for up to 30 days. 11/30/20 12/30/20 Yes WILLIE Sullivan CNP   lisinopril-hydroCHLOROthiazide (PRINZIDE;ZESTORETIC) 10-12.5 MG per tablet Take 1 tablet by mouth daily 11/30/20  Yes WILLIE Sullivan CNP   ELIQUIS 5 MG TABS tablet TAKE ONE TABLET BY MOUTH TWICE A DAY 11/30/20  Yes WILLIE Sullivan CNP   levothyroxine (SYNTHROID) 125 MCG tablet Take 1 tablet by mouth daily 11/30/20  Yes WILLIE Sullivan CNP   metoprolol succinate (TOPROL XL) 25 MG extended release tablet TAKE 1 TABLET BY MOUTH DAILY. (PLEASE MAKE DR HER FOR FUTURE REFILLS) 11/23/20  Yes WILLIE Sullivan CNP   clonazePAM (KLONOPIN) 0.5 MG tablet Take 1 tablet by mouth 3 times daily as needed for Anxiety for up to 30 days.  11/17/20 12/19/20 Yes WILLIE Sullivan CNP   fluticasone (FLOVENT HFA) 220 MCG/ACT inhaler Inhale 2 puffs into the lungs 2 times daily 11/17/20  Yes WILLIE Sullivan CNP   albuterol (PROVENTIL) (2.5 MG/3ML) 0.083% nebulizer solution Take 3 mLs by nebulization every 6 hours as needed for Wheezing 11/5/20  Yes WILLIE Sullivan CNP   dilTIAZem (CARDIZEM CD) 120 MG extended release capsule Take 1 capsule by mouth daily 10/19/20  Yes WILLIE Sullivan CNP   albuterol sulfate HFA (PROAIR HFA) 108 (90 Base) MCG/ACT inhaler Inhale 2 puffs into the lungs every 6 hours as needed for Wheezing   Yes Historical Provider, MD   fluticasone (FLONASE) 50 MCG/ACT nasal spray USE ONE SPRAY TO EACH NOSTRIL ONCE ADAY 9/4/20  Yes WILLIE Goldberg CNP   atorvastatin (LIPITOR) 10 MG tablet TAKE 1 TABLET BY MOUTH ONCE DAILY 9/4/20  Yes WILLIE Goldberg CNP   Ascorbic Acid (C-1000 PO) Take 1 capsule by mouth daily    Yes Historical Provider, MD   ferrous sulfate 325 (65 Fe) MG tablet Take 325 mg by mouth daily  8/30/18  Yes Historical Provider, MD Oxygen Concentrator continuous    Historical Provider, MD   Lift Chair MISC Use daily for comfort  Patient not taking: Reported on 12/18/2020 11/30/20   WILLIE Sullivan CNP   PROAIR  (58 Base) MCG/ACT inhaler Inhale 2 puffs into the lungs every 6 hours as needed for Wheezing 11/17/20   WILLIE Sullivan CNP   levalbuterol [facility] HFA) 45 MCG/ACT inhaler Inhale 1 puff into the lungs every 4 hours as needed for Wheezing 8/19/20   WILLIE Sullivan CNP   Respiratory Therapy Supplies (NEBULIZER/TUBING/MOUTHPIECE) KIT Use every 4-6 hours prn for copd 6/15/20   WILLIE Sullivan CNP     Current Facility-Administered Medications   Medication Dose Route Frequency Provider Last Rate Last Admin    vancomycin (VANCOCIN) intermittent dosing (placeholder)   Other RX Placeholder Jese Quinones DO        [START ON 12/20/2020] vancomycin (VANCOCIN) 1,250 mg in dextrose 5 % 250 mL IVPB (ADDAVIAL)  1,250 mg Intravenous Q24H Niesha Quinones DO        sodium chloride flush 0.9 % injection 10 mL  10 mL Intravenous 2 times per day Emeka Mcpherson MD   10 mL at 12/19/20 1051    sodium chloride flush 0.9 % injection 10 mL  10 mL Intravenous PRN Emeka Mcpherson MD        acetaminophen (TYLENOL) tablet 650 mg  650 mg Oral Q4H PRN Emeka Mcpherson MD        ferrous sulfate (IRON 325) tablet 325 mg  325 mg Oral Daily Emeka Mcpherson MD   325 mg at 12/19/20 1051    ascorbic acid (VITAMIN C) tablet 1,000 mg  1,000 mg Oral Daily Emeka Mcpherson MD   1,000 mg at 12/19/20 1051    fluticasone (FLONASE) 50 MCG/ACT nasal spray 1 spray  1 spray Each Nostril Daily Emeka Mcpherson MD   1 spray at 12/19/20 1103    atorvastatin (LIPITOR) tablet 10 mg  10 mg Oral Daily Emeka Mcpherson MD   10 mg at 12/19/20 1051    dilTIAZem (CARDIZEM CD) extended release capsule 120 mg  120 mg Oral Daily Emeka Mcpherson MD   120 mg at 12/19/20 1051  albuterol (PROVENTIL) nebulizer solution 2.5 mg  2.5 mg Nebulization Q6H PRN Jovanna Hurd MD        clonazePAM Deedee Dies) tablet 0.5 mg  0.5 mg Oral TID PRN Jovanna Hurd MD        fluticasone (FLOVENT HFA) 110 MCG/ACT inhaler 2 puff  2 puff Inhalation BID Jovanna Hurd MD   2 puff at 12/19/20 1050    metoprolol succinate (TOPROL XL) extended release tablet 25 mg  25 mg Oral Daily Jovanna Hurd MD   25 mg at 12/19/20 1051    furosemide (LASIX) tablet 80 mg  80 mg Oral Daily Jovanna Hurd MD   80 mg at 12/19/20 1051    baclofen (LIORESAL) tablet 10 mg  10 mg Oral TID PRN Jovanna Hurd MD        HYDROcodone-acetaminophen (NORCO) 5-325 MG per tablet 1 tablet  1 tablet Oral Q8H PRN Jovanna Hurd MD        [Held by provider] apixaban (ELIQUIS) tablet 5 mg  5 mg Oral BID Jovanna Hurd MD        levothyroxine (SYNTHROID) tablet 125 mcg  125 mcg Oral Daily Jovanna Hurd MD   125 mcg at 12/19/20 1051    pantoprazole (PROTONIX) tablet 40 mg  40 mg Oral QAM AC Jovanna Hurd MD   40 mg at 12/19/20 1051    DULoxetine (CYMBALTA) extended release capsule 60 mg  60 mg Oral Daily Jovanna Hurd MD   60 mg at 12/19/20 1103    tamsulosin (FLOMAX) capsule 0.4 mg  0.4 mg Oral Daily Jovanna Hurd MD   0.4 mg at 12/19/20 1051    lisinopril (PRINIVIL;ZESTRIL) tablet 10 mg  10 mg Oral Daily Jovanna Hurd MD   10 mg at 12/19/20 1051    And    hydroCHLOROthiazide (HYDRODIURIL) tablet 12.5 mg  12.5 mg Oral Daily Jovanna Hurd MD   12.5 mg at 12/19/20 1051    potassium chloride (KLOR-CON M) extended release tablet 40 mEq  40 mEq Oral PRN Jovanna Hurd MD        Or    potassium bicarb-citric acid (EFFER-K) effervescent tablet 40 mEq  40 mEq Oral PRN Jovanna Hurd MD        Or   [facility] potassium chloride 10 mEq/100 mL IVPB (Peripheral Line)  10 mEq Intravenous PRN Jovanna Hurd MD           Allergies:  Tizanidine Social History:   reports that she has quit smoking. Her smoking use included cigarettes. She has a 78.00 pack-year smoking history. She has never used smokeless tobacco. She reports current alcohol use. She reports that she does not use drugs. Family History: family history includes COPD in her mother; Cancer in an other family member; Emphysema in her sister; Heart Disease in her mother. REVIEW OF SYSTEMS:    Constitutional: ++fatigue, No fever or chills. No night sweats, no weight loss   Eyes: No eye discharge, double vision, or eye pain   HEENT: negative for sore mouth, sore throat, hoarseness and voice change   Respiratory: negative for cough , sputum, dyspnea, wheezing, hemoptysis, chest pain   Cardiovascular: negative for chest pain, dyspnea, palpitations, orthopnea, PND   Gastrointestinal: negative for nausea, vomiting, diarrhea, constipation, abdominal pain, Dysphagia, hematemesis and hematochezia   Genitourinary: negative for frequency, dysuria, nocturia, urinary incontinence, and hematuria   Integument: negative for rash, skin lesions, bruises.    Hematologic/Lymphatic: negative for easy bruising, bleeding, lymphadenopathy, or petechiae   Endocrine: negative for heat or cold intolerance,weight changes, change in bowel habits and hair loss   Musculoskeletal: negative for myalgias, arthralgias, pain, joint swelling,and bone pain   Neurological: negative for headaches, dizziness, seizures, weakness, numbness    PHYSICAL EXAM:      BP (!) 115/59   Pulse 88   Temp 98.2 °F (36.8 °C) (Oral)   Resp 16   Ht 5' 5\" (1.651 m)   Wt 193 lb 2 oz (87.6 kg)   SpO2 99%   BMI 32.14 kg/m²    Temp (24hrs), Av.2 °F (36.8 °C), Min:97.8 °F (36.6 °C), Max:98.8 °F (37.1 °C)    General appearance - well appearing, no in pain or distress   Mental status - alert and cooperative   Eyes - pupils equal and reactive, extraocular eye movements intact   Ears - bilateral TM's and external ear canals normal Mouth - mucous membranes moist, pharynx normal without lesions   Neck - supple, no significant adenopathy   Lymphatics - no palpable lymphadenopathy, no hepatosplenomegaly   Chest - clear to auscultation, no wheezes, rales or rhonchi, symmetric air entry   Heart - normal rate, regular rhythm, normal S1, S2, no murmurs  Abdomen - soft, nontender, nondistended, no masses or organomegaly   Neurological - alert, oriented, normal speech, no focal findings or movement disorder noted   Musculoskeletal - no joint tenderness, deformity or swelling   Extremities - peripheral pulses normal, no pedal edema, no clubbing or cyanosis   Skin - normal coloration and turgor, no rashes, no suspicious skin lesions noted ,    DATA:    Labs:   CBC:   Recent Labs     12/19/20 0040 12/19/20  0844   WBC 5.4 7.6   HGB 6.3* 7.6*   HCT 20.4* 23.3*    364     BMP:   Recent Labs     12/19/20 0040 12/19/20  0844    145*   K 3.5* 4.1   CO2 32* 31   BUN 24* 21   CREATININE 1.45* 1.24*   LABGLOM 35* 42*   GLUCOSE 94 115*     PT/INR:   Recent Labs     12/19/20 0040   PROTIME 18.0*   INR 1.5       IMAGING DATA:  @IMG@   XR CHEST PORTABLE   Final Result   1. Left IJ central line in place, tip overlying the mid SVC   2. No evidence of a pneumothorax   3.  Small left pleural effusion, and left basilar opacity, differential   considerations include atelectasis versus pneumonia             Primary Problem  Symptomatic anemia    Active Hospital Problems    Diagnosis Date Noted    Symptomatic anemia [D64.9] 12/19/2020    Pneumonia due to organism [J18.9]     Iron deficiency anemia due to chronic blood loss [D50.0] 12/18/2020    Acute on chronic diastolic CHF (congestive heart failure) ([facility] [I50.33] 07/21/2020    Obesity, Class I, BMI 30-34.9 [E66.9] 07/20/2020    Moderate major depression ([facility] [F32.1] 06/17/2020    PAD (peripheral artery disease) ([facility] [I73.9] 06/17/2020  CKD (chronic kidney disease) stage 3, GFR 30-59 ml/min [N18.30]     HA (generalized anxiety disorder) [F41.1] 12/13/2018    A-fib ([facility] [I48.91] 09/13/2018    Pulmonary hypertension ([facility] [I27.20] 09/13/2018    COPD (chronic obstructive pulmonary disease) ([facility] [J44.9] 09/13/2018    Acquired hypothyroidism [E03.9] 01/18/2013         IMPRESSION:   1. Severe anemia: Patient has history of chronic anemia. She is on chronic anticoagulation and history of female patient which can contribute to GI bleed. 2. SUNSHINE  3. Atrial fibrillation  4. COPD  5. GERD    RECOMMENDATIONS:  1. I reviewed the laboratory data, imaging studies, diagnosis, prognosis and treatment options with patient  2. Recommend stool for occult blood  3. GI work-up if signs of bleeding noted  4. I will rule out any hemolysis and review peripheral blood smear  5. Her iron panel revealed ferritin 37, iron 92, iron saturation 29 and TIBC 318. Overall no picture of iron deficiency therefore I will hold off on giving any IV iron supplements  6. I will check erythropoietin level  7. She may benefit from erythropoietin stimulating agent  8. If the work-up is negative she will need bone marrow biopsy which can be done as outpatient      Discussed with patient and Nurse. Thank you for asking us to see this patient. Macario Shultz MD  Hematologist/Medical Oncologist    Cell: [phone]      This note is created with the assistance of a speech recognition program.  While intending to generate a document that actually reflects the content of the visit, the document can still have some errors including those of syntax and sound a like substitutions which may escape proof reading. It such instances, actual meaning can be extrapolated by contextual diversion.

## 2020-12-20 LAB
ABSOLUTE RETIC #: 0.09 M/UL (ref 0.02–0.1)
ANION GAP SERPL CALCULATED.3IONS-SCNC: 9 MMOL/L (ref 9–17)
BUN BLDV-MCNC: 23 MG/DL (ref 8–23)
BUN/CREAT BLD: ABNORMAL (ref 9–20)
CALCIUM SERPL-MCNC: 8.7 MG/DL (ref 8.6–10.4)
CHLORIDE BLD-SCNC: 100 MMOL/L (ref 98–107)
CO2: 31 MMOL/L (ref 20–31)
CREAT SERPL-MCNC: 1.73 MG/DL (ref 0.5–0.9)
ERYTHROPOIETIN: 328 MU/ML (ref 4–27)
GFR AFRICAN AMERICAN: 35 ML/MIN
GFR NON-AFRICAN AMERICAN: 29 ML/MIN
GFR SERPL CREATININE-BSD FRML MDRD: ABNORMAL ML/MIN/{1.73_M2}
GFR SERPL CREATININE-BSD FRML MDRD: ABNORMAL ML/MIN/{1.73_M2}
GLUCOSE BLD-MCNC: 116 MG/DL (ref 70–99)
HCT VFR BLD CALC: 23.3 % (ref 36–46)
HEMOGLOBIN: 7.3 G/DL (ref 12–16)
IMMATURE RETIC FRACT: ABNORMAL %
IRON SATURATION: 13 % (ref 20–55)
IRON: 39 UG/DL (ref 37–145)
MCH RBC QN AUTO: 30.8 PG (ref 26–34)
MCHC RBC AUTO-ENTMCNC: 31.4 G/DL (ref 31–37)
MCV RBC AUTO: 98.1 FL (ref 80–100)
NRBC AUTOMATED: ABNORMAL PER 100 WBC
PDW BLD-RTO: 18.6 % (ref 11.5–14.9)
PLATELET # BLD: 348 K/UL (ref 150–450)
PMV BLD AUTO: 7.8 FL (ref 6–12)
POTASSIUM SERPL-SCNC: 3.9 MMOL/L (ref 3.7–5.3)
PROCALCITONIN: 0.08 NG/ML
RBC # BLD: 2.38 M/UL (ref 4–5.2)
RETIC %: 3.3 % (ref 0.5–2)
RETIC HEMOGLOBIN: ABNORMAL PG (ref 28.2–35.7)
SODIUM BLD-SCNC: 140 MMOL/L (ref 135–144)
TOTAL IRON BINDING CAPACITY: 300 UG/DL (ref 250–450)
TROPONIN INTERP: ABNORMAL
TROPONIN T: ABNORMAL NG/ML
TROPONIN, HIGH SENSITIVITY: 21 NG/L (ref 0–14)
UNSATURATED IRON BINDING CAPACITY: 261 UG/DL (ref 112–347)
WBC # BLD: 6.7 K/UL (ref 3.5–11)

## 2020-12-20 PROCEDURE — 36415 COLL VENOUS BLD VENIPUNCTURE: CPT

## 2020-12-20 PROCEDURE — 99232 SBSQ HOSP IP/OBS MODERATE 35: CPT | Performed by: INTERNAL MEDICINE

## 2020-12-20 PROCEDURE — 94664 DEMO&/EVAL PT USE INHALER: CPT

## 2020-12-20 PROCEDURE — 85027 COMPLETE CBC AUTOMATED: CPT

## 2020-12-20 PROCEDURE — 99233 SBSQ HOSP IP/OBS HIGH 50: CPT | Performed by: FAMILY MEDICINE

## 2020-12-20 PROCEDURE — 6370000000 HC RX 637 (ALT 250 FOR IP): Performed by: INTERNAL MEDICINE

## 2020-12-20 PROCEDURE — 83550 IRON BINDING TEST: CPT

## 2020-12-20 PROCEDURE — 83540 ASSAY OF IRON: CPT

## 2020-12-20 PROCEDURE — 2060000000 HC ICU INTERMEDIATE R&B

## 2020-12-20 PROCEDURE — 2580000003 HC RX 258: Performed by: NURSE PRACTITIONER

## 2020-12-20 PROCEDURE — 2580000003 HC RX 258: Performed by: FAMILY MEDICINE

## 2020-12-20 PROCEDURE — 99222 1ST HOSP IP/OBS MODERATE 55: CPT | Performed by: INTERNAL MEDICINE

## 2020-12-20 PROCEDURE — 6370000000 HC RX 637 (ALT 250 FOR IP): Performed by: FAMILY MEDICINE

## 2020-12-20 PROCEDURE — 84484 ASSAY OF TROPONIN QUANT: CPT

## 2020-12-20 PROCEDURE — 97116 GAIT TRAINING THERAPY: CPT

## 2020-12-20 PROCEDURE — 84145 PROCALCITONIN (PCT): CPT

## 2020-12-20 PROCEDURE — 97110 THERAPEUTIC EXERCISES: CPT

## 2020-12-20 PROCEDURE — 94669 MECHANICAL CHEST WALL OSCILL: CPT

## 2020-12-20 PROCEDURE — 85045 AUTOMATED RETICULOCYTE COUNT: CPT

## 2020-12-20 PROCEDURE — 80048 BASIC METABOLIC PNL TOTAL CA: CPT

## 2020-12-20 RX ORDER — GUAIFENESIN 600 MG/1
1200 TABLET, EXTENDED RELEASE ORAL 2 TIMES DAILY
Status: DISCONTINUED | OUTPATIENT
Start: 2020-12-20 | End: 2020-12-29 | Stop reason: HOSPADM

## 2020-12-20 RX ORDER — FLUTICASONE PROPIONATE 50 MCG
2 SPRAY, SUSPENSION (ML) NASAL DAILY
Status: DISCONTINUED | OUTPATIENT
Start: 2020-12-21 | End: 2020-12-29 | Stop reason: HOSPADM

## 2020-12-20 RX ORDER — FUROSEMIDE 10 MG/ML
40 INJECTION INTRAMUSCULAR; INTRAVENOUS DAILY
Status: DISCONTINUED | OUTPATIENT
Start: 2020-12-21 | End: 2020-12-24

## 2020-12-20 RX ORDER — 0.9 % SODIUM CHLORIDE 0.9 %
250 INTRAVENOUS SOLUTION INTRAVENOUS ONCE
Status: DISCONTINUED | OUTPATIENT
Start: 2020-12-20 | End: 2020-12-20

## 2020-12-20 RX ORDER — 0.9 % SODIUM CHLORIDE 0.9 %
250 INTRAVENOUS SOLUTION INTRAVENOUS ONCE
Status: COMPLETED | OUTPATIENT
Start: 2020-12-20 | End: 2020-12-20

## 2020-12-20 RX ADMIN — GUAIFENESIN 1200 MG: 600 TABLET, EXTENDED RELEASE ORAL at 21:16

## 2020-12-20 RX ADMIN — Medication 10 ML: at 08:42

## 2020-12-20 RX ADMIN — ATORVASTATIN CALCIUM 10 MG: 10 TABLET, FILM COATED ORAL at 08:49

## 2020-12-20 RX ADMIN — FLUTICASONE PROPIONATE 1 SPRAY: 50 SPRAY, METERED NASAL at 08:41

## 2020-12-20 RX ADMIN — HYDROCODONE BITARTRATE AND ACETAMINOPHEN 1 TABLET: 5; 325 TABLET ORAL at 21:18

## 2020-12-20 RX ADMIN — CLONAZEPAM 0.5 MG: 1 TABLET ORAL at 23:19

## 2020-12-20 RX ADMIN — LEVOTHYROXINE SODIUM 125 MCG: 125 TABLET ORAL at 06:51

## 2020-12-20 RX ADMIN — FLUTICASONE PROPIONATE 2 PUFF: 110 AEROSOL, METERED RESPIRATORY (INHALATION) at 08:41

## 2020-12-20 RX ADMIN — TAMSULOSIN HYDROCHLORIDE 0.4 MG: 0.4 CAPSULE ORAL at 08:49

## 2020-12-20 RX ADMIN — FERROUS SULFATE TAB 325 MG (65 MG ELEMENTAL FE) 325 MG: 325 (65 FE) TAB at 08:49

## 2020-12-20 RX ADMIN — SODIUM CHLORIDE 250 ML: 9 INJECTION, SOLUTION INTRAVENOUS at 11:07

## 2020-12-20 RX ADMIN — GUAIFENESIN 1200 MG: 600 TABLET, EXTENDED RELEASE ORAL at 12:30

## 2020-12-20 RX ADMIN — BACLOFEN 10 MG: 10 TABLET ORAL at 21:18

## 2020-12-20 RX ADMIN — FLUTICASONE PROPIONATE 2 PUFF: 110 AEROSOL, METERED RESPIRATORY (INHALATION) at 21:16

## 2020-12-20 RX ADMIN — Medication 10 ML: at 21:17

## 2020-12-20 RX ADMIN — OXYCODONE HYDROCHLORIDE AND ACETAMINOPHEN 1000 MG: 500 TABLET ORAL at 08:49

## 2020-12-20 RX ADMIN — DULOXETINE HYDROCHLORIDE 60 MG: 60 CAPSULE, DELAYED RELEASE ORAL at 08:49

## 2020-12-20 RX ADMIN — PANTOPRAZOLE SODIUM 40 MG: 40 TABLET, DELAYED RELEASE ORAL at 06:51

## 2020-12-20 RX ADMIN — SODIUM CHLORIDE 250 ML: 9 INJECTION, SOLUTION INTRAVENOUS at 09:42

## 2020-12-20 ASSESSMENT — PAIN SCALES - GENERAL
PAINLEVEL_OUTOF10: 0
PAINLEVEL_OUTOF10: 3
PAINLEVEL_OUTOF10: 0
PAINLEVEL_OUTOF10: 5
PAINLEVEL_OUTOF10: 0
PAINLEVEL_OUTOF10: 0

## 2020-12-20 ASSESSMENT — PAIN DESCRIPTION - PROGRESSION: CLINICAL_PROGRESSION: NOT CHANGED

## 2020-12-20 NOTE — PROGRESS NOTES
Past Medical History:   has a past medical history of A-fib ([facility] Acquired hypothyroidism, Acute encephalopathy, Acute kidney injury ([facility] [address] Anxiety, Benign hypertension with CKD (chronic kidney disease) stage III, Chronic back pain, CKD (chronic kidney disease) stage 3, GFR 30-59 ml/min, Constipation, COPD (chronic obstructive pulmonary disease) ([facility] [address] [facility] Cough, Depression, ETOH abuse, Former smoker, HA (generalized anxiety disorder), History of GI bleed, Hyperlipidemia, Hypertension, Hypothyroid, Leg pain, Normocytic anemia, Osteoarthritis, PAD (peripheral artery disease) ([facility] [address] [facility] Primary osteoarthritis, Pulmonary hypertension ([facility] [address] Pure hypercholesterolemia, SIRS (systemic inflammatory response syndrome) ([facility] [address] Urge incontinence, and Wheezing. Past Surgical History:   has a past surgical history that includes eye surgery (Bilateral); Colonoscopy; joint replacement; Upper gastrointestinal endoscopy (N/A, 12/2/2020); and Colonoscopy (N/A, 12/2/2020). Medications:    Prior to Admission medications    Medication Sig Start Date End Date Taking?  Authorizing Provider   tamsulosin (FLOMAX) 0.4 MG capsule TAKE 1 CAPSULE BY MOUTH DAILY 12/15/20  Yes Forrestine Plaster, APRN - CNP   omeprazole (PRILOSEC) 40 MG delayed release capsule TAKE 1 CAPSULE BY MOUTH EVERY MORNING BEFORE BREAKFAST 12/14/20  Yes Forrestine Plaster, APRN - CNP   DULoxetine (CYMBALTA) 60 MG extended release capsule Take 1 capsule by mouth daily 12/14/20  Yes Forrestine Plaster, APRN - CNP   furosemide (LASIX) 40 MG tablet TAKE 1 TABLET BY MOUTH DAILY  Patient taking differently: Take 40 mg by mouth daily Currently taking 2 tablets daily  12/18/20 11/30/20  Yes Forrestine Plaster, APRN - CNP   baclofen (LIORESAL) 10 MG tablet TAKE ONE TABLET BY MOUTH THREE TIMES A DAY AS NEEDED. 11/30/20  Yes Forrestine Plaster, APRN - CNP HYDROcodone-acetaminophen (NORCO) 5-325 MG per tablet Take 1 tablet by mouth every 8 hours as needed for Pain for up to 30 days. 11/30/20 12/30/20 Yes WILLIE Adorno CNP   lisinopril-hydroCHLOROthiazide (PRINZIDE;ZESTORETIC) 10-12.5 MG per tablet Take 1 tablet by mouth daily 11/30/20  Yes WILLIE Adorno CNP   ELIQUIS 5 MG TABS tablet TAKE ONE TABLET BY MOUTH TWICE A DAY 11/30/20  Yes WILLIE Adorno CNP   levothyroxine (SYNTHROID) 125 MCG tablet Take 1 tablet by mouth daily 11/30/20  Yes WILLIE Adorno CNP   metoprolol succinate (TOPROL XL) 25 MG extended release tablet TAKE 1 TABLET BY MOUTH DAILY. (PLEASE MAKE DR HER FOR FUTURE REFILLS) 11/23/20  Yes WILLIE Adorno CNP   clonazePAM (KLONOPIN) 0.5 MG tablet Take 1 tablet by mouth 3 times daily as needed for Anxiety for up to 30 days.  11/17/20 12/19/20 Yes WILLIE Adorno CNP   fluticasone (FLOVENT HFA) 220 MCG/ACT inhaler Inhale 2 puffs into the lungs 2 times daily 11/17/20  Yes WILLIE Adorno CNP   albuterol (PROVENTIL) (2.5 MG/3ML) 0.083% nebulizer solution Take 3 mLs by nebulization every 6 hours as needed for Wheezing 11/5/20  Yes WILLIE Adorno CNP   dilTIAZem (CARDIZEM CD) 120 MG extended release capsule Take 1 capsule by mouth daily 10/19/20  Yes WILLIE Adorno CNP   albuterol sulfate HFA (PROAIR HFA) 108 (90 Base) MCG/ACT inhaler Inhale 2 puffs into the lungs every 6 hours as needed for Wheezing   Yes Historical Provider, MD   fluticasone (FLONASE) 50 MCG/ACT nasal spray USE ONE SPRAY TO EACH NOSTRIL ONCE ADAY 9/4/20  Yes WILLIE Sauceda CNP   atorvastatin (LIPITOR) 10 MG tablet TAKE 1 TABLET BY MOUTH ONCE DAILY 9/4/20  Yes Ashley Dust, APRN - CNP   Ascorbic Acid (C-1000 PO) Take 1 capsule by mouth daily    Yes Historical Provider, MD   ferrous sulfate 325 (65 Fe) MG tablet Take 325 mg by mouth daily  8/30/18  Yes Historical Provider, MD Oxygen Concentrator continuous    Historical Provider, MD   Lift Chair [facility] Use daily for comfort  Patient not taking: Reported on 12/18/2020 11/30/20   WILLIE Mobley CNP   PROAIR  (21 Base) MCG/ACT inhaler Inhale 2 puffs into the lungs every 6 hours as needed for Wheezing 11/17/20   WILLIE Mobley CNP   levalbuterol [facility] HFA) 45 MCG/ACT inhaler Inhale 1 puff into the lungs every 4 hours as needed for Wheezing 8/19/20   WILLIE Mobley CNP   Respiratory Therapy Supplies (NEBULIZER/TUBING/MOUTHPIECE) KIT Use every 4-6 hours prn for copd 6/15/20   WILLIE Mobley CNP     Current Facility-Administered Medications   Medication Dose Route Frequency Provider Last Rate Last Admin    guaiFENesin [facility]) extended release tablet 1,200 mg  1,200 mg Oral BID Angelito Gaston MD   1,200 mg at 12/20/20 1230    [START ON 12/21/2020] fluticasone (FLONASE) 50 MCG/ACT nasal spray 2 spray  2 spray Each Nostril Daily Angelito Gaston MD        [START ON 12/21/2020] furosemide (LASIX) injection 40 mg  40 mg Intravenous Daily Angelito Gaston MD        sodium chloride flush 0.9 % injection 10 mL  10 mL Intravenous 2 times per day Izabella Araujo MD   10 mL at 12/20/20 0842    sodium chloride flush 0.9 % injection 10 mL  10 mL Intravenous PRN Izabella Araujo MD        acetaminophen (TYLENOL) tablet 650 mg  650 mg Oral Q4H PRN Izabella Araujo MD        ferrous sulfate (IRON 325) tablet 325 mg  325 mg Oral Daily Izabella Araujo MD   325 mg at 12/20/20 0849    ascorbic acid (VITAMIN C) tablet 1,000 mg  1,000 mg Oral Daily Izabella Araujo MD   1,000 mg at 12/20/20 0849    atorvastatin (LIPITOR) tablet 10 mg  10 mg Oral Daily Izabella Araujo MD   10 mg at 12/20/20 0849    dilTIAZem (CARDIZEM CD) extended release capsule 120 mg  120 mg Oral Daily Izabella Araujo MD   120 mg at 12/19/20 1051 Social History:   reports that she has quit smoking. Her smoking use included cigarettes. She has a 78.00 pack-year smoking history. She has never used smokeless tobacco. She reports current alcohol use. She reports that she does not use drugs. Family History: family history includes COPD in her mother; Cancer in an other family member; Emphysema in her sister; Heart Disease in her mother. REVIEW OF SYSTEMS:    Constitutional: ++fatigue, No fever or chills. No night sweats, no weight loss   Eyes: No eye discharge, double vision, or eye pain   HEENT: negative for sore mouth, sore throat, hoarseness and voice change   Respiratory: negative for cough , sputum, dyspnea, wheezing, hemoptysis, chest pain   Cardiovascular: negative for chest pain, dyspnea, palpitations, orthopnea, PND   Gastrointestinal: negative for nausea, vomiting, diarrhea, constipation, abdominal pain, Dysphagia, hematemesis and hematochezia   Genitourinary: negative for frequency, dysuria, nocturia, urinary incontinence, and hematuria   Integument: negative for rash, skin lesions, bruises.    Hematologic/Lymphatic: negative for easy bruising, bleeding, lymphadenopathy, or petechiae   Endocrine: negative for heat or cold intolerance,weight changes, change in bowel habits and hair loss   Musculoskeletal: negative for myalgias, arthralgias, pain, joint swelling,and bone pain   Neurological: negative for headaches, dizziness, seizures, weakness, numbness    PHYSICAL EXAM:      BP (!) 93/45   Pulse 62   Temp 98.1 °F (36.7 °C) (Oral)   Resp 16   Ht 5' 5\" (1.651 m)   Wt 182 lb 15.7 oz (83 kg)   SpO2 97%   BMI 30.45 kg/m²    Temp (24hrs), Av.9 °F (36.6 °C), Min:97.3 °F (36.3 °C), Max:98.1 °F (36.7 °C)    General appearance - well appearing, no in pain or distress   Mental status - alert and cooperative   Eyes - pupils equal and reactive, extraocular eye movements intact   Ears - bilateral TM's and external ear canals normal Mouth - mucous membranes moist, pharynx normal without lesions   Neck - supple, no significant adenopathy   Lymphatics - no palpable lymphadenopathy, no hepatosplenomegaly   Chest - clear to auscultation, no wheezes, rales or rhonchi, symmetric air entry   Heart - normal rate, regular rhythm, normal S1, S2, no murmurs  Abdomen - soft, nontender, nondistended, no masses or organomegaly   Neurological - alert, oriented, normal speech, no focal findings or movement disorder noted   Musculoskeletal - no joint tenderness, deformity or swelling   Extremities - peripheral pulses normal, no pedal edema, no clubbing or cyanosis   Skin - normal coloration and turgor, no rashes, no suspicious skin lesions noted ,    DATA:    Labs:   CBC:   Recent Labs     12/19/20  0844 12/20/20  1152   WBC 7.6 6.7   HGB 7.6* 7.3*   HCT 23.3* 23.3*    348     BMP:   Recent Labs     12/19/20  0844 12/20/20  1152   * 140   K 4.1 3.9   CO2 31 31   BUN 21 23   CREATININE 1.24* 1.73*   LABGLOM 42* 29*   GLUCOSE 115* 116*     PT/INR:   Recent Labs     12/19/20  0040   PROTIME 18.0*   INR 1.5       IMAGING DATA:  @IMG@   XR CHEST PORTABLE   Final Result   1. Left IJ central line in place, tip overlying the mid SVC   2. No evidence of a pneumothorax   3.  Small left pleural effusion, and left basilar opacity, differential   considerations include atelectasis versus pneumonia             Primary Problem  Symptomatic anemia    Active Hospital Problems    Diagnosis Date Noted    Symptomatic anemia [D64.9] 12/19/2020    Pneumonia due to organism [J18.9]     Iron deficiency anemia due to chronic blood loss [D50.0] 12/18/2020    Acute on chronic diastolic CHF (congestive heart failure) ([facility] [I50.33] 07/21/2020    Obesity, Class I, BMI 30-34.9 [E66.9] 07/20/2020    Moderate major depression ([facility] [address] [F32.1] 06/17/2020    PAD (peripheral artery disease) ([facility] [address] [I73.9] 06/17/2020  CKD (chronic kidney disease) stage 3, GFR 30-59 ml/min [N18.30]     HA (generalized anxiety disorder) [F41.1] 12/13/2018    A-fib ([facility] [I48.91] 09/13/2018    Pulmonary hypertension ([facility] [I27.20] 09/13/2018    COPD (chronic obstructive pulmonary disease) ([facility] [J44.9] 09/13/2018    Acquired hypothyroidism [E03.9] 01/18/2013         IMPRESSION:   1. Severe anemia: Patient has history of chronic anemia. She is on chronic anticoagulation and history of female patient which can contribute to GI bleed. 2. SUNSHINE  3. Atrial fibrillation  4. COPD  5. GERD    RECOMMENDATIONS:  1. I reviewed the laboratory data, imaging studies, diagnosis, prognosis and treatment options with patient  2. POSITIVE stool for occult blood. Needs GI work-up   3. No hemolysis and review peripheral blood smear  4. Her iron panel revealed ferritin 37, iron 92, iron saturation 29 and TIBC 318. Overall no picture of iron deficiency therefore I will hold off on giving any IV iron supplements  5. Await erythropoietin level. If low, and GI work up negative, She may benefit from erythropoietin stimulating agent  6. If the work-up is negative she will need bone marrow biopsy which can be done as outpatient      Discussed with patient and Nurse. Thank you for asking us to see this patient. Macario Mathis MD  Hematologist/Medical Oncologist    Cell: [phone]      This note is created with the assistance of a speech recognition program.  While intending to generate a document that actually reflects the content of the visit, the document can still have some errors including those of syntax and sound a like substitutions which may escape proof reading. It such instances, actual meaning can be extrapolated by contextual diversion.

## 2020-12-20 NOTE — PROGRESS NOTES
Peripheral IV placed in patient- LFA #20g. Rahul served Dr. Hernandez Guallpa about removal of TLC left IJ due to this and she stated that she wanted to keep the TLC in at this time.

## 2020-12-20 NOTE — PROGRESS NOTES
Physical Therapy  Facility/Department: [facility] PROGRESSIVE CARE  Daily Treatment Note  NAME: Vasquez Swift  : 1945  MRN: [id number]    Date of Service: 2020    Discharge Recommendations:  Patient would benefit from continued therapy after discharge, Continue to assess pending progress   PT Equipment Recommendations  Equipment Needed: No    Assessment   Body structures, Functions, Activity limitations: Decreased functional mobility ; Decreased ADL status; Decreased strength;Decreased endurance;Decreased posture; Increased pain;Decreased balance  Treatment Diagnosis: Impaired functional mobility 2* anemia  Specific instructions for Next Treatment: check Hgb, progress gait (SBQC vs walker), platform step, HEP, endurance activities  Prognosis: Good  Barriers to Learning: none  REQUIRES PT FOLLOW UP: Yes  Activity Tolerance  Activity Tolerance: Patient limited by endurance     Patient Diagnosis(es): The primary encounter diagnosis was Symptomatic anemia. A diagnosis of Pneumonia due to organism was also pertinent to this visit. has a past medical history of A-fib ([address] Acquired hypothyroidism, Acute encephalopathy, Acute kidney injury ([address] Anxiety, Benign hypertension with CKD (chronic kidney disease) stage III, Chronic back pain, CKD (chronic kidney disease) stage 3, GFR 30-59 ml/min, Constipation, COPD (chronic obstructive pulmonary disease) ([address] Cough, Depression, ETOH abuse, Former smoker, HA (generalized anxiety disorder), History of GI bleed, Hyperlipidemia, Hypertension, Hypothyroid, Leg pain, Normocytic anemia, Osteoarthritis, PAD (peripheral artery disease) ([address] Primary osteoarthritis, Pulmonary hypertension ([address] Pure hypercholesterolemia, SIRS (systemic inflammatory response syndrome) ([address] Urge incontinence, and Wheezing. has a past surgical history that includes eye surgery (Bilateral); Colonoscopy; joint replacement; Upper gastrointestinal endoscopy (N/A, 12/2/2020); and Colonoscopy (N/A, 12/2/2020). Restrictions  Restrictions/Precautions  Restrictions/Precautions: General Precautions, Fall Risk(Fell last week after letting dogs out)  Required Braces or Orthoses?: No  Implants present? : Metal implants(L TKA)  Position Activity Restriction  Other position/activity restrictions: up as tolerated  Subjective   Subjective  Subjective: Pt in bed upon entry, agreeable to therapy. Pt report sleeping poorly during the night. Pain Assessment  Pain Assessment: 0-10  Pain Level: 0  Clinical Progression: Not changed  Response to Pain Intervention: Patient Satisfied  Oxygen Therapy  SpO2: 97 %  Pulse Oximeter Device Mode: Intermittent  Pulse Oximeter Device Location: Finger  O2 Device: Nasal cannula  O2 Flow Rate (L/min): 1 L/min  Patient Observation  Observations: Pt easily fatigued, SpO2 decreased 93%, Oked per [employer] to treat, Hgb is improved. Orientation     Cognition      Objective   Bed mobility  Rolling to Left: Stand by assistance  Rolling to Right: Stand by assistance  Supine to Sit: Stand by assistance(HOB elevated, cues to use bedrail.)  Sit to Supine: Stand by assistance  Scooting: Stand by assistance  Transfers  Sit to Stand: Contact guard assistance  Stand to sit: Contact guard assistance  Bed to Chair: Contact guard assistance;Stand by assistance  Comment: CGA/SBA for transfers with [facility].    Ambulation  Ambulation?: Yes  Ambulation 1  Surface: level tile  Device: Rolling Walker  Other Apparatus: O2(1L)  Assistance: Stand by assistance;Contact guard assistance  Quality of Gait: slow karen, no LOB  Gait Deviations: Slow Karen  Distance: 30' x 2(Required frequent standing rest break.) Comments: Pt balance appears to be better w/ RW, pt stated that she has Rollator and walker at home. No LOB. Pt easily fatigued w/ SOB, SpO2 94% on 1L. HR slightly elevated. Stairs/Curb  Stairs?: No     Balance  Posture: Fair  Sitting - Static: Good;-  Sitting - Dynamic: Good;-  Standing - Static: Fair;+  Standing - Dynamic: Fair  Comments:   Other exercises  Other exercises?: Yes  Other exercises 1: Ther ex both L/E seated x 10  Other exercises 2: Sit<>Stand x 3(cues for hand placement)         Other Activities: Dangling protocol              G-Code     OutComes Score                                                     AM-PAC Score             Goals  Short term goals  Time Frame for Short term goals: pt to demo bed mobility Mod I. Short term goal 1: Pt to demo transfers Mod I. Short term goal 2: Pt to amb 75'-100' Mod I. Short term goal 3: pt to ascend/descend 1 threshold for home entry, SBA/CGA. Short term goal 4: pt to demo good technique for HEP with B LE strengthening by 1/2 MMG. Short term goal 5: Pt to tolerate 30-45 minute PT session with O2 sats WNL. Patient Goals   Patient goals : 4-5 days    Plan    Plan  Times per week: 5-6x/week  Specific instructions for Next Treatment: check Hgb, progress gait (SBQC vs walker), platform step, HEP, endurance activities  Current Treatment Recommendations: Strengthening, ROM, Balance Training, Functional Mobility Training, Transfer Training, Endurance Training, Gait Training, Stair training, Equipment Evaluation, Education, & procurement, Patient/Caregiver Education & Training, Safety Education & Training, Home Exercise Program  Safety Devices  Type of devices:  All fall risk precautions in place, Call light within reach, Gait belt, Patient at risk for falls, Nurse notified, Bed alarm in place, Left in bed(WOODY Rizo)     Therapy Time   Individual Concurrent Group Co-treatment   Time In 1405         Time Out 1446         Minutes [address] [place] [address]

## 2020-12-20 NOTE — PLAN OF CARE
Problem: Falls - Risk of:  Goal: Will remain free from falls  Description: Will remain free from falls  12/20/2020 0504 by Ruma Ervin RN  Outcome: Met This Shift  Note: Will continue to monitor patient needs hourly and respond in a timely fashion in order to decrease the risk of patient falls. Patient is a high fall risk. Bed alarm initiated at all times. Will encourage patient to utilize call light when needing to ambulate out of bed. Patient compliant this shift. Problem: Falls - Risk of:  Goal: Absence of physical injury  Description: Absence of physical injury  12/20/2020 0504 by Ruma Ervin RN  Outcome: Met This Shift  Note: Patient will continue to be free from physical injury. Patient's bed alarm is on and hourly rounding is being completed to ensure all patient needs are met. Problem: Pain:  Description: Pain management should include both nonpharmacologic and pharmacologic interventions. Goal: Pain level will decrease  Description: Pain level will decrease  12/20/2020 0504 by Ruma Ervin RN  Outcome: Met This Shift  Note: Patient will be monitored and assessed to ensure pain is being managed properly in order to ensure the pain level has decreased or remained at a tolerable level for patient acceptance. No complaints of pain this shift. Problem: Bleeding:  Goal: Will show no signs and symptoms of excessive bleeding  Description: Will show no signs and symptoms of excessive bleeding  12/20/2020 0504 by Ruma Ervin RN  Outcome: Ongoing  Note: Will continue to assess and monitor patient for any signs and symptoms of excessive bleeding due to blood thinners. Patient has active GI bleed. Occult stool positive.       Problem: Musculor/Skeletal Functional Status  Goal: Highest potential functional level  12/20/2020 0504 by Ruma Ervin RN  Outcome: Ongoing Note: Will continue to work with patient to continue to maintain and improve patients ability to function on a physical level.

## 2020-12-20 NOTE — PROGRESS NOTES
Pulmonary Progress Note  [facility]      Patient - Ellyn Medeiros,  Age - 70 y.o.    - 1945      Room Number - 2097/2097-01   N -  [id number]   [facility] - [de-identified]  Date of Admission -  2020  8:33 PM        [address], [place]  Primary Care Physician - Taurus Natarajan, APRN - CNP     SUBJECTIVE   Episodes of low blood pressure this morning but patient currently alert does not complain of any lightheadedness does complain of difficulty bringing up mucus    OBJECTIVE   VITALS    height is 5' 5\" (1.651 m) and weight is 182 lb 15.7 oz (83 kg). Her oral temperature is 97.3 °F (36.3 °C). Her blood pressure is 90/49 (abnormal) and her pulse is 76. Her respiration is 18 and oxygen saturation is 93%. Body mass index is 30.45 kg/m². Temperature Range: Temp: 97.3 °F (36.3 °C) Temp  Av.9 °F (36.6 °C)  Min: 97.3 °F (36.3 °C)  Max: 98.2 °F (36.8 °C)  BP Range:  Systolic (38TDK), OPA:27 , Min:82 , DVN:433     Diastolic (21MWD), HHX:71, Min:40, Max:95    Pulse Range: Pulse  Av.2  Min: 72  Max: 92  Respiration Range: Resp  Av  Min: 18  Max: 18  Current Pulse Ox[de-identified]  SpO2: 93 %  24HR Pulse Ox Range:  SpO2  Av.8 %  Min: 93 %  Max: 100 %  Oxygen Amount and Delivery: O2 Flow Rate (L/min): 1 L/min    Wt Readings from Last 3 Encounters:   20 182 lb 15.7 oz (83 kg)   20 187 lb 6.4 oz (85 kg)   20 188 lb (85.3 kg)       I/O (24 Hours)    Intake/Output Summary (Last 24 hours) at 2020 1136  Last data filed at 2020 0842  Gross per 24 hour   Intake 580 ml   Output 1150 ml   Net -570 ml       EXAM     General Appearance  Awake, alert, oriented, in no acute distress  HEENT - normocephalic, atraumatic.  []  Mallampati  [] Crowded airway   [] Macroglossia  []  Retrognathia  [] Micrognathia  []  Normal tongue size []  Normal Bite  [] Itta sign positive Neck - Supple,  trachea midline   Lungs -diminished without any wheezes or rhonchi  Cardiovascular - Heart sounds are normal.  Regular rate and rhythm   Abdomen - Soft, nontender, nondistended, no masses or organomegaly  Neurologic - There are no focal motor or sensory deficits  Skin - No bruising or bleeding  Extremities - No clubbing, cyanosis, edema    MEDS      sodium chloride  250 mL Intravenous Once    sodium chloride flush  10 mL Intravenous 2 times per day    ferrous sulfate  325 mg Oral Daily    ascorbic acid  1,000 mg Oral Daily    fluticasone  1 spray Each Nostril Daily    atorvastatin  10 mg Oral Daily    dilTIAZem  120 mg Oral Daily    fluticasone  2 puff Inhalation BID    metoprolol succinate  25 mg Oral Daily    [Held by provider] apixaban  5 mg Oral BID    levothyroxine  125 mcg Oral Daily    pantoprazole  40 mg Oral QAM AC    DULoxetine  60 mg Oral Daily    tamsulosin  0.4 mg Oral Daily    lisinopril  10 mg Oral Daily    And    hydroCHLOROthiazide  12.5 mg Oral Daily    furosemide  40 mg Intravenous BID       sodium chloride flush, acetaminophen, albuterol, clonazePAM, baclofen, HYDROcodone-acetaminophen, potassium chloride **OR** potassium alternative oral replacement **OR** potassium chloride    LABS   CBC   Recent Labs     12/19/20  0844   WBC 7.6   HGB 7.6*   HCT 23.3*   MCV 95.2        BMP:   Lab Results   Component Value Date     12/19/2020    K 4.1 12/19/2020     12/19/2020    CO2 31 12/19/2020    BUN 21 12/19/2020    LABALBU 3.3 12/19/2020    LABALBU 4.5 02/18/2012    CREATININE 1.24 12/19/2020    CALCIUM 8.2 12/19/2020    GFRAA 51 12/19/2020    LABGLOM 42 12/19/2020     ABGs:  Lab Results   Component Value Date    PHART 7.330 09/13/2018    PO2ART 74.8 09/13/2018    TQS1EYE 47.3 09/13/2018      Lab Results   Component Value Date    MODE NOT REPORTED 09/13/2018     Ionized Calcium:  No results found for: IONCA  Magnesium:    Lab Results Component Value Date    MG 2.1 12/19/2020     Phosphorus:    Lab Results   Component Value Date    PHOS 4.1 03/16/2020        LIVER PROFILE   Recent Labs     12/19/20  0040   AST 14   ALT 10   BILITOT 0.29*   ALKPHOS 102     INR   Recent Labs     12/19/20  0040   INR 1.5     PTT   Lab Results   Component Value Date    APTT 36.9 (H) 12/19/2020         RADIOLOGY     (See actual reports for details)    ASSESSMENT/PLAN   Principal Problem:    Symptomatic anemia  Active Problems:    Acquired hypothyroidism    A-fib (HCC)    Pulmonary hypertension (HCC)    COPD (chronic obstructive pulmonary disease) (HCC)    HA (generalized anxiety disorder)    CKD (chronic kidney disease) stage 3, GFR 30-59 ml/min    Moderate major depression (HCC)    PAD (peripheral artery disease) (HCC)    Obesity, Class I, BMI 30-34.9    Acute on chronic diastolic CHF (congestive heart failure) (HCC)    Iron deficiency anemia due to chronic blood loss    Pneumonia due to organism  Resolved Problems:    * No resolved hospital problems.  *    Would not give IV fluids, she still appears to be fluid overloaded  Hold antihypertensives  Decrease Lasix to once a day  Add Mucinex  Add Acapella  Not bronchospastic, does not need scheduled treatments-unclear if she truly has COPD as she quit smoking over 36 years ago   Labs were ordered today I will order them stat  electronically signed by Jesus Hsieh MD on 12/20/2020 at 11:36 AM

## 2020-12-20 NOTE — PLAN OF CARE
Problem: Falls - Risk of:  Goal: Will remain free from falls  Description: Will remain free from falls  Outcome: Ongoing   No falls this shift  Problem: Falls - Risk of:  Goal: Absence of physical injury  Description: Absence of physical injury  Outcome: Ongoing   No injury noted this shift. Problem: Bleeding:  Goal: Will show no signs and symptoms of excessive bleeding  Description: Will show no signs and symptoms of excessive bleeding  Outcome: Ongoing   Pt being monitored for h and h. Current labs stable. Problem: Pain:  Goal: Pain level will decrease  Description: Pain level will decrease  Outcome: Ongoing   Pt rated elevated pain. PRN norco effective for pain control. Problem: Musculor/Skeletal Functional Status  Goal: Highest potential functional level  Outcome: Ongoing   Pt up with one assist/standby. Monitoring.

## 2020-12-20 NOTE — PLAN OF CARE
PRE-CONSULT ROUNDING NOTE    HPI    79-year-old female admitted to the hospital for abnormal hemoglobin of 6.6. Patient was recently evaluated by Dr. Naz Kathleen  on 12/18 for chronic anemia. Hgb found to be 6.6 so was instructed to go to ER. Patient currently on Eliquis for hx of Afib. Other past medical history includes hypothyroidism, acute kidney injury, anxiety, CKD stage III, chronic back pain, constipation, COPD, EtOH abuse, GERD, history of GI bleed, anemia, OA, PAD, pulmonary hypertension. Patient had EGD on 12/2/2020. This was unremarkable. Also had colonoscopy on 12/2/2020 and noted to have 2 AVMs 1 in the ascending and one in the descending colon that were cauterized with APC. Patient received 1 unit of PRBCs. Hemoglobin improved to 7.6. Stool for occult blood positive. Denies NSAIDS  Takes Eliquis for Afib    Patient hypotensive during my visit. She is drowsy but easy to arouse. Difficult to obtain hx    No abdominal pain  Has mild nausea, no emesis. Tolerating cardiac diet    No reported BM    Appears to be in fluid overload    Review of Systems   Unable to assess appropriately  Patient drowsy    Physical Exam  Vitals signs and nursing note reviewed. Constitutional:       Appearance: She is well-developed. HENT:      Head: Normocephalic and atraumatic. Eyes:      General: No scleral icterus. Conjunctiva/sclera: Conjunctivae normal.      Pupils: Pupils are equal, round, and reactive to light. Neck:      Musculoskeletal: Normal range of motion and neck supple. Thyroid: No thyromegaly. Vascular: No hepatojugular reflux or JVD. Trachea: No tracheal deviation. Cardiovascular:      Rate and Rhythm: Normal rate and regular rhythm. Heart sounds: Normal heart sounds. Pulmonary:      Effort: No respiratory distress. Breath sounds: Decreased breath sounds present. No wheezing or rales.    Abdominal: General: Bowel sounds are normal. There is no distension. Palpations: Abdomen is soft. There is no hepatomegaly or mass. Tenderness: There is no abdominal tenderness. There is no rebound. Hernia: No hernia is present. Musculoskeletal:         General: No tenderness. Comments: No joint swelling   Lymphadenopathy:      Cervical: No cervical adenopathy. Skin:     General: Skin is warm. Findings: No bruising, ecchymosis, erythema or rash. Neurological:      Mental Status: She is oriented to person, place, and time and easily aroused. Cranial Nerves: No cranial nerve deficit. Psychiatric:         Thought Content:  Thought content normal.         ASSESSMENT/PLAN    Anemia, hx of colonic AVMs cauterized 12/2/2020  -Trend H&H  -Monitor for bleeding  -Tranfuse for hgb < 7  -Needs capsule endoscopy outpatient  -Check iron studies    Fluid overload  -Mgt per cardiology, pulmonology

## 2020-12-20 NOTE — PROGRESS NOTES
Back exam - full range of motion, no tenderness, palpable spasm or pain on motion  Neurological - alert, oriented, normal speech, no focal findings or movement disorder noted  Musculoskeletal - osteoarthritic changes noted in both hands  Extremities - peripheral pulses normal, no pedal edema, no clubbing or cyanosis  Skin - normal coloration and turgor, no rashes, no suspicious skin lesions noted    Data:   Medications:   Current Facility-Administered Medications   Medication Dose Route Frequency Provider Last Rate Last Admin    sodium chloride flush 0.9 % injection 10 mL  10 mL Intravenous 2 times per day Octavia Be MD   10 mL at 12/19/20 2100    sodium chloride flush 0.9 % injection 10 mL  10 mL Intravenous PRN Octavia Be MD        acetaminophen (TYLENOL) tablet 650 mg  650 mg Oral Q4H PRN Octavia Be MD        ferrous sulfate (IRON 325) tablet 325 mg  325 mg Oral Daily Octavia Be MD   325 mg at 12/19/20 1051    ascorbic acid (VITAMIN C) tablet 1,000 mg  1,000 mg Oral Daily Octavia Be MD   1,000 mg at 12/19/20 1051    fluticasone (FLONASE) 50 MCG/ACT nasal spray 1 spray  1 spray Each Nostril Daily Octavia Be MD   1 spray at 12/19/20 1103    atorvastatin (LIPITOR) tablet 10 mg  10 mg Oral Daily Octavia Be MD   10 mg at 12/19/20 1051    dilTIAZem (CARDIZEM CD) extended release capsule 120 mg  120 mg Oral Daily Octavia Be MD   120 mg at 12/19/20 1051    albuterol (PROVENTIL) nebulizer solution 2.5 mg  2.5 mg Nebulization Q6H PRN Octavia Be MD        clonazePAM Negron Bette) tablet 0.5 mg  0.5 mg Oral TID PRN Octavia Be MD        fluticasone (FLOVENT HFA) 110 MCG/ACT inhaler 2 puff  2 puff Inhalation BID Octavia Be MD   2 puff at 12/19/20 1050    metoprolol succinate (TOPROL XL) extended release tablet 25 mg  25 mg Oral Daily Octavia Be MD   25 mg at 12/19/20 1051  baclofen (LIORESAL) tablet 10 mg  10 mg Oral TID PRN Geneva Sethi MD        HYDROcodone-acetaminophen [facility]) 5-325 MG per tablet 1 tablet  1 tablet Oral Q8H PRN Geneva Sethi MD   1 tablet at 12/19/20 1437    [Held by provider] apixaban (ELIQUIS) tablet 5 mg  5 mg Oral BID Geneva Sethi MD        levothyroxine (SYNTHROID) tablet 125 mcg  125 mcg Oral Daily Geneva Sethi MD   125 mcg at 12/20/20 7254    pantoprazole (PROTONIX) tablet 40 mg  40 mg Oral QAM AC Geneva Sethi MD   40 mg at 12/20/20 3401    DULoxetine (CYMBALTA) extended release capsule 60 mg  60 mg Oral Daily Geneav Sethi MD   60 mg at 12/19/20 1103    tamsulosin (FLOMAX) capsule 0.4 mg  0.4 mg Oral Daily Geneva Sethi MD   0.4 mg at 12/19/20 1051    lisinopril (PRINIVIL;ZESTRIL) tablet 10 mg  10 mg Oral Daily Geneva Sethi MD   10 mg at 12/19/20 1051    And    hydroCHLOROthiazide (HYDRODIURIL) tablet 12.5 mg  12.5 mg Oral Daily Geneva Sethi MD   12.5 mg at 12/19/20 1051    potassium chloride (KLOR-CON M) extended release tablet 40 mEq  40 mEq Oral PRN Geneva Sethi MD        Or    potassium bicarb-citric acid (EFFER-K) effervescent tablet 40 mEq  40 mEq Oral PRN Geneva Sethi MD        Or   [employer] potassium chloride 10 mEq/100 mL IVPB (Peripheral Line)  10 mEq Intravenous PRN Geneva Sethi MD        furosemide (LASIX) injection 40 mg  40 mg Intravenous BID Topher Morris MD   40 mg at 12/19/20 1718       Intake/Output Summary (Last 24 hours) at 12/20/2020 0829  Last data filed at 12/19/2020 2030  Gross per 24 hour   Intake 1360 ml   Output 1650 ml   Net -290 ml     Recent Results (from the past 24 hour(s))   CBC    Collection Time: 12/19/20  8:44 AM   Result Value Ref Range    WBC 7.6 3.5 - 11.0 k/uL    RBC 2.45 (L) 4.0 - 5.2 m/uL    Hemoglobin 7.6 (L) 12.0 - 16.0 g/dL    Hematocrit 23.3 (L) 36 - 46 %    MCV 95.2 80 - 100 fL    MCH 31.2 26 - 34 pg    MCHC 32.7 31 - 37 g/dL RDW 17.9 (H) 11.5 - 14.9 %    Platelets 297 262 - 335 k/uL    MPV 7.6 6.0 - 12.0 fL    NRBC Automated NOT REPORTED per 100 WBC   BASIC METABOLIC PANEL    Collection Time: 12/19/20  8:44 AM   Result Value Ref Range    Glucose 115 (H) 70 - 99 mg/dL    BUN 21 8 - 23 mg/dL    CREATININE 1.24 (H) 0.50 - 0.90 mg/dL    Bun/Cre Ratio NOT REPORTED 9 - 20    Calcium 8.2 (L) 8.6 - 10.4 mg/dL    Sodium 145 (H) 135 - 144 mmol/L    Potassium 4.1 3.7 - 5.3 mmol/L    Chloride 103 98 - 107 mmol/L    CO2 31 20 - 31 mmol/L    Anion Gap 11 9 - 17 mmol/L    GFR Non-African American 42 (L) >60 mL/min    GFR  51 (L) >60 mL/min    GFR Comment          GFR Staging NOT REPORTED    PERIPHERAL BLOOD SMEAR, PATH REVIEW    Collection Time: 12/19/20  1:00 PM   Result Value Ref Range    Pathologist Review TO BE REVIEWED BY PATHOLOGIST    Lactate Dehydrogenase    Collection Time: 12/19/20  1:00 PM   Result Value Ref Range     135 - 214 U/L   BLOOD OCCULT STOOL #1    Collection Time: 12/19/20 11:22 PM   Result Value Ref Range    Specimen Description . FECES     Occult Blood, Stool #1 POSITIVE     Date, Stool #1 51,030,148     Time, Stool #1 2,323     Occult Blood, Stool #2 NOT REPORTED     Date, Stool #2 NOT REPORTED     Time, Stool #2 NOT REPORTED     Occult Blood, Stool #3 NOT REPORTED     Date, Stool #3 NOT REPORTED     Time, Stool #3 NOT REPORTED      -----------------------------------------------------------------  RAD:  EKG:  Micro:     Assessment & Plan:    Patient Active Problem List:     Acquired hypothyroidism     Hypertension     Primary osteoarthritis of right knee     A-fib (HCC)     Acute encephalopathy     SIRS (systemic inflammatory response syndrome) (HCC)     Normocytic anemia     Pulmonary hypertension (HCC)     COPD (chronic obstructive pulmonary disease) (HCC)     History of GI bleed     Constipation     HA (generalized anxiety disorder)     Lumbar radiculopathy Lumbosacral spondylosis without myelopathy     Benign hypertension with CKD (chronic kidney disease) stage III     CKD (chronic kidney disease) stage 3, GFR 30-59 ml/min     Alcohol withdrawal syndrome with complication (HCC)     Moderate major depression (HCC)     PAD (peripheral artery disease) (HCC)     Acute kidney injury ([facility] [address]     Obesity, Class I, BMI 30-34.9     Acute on chronic diastolic CHF (congestive heart failure) (HCC)     GI bleed     Weight loss     Elevated alkaline phosphatase level     Thrombocytosis (HCC)     Iron deficiency anemia due to chronic blood loss     Iron malabsorption     Symptomatic anemia     Pneumonia due to organism           Plan:  -Symptomatic anemia - Hgb improved s/p 1 unit PRBCs, Hem/Onc following.  -Pneumonia - antibiotics per Pulmonology.  -Anemia with recent GI bleed - GI consulted. -Continue current treatments.  -Complete orders per chart.     See orders   Disposition:    Electronically signed by Jovanna Hurd MD on 12/20/2020 at 8:29 AM

## 2020-12-21 LAB
ABSOLUTE EOS #: 0.2 K/UL (ref 0–0.4)
ABSOLUTE IMMATURE GRANULOCYTE: ABNORMAL K/UL (ref 0–0.3)
ABSOLUTE LYMPH #: 0.9 K/UL (ref 1–4.8)
ABSOLUTE MONO #: 0.7 K/UL (ref 0.1–1.3)
ANION GAP SERPL CALCULATED.3IONS-SCNC: 9 MMOL/L (ref 9–17)
BASOPHILS # BLD: 1 % (ref 0–2)
BASOPHILS ABSOLUTE: 0.1 K/UL (ref 0–0.2)
BNP INTERPRETATION: ABNORMAL
BUN BLDV-MCNC: 23 MG/DL (ref 8–23)
BUN/CREAT BLD: ABNORMAL (ref 9–20)
CALCIUM SERPL-MCNC: 9 MG/DL (ref 8.6–10.4)
CHLORIDE BLD-SCNC: 100 MMOL/L (ref 98–107)
CO2: 31 MMOL/L (ref 20–31)
CREAT SERPL-MCNC: 1.69 MG/DL (ref 0.5–0.9)
DIFFERENTIAL TYPE: ABNORMAL
EOSINOPHILS RELATIVE PERCENT: 3 % (ref 0–4)
FOLATE: 12 NG/ML
GFR AFRICAN AMERICAN: 36 ML/MIN
GFR NON-AFRICAN AMERICAN: 30 ML/MIN
GFR SERPL CREATININE-BSD FRML MDRD: ABNORMAL ML/MIN/{1.73_M2}
GFR SERPL CREATININE-BSD FRML MDRD: ABNORMAL ML/MIN/{1.73_M2}
GLUCOSE BLD-MCNC: 112 MG/DL (ref 70–99)
HCT VFR BLD CALC: 23.6 % (ref 36–46)
HEMOGLOBIN: 7.4 G/DL (ref 12–16)
IMMATURE GRANULOCYTES: ABNORMAL %
LYMPHOCYTES # BLD: 14 % (ref 24–44)
MCH RBC QN AUTO: 30.3 PG (ref 26–34)
MCHC RBC AUTO-ENTMCNC: 31.3 G/DL (ref 31–37)
MCV RBC AUTO: 97 FL (ref 80–100)
MONOCYTES # BLD: 11 % (ref 1–7)
NRBC AUTOMATED: ABNORMAL PER 100 WBC
PDW BLD-RTO: 18.7 % (ref 11.5–14.9)
PLATELET # BLD: 353 K/UL (ref 150–450)
PLATELET ESTIMATE: ABNORMAL
PMV BLD AUTO: 8 FL (ref 6–12)
POTASSIUM SERPL-SCNC: 4.1 MMOL/L (ref 3.7–5.3)
PRO-BNP: 6049 PG/ML
RBC # BLD: 2.43 M/UL (ref 4–5.2)
RBC # BLD: ABNORMAL 10*6/UL
SEG NEUTROPHILS: 71 % (ref 36–66)
SEGMENTED NEUTROPHILS ABSOLUTE COUNT: 4.3 K/UL (ref 1.3–9.1)
SODIUM BLD-SCNC: 140 MMOL/L (ref 135–144)
VITAMIN B-12: 366 PG/ML (ref 232–1245)
WBC # BLD: 6.1 K/UL (ref 3.5–11)
WBC # BLD: ABNORMAL 10*3/UL

## 2020-12-21 PROCEDURE — 99232 SBSQ HOSP IP/OBS MODERATE 35: CPT | Performed by: INTERNAL MEDICINE

## 2020-12-21 PROCEDURE — 6370000000 HC RX 637 (ALT 250 FOR IP): Performed by: INTERNAL MEDICINE

## 2020-12-21 PROCEDURE — 36430 TRANSFUSION BLD/BLD COMPNT: CPT

## 2020-12-21 PROCEDURE — 36415 COLL VENOUS BLD VENIPUNCTURE: CPT

## 2020-12-21 PROCEDURE — 2580000003 HC RX 258: Performed by: NURSE PRACTITIONER

## 2020-12-21 PROCEDURE — APPSS30 APP SPLIT SHARED TIME 16-30 MINUTES: Performed by: NURSE PRACTITIONER

## 2020-12-21 PROCEDURE — 80048 BASIC METABOLIC PNL TOTAL CA: CPT

## 2020-12-21 PROCEDURE — 82746 ASSAY OF FOLIC ACID SERUM: CPT

## 2020-12-21 PROCEDURE — 6370000000 HC RX 637 (ALT 250 FOR IP): Performed by: FAMILY MEDICINE

## 2020-12-21 PROCEDURE — 82607 VITAMIN B-12: CPT

## 2020-12-21 PROCEDURE — 94640 AIRWAY INHALATION TREATMENT: CPT

## 2020-12-21 PROCEDURE — 86900 BLOOD TYPING SEROLOGIC ABO: CPT

## 2020-12-21 PROCEDURE — 83880 ASSAY OF NATRIURETIC PEPTIDE: CPT

## 2020-12-21 PROCEDURE — 99232 SBSQ HOSP IP/OBS MODERATE 35: CPT | Performed by: FAMILY MEDICINE

## 2020-12-21 PROCEDURE — P9016 RBC LEUKOCYTES REDUCED: HCPCS

## 2020-12-21 PROCEDURE — 2060000000 HC ICU INTERMEDIATE R&B

## 2020-12-21 PROCEDURE — 2580000003 HC RX 258: Performed by: FAMILY MEDICINE

## 2020-12-21 PROCEDURE — 2700000000 HC OXYGEN THERAPY PER DAY

## 2020-12-21 PROCEDURE — 6360000002 HC RX W HCPCS: Performed by: FAMILY MEDICINE

## 2020-12-21 PROCEDURE — 94664 DEMO&/EVAL PT USE INHALER: CPT

## 2020-12-21 PROCEDURE — 85025 COMPLETE CBC W/AUTO DIFF WBC: CPT

## 2020-12-21 PROCEDURE — 94761 N-INVAS EAR/PLS OXIMETRY MLT: CPT

## 2020-12-21 RX ORDER — ONDANSETRON 2 MG/ML
4 INJECTION INTRAMUSCULAR; INTRAVENOUS EVERY 6 HOURS PRN
Status: DISCONTINUED | OUTPATIENT
Start: 2020-12-21 | End: 2020-12-29 | Stop reason: HOSPADM

## 2020-12-21 RX ORDER — 0.9 % SODIUM CHLORIDE 0.9 %
20 INTRAVENOUS SOLUTION INTRAVENOUS ONCE
Status: COMPLETED | OUTPATIENT
Start: 2020-12-21 | End: 2020-12-21

## 2020-12-21 RX ADMIN — ACETAMINOPHEN 650 MG: 325 TABLET, FILM COATED ORAL at 13:25

## 2020-12-21 RX ADMIN — Medication 10 ML: at 08:01

## 2020-12-21 RX ADMIN — DULOXETINE HYDROCHLORIDE 60 MG: 60 CAPSULE, DELAYED RELEASE ORAL at 08:06

## 2020-12-21 RX ADMIN — GUAIFENESIN 1200 MG: 600 TABLET, EXTENDED RELEASE ORAL at 20:08

## 2020-12-21 RX ADMIN — TAMSULOSIN HYDROCHLORIDE 0.4 MG: 0.4 CAPSULE ORAL at 08:06

## 2020-12-21 RX ADMIN — PANTOPRAZOLE SODIUM 40 MG: 40 TABLET, DELAYED RELEASE ORAL at 06:10

## 2020-12-21 RX ADMIN — FLUTICASONE PROPIONATE 2 SPRAY: 50 SPRAY, METERED NASAL at 08:06

## 2020-12-21 RX ADMIN — HYDROCODONE BITARTRATE AND ACETAMINOPHEN 1 TABLET: 5; 325 TABLET ORAL at 23:36

## 2020-12-21 RX ADMIN — ATORVASTATIN CALCIUM 10 MG: 10 TABLET, FILM COATED ORAL at 08:06

## 2020-12-21 RX ADMIN — OXYCODONE HYDROCHLORIDE AND ACETAMINOPHEN 1000 MG: 500 TABLET ORAL at 08:06

## 2020-12-21 RX ADMIN — FLUTICASONE PROPIONATE 2 PUFF: 110 AEROSOL, METERED RESPIRATORY (INHALATION) at 08:06

## 2020-12-21 RX ADMIN — HYDROCODONE BITARTRATE AND ACETAMINOPHEN 1 TABLET: 5; 325 TABLET ORAL at 03:57

## 2020-12-21 RX ADMIN — GUAIFENESIN 1200 MG: 600 TABLET, EXTENDED RELEASE ORAL at 08:06

## 2020-12-21 RX ADMIN — LEVOTHYROXINE SODIUM 125 MCG: 125 TABLET ORAL at 06:10

## 2020-12-21 RX ADMIN — Medication 10 ML: at 20:08

## 2020-12-21 RX ADMIN — SODIUM CHLORIDE 20 ML: 9 INJECTION, SOLUTION INTRAVENOUS at 20:08

## 2020-12-21 RX ADMIN — DILTIAZEM HYDROCHLORIDE 120 MG: 120 CAPSULE, COATED, EXTENDED RELEASE ORAL at 13:59

## 2020-12-21 RX ADMIN — ONDANSETRON 4 MG: 2 INJECTION INTRAMUSCULAR; INTRAVENOUS at 13:18

## 2020-12-21 RX ADMIN — FERROUS SULFATE TAB 325 MG (65 MG ELEMENTAL FE) 325 MG: 325 (65 FE) TAB at 08:06

## 2020-12-21 RX ADMIN — FLUTICASONE PROPIONATE 2 PUFF: 110 AEROSOL, METERED RESPIRATORY (INHALATION) at 20:08

## 2020-12-21 RX ADMIN — ALBUTEROL SULFATE 2.5 MG: 2.5 SOLUTION RESPIRATORY (INHALATION) at 13:34

## 2020-12-21 ASSESSMENT — PAIN SCALES - GENERAL
PAINLEVEL_OUTOF10: 6
PAINLEVEL_OUTOF10: 4
PAINLEVEL_OUTOF10: 6
PAINLEVEL_OUTOF10: 6

## 2020-12-21 NOTE — PROGRESS NOTES
Physical Therapy  DATE: 2020    NAME: Trish Lopez  MRN: [id number]   : 1945    Patient not seen this date for Physical Therapy due to:  [] Blood transfusion in progress  [] Hemodialysis  [x] Patient Declined    20: Cx AM tx at  - pt did not sleep well last night, falling asleep in recliner and requests to attempt later.   [] Spine Precautions   [] Strict Bedrest  [] Surgery/ Procedure  [] Testing      [] Other        [] PT is being discontinued at this time. Patient independent. No further needs. [] PT is being discontinued at this time due to declining physical/ medical status. Therapy is not appropriate at this time.     Bruno Cain, PT

## 2020-12-21 NOTE — PROGRESS NOTES
[facility]    Gastroenterology Daily Progress Note      Patient: Ellyn Medeiros   :    1945   Facility:   [facility]  Date:     2020  Consultant:   Day Bautista CNP      SUBJECTIVE  70 y.o. female admitted 2020 with Symptomatic anemia [D64.9] and seen for iron deficiency anemia with positive fobt. The pt was seen and examined. hgb improved to 7.8 after transfusion, no c/o abdominal pain or nausea, reports decreased appetite. Still short of breath with minimal  exertion. No BM overnight.         OBJECTIVE  Scheduled Meds:   guaiFENesin  1,200 mg Oral BID    fluticasone  2 spray Each Nostril Daily    furosemide  40 mg Intravenous Daily    sodium chloride flush  10 mL Intravenous 2 times per day    ferrous sulfate  325 mg Oral Daily    ascorbic acid  1,000 mg Oral Daily    atorvastatin  10 mg Oral Daily    dilTIAZem  120 mg Oral Daily    fluticasone  2 puff Inhalation BID    [Held by provider] metoprolol succinate  25 mg Oral Daily    [Held by provider] apixaban  5 mg Oral BID    levothyroxine  125 mcg Oral Daily    pantoprazole  40 mg Oral QAM AC    DULoxetine  60 mg Oral Daily    tamsulosin  0.4 mg Oral Daily    [Held by provider] lisinopril  10 mg Oral Daily    And    [Held by provider] hydroCHLOROthiazide  12.5 mg Oral Daily       Vital Signs:  BP (!) 119/59   Pulse 105   Temp 98.3 °F (36.8 °C) (Oral)   Resp 18   Ht 5' 5\" (1.651 m)   Wt 181 lb 14.1 oz (82.5 kg)   SpO2 96%   BMI 30.27 kg/m²      Physical Exam:     General Appearance: alert and oriented to person, place and time, well-developed and well-nourished, in no acute distress  Skin: warm and dry, no rash or erythema  Head: normocephalic and atraumatic  Eyes: pupils equal, round, and reactive to light, extraocular eye movements intact, conjunctivae normal  ENT: hearing grossly normal bilaterally Neck: neck supple and non tender without mass, no thyromegaly or thyroid nodules, no cervical lymphadenopathy   Pulmonary/Chest: clear to auscultation bilaterally- no wheezes, rales or rhonchi, normal air movement, no respiratory distress  Cardiovascular: tachycardic rate, regular rhythm, normal S1 and S2, positive for  Murmurs, no  rubs, clicks or gallops, distal pulses intact, no carotid bruits  Abdomen: soft, obese non-tender, non-distended, normal bowel sounds, no masses or organomegaly  Extremities: no cyanosis, clubbing or edema  Musculoskeletal: normal range of motion, no joint swelling, deformity or tenderness  Neurologic: no cranial nerve deficit and muscle strength normal    Lab and Imaging Review     CBC  Recent Labs     12/19/20  0844 12/20/20  1152 12/21/20  0553   WBC 7.6 6.7 6.1   HGB 7.6* 7.3* 7.4*   HCT 23.3* 23.3* 23.6*   MCV 95.2 98.1 97.0    348 353       BMP  Recent Labs     12/19/20  0844 12/20/20  1152 12/21/20  0553   * 140 140   K 4.1 3.9 4.1    100 100   CO2 31 31 31   BUN 21 23 23   CREATININE 1.24* 1.73* 1.69*   GLUCOSE 115* 116* 112*   CALCIUM 8.2* 8.7 9.0       LFTS  Recent Labs     12/19/20  0040   ALKPHOS 102   ALT 10   AST 14   PROT 5.8*   BILITOT 0.29*   LABALBU 3.3*       PT/INR  Recent Labs     12/19/20  0040   PROTIME 18.0*   INR 1.5         ANEMIA STUDIES  Recent Labs     12/20/20 2008 12/21/20  0553   LABIRON 13*  --    TIBC 300  --    [id number]  --  366   FOLATE  --  12.0         ASSESSMENT/PLAN:  1. Iron deficiency anemia, fobt positive, hx avm in colon in recent colonoscopy  - discussed AC with md peña to restart    -trend hh and keep hgb >7 will given one unit of blood today  -continue ppi and ferrous sulfate  -outpt videocapsule to eval for small bowel AVM's    Gi signing off to follow up as outpt    This plan was formulated in collaboration with Dr. Papi Tate.     Electronically signed by: WILLIE Krishnan CNP on 12/21/2020 at 5:19 PM

## 2020-12-21 NOTE — CONSULTS
GI Consult Note:    Name: Raven Contreras  MRN: [id number]     Acct: [de-identified]  Room: 2097/2097-01    Admit Date: 12/18/2020  PCP: WILLIE Mobley CNP    Physician Requesting Consult: Izabella Araujo MD     Reason for Consult: Anemia. Chief Complaint:     Chief Complaint   Patient presents with    Abnormal Lab     Patient comes to the ER with a hemoglobin of 6.6, per her physician, to get a blood transfusion.  Shortness of Breath     Dx with pneumonia today by her physician after having a chest x-ray Thursday. Has not started her antibiotics. Says the drug store has yet to deliver it. History Obtained From:     patient, electronic medical record    History of Present Illness: Raven Contreras is a  70 y.o.  female who presents with Abnormal Lab (Patient comes to the ER with a hemoglobin of 6.6, per her physician, to get a blood transfusion.) and Shortness of Breath (Dx with pneumonia today by her physician after having a chest x-ray Thursday. Has not started her antibiotics. Says the drug store has yet to deliver it.)      Symptoms:  Onset:  Patient seen at [facility] around 12 noon. She is admitted to the hospital with a hemoglobin of 6.6. Apparently this patient had a labs done as an outpatient by Dr. Susannah Pelayo, and was found to have abnormal hemoglobin and subsequently she was sent to the emergency department. During my visit patient denies GI symptoms including abdominal pain, bloating, nausea vomiting etc.  Patient has atrial fibrillation and has been on anticoagulation therapy with Eliquis. However she did obvious hematochezia recently. In the beginning of this month that is on 12/2/2020, she had EGD and colonoscopy done by Dr. Larry Cedillo and EGD was unremarkable, colonoscopy revealed 4 to 5 mm AVM in the ascending colon that was cauterized. She has multiple other medical issues including stage III kidney disease, chronic back pain, ethanol abuse, chronic GERD, osteoarthritis, pulmonary hypertension, COPD etc.    At present after 1 unit of packed red cells, hemoglobin is about 7.6. Her stool is positive for occult blood. No prior history of NSAID use. Also APRN notes reviewed. Past Medical History:     Past Medical History:   Diagnosis Date    A-fib [facility])     Acquired hypothyroidism     Acute encephalopathy     [facility]     Anxiety     Benign hypertension with CKD (chronic kidney disease) stage III     Chronic back pain     CKD (chronic kidney disease) stage 3, GFR 30-59 ml/min     Constipation     COPD (chronic obstructive [facility] disease) ([facility])     Cough     Depression     ETOH abuse     Former smoker     3 packs a day for 26 years    HA (generalized anxiety disorder)     History of GI bleed     Hyperlipidemia     Hypertension     Hypothyroid 1/18/2013    Leg pain     Normocytic anemia     Osteoarthritis     PAD (peripheral artery disease) ([facility])     Primary osteoarthritis     Pulmonary hypertension ([facility]     Pure hypercholesterolemia     SIRS (systemic inflammatory response syndrome) ([facility])     Urge incontinence     Wheezing         Past Surgical History:     Past Surgical History:   Procedure Laterality Date    COLONOSCOPY      COLONOSCOPY N/A 12/2/2020    COLONOSCOPY CONTROL OF BLEEDING performed by Kasia Merino MD at [facility] Bilateral     cataracts    JOINT REPLACEMENT      Left knee on 9-11-18    UPPER GASTROINTESTINAL ENDOSCOPY N/A 12/2/2020    EGD performed by Kasia Merino MD at [address] [place] [address]        Medications Prior to Admission:       Prior to Admission medications    Medication Sig Start Date End Date Taking?  Authorizing Provider   tamsulosin (FLOMAX) 0.4 MG capsule TAKE 1 CAPSULE BY MOUTH DAILY 12/15/20  Yes Lorena Huynh APRN - CNP omeprazole (PRILOSEC) 40 MG delayed release capsule TAKE 1 CAPSULE BY MOUTH EVERY MORNING BEFORE BREAKFAST 12/14/20  Yes WILLIE Mobley CNP   DULoxetine (CYMBALTA) 60 MG extended release capsule Take 1 capsule by mouth daily 12/14/20  Yes WILLIE Mobley CNP   furosemide (LASIX) 40 MG tablet TAKE 1 TABLET BY MOUTH DAILY  Patient taking differently: Take 40 mg by mouth daily Currently taking 2 tablets daily  12/18/20 11/30/20  Yes WILLIE Mobley CNP   baclofen (LIORESAL) 10 MG tablet TAKE ONE TABLET BY MOUTH THREE TIMES A DAY AS NEEDED. 11/30/20  Yes WILLIE Mobley CNP   HYDROcodone-acetaminophen (NORCO) 5-325 MG per tablet Take 1 tablet by mouth every 8 hours as needed for Pain for up to 30 days. 11/30/20 12/30/20 Yes WILLIE Mobley CNP   lisinopril-hydroCHLOROthiazide (PRINZIDE;ZESTORETIC) 10-12.5 MG per tablet Take 1 tablet by mouth daily 11/30/20  Yes WILLIE Mobley CNP   ELIQUIS 5 MG TABS tablet TAKE ONE TABLET BY MOUTH TWICE A DAY 11/30/20  Yes WILLIE Mobley CNP   levothyroxine (SYNTHROID) 125 MCG tablet Take 1 tablet by mouth daily 11/30/20  Yes WILLIE Mobley CNP   metoprolol succinate (TOPROL XL) 25 MG extended release tablet TAKE 1 TABLET BY MOUTH DAILY. (PLEASE MAKE DR HER FOR FUTURE REFILLS) 11/23/20  Yes WILLIE Mobley CNP   clonazePAM (KLONOPIN) 0.5 MG tablet Take 1 tablet by mouth 3 times daily as needed for Anxiety for up to 30 days.  11/17/20 12/19/20 Yes WILLIE Mobley CNP   fluticasone (FLOVENT HFA) 220 MCG/ACT inhaler Inhale 2 puffs into the lungs 2 times daily 11/17/20  Yes WILLIE Mobley CNP   albuterol (PROVENTIL) (2.5 MG/3ML) 0.083% nebulizer solution Take 3 mLs by nebulization every 6 hours as needed for Wheezing 11/5/20  Yes Abhinav Segura, APRN - CNP dilTIAZem (CARDIZEM CD) 120 MG extended release capsule Take 1 capsule by mouth daily 10/19/20  Yes WILLIE Kwan CNP   albuterol sulfate HFA (PROAIR HFA) 108 (90 Base) MCG/ACT inhaler Inhale 2 puffs into the lungs every 6 hours as needed for Wheezing   Yes Historical Provider, MD   fluticasone (FLONASE) 50 MCG/ACT nasal spray USE ONE SPRAY TO EACH NOSTRIL ONCE ADAY 9/4/20  Yes WILLIE Xiong CNP   atorvastatin (LIPITOR) 10 MG tablet TAKE 1 TABLET BY MOUTH ONCE DAILY 9/4/20  Yes WILLIE Xiong CNP   Ascorbic Acid (C-1000 PO) Take 1 capsule by mouth daily    Yes Historical Provider, MD   ferrous sulfate 325 (65 Fe) MG tablet Take 325 mg by mouth daily  8/30/18  Yes Historical Provider, MD   Oxygen Concentrator continuous    Historical Provider, MD   Lift Chair MISC Use daily for comfort  Patient not taking: Reported on 12/18/2020 11/30/20   WILLIE Kwan CNP   PROAIR  (96 Base) MCG/ACT inhaler Inhale 2 puffs into the lungs every 6 hours as needed for Wheezing 11/17/20   WILLIE Kwan CNP   levalbuterol [facility] HFA) 45 MCG/ACT inhaler Inhale 1 puff into the lungs every 4 hours as needed for Wheezing 8/19/20   WILLIE Kwan CNP   Respiratory Therapy Supplies (NEBULIZER/TUBING/MOUTHPIECE) KIT Use every 4-6 hours prn for copd 6/15/20   WILLIE Kwan CNP        Allergies:       Tizanidine    Social History:     Tobacco:    reports that she has quit smoking. Her smoking use included cigarettes. She has a 78.00 pack-year smoking history. She has never used smokeless tobacco.  Alcohol:      reports current alcohol use. Drug Use: reports no history of drug use.     Family History:     Family History   Problem Relation Age of Onset    Heart Disease Mother     COPD Mother     Emphysema Sister     Cancer Other         Cousin - breast cancer       Review of Systems:     Review of Systems   Unable to perform ROS: Other As patient is drowsy, review of systems not able to obtain. However I did review the chart and noted the review of systems. Code Status:  Full Code    Physical Exam:     Vitals:  BP (!) 93/45   Pulse 62   Temp 98.1 °F (36.7 °C) (Oral)   Resp 16   Ht 5' 5\" (1.651 m)   Wt 182 lb 15.7 oz (83 kg)   SpO2 97%   BMI 30.45 kg/m²   Temp (24hrs), Av.8 °F (36.6 °C), Min:97.3 °F (36.3 °C), Max:98.1 °F (36.7 °C)      Physical Exam  Vitals signs and nursing note reviewed. Constitutional:       Appearance: She is well-developed. Comments: Patient is drowsy, arousable. HENT:      Head: Normocephalic and atraumatic. Eyes:      General: No scleral icterus. Conjunctiva/sclera: Conjunctivae normal.      Pupils: Pupils are equal, round, and reactive to light. Neck:      Musculoskeletal: Normal range of motion and neck supple. Thyroid: No thyromegaly. Vascular: No hepatojugular reflux or JVD. Trachea: No tracheal deviation. Cardiovascular:      Rate and Rhythm: Normal rate and regular rhythm. Heart sounds: Normal heart sounds. Pulmonary:      Effort: Pulmonary effort is normal. No respiratory distress. Breath sounds: Normal breath sounds. No wheezing or rales. Abdominal:      General: Bowel sounds are normal. There is no distension. Palpations: Abdomen is soft. There is no hepatomegaly or mass. Tenderness: There is no abdominal tenderness. There is no rebound. Hernia: No hernia is present. Musculoskeletal:         General: No tenderness. Comments: No joint swelling   Lymphadenopathy:      Cervical: No cervical adenopathy. Skin:     General: Skin is warm. Findings: No bruising, ecchymosis, erythema or rash. Neurological:      Mental Status: She is alert and oriented to person, place, and time. Cranial Nerves: No cranial nerve deficit. Psychiatric:         Thought Content:  Thought content normal.       Data:   CBC:   Lab Results Component Value Date    WBC 6.7 12/20/2020    RBC 2.38 12/20/2020    RBC 4.50 02/18/2012    HGB 7.3 12/20/2020    HCT 23.3 12/20/2020    MCV 98.1 12/20/2020    MCH 30.8 12/20/2020    MCHC 31.4 12/20/2020    RDW 18.6 12/20/2020     12/20/2020     02/18/2012    MPV 7.8 12/20/2020     CBC with Differential:  Lab Results   Component Value Date    WBC 6.7 12/20/2020    RBC 2.38 12/20/2020    RBC 4.50 02/18/2012    HGB 7.3 12/20/2020    HCT 23.3 12/20/2020     12/20/2020     02/18/2012    MCV 98.1 12/20/2020    MCH 30.8 12/20/2020    MCHC 31.4 12/20/2020    RDW 18.6 12/20/2020    LYMPHOPCT 18 12/19/2020    MONOPCT 11 12/19/2020    BASOPCT 1 12/19/2020    MONOSABS 0.60 12/19/2020    LYMPHSABS 1.00 12/19/2020    EOSABS 0.20 12/19/2020    BASOSABS 0.10 12/19/2020    DIFFTYPE NOT REPORTED 12/19/2020     Hemoglobin/Hematocrit:  Lab Results   Component Value Date    HGB 7.3 12/20/2020    HCT 23.3 12/20/2020     CMP:    Lab Results   Component Value Date     12/20/2020    K 3.9 12/20/2020     12/20/2020    CO2 31 12/20/2020    BUN 23 12/20/2020    CREATININE 1.73 12/20/2020    GFRAA 35 12/20/2020    LABGLOM 29 12/20/2020    GLUCOSE 116 12/20/2020    GLUCOSE 113 02/18/2012    PROT 5.8 12/19/2020    LABALBU 3.3 12/19/2020    LABALBU 4.5 02/18/2012    CALCIUM 8.7 12/20/2020    BILITOT 0.29 12/19/2020    ALKPHOS 102 12/19/2020    AST 14 12/19/2020    ALT 10 12/19/2020     BMP:  Lab Results   Component Value Date     12/20/2020    K 3.9 12/20/2020     12/20/2020    CO2 31 12/20/2020    BUN 23 12/20/2020    LABALBU 3.3 12/19/2020    LABALBU 4.5 02/18/2012    CREATININE 1.73 12/20/2020    CALCIUM 8.7 12/20/2020    GFRAA 35 12/20/2020    LABGLOM 29 12/20/2020    GLUCOSE 116 12/20/2020    GLUCOSE 113 02/18/2012     PT/INR:    Lab Results   Component Value Date    PROTIME 18.0 12/19/2020    INR 1.5 12/19/2020     PTT:    Lab Results   Component Value Date    APTT 36.9 12/19/2020 [APTT}    Assesment:     Primary Problem  Symptomatic anemia    Active Hospital Problems    Diagnosis Date Noted    Symptomatic anemia [D64.9] 12/19/2020    Pneumonia due to organism [J18.9]     Iron deficiency anemia due to chronic blood loss [D50.0] 12/18/2020    Acute on chronic diastolic CHF (congestive [facility] failure) ([facility] [I50.33] 07/21/2020    Obesity, Class I, BMI 30-34.9 [E66.9] 07/20/2020    Moderate major depression ([facility] [F32.1] 06/17/2020    PAD (peripheral artery disease) ([facility] [I73.9] 06/17/2020    CKD (chronic kidney disease) stage 3, GFR 30-59 ml/min [N18.30]     HA (generalized anxiety disorder) [F41.1] 12/13/2018    A-fib ([facility] [I48.91] 09/13/2018    Pulmonary hypertension ([facility] [I27.20] 09/13/2018    COPD (chronic obstructive pulmonary disease) ([facility] [J44.9] 09/13/2018    Acquired hypothyroidism [E03.9] 01/18/2013       Plan:     1. Patient has recurrent anemia and stool is positive for blood. 2. Anticoagulation therapy may be contributing to her recurrent anemia. 3. Need to evaluate small bowel AVMs. She did have AVM in the ascending colon basing on the colonoscopy 3 weeks ago. 4. At present no signs of overt bleeding and need for close observation and plan further work-up may be as an outpatient. 5. Diet as tolerated        Thank you for allowing me to participate in the care of your patient. Please feel free to contact me with anyquestions or concerns. Electronically signed by Alison Shay MD on 12/20/2020 at 7:41 PM     Copy sent to WILLIE Aguilar - CNP    Note is dictated utilizing voice recognition software. Unfortunately this leads to occasional typographical errors. Please contact our office if you have any questions.

## 2020-12-21 NOTE — PLAN OF CARE
Problem: Falls - Risk of:  Goal: Will remain free from falls  Description: Will remain free from falls  12/21/2020 0501 by Hemalatha Dey RN  Outcome: Ongoing  Note: No falls this shift. Call light with in reach, side rails up x2, bed in lowest postion. Patients safety maintained. 12/20/2020 1511 by Jl Jeffrey RN  Outcome: Ongoing  Goal: Absence of physical injury  Description: Absence of physical injury  12/21/2020 0501 by Hemalatha Dey RN  Outcome: Ongoing  Note: No injury this shift. Patients safety maintained. 12/20/2020 1511 by Jl Jeffrey RN  Outcome: Ongoing     Problem: Bleeding:  Goal: Will show no signs and symptoms of excessive bleeding  Description: Will show no signs and symptoms of excessive bleeding  12/21/2020 0501 by Hemalatha Dey RN  Outcome: Ongoing  Note: No s/s of bleeding noted this shift  12/20/2020 1511 by Jl Jeffrey RN  Outcome: Ongoing     Problem: Pain:  Goal: Pain level will decrease  Description: Pain level will decrease  12/21/2020 0501 by Hemalatha Dey RN  Outcome: Ongoing  Note: Patients pain level decreased with prescribed pain medications. 12/20/2020 1511 by Jl Jeffrey RN  Outcome: Ongoing  Goal: Control of acute pain  Description: Control of acute pain  12/21/2020 0501 by Hemalatha Dey RN  Outcome: Ongoing  Note: Patients pain level decreased with prescribed pain medications. 12/20/2020 1511 by Jl Jeffrey RN  Outcome: Ongoing  Goal: Control of chronic pain  Description: Control of chronic pain  12/21/2020 0501 by Hemalatha Dey RN  Outcome: Ongoing  Note: Patients pain level decreased with prescribed pain medications.    12/20/2020 1511 by Jl Jeffrey RN  Outcome: Ongoing     Problem: Musculor/Skeletal Functional Status  Goal: Highest potential functional level  12/21/2020 0501 by Hemalatha Dey RN  Outcome: Ongoing  12/20/2020 1511 by Jl Jeffrey RN  Outcome: Ongoing  Goal: Absence of falls  12/21/2020 0501 by Hemalatha Dey RN Outcome: Ongoing  Note: No falls this shift. Call light with in reach, side rails up x2, bed in lowest postion. Patients safety maintained.    12/20/2020 1511 by Gustabo Gonzales RN  Outcome: Ongoing

## 2020-12-21 NOTE — PROGRESS NOTES
Patient has audible wheezing while breathing. Marcelina Respiratory Therapist called for PRN treatment.

## 2020-12-21 NOTE — PROGRESS NOTES
Patient's HR jumped up to the 130's while she was sitting in chair. She did not receive her Cardizem this AM d/t low BP. BP is now 119/59. Dr. Sharif Magallanes to see if we could give medication.

## 2020-12-21 NOTE — PROGRESS NOTES
Dr. Denise Lux returns [facility]. Orders received to hold both Lasix and Cardizem this AM, and consult cardiology-Dr. Amelia Stack. Orders placed.

## 2020-12-21 NOTE — PROGRESS NOTES
Physical Therapy  DATE: 2020    NAME: Morro Duran  MRN: [id number]   : 1945    Patient not seen this date for Physical Therapy due to:  [] Blood transfusion in progress  [] Hemodialysis  [] Patient Declined  [] Spine Precautions   [] Strict Bedrest  [] Surgery/ Procedure  [] Testing      [x] Other  20: Attempt x2, pt nauseous this PM 1318, RN [address] in room. [] PT is being discontinued at this time. Patient independent. No further needs. [] PT is being discontinued at this time due to declining physical/ medical status. Therapy is not appropriate at this time.     Trina Arcos, PT

## 2020-12-21 NOTE — PROGRESS NOTES
FAMILY MEDICINE  - PROGRESS NOTE    Date:  12/21/2020  [place]  [id number]      Chief Complaint   Patient presents with    Abnormal Lab     Patient comes to the ER with a hemoglobin of 6.6, per her physician, to get a blood transfusion.  Shortness of Breath     Dx with pneumonia today by her physician after having a chest x-ray Thursday. Has not started her antibiotics. Says the drug store has yet to deliver it. Interval History:  Worsened, her BP has been running low since yesterday. Her BNP has increased, I will consult Cardiology.       Subjective  Eyes: positive for contacts/glasses  Respiratory: positive for emphysema  Cardiovascular: positive for irregular heart beat  Hematologic/lymphatic: positive for pallor  Musculoskeletal:positive for arthralgias, myalgias and stiff joints  Behavioral/Psych: positive for anxiety and depression:    Objective:    /68   Pulse 82   Temp 97.2 °F (36.2 °C) (Oral)   Resp 20   Ht 5' 5\" (1.651 m)   Wt 181 lb 14.1 oz (82.5 kg)   SpO2 94%   BMI 30.27 kg/m²   General appearance - overweight  Mental status - drowsy  Eyes - not examined  Ears - bilateral TM's and external ear canals normal  Nose - normal and patent, no erythema, discharge or polyps  Mouth - mucous membranes moist, pharynx normal without lesions  Neck - supple, no significant adenopathy  Lymphatics - no palpable lymphadenopathy, no hepatosplenomegaly  Chest - clear to auscultation, no wheezes, rales or rhonchi, symmetric air entry, decreased air entry noted posteriorly  Heart - irregularly irregular rhythm with rate 82  Abdomen - soft, nontender, nondistended, no masses or organomegaly  Breasts - not examined  Back exam - not examined  Neurological - neck supple without rigidity  Musculoskeletal - osteoarthritic changes noted in both hands  Extremities - peripheral pulses normal, no pedal edema, no clubbing or cyanosis Skin - normal coloration and turgor, no rashes, no suspicious skin lesions noted    Data:   Medications:   Current Facility-Administered Medications   Medication Dose Route Frequency Provider Last Rate Last Admin    guaiFENesin (MUCINEX) extended release tablet 1,200 mg  1,200 mg Oral BID Neelima Flower MD   1,200 mg at 12/20/20 2116    fluticasone (FLONASE) 50 MCG/ACT nasal spray 2 spray  2 spray Each Nostril Daily Neelima Flower MD        furosemide (LASIX) injection 40 mg  40 mg Intravenous Daily Neelima Flower MD        sodium chloride flush 0.9 % injection 10 mL  10 mL Intravenous 2 times per day Kristina Bejarano MD   10 mL at 12/20/20 2117    sodium chloride flush 0.9 % injection 10 mL  10 mL Intravenous PRN Kristina Bejarano MD        acetaminophen (TYLENOL) tablet 650 mg  650 mg Oral Q4H PRN Kristina Bejarano MD        ferrous sulfate (IRON 325) tablet 325 mg  325 mg Oral Daily Kristina Bejarano MD   325 mg at 12/20/20 9797    ascorbic acid (VITAMIN C) tablet 1,000 mg  1,000 mg Oral Daily Kristina Bejarano MD   1,000 mg at 12/20/20 0849    atorvastatin (LIPITOR) tablet 10 mg  10 mg Oral Daily Kristina Bejarano MD   10 mg at 12/20/20 0849    dilTIAZem (CARDIZEM CD) extended release capsule 120 mg  120 mg Oral Daily Kristina Bejarano MD   120 mg at 12/19/20 1051    albuterol (PROVENTIL) nebulizer solution 2.5 mg  2.5 mg Nebulization Q6H PRN Kristina Bejarano MD        clonazePAM Pap[facility] guadalupe Peacock) tablet 0.5 mg  0.5 mg Oral TID PRN Kristina Bejarano MD   0.5 mg at 12/20/20 2319    fluticasone (FLOVENT HFA) 110 MCG/ACT inhaler 2 puff  2 puff Inhalation BID Kristina Bejarano MD   2 puff at 12/20/20 2116    [Held by provider] metoprolol succinate (TOPROL XL) extended release tablet 25 mg  25 mg Oral Daily Kristina Bejarano MD   25 mg at 12/19/20 1051    baclofen (LIORESAL) tablet 10 mg  10 mg Oral TID PRN Kristina Bejarano MD   10 mg at 12/20/20 2115  HYDROcodone-acetaminophen (NORCO) 5-325 MG per tablet 1 tablet  1 tablet Oral Q8H PRN Juan Luna MD   1 tablet at 12/21/20 0357    [Held by provider] apixaban (ELIQUIS) tablet 5 mg  5 mg Oral BID Juan Luna MD        levothyroxine (SYNTHROID) tablet 125 mcg  125 mcg Oral Daily Juan Luna MD   125 mcg at 12/20/20 9180    pantoprazole (PROTONIX) tablet 40 mg  40 mg Oral QAM AC Juan Luna MD   40 mg at 12/20/20 9631    DULoxetine (CYMBALTA) extended release capsule 60 mg  60 mg Oral Daily Juan Luna MD   60 mg at 12/20/20 0849    tamsulosin (FLOMAX) capsule 0.4 mg  0.4 mg Oral Daily Juan Luna MD   0.4 mg at 12/20/20 0849    [Held by provider] lisinopril (PRINIVIL;ZESTRIL) tablet 10 mg  10 mg Oral Daily Juan Luna MD   10 mg at 12/19/20 1051    And    [Held by provider] hydroCHLOROthiazide (HYDRODIURIL) tablet 12.5 mg  12.5 mg Oral Daily Juan Luna MD   12.5 mg at 12/19/20 1051    potassium chloride (KLOR-CON M) extended release tablet 40 mEq  40 mEq Oral PRN Juan Luna MD        Or    potassium bicarb-citric acid (EFFER-K) effervescent tablet 40 mEq  40 mEq Oral PRN Juan Luna MD        Or   [facility] potassium chloride 10 mEq/100 mL IVPB (Peripheral Line)  10 mEq Intravenous PRN Juan Luna MD           Intake/Output Summary (Last 24 hours) at 12/21/2020 0607  Last data filed at 12/20/2020 1838  Gross per 24 hour   Intake 680 ml   Output 200 ml   Net 480 ml     Recent Results (from the past 24 hour(s))   CBC    Collection Time: 12/20/20 11:52 AM   Result Value Ref Range    WBC 6.7 3.5 - 11.0 k/uL    RBC 2.38 (L) 4.0 - 5.2 m/uL    Hemoglobin 7.3 (L) 12.0 - 16.0 g/dL    Hematocrit 23.3 (L) 36 - 46 %    MCV 98.1 80 - 100 fL    MCH 30.8 26 - 34 pg    MCHC 31.4 31 - 37 g/dL    RDW 18.6 (H) 11.5 - 14.9 %    Platelets 179 306 - 833 k/uL    MPV 7.8 6.0 - 12.0 fL    NRBC Automated NOT REPORTED per 100 WBC   Basic Metabolic Prof Collection Time: 12/20/20 11:52 AM   Result Value Ref Range    Glucose 116 (H) 70 - 99 mg/dL    BUN 23 8 - 23 mg/dL    CREATININE 1.73 (H) 0.50 - 0.90 mg/dL    Bun/Cre Ratio NOT REPORTED 9 - 20    Calcium 8.7 8.6 - 10.4 mg/dL    Sodium 140 135 - 144 mmol/L    Potassium 3.9 3.7 - 5.3 mmol/L    Chloride 100 98 - 107 mmol/L    CO2 31 20 - 31 mmol/L    Anion Gap 9 9 - 17 mmol/L    GFR Non-African American 29 (L) >60 mL/min    GFR  35 (L) >60 mL/min    GFR Comment          GFR Staging NOT REPORTED    Procalcitonin    Collection Time: 12/20/20  8:08 PM   Result Value Ref Range    Procalcitonin 0.08 <0.09 ng/mL   Troponin    Collection Time: 12/20/20  8:08 PM   Result Value Ref Range    Troponin, High Sensitivity 21 (H) 0 - 14 ng/L    Troponin T NOT REPORTED <0.03 ng/mL    Troponin Interp NOT REPORTED    Iron and TIBC    Collection Time: 12/20/20  8:08 PM   Result Value Ref Range    Iron 39 37 - 145 ug/dL    TIBC 300 250 - 450 ug/dL    Iron Saturation 13 (L) 20 - 55 %    UIBC 261 112 - 347 ug/dL   Reticulocytes    Collection Time: 12/20/20  8:08 PM   Result Value Ref Range    Retic % 3.3 (H) 0.5 - 2.0 %    Absolute Retic # 0.085 0.0245 - 0.098 M/uL    Immature Retic Fract NOT REPORTED %    Retic Hemoglobin NOT REPORTED 28.2 - 35.7 pg     -----------------------------------------------------------------  RAD:  EKG:  Micro:     Assessment & Plan:    Patient Active Problem List:     Acquired hypothyroidism     Hypertension     Primary osteoarthritis of right knee     A-fib (HCC)     Acute encephalopathy     SIRS (systemic inflammatory response syndrome) (HCC)     Normocytic anemia     Pulmonary hypertension (HCC)     COPD (chronic obstructive pulmonary disease) (HCC)     History of GI bleed     Constipation     HA (generalized anxiety disorder)     Lumbar radiculopathy     Lumbosacral spondylosis without myelopathy     Benign hypertension with CKD (chronic kidney disease) stage III

## 2020-12-21 NOTE — PROGRESS NOTES
Physician Progress Note      PATIENTLortang Vieyra  CSN #:                  [id number]  :                       1945  ADMIT DATE:       2020 8:33 PM  [address] [place] DATE:  RESPONDING  PROVIDER #:        Raul Hancock MD          QUERY TEXT:    Pt admitted with Anemia. Noted documentation of \"Do not feel that she has   pneumonia\" in Pulm consult note dated 2020. If possible, please   document in progress notes and discharge summary:[[The medical record reflects   the following:  Risk Factors: 70 yr old female, former smoker, questionable COPD dx, former   smoker, home O2  Clinical Indicators: Per Pulm notes \"Do not feel that she has pneumonia,   Appears to be fluid overloaded on clinical exam and by x-ray as well as   elevated proBNP\"  Treatment: Pulm consult, Discontinue antibiotics and observe (one dose of   cefepime & vanco was received)  Options provided:  -- Pneumonia confirmed present on admission  -- Pneumonia ruled out  -- Other - I will add my own diagnosis  -- Disagree - Not applicable / Not valid  -- Disagree - Clinically unable to determine / Unknown  -- Refer to Clinical Documentation Reviewer    PROVIDER RESPONSE TEXT:    The diagnosis of Pneumonia was ruled out. Query created by:  Taty Hanna on 2020 8:17 AM      Electronically signed by:  Raul Hancock MD 2020 10:49 AM

## 2020-12-21 NOTE — PROGRESS NOTES
Patient's BP was 93/67, HR was 83. Patient is d/t get IV Lasix 40mg, and Cardizem 120mg this AM. Patient's BNP also increased to 6,049 from 4,104. Dr. Wendy Lundberg contacted via [address] [place] [address].

## 2020-12-21 NOTE — PROGRESS NOTES
Today's Date: 12/21/2020  Patient Name: Yeimi Victoria  Date of admission: 12/18/2020  8:33 PM  Patient's age: 70 y.o., 1945  Admission Dx: Symptomatic anemia [D64.9]    Reason for Consult: anemia  Requesting Physician: Kun Mcrae MD    CHIEF COMPLAINT:    Chief Complaint   Patient presents with    Abnormal Lab     Patient comes to the ER with a hemoglobin of 6.6, per her physician, to get a blood transfusion.  Shortness of Breath     Dx with pneumonia today by her physician after having a chest x-ray Thursday. Has not started her antibiotics. Says the drug store has yet to deliver it. SUBJECTIVE:    Patient seen and examined  Patient seen by gastroenterology and planning repeat colonoscopy in few weeks  Hb 7.4  ++fatigue  NO NV  HISTORY OF PRESENT ILLNESS IN BRIEF:    Yeimi Victoria  is a 70 y.o. female who is admitted to the hospital for abnormal lab and hemoglobin of 6.6. Patient has recently been evaluated by Dr. Dana Lynch hematologist as outpatient on 12/18 for chronic anemia, possible iron deficiency, history of AV malformation. He has ordered some labs and there was a plan to start patient on IV iron supplements if her lab work suggest significant iron deficiency. Her lab work at the office showed hemoglobin of 6.6 and therefore she was instructed to go to ER. Patient has been maintained on chronically anticoagulation with Eliquis for her history of atrial fibrillation. Past Medical History:   has a past medical history of A-fib ([facility] [address] Acquired hypothyroidism, Acute encephalopathy, Acute kidney injury ([facility] [address] Anxiety, Benign hypertension with CKD (chronic kidney disease) stage III, Chronic back pain, CKD (chronic kidney disease) stage 3, GFR 30-59 ml/min, Constipation, COPD (chronic obstructive pulmonary disease) ([facility] [address] Cough, Depression, ETOH abuse, Former smoker, HA (generalized anxiety disorder), History of GI bleed, Hyperlipidemia, Hypertension, Hypothyroid, Leg pain, Normocytic anemia, Osteoarthritis, PAD (peripheral artery disease) ([facility] [address] Primary osteoarthritis, Pulmonary hypertension ([facility] [address] Pure hypercholesterolemia, SIRS (systemic inflammatory response syndrome) ([facility] [address] Urge incontinence, and Wheezing. Past Surgical History:   has a past surgical history that includes eye surgery (Bilateral); Colonoscopy; joint replacement; Upper gastrointestinal endoscopy (N/A, 12/2/2020); and Colonoscopy (N/A, 12/2/2020). Medications:    Prior to Admission medications    Medication Sig Start Date End Date Taking?  Authorizing Provider   tamsulosin (FLOMAX) 0.4 MG capsule TAKE 1 CAPSULE BY MOUTH DAILY 12/15/20  Yes WILLIE Albert - CNP   omeprazole (PRILOSEC) 40 MG delayed release capsule TAKE 1 CAPSULE BY MOUTH EVERY MORNING BEFORE BREAKFAST 12/14/20  Yes Jessica Aquino APRDAGMAR - CNP   DULoxetine (CYMBALTA) 60 MG extended release capsule Take 1 capsule by mouth daily 12/14/20  Yes WILLIE Albert - CNP   furosemide (LASIX) 40 MG tablet TAKE 1 TABLET BY MOUTH DAILY  Patient taking differently: Take 40 mg by mouth daily Currently taking 2 tablets daily  12/18/20 11/30/20  Yes Jessica Aquino APRN - CNP   baclofen (LIORESAL) 10 MG tablet TAKE ONE TABLET BY MOUTH THREE TIMES A DAY AS NEEDED. 11/30/20  Yes WILLIE Albert - BOBY HYDROcodone-acetaminophen (NORCO) 5-325 MG per tablet Take 1 tablet by mouth every 8 hours as needed for Pain for up to 30 days. 11/30/20 12/30/20 Yes WILLIE Mayo CNP   lisinopril-hydroCHLOROthiazide (PRINZIDE;ZESTORETIC) 10-12.5 MG per tablet Take 1 tablet by mouth daily 11/30/20  Yes WILLIE Mayo CNP   ELIQUIS 5 MG TABS tablet TAKE ONE TABLET BY MOUTH TWICE A DAY 11/30/20  Yes WILLIE Mayo CNP   levothyroxine (SYNTHROID) 125 MCG tablet Take 1 tablet by mouth daily 11/30/20  Yes WILLIE Mayo CNP   metoprolol succinate (TOPROL XL) 25 MG extended release tablet TAKE 1 TABLET BY MOUTH DAILY. (PLEASE MAKE DR HER FOR FUTURE REFILLS) 11/23/20  Yes WILLIE Mayo CNP   clonazePAM (KLONOPIN) 0.5 MG tablet Take 1 tablet by mouth 3 times daily as needed for Anxiety for up to 30 days.  11/17/20 12/19/20 Yes WILLIE Mayo CNP   fluticasone (FLOVENT HFA) 220 MCG/ACT inhaler Inhale 2 puffs into the lungs 2 times daily 11/17/20  Yes WILLIE Mayo CNP   albuterol (PROVENTIL) (2.5 MG/3ML) 0.083% nebulizer solution Take 3 mLs by nebulization every 6 hours as needed for Wheezing 11/5/20  Yes WILLIE Mayo CNP   dilTIAZem (CARDIZEM CD) 120 MG extended release capsule Take 1 capsule by mouth daily 10/19/20  Yes WILLIE Mayo CNP   albuterol sulfate HFA (PROAIR HFA) 108 (90 Base) MCG/ACT inhaler Inhale 2 puffs into the lungs every 6 hours as needed for Wheezing   Yes Historical Provider, MD   fluticasone (FLONASE) 50 MCG/ACT nasal spray USE ONE SPRAY TO EACH NOSTRIL ONCE ADAY 9/4/20  Yes WILLIE Chacon CNP   atorvastatin (LIPITOR) 10 MG tablet TAKE 1 TABLET BY MOUTH ONCE DAILY 9/4/20  Yes WILLIE Chacon CNP   Ascorbic Acid (C-1000 PO) Take 1 capsule by mouth daily    Yes Historical Provider, MD   ferrous sulfate 325 (65 Fe) MG tablet Take 325 mg by mouth daily  8/30/18  Yes Historical Provider, MD Oxygen Concentrator continuous    Historical Provider, MD   Lift Chair [facility] Use daily for comfort  Patient not taking: Reported on 12/18/2020 11/30/20   WILLIE Villa CNP   PROAIR  (89 Base) MCG/ACT inhaler Inhale 2 puffs into the lungs every 6 hours as needed for Wheezing 11/17/20   WILLIE Villa CNP   levalbuterol [facility]) 45 MCG/ACT inhaler Inhale 1 puff into the lungs every 4 hours as needed for Wheezing 8/19/20   WILLIE Villa CNP   Respiratory Therapy Supplies (NEBULIZER/TUBING/MOUTHPIECE) KIT Use every 4-6 hours prn for copd 6/15/20   WILLIE Villa CNP     Current Facility-Administered Medications   Medication Dose Route Frequency Provider Last Rate Last Admin    ondansetron (ZOFRAN) injection 4 mg  4 mg Intravenous Q6H PRN Patti Macario MD   4 mg at 12/21/20 1318    guaiFENesin [facility]) extended release tablet 1,200 mg  1,200 mg Oral BID Jose Juan Kowalski MD   1,200 mg at 12/21/20 0806    fluticasone (FLONASE) 50 MCG/ACT nasal spray 2 spray  2 spray Each Nostril Daily Jose Juan Kowalski MD   2 spray at 12/21/20 0806    furosemide (LASIX) injection 40 mg  40 mg Intravenous Daily Jose Juan Kowalski MD   Stopped at 12/21/20 1002    sodium chloride flush 0.9 % injection 10 mL  10 mL Intravenous 2 times per day Patti Macario MD   10 mL at 12/21/20 0801    sodium chloride flush 0.9 % injection 10 mL  10 mL Intravenous PRN Patti Macario MD        acetaminophen (TYLENOL) tablet 650 mg  650 mg Oral Q4H PRN Patti Macario MD   650 mg at 12/21/20 1325    ferrous sulfate (IRON 325) tablet 325 mg  325 mg Oral Daily Patti Macario MD   325 mg at 12/21/20 8588    ascorbic acid (VITAMIN C) tablet 1,000 mg  1,000 mg Oral Daily Patti Macario MD   1,000 mg at 12/21/20 0806    atorvastatin (LIPITOR) tablet 10 mg  10 mg Oral Daily Patti Macario MD   10 mg at 12/21/20 9574  dilTIAZem (CARDIZEM CD) extended release capsule 120 mg  120 mg Oral Daily Juve Cespedes MD   120 mg at 12/21/20 1359    albuterol (PROVENTIL) nebulizer solution 2.5 mg  2.5 mg Nebulization Q6H PRN Juve Cespedes MD   2.5 mg at 12/21/20 1334    clonazePAM (KLONOPIN) tablet 0.5 mg  0.5 mg Oral TID PRN Juve Cespedes MD   0.5 mg at 12/20/20 2319    fluticasone (FLOVENT HFA) 110 MCG/ACT inhaler 2 puff  2 puff Inhalation BID Juve Cespedes MD   2 puff at 12/21/20 0806    [Held by provider] metoprolol succinate (TOPROL XL) extended release tablet 25 mg  25 mg Oral Daily Juve Cespedes MD   25 mg at 12/19/20 1051    baclofen (LIORESAL) tablet 10 mg  10 mg Oral TID PRN Juve Cespedes MD   10 mg at 12/20/20 2118    HYDROcodone-acetaminophen (NORCO) 5-325 MG per tablet 1 tablet  1 tablet Oral Q8H PRN Juve Cespedes MD   1 tablet at 12/21/20 0357    [Held by provider] apixaban (ELIQUIS) tablet 5 mg  5 mg Oral BID Juve Cespedes MD        levothyroxine (SYNTHROID) tablet 125 mcg  125 mcg Oral Daily Juve Cespedes MD   125 mcg at 12/21/20 0610    pantoprazole (PROTONIX) tablet 40 mg  40 mg Oral QAM AC Juve Cespedes MD   40 mg at 12/21/20 0610    DULoxetine (CYMBALTA) extended release capsule 60 mg  60 mg Oral Daily Juve Cespedes MD   60 mg at 12/21/20 0806    tamsulosin (FLOMAX) capsule 0.4 mg  0.4 mg Oral Daily Juve Cespedes MD   0.4 mg at 12/21/20 8955    [Held by provider] lisinopril (PRINIVIL;ZESTRIL) tablet 10 mg  10 mg Oral Daily Juve Cespedes MD   10 mg at 12/19/20 1051    And    [Held by provider] hydroCHLOROthiazide (HYDRODIURIL) tablet 12.5 mg  12.5 mg Oral Daily Juve Cespedes MD   12.5 mg at 12/19/20 1051    potassium chloride (KLOR-CON M) extended release tablet 40 mEq  40 mEq Oral PRN Juve Cespedes MD        Or    potassium bicarb-citric acid (EFFER-K) effervescent tablet 40 mEq  40 mEq Oral PRN Juve Cespedes MD        Or  potassium chloride 10 mEq/100 mL IVPB (Peripheral Line)  10 mEq Intravenous PRN Geneva Sethi MD           Allergies:  Tizanidine    Social History:   reports that she has quit smoking. Her smoking use included cigarettes. She has a 78.00 pack-year smoking history. She has never used smokeless tobacco. She reports current alcohol use. She reports that she does not use drugs. Family History: family history includes COPD in her mother; Cancer in an other family member; Emphysema in her sister; Heart Disease in her mother. REVIEW OF SYSTEMS:    Constitutional: ++fatigue, No fever or chills. No night sweats, no weight loss   Eyes: No eye discharge, double vision, or eye pain   HEENT: negative for sore mouth, sore throat, hoarseness and voice change   Respiratory: negative for cough , sputum, dyspnea, wheezing, hemoptysis, chest pain   Cardiovascular: negative for chest pain, dyspnea, palpitations, orthopnea, PND   Gastrointestinal: negative for nausea, vomiting, diarrhea, constipation, abdominal pain, Dysphagia, hematemesis and hematochezia   Genitourinary: negative for frequency, dysuria, nocturia, urinary incontinence, and hematuria   Integument: negative for rash, skin lesions, bruises.    Hematologic/Lymphatic: negative for easy bruising, bleeding, lymphadenopathy, or petechiae   Endocrine: negative for heat or cold intolerance,weight changes, change in bowel habits and hair loss   Musculoskeletal: negative for myalgias, arthralgias, pain, joint swelling,and bone pain   Neurological: negative for headaches, dizziness, seizures, weakness, numbness    PHYSICAL EXAM:      BP (!) 119/59   Pulse 105   Temp 98.3 °F (36.8 °C) (Oral)   Resp 18   Ht 5' 5\" (1.651 m)   Wt 181 lb 14.1 oz (82.5 kg)   SpO2 96%   BMI 30.27 kg/m²    Temp (24hrs), Av °F (36.7 °C), Min:97.2 °F (36.2 °C), Max:98.3 °F (36.8 °C)    General appearance - well appearing, no in pain or distress   Mental status - alert and cooperative Eyes - pupils equal and reactive, extraocular eye movements intact   Ears - bilateral TM's and external ear canals normal   Mouth - mucous membranes moist, pharynx normal without lesions   Neck - supple, no significant adenopathy   Lymphatics - no palpable lymphadenopathy, no hepatosplenomegaly   Chest - clear to auscultation, no wheezes, rales or rhonchi, symmetric air entry   Heart - normal rate, regular rhythm, normal S1, S2, no murmurs  Abdomen - soft, nontender, nondistended, no masses or organomegaly   Neurological - alert, oriented, normal speech, no focal findings or movement disorder noted   Musculoskeletal - no joint tenderness, deformity or swelling   Extremities - peripheral pulses normal, no pedal edema, no clubbing or cyanosis   Skin - normal coloration and turgor, no rashes, no suspicious skin lesions noted ,    DATA:    Labs:   CBC:   Recent Labs     12/20/20  1152 12/21/20  0553   WBC 6.7 6.1   HGB 7.3* 7.4*   HCT 23.3* 23.6*    353     BMP:   Recent Labs     12/20/20  1152 12/21/20  0553    140   K 3.9 4.1   CO2 31 31   BUN 23 23   CREATININE 1.73* 1.69*   LABGLOM 29* 30*   GLUCOSE 116* 112*     PT/INR:   Recent Labs     12/19/20  0040   PROTIME 18.0*   INR 1.5       IMAGING DATA:  @IMG@   XR CHEST PORTABLE   Final Result   1. Left IJ central line in place, tip overlying the mid SVC   2. No evidence of a pneumothorax   3.  Small left pleural effusion, and left basilar opacity, differential   considerations include atelectasis versus pneumonia             Primary Problem  Symptomatic anemia    Active Hospital Problems    Diagnosis Date Noted    Symptomatic anemia [D64.9] 12/19/2020    Pneumonia due to organism [J18.9]     Iron deficiency anemia due to chronic blood loss [D50.0] 12/18/2020    Acute on chronic diastolic CHF (congestive heart failure) ([facility] [address] [I50.33] 07/21/2020    Obesity, Class I, BMI 30-34.9 [E66.9] 07/20/2020    Moderate major depression ([facility] [address] [F32.1] 06/17/2020

## 2020-12-21 NOTE — CARE COORDINATION
ONGOING DISCHARGE PLAN:    Spoke with patient regarding discharge plan and patient confirms that plan is still home with [facility]. Hgb 7.4 today. Active order for IV Lasix 40mg daily. PT recommends home with assist.    Will continue to follow for additional discharge needs.     Electronically signed by Julio Valenzuela RN on 12/21/2020 at 11:46 AM

## 2020-12-22 ENCOUNTER — TELEPHONE (OUTPATIENT)
Dept: INFUSION THERAPY | Facility: MEDICAL CENTER | Age: 75
End: 2020-12-22

## 2020-12-22 LAB
-: NORMAL
ABSOLUTE EOS #: 0.1 K/UL (ref 0–0.4)
ABSOLUTE IMMATURE GRANULOCYTE: ABNORMAL K/UL (ref 0–0.3)
ABSOLUTE LYMPH #: 0.6 K/UL (ref 1–4.8)
ABSOLUTE MONO #: 0.6 K/UL (ref 0.1–1.3)
ANION GAP SERPL CALCULATED.3IONS-SCNC: 7 MMOL/L (ref 9–17)
BASOPHILS # BLD: 1 % (ref 0–2)
BASOPHILS ABSOLUTE: 0.1 K/UL (ref 0–0.2)
BUN BLDV-MCNC: 16 MG/DL (ref 8–23)
BUN/CREAT BLD: ABNORMAL (ref 9–20)
CALCIUM SERPL-MCNC: 9.2 MG/DL (ref 8.6–10.4)
CHLORIDE BLD-SCNC: 102 MMOL/L (ref 98–107)
CO2: 34 MMOL/L (ref 20–31)
CREAT SERPL-MCNC: 1.1 MG/DL (ref 0.5–0.9)
DIFFERENTIAL TYPE: ABNORMAL
EOSINOPHILS RELATIVE PERCENT: 1 % (ref 0–4)
GFR AFRICAN AMERICAN: 59 ML/MIN
GFR NON-AFRICAN AMERICAN: 49 ML/MIN
GFR SERPL CREATININE-BSD FRML MDRD: ABNORMAL ML/MIN/{1.73_M2}
GFR SERPL CREATININE-BSD FRML MDRD: ABNORMAL ML/MIN/{1.73_M2}
GLUCOSE BLD-MCNC: 120 MG/DL (ref 70–99)
HCT VFR BLD CALC: 28.5 % (ref 36–46)
HEMOGLOBIN: 8.9 G/DL (ref 12–16)
IMMATURE GRANULOCYTES: ABNORMAL %
LYMPHOCYTES # BLD: 8 % (ref 24–44)
MCH RBC QN AUTO: 30 PG (ref 26–34)
MCHC RBC AUTO-ENTMCNC: 31.1 G/DL (ref 31–37)
MCV RBC AUTO: 96.4 FL (ref 80–100)
MONOCYTES # BLD: 9 % (ref 1–7)
NRBC AUTOMATED: ABNORMAL PER 100 WBC
PDW BLD-RTO: 19 % (ref 11.5–14.9)
PLATELET # BLD: 380 K/UL (ref 150–450)
PLATELET ESTIMATE: ABNORMAL
PMV BLD AUTO: 7.7 FL (ref 6–12)
POTASSIUM SERPL-SCNC: 4 MMOL/L (ref 3.7–5.3)
PROCALCITONIN: 0.09 NG/ML
RBC # BLD: 2.96 M/UL (ref 4–5.2)
RBC # BLD: ABNORMAL 10*6/UL
REASON FOR REJECTION: NORMAL
SEG NEUTROPHILS: 81 % (ref 36–66)
SEGMENTED NEUTROPHILS ABSOLUTE COUNT: 5.5 K/UL (ref 1.3–9.1)
SODIUM BLD-SCNC: 143 MMOL/L (ref 135–144)
WBC # BLD: 6.8 K/UL (ref 3.5–11)
WBC # BLD: ABNORMAL 10*3/UL
ZZ NTE CLEAN UP: ORDERED TEST: NORMAL
ZZ NTE WITH NAME CLEAN UP: SPECIMEN SOURCE: NORMAL

## 2020-12-22 PROCEDURE — 2700000000 HC OXYGEN THERAPY PER DAY

## 2020-12-22 PROCEDURE — 99232 SBSQ HOSP IP/OBS MODERATE 35: CPT | Performed by: INTERNAL MEDICINE

## 2020-12-22 PROCEDURE — 94640 AIRWAY INHALATION TREATMENT: CPT

## 2020-12-22 PROCEDURE — 36415 COLL VENOUS BLD VENIPUNCTURE: CPT

## 2020-12-22 PROCEDURE — 84145 PROCALCITONIN (PCT): CPT

## 2020-12-22 PROCEDURE — 2060000000 HC ICU INTERMEDIATE R&B

## 2020-12-22 PROCEDURE — 94761 N-INVAS EAR/PLS OXIMETRY MLT: CPT

## 2020-12-22 PROCEDURE — 6360000002 HC RX W HCPCS: Performed by: FAMILY MEDICINE

## 2020-12-22 PROCEDURE — 6360000002 HC RX W HCPCS: Performed by: INTERNAL MEDICINE

## 2020-12-22 PROCEDURE — 2580000003 HC RX 258: Performed by: FAMILY MEDICINE

## 2020-12-22 PROCEDURE — 80048 BASIC METABOLIC PNL TOTAL CA: CPT

## 2020-12-22 PROCEDURE — 6370000000 HC RX 637 (ALT 250 FOR IP): Performed by: FAMILY MEDICINE

## 2020-12-22 PROCEDURE — 85025 COMPLETE CBC W/AUTO DIFF WBC: CPT

## 2020-12-22 PROCEDURE — 6370000000 HC RX 637 (ALT 250 FOR IP): Performed by: INTERNAL MEDICINE

## 2020-12-22 PROCEDURE — 99232 SBSQ HOSP IP/OBS MODERATE 35: CPT | Performed by: FAMILY MEDICINE

## 2020-12-22 RX ADMIN — DULOXETINE HYDROCHLORIDE 60 MG: 60 CAPSULE, DELAYED RELEASE ORAL at 08:02

## 2020-12-22 RX ADMIN — METOPROLOL TARTRATE 25 MG: 25 TABLET, FILM COATED ORAL at 21:05

## 2020-12-22 RX ADMIN — TAMSULOSIN HYDROCHLORIDE 0.4 MG: 0.4 CAPSULE ORAL at 08:01

## 2020-12-22 RX ADMIN — OXYCODONE HYDROCHLORIDE AND ACETAMINOPHEN 1000 MG: 500 TABLET ORAL at 08:01

## 2020-12-22 RX ADMIN — Medication 10 ML: at 08:03

## 2020-12-22 RX ADMIN — PANTOPRAZOLE SODIUM 40 MG: 40 TABLET, DELAYED RELEASE ORAL at 05:49

## 2020-12-22 RX ADMIN — DILTIAZEM HYDROCHLORIDE 120 MG: 120 CAPSULE, COATED, EXTENDED RELEASE ORAL at 08:01

## 2020-12-22 RX ADMIN — ALBUTEROL SULFATE 2.5 MG: 2.5 SOLUTION RESPIRATORY (INHALATION) at 09:15

## 2020-12-22 RX ADMIN — Medication 10 ML: at 21:10

## 2020-12-22 RX ADMIN — CLONAZEPAM 0.5 MG: 1 TABLET ORAL at 05:53

## 2020-12-22 RX ADMIN — LEVOTHYROXINE SODIUM 125 MCG: 125 TABLET ORAL at 05:49

## 2020-12-22 RX ADMIN — FLUTICASONE PROPIONATE 2 SPRAY: 50 SPRAY, METERED NASAL at 08:03

## 2020-12-22 RX ADMIN — GUAIFENESIN 1200 MG: 600 TABLET, EXTENDED RELEASE ORAL at 21:05

## 2020-12-22 RX ADMIN — ACETAMINOPHEN 650 MG: 325 TABLET, FILM COATED ORAL at 21:06

## 2020-12-22 RX ADMIN — ATORVASTATIN CALCIUM 10 MG: 10 TABLET, FILM COATED ORAL at 08:01

## 2020-12-22 RX ADMIN — FLUTICASONE PROPIONATE 2 PUFF: 110 AEROSOL, METERED RESPIRATORY (INHALATION) at 08:02

## 2020-12-22 RX ADMIN — FLUTICASONE PROPIONATE 2 PUFF: 110 AEROSOL, METERED RESPIRATORY (INHALATION) at 21:05

## 2020-12-22 RX ADMIN — ONDANSETRON 4 MG: 2 INJECTION INTRAMUSCULAR; INTRAVENOUS at 10:44

## 2020-12-22 RX ADMIN — FERROUS SULFATE TAB 325 MG (65 MG ELEMENTAL FE) 325 MG: 325 (65 FE) TAB at 08:02

## 2020-12-22 RX ADMIN — GUAIFENESIN 1200 MG: 600 TABLET, EXTENDED RELEASE ORAL at 08:02

## 2020-12-22 RX ADMIN — FUROSEMIDE 40 MG: 10 INJECTION, SOLUTION INTRAMUSCULAR; INTRAVENOUS at 08:02

## 2020-12-22 ASSESSMENT — PAIN SCALES - GENERAL
PAINLEVEL_OUTOF10: 4
PAINLEVEL_OUTOF10: 4
PAINLEVEL_OUTOF10: 6

## 2020-12-22 NOTE — CONSULTS
Cardiology Consult           Date of Admission:  12/18/2020  Date of Consultation:  12/22/2020      PCP:  WILLIE Barclay CNP      Chief Complaint: Symptomatic anemia, shortness of breath with minimal exertion    History of Present Illness: Adin Garza is a 70 y.o. female who presents with hemoglobin of 6.6 improved to 7.8 after transfusion. Has known iron deficiency anemia and GI plans to do a video endoscopy after discharge to more fully assess for acute GI bleed. In spite of improved hemoglobin, the patient continues to have significant fatigue and shortness of breath. The patient has a known history of persistent atrial fibrillation, anticoagulated on Eliquis which is currently on hold due to her GI bleed. For the most part her rate is well controlled with Cardizem and metoprolol, although metoprolol is currently on hold for hypotension. Patient has a known history of heart failure preserved EF. Most recent echo 7/6/2020 does indicate an EF of 55% with moderate tricuspid valve regurgitation and RVSP of 49. The patient is an elderly frail appearing female. She continues to complain of significant fatigue and dyspnea with minimal exertion. There is minimal swelling in bilateral lower extremities but it is nonpitting. She does continue to be in atrial fibrillation, for the most part rate controlled. PMH:   has a past medical history of A-fib ([address] Acquired hypothyroidism, Acute encephalopathy, Acute kidney injury ([address] Anxiety, Benign hypertension with CKD (chronic kidney disease) stage III, Chronic back pain, CKD (chronic kidney disease) stage 3, GFR 30-59 ml/min, Constipation, COPD (chronic obstructive pulmonary disease) ([address] Cough, Depression, ETOH abuse, Former smoker, HA (generalized anxiety disorder), History of GI bleed, Hyperlipidemia, Hypertension, Hypothyroid, Leg pain, Normocytic anemia, Osteoarthritis, PAD (peripheral artery disease) ([address] Primary osteoarthritis, Pulmonary hypertension ([address] Pure hypercholesterolemia, SIRS (systemic inflammatory response syndrome) ([address] Urge incontinence, and Wheezing. PSH:   has a past surgical history that includes eye surgery (Bilateral); Colonoscopy; joint replacement; Upper gastrointestinal endoscopy (N/A, 12/2/2020); and Colonoscopy (N/A, 12/2/2020). Allergies: Allergies   Allergen Reactions    Tizanidine Other (See Comments)     Patient states it makes her loopy        Home Meds:    Prior to Admission medications    Medication Sig Start Date End Date Taking?  Authorizing Provider   tamsulosin (FLOMAX) 0.4 MG capsule TAKE 1 CAPSULE BY MOUTH DAILY 12/15/20  Yes WILLIE Adorno CNP   omeprazole (PRILOSEC) 40 MG delayed release capsule TAKE 1 CAPSULE BY MOUTH EVERY MORNING BEFORE BREAKFAST 12/14/20  Yes WILLIE Adorno CNP   DULoxetine (CYMBALTA) 60 MG extended release capsule Take 1 capsule by mouth daily 12/14/20  Yes WILLIE Adorno CNP   furosemide (LASIX) 40 MG tablet TAKE 1 TABLET BY MOUTH DAILY  Patient taking differently: Take 40 mg by mouth daily Currently taking 2 tablets daily  12/18/20 11/30/20  Yes WILLIE Adorno CNP   baclofen (LIORESAL) 10 MG tablet TAKE ONE TABLET BY MOUTH THREE TIMES A DAY AS NEEDED. 11/30/20  Yes WILLIE Adorno CNP HYDROcodone-acetaminophen (NORCO) 5-325 MG per tablet Take 1 tablet by mouth every 8 hours as needed for Pain for up to 30 days. 11/30/20 12/30/20 Yes WILLIE Mayo CNP   lisinopril-hydroCHLOROthiazide (PRINZIDE;ZESTORETIC) 10-12.5 MG per tablet Take 1 tablet by mouth daily 11/30/20  Yes WILLIE Mayo CNP   ELIQUIS 5 MG TABS tablet TAKE ONE TABLET BY MOUTH TWICE A DAY 11/30/20  Yes WILLIE Mayo CNP   levothyroxine (SYNTHROID) 125 MCG tablet Take 1 tablet by mouth daily 11/30/20  Yes WILLIE Mayo CNP   metoprolol succinate (TOPROL XL) 25 MG extended release tablet TAKE 1 TABLET BY MOUTH DAILY. (PLEASE MAKE DR HER FOR FUTURE REFILLS) 11/23/20  Yes WILLIE Mayo CNP   clonazePAM (KLONOPIN) 0.5 MG tablet Take 1 tablet by mouth 3 times daily as needed for Anxiety for up to 30 days.  11/17/20 12/19/20 Yes WILLIE Mayo CNP   fluticasone (FLOVENT HFA) 220 MCG/ACT inhaler Inhale 2 puffs into the lungs 2 times daily 11/17/20  Yes WILLIE Mayo CNP   albuterol (PROVENTIL) (2.5 MG/3ML) 0.083% nebulizer solution Take 3 mLs by nebulization every 6 hours as needed for Wheezing 11/5/20  Yes WILLIE Mayo CNP   dilTIAZem (CARDIZEM CD) 120 MG extended release capsule Take 1 capsule by mouth daily 10/19/20  Yes WILLIE Mayo CNP   albuterol sulfate HFA (PROAIR HFA) 108 (90 Base) MCG/ACT inhaler Inhale 2 puffs into the lungs every 6 hours as needed for Wheezing   Yes Historical Provider, MD   fluticasone (FLONASE) 50 MCG/ACT nasal spray USE ONE SPRAY TO EACH NOSTRIL ONCE ADAY 9/4/20  Yes WILLIE Chacon CNP   atorvastatin (LIPITOR) 10 MG tablet TAKE 1 TABLET BY MOUTH ONCE DAILY 9/4/20  Yes WILLIE Chacon CNP   Ascorbic Acid (C-1000 PO) Take 1 capsule by mouth daily    Yes Historical Provider, MD   ferrous sulfate 325 (65 Fe) MG tablet Take 325 mg by mouth daily  8/30/18  Yes Historical Provider, MD Oxygen Concentrator continuous    Historical Provider, MD   Lift Chair [facility] Use daily for comfort  Patient not taking: Reported on 12/18/2020 11/30/20   WILLIE Villa CNP   PROAIR  (55 Base) MCG/ACT inhaler Inhale 2 puffs into the lungs every 6 hours as needed for Wheezing 11/17/20   WILLIE Villa CNP   levalbuterol [facility]) 45 MCG/ACT inhaler Inhale 1 puff into the lungs every 4 hours as needed for Wheezing 8/19/20   WILLIE Villa CNP   Respiratory Therapy Supplies (NEBULIZER/TUBING/MOUTHPIECE) KIT Use every 4-6 hours prn for copd 6/15/20   Lori [address] Meds:    Current Facility-Administered Medications   Medication Dose Route Frequency Provider Last Rate Last Admin    ondansetron (ZOFRAN) injection 4 mg  4 mg Intravenous Q6H PRN Patti Macario MD   4 mg at 12/21/20 1318    guaiFENesin [facility]) extended release tablet 1,200 mg  1,200 mg Oral BID Jose Juan Kowalski MD   1,200 mg at 12/22/20 0802    fluticasone (FLONASE) 50 MCG/ACT nasal spray 2 spray  2 spray Each Nostril Daily Jose Juan Kowalski MD   2 spray at 12/22/20 0803    furosemide (LASIX) injection 40 mg  40 mg Intravenous Daily Jose Juan Kowalski MD   40 mg at 12/22/20 0802    sodium chloride flush 0.9 % injection 10 mL  10 mL Intravenous 2 times per day Patti Macario MD   10 mL at 12/22/20 0803    sodium chloride flush 0.9 % injection 10 mL  10 mL Intravenous PRN Patti Macario MD        acetaminophen (TYLENOL) tablet 650 mg  650 mg Oral Q4H PRN Patti Macario MD   650 mg at 12/21/20 1325    ferrous sulfate (IRON 325) tablet 325 mg  325 mg Oral Daily Patti Macario MD   325 mg at 12/22/20 0802    ascorbic acid (VITAMIN C) tablet 1,000 mg  1,000 mg Oral Daily Patti Macario MD   1,000 mg at 12/22/20 0801    atorvastatin (LIPITOR) tablet 10 mg  10 mg Oral Daily Patti Macario MD   10 mg at 12/22/20 6973  dilTIAZem (CARDIZEM CD) extended release capsule 120 mg  120 mg Oral Daily Izabella Araujo MD   120 mg at 12/22/20 0801    albuterol (PROVENTIL) nebulizer solution 2.5 mg  2.5 mg Nebulization Q6H PRN Izabella Araujo MD   2.5 mg at 12/21/20 1334    clonazePAM (KLONOPIN) tablet 0.5 mg  0.5 mg Oral TID PRN Izabella Araujo MD   0.5 mg at 12/22/20 0553    fluticasone (FLOVENT HFA) 110 MCG/ACT inhaler 2 puff  2 puff Inhalation BID Izabella Araujo MD   2 puff at 12/22/20 0802    [Held by provider] metoprolol succinate (TOPROL XL) extended release tablet 25 mg  25 mg Oral Daily Izabella Araujo MD   25 mg at 12/19/20 1051    baclofen (LIORESAL) tablet 10 mg  10 mg Oral TID PRN Izabella Araujo MD   10 mg at 12/20/20 2118    HYDROcodone-acetaminophen (NORCO) 5-325 MG per tablet 1 tablet  1 tablet Oral Q8H PRN Izabella Araujo MD   1 tablet at 12/21/20 2336    [Held by provider] apixaban (ELIQUIS) tablet 5 mg  5 mg Oral BID Izabella Araujo MD        levothyroxine (SYNTHROID) tablet 125 mcg  125 mcg Oral Daily Izabella Araujo MD   125 mcg at 12/22/20 0549    pantoprazole (PROTONIX) tablet 40 mg  40 mg Oral QAM AC Izabella Araujo MD   40 mg at 12/22/20 0549    DULoxetine (CYMBALTA) extended release capsule 60 mg  60 mg Oral Daily Izabella Araujo MD   60 mg at 12/22/20 0802    tamsulosin (FLOMAX) capsule 0.4 mg  0.4 mg Oral Daily Izabella Araujo MD   0.4 mg at 12/22/20 0801    [Held by provider] lisinopril (PRINIVIL;ZESTRIL) tablet 10 mg  10 mg Oral Daily Izabella Araujo MD   10 mg at 12/19/20 1051    And    [Held by provider] hydroCHLOROthiazide (HYDRODIURIL) tablet 12.5 mg  12.5 mg Oral Daily Izabella Araujo MD   12.5 mg at 12/19/20 1051    potassium chloride (KLOR-CON M) extended release tablet 40 mEq  40 mEq Oral PRN Izabella Araujo MD        Or    potassium bicarb-citric acid (EFFER-K) effervescent tablet 40 mEq  40 mEq Oral PRN Izabella Araujo MD        Or  potassium chloride 10 mEq/100 mL IVPB (Peripheral Line)  10 mEq Intravenous PRN Jo Milan MD           Social History:       TOBACCO:   reports that she has quit smoking. Her smoking use included cigarettes. She has a 78.00 pack-year smoking history. She has never used smokeless tobacco.  ETOH:   reports current alcohol use. DRUGS:  reports no history of drug use. OCCUPATION:          Family Histroy:         Problem Relation Age of Onset    Heart Disease Mother     COPD Mother     Emphysema Sister     Cancer Other         Cousin - breast cancer           Review of Systems:   · Constitutional: there has been no unanticipated weight loss. There's been no change in energy level, sleep pattern, or activity level. · Cardiovascular: No chest pain, palpitations or loss of consciousness. No cough, hemoptysis, pleuritic pain, or phlebitis. · Respiratory: See HPI  · Gastrointestinal: No abdominal pain, appetite loss, blood in stools. No change in bowel or bladder habits. · Genitourinary: No dysuria, trouble voiding, or hematuria. · Musculoskeletal:  No weakness or joint complaints. · Integumentary: No rash or pruritis. · Neurological: No headache, diplopia, change in muscle strength, numbness or tingling. No change coordination, mood, affect, memory, mentation, behavior. · Psychiatric: No anxiety, or depression. · Endocrine: No temperature intolerance. No excessive thirst, fluid intake, or urination. No tremor. · Allergic/Immunologic: No nasal congestion or hives. Physical Exam    Vital Signs: /73   Pulse 90   Temp 98.1 °F (36.7 °C) (Oral)   Resp 22   Ht 5' 5\" (1.651 m)   Wt 181 lb 14.1 oz (82.5 kg)   SpO2 96%   BMI 30.27 kg/m²  O2 Flow Rate (L/min): 2 L/min     Admission Weight: 187 lb (84.8 kg)     General appearance: Awake, Alert Cooperative    Head: Normocephalic, without obvious abnormality, atraumatic    Eyes: Conjunctivae/corneas clear. PERRL, EOM's intact.  Fundi benign Neck: no adenopathy, no carotid bruit, no JVD, supple, symmetrical, trachea midline and thyroid: not enlarged, symmetric, no tenderness/mass/nodules    Lungs: clear to auscultation bilaterally    Heart: regular rate and rhythm, S1, S2 normal, no murmur, click, rub or gallop    Abdomen: Soft, non-tender. Bowel sounds normal. No masses,  no organomegaly    Extremities: extremities normal, atraumatic, no cyanosis or edema    Skin: Skin color, texture, turgor normal. No rashes or lesions    Neurologic: Grossly normal        MEDICAL DECISION MAKING/TESTING    Echo/Stress:      Stress test 7/13/2018 impression:    Left ventricular ejection fraction calculation 85%, suggesting normal left ventricular function  Wall motion analysis suggests no focal wall motion abnormality    No large reversible defects are seen  There is a small equivocal reversible defect at the inferior apical region and mild decreased uptake on stress images along the inferior posterior wall perhaps exaggerated by diaphragm attenuation artifact  There is apparent dilatation of the left ventricle with stress/Lexiscan imaging with t.i.d. calculation elevated at 1.3    Pattern is considered low to intermediate risk for reversible ischemia. Echocardiogram summary 7/6/2020  Technically difficult study. Left ventricle is normal in size. Mild left ventricular hypertrophy. Global left ventricular systolic function is normal. Estimated LV EF >55 %. Left atrium is normal in size. Moderate tricuspid regurgitation. Mild pulmonary hypertension. Estimated right ventricular systolic pressure  is 14QMLK. EKG:          CXR:   FINDINGS:   Interval placement of a left IJ central line is noted.  Tip overlies the mid   SVC.  No pneumothorax is present.  Cardiomegaly is present.  Small left   pleural effusion is noted.  Left basilar opacity is again visualized, and may   represent combination of atelectasis and pneumonia.  Osseous structures are   stable. Labs:      CBC:   Recent Labs     12/19/20  0844 12/20/20  1152 12/21/20  0553   WBC 7.6 6.7 6.1   HGB 7.6* 7.3* 7.4*   HCT 23.3* 23.3* 23.6*   MCV 95.2 98.1 97.0    348 353     BMP:   Recent Labs     12/20/20  1152 12/21/20  0553 12/22/20  0656    140 143   K 3.9 4.1 4.0    100 102   CO2 31 31 34*   BUN 23 23 16   CREATININE 1.73* 1.69* 1.10*     PT/INR: No results for input(s): PROTIME, INR in the last 72 hours. APTT: No results for input(s): APTT in the last 72 hours. MAG: No results for input(s): MG in the last 72 hours. D Dimer: No results for input(s): DDIMER in the last 72 hours. Troponin T   Recent Labs     12/20/20 2008   TROPONINT NOT REPORTED     ProBNP Invalid input(s): PRO-BNP          Diagnosis:  Principal Problem:    Symptomatic anemia  Active Problems:    Acquired hypothyroidism    A-fib ([facility])    Pulmonary hypertension ([facility])    COPD (chronic obstructive pulmonary disease) ([facility])    HA (generalized anxiety disorder)    CKD (chronic kidney disease) stage 3, GFR 30-59 ml/min    Moderate major depression ([facility])    PAD (peripheral artery disease) ([facility])    Obesity, Class I, BMI 30-34.9    Acute on chronic diastolic CHF (congestive heart failure) ([facility])    Iron deficiency anemia due to chronic blood loss    Pneumonia due to organism  Resolved Problems:    * No resolved hospital problems. *        Plan:    1. Heart failure preserved EF, acute on chronic.  proBNP is significantly elevated although chest x-ray does not reveal significant fluid overload. Patient is currently scheduled to get Lasix 40 IV push when blood pressure is greater than 90 systolic. She has diuresed almost a liter in the last 24 hours. Will request daily weights also. Continue to monitor closely. 2.  Persistent atrial fibrillation, anticoagulated on Eliquis. Eliquis on hold due to GI bleed. Rate control with Cardizem and metoprolol in the outpatient setting, Toprol currently on hold due to hypotension  3. Hypotension in the setting of history of essential hypertension. Lisinopril, metoprolol and hydrochlorothiazide are on hold. Patient systolic blood pressure has been as low as 82/42. With medications on hold there is good systolic recovery with blood pressure in the 120s. 4. CKD stage III with a creatinine of 1.10  5. Symptomatic anemia secondary to possible GI bleed and iron deficiency anemia. Hemoglobin improved to 7.8 after transfusion. Video endoscopy is planned for the outpatient setting. Now Eliquis will continue to remain on hold. Overall the patient is very frail. Will be difficult to balance fluid level with significant hypotension. Agree with current diuresis of 40 of Lasix IV push if blood pressure is greater than 90 systolic. We will continue to follow.     Anders Branham, [address] [facility]

## 2020-12-22 NOTE — FLOWSHEET NOTE
12/22/20 1408   Encounter Summary   Services provided to: Patient   Referral/Consult From: [address] [facility]   (12/22/20)   Complexity of Encounter Moderate   Length of Encounter 15 minutes   Spiritual Assessment Completed Yes   Routine   Intervention Howard   Spiritual/Buddhism   Type Spiritual support   Assessment Coping; Approachable;Helplessness; Hopeful   Intervention Active listening;Explored feelings, thoughts, concerns;Sustaining presence/ Ministry of presence; Discussed meaning/purpose;Discussed illness/injury and it's impact;Nurtured hope;Prayer   Outcome Receptive; Expressed feelings/needs/concerns;Engaged in conversation;Comfort;Expressed gratitude

## 2020-12-22 NOTE — PLAN OF CARE
Case discussed with dr Trupti Veliz and cardiology pt continues to be anemic will plan for push enteroscopy tomorrow. Barbara Reza, APRN - CNP

## 2020-12-22 NOTE — PROGRESS NOTES
Physical Therapy  DATE: 2020    NAME: Morro Duran  MRN: [id number]   : 1945    Patient not seen this date for Physical Therapy due to:  [] Blood transfusion in progress  [] Cancel by RN  [] Hemodialysis  []  Refusal by Patient   [] Spine Precautions   [] Strict Bedrest  [] Surgery  [] Testing      [x] Other; Zbigniew Pi came in at [phone] patient agreeable to therapy despite having just thrown up. Asked patient to sit on the edge of the bed and patient became sick again. Writer told patient to lay back down and rest . RN Bradley notified. [] PT being discontinued at this time. Patient independent. No further needs. [] PT being discontinued at this time as the patient has been transferred to hospice care. No further needs.     Electronically signed by Inocencio Garzon PTA on 20 at 1:02 PM EST

## 2020-12-22 NOTE — PLAN OF CARE
Problem: Falls - Risk of:  Goal: Will remain free from falls  Description: Will remain free from falls  Outcome: Ongoing  Note: Patient remains free of falls and injuries throughout shift. Bed remains in the lowest position, wheels locked, call light and bedside table are within reach.    Goal: Absence of physical injury  Description: Absence of physical injury  Outcome: Ongoing     Problem: Bleeding:  Goal: Will show no signs and symptoms of excessive bleeding  Description: Will show no signs and symptoms of excessive bleeding  Outcome: Ongoing     Problem: Pain:  Goal: Pain level will decrease  Description: Pain level will decrease  Outcome: Ongoing  Goal: Control of acute pain  Description: Control of acute pain  Outcome: Ongoing  Goal: Control of chronic pain  Description: Control of chronic pain  Outcome: Ongoing     Problem: Musculor/Skeletal Functional Status  Goal: Highest potential functional level  Outcome: Ongoing  Goal: Absence of falls  Outcome: Ongoing

## 2020-12-22 NOTE — TELEPHONE ENCOUNTER
[employer] IS APPROVED AND READY TO SCHEDULE. PER CLERICAL NOTE, PT WOULD LIKE TO START TX IN JAN 2021 AND POSSIBLE MIGHT HAVE NEW INSURANCE ALSO, POSSIBLE PARAMOUNT. I WILL CHECK IN JAN WITH PATIENT TO SEE IF SHE HAS NEW INSURANCE AND WHAT IT IS AND IF ACCEPTED HERE AT [facility]. I WILL REPRECERT IT AND GET PT SCHEDULED ONCE APPROVED.

## 2020-12-22 NOTE — PROGRESS NOTES
FAMILY MEDICINE  - PROGRESS NOTE    Date:  12/22/2020  Marko Kocher  [id number]      Chief Complaint   Patient presents with    Abnormal Lab     Patient comes to the ER with a hemoglobin of 6.6, per her physician, to get a blood transfusion.  Shortness of Breath     Dx with pneumonia today by her physician after having a chest x-ray Thursday. Has not started her antibiotics. Says the drug store has yet to deliver it. Interval History:  not changed, no significant events overnight.  She is tired this am.       Subjective  Constitutional: positive for fatigue and obesity  Respiratory: positive for emphysema and shortness of breath  Cardiovascular: positive for irregular heart beat  Hematologic/lymphatic: positive for pallor  Musculoskeletal:positive for arthralgias, myalgias and stiff joints  Behavioral/Psych: positive for anxiety and depression:    Objective:    /63   Pulse 101   Temp 98.2 °F (36.8 °C) (Oral)   Resp 18   Ht 5' 5\" (1.651 m)   Wt 181 lb 14.1 oz (82.5 kg)   SpO2 97%   BMI 30.27 kg/m²   General appearance - alert, well appearing, and in no distress and overweight  Mental status - alert, oriented to person, place, and time  Eyes - pupils equal and reactive, extraocular eye movements intact  Ears - hearing grossly normal bilaterally  Nose - normal and patent, no erythema, discharge or polyps  Mouth - mucous membranes moist, pharynx normal without lesions  Neck - supple, no significant adenopathy  Lymphatics - no palpable lymphadenopathy, no hepatosplenomegaly  Chest - clear to auscultation, no wheezes, rales or rhonchi, symmetric air entry, decreased air entry noted posteriorly  Heart - irregularly irregular rhythm with rate 101  Abdomen - soft, nontender, nondistended, no masses or organomegaly  Breasts - not examined  Back exam - not examined Neurological - alert, oriented, normal speech, no focal findings or movement disorder noted  Musculoskeletal - osteoarthritic changes noted in both hands  Extremities - peripheral pulses normal, no pedal edema, no clubbing or cyanosis  Skin - normal coloration and turgor, no rashes, no suspicious skin lesions noted    Data:   Medications:   Current Facility-Administered Medications   Medication Dose Route Frequency Provider Last Rate Last Admin    ondansetron (ZOFRAN) injection 4 mg  4 mg Intravenous Q6H PRN Felisa Garcia MD   4 mg at 12/21/20 1318    guaiFENesin [facility]) extended release tablet 1,200 mg  1,200 mg Oral BID Aguilar Perdue MD   1,200 mg at 12/21/20 2008    fluticasone (FLONASE) 50 MCG/ACT nasal spray 2 spray  2 spray Each Nostril Daily Aguilar Perdue MD   2 spray at 12/21/20 0806    furosemide (LASIX) injection 40 mg  40 mg Intravenous Daily Aguilar Perdue MD   Stopped at 12/21/20 1002    sodium chloride flush 0.9 % injection 10 mL  10 mL Intravenous 2 times per day Felisa Garcia MD   10 mL at 12/21/20 2008    sodium chloride flush 0.9 % injection 10 mL  10 mL Intravenous PRN Felisa Garcia MD        acetaminophen (TYLENOL) tablet 650 mg  650 mg Oral Q4H PRN Felisa Garcia MD   650 mg at 12/21/20 1325    ferrous sulfate (IRON 325) tablet 325 mg  325 mg Oral Daily Felisa Garcia MD   325 mg at 12/21/20 0916    ascorbic acid (VITAMIN C) tablet 1,000 mg  1,000 mg Oral Daily Felisa Garcia MD   1,000 mg at 12/21/20 9342    atorvastatin (LIPITOR) tablet 10 mg  10 mg Oral Daily Felisa Garcia MD   10 mg at 12/21/20 9997    dilTIAZem (CARDIZEM CD) extended release capsule 120 mg  120 mg Oral Daily Felisa Garcia MD   120 mg at 12/21/20 1359    albuterol (PROVENTIL) nebulizer solution 2.5 mg  2.5 mg Nebulization Q6H PRN Felisa Garcia MD   2.5 mg at 12/21/20 1334  clonazePAM (KLONOPIN) tablet 0.5 mg  0.5 mg Oral TID PRN Jerald Mendez MD   0.5 mg at 12/20/20 2319    fluticasone (FLOVENT HFA) 110 MCG/ACT inhaler 2 puff  2 puff Inhalation BID Jerald Mendez MD   2 puff at 12/21/20 2008    [Held by provider] metoprolol succinate (TOPROL XL) extended release tablet 25 mg  25 mg Oral Daily Jerald Mendez MD   25 mg at 12/19/20 1051    baclofen (LIORESAL) tablet 10 mg  10 mg Oral TID PRN Jerald Mendez MD   10 mg at 12/20/20 2118    HYDROcodone-acetaminophen (NORCO) 5-325 MG per tablet 1 tablet  1 tablet Oral Q8H PRN Jerald Mendez MD   1 tablet at 12/21/20 2336    [Held by provider] apixaban (ELIQUIS) tablet 5 mg  5 mg Oral BID Jerald Mendez MD        levothyroxine (SYNTHROID) tablet 125 mcg  125 mcg Oral Daily Jerald Mendez MD   125 mcg at 12/22/20 0549    pantoprazole (PROTONIX) tablet 40 mg  40 mg Oral QAM AC Jerald Mendez MD   40 mg at 12/22/20 0549    DULoxetine (CYMBALTA) extended release capsule 60 mg  60 mg Oral Daily Jerald Mendez MD   60 mg at 12/21/20 0806    tamsulosin (FLOMAX) capsule 0.4 mg  0.4 mg Oral Daily Jerald Mendez MD   0.4 mg at 12/21/20 1890    [Held by provider] lisinopril (PRINIVIL;ZESTRIL) tablet 10 mg  10 mg Oral Daily Jerald Mendez MD   10 mg at 12/19/20 1051    And    [Held by provider] hydroCHLOROthiazide (HYDRODIURIL) tablet 12.5 mg  12.5 mg Oral Daily Jerald Mendez MD   12.5 mg at 12/19/20 1051    potassium chloride (KLOR-CON M) extended release tablet 40 mEq  40 mEq Oral PRN Jerald Mendez MD        Or    potassium bicarb-citric acid (EFFER-K) effervescent tablet 40 mEq  40 mEq Oral PRN Jerald Mendez MD        Or    potassium chloride 10 mEq/100 mL IVPB (Peripheral Line)  10 mEq Intravenous PRN Jerald Mendez MD           Intake/Output Summary (Last 24 hours) at 12/22/2020 0550  Last data filed at 12/22/2020 0400  Gross per 24 hour   Intake 290.83 ml   Output 1050 ml   Net -759.17 ml Recent Results (from the past 24 hour(s))   CBC Auto Differential    Collection Time: 12/21/20  5:53 AM   Result Value Ref Range    WBC 6.1 3.5 - 11.0 k/uL    RBC 2.43 (L) 4.0 - 5.2 m/uL    Hemoglobin 7.4 (L) 12.0 - 16.0 g/dL    Hematocrit 23.6 (L) 36 - 46 %    MCV 97.0 80 - 100 fL    MCH 30.3 26 - 34 pg    MCHC 31.3 31 - 37 g/dL    RDW 18.7 (H) 11.5 - 14.9 %    Platelets 317 917 - 454 k/uL    MPV 8.0 6.0 - 12.0 fL    NRBC Automated NOT REPORTED per 100 WBC    Differential Type NOT REPORTED     Immature Granulocytes NOT REPORTED 0 %    Absolute Immature Granulocyte NOT REPORTED 0.00 - 0.30 k/uL    WBC Morphology NOT REPORTED     RBC Morphology NOT REPORTED     Platelet Estimate NOT REPORTED     Seg Neutrophils 71 (H) 36 - 66 %    Lymphocytes 14 (L) 24 - 44 %    Monocytes 11 (H) 1 - 7 %    Eosinophils % 3 0 - 4 %    Basophils 1 0 - 2 %    Segs Absolute 4.30 1.3 - 9.1 k/uL    Absolute Lymph # 0.90 (L) 1.0 - 4.8 k/uL    Absolute Mono # 0.70 0.1 - 1.3 k/uL    Absolute Eos # 0.20 0.0 - 0.4 k/uL    Basophils Absolute 0.10 0.0 - 0.2 k/uL   Basic Metabolic Panel    Collection Time: 12/21/20  5:53 AM   Result Value Ref Range    Glucose 112 (H) 70 - 99 mg/dL    BUN 23 8 - 23 mg/dL    CREATININE 1.69 (H) 0.50 - 0.90 mg/dL    Bun/Cre Ratio NOT REPORTED 9 - 20    Calcium 9.0 8.6 - 10.4 mg/dL    Sodium 140 135 - 144 mmol/L    Potassium 4.1 3.7 - 5.3 mmol/L    Chloride 100 98 - 107 mmol/L    CO2 31 20 - 31 mmol/L    Anion Gap 9 9 - 17 mmol/L    GFR Non-African American 30 (L) >60 mL/min    GFR  36 (L) >60 mL/min    GFR Comment          GFR Staging NOT REPORTED    Brain Natriuretic Peptide    Collection Time: 12/21/20  5:53 AM   Result Value Ref Range    Pro-BNP 6,049 (H) <300 pg/mL    BNP Interpretation Pro-BNP Reference Range:    Vitamin B12 & Folate    Collection Time: 12/21/20  5:53 AM   Result Value Ref Range    Vitamin B-12 366 232 - 1245 pg/mL    Folate 12.0 >4.8 ng/mL

## 2020-12-22 NOTE — PLAN OF CARE
Problem: Falls - Risk of:  Goal: Will remain free from falls  Description: Will remain free from falls  12/22/2020 0507 by Crescencio Medeiros RN  Outcome: Ongoing  Note: No falls this shift. Call light with in reach, side rails up x2, bed in lowest postion. Patients safety maintained. 12/21/2020 2002 by Lola Clark RN  Outcome: Ongoing  Note: Patient remains free of falls and injuries throughout shift. Bed remains in the lowest position, wheels locked, call light and bedside table are within reach. Goal: Absence of physical injury  Description: Absence of physical injury  12/22/2020 0507 by Crescencio Medeiros RN  Outcome: Ongoing  Note: No falls this shift. Call light with in reach, side rails up x2, bed in lowest postion. Patients safety maintained. 12/21/2020 2002 by Lola Clark RN  Outcome: Ongoing     Problem: Bleeding:  Goal: Will show no signs and symptoms of excessive bleeding  Description: Will show no signs and symptoms of excessive bleeding  12/22/2020 0507 by Crescencio Medeiros RN  Outcome: Ongoing  Note: No s/s of bleeding this shift  12/21/2020 2002 by Lola Clark RN  Outcome: Ongoing     Problem: Pain:  Goal: Pain level will decrease  Description: Pain level will decrease  12/22/2020 0507 by Crescencio Medeiros RN  Outcome: Ongoing  Note: Patients pain level decreased with prescribed pain medications. 12/21/2020 2002 by Lola Clark RN  Outcome: Ongoing  Goal: Control of acute pain  Description: Control of acute pain  12/22/2020 0507 by Crescencio Medeiros RN  Outcome: Ongoing  Note: Patients pain level decreased with prescribed pain medications. 12/21/2020 2002 by Lola Clark RN  Outcome: Ongoing  Goal: Control of chronic pain  Description: Control of chronic pain  12/22/2020 0507 by Crescencio Medeiros RN  Outcome: Ongoing  Note: Patients pain level decreased with prescribed pain medications.    12/21/2020 2002 by Lola Clark RN  Outcome: Ongoing Problem: Musculor/Skeletal Functional Status  Goal: Highest potential functional level  12/22/2020 0507 by Karina Rosario RN  Outcome: Ongoing  12/21/2020 2002 by Richard Velez RN  Outcome: Ongoing  Goal: Absence of falls  12/22/2020 0507 by Karina Rosario RN  Outcome: Ongoing  Note: No falls this shift. Call light with in reach, side rails up x2, bed in lowest postion. Patients safety maintained.    12/21/2020 2002 by Richard Velez RN  Outcome: Ongoing

## 2020-12-22 NOTE — PLAN OF CARE
Problem: Falls - Risk of:  Goal: Will remain free from falls  12/22/2020 1537 by Jake Hayes RN  Outcome: Ongoing  12/22/2020 0507 by Omayra Watt RN  Outcome: Ongoing  Goal: Absence of physical injury  12/22/2020 1537 by Jake Hayes RN  Outcome: Ongoing  12/22/2020 0507 by Omayra Watt RN  Outcome: Ongoing     Problem: Bleeding:  Goal: Will show no signs and symptoms of excessive bleeding  12/22/2020 1537 by Jake Hayes RN  Outcome: Ongoing  12/22/2020 0507 by Omayra Watt RN  Outcome: Ongoing     Problem: Pain:  Goal: Pain level will decrease  12/22/2020 1537 by Jake Hayes RN  Outcome: Ongoing  12/22/2020 0507 by Omayra Watt RN  Outcome: Ongoing  Goal: Control of acute pain  12/22/2020 1537 by Jake Hayes RN  Outcome: Ongoing  12/22/2020 0507 by Omayra Watt RN  Outcome: Ongoing  Goal: Control of chronic pain  12/22/2020 1537 by Jake Hayes RN  Outcome: Ongoing  12/22/2020 0507 by Omayra Watt RN  Outcome: Ongoing

## 2020-12-22 NOTE — PROGRESS NOTES
Pulmonary Progress Note  [facility]      Patient - Angel Horton,  Age - 70 y.o.    - 1945      Room Number - 2097/2097-01   MRN -  [id number]   Acct # - [de-identified]  Date of Admission -  2020  8:33 PM        [address], [place]  Primary Care Physician - Maya Novak, APRN - CNP     SUBJECTIVE   He feels nauseated today    OBJECTIVE   VITALS    height is 5' 5\" (1.651 m) and weight is 181 lb 14.1 oz (82.5 kg). Her oral temperature is 98.1 °F (36.7 °C). Her blood pressure is 122/69 and her pulse is 99. Her respiration is 20 and oxygen saturation is 92%. Body mass index is 30.27 kg/m². Temperature Range: Temp: 98.1 °F (36.7 °C) Temp  Av.2 °F (36.8 °C)  Min: 97.3 °F (36.3 °C)  Max: 98.6 °F (37 °C)  BP Range:  Systolic (50WAV), FOI:376 , Min:99 , WND:122     Diastolic (28QNT), VYC:55, Min:53, Max:77    Pulse Range: Pulse  Av.2  Min: 83  Max: 104  Respiration Range: Resp  Av.5  Min: 18  Max: 22  Current Pulse Ox[de-identified]  SpO2: 92 %  24HR Pulse Ox Range:  SpO2  Av.8 %  Min: 92 %  Max: 98 %  Oxygen Amount and Delivery: O2 Flow Rate (L/min): 2 L/min    Wt Readings from Last 3 Encounters:   20 181 lb 14.1 oz (82.5 kg)   20 187 lb 6.4 oz (85 kg)   20 188 lb (85.3 kg)       I/O (24 Hours)    Intake/Output Summary (Last 24 hours) at 2020 1530  Last data filed at 2020 0851  Gross per 24 hour   Intake 290.83 ml   Output 1300 ml   Net -1009.17 ml       EXAM     General Appearance  Awake, alert, oriented, in no acute distress  HEENT - normocephalic, atraumatic.  []  Mallampati  [] Crowded airway   [] Macroglossia  []  Retrognathia  [] Micrognathia  []  Normal tongue size []  Normal Bite  [] Cata[place]o sign positive    Neck - Supple,  trachea midline   Lungs -clear  Cardiovascular - Heart sounds are normal.  Regular rate and rhythm Abdomen - Soft, nontender, nondistended, no masses or organomegaly  Neurologic - There are no focal motor or sensory deficits  Skin - No bruising or bleeding  Extremities - No clubbing, cyanosis, edema    MEDS      guaiFENesin  1,200 mg Oral BID    fluticasone  2 spray Each Nostril Daily    furosemide  40 mg Intravenous Daily    sodium chloride flush  10 mL Intravenous 2 times per day    ferrous sulfate  325 mg Oral Daily    ascorbic acid  1,000 mg Oral Daily    atorvastatin  10 mg Oral Daily    dilTIAZem  120 mg Oral Daily    fluticasone  2 puff Inhalation BID    [Held by provider] metoprolol succinate  25 mg Oral Daily    [Held by provider] apixaban  5 mg Oral BID    levothyroxine  125 mcg Oral Daily    pantoprazole  40 mg Oral QAM AC    DULoxetine  60 mg Oral Daily    tamsulosin  0.4 mg Oral Daily    [Held by provider] lisinopril  10 mg Oral Daily    And    [Held by provider] hydroCHLOROthiazide  12.5 mg Oral Daily       ondansetron, sodium chloride flush, acetaminophen, albuterol, clonazePAM, baclofen, HYDROcodone-acetaminophen, potassium chloride **OR** potassium alternative oral replacement **OR** potassium chloride    LABS   CBC   Recent Labs     12/22/20  0850   WBC 6.8   HGB 8.9*   HCT 28.5*   MCV 96.4        BMP:   Lab Results   Component Value Date     12/22/2020    K 4.0 12/22/2020     12/22/2020    CO2 34 12/22/2020    BUN 16 12/22/2020    LABALBU 3.3 12/19/2020    LABALBU 4.5 02/18/2012    CREATININE 1.10 12/22/2020    CALCIUM 9.2 12/22/2020    GFRAA 59 12/22/2020    LABGLOM 49 12/22/2020     ABGs:  Lab Results   Component Value Date    PHART 7.330 09/13/2018    PO2ART 74.8 09/13/2018    PFS6EVA 47.3 09/13/2018      Lab Results   Component Value Date    MODE NOT REPORTED 09/13/2018     Ionized Calcium:  No results found for: IONCA  Magnesium:    Lab Results   Component Value Date    MG 2.1 12/19/2020     Phosphorus:    Lab Results   Component Value Date

## 2020-12-23 ENCOUNTER — ANESTHESIA (OUTPATIENT)
Dept: ENDOSCOPY | Age: 75
DRG: 377 | End: 2020-12-23
Payer: MEDICARE

## 2020-12-23 ENCOUNTER — ANESTHESIA EVENT (OUTPATIENT)
Dept: ENDOSCOPY | Age: 75
DRG: 377 | End: 2020-12-23
Payer: MEDICARE

## 2020-12-23 VITALS — DIASTOLIC BLOOD PRESSURE: 50 MMHG | SYSTOLIC BLOOD PRESSURE: 95 MMHG | OXYGEN SATURATION: 87 %

## 2020-12-23 LAB
ABSOLUTE EOS #: 0.1 K/UL (ref 0–0.4)
ABSOLUTE IMMATURE GRANULOCYTE: ABNORMAL K/UL (ref 0–0.3)
ABSOLUTE LYMPH #: 0.7 K/UL (ref 1–4.8)
ABSOLUTE MONO #: 0.7 K/UL (ref 0.1–1.3)
ANION GAP SERPL CALCULATED.3IONS-SCNC: 9 MMOL/L (ref 9–17)
BASOPHILS # BLD: 2 % (ref 0–2)
BASOPHILS ABSOLUTE: 0.1 K/UL (ref 0–0.2)
BUN BLDV-MCNC: 14 MG/DL (ref 8–23)
BUN/CREAT BLD: ABNORMAL (ref 9–20)
CALCIUM SERPL-MCNC: 9.3 MG/DL (ref 8.6–10.4)
CHLORIDE BLD-SCNC: 98 MMOL/L (ref 98–107)
CO2: 34 MMOL/L (ref 20–31)
CREAT SERPL-MCNC: 1.06 MG/DL (ref 0.5–0.9)
DIFFERENTIAL TYPE: ABNORMAL
EOSINOPHILS RELATIVE PERCENT: 1 % (ref 0–4)
ERYTHROPOIETIN: 215 MU/ML (ref 4–27)
GFR AFRICAN AMERICAN: >60 ML/MIN
GFR NON-AFRICAN AMERICAN: 51 ML/MIN
GFR SERPL CREATININE-BSD FRML MDRD: ABNORMAL ML/MIN/{1.73_M2}
GFR SERPL CREATININE-BSD FRML MDRD: ABNORMAL ML/MIN/{1.73_M2}
GLUCOSE BLD-MCNC: 113 MG/DL (ref 70–99)
HCT VFR BLD CALC: 28.2 % (ref 36–46)
HEMOGLOBIN: 8.9 G/DL (ref 12–16)
IMMATURE GRANULOCYTES: ABNORMAL %
LYMPHOCYTES # BLD: 11 % (ref 24–44)
MCH RBC QN AUTO: 30.4 PG (ref 26–34)
MCHC RBC AUTO-ENTMCNC: 31.6 G/DL (ref 31–37)
MCV RBC AUTO: 96.2 FL (ref 80–100)
MONOCYTES # BLD: 10 % (ref 1–7)
NRBC AUTOMATED: ABNORMAL PER 100 WBC
PDW BLD-RTO: 18.9 % (ref 11.5–14.9)
PLATELET # BLD: 400 K/UL (ref 150–450)
PLATELET ESTIMATE: ABNORMAL
PMV BLD AUTO: 8 FL (ref 6–12)
POTASSIUM SERPL-SCNC: 3.8 MMOL/L (ref 3.7–5.3)
RBC # BLD: 2.93 M/UL (ref 4–5.2)
RBC # BLD: ABNORMAL 10*6/UL
SEG NEUTROPHILS: 76 % (ref 36–66)
SEGMENTED NEUTROPHILS ABSOLUTE COUNT: 4.8 K/UL (ref 1.3–9.1)
SODIUM BLD-SCNC: 141 MMOL/L (ref 135–144)
WBC # BLD: 6.3 K/UL (ref 3.5–11)
WBC # BLD: ABNORMAL 10*3/UL

## 2020-12-23 PROCEDURE — 85025 COMPLETE CBC W/AUTO DIFF WBC: CPT

## 2020-12-23 PROCEDURE — 0D5A8ZZ DESTRUCTION OF JEJUNUM, VIA NATURAL OR ARTIFICIAL OPENING ENDOSCOPIC: ICD-10-PCS | Performed by: INTERNAL MEDICINE

## 2020-12-23 PROCEDURE — 3609013000 HC EGD TRANSORAL CONTROL BLEEDING ANY METHOD: Performed by: INTERNAL MEDICINE

## 2020-12-23 PROCEDURE — 44365 SMALL BOWEL ENDOSCOPY: CPT | Performed by: INTERNAL MEDICINE

## 2020-12-23 PROCEDURE — 99232 SBSQ HOSP IP/OBS MODERATE 35: CPT | Performed by: INTERNAL MEDICINE

## 2020-12-23 PROCEDURE — 99232 SBSQ HOSP IP/OBS MODERATE 35: CPT | Performed by: FAMILY MEDICINE

## 2020-12-23 PROCEDURE — 6370000000 HC RX 637 (ALT 250 FOR IP): Performed by: INTERNAL MEDICINE

## 2020-12-23 PROCEDURE — 2580000003 HC RX 258: Performed by: ANESTHESIOLOGY

## 2020-12-23 PROCEDURE — 80048 BASIC METABOLIC PNL TOTAL CA: CPT

## 2020-12-23 PROCEDURE — 3700000001 HC ADD 15 MINUTES (ANESTHESIA): Performed by: INTERNAL MEDICINE

## 2020-12-23 PROCEDURE — 6370000000 HC RX 637 (ALT 250 FOR IP): Performed by: FAMILY MEDICINE

## 2020-12-23 PROCEDURE — 1200000000 HC SEMI PRIVATE

## 2020-12-23 PROCEDURE — 2720000010 HC SURG SUPPLY STERILE: Performed by: INTERNAL MEDICINE

## 2020-12-23 PROCEDURE — 2060000000 HC ICU INTERMEDIATE R&B

## 2020-12-23 PROCEDURE — 6360000002 HC RX W HCPCS: Performed by: INTERNAL MEDICINE

## 2020-12-23 PROCEDURE — 3700000000 HC ANESTHESIA ATTENDED CARE: Performed by: INTERNAL MEDICINE

## 2020-12-23 PROCEDURE — 2580000003 HC RX 258: Performed by: INTERNAL MEDICINE

## 2020-12-23 PROCEDURE — 7100000000 HC PACU RECOVERY - FIRST 15 MIN: Performed by: INTERNAL MEDICINE

## 2020-12-23 PROCEDURE — 36415 COLL VENOUS BLD VENIPUNCTURE: CPT

## 2020-12-23 PROCEDURE — 6360000002 HC RX W HCPCS: Performed by: NURSE ANESTHETIST, CERTIFIED REGISTERED

## 2020-12-23 PROCEDURE — 2709999900 HC NON-CHARGEABLE SUPPLY: Performed by: INTERNAL MEDICINE

## 2020-12-23 PROCEDURE — 7100000001 HC PACU RECOVERY - ADDTL 15 MIN: Performed by: INTERNAL MEDICINE

## 2020-12-23 RX ORDER — ONDANSETRON 2 MG/ML
4 INJECTION INTRAMUSCULAR; INTRAVENOUS
Status: DISCONTINUED | OUTPATIENT
Start: 2020-12-23 | End: 2020-12-23 | Stop reason: HOSPADM

## 2020-12-23 RX ORDER — DIPHENHYDRAMINE HYDROCHLORIDE 50 MG/ML
12.5 INJECTION INTRAMUSCULAR; INTRAVENOUS
Status: DISCONTINUED | OUTPATIENT
Start: 2020-12-23 | End: 2020-12-23 | Stop reason: HOSPADM

## 2020-12-23 RX ORDER — MORPHINE SULFATE 2 MG/ML
2 INJECTION, SOLUTION INTRAMUSCULAR; INTRAVENOUS EVERY 5 MIN PRN
Status: DISCONTINUED | OUTPATIENT
Start: 2020-12-23 | End: 2020-12-23 | Stop reason: HOSPADM

## 2020-12-23 RX ORDER — PROPOFOL 10 MG/ML
INJECTION, EMULSION INTRAVENOUS CONTINUOUS PRN
Status: DISCONTINUED | OUTPATIENT
Start: 2020-12-23 | End: 2020-12-23 | Stop reason: SDUPTHER

## 2020-12-23 RX ORDER — MEPERIDINE HYDROCHLORIDE 25 MG/ML
12.5 INJECTION INTRAMUSCULAR; INTRAVENOUS; SUBCUTANEOUS EVERY 5 MIN PRN
Status: DISCONTINUED | OUTPATIENT
Start: 2020-12-23 | End: 2020-12-23 | Stop reason: HOSPADM

## 2020-12-23 RX ORDER — PROPOFOL 10 MG/ML
INJECTION, EMULSION INTRAVENOUS PRN
Status: DISCONTINUED | OUTPATIENT
Start: 2020-12-23 | End: 2020-12-23 | Stop reason: SDUPTHER

## 2020-12-23 RX ORDER — LABETALOL HYDROCHLORIDE 5 MG/ML
5 INJECTION, SOLUTION INTRAVENOUS EVERY 10 MIN PRN
Status: DISCONTINUED | OUTPATIENT
Start: 2020-12-23 | End: 2020-12-23 | Stop reason: HOSPADM

## 2020-12-23 RX ORDER — SODIUM CHLORIDE 9 MG/ML
INJECTION, SOLUTION INTRAVENOUS CONTINUOUS
Status: DISCONTINUED | OUTPATIENT
Start: 2020-12-23 | End: 2020-12-23

## 2020-12-23 RX ADMIN — PANTOPRAZOLE SODIUM 40 MG: 40 TABLET, DELAYED RELEASE ORAL at 07:12

## 2020-12-23 RX ADMIN — Medication 10 ML: at 23:01

## 2020-12-23 RX ADMIN — LEVOTHYROXINE SODIUM 125 MCG: 125 TABLET ORAL at 07:12

## 2020-12-23 RX ADMIN — PROPOFOL 50 MG: 10 INJECTION, EMULSION INTRAVENOUS at 10:07

## 2020-12-23 RX ADMIN — TAMSULOSIN HYDROCHLORIDE 0.4 MG: 0.4 CAPSULE ORAL at 12:04

## 2020-12-23 RX ADMIN — ATORVASTATIN CALCIUM 10 MG: 10 TABLET, FILM COATED ORAL at 12:03

## 2020-12-23 RX ADMIN — DILTIAZEM HYDROCHLORIDE 120 MG: 120 CAPSULE, COATED, EXTENDED RELEASE ORAL at 12:03

## 2020-12-23 RX ADMIN — GUAIFENESIN 1200 MG: 600 TABLET, EXTENDED RELEASE ORAL at 23:01

## 2020-12-23 RX ADMIN — GUAIFENESIN 1200 MG: 600 TABLET, EXTENDED RELEASE ORAL at 12:02

## 2020-12-23 RX ADMIN — METOPROLOL TARTRATE 25 MG: 25 TABLET, FILM COATED ORAL at 23:01

## 2020-12-23 RX ADMIN — CLONAZEPAM 0.5 MG: 1 TABLET ORAL at 12:04

## 2020-12-23 RX ADMIN — FLUTICASONE PROPIONATE 2 PUFF: 110 AEROSOL, METERED RESPIRATORY (INHALATION) at 23:01

## 2020-12-23 RX ADMIN — SODIUM CHLORIDE: 9 INJECTION, SOLUTION INTRAVENOUS at 09:09

## 2020-12-23 RX ADMIN — FUROSEMIDE 40 MG: 10 INJECTION, SOLUTION INTRAMUSCULAR; INTRAVENOUS at 12:02

## 2020-12-23 RX ADMIN — PROPOFOL 125 MCG/KG/MIN: 10 INJECTION, EMULSION INTRAVENOUS at 10:07

## 2020-12-23 RX ADMIN — DULOXETINE HYDROCHLORIDE 60 MG: 60 CAPSULE, DELAYED RELEASE ORAL at 12:03

## 2020-12-23 RX ADMIN — FERROUS SULFATE TAB 325 MG (65 MG ELEMENTAL FE) 325 MG: 325 (65 FE) TAB at 12:03

## 2020-12-23 ASSESSMENT — PULMONARY FUNCTION TESTS
PIF_VALUE: 0
PIF_VALUE: 1
PIF_VALUE: 0

## 2020-12-23 ASSESSMENT — PAIN DESCRIPTION - LOCATION: LOCATION: HEAD

## 2020-12-23 ASSESSMENT — COPD QUESTIONNAIRES: CAT_SEVERITY: NO INTERVAL CHANGE

## 2020-12-23 ASSESSMENT — LIFESTYLE VARIABLES: SMOKING_STATUS: 0

## 2020-12-23 ASSESSMENT — PAIN SCALES - GENERAL
PAINLEVEL_OUTOF10: 0
PAINLEVEL_OUTOF10: 3
PAINLEVEL_OUTOF10: 0
PAINLEVEL_OUTOF10: 0

## 2020-12-23 ASSESSMENT — PAIN DESCRIPTION - DESCRIPTORS: DESCRIPTORS: ACHING

## 2020-12-23 ASSESSMENT — ENCOUNTER SYMPTOMS
SHORTNESS OF BREATH: 0
STRIDOR: 0

## 2020-12-23 NOTE — PLAN OF CARE
Problem: Falls - Risk of:  Goal: Will remain free from falls  Description: Will remain free from falls  12/23/2020 0305 by Jillian Looney RN  Outcome: Ongoing  12/22/2020 1537 by Geo White RN  Outcome: Ongoing  Goal: Absence of physical injury  Description: Absence of physical injury  12/22/2020 1537 by Geo White RN  Outcome: Ongoing     Problem: Bleeding:  Goal: Will show no signs and symptoms of excessive bleeding  Description: Will show no signs and symptoms of excessive bleeding  12/23/2020 0305 by Jillian Looney RN  Outcome: Ongoing  12/22/2020 1537 by Geo White RN  Outcome: Ongoing     Problem: Pain:  Goal: Pain level will decrease  Description: Pain level will decrease  12/23/2020 0305 by Jillian Looney RN  Outcome: Ongoing  12/22/2020 1537 by Geo White RN  Outcome: Ongoing  Goal: Control of acute pain  Description: Control of acute pain  12/22/2020 1537 by Geo White RN  Outcome: Ongoing  Goal: Control of chronic pain  Description: Control of chronic pain  12/22/2020 1537 by Geo White RN  Outcome: Ongoing     Problem: Musculor/Skeletal Functional Status  Goal: Highest potential functional level  12/23/2020 0305 by Jillian Looney RN  Outcome: Ongoing  12/22/2020 1537 by Geo White RN  Outcome: Ongoing  Goal: Absence of falls  12/22/2020 1537 by Geo White RN  Outcome: Ongoing

## 2020-12-23 NOTE — PROGRESS NOTES
[facility]   OCCUPATIONAL THERAPY MISSED TREATMENT NOTE   INPATIENT   Date: 20  Patient Name: Salvador Scott       Room: [address]  MRN: [id number]   Account #: [de-identified]    : 1945  (80 y.o.)  Gender: female     REASON FOR MISSED TREATMENT:  Patient at testing and/or off the floor   -   2763 Will reattempt as able.      Court Fent

## 2020-12-23 NOTE — CARE COORDINATION
ONGOING DISCHARGE PLAN:    Spoke with patient regarding discharge plan and patient confirms that plan is still to DC to home w/ [facility], [facility]. Pt. Had EGD today w/ GI. Remains on IV Lasix, 40 MG, Daily. Cardiac Diet. HGB today 8.9. Hemoc on board. Will continue to follow for additional discharge needs.     Electronically signed by Pat Rudd RN on 12/23/2020 at 3:04 PM

## 2020-12-23 NOTE — ANESTHESIA POSTPROCEDURE EVALUATION
POST- ANESTHESIA EVALUATION       Pt Name: Hipolito Sorensen  MRN: [id number]  YOB: 1945  Date of evaluation: 12/23/2020  Time:  2:46 PM      /62   Pulse 92   Temp 97.5 °F (36.4 °C) (Oral)   Resp 22   Ht 5' 5\" (1.651 m)   Wt 190 lb 4.1 oz (86.3 kg)   SpO2 94%   BMI 31.66 kg/m²      Consciousness Level  Awake  Cardiopulmonary Status  Stable  Pain Adequately Treated YES  Nausea / Vomiting  NO  Adequate Hydration  YES  Anesthesia Related Complications NONE      Electronically signed by Alex Paulino MD on 12/23/2020 at 2:46 PM       Department of Anesthesiology  Postprocedure Note    Patient: Hipolito Sorensen  MRN: [id number]  YOB: 1945  Date of evaluation: 12/23/2020  Time:  2:46 PM     Procedure Summary     Date: 12/23/20 Room / Location: [facility] [place]    Anesthesia Start: 5661 Anesthesia Stop: 1034    Procedure: PUSH ENTEROSCOPY/EGD CONTROL BLEEDING (N/A Esophagus) Diagnosis: (ANEMIA/SYMPTOMATIC)    Surgeons: Haseeb Pat MD Responsible Provider: Alex Paulino MD    Anesthesia Type: MAC, general ASA Status: 3          Anesthesia Type: MAC, general    Ethan Phase I: Ethan Score: 9    Ethan Phase II:      Last vitals: Reviewed and per EMR flowsheets.        Anesthesia Post Evaluation

## 2020-12-23 NOTE — PROGRESS NOTES
FAMILY MEDICINE  - PROGRESS NOTE    Date:  12/23/2020  Ellyn Said  [id number]      Chief Complaint   Patient presents with    Abnormal Lab     Patient comes to the ER with a hemoglobin of 6.6, per her physician, to get a blood transfusion.  Shortness of Breath     Dx with pneumonia today by her physician after having a chest x-ray Thursday. Has not started her antibiotics. Says the drug store has yet to deliver it. Interval History:  not changed, no significant events overnight. She is scheduled for an endoscopy today.       Subjective  Constitutional: positive for fatigue  Respiratory: positive for emphysema and shortness of breath  Cardiovascular: positive for irregular heart beat  Hematologic/lymphatic: positive for pallor  Musculoskeletal:positive for arthralgias, myalgias and stiff joints  Behavioral/Psych: positive for anxiety and depression:    Objective:    /72   Pulse 95   Temp 98.6 °F (37 °C)   Resp 18   Ht 5' 5\" (1.651 m)   Wt 190 lb 4.1 oz (86.3 kg)   SpO2 95%   BMI 31.66 kg/m²   General appearance - alert, well appearing, and in no distress and overweight  Mental status - alert, oriented to person, place, and time  Eyes - pupils equal and reactive, extraocular eye movements intact  Ears - hearing grossly normal bilaterally  Nose - normal and patent, no erythema, discharge or polyps  Mouth - mucous membranes moist, pharynx normal without lesions  Neck - supple, no significant adenopathy  Lymphatics - no palpable lymphadenopathy, no hepatosplenomegaly  Chest - clear to auscultation, no wheezes, rales or rhonchi, symmetric air entry, decreased air entry noted posteriorly  Heart - irregularly irregular rhythm with rate 95  Abdomen - soft, nontender, nondistended, no masses or organomegaly  Breasts - not examined  Back exam - not examined Neurological - alert, oriented, normal speech, no focal findings or movement disorder noted  Musculoskeletal - osteoarthritic changes noted in both hands  Extremities - pedal edema trace +  Skin - normal coloration and turgor, no rashes, no suspicious skin lesions noted    Data:   Medications:   Current Facility-Administered Medications   Medication Dose Route Frequency Provider Last Rate Last Admin    metoprolol tartrate (LOPRESSOR) tablet 25 mg  25 mg Oral BID Doug Parisi MD   25 mg at 12/22/20 2105    ondansetron (ZOFRAN) injection 4 mg  4 mg Intravenous Q6H PRN Jo Milan MD   4 mg at 12/22/20 1044    guaiFENesin [facility]) extended release tablet 1,200 mg  1,200 mg Oral BID Elyse Michelle MD   1,200 mg at 12/22/20 2105    fluticasone (FLONASE) 50 MCG/ACT nasal spray 2 spray  2 spray Each Nostril Daily Elyse Michelle MD   2 spray at 12/22/20 0803    furosemide (LASIX) injection 40 mg  40 mg Intravenous Daily Elyse Michelle MD   40 mg at 12/22/20 0802    sodium chloride flush 0.9 % injection 10 mL  10 mL Intravenous 2 times per day Jo Milan MD   10 mL at 12/22/20 2110    sodium chloride flush 0.9 % injection 10 mL  10 mL Intravenous PRN Jo Milan MD        acetaminophen (TYLENOL) tablet 650 mg  650 mg Oral Q4H PRN Jo Milan MD   650 mg at 12/22/20 2106    ferrous sulfate (IRON 325) tablet 325 mg  325 mg Oral Daily Jo Milan MD   325 mg at 12/22/20 0802    ascorbic acid (VITAMIN C) tablet 1,000 mg  1,000 mg Oral Daily Jo Milan MD   1,000 mg at 12/22/20 0801    atorvastatin (LIPITOR) tablet 10 mg  10 mg Oral Daily Jo Milan MD   10 mg at 12/22/20 0801    dilTIAZem (CARDIZEM CD) extended release capsule 120 mg  120 mg Oral Daily Doug Parisi MD   120 mg at 12/22/20 0801    albuterol (PROVENTIL) nebulizer solution 2.5 mg  2.5 mg Nebulization Q6H PRN Jo Milan MD   2.5 mg at 12/22/20 5305  clonazePAM (KLONOPIN) tablet 0.5 mg  0.5 mg Oral TID PRN Carrie Joseph MD   0.5 mg at 12/22/20 0553    fluticasone (FLOVENT HFA) 110 MCG/ACT inhaler 2 puff  2 puff Inhalation BID Carrie Joseph MD   2 puff at 12/22/20 2105    [Held by provider] metoprolol succinate (TOPROL XL) extended release tablet 25 mg  25 mg Oral Daily Carrie Joseph MD   25 mg at 12/19/20 1051    baclofen (LIORESAL) tablet 10 mg  10 mg Oral TID PRN Carrie Joseph MD   10 mg at 12/20/20 2118    HYDROcodone-acetaminophen (NORCO) 5-325 MG per tablet 1 tablet  1 tablet Oral Q8H PRN Carrie Joseph MD   1 tablet at 12/21/20 2336    [Held by provider] apixaban (ELIQUIS) tablet 5 mg  5 mg Oral BID Carrie Joseph MD        levothyroxine (SYNTHROID) tablet 125 mcg  125 mcg Oral Daily Carrie Joseph MD   125 mcg at 12/22/20 0549    pantoprazole (PROTONIX) tablet 40 mg  40 mg Oral QAM AC Carrie Joseph MD   40 mg at 12/22/20 0549    DULoxetine (CYMBALTA) extended release capsule 60 mg  60 mg Oral Daily Carrie Joseph MD   60 mg at 12/22/20 0802    tamsulosin (FLOMAX) capsule 0.4 mg  0.4 mg Oral Daily Carrie Joseph MD   0.4 mg at 12/22/20 0801    [Held by provider] lisinopril (PRINIVIL;ZESTRIL) tablet 10 mg  10 mg Oral Daily Carrie Joseph MD   10 mg at 12/19/20 1051    And    [Held by provider] hydroCHLOROthiazide (HYDRODIURIL) tablet 12.5 mg  12.5 mg Oral Daily Carrie Joseph MD   12.5 mg at 12/19/20 1051    potassium chloride (KLOR-CON M) extended release tablet 40 mEq  40 mEq Oral PRN Carrie Joseph MD        Or    potassium bicarb-citric acid (EFFER-K) effervescent tablet 40 mEq  40 mEq Oral PRN Carrie Joseph MD        Or   Armida See potassium chloride 10 mEq/100 mL IVPB (Peripheral Line)  10 mEq Intravenous PRN Carrie Joseph MD           Intake/Output Summary (Last 24 hours) at 12/23/2020 0534  Last data filed at 12/22/2020 0851  Gross per 24 hour   Intake    Output 400 ml   Net -400 ml Acute on chronic diastolic CHF (congestive heart failure) (HCC)     GI bleed     Weight loss     Elevated alkaline phosphatase level     Thrombocytosis (HCC)     Iron deficiency anemia due to chronic blood loss     Iron malabsorption     Symptomatic anemia     Pneumonia due to organism           Plan:  -Symptomatic anemia - Hgb 8.9 again this am, Hem/Onc following. -GI bleed - Endoscopy planned for today. -A. Fib. - rate controlled, Eliquis on hold due to GI bleed.  -Acute on chronic diastolic CHF - Cardiology following.  -Continue current treatments.  -Complete orders per chart.     See orders   Disposition:    Electronically signed by Juan Luna MD on 12/23/2020 at 5:34 AM

## 2020-12-23 NOTE — ANESTHESIA PRE PROCEDURE
clonazePAM (KLONOPIN) 0.5 MG tablet Take 1 tablet by mouth 3 times daily as needed for Anxiety for up to 30 days.  11/17/20 12/19/20 Yes WILLIE Albert CNP   fluticasone (FLOVENT HFA) 220 MCG/ACT inhaler Inhale 2 puffs into the lungs 2 times daily 11/17/20  Yes WILLIE Albert CNP   albuterol (PROVENTIL) (2.5 MG/3ML) 0.083% nebulizer solution Take 3 mLs by nebulization every 6 hours as needed for Wheezing 11/5/20  Yes WILLIE Albert CNP   dilTIAZem (CARDIZEM CD) 120 MG extended release capsule Take 1 capsule by mouth daily 10/19/20  Yes WILLIE Albert CNP   albuterol sulfate HFA (PROAIR HFA) 108 (90 Base) MCG/ACT inhaler Inhale 2 puffs into the lungs every 6 hours as needed for Wheezing   Yes Historical Provider, MD   fluticasone (FLONASE) 50 MCG/ACT nasal spray USE ONE SPRAY TO EACH NOSTRIL ONCE ADAY 9/4/20  Yes WILLIE Huffman CNP   atorvastatin (LIPITOR) 10 MG tablet TAKE 1 TABLET BY MOUTH ONCE DAILY 9/4/20  Yes WILLIE Huffman CNP   Ascorbic Acid (C-1000 PO) Take 1 capsule by mouth daily    Yes Historical Provider, MD   ferrous sulfate 325 (65 Fe) MG tablet Take 325 mg by mouth daily  8/30/18  Yes Historical Provider, MD   Oxygen Concentrator continuous    Historical Provider, MD   Lift Chair MISC Use daily for comfort  Patient not taking: Reported on 12/18/2020 11/30/20   WILLIE Albert CNP   PROAIR  (90 Base) MCG/ACT inhaler Inhale 2 puffs into the lungs every 6 hours as needed for Wheezing 11/17/20   WILLIE Albert CNP   levalbuterol [facility] HFA) 45 MCG/ACT inhaler Inhale 1 puff into the lungs every 4 hours as needed for Wheezing 8/19/20   WILLIE Albert CNP   Respiratory Therapy Supplies (NEBULIZER/TUBING/MOUTHPIECE) KIT Use every 4-6 hours prn for copd 6/15/20   Burlene Barley, APRN - CNP       Current medications:    Current Facility-Administered Medications Medication Dose Route Frequency Provider Last Rate Last Admin    metoprolol tartrate (LOPRESSOR) tablet 25 mg  25 mg Oral BID Erickson Parisi MD   25 mg at 12/22/20 2105    ondansetron (ZOFRAN) injection 4 mg  4 mg Intravenous Q6H PRN Catie Michele MD   4 mg at 12/22/20 1044    guaiFENesin [facility]) extended release tablet 1,200 mg  1,200 mg Oral BID Delicia Archer MD   1,200 mg at 12/22/20 2105    fluticasone (FLONASE) 50 MCG/ACT nasal spray 2 spray  2 spray Each Nostril Daily Delicia Archer MD   2 spray at 12/22/20 0803    furosemide (LASIX) injection 40 mg  40 mg Intravenous Daily Delicia Archer MD   40 mg at 12/22/20 0802    sodium chloride flush 0.9 % injection 10 mL  10 mL Intravenous 2 times per day Catie Michele MD   10 mL at 12/22/20 2110    sodium chloride flush 0.9 % injection 10 mL  10 mL Intravenous PRN Catie Michele MD        acetaminophen (TYLENOL) tablet 650 mg  650 mg Oral Q4H PRN Catie Michele MD   650 mg at 12/22/20 2106    ferrous sulfate (IRON 325) tablet 325 mg  325 mg Oral Daily Catie Michele MD   325 mg at 12/22/20 0802    ascorbic acid (VITAMIN C) tablet 1,000 mg  1,000 mg Oral Daily Catie Michele MD   1,000 mg at 12/22/20 0801    atorvastatin (LIPITOR) tablet 10 mg  10 mg Oral Daily Catie Michele MD   10 mg at 12/22/20 0801    dilTIAZem (CARDIZEM CD) extended release capsule 120 mg  120 mg Oral Daily Erickson Parisi MD   120 mg at 12/22/20 0801    albuterol (PROVENTIL) nebulizer solution 2.5 mg  2.5 mg Nebulization Q6H PRN Catie Michele MD   2.5 mg at 12/22/20 0915    clonazePAM (KLONOPIN) tablet 0.5 mg  0.5 mg Oral TID PRN Catie Michele MD   0.5 mg at 12/22/20 0553    fluticasone (FLOVENT HFA) 110 MCG/ACT inhaler 2 puff  2 puff Inhalation BID Catie Michele MD   2 puff at 12/22/20 6100  [Held by provider] metoprolol succinate (TOPROL XL) extended release tablet 25 mg  25 mg Oral Daily Emeka Mcpherson MD   25 mg at 12/19/20 1051    baclofen (LIORESAL) tablet 10 mg  10 mg Oral TID PRN Emeka Mcpherson MD   10 mg at 12/20/20 2118    HYDROcodone-acetaminophen (NORCO) 5-325 MG per tablet 1 tablet  1 tablet Oral Q8H PRN Emeka Mcpherson MD   1 tablet at 12/21/20 2336    [Held by provider] apixaban (ELIQUIS) tablet 5 mg  5 mg Oral BID Emeka Mcpherson MD        levothyroxine (SYNTHROID) tablet 125 mcg  125 mcg Oral Daily Emeka Mcpherson MD   125 mcg at 12/23/20 5449    pantoprazole (PROTONIX) tablet 40 mg  40 mg Oral QAM AC Emeka Mcpherson MD   40 mg at 12/23/20 0723    DULoxetine (CYMBALTA) extended release capsule 60 mg  60 mg Oral Daily Emeka Mcpherson MD   60 mg at 12/22/20 0802    tamsulosin (FLOMAX) capsule 0.4 mg  0.4 mg Oral Daily Emeka Mcpherson MD   0.4 mg at 12/22/20 0801    [Held by provider] lisinopril (PRINIVIL;ZESTRIL) tablet 10 mg  10 mg Oral Daily Emeka Mcpherson MD   10 mg at 12/19/20 1051    And    [Held by provider] hydroCHLOROthiazide (HYDRODIURIL) tablet 12.5 mg  12.5 mg Oral Daily Emeka Mcpherson MD   12.5 mg at 12/19/20 1051    potassium chloride (KLOR-CON M) extended release tablet 40 mEq  40 mEq Oral PRN Emeka Mcpherson MD        Or    potassium bicarb-citric acid (EFFER-K) effervescent tablet 40 mEq  40 mEq Oral PRN Emeka Mcpherson MD        Or   [employer] potassium chloride 10 mEq/100 mL IVPB (Peripheral Line)  10 mEq Intravenous PRN Emeka Mcpherson MD           Allergies:     Allergies   Allergen Reactions    Tizanidine Other (See Comments)     Patient states it makes her loopy       Problem List:    Patient Active Problem List   Diagnosis Code    Acquired hypothyroidism E03.9    Hypertension I10    Primary osteoarthritis of right knee M17.11    A-fib ([address] I48.91    Acute encephalopathy G93.40  SIRS (systemic inflammatory response syndrome) ([facility]) R65.10    Normocytic anemia D64.9    Pulmonary hypertension ([facility]) I27.20    COPD (chronic obstructive pulmonary disease) ([facility]) J44.9    History of GI bleed Z87.19    Constipation K59.00    HA (generalized anxiety disorder) F41.1    Lumbar radiculopathy M54.16    Lumbosacral spondylosis without myelopathy M47.817    Benign hypertension with CKD (chronic kidney disease) stage III I12.9, N18.30    CKD (chronic kidney disease) stage 3, GFR 30-59 ml/min N18.30    Alcohol withdrawal syndrome with complication ([facility]) Q16.836    Moderate major depression ([facility]) F32.1    PAD (peripheral artery disease) ([facility]) I73.9    Acute kidney injury ([facility]) N17.9    Obesity, Class I, BMI 30-34.9 E66.9    Acute on chronic diastolic CHF (congestive heart failure) ([facility]) I50.33    GI bleed K92.2    Weight loss R63.4    Elevated alkaline phosphatase level R74.8    Thrombocytosis ([facility]) D47.3    Iron deficiency anemia due to chronic blood loss D50.0    Iron malabsorption K90.9    Symptomatic anemia D64.9    Pneumonia due to organism J18.9       Past Medical History:        Diagnosis Date    [facility]     Acquired hypothyroidism     Acute encephalopathy     Acute kidney injury [facility] [address]     Anxiety     Benign hypertension with CKD (chronic kidney disease) stage III     Chronic back pain     CKD (chronic kidney disease) stage 3, GFR 30-59 ml/min     Constipation     COPD (chronic obstructive pulmonary disease) ([facility])     Cough     Depression     ETOH abuse     Former smoker     3 packs a day for 26 years    HA (generalized anxiety disorder)     History of GI bleed     Hyperlipidemia     Hypertension     Hypothyroid 1/18/2013    Leg pain     Normocytic anemia     Osteoarthritis     PAD (peripheral artery disease) ([address]     Primary osteoarthritis     Pulmonary hypertension ([address]     Pure hypercholesterolemia CREATININE 1.06 12/23/2020    GFRAA >60 12/23/2020    LABGLOM 51 12/23/2020    GLUCOSE 113 12/23/2020    GLUCOSE 113 02/18/2012    PROT 5.8 12/19/2020    CALCIUM 9.3 12/23/2020    BILITOT 0.29 12/19/2020    ALKPHOS 102 12/19/2020    AST 14 12/19/2020    ALT 10 12/19/2020       POC Tests: No results for input(s): POCGLU, POCNA, POCK, POCCL, POCBUN, POCHEMO, POCHCT in the last 72 hours. Coags:   Lab Results   Component Value Date    PROTIME 18.0 12/19/2020    INR 1.5 12/19/2020    APTT 36.9 12/19/2020       HCG (If Applicable): No results found for: PREGTESTUR, PREGSERUM, HCG, HCGQUANT     ABGs:   Lab Results   Component Value Date    PHART 7.330 09/13/2018    PO2ART 74.8 09/13/2018    YLC5LZP 47.3 09/13/2018    RSO6YNV 24.9 09/13/2018    X0GCVMLF 93.0 09/13/2018        Type & Screen (If Applicable):  No results found for: LABABO, LABRH    Drug/Infectious Status (If Applicable):  Lab Results   Component Value Date    HEPCAB NONREACTIVE 03/04/2019       COVID-19 Screening (If Applicable):   Lab Results   Component Value Date    COVID19 Not Detected 12/19/2020    COVID19 Not Detected 12/01/2020         Anesthesia Evaluation  Patient summary reviewed and Nursing notes reviewed no history of anesthetic complications:   Airway: Mallampati: II  TM distance: >3 FB   Neck ROM: full  Mouth opening: > = 3 FB Dental:    (+) edentulous      Pulmonary:normal exam  breath sounds clear to auscultation  (+) pneumonia: no interval change,  COPD: no interval change,      (-) asthma, shortness of breath, recent URI, sleep apnea, rhonchi, wheezes, rales, stridor and not a current smoker          Patient did not smoke on day of surgery.                  Cardiovascular:  Exercise tolerance: good (>4 METS),   (+) hypertension: no interval change, CHF: no interval change, (-) pacemaker, valvular problems/murmurs, past MI, CAD, CABG/stent, dysrhythmias,  angina, orthopnea, PND,  NAVARRO, murmur, weak pulses,  friction rub, systolic click, carotid bruit,  JVD and peripheral edema    ECG reviewed  Rhythm: regular  Rate: normal  Echocardiogram reviewed         Beta Blocker:  Dose within 24 Hrs         Neuro/Psych:   (+) neuromuscular disease:, psychiatric history: stable with treatmentdepression/anxiety    (-) seizures, TIA, CVA and headaches           GI/Hepatic/Renal: Neg GI/Hepatic/Renal ROS       (-) hiatal hernia, GERD, PUD, hepatitis, liver disease, no renal disease, bowel prep and no morbid obesity       Endo/Other:    (+) hypothyroidism::., no malignancy/cancer. (-) diabetes mellitus, hyperthyroidism, blood dyscrasia, arthritis, no electrolyte abnormalities, no malignancy/cancer               Abdominal:           Vascular: negative vascular ROS. - PVD, DVT and PE. Anesthesia Plan      MAC and general     ASA 3       Induction: intravenous. MIPS: Postoperative opioids intended and Prophylactic antiemetics administered. Anesthetic plan and risks discussed with patient. Plan discussed with CRNA.                   Alex Paulino MD   12/23/2020

## 2020-12-23 NOTE — PROGRESS NOTES
Progress Note    Patient Name:  Marko Kocher    :  1945  2020 7:55 AM      SUBJECTIVE       Ms. Paz sitting in the chair this morning. Has shoulder pain but otherwise continues to be fatigued but has no other acute complaints. Does not appear short of breath while talking today. But states she does become easily short of breath with any activity. OBJECTIVE     Vital signs:    /81   Pulse 89   Temp 98.2 °F (36.8 °C) (Oral)   Resp 18   Ht 5' 5\" (1.651 m)   Wt 190 lb 4.1 oz (86.3 kg)   SpO2 94%   BMI 31.66 kg/m²  2 L/min      Admit Weight:  187 lb (84.8 kg)    Last 3 weights: Wt Readings from Last 3 Encounters:   20 190 lb 4.1 oz (86.3 kg)   20 187 lb 6.4 oz (85 kg)   20 188 lb (85.3 kg)       BMI: Body mass index is 31.66 kg/m². Input/Output:       Intake/Output Summary (Last 24 hours) at 2020 0755  Last data filed at 2020 0851  Gross per 24 hour   Intake    Output 400 ml   Net -400 ml         Exam:     General appearance: awake and alert moves all ext   Lungs: Decreased throughout  Heart: S1 and S2, regular rate and rhythm, systolic murmur  Abdomen: positive bowel sounds, no bruits, no masses  Extremities: warm and dry, no cyanosis, no clubbing        Laboratory Studies:     CBC:   Recent Labs     20  0553 20  0850 20  0512   WBC 6.1 6.8 6.3   HGB 7.4* 8.9* 8.9*   HCT 23.6* 28.5* 28.2*   MCV 97.0 96.4 96.2    380 400     BMP:   Recent Labs     20  0553 20  0656 20  0512    143 141   K 4.1 4.0 3.8    102 98   CO2 31 34* 34*   BUN 23 16 14   CREATININE 1.69* 1.10* 1.06*     PT/INR: No results for input(s): PROTIME, INR in the last 72 hours. APTT: No results for input(s): APTT in the last 72 hours. MAG: No results for input(s): MG in the last 72 hours. D Dimer: No results for input(s): DDIMER in the last 72 hours. Troponin  No results for input(s): TROPONINI in the last 72 hours. Recent Labs     20   TROPONINT NOT REPORTED      BNP No results for input(s): BNP in the last 72 hours. Recent Labs     20  0553   PROBNP 6,049*         Pulse Ox: SpO2  Av.2 %  Min: 92 %  Max: 97 %  Supplemental O2: O2 Flow Rate (L/min): 2 L/min     Current Meds:    metoprolol tartrate  25 mg Oral BID    guaiFENesin  1,200 mg Oral BID    fluticasone  2 spray Each Nostril Daily    furosemide  40 mg Intravenous Daily    sodium chloride flush  10 mL Intravenous 2 times per day    ferrous sulfate  325 mg Oral Daily    ascorbic acid  1,000 mg Oral Daily    atorvastatin  10 mg Oral Daily    dilTIAZem  120 mg Oral Daily    fluticasone  2 puff Inhalation BID    [Held by provider] metoprolol succinate  25 mg Oral Daily    [Held by provider] apixaban  5 mg Oral BID    levothyroxine  125 mcg Oral Daily    pantoprazole  40 mg Oral QAM AC    DULoxetine  60 mg Oral Daily    tamsulosin  0.4 mg Oral Daily    [Held by provider] lisinopril  10 mg Oral Daily    And    [Held by provider] hydroCHLOROthiazide  12.5 mg Oral Daily     Continuous Infusions:          ASSESSMENT     Principal Problem:    Symptomatic anemia  Active Problems:    Acquired hypothyroidism    A-fib (HCC)    Pulmonary hypertension (HCC)    COPD (chronic obstructive pulmonary disease) ([facility])    HA (generalized anxiety disorder)    CKD (chronic kidney disease) stage 3, GFR 30-59 ml/min    Moderate major depression ([facility])    PAD (peripheral artery disease) ([facility])    Obesity, Class I, BMI 30-34.9    Acute on chronic diastolic CHF (congestive heart failure) ([facility])    Iron deficiency anemia due to chronic blood loss    Pneumonia due to organism  Resolved Problems:    * No resolved hospital problems.  *      PLAN 1.  Heart failure preserved EF, acute on chronic.  proBNP is significantly elevated although chest x-ray does not reveal significant fluid overload. Continue 40 of Lasix IV push daily with blood pressure greater than 90.  2.  Persistent atrial fibrillation, anticoagulated on Eliquis. Eliquis on hold due to GI bleed. Rate control with Cardizem and metoprolol. 3.  Hypotension in the setting of history of essential hypertension, no evidence of hypotension in the last 24 hours. Her lisinopril and hydrochlorothiazide continue on hold. 4. CKD stage III with a creatinine of 1. 6 9 today. Need to monitor creatinine closely and hold Lasix if continues to elevate. 5.  Symptomatic anemia secondary to possible GI bleed and iron deficiency anemia. Continue to hold Eliquis.     Aravind García, [address] [facility]

## 2020-12-23 NOTE — PROGRESS NOTES
Today's Date: 12/22/2020  Patient Name: Lawrence Pardo  Date of admission: 12/18/2020  8:33 PM  Patient's age: 70 y.o., 1945  Admission Dx: Symptomatic anemia [D64.9]    Reason for Consult: anemia  Requesting Physician: Blanca Pride MD    CHIEF COMPLAINT:    Chief Complaint   Patient presents with    Abnormal Lab     Patient comes to the ER with a hemoglobin of 6.6, per her physician, to get a blood transfusion.  Shortness of Breath     Dx with pneumonia today by her physician after having a chest x-ray Thursday. Has not started her antibiotics. Says the drug store has yet to deliver it. SUBJECTIVE:    Patient seen and examined  NO fever chills  Hb 8.9  ++fatigue  NO NV  HISTORY OF PRESENT ILLNESS IN BRIEF:    Lawrence Pardo  is a 70 y.o. female who is admitted to the hospital for abnormal lab and hemoglobin of 6.6. Patient has recently been evaluated by Dr. Dane Live hematologist as outpatient on 12/18 for chronic anemia, possible iron deficiency, history of AV malformation. He has ordered some labs and there was a plan to start patient on IV iron supplements if her lab work suggest significant iron deficiency. Her lab work at the office showed hemoglobin of 6.6 and therefore she was instructed to go to ER. Patient has been maintained on chronically anticoagulation with Eliquis for her history of atrial fibrillation. Past Medical History:   has a past medical history of A-fib ([facility] [address] Acquired hypothyroidism, Acute encephalopathy, Acute kidney injury ([facility] [address] Anxiety, Benign hypertension with CKD (chronic kidney disease) stage III, Chronic back pain, CKD (chronic kidney disease) stage 3, GFR 30-59 ml/min, Constipation, COPD (chronic obstructive pulmonary disease) ([facility] [address] Cough, Depression, ETOH abuse, Former smoker, HA (generalized anxiety disorder), History of GI bleed, Hyperlipidemia, Hypertension, Hypothyroid, Leg pain, Normocytic anemia, Osteoarthritis, PAD (peripheral artery disease) ([facility] [address] Primary osteoarthritis, Pulmonary hypertension ([facility] [address] Pure hypercholesterolemia, SIRS (systemic inflammatory response syndrome) ([facility] [address] Urge incontinence, and Wheezing. Past Surgical History:   has a past surgical history that includes eye surgery (Bilateral); Colonoscopy; joint replacement; Upper gastrointestinal endoscopy (N/A, 12/2/2020); and Colonoscopy (N/A, 12/2/2020). Medications:    Prior to Admission medications    Medication Sig Start Date End Date Taking?  Authorizing Provider   tamsulosin (FLOMAX) 0.4 MG capsule TAKE 1 CAPSULE BY MOUTH DAILY 12/15/20  Yes WILLIE Robbins CNP   omeprazole (PRILOSEC) 40 MG delayed release capsule TAKE 1 CAPSULE BY MOUTH EVERY MORNING BEFORE BREAKFAST 12/14/20  Yes WILLIE Robbins CNP   DULoxetine (CYMBALTA) 60 MG extended release capsule Take 1 capsule by mouth daily 12/14/20  Yes WILLIE Robbins CNP   furosemide (LASIX) 40 MG tablet TAKE 1 TABLET BY MOUTH DAILY  Patient taking differently: Take 40 mg by mouth daily Currently taking 2 tablets daily  12/18/20 11/30/20  Yes WILLIE Robbins CNP   baclofen (LIORESAL) 10 MG tablet TAKE ONE TABLET BY MOUTH THREE TIMES A DAY AS NEEDED. 11/30/20  Yes WILLIE Robbins CNP HYDROcodone-acetaminophen (NORCO) 5-325 MG per tablet Take 1 tablet by mouth every 8 hours as needed for Pain for up to 30 days. 11/30/20 12/30/20 Yes WILLIE Robbins CNP   lisinopril-hydroCHLOROthiazide (PRINZIDE;ZESTORETIC) 10-12.5 MG per tablet Take 1 tablet by mouth daily 11/30/20  Yes WILLIE Robbins CNP   ELIQUIS 5 MG TABS tablet TAKE ONE TABLET BY MOUTH TWICE A DAY 11/30/20  Yes WILLIE Robbins CNP   levothyroxine (SYNTHROID) 125 MCG tablet Take 1 tablet by mouth daily 11/30/20  Yes WILLIE Robbins CNP   metoprolol succinate (TOPROL XL) 25 MG extended release tablet TAKE 1 TABLET BY MOUTH DAILY. (PLEASE MAKE DR APPT FOR FUTURE REFILLS) 11/23/20  Yes WILLIE Robbins CNP   clonazePAM (KLONOPIN) 0.5 MG tablet Take 1 tablet by mouth 3 times daily as needed for Anxiety for up to 30 days.  11/17/20 12/19/20 Yes WILLIE Robbins CNP   fluticasone (FLOVENT HFA) 220 MCG/ACT inhaler Inhale 2 puffs into the lungs 2 times daily 11/17/20  Yes WILLIE Robbins CNP   albuterol (PROVENTIL) (2.5 MG/3ML) 0.083% nebulizer solution Take 3 mLs by nebulization every 6 hours as needed for Wheezing 11/5/20  Yes WILLIE Robbins CNP   dilTIAZem (CARDIZEM CD) 120 MG extended release capsule Take 1 capsule by mouth daily 10/19/20  Yes WILLIE Robbins CNP   albuterol sulfate HFA (PROAIR HFA) 108 (90 Base) MCG/ACT inhaler Inhale 2 puffs into the lungs every 6 hours as needed for Wheezing   Yes Historical Provider, MD   fluticasone (FLONASE) 50 MCG/ACT nasal spray USE ONE SPRAY TO EACH NOSTRIL ONCE ADAY 9/4/20  Yes WILLIE Grace CNP   atorvastatin (LIPITOR) 10 MG tablet TAKE 1 TABLET BY MOUTH ONCE DAILY 9/4/20  Yes Erika Dries, APRN - CNP   Ascorbic Acid (C-1000 PO) Take 1 capsule by mouth daily    Yes Historical Provider, MD   ferrous sulfate 325 (65 Fe) MG tablet Take 325 mg by mouth daily  8/30/18  Yes Historical Provider, MD Oxygen Concentrator continuous    Historical Provider, MD   Lift Chair [facility] Use daily for comfort  Patient not taking: Reported on 12/18/2020 11/30/20   WILLIE Larry CNP   PROAIR  (22 Base) MCG/ACT inhaler Inhale 2 puffs into the lungs every 6 hours as needed for Wheezing 11/17/20   WILLIE Larry CNP   levalbuterol [facility] HFA) 45 MCG/ACT inhaler Inhale 1 puff into the lungs every 4 hours as needed for Wheezing 8/19/20   WILLIE Larry CNP   Respiratory Therapy Supplies (NEBULIZER/TUBING/MOUTHPIECE) KIT Use every 4-6 hours prn for copd 6/15/20   WILLIE Larry CNP     Current Facility-Administered Medications   Medication Dose Route Frequency Provider Last Rate Last Admin    metoprolol tartrate (LOPRESSOR) tablet 25 mg  25 mg Oral BID Hema Parisi MD   25 mg at 12/22/20 2105    ondansetron (ZOFRAN) injection 4 mg  4 mg Intravenous Q6H PRN Adelina Martinez MD   4 mg at 12/22/20 1044    guaiFENesin [facility]) extended release tablet 1,200 mg  1,200 mg Oral BID David Leone MD   1,200 mg at 12/22/20 2105    fluticasone (FLONASE) 50 MCG/ACT nasal spray 2 spray  2 spray Each Nostril Daily David Leone MD   2 spray at 12/22/20 0803    furosemide (LASIX) injection 40 mg  40 mg Intravenous Daily David Leone MD   40 mg at 12/22/20 0802    sodium chloride flush 0.9 % injection 10 mL  10 mL Intravenous 2 times per day Adelina Martinez MD   10 mL at 12/22/20 2110    sodium chloride flush 0.9 % injection 10 mL  10 mL Intravenous PRN Adelina Martinez MD        acetaminophen (TYLENOL) tablet 650 mg  650 mg Oral Q4H PRN Adelina Martinez MD   650 mg at 12/22/20 2106    ferrous sulfate (IRON 325) tablet 325 mg  325 mg Oral Daily Adelina Martinez MD   325 mg at 12/22/20 0802    ascorbic acid (VITAMIN C) tablet 1,000 mg  1,000 mg Oral Daily Adelina Martinez MD   1,000 mg at 12/22/20 1403  atorvastatin (LIPITOR) tablet 10 mg  10 mg Oral Daily Bharti Bernardo MD   10 mg at 12/22/20 0801    dilTIAZem (CARDIZEM CD) extended release capsule 120 mg  120 mg Oral Daily Corry Parisi MD   120 mg at 12/22/20 0801    albuterol (PROVENTIL) nebulizer solution 2.5 mg  2.5 mg Nebulization Q6H PRN Bharti Bernardo MD   2.5 mg at 12/22/20 0915    clonazePAM (KLONOPIN) tablet 0.5 mg  0.5 mg Oral TID PRN Bharti Bernardo MD   0.5 mg at 12/22/20 0553    fluticasone (FLOVENT HFA) 110 MCG/ACT inhaler 2 puff  2 puff Inhalation BID Bharti Bernardo MD   2 puff at 12/22/20 2105    [Held by provider] metoprolol succinate (TOPROL XL) extended release tablet 25 mg  25 mg Oral Daily Bharti Bernardo MD   25 mg at 12/19/20 1051    baclofen (LIORESAL) tablet 10 mg  10 mg Oral TID PRN Bharti Bernardo MD   10 mg at 12/20/20 2118    HYDROcodone-acetaminophen (NORCO) 5-325 MG per tablet 1 tablet  1 tablet Oral Q8H PRN Bharti Bernardo MD   1 tablet at 12/21/20 2336    [Held by provider] apixaban (ELIQUIS) tablet 5 mg  5 mg Oral BID Bharti Bernardo MD        levothyroxine (SYNTHROID) tablet 125 mcg  125 mcg Oral Daily Bharti Bernardo MD   125 mcg at 12/22/20 0549    pantoprazole (PROTONIX) tablet 40 mg  40 mg Oral QAM AC Bharti Bernardo MD   40 mg at 12/22/20 0549    DULoxetine (CYMBALTA) extended release capsule 60 mg  60 mg Oral Daily Bharti Bernardo MD   60 mg at 12/22/20 0802    tamsulosin (FLOMAX) capsule 0.4 mg  0.4 mg Oral Daily Bharti Bernardo MD   0.4 mg at 12/22/20 0801    [Held by provider] lisinopril (PRINIVIL;ZESTRIL) tablet 10 mg  10 mg Oral Daily Bharti Bernardo MD   10 mg at 12/19/20 1051    And    [Held by provider] hydroCHLOROthiazide (HYDRODIURIL) tablet 12.5 mg  12.5 mg Oral Daily Bharti Bernardo MD   12.5 mg at 12/19/20 1051    potassium chloride (KLOR-CON M) extended release tablet 40 mEq  40 mEq Oral PRN Bharti Bernardo MD        Or  potassium bicarb-citric acid (EFFER-K) effervescent tablet 40 mEq  40 mEq Oral PRN Carrie Joseph MD        Or   Armida Gore potassium chloride 10 mEq/100 mL IVPB (Peripheral Line)  10 mEq Intravenous PRN Carrie Joseph MD           Allergies:  Tizanidine    Social History:   reports that she has quit smoking. Her smoking use included cigarettes. She has a 78.00 pack-year smoking history. She has never used smokeless tobacco. She reports current alcohol use. She reports that she does not use drugs. Family History: family history includes COPD in her mother; Cancer in an other family member; Emphysema in her sister; Heart Disease in her mother. REVIEW OF SYSTEMS:    Constitutional: ++fatigue, No fever or chills. No night sweats, no weight loss   Eyes: No eye discharge, double vision, or eye pain   HEENT: negative for sore mouth, sore throat, hoarseness and voice change   Respiratory: negative for cough , sputum, dyspnea, wheezing, hemoptysis, chest pain   Cardiovascular: negative for chest pain, dyspnea, palpitations, orthopnea, PND   Gastrointestinal: negative for nausea, vomiting, diarrhea, constipation, abdominal pain, Dysphagia, hematemesis and hematochezia   Genitourinary: negative for frequency, dysuria, nocturia, urinary incontinence, and hematuria   Integument: negative for rash, skin lesions, bruises.    Hematologic/Lymphatic: negative for easy bruising, bleeding, lymphadenopathy, or petechiae   Endocrine: negative for heat or cold intolerance,weight changes, change in bowel habits and hair loss   Musculoskeletal: negative for myalgias, arthralgias, pain, joint swelling,and bone pain   Neurological: negative for headaches, dizziness, seizures, weakness, numbness    PHYSICAL EXAM:      /68   Pulse 101   Temp 98.1 °F (36.7 °C)   Resp 20   Ht 5' 5\" (1.651 m)   Wt 181 lb 14.1 oz (82.5 kg)   SpO2 93%   BMI 30.27 kg/m² Temp (24hrs), Av.3 °F (36.8 °C), Min:98.1 °F (36.7 °C), Max:98.6 °F (37 °C)    General appearance - well appearing, no in pain or distress   Mental status - alert and cooperative   Eyes - pupils equal and reactive, extraocular eye movements intact   Ears - bilateral TM's and external ear canals normal   Mouth - mucous membranes moist, pharynx normal without lesions   Neck - supple, no significant adenopathy   Lymphatics - no palpable lymphadenopathy, no hepatosplenomegaly   Chest - clear to auscultation, no wheezes, rales or rhonchi, symmetric air entry   Heart - normal rate, regular rhythm, normal S1, S2, no murmurs  Abdomen - soft, nontender, nondistended, no masses or organomegaly   Neurological - alert, oriented, normal speech, no focal findings or movement disorder noted   Musculoskeletal - no joint tenderness, deformity or swelling   Extremities - peripheral pulses normal, no pedal edema, no clubbing or cyanosis   Skin - normal coloration and turgor, no rashes, no suspicious skin lesions noted ,    DATA:    Labs:   CBC:   Recent Labs     20  0553 20  0850   WBC 6.1 6.8   HGB 7.4* 8.9*   HCT 23.6* 28.5*    380     BMP:   Recent Labs     20  0553 20  0656    143   K 4.1 4.0   CO2 31 34*   BUN 23 16   CREATININE 1.69* 1.10*   LABGLOM 30* 49*   GLUCOSE 112* 120*     PT/INR:   No results for input(s): PROTIME, INR in the last 72 hours. IMAGING DATA:  @IMG@   XR CHEST PORTABLE   Final Result   1. Left IJ central line in place, tip overlying the mid SVC   2. No evidence of a pneumothorax   3.  Small left pleural effusion, and left basilar opacity, differential   considerations include atelectasis versus pneumonia             Primary Problem  Symptomatic anemia    Active Hospital Problems    Diagnosis Date Noted    Symptomatic anemia [D64.9] 2020    Pneumonia due to organism [J18.9]     Iron deficiency anemia due to chronic blood loss [D50.0] 2020  Acute on chronic diastolic CHF (congestive heart failure) ([facility]) [I50.33] 07/21/2020    Obesity, Class I, BMI 30-34.9 [E66.9] 07/20/2020    Moderate major depression ([facility] [F32.1] 06/17/2020    PAD (peripheral artery disease) ([facility]) [I73.9] 06/17/2020    CKD (chronic kidney disease) stage 3, GFR 30-59 ml/min [N18.30]     HA (generalized anxiety disorder) [F41.1] 12/13/2018    A-fib ([facility] [I48.91] 09/13/2018    Pulmonary hypertension ([facility] [I27.20] 09/13/2018    COPD (chronic obstructive pulmonary disease) ([facility] [J44.9] 09/13/2018    Acquired hypothyroidism [E03.9] 01/18/2013         IMPRESSION:   1. Severe anemia: Patient has history of chronic anemia. She is on chronic anticoagulation and history of female patient which can contribute to GI bleed. 2. SUNSHINE  3. Atrial fibrillation  4. COPD  5. GERD    RECOMMENDATIONS:  1. I reviewed the laboratory data, imaging studies, diagnosis, prognosis and treatment options with patient  2. She had upper endoscopy and colonoscopy on 12/2. She was noted to have 2 AVMs and had APC treatment  3. Now POSITIVE stool for occult blood. Needs GI work-up   4. No hemolysis and review peripheral blood smear  5. Her iron panel revealed ferritin 37, iron 92, iron saturation 29 and TIBC 318. Overall no picture of iron deficiency therefore I will hold off on giving any IV iron supplements  6. GI on board  7. Await erythropoietin level. If low, and GI work up negative, She may benefit from erythropoietin stimulating agent  8. If the work-up is negative she will need bone marrow biopsy which can be done as outpatient      Discussed with patient and Nurse. Thank you for asking us to see this patient. Macario Menchaca MD  Hematologist/Medical Oncologist    Cell: [phone]

## 2020-12-23 NOTE — OP NOTE
[facility]     PROCEDURE DATE: 12/23/20    REFERRING PHYSICIAN: No ref. provider found     PRIMARY CARE PROVIDER: WILLIE Mena CNP    ATTENDING PHYSICIAN: Kalee Anguiano MD     HISTORY: Ms. Orestes Lee is a 70 y.o. female who presents to the [place] [address] Endoscopy unit for upper endoscopy. The patient's clinical history is remarkable for Fe def anemia. She is currently medically stable and appropriate for the planned procedure. PREOPERATIVE DIAGNOSIS: Fe def anemia. PROCEDURES:   1) Transoral Upper Endoscopy, Push enteroscopy. APC coagulation. POSTOPERATIVE DIAGNOSIS:   6 AVMs in jejunum     SPECIMENS: none    MEDICATIONS:   MAC per anesthesia    EBL: minimal    INSTRUMENT: Olympus GIF-H190 flexible Gastroscope. PREPARATION: The nature and character of the procedure as well as risks, benefits, and alternatives were discussed with the patient and informed consent was obtained. Complications were said to include, but were not limited to: medication allergy, medication reaction, cardiovascular and respiratory problems, bleeding, perforation, infection, and/or missed diagnosis. Following arrival in the endoscopy room, the patient was placed in the left lateral decubitus position and final time-out accomplished in the presence of the nursing staff. Baseline vital signs were obtained and reviewed, and IV sedation was subsequently initiated. FINDINGS:   Esophagus: The esophagus was inspected to the Z-line. The endoscopic exam showed no pathology. Stomach: The stomach was inspected in both forward and retroflex fashion and was appropriately distensible. The cardia, fundus, incisura, antrum and pylorus were identified via direct visualization. The endoscopic exam showed mild distal gastritis. Duodenum: The proximal small bowel was inspected through the bulb, sweep, and second portion of the duodenum. The endoscopic exam showed no pathology. Jejunum: The scope was advanced ~ 40 cm distal to ligament of Treitz. 6 small AVMs were cauterized using APC. RECOMMENDATIONS:   1) Transfer back to the floor for further management as per primary care team.   2) Ok to restart blood thinners. 3) Ok to d/c home from GI standpoint. 4) Periodic IV iron infusion. 5) Follow up in GI office in 2-3 weeks.         Britni Harris

## 2020-12-24 ENCOUNTER — APPOINTMENT (OUTPATIENT)
Dept: GENERAL RADIOLOGY | Age: 75
DRG: 377 | End: 2020-12-24
Payer: MEDICARE

## 2020-12-24 LAB
ABO/RH: NORMAL
ABSOLUTE EOS #: 0.1 K/UL (ref 0–0.4)
ABSOLUTE IMMATURE GRANULOCYTE: ABNORMAL K/UL (ref 0–0.3)
ABSOLUTE LYMPH #: 0.9 K/UL (ref 1–4.8)
ABSOLUTE MONO #: 0.9 K/UL (ref 0.1–1.3)
ALBUMIN SERPL-MCNC: 3.8 G/DL (ref 3.5–5.2)
ALBUMIN/GLOBULIN RATIO: ABNORMAL (ref 1–2.5)
ALP BLD-CCNC: 102 U/L (ref 35–104)
ALT SERPL-CCNC: 9 U/L (ref 5–33)
ANION GAP SERPL CALCULATED.3IONS-SCNC: 8 MMOL/L (ref 9–17)
ANTIBODY SCREEN: NEGATIVE
ARM BAND NUMBER: NORMAL
AST SERPL-CCNC: 13 U/L
BASOPHILS # BLD: 1 % (ref 0–2)
BASOPHILS ABSOLUTE: 0.1 K/UL (ref 0–0.2)
BILIRUB SERPL-MCNC: 0.39 MG/DL (ref 0.3–1.2)
BLD PROD TYP BPU: NORMAL
BLD PROD TYP BPU: NORMAL
BNP INTERPRETATION: ABNORMAL
BUN BLDV-MCNC: 17 MG/DL (ref 8–23)
BUN/CREAT BLD: ABNORMAL (ref 9–20)
CALCIUM SERPL-MCNC: 9.6 MG/DL (ref 8.6–10.4)
CHLORIDE BLD-SCNC: 97 MMOL/L (ref 98–107)
CO2: 36 MMOL/L (ref 20–31)
CREAT SERPL-MCNC: 1.34 MG/DL (ref 0.5–0.9)
CROSSMATCH RESULT: NORMAL
CROSSMATCH RESULT: NORMAL
DIFFERENTIAL TYPE: ABNORMAL
DISPENSE STATUS BLOOD BANK: NORMAL
DISPENSE STATUS BLOOD BANK: NORMAL
EOSINOPHILS RELATIVE PERCENT: 1 % (ref 0–4)
EXPIRATION DATE: NORMAL
GFR AFRICAN AMERICAN: 47 ML/MIN
GFR NON-AFRICAN AMERICAN: 39 ML/MIN
GFR SERPL CREATININE-BSD FRML MDRD: ABNORMAL ML/MIN/{1.73_M2}
GFR SERPL CREATININE-BSD FRML MDRD: ABNORMAL ML/MIN/{1.73_M2}
GLUCOSE BLD-MCNC: 131 MG/DL (ref 70–99)
HCT VFR BLD CALC: 31.4 % (ref 36–46)
HEMOGLOBIN: 9.7 G/DL (ref 12–16)
IMMATURE GRANULOCYTES: ABNORMAL %
LYMPHOCYTES # BLD: 12 % (ref 24–44)
MAGNESIUM: 2.2 MG/DL (ref 1.6–2.6)
MCH RBC QN AUTO: 30 PG (ref 26–34)
MCHC RBC AUTO-ENTMCNC: 31 G/DL (ref 31–37)
MCV RBC AUTO: 96.8 FL (ref 80–100)
MONOCYTES # BLD: 12 % (ref 1–7)
NRBC AUTOMATED: ABNORMAL PER 100 WBC
PDW BLD-RTO: 18.1 % (ref 11.5–14.9)
PLATELET # BLD: 409 K/UL (ref 150–450)
PLATELET ESTIMATE: ABNORMAL
PMV BLD AUTO: 7.8 FL (ref 6–12)
POTASSIUM SERPL-SCNC: 3.7 MMOL/L (ref 3.7–5.3)
PRO-BNP: 8534 PG/ML
PROCALCITONIN: 0.11 NG/ML
RBC # BLD: 3.24 M/UL (ref 4–5.2)
RBC # BLD: ABNORMAL 10*6/UL
SEG NEUTROPHILS: 74 % (ref 36–66)
SEGMENTED NEUTROPHILS ABSOLUTE COUNT: 5.4 K/UL (ref 1.3–9.1)
SODIUM BLD-SCNC: 141 MMOL/L (ref 135–144)
TOTAL PROTEIN: 6.8 G/DL (ref 6.4–8.3)
TRANSFUSION STATUS: NORMAL
TRANSFUSION STATUS: NORMAL
TSH SERPL DL<=0.05 MIU/L-ACNC: 1.24 MIU/L (ref 0.3–5)
UNIT DIVISION: 0
UNIT DIVISION: 0
UNIT NUMBER: NORMAL
UNIT NUMBER: NORMAL
WBC # BLD: 7.4 K/UL (ref 3.5–11)
WBC # BLD: ABNORMAL 10*3/UL

## 2020-12-24 PROCEDURE — 6370000000 HC RX 637 (ALT 250 FOR IP): Performed by: INTERNAL MEDICINE

## 2020-12-24 PROCEDURE — 85025 COMPLETE CBC W/AUTO DIFF WBC: CPT

## 2020-12-24 PROCEDURE — 99232 SBSQ HOSP IP/OBS MODERATE 35: CPT | Performed by: FAMILY MEDICINE

## 2020-12-24 PROCEDURE — 36415 COLL VENOUS BLD VENIPUNCTURE: CPT

## 2020-12-24 PROCEDURE — 2700000000 HC OXYGEN THERAPY PER DAY

## 2020-12-24 PROCEDURE — 6360000002 HC RX W HCPCS: Performed by: INTERNAL MEDICINE

## 2020-12-24 PROCEDURE — 94640 AIRWAY INHALATION TREATMENT: CPT

## 2020-12-24 PROCEDURE — 2060000000 HC ICU INTERMEDIATE R&B

## 2020-12-24 PROCEDURE — 2580000003 HC RX 258: Performed by: INTERNAL MEDICINE

## 2020-12-24 PROCEDURE — 83735 ASSAY OF MAGNESIUM: CPT

## 2020-12-24 PROCEDURE — 83880 ASSAY OF NATRIURETIC PEPTIDE: CPT

## 2020-12-24 PROCEDURE — 84145 PROCALCITONIN (PCT): CPT

## 2020-12-24 PROCEDURE — 1200000000 HC SEMI PRIVATE

## 2020-12-24 PROCEDURE — 84443 ASSAY THYROID STIM HORMONE: CPT

## 2020-12-24 PROCEDURE — 71046 X-RAY EXAM CHEST 2 VIEWS: CPT

## 2020-12-24 PROCEDURE — 99232 SBSQ HOSP IP/OBS MODERATE 35: CPT | Performed by: INTERNAL MEDICINE

## 2020-12-24 PROCEDURE — 6370000000 HC RX 637 (ALT 250 FOR IP): Performed by: FAMILY MEDICINE

## 2020-12-24 PROCEDURE — 80053 COMPREHEN METABOLIC PANEL: CPT

## 2020-12-24 PROCEDURE — 94761 N-INVAS EAR/PLS OXIMETRY MLT: CPT

## 2020-12-24 RX ORDER — FUROSEMIDE 10 MG/ML
40 INJECTION INTRAMUSCULAR; INTRAVENOUS 2 TIMES DAILY
Status: DISCONTINUED | OUTPATIENT
Start: 2020-12-24 | End: 2020-12-25

## 2020-12-24 RX ORDER — LISINOPRIL 5 MG/1
2.5 TABLET ORAL
Status: DISCONTINUED | OUTPATIENT
Start: 2020-12-24 | End: 2020-12-29 | Stop reason: HOSPADM

## 2020-12-24 RX ADMIN — Medication 10 ML: at 08:01

## 2020-12-24 RX ADMIN — Medication 10 ML: at 20:38

## 2020-12-24 RX ADMIN — GUAIFENESIN 1200 MG: 600 TABLET, EXTENDED RELEASE ORAL at 20:38

## 2020-12-24 RX ADMIN — ALBUTEROL SULFATE 2.5 MG: 2.5 SOLUTION RESPIRATORY (INHALATION) at 11:10

## 2020-12-24 RX ADMIN — FUROSEMIDE 40 MG: 10 INJECTION, SOLUTION INTRAMUSCULAR; INTRAVENOUS at 17:37

## 2020-12-24 RX ADMIN — APIXABAN 5 MG: 5 TABLET, FILM COATED ORAL at 08:01

## 2020-12-24 RX ADMIN — FERROUS SULFATE TAB 325 MG (65 MG ELEMENTAL FE) 325 MG: 325 (65 FE) TAB at 08:01

## 2020-12-24 RX ADMIN — DILTIAZEM HYDROCHLORIDE 120 MG: 120 CAPSULE, COATED, EXTENDED RELEASE ORAL at 08:01

## 2020-12-24 RX ADMIN — DULOXETINE HYDROCHLORIDE 60 MG: 60 CAPSULE, DELAYED RELEASE ORAL at 08:01

## 2020-12-24 RX ADMIN — FLUTICASONE PROPIONATE 2 PUFF: 110 AEROSOL, METERED RESPIRATORY (INHALATION) at 08:05

## 2020-12-24 RX ADMIN — GUAIFENESIN 1200 MG: 600 TABLET, EXTENDED RELEASE ORAL at 08:01

## 2020-12-24 RX ADMIN — LEVOTHYROXINE SODIUM 125 MCG: 125 TABLET ORAL at 05:54

## 2020-12-24 RX ADMIN — ATORVASTATIN CALCIUM 10 MG: 10 TABLET, FILM COATED ORAL at 08:01

## 2020-12-24 RX ADMIN — METOPROLOL TARTRATE 25 MG: 25 TABLET, FILM COATED ORAL at 08:01

## 2020-12-24 RX ADMIN — METOPROLOL TARTRATE 25 MG: 25 TABLET, FILM COATED ORAL at 20:37

## 2020-12-24 RX ADMIN — FLUTICASONE PROPIONATE 2 PUFF: 110 AEROSOL, METERED RESPIRATORY (INHALATION) at 20:38

## 2020-12-24 RX ADMIN — FUROSEMIDE 40 MG: 10 INJECTION, SOLUTION INTRAMUSCULAR; INTRAVENOUS at 08:05

## 2020-12-24 RX ADMIN — PANTOPRAZOLE SODIUM 40 MG: 40 TABLET, DELAYED RELEASE ORAL at 05:54

## 2020-12-24 RX ADMIN — TAMSULOSIN HYDROCHLORIDE 0.4 MG: 0.4 CAPSULE ORAL at 08:01

## 2020-12-24 RX ADMIN — OXYCODONE HYDROCHLORIDE AND ACETAMINOPHEN 1000 MG: 500 TABLET ORAL at 08:01

## 2020-12-24 ASSESSMENT — PAIN SCALES - GENERAL: PAINLEVEL_OUTOF10: 0

## 2020-12-24 NOTE — PROGRESS NOTES
[facility]    OCCUPATIONAL THERAPY MISSED TREATMENT NOTE   INPATIENT   Date: 20  Patient Name: Viridiana Grace       Room:   MRN: [id number]   Account #: [de-identified]    : 1945  (80 y.o.)  Gender: female      REASON FOR MISSED TREATMENT:  Patient unable to participate   -    NEEDS EVAL  Altered JEOVANY - Not able to participate   Pt off the floor for testing - 0942;  Cx - did not sleep last night       Wander Montana, OT

## 2020-12-24 NOTE — PROGRESS NOTES
Pulmonary Progress Note  [facility]      Patient - Adin Garza,  Age - 70 y.o.    - 1945      Room Number - 2097/2097-01   MRN -  [id number]   [facility]  Date of Admission -  2020  8:33 PM        Consulting 838 Emma Connors MD  Primary Care Physician - Ronald Alcaraz, APRN - CNP     SUBJECTIVE   Some increased dyspnea this morning according to nurses, but patient's denies any worsening shortness of breath or cough  She is not using flutter valve therapy    OBJECTIVE   VITALS    height is 5' 5\" (1.651 m) and weight is 186 lb 8.2 oz (84.6 kg). Her oral temperature is 97.9 °F (36.6 °C). Her blood pressure is 112/67 and her pulse is 91. Her respiration is 24 and oxygen saturation is 97%. Body mass index is 31.04 kg/m². Temperature Range: Temp: 97.9 °F (36.6 °C) Temp  Av.9 °F (36.6 °C)  Min: 97.5 °F (36.4 °C)  Max: 98.1 °F (36.7 °C)  BP Range:  Systolic (95LJK), WCY:641 , Min:107 , UEY:845     Diastolic (81EVP), RGW:92, Min:54, Max:67    Pulse Range: Pulse  Av.8  Min: 84  Max: 100  Respiration Range: Resp  Av  Min: 16  Max: 24  Current Pulse Ox[de-identified]  SpO2: 97 %  24HR Pulse Ox Range:  SpO2  Av.8 %  Min: 92 %  Max: 98 %  Oxygen Amount and Delivery: O2 Flow Rate (L/min): 4 L/min    Wt Readings from Last 3 Encounters:   20 186 lb 8.2 oz (84.6 kg)   20 187 lb 6.4 oz (85 kg)   20 188 lb (85.3 kg)       I/O (24 Hours)    Intake/Output Summary (Last 24 hours) at 2020 1128  Last data filed at 2020 0452  Gross per 24 hour   Intake    Output 450 ml   Net -450 ml       EXAM     General Appearance  Awake, alert, oriented, in no acute distress  HEENT - normocephalic, atraumatic.  []  Mallampati  [] Crowded airway   [] Macroglossia  []  Retrognathia  [] Micrognathia  []  Normal tongue size []  Normal Bite  [] Tita sign positive Neck - Supple,  trachea midline   Lungs -diminished bilaterally at bases  Cardiovascular - Heart sounds are normal.  Regular rate and rhythm   Abdomen - Soft, nontender, nondistended, no masses or organomegaly  Neurologic - There are no focal motor or sensory deficits  Skin - No bruising or bleeding  Extremities - No clubbing, cyanosis, edema    MEDS      lisinopril  2.5 mg Oral Lunch    metoprolol tartrate  25 mg Oral BID    guaiFENesin  1,200 mg Oral BID    fluticasone  2 spray Each Nostril Daily    furosemide  40 mg Intravenous Daily    sodium chloride flush  10 mL Intravenous 2 times per day    ferrous sulfate  325 mg Oral Daily    ascorbic acid  1,000 mg Oral Daily    atorvastatin  10 mg Oral Daily    dilTIAZem  120 mg Oral Daily    fluticasone  2 puff Inhalation BID    apixaban  5 mg Oral BID    levothyroxine  125 mcg Oral Daily    pantoprazole  40 mg Oral QAM AC    DULoxetine  60 mg Oral Daily    tamsulosin  0.4 mg Oral Daily       ondansetron, sodium chloride flush, acetaminophen, albuterol, clonazePAM, baclofen, HYDROcodone-acetaminophen, potassium chloride **OR** potassium alternative oral replacement **OR** potassium chloride    LABS   CBC   Recent Labs     12/24/20  0812   WBC 7.4   HGB 9.7*   HCT 31.4*   MCV 96.8        BMP:   Lab Results   Component Value Date     12/24/2020    K 3.7 12/24/2020    CL 97 12/24/2020    CO2 36 12/24/2020    BUN 17 12/24/2020    LABALBU 3.8 12/24/2020    LABALBU 4.5 02/18/2012    CREATININE 1.34 12/24/2020    CALCIUM 9.6 12/24/2020    GFRAA 47 12/24/2020    LABGLOM 39 12/24/2020     ABGs:  Lab Results   Component Value Date    PHART 7.330 09/13/2018    PO2ART 74.8 09/13/2018    HCR3LOY 47.3 09/13/2018      Lab Results   Component Value Date    MODE NOT REPORTED 09/13/2018     Ionized Calcium:  No results found for: IONCA  Magnesium:    Lab Results   Component Value Date    MG 2.2 12/24/2020     Phosphorus:    Lab Results Component Value Date    PHOS 4.1 03/16/2020        LIVER PROFILE   Recent Labs     12/24/20  0812   AST 13   ALT 9   BILITOT 0.39   ALKPHOS 102     INR No results for input(s): INR in the last 72 hours. PTT   Lab Results   Component Value Date    APTT 36.9 (H) 12/19/2020         RADIOLOGY         ASSESSMENT/PLAN   Principal Problem:    Symptomatic anemia  Active Problems:    Acquired hypothyroidism    A-fib (HCC)    Pulmonary hypertension (HCC)    COPD (chronic obstructive pulmonary disease) (HCC)    HA (generalized anxiety disorder)    CKD (chronic kidney disease) stage 3, GFR 30-59 ml/min    Moderate major depression (HCC)    PAD (peripheral artery disease) (HCC)    Obesity, Class I, BMI 30-34.9    Acute on chronic diastolic CHF (congestive heart failure) (HCC)    Iron deficiency anemia due to chronic blood loss    Pneumonia due to organism  Resolved Problems:    * No resolved hospital problems.  *    Has left pleural effusion which she has had before  proBNP continues to be elevated  I think she needs more aggressive diuresis but creatinine is climbing  May be developing cardiorenal syndrome  Will hold Eliquis, possible thoracentesis in 2 days    Electronically signed by Elyse Michelle MD on 12/24/2020 at 11:28 AM

## 2020-12-24 NOTE — PROGRESS NOTES
FAMILY MEDICINE  - PROGRESS NOTE    Date:  12/24/2020  Krystal Boone  [id number]      Chief Complaint   Patient presents with    Abnormal Lab     Patient comes to the ER with a hemoglobin of 6.6, per her physician, to get a blood transfusion.  Shortness of Breath     Dx with pneumonia today by her physician after having a chest x-ray Thursday. Has not started her antibiotics. Says the drug store has yet to deliver it. Interval History:  not changed, no significant events overnight. She has no new complaints.  She appears drowsy this am.      Subjective  Constitutional: positive for fatigue  Respiratory: positive for emphysema and shortness of breath  Cardiovascular: positive for irregular heart beat  Musculoskeletal:positive for arthralgias, myalgias and stiff joints  Behavioral/Psych: positive for anxiety and depression:    Objective:    /67   Pulse 91   Temp 97.9 °F (36.6 °C) (Oral)   Resp 16   Ht 5' 5\" (1.651 m)   Wt 186 lb 8.2 oz (84.6 kg)   SpO2 98%   BMI 31.04 kg/m²   General appearance - alert, well appearing, and in no distress and overweight  Mental status - alert, oriented to person, place, and time  Eyes - pupils equal and reactive, extraocular eye movements intact  Ears - hearing grossly normal bilaterally  Nose - normal and patent, no erythema, discharge or polyps  Mouth - mucous membranes moist, pharynx normal without lesions  Neck - supple, no significant adenopathy  Lymphatics - no palpable lymphadenopathy, no hepatosplenomegaly  Chest - clear to auscultation, no wheezes, rales or rhonchi, symmetric air entry, decreased air entry noted posteriorly  Heart - irregularly irregular rhythm with rate 91  Abdomen - soft, nontender, nondistended, no masses or organomegaly  Breasts - not examined  Back exam - not examined  Neurological - alert, oriented, normal speech, no focal findings or movement disorder noted Musculoskeletal - osteoarthritic changes noted in both hands  Extremities - peripheral pulses normal, no pedal edema, no clubbing or cyanosis  Skin - normal coloration and turgor, no rashes, no suspicious skin lesions noted    Data:   Medications:   Current Facility-Administered Medications   Medication Dose Route Frequency Provider Last Rate Last Admin    lisinopril (PRINIVIL;ZESTRIL) tablet 2.5 mg  2.5 mg Oral Lunch Chuck Parisi MD        metoprolol tartrate (LOPRESSOR) tablet 25 mg  25 mg Oral BID Ashley Squires MD   25 mg at 12/23/20 2301    ondansetron (ZOFRAN) injection 4 mg  4 mg Intravenous Q6H PRN Dwayne Clemente MD   4 mg at 12/22/20 1044    guaiFENesin (MUCINEX) extended release tablet 1,200 mg  1,200 mg Oral BID Ashley Squires MD   1,200 mg at 12/23/20 2301    fluticasone (FLONASE) 50 MCG/ACT nasal spray 2 spray  2 spray Each Nostril Daily Dwayne Clemente MD   2 spray at 12/22/20 0803    furosemide (LASIX) injection 40 mg  40 mg Intravenous Daily Dwayne Clemente MD   40 mg at 12/23/20 1202    sodium chloride flush 0.9 % injection 10 mL  10 mL Intravenous 2 times per day Ashley Squires MD   10 mL at 12/23/20 2301    sodium chloride flush 0.9 % injection 10 mL  10 mL Intravenous PRN Dwayne Clemente MD        acetaminophen (TYLENOL) tablet 650 mg  650 mg Oral Q4H PRN Dwayne Clemente MD   650 mg at 12/22/20 2106    ferrous sulfate (IRON 325) tablet 325 mg  325 mg Oral Daily Dwayne Clemente MD   325 mg at 12/23/20 1203    ascorbic acid (VITAMIN C) tablet 1,000 mg  1,000 mg Oral Daily Dwayne Clemente MD   1,000 mg at 12/22/20 0801    atorvastatin (LIPITOR) tablet 10 mg  10 mg Oral Daily Dwayne Clemente MD   10 mg at 12/23/20 1203    dilTIAZem (CARDIZEM CD) extended release capsule 120 mg  120 mg Oral Daily Dwayne Clemente MD   120 mg at 12/23/20 1203    albuterol (PROVENTIL) nebulizer solution 2.5 mg  2.5 mg Nebulization Q6H PRN Dwayne Clemente MD   2.5 mg at 12/22/20 0915  clonazePAM (KLONOPIN) tablet 0.5 mg  0.5 mg Oral TID PRN Kasia Merino MD   0.5 mg at 12/23/20 1204    fluticasone (FLOVENT HFA) 110 MCG/ACT inhaler 2 puff  2 puff Inhalation BID Kasia Merino MD   2 puff at 12/23/20 2301    baclofen (LIORESAL) tablet 10 mg  10 mg Oral TID PRN Kasia Merino MD   10 mg at 12/20/20 2118    HYDROcodone-acetaminophen (NORCO) 5-325 MG per tablet 1 tablet  1 tablet Oral Q8H PRN Kasia Merino MD   1 tablet at 12/21/20 2336    apixaban (ELIQUIS) tablet 5 mg  5 mg Oral BID Mariam Sims MD        levothyroxine (SYNTHROID) tablet 125 mcg  125 mcg Oral Daily Kasia Merino MD   125 mcg at 12/24/20 0554    pantoprazole (PROTONIX) tablet 40 mg  40 mg Oral QAM AC Dwayne Clemente MD   40 mg at 12/24/20 0554    DULoxetine (CYMBALTA) extended release capsule 60 mg  60 mg Oral Daily Dwayne Clemente MD   60 mg at 12/23/20 1203    tamsulosin (FLOMAX) capsule 0.4 mg  0.4 mg Oral Daily Dwayne Clemente MD   0.4 mg at 12/23/20 1204    potassium chloride (KLOR-CON M) extended release tablet 40 mEq  40 mEq Oral PRDAGMAR Merino MD        Or    potassium bicarb-citric acid (EFFER-K) effervescent tablet 40 mEq  40 mEq Oral PRDAGMAR Merino MD        Or    potassium chloride 10 mEq/100 mL IVPB (Peripheral Line)  10 mEq Intravenous PRDAGMAR Merino MD           Intake/Output Summary (Last 24 hours) at 12/24/2020 0754  Last data filed at 12/24/2020 0452  Gross per 24 hour   Intake 400 ml   Output 450 ml   Net -50 ml     No results found for this or any previous visit (from the past 24 hour(s)).  -----------------------------------------------------------------  RAD:  EKG:  Micro:     Assessment & Plan:    Patient Active Problem List:     Acquired hypothyroidism     Hypertension     Primary osteoarthritis of right knee     A-fib ([facility] [address]     Acute encephalopathy     SIRS (systemic inflammatory response syndrome) ([facility] [address]     Normocytic anemia     Pulmonary hypertension ([facility] [address] COPD (chronic obstructive pulmonary disease) (HCC)     History of GI bleed     Constipation     HA (generalized anxiety disorder)     Lumbar radiculopathy     Lumbosacral spondylosis without myelopathy     Benign hypertension with CKD (chronic kidney disease) stage III     CKD (chronic kidney disease) stage 3, GFR 30-59 ml/min     Alcohol withdrawal syndrome with complication (HCC)     Moderate major depression (HCC)     PAD (peripheral artery disease) (HCC)     Acute kidney injury ([facility] [address]     Obesity, Class I, BMI 30-34.9     Acute on chronic diastolic CHF (congestive heart failure) ([facility])     GI bleed     Weight loss     Elevated alkaline phosphatase level     Thrombocytosis (HCC)     Iron deficiency anemia due to chronic blood loss     Iron malabsorption     Symptomatic anemia     Pneumonia due to organism           Plan:  -Symptomatic anemia - Hem/Onc following. -GI bleed - AVM on EGD. -Left pleural effusion - Pulmonology possibly doing a thoracentesis in 2 days. -SUNSHINE - CR rising, possibly developing cardiorenal syndrome. -A. Fib. - rate controlled, Eliquis on hold for thoracentesis. -Acute on chronic diastolic CHF - Cardiology following.  -Continue current treatments.  -Complete orders per chart.     See orders   Disposition:    Electronically signed by Kristina Bejarano MD on 12/24/2020 at 7:54 AM

## 2020-12-24 NOTE — PROGRESS NOTES
[facility] PHYSICIANS CARDIOLOGY Progress Note    12/24/2020 11:01 AM      Subjective:  Ms. Robert oDrsey is currently drowsy and appears slightly short of breath at rest requiring increased oxygen level, per nurses report. Denies any chest pain or worsening shortness of breath or palpitations or lightheadedness or dizziness. Review of systems:  No fever or chills. Knows her full name. Nurse updated overnight events.              LABS:     Recent Results (from the past 24 hour(s))   Brain Natriuretic Peptide    Collection Time: 12/24/20  8:12 AM   Result Value Ref Range    Pro-BNP 8,534 (H) <300 pg/mL    BNP Interpretation Pro-BNP Reference Range:    TSH w/reflex to FT4    Collection Time: 12/24/20  8:12 AM   Result Value Ref Range    TSH 1.24 0.30 - 5.00 mIU/L   Magnesium    Collection Time: 12/24/20  8:12 AM   Result Value Ref Range    Magnesium 2.2 1.6 - 2.6 mg/dL   CBC WITH AUTO DIFFERENTIAL    Collection Time: 12/24/20  8:12 AM   Result Value Ref Range    WBC 7.4 3.5 - 11.0 k/uL    RBC 3.24 (L) 4.0 - 5.2 m/uL    Hemoglobin 9.7 (L) 12.0 - 16.0 g/dL    Hematocrit 31.4 (L) 36 - 46 %    MCV 96.8 80 - 100 fL    MCH 30.0 26 - 34 pg    MCHC 31.0 31 - 37 g/dL    RDW 18.1 (H) 11.5 - 14.9 %    Platelets 217 675 - 697 k/uL    MPV 7.8 6.0 - 12.0 fL    NRBC Automated NOT REPORTED per 100 WBC    Differential Type NOT REPORTED     Seg Neutrophils 74 (H) 36 - 66 %    Lymphocytes 12 (L) 24 - 44 %    Monocytes 12 (H) 1 - 7 %    Eosinophils % 1 0 - 4 %    Basophils 1 0 - 2 %    Immature Granulocytes NOT REPORTED 0 %    Segs Absolute 5.40 1.3 - 9.1 k/uL    Absolute Lymph # 0.90 (L) 1.0 - 4.8 k/uL    Absolute Mono # 0.90 0.1 - 1.3 k/uL    Absolute Eos # 0.10 0.0 - 0.4 k/uL    Basophils Absolute 0.10 0.0 - 0.2 k/uL    Absolute Immature Granulocyte NOT REPORTED 0.00 - 0.30 k/uL    WBC Morphology NOT REPORTED     RBC Morphology NOT REPORTED     Platelet Estimate NOT REPORTED    Comprehensive Metabolic Panel w/ Reflex to MG Collection Time: 20  8:12 AM   Result Value Ref Range    Glucose 131 (H) 70 - 99 mg/dL    BUN 17 8 - 23 mg/dL    CREATININE 1.34 (H) 0.50 - 0.90 mg/dL    Bun/Cre Ratio NOT REPORTED  -     Calcium 9.6 8.6 - 10.4 mg/dL    Sodium 141 135 - 144 mmol/L    Potassium 3.7 3.7 - 5.3 mmol/L    Chloride 97 (L) 98 - 107 mmol/L    CO2 36 (H) 20 - 31 mmol/L    Anion Gap 8 (L) 9 - 17 mmol/L    Alkaline Phosphatase 102 35 - 104 U/L    ALT 9 5 - 33 U/L    AST 13 <32 U/L    Total Bilirubin 0.39 0.3 - 1.2 mg/dL    Total Protein 6.8 6.4 - 8.3 g/dL    Alb 3.8 3.5 - 5.2 g/dL    Albumin/Globulin Ratio NOT REPORTED 1.0 - 2.5    GFR Non- 39 (L) >60 mL/min    GFR  47 (L) >60 mL/min    GFR Comment          GFR Staging NOT REPORTED        Pulse Ox: SpO2  Av.5 %  Min: 92 %  Max: 98 %    Supplemental O2: O2 Flow Rate (L/min): 2 L/min     Current Meds:    lisinopril  2.5 mg Oral Lunch    metoprolol tartrate  25 mg Oral BID    guaiFENesin  1,200 mg Oral BID    fluticasone  2 spray Each Nostril Daily    furosemide  40 mg Intravenous Daily    sodium chloride flush  10 mL Intravenous 2 times per day    ferrous sulfate  325 mg Oral Daily    ascorbic acid  1,000 mg Oral Daily    atorvastatin  10 mg Oral Daily    dilTIAZem  120 mg Oral Daily    fluticasone  2 puff Inhalation BID    apixaban  5 mg Oral BID    levothyroxine  125 mcg Oral Daily    pantoprazole  40 mg Oral QAM AC    DULoxetine  60 mg Oral Daily    tamsulosin  0.4 mg Oral Daily              VITAL SIGNS:    /67   Pulse 91   Temp 97.9 °F (36.6 °C) (Oral)   Resp 16   Ht 5' 5\" (1.651 m)   Wt 186 lb 8.2 oz (84.6 kg)   SpO2 98%   BMI 31.04 kg/m²  2 L/min      Admit Weight:  187 lb (84.8 kg)    Last 3 weights: Wt Readings from Last 3 Encounters:   20 186 lb 8.2 oz (84.6 kg)   20 187 lb 6.4 oz (85 kg)   20 188 lb (85.3 kg)       BMI: Body mass index is 31.04 kg/m².      INPUT/OUTPUT: Intake/Output Summary (Last 24 hours) at 12/24/2020 1101  Last data filed at 12/24/2020 0452  Gross per 24 hour   Intake 200 ml   Output 450 ml   Net -250 ml         Telemetry shows A fib. EXAM:     General appearance: awake. Pleasant. Neck: No JVD or thyromegaly. Chest: Diminished air entry bilaterally. No tenderness. No rhonchi or wheezing. Cardiac: Irregular rate and rhythm. No S3 gallop or rubs. Abdomen: soft, non-tender. Extremities: no cyanosis, no clubbing, no calf tenderness, persistent bilateral lower leg edema. Skin:  warm and dry. Neuro:  Able to move all 4 extremities. CXR 12/24/2020:    Small-moderate left pleural effusion.  Underlying opacity may represent   atelectasis with pneumonia not excluded. 2 D ECHO July 2020:    Summary  Technically difficult study. Left ventricle is normal in size. Mild left ventricular hypertrophy. Global left ventricular systolic function is normal. Estimated LV EF >55 %. Left atrium is normal in size. Moderate tricuspid regurgitation. Mild pulmonary hypertension. Estimated right ventricular systolic pressure  is [id number]. EKG 12/19/2020:    Atrial fibrillation  Low voltage QRS  Nonspecific ST and T wave abnormality  Abnormal ECG  When compared with ECG of 14-DEC-2020 13:24,  Nonspecific T wave abnormality, worse in Anterolateral leads    ASSESSMENT:    Acute on chronic heart failure with preserved LV ejection fraction, persistent atrial fibrillation, intermittent hypotension which has improved, shortness of breath - multifactorial in etiology, moderate TR, moderate pulmonary hypertension with RVSP 49 mmHg, abnormal EKG, chronic kidney disease stage 3, symptomatic anemia with iron deficiency anemia, hypoxemia requiring increased oxygen levels, other problems as charted. REC/PLAN:    Slightly worsened clinical condition currently. Reviewed chest x-ray findings. Advised patient's nurse to contact pulmonologist regarding worsening hypoxemia requiring increased oxygen levels. Patient may need to be transferred to intermediate care unit for closer observation and nursing care. Suggest continuation of other current cardiac medications. Of note, Eliquis is on hold due to symptomatic anemia with possible GI bleeding and iron deficiency anemia. We will follow. Discussed with the nurse. Electronically signed by Shasta Groves MD, [facility]        PLEASE NOTE:  This progress note was completed using a voice transcription system. Every effort was made to ensure accuracy. However, inadvertent computerized transcription errors may be present.

## 2020-12-24 NOTE — PROGRESS NOTES
Patient seemingly SOB while resting in bed. RN finds patient without oxygen on, SP02 89%, 2 L nasal cannula applied and patient SP02 raises to 95%. Patient lung sounds clear - PRN resp treatment requested.

## 2020-12-24 NOTE — PROGRESS NOTES
Physical Therapy  DATE: 2020    NAME: Denise Renteria  MRN: [id number]   : 1945    Patient not seen this date for Physical Therapy due to:  [] Blood transfusion in progress  [] Hemodialysis  [] Patient Declined  [] Spine Precautions   [] Strict Bedrest  [] Surgery/ Procedure  [] Testing      [x] Other    20: Attempted at 819 - pt resting and unable to keep eyes open - requests therapy check back later. [] PT is being discontinued at this time. Patient independent. No further needs. [] PT is being discontinued at this time due to declining physical/ medical status. Therapy is not appropriate at this time.     Elaine Metcalf, PT

## 2020-12-24 NOTE — FLOWSHEET NOTE
12/24/20 0636   Provider Notification   Reason for Communication Evaluate   Provider Name Dr. Thompson Adams   Provider Notification Physician   Method of Communication Secure Message   Response Waiting for response   Notification Time 0636     RN notified Dr. Thompson Adams that pt removed her triple lumen last night.

## 2020-12-24 NOTE — PROGRESS NOTES
RN walked into room and pt was sitting at the side of the bed. Pt turned off her bed alarm and was going to the bathroom by herself. RN assisted pt back into bed. RN then noticed the pt's triple lumen sitting on her bedside table. Catheter is intact, dark blue tip is visible. No bleeding at the site. RN cleansed the site and applied a dressing. RN notified [facility].

## 2020-12-24 NOTE — PLAN OF CARE
Problem: Falls - Risk of:  Goal: Will remain free from falls  Description: Will remain free from falls  Outcome: Ongoing  Note: Pt assessed as a fall risk this shift. Remains free from falls and accidental injury at this time. Fall precautions in place, including falling star sign and fall risk band on pt. Floor free from obstacles, and bed is locked and in lowest position. Adequate lighting provided. Pt encouraged to call before getting OOB for any need. Bed alarm activated. Will continue to monitor needs during hourly rounding, and reinforce education on use of call light. Problem: Bleeding:  Goal: Will show no signs and symptoms of excessive bleeding  Description: Will show no signs and symptoms of excessive bleeding  Outcome: Ongoing  Note: No s/s of excessive bleeding for this RN's shift. Problem: Pain:  Goal: Pain level will decrease  Description: Pain level will decrease  Outcome: Ongoing  Note: No pain at this time.   0/10 pain scale

## 2020-12-24 NOTE — ANESTHESIA POSTPROCEDURE EVALUATION
Department of Anesthesiology  Postprocedure Note    Patient: Stephie Mcfarlane  MRN: [id number]  YOB: 1945  Date of evaluation: 12/24/2020  Time:  11:29 AM     Procedure Summary     Date: 12/23/20 Room / Location: [facility]el / Arielle Eliz [id number]    Anesthesia Start: 7532 Anesthesia Stop: 1034    Procedure: PUSH ENTEROSCOPY/EGD CONTROL BLEEDING (N/A Esophagus) Diagnosis: (ANEMIA/SYMPTOMATIC)    Surgeons: Mina Howe MD Responsible Provider: Torres Arias MD    Anesthesia Type: MAC, general ASA Status: 3          Anesthesia Type: MAC, general    Ethan Phase I: Ethan Score: 9    Ethan Phase II:      Last vitals: Reviewed and per EMR flowsheets. Anesthesia Post Evaluation    Comments: POD #1. Patient seen at bedside. No anesthesia complications reported.

## 2020-12-24 NOTE — PROGRESS NOTES
Physical Therapy  DATE: 2020    NAME: Lawrence Pardo  MRN: [id number]   : 1945    Patient not seen this date for Physical Therapy due to:  [] Blood transfusion in progress  [] Hemodialysis  [] Patient Declined  [] Spine Precautions   [] Strict Bedrest  [] Surgery/ Procedure  [] Testing      [x] Other 20: 2nd attempt today at 10 - pt lethargic/short of breath, hold PT.       [] PT is being discontinued at this time. Patient independent. No further needs. [] PT is being discontinued at this time due to declining physical/ medical status. Therapy is not appropriate at this time.     Miryam Barber, PT

## 2020-12-24 NOTE — PROGRESS NOTES
Today's Date: 12/23/2020  Patient Name: Salvador Scott  Date of admission: 12/18/2020  8:33 PM  Patient's age: 70 y.o., 1945  Admission Dx: Symptomatic anemia [D64.9]    Reason for Consult: anemia  Requesting Physician: Jovanna Hurd MD    CHIEF COMPLAINT:    Chief Complaint   Patient presents with    Abnormal Lab     Patient comes to the ER with a hemoglobin of 6.6, per her physician, to get a blood transfusion.  Shortness of Breath     Dx with pneumonia today by her physician after having a chest x-ray Thursday. Has not started her antibiotics. Says the drug store has yet to deliver it. SUBJECTIVE:    Patient seen and examined  NO fever chills  SHe had push enteroscopy today and seen AVM and had APC  ++fatigue  NO NV  HISTORY OF PRESENT ILLNESS IN BRIEF:    Salvador Scott  is a 70 y.o. female who is admitted to the hospital for abnormal lab and hemoglobin of 6.6. Patient has recently been evaluated by Dr. Juliana Horowitz hematologist as outpatient on 12/18 for chronic anemia, possible iron deficiency, history of AV malformation. He has ordered some labs and there was a plan to start patient on IV iron supplements if her lab work suggest significant iron deficiency. Her lab work at the office showed hemoglobin of 6.6 and therefore she was instructed to go to ER. Patient has been maintained on chronically anticoagulation with Eliquis for her history of atrial fibrillation. HYDROcodone-acetaminophen (NORCO) 5-325 MG per tablet Take 1 tablet by mouth every 8 hours as needed for Pain for up to 30 days. 11/30/20 12/30/20 Yes WILLIE Pinzon CNP   lisinopril-hydroCHLOROthiazide (PRINZIDE;ZESTORETIC) 10-12.5 MG per tablet Take 1 tablet by mouth daily 11/30/20  Yes WILLIE Pinzon CNP   ELIQUIS 5 MG TABS tablet TAKE ONE TABLET BY MOUTH TWICE A DAY 11/30/20  Yes WILLIE Pinzon CNP   levothyroxine (SYNTHROID) 125 MCG tablet Take 1 tablet by mouth daily 11/30/20  Yes WILLIE Pinzon CNP   metoprolol succinate (TOPROL XL) 25 MG extended release tablet TAKE 1 TABLET BY MOUTH DAILY. (PLEASE MAKE  APPT FOR FUTURE REFILLS) 11/23/20  Yes WILLIE Pinzon CNP   clonazePAM (KLONOPIN) 0.5 MG tablet Take 1 tablet by mouth 3 times daily as needed for Anxiety for up to 30 days.  11/17/20 12/19/20 Yes WILLIE Pinzon CNP   fluticasone (FLOVENT HFA) 220 MCG/ACT inhaler Inhale 2 puffs into the lungs 2 times daily 11/17/20  Yes WILLIE Pinzon CNP   albuterol (PROVENTIL) (2.5 MG/3ML) 0.083% nebulizer solution Take 3 mLs by nebulization every 6 hours as needed for Wheezing 11/5/20  Yes WILLIE Pinzon CNP   dilTIAZem (CARDIZEM CD) 120 MG extended release capsule Take 1 capsule by mouth daily 10/19/20  Yes WILLIE Pinzon CNP   albuterol sulfate HFA (PROAIR HFA) 108 (90 Base) MCG/ACT inhaler Inhale 2 puffs into the lungs every 6 hours as needed for Wheezing   Yes Historical Provider, MD   fluticasone (FLONASE) 50 MCG/ACT nasal spray USE ONE SPRAY TO EACH NOSTRIL ONCE ADAY 9/4/20  Yes WILLIE Avina CNP   atorvastatin (LIPITOR) 10 MG tablet TAKE 1 TABLET BY MOUTH ONCE DAILY 9/4/20  Yes Messer [place], APRN - CNP   Ascorbic Acid (C-1000 PO) Take 1 capsule by mouth daily    Yes Historical Provider, MD   ferrous sulfate 325 (65 Fe) MG tablet Take 325 mg by mouth daily  8/30/18  Yes Historical Provider, MD Oxygen Concentrator continuous    Historical Provider, MD   Lift Chair MISC Use daily for comfort  Patient not taking: Reported on 12/18/2020 11/30/20   WILLIE Doan CNP   PROAIR  (26 Base) MCG/ACT inhaler Inhale 2 puffs into the lungs every 6 hours as needed for Wheezing 11/17/20   WILLIE Doan CNP   levalbuterol [facility] HFA) 45 MCG/ACT inhaler Inhale 1 puff into the lungs every 4 hours as needed for Wheezing 8/19/20   WILLIE Doan CNP   Respiratory Therapy Supplies (NEBULIZER/TUBING/MOUTHPIECE) KIT Use every 4-6 hours prn for copd 6/15/20   WILLIE Doan CNP     Current Facility-Administered Medications   Medication Dose Route Frequency Provider Last Rate Last Admin    metoprolol tartrate (LOPRESSOR) tablet 25 mg  25 mg Oral BID Pamela Burciaga MD   25 mg at 12/23/20 2301    ondansetron (ZOFRAN) injection 4 mg  4 mg Intravenous Q6H PRN Dwayne Clemente MD   4 mg at 12/22/20 1044    guaiFENesin (MUCINEX) extended release tablet 1,200 mg  1,200 mg Oral BID Pamela Burciaga MD   1,200 mg at 12/23/20 2301    fluticasone (FLONASE) 50 MCG/ACT nasal spray 2 spray  2 spray Each Nostril Daily Dwayne Clemente MD   2 spray at 12/22/20 0803    furosemide (LASIX) injection 40 mg  40 mg Intravenous Daily Pamela Burciaga MD   40 mg at 12/23/20 1202    sodium chloride flush 0.9 % injection 10 mL  10 mL Intravenous 2 times per day Pamela Burciaga MD   10 mL at 12/23/20 2301    sodium chloride flush 0.9 % injection 10 mL  10 mL Intravenous PRN Dwayne Clemente MD        acetaminophen (TYLENOL) tablet 650 mg  650 mg Oral Q4H PRN Dwayne Clemente MD   650 mg at 12/22/20 2106    ferrous sulfate (IRON 325) tablet 325 mg  325 mg Oral Daily Dwayne Clemente MD   325 mg at 12/23/20 1203    ascorbic acid (VITAMIN C) tablet 1,000 mg  1,000 mg Oral Daily Dwayne Clemente MD   1,000 mg at 12/22/20 7117  atorvastatin (LIPITOR) tablet 10 mg  10 mg Oral Daily Dwayne Clemente MD   10 mg at 12/23/20 1203    dilTIAZem (CARDIZEM CD) extended release capsule 120 mg  120 mg Oral Daily Devin Vickers MD   120 mg at 12/23/20 1203    albuterol (PROVENTIL) nebulizer solution 2.5 mg  2.5 mg Nebulization Q6H PRN Dwayne Clemente MD   2.5 mg at 12/22/20 0915    clonazePAM (KLONOPIN) tablet 0.5 mg  0.5 mg Oral TID PRN Devin Vickers MD   0.5 mg at 12/23/20 1204    fluticasone (FLOVENT HFA) 110 MCG/ACT inhaler 2 puff  2 puff Inhalation BID Devin Vickers MD   2 puff at 12/23/20 2301    [Held by provider] metoprolol succinate (TOPROL XL) extended release tablet 25 mg  25 mg Oral Daily Sammi Wright MD   25 mg at 12/19/20 1051    baclofen (LIORESAL) tablet 10 mg  10 mg Oral TID PRN Devin Vickers MD   10 mg at 12/20/20 2118    HYDROcodone-acetaminophen (NORCO) 5-325 MG per tablet 1 tablet  1 tablet Oral Q8H PRN Devin Vickers MD   1 tablet at 12/21/20 2336    [Held by provider] apixaban (ELIQUIS) tablet 5 mg  5 mg Oral BID Sammi Wright MD        levothyroxine (SYNTHROID) tablet 125 mcg  125 mcg Oral Daily Dwayne Clemente MD   125 mcg at 12/23/20 9650    pantoprazole (PROTONIX) tablet 40 mg  40 mg Oral QAM AC Dwayne Clemente MD   40 mg at 12/23/20 9035    DULoxetine (CYMBALTA) extended release capsule 60 mg  60 mg Oral Daily Devin Vickers MD   60 mg at 12/23/20 1203    tamsulosin (FLOMAX) capsule 0.4 mg  0.4 mg Oral Daily Dwayne Clemente MD   0.4 mg at 12/23/20 1204    [Held by provider] lisinopril (PRINIVIL;ZESTRIL) tablet 10 mg  10 mg Oral Daily Sammi Wright MD   10 mg at 12/19/20 1051    And    [Held by provider] hydroCHLOROthiazide (HYDRODIURIL) tablet 12.5 mg  12.5 mg Oral Daily Sammi Wright MD   12.5 mg at 12/19/20 1051    potassium chloride (KLOR-CON M) extended release tablet 40 mEq  40 mEq Oral PRN Devin Vickers MD        Or  potassium bicarb-citric acid (EFFER-K) effervescent tablet 40 mEq  40 mEq Oral PRN Dwayne Clemente MD        Or    potassium chloride 10 mEq/100 mL IVPB (Peripheral Line)  10 mEq Intravenous PRN Bubba Parikh MD           Allergies:  Tizanidine    Social History:   reports that she has quit smoking. Her smoking use included cigarettes. She has a 78.00 pack-year smoking history. She has never used smokeless tobacco. She reports current alcohol use. She reports that she does not use drugs. Family History: family history includes COPD in her mother; Cancer in an other family member; Emphysema in her sister; Heart Disease in her mother. REVIEW OF SYSTEMS:    Constitutional: ++fatigue, No fever or chills. No night sweats, no weight loss   Eyes: No eye discharge, double vision, or eye pain   HEENT: negative for sore mouth, sore throat, hoarseness and voice change   Respiratory: negative for cough , sputum, dyspnea, wheezing, hemoptysis, chest pain   Cardiovascular: negative for chest pain, dyspnea, palpitations, orthopnea, PND   Gastrointestinal: negative for nausea, vomiting, diarrhea, constipation, abdominal pain, Dysphagia, hematemesis and hematochezia   Genitourinary: negative for frequency, dysuria, nocturia, urinary incontinence, and hematuria   Integument: negative for rash, skin lesions, bruises.    Hematologic/Lymphatic: negative for easy bruising, bleeding, lymphadenopathy, or petechiae   Endocrine: negative for heat or cold intolerance,weight changes, change in bowel habits and hair loss   Musculoskeletal: negative for myalgias, arthralgias, pain, joint swelling,and bone pain   Neurological: negative for headaches, dizziness, seizures, weakness, numbness    PHYSICAL EXAM:      BP (!) 127/54   Pulse 100   Temp 98.1 °F (36.7 °C) (Oral)   Resp 20   Ht 5' 5\" (1.651 m)   Wt 190 lb 4.1 oz (86.3 kg)   SpO2 92%   BMI 31.66 kg/m² Temp (24hrs), Av °F (36.7 °C), Min:97.1 °F (36.2 °C), Max:98.7 °F (37.1 °C)    General appearance - well appearing, no in pain or distress   Mental status - alert and cooperative   Eyes - pupils equal and reactive, extraocular eye movements intact   Ears - bilateral TM's and external ear canals normal   Mouth - mucous membranes moist, pharynx normal without lesions   Neck - supple, no significant adenopathy   Lymphatics - no palpable lymphadenopathy, no hepatosplenomegaly   Chest - clear to auscultation, no wheezes, rales or rhonchi, symmetric air entry   Heart - normal rate, regular rhythm, normal S1, S2, no murmurs  Abdomen - soft, nontender, nondistended, no masses or organomegaly   Neurological - alert, oriented, normal speech, no focal findings or movement disorder noted   Musculoskeletal - no joint tenderness, deformity or swelling   Extremities - peripheral pulses normal, no pedal edema, no clubbing or cyanosis   Skin - normal coloration and turgor, no rashes, no suspicious skin lesions noted ,    DATA:    Labs:   CBC:   Recent Labs     20  0850 20  0512   WBC 6.8 6.3   HGB 8.9* 8.9*   HCT 28.5* 28.2*    400     BMP:   Recent Labs     20  0656 20  0512    141   K 4.0 3.8   CO2 34* 34*   BUN 16 14   CREATININE 1.10* 1.06*   LABGLOM 49* 51*   GLUCOSE 120* 113*     PT/INR:   No results for input(s): PROTIME, INR in the last 72 hours. IMAGING DATA:  @IMG@   XR CHEST PORTABLE   Final Result   1. Left IJ central line in place, tip overlying the mid SVC   2. No evidence of a pneumothorax   3.  Small left pleural effusion, and left basilar opacity, differential   considerations include atelectasis versus pneumonia             Primary Problem  Symptomatic anemia    Active Hospital Problems    Diagnosis Date Noted    Symptomatic anemia [D64.9] 2020    Pneumonia due to organism [J18.9]     Iron deficiency anemia due to chronic blood loss [D50.0] 2020  Acute on chronic diastolic CHF (congestive heart failure) ([facility]) [I50.33] 07/21/2020    Obesity, Class I, BMI 30-34.9 [E66.9] 07/20/2020    Mod[facility] [F32.1] 06/17/2020    PAD (peripheral artery disease) ([facility]) [I73.9] 06/17/2020    CKD (chronic kidney disease) stage 3, GFR 30-59 ml/min [N18.30]     HA (generalized anxiety disorder) [F41.1] 12/13/2018    A-fib ([facility] [I48.91] 09/13/2018    Pulmonary hypertension ([facility] [I27.20] 09/13/2018    COPD (chronic obstructive pulmonary disease) ([facility] [J44.9] 09/13/2018    Acquired hypothyroidism [E03.9] 01/18/2013         IMPRESSION:   1. Severe anemia: Patient has history of chronic anemia. She is on chronic anticoagulation and history of female patient which can contribute to GI bleed. 2. SUNSHINE  3. Atrial fibrillation  4. COPD  5. GERD    RECOMMENDATIONS:  1. I reviewed the laboratory data, imaging studies, diagnosis, prognosis and treatment options with patient  2. She had upper endoscopy and colonoscopy on 12/2. She was noted to have 2 AVMs and had APC treatment  3. Now POSITIVE stool for occult blood. 4. EGD showed AVM, had APC  5. No hemolysis and review peripheral blood smear  6. Her iron panel revealed ferritin 37, iron 92, iron saturation 29 and TIBC 318.  t  7. No plan for BM biopsy at this point      Discussed with patient and Nurse. Thank you for asking us to see this patient. Macario Oquendo MD  Hematologist/Medical Oncologist    Cell: [phone]      This note is created with the assistance of a speech recognition program.  While intending to generate a document that actually reflects the content of the visit, the document can still have some errors including those of syntax and sound a like substitutions which may escape proof reading. It such instances, actual meaning can be extrapolated by contextual diversion.

## 2020-12-24 NOTE — CARE COORDINATION
ERIAC spoke with Rosi Miller in admissions with [facility] to see if she could look and see if this patient has anymore Skilled facility days left with her insurance. Rosi Miller reported that she would check and let this SW know. ADD:  ERICA received a call back from Rosi Miller in admissions at [facility]. She reported that this patient has 8 more days that would be covered at 100% and 80 Days that would be covered at 80%. [facility] will not have a bed until Wednesday 12/30/2020.

## 2020-12-24 NOTE — PROGRESS NOTES
Today's Date: 12/24/2020  Patient Name: Salvador Scott  Date of admission: 12/18/2020  8:33 PM  Patient's age: 70 y.o., 1945  Admission Dx: Symptomatic anemia [D64.9]    Reason for Consult: anemia  Requesting Physician: Jovanna Hurd MD    CHIEF COMPLAINT:    Chief Complaint   Patient presents with    Abnormal Lab     Patient comes to the ER with a hemoglobin of 6.6, per her physician, to get a blood transfusion.  Shortness of Breath     Dx with pneumonia today by her physician after having a chest x-ray Thursday. Has not started her antibiotics. Says the drug store has yet to deliver it. SUBJECTIVE:    Patient seen and examined  NO fever chills  Hb  Improved to 9.7  ++SOB  ++fatigue  NO NV  HISTORY OF PRESENT ILLNESS IN BRIEF:    Salvador Scott  is a 70 y.o. female who is admitted to the hospital for abnormal lab and hemoglobin of 6.6. Patient has recently been evaluated by Dr. Juliana Horowitz hematologist as outpatient on 12/18 for chronic anemia, possible iron deficiency, history of AV malformation. He has ordered some labs and there was a plan to start patient on IV iron supplements if her lab work suggest significant iron deficiency. Her lab work at the office showed hemoglobin of 6.6 and therefore she was instructed to go to ER. Patient has been maintained on chronically anticoagulation with Eliquis for her history of atrial fibrillation. Past Medical History:   has a past medical history of A-fib ([facility] Acquired hypothyroidism, Acute encephalopathy, Acute kidney injury ([facility] Anxiety, Benign hypertension with CKD (chronic kidney disease) stage III, Chronic back pain, CKD (chronic kidney disease) stage 3, GFR 30-59 ml/min, Constipation, COPD (chronic obstructive pulmonary disease) ([facility] Cough, Depression, ETOH abuse, Former smoker, HA (generalized anxiety disorder), History of GI bleed, Hyperlipidemia, Hypertension, Hypothyroid, Leg pain, Normocytic anemia, Osteoarthritis, PAD (peripheral artery disease) ([facility] Primary osteoarthritis, Pulmonary hypertension ([facility] Pure hypercholesterolemia, SIRS (systemic inflammatory response syndrome) ([facility] Urge incontinence, and Wheezing. Past Surgical History:   has a past surgical history that includes eye surgery (Bilateral); Colonoscopy; joint replacement; Upper gastrointestinal endoscopy (N/A, 12/2/2020); Colonoscopy (N/A, 12/2/2020); and Upper gastrointestinal endoscopy (N/A, 12/23/2020). Medications:    Prior to Admission medications    Medication Sig Start Date End Date Taking?  Authorizing Provider   tamsulosin (FLOMAX) 0.4 MG capsule TAKE 1 CAPSULE BY MOUTH DAILY 12/15/20  Yes WILLIE Sullivan CNP   omeprazole (PRILOSEC) 40 MG delayed release capsule TAKE 1 CAPSULE BY MOUTH EVERY MORNING BEFORE BREAKFAST 12/14/20  Yes WILLIE Sullivan CNP   DULoxetine (CYMBALTA) 60 MG extended release capsule Take 1 capsule by mouth daily 12/14/20  Yes WILLIE Sullivan CNP   furosemide (LASIX) 40 MG tablet TAKE 1 TABLET BY MOUTH DAILY  Patient taking differently: Take 40 mg by mouth daily Currently taking 2 tablets daily  12/18/20 11/30/20  Yes WILLIE Sullivan CNP   baclofen (LIORESAL) 10 MG tablet TAKE ONE TABLET BY MOUTH THREE TIMES A DAY AS NEEDED. 11/30/20  Yes WILLIE Sullivan CNP HYDROcodone-acetaminophen (NORCO) 5-325 MG per tablet Take 1 tablet by mouth every 8 hours as needed for Pain for up to 30 days. 11/30/20 12/30/20 Yes WILLIE Murray CNP   lisinopril-hydroCHLOROthiazide (PRINZIDE;ZESTORETIC) 10-12.5 MG per tablet Take 1 tablet by mouth daily 11/30/20  Yes WILLIE Murray CNP   ELIQUIS 5 MG TABS tablet TAKE ONE TABLET BY MOUTH TWICE A DAY 11/30/20  Yes WILLIE Murray CNP   levothyroxine (SYNTHROID) 125 MCG tablet Take 1 tablet by mouth daily 11/30/20  Yes WILLIE Murray CNP   metoprolol succinate (TOPROL XL) 25 MG extended release tablet TAKE 1 TABLET BY MOUTH DAILY. (PLEASE MAKE DR HER FOR FUTURE REFILLS) 11/23/20  Yes WILLIE Murray CNP   clonazePAM (KLONOPIN) 0.5 MG tablet Take 1 tablet by mouth 3 times daily as needed for Anxiety for up to 30 days.  11/17/20 12/19/20 Yes WILLIE Murray CNP   fluticasone (FLOVENT HFA) 220 MCG/ACT inhaler Inhale 2 puffs into the lungs 2 times daily 11/17/20  Yes WILLIE Murray CNP   albuterol (PROVENTIL) (2.5 MG/3ML) 0.083% nebulizer solution Take 3 mLs by nebulization every 6 hours as needed for Wheezing 11/5/20  Yes WILLIE Murray CNP   dilTIAZem (CARDIZEM CD) 120 MG extended release capsule Take 1 capsule by mouth daily 10/19/20  Yes WILLIE Murray CNP   albuterol sulfate HFA (PROAIR HFA) 108 (90 Base) MCG/ACT inhaler Inhale 2 puffs into the lungs every 6 hours as needed for Wheezing   Yes Historical Provider, MD   fluticasone (FLONASE) 50 MCG/ACT nasal spray USE ONE SPRAY TO EACH NOSTRIL ONCE ADAY 9/4/20  Yes WILLIE Herrmann CNP   atorvastatin (LIPITOR) 10 MG tablet TAKE 1 TABLET BY MOUTH ONCE DAILY 9/4/20  Yes Gianni Slot, APRN - CNP   Ascorbic Acid (C-1000 PO) Take 1 capsule by mouth daily    Yes Historical Provider, MD   ferrous sulfate 325 (65 Fe) MG tablet Take 325 mg by mouth daily  8/30/18  Yes Historical Provider, MD Oxygen Concentrator continuous    Historical Provider, MD   Lift Chair MISC Use daily for comfort  Patient not taking: Reported on 12/18/2020 11/30/20   WILLIE Murray CNP   PROAIR  (25 Base) MCG/ACT inhaler Inhale 2 puffs into the lungs every 6 hours as needed for Wheezing 11/17/20   WILLIE Murray CNP   levalbuterol [facility] HFA) 45 MCG/ACT inhaler Inhale 1 puff into the lungs every 4 hours as needed for Wheezing 8/19/20   WILLIE Murray CNP   Respiratory Therapy Supplies (NEBULIZER/TUBING/MOUTHPIECE) KIT Use every 4-6 hours prn for copd 6/15/20   WILLIE Murray CNP     Current Facility-Administered Medications   Medication Dose Route Frequency Provider Last Rate Last Admin    lisinopril (PRINIVIL;ZESTRIL) tablet 2.5 mg  2.5 mg Oral Lunch Day Sherman MD        furosemide (LASIX) injection 40 mg  40 mg Intravenous BID Sandra Cook MD        metoprolol tartrate (LOPRESSOR) tablet 25 mg  25 mg Oral BID Ashley Squires MD   25 mg at 12/24/20 0801    ondansetron (ZOFRAN) injection 4 mg  4 mg Intravenous Q6H PRN Dwayne Clemente MD   4 mg at 12/22/20 1044    guaiFENesin (MUCINEX) extended release tablet 1,200 mg  1,200 mg Oral BID Ashley Squires MD   1,200 mg at 12/24/20 0801    fluticasone (FLONASE) 50 MCG/ACT nasal spray 2 spray  2 spray Each Nostril Daily Ashley Squires MD   2 spray at 12/22/20 0803    sodium chloride flush 0.9 % injection 10 mL  10 mL Intravenous 2 times per day Ashley Squires MD   10 mL at 12/24/20 0801    sodium chloride flush 0.9 % injection 10 mL  10 mL Intravenous PRN Dwayne Clemente MD        acetaminophen (TYLENOL) tablet 650 mg  650 mg Oral Q4H PRN Dwayne Clemente MD   650 mg at 12/22/20 2106    ferrous sulfate (IRON 325) tablet 325 mg  325 mg Oral Daily Dwayne Clemente MD   325 mg at 12/24/20 0801    ascorbic acid (VITAMIN C) tablet 1,000 mg  1,000 mg Oral Daily Dwayne Clemente MD   1,000 mg at 12/24/20 5261  atorvastatin (LIPITOR) tablet 10 mg  10 mg Oral Daily Dwayne Clemente MD   10 mg at 12/24/20 0801    dilTIAZem (CARDIZEM CD) extended release capsule 120 mg  120 mg Oral Daily Alix Strong MD   120 mg at 12/24/20 0801    albuterol (PROVENTIL) nebulizer solution 2.5 mg  2.5 mg Nebulization Q6H PRN Dwayne Clemente MD   2.5 mg at 12/24/20 1110    clonazePAM (KLONOPIN) tablet 0.5 mg  0.5 mg Oral TID PRN Alix Strong MD   0.5 mg at 12/23/20 1204    fluticasone (FLOVENT HFA) 110 MCG/ACT inhaler 2 puff  2 puff Inhalation BID Alix Strong MD   2 puff at 12/24/20 0805    baclofen (LIORESAL) tablet 10 mg  10 mg Oral TID PRN Alix Strong MD   10 mg at 12/20/20 2118    HYDROcodone-acetaminophen (Raymon Carne) 5-325 MG per tablet 1 tablet  1 tablet Oral Q8H PRN Alix Strong MD   1 tablet at 12/21/20 2336    [Held by provider] apixaban (ELIQUIS) tablet 5 mg  5 mg Oral BID Jerald Mendez MD   5 mg at 12/24/20 0801    levothyroxine (SYNTHROID) tablet 125 mcg  125 mcg Oral Daily Dwayne Clemente MD   125 mcg at 12/24/20 0554    pantoprazole (PROTONIX) tablet 40 mg  40 mg Oral QAM AC Dwayne Clemente MD   40 mg at 12/24/20 0554    DULoxetine (CYMBALTA) extended release capsule 60 mg  60 mg Oral Daily Alix Strong MD   60 mg at 12/24/20 0801    tamsulosin (FLOMAX) capsule 0.4 mg  0.4 mg Oral Daily Dwayne Clemente MD   0.4 mg at 12/24/20 0801    potassium chloride (KLOR-CON M) extended release tablet 40 mEq  40 mEq Oral PRN Alix Strong MD        Or    potassium bicarb-citric acid (EFFER-K) effervescent tablet 40 mEq  40 mEq Oral PRN Dwayne Clemente MD        Or    potassium chloride 10 mEq/100 mL IVPB (Peripheral Line)  10 mEq Intravenous PRN Alix Strong MD           Allergies:  Tizanidine Social History:   reports that she has quit smoking. Her smoking use included cigarettes. She has a 78.00 pack-year smoking history. She has never used smokeless tobacco. She reports current alcohol use. She reports that she does not use drugs. Family History: family history includes COPD in her mother; Cancer in an other family member; Emphysema in her sister; Heart Disease in her mother. REVIEW OF SYSTEMS:    Constitutional: ++fatigue, No fever or chills. No night sweats, no weight loss   Eyes: No eye discharge, double vision, or eye pain   HEENT: negative for sore mouth, sore throat, hoarseness and voice change   Respiratory: negative for cough , sputum, dyspnea, wheezing, hemoptysis, chest pain   Cardiovascular: negative for chest pain, dyspnea, palpitations, orthopnea, PND   Gastrointestinal: negative for nausea, vomiting, diarrhea, constipation, abdominal pain, Dysphagia, hematemesis and hematochezia   Genitourinary: negative for frequency, dysuria, nocturia, urinary incontinence, and hematuria   Integument: negative for rash, skin lesions, bruises.    Hematologic/Lymphatic: negative for easy bruising, bleeding, lymphadenopathy, or petechiae   Endocrine: negative for heat or cold intolerance,weight changes, change in bowel habits and hair loss   Musculoskeletal: negative for myalgias, arthralgias, pain, joint swelling,and bone pain   Neurological: negative for headaches, dizziness, seizures, weakness, numbness    PHYSICAL EXAM:      /62   Pulse 83   Temp 97.7 °F (36.5 °C) (Oral)   Resp 16   Ht 5' 5\" (1.651 m)   Wt 186 lb 8.2 oz (84.6 kg)   SpO2 98%   BMI 31.04 kg/m²    Temp (24hrs), Av.8 °F (36.6 °C), Min:97.5 °F (36.4 °C), Max:98.1 °F (36.7 °C)    General appearance - well appearing, no in pain or distress   Mental status - alert and cooperative   Eyes - pupils equal and reactive, extraocular eye movements intact   Ears - bilateral TM's and external ear canals normal  Moderate major depression ([facility] [F32.1] 06/17/2020    PAD (peripheral artery disease) ([facility] [I73.9] 06/17/2020    CKD (chronic kidney disease) stage 3, GFR 30-59 ml/min [N18.30]     HA (generalized anxiety disorder) [F41.1] 12/13/2018    [facility]-fib ([facility] [I48.91] 09/13/2018    Pulmonary hypertension ([facility] [I27.20] 09/13/2018    COPD (chronic obstructive pulmonary disease) ([facility] [J44.9] 09/13/2018    Acquired hypothyroidism [E03.9] 01/18/2013         IMPRESSION:   1. Severe anemia: Patient has history of chronic anemia. She is on chronic anticoagulation and history of female patient which can contribute to GI bleed. 2. SUNSHINE  3. Atrial fibrillation  4. COPD  5. GERD    RECOMMENDATIONS:  1. I reviewed the laboratory data, imaging studies, diagnosis, prognosis and treatment options with patient  2. She had upper endoscopy and colonoscopy on 12/2. She was noted to have 2 AVMs and had APC treatment  3. Now POSITIVE stool for occult blood. 4. EGD showed AVM, had APC  5. No hemolysis and review peripheral blood smear  6. Her iron panel revealed ferritin 37, iron 92, iron saturation 29 and TIBC 318.  t  7. No plan for BM biopsy at this point  8. Pulm planning thoracentesis  9. Follow with hematology in 2-3 weeks      Discussed with patient and Nurse. Thank you for asking us to see this patient. Macario Ochoa MD  Hematologist/Medical Oncologist    Cell: [phone]      This note is created with the assistance of a speech recognition program.  While intending to generate a document that actually reflects the content of the visit, the document can still have some errors including those of syntax and sound a like substitutions which may escape proof reading. It such instances, actual meaning can be extrapolated by contextual diversion.

## 2020-12-24 NOTE — CARE COORDINATION
ONGOING DISCHARGE PLAN:    Spoke with patient regarding discharge plan and patient confirms that plan is undecided at this time. Pt. Is from home alone & is current w/[facility], [facility] & will continue. Pt. Appears weaker than yesterday. PT/OT on board, will follow Rec. Writer spoke in regards to Rehab, she has been to [facility], in past & she didn't \"think she didn't think she had any more SNF days left\". Writer did inform Cheryl Galvan of this & he is following. HGB today 9.7. CR 1.34. Pro BNP 8534, from 6049. Pt on IV Lasix, 40mg, Bid. Pulmonary continues to follow. Per Notes, May need Thoracentesis in 2 days. Will continue to follow for additional discharge needs.     Electronically signed by Monica Sultana RN on 12/24/2020 at 3:01 PM

## 2020-12-25 LAB
ANION GAP SERPL CALCULATED.3IONS-SCNC: 13 MMOL/L (ref 9–17)
BUN BLDV-MCNC: 24 MG/DL (ref 8–23)
BUN/CREAT BLD: ABNORMAL (ref 9–20)
CALCIUM SERPL-MCNC: 9.7 MG/DL (ref 8.6–10.4)
CHLORIDE BLD-SCNC: 97 MMOL/L (ref 98–107)
CO2: 31 MMOL/L (ref 20–31)
CREAT SERPL-MCNC: 1.43 MG/DL (ref 0.5–0.9)
CULTURE: NORMAL
CULTURE: NORMAL
GFR AFRICAN AMERICAN: 44 ML/MIN
GFR NON-AFRICAN AMERICAN: 36 ML/MIN
GFR SERPL CREATININE-BSD FRML MDRD: ABNORMAL ML/MIN/{1.73_M2}
GFR SERPL CREATININE-BSD FRML MDRD: ABNORMAL ML/MIN/{1.73_M2}
GLUCOSE BLD-MCNC: 92 MG/DL (ref 70–99)
Lab: NORMAL
Lab: NORMAL
POTASSIUM SERPL-SCNC: 4 MMOL/L (ref 3.7–5.3)
SODIUM BLD-SCNC: 141 MMOL/L (ref 135–144)
SPECIMEN DESCRIPTION: NORMAL
SPECIMEN DESCRIPTION: NORMAL

## 2020-12-25 PROCEDURE — 6370000000 HC RX 637 (ALT 250 FOR IP): Performed by: INTERNAL MEDICINE

## 2020-12-25 PROCEDURE — 99232 SBSQ HOSP IP/OBS MODERATE 35: CPT | Performed by: INTERNAL MEDICINE

## 2020-12-25 PROCEDURE — 97110 THERAPEUTIC EXERCISES: CPT

## 2020-12-25 PROCEDURE — 1200000000 HC SEMI PRIVATE

## 2020-12-25 PROCEDURE — 2580000003 HC RX 258: Performed by: INTERNAL MEDICINE

## 2020-12-25 PROCEDURE — 80048 BASIC METABOLIC PNL TOTAL CA: CPT

## 2020-12-25 PROCEDURE — 97116 GAIT TRAINING THERAPY: CPT

## 2020-12-25 PROCEDURE — 99232 SBSQ HOSP IP/OBS MODERATE 35: CPT | Performed by: FAMILY MEDICINE

## 2020-12-25 PROCEDURE — 36415 COLL VENOUS BLD VENIPUNCTURE: CPT

## 2020-12-25 RX ORDER — FUROSEMIDE 40 MG/1
40 TABLET ORAL 2 TIMES DAILY
Status: DISCONTINUED | OUTPATIENT
Start: 2020-12-25 | End: 2020-12-26

## 2020-12-25 RX ADMIN — FLUTICASONE PROPIONATE 2 PUFF: 110 AEROSOL, METERED RESPIRATORY (INHALATION) at 22:09

## 2020-12-25 RX ADMIN — DULOXETINE HYDROCHLORIDE 60 MG: 60 CAPSULE, DELAYED RELEASE ORAL at 10:27

## 2020-12-25 RX ADMIN — DILTIAZEM HYDROCHLORIDE 120 MG: 120 CAPSULE, COATED, EXTENDED RELEASE ORAL at 10:28

## 2020-12-25 RX ADMIN — FUROSEMIDE 40 MG: 40 TABLET ORAL at 18:26

## 2020-12-25 RX ADMIN — ATORVASTATIN CALCIUM 10 MG: 10 TABLET, FILM COATED ORAL at 10:27

## 2020-12-25 RX ADMIN — PANTOPRAZOLE SODIUM 40 MG: 40 TABLET, DELAYED RELEASE ORAL at 06:14

## 2020-12-25 RX ADMIN — FLUTICASONE PROPIONATE 2 PUFF: 110 AEROSOL, METERED RESPIRATORY (INHALATION) at 10:29

## 2020-12-25 RX ADMIN — GUAIFENESIN 1200 MG: 600 TABLET, EXTENDED RELEASE ORAL at 10:27

## 2020-12-25 RX ADMIN — GUAIFENESIN 1200 MG: 600 TABLET, EXTENDED RELEASE ORAL at 22:09

## 2020-12-25 RX ADMIN — LEVOTHYROXINE SODIUM 125 MCG: 125 TABLET ORAL at 06:14

## 2020-12-25 RX ADMIN — OXYCODONE HYDROCHLORIDE AND ACETAMINOPHEN 1000 MG: 500 TABLET ORAL at 10:27

## 2020-12-25 RX ADMIN — Medication 10 ML: at 22:10

## 2020-12-25 RX ADMIN — LISINOPRIL 2.5 MG: 5 TABLET ORAL at 12:07

## 2020-12-25 RX ADMIN — TAMSULOSIN HYDROCHLORIDE 0.4 MG: 0.4 CAPSULE ORAL at 12:07

## 2020-12-25 RX ADMIN — FERROUS SULFATE TAB 325 MG (65 MG ELEMENTAL FE) 325 MG: 325 (65 FE) TAB at 10:28

## 2020-12-25 RX ADMIN — FLUTICASONE PROPIONATE 2 SPRAY: 50 SPRAY, METERED NASAL at 10:31

## 2020-12-25 RX ADMIN — METOPROLOL TARTRATE 25 MG: 25 TABLET, FILM COATED ORAL at 10:27

## 2020-12-25 RX ADMIN — METOPROLOL TARTRATE 25 MG: 25 TABLET, FILM COATED ORAL at 22:09

## 2020-12-25 RX ADMIN — CLONAZEPAM 0.5 MG: 1 TABLET ORAL at 18:27

## 2020-12-25 RX ADMIN — Medication 10 ML: at 12:09

## 2020-12-25 ASSESSMENT — PAIN SCALES - GENERAL: PAINLEVEL_OUTOF10: 0

## 2020-12-25 ASSESSMENT — PAIN DESCRIPTION - LOCATION: LOCATION: HEAD

## 2020-12-25 ASSESSMENT — PAIN DESCRIPTION - PAIN TYPE: TYPE: CHRONIC PAIN

## 2020-12-25 NOTE — PROGRESS NOTES
Today's Date: 12/25/2020  Patient Name: Denise Renteria  Date of admission: 12/18/2020  8:33 PM  Patient's age: 70 y.o., 1945  Admission Dx: Symptomatic anemia [D64.9]    Reason for Consult: anemia  Requesting Physician: Radha Germain MD    CHIEF COMPLAINT:    Chief Complaint   Patient presents with    Abnormal Lab     Patient comes to the ER with a hemoglobin of 6.6, per her physician, to get a blood transfusion.  Shortness of Breath     Dx with pneumonia today by her physician after having a chest x-ray Thursday. Has not started her antibiotics. Says the drug store has yet to deliver it. SUBJECTIVE:    Patient seen and examined  NO fever chills  Pulmonary planning possible thoracentesis   NO NV  HISTORY OF PRESENT ILLNESS IN BRIEF:    Denise Renteria  is a 70 y.o. female who is admitted to the hospital for abnormal lab and hemoglobin of 6.6. Patient has recently been evaluated by Dr. Birgit Lakhani hematologist as outpatient on 12/18 for chronic anemia, possible iron deficiency, history of AV malformation. He has ordered some labs and there was a plan to start patient on IV iron supplements if her lab work suggest significant iron deficiency. Her lab work at the office showed hemoglobin of 6.6 and therefore she was instructed to go to ER. Patient has been maintained on chronically anticoagulation with Eliquis for her history of atrial fibrillation. HYDROcodone-acetaminophen (NORCO) 5-325 MG per tablet Take 1 tablet by mouth every 8 hours as needed for Pain for up to 30 days. 11/30/20 12/30/20 Yes WILLIE Mobley CNP   lisinopril-hydroCHLOROthiazide (PRINZIDE;ZESTORETIC) 10-12.5 MG per tablet Take 1 tablet by mouth daily 11/30/20  Yes WILLIE Mobley CNP   ELIQUIS 5 MG TABS tablet TAKE ONE TABLET BY MOUTH TWICE A DAY 11/30/20  Yes WILLIE Mobley CNP   levothyroxine (SYNTHROID) 125 MCG tablet Take 1 tablet by mouth daily 11/30/20  Yes WILLIE Mobley CNP   metoprolol succinate (TOPROL XL) 25 MG extended release tablet TAKE 1 TABLET BY MOUTH DAILY. (PLEASE MAKE DR HER FOR FUTURE REFILLS) 11/23/20  Yes WILLIE Mobley CNP   clonazePAM (KLONOPIN) 0.5 MG tablet Take 1 tablet by mouth 3 times daily as needed for Anxiety for up to 30 days.  11/17/20 12/19/20 Yes WILLIE Mobley CNP   fluticasone (FLOVENT HFA) 220 MCG/ACT inhaler Inhale 2 puffs into the lungs 2 times daily 11/17/20  Yes WILLIE Mobley CNP   albuterol (PROVENTIL) (2.5 MG/3ML) 0.083% nebulizer solution Take 3 mLs by nebulization every 6 hours as needed for Wheezing 11/5/20  Yes WILLIE Mobley CNP   dilTIAZem (CARDIZEM CD) 120 MG extended release capsule Take 1 capsule by mouth daily 10/19/20  Yes WILLIE Mobley CNP   albuterol sulfate HFA (PROAIR HFA) 108 (90 Base) MCG/ACT inhaler Inhale 2 puffs into the lungs every 6 hours as needed for Wheezing   Yes Historical Provider, MD   fluticasone (FLONASE) 50 MCG/ACT nasal spray USE ONE SPRAY TO EACH NOSTRIL ONCE ADAY 9/4/20  Yes WILLIE Pang CNP   atorvastatin (LIPITOR) 10 MG tablet TAKE 1 TABLET BY MOUTH ONCE DAILY 9/4/20  Yes Tivis Hey, APRN - CNP   Ascorbic Acid (C-1000 PO) Take 1 capsule by mouth daily    Yes Historical Provider, MD   ferrous sulfate 325 (65 Fe) MG tablet Take 325 mg by mouth daily  8/30/18  Yes Historical Provider, MD Oxygen Concentrator continuous    Historical Provider, MD   Lift Chair MISC Use daily for comfort  Patient not taking: Reported on 12/18/2020 11/30/20   WILLIE Green CNP   PROAIR  (32 Base) MCG/ACT inhaler Inhale 2 puffs into the lungs every 6 hours as needed for Wheezing 11/17/20   WILLIE Green CNP   levalbuterol [facility] HFA) 45 MCG/ACT inhaler Inhale 1 puff into the lungs every 4 hours as needed for Wheezing 8/19/20   WILLIE Green CNP   Respiratory Therapy Supplies (NEBULIZER/TUBING/MOUTHPIECE) KIT Use every 4-6 hours prn for copd 6/15/20   WILLIE Green CNP     Current Facility-Administered Medications   Medication Dose Route Frequency Provider Last Rate Last Admin    furosemide (LASIX) tablet 40 mg  40 mg Oral BID Erickson Parisi MD        lisinopril (PRINIVIL;ZESTRIL) tablet 2.5 mg  2.5 mg Oral Lunch Chuck DANISH Parisi MD   2.5 mg at 12/25/20 1207    metoprolol tartrate (LOPRESSOR) tablet 25 mg  25 mg Oral BID Kay Hinojosa MD   25 mg at 12/25/20 1027    ondansetron (ZOFRAN) injection 4 mg  4 mg Intravenous Q6H PRN Dwayne Clemente MD   4 mg at 12/22/20 1044    guaiFENesin (MUCINEX) extended release tablet 1,200 mg  1,200 mg Oral BID Kay Hinojosa MD   1,200 mg at 12/25/20 1027    fluticasone (FLONASE) 50 MCG/ACT nasal spray 2 spray  2 spray Each Nostril Daily Dwayne Clemente MD   2 spray at 12/25/20 1031    sodium chloride flush 0.9 % injection 10 mL  10 mL Intravenous 2 times per day Kay Hinojosa MD   10 mL at 12/25/20 1209    sodium chloride flush 0.9 % injection 10 mL  10 mL Intravenous PRN Dwayne Clemente MD        acetaminophen (TYLENOL) tablet 650 mg  650 mg Oral Q4H PRN Dwayne Clemente MD   650 mg at 12/22/20 2106    ferrous sulfate (IRON 325) tablet 325 mg  325 mg Oral Daily Dwayne Clemente MD   325 mg at 12/25/20 8315  ascorbic acid (VITAMIN C) tablet 1,000 mg  1,000 mg Oral Daily Dwayne Clemente MD   1,000 mg at 12/25/20 1027    atorvastatin (LIPITOR) tablet 10 mg  10 mg Oral Daily Dwayne Clemente MD   10 mg at 12/25/20 1027    dilTIAZem (CARDIZEM CD) extended release capsule 120 mg  120 mg Oral Daily Ilsa Aase, MD   120 mg at 12/25/20 1028    albuterol (PROVENTIL) nebulizer solution 2.5 mg  2.5 mg Nebulization Q6H PRN Dwayne Clemente MD   2.5 mg at 12/24/20 1110    clonazePAM (KLONOPIN) tablet 0.5 mg  0.5 mg Oral TID PRN Ilsa Aase, MD   0.5 mg at 12/23/20 1204    fluticasone (FLOVENT HFA) 110 MCG/ACT inhaler 2 puff  2 puff Inhalation BID Dwayne Clemente MD   2 puff at 12/25/20 1029    baclofen (LIORESAL) tablet 10 mg  10 mg Oral TID PRN Ilsa Aase, MD   10 mg at 12/20/20 2118    HYDROcodone-acetaminophen (NORCO) 5-325 MG per tablet 1 tablet  1 tablet Oral Q8H PRN Ilsa Aase, MD   1 tablet at 12/21/20 2336    [Held by provider] apixaban (ELIQUIS) tablet 5 mg  5 mg Oral BID Enriqueta Bourgeois MD   5 mg at 12/24/20 0801    levothyroxine (SYNTHROID) tablet 125 mcg  125 mcg Oral Daily Ilsa Aase, MD   125 mcg at 12/25/20 0614    pantoprazole (PROTONIX) tablet 40 mg  40 mg Oral QAM AC Dwayne Clemente MD   40 mg at 12/25/20 8331    DULoxetine (CYMBALTA) extended release capsule 60 mg  60 mg Oral Daily Ilsa Aase, MD   60 mg at 12/25/20 1027    tamsulosin (FLOMAX) capsule 0.4 mg  0.4 mg Oral Daily Ilsa Aase, MD   0.4 mg at 12/25/20 1207    potassium chloride (KLOR-CON M) extended release tablet 40 mEq  40 mEq Oral PRN Ilsa Aase, MD        Or    potassium bicarb-citric acid (EFFER-K) effervescent tablet 40 mEq  40 mEq Oral PRN Dwayne Clemente MD        Or    potassium chloride 10 mEq/100 mL IVPB (Peripheral Line)  10 mEq Intravenous PRN Ilsa Aase, MD           Allergies:  Tizanidine Social History:   reports that she has quit smoking. Her smoking use included cigarettes. She has a 78.00 pack-year smoking history. She has never used smokeless tobacco. She reports current alcohol use. She reports that she does not use drugs. Family History: family history includes COPD in her mother; Cancer in an other family member; Emphysema in her sister; Heart Disease in her mother. REVIEW OF SYSTEMS:    Constitutional: ++fatigue, No fever or chills. No night sweats, no weight loss   Eyes: No eye discharge, double vision, or eye pain   HEENT: negative for sore mouth, sore throat, hoarseness and voice change   Respiratory: negative for cough , sputum, dyspnea, wheezing, hemoptysis, chest pain   Cardiovascular: negative for chest pain, dyspnea, palpitations, orthopnea, PND   Gastrointestinal: negative for nausea, vomiting, diarrhea, constipation, abdominal pain, Dysphagia, hematemesis and hematochezia   Genitourinary: negative for frequency, dysuria, nocturia, urinary incontinence, and hematuria   Integument: negative for rash, skin lesions, bruises.    Hematologic/Lymphatic: negative for easy bruising, bleeding, lymphadenopathy, or petechiae   Endocrine: negative for heat or cold intolerance,weight changes, change in bowel habits and hair loss   Musculoskeletal: negative for myalgias, arthralgias, pain, joint swelling,and bone pain   Neurological: negative for headaches, dizziness, seizures, weakness, numbness    PHYSICAL EXAM:      /62   Pulse 74   Temp 97.7 °F (36.5 °C)   Resp 14   Ht 5' 5\" (1.651 m)   Wt 184 lb 15.5 oz (83.9 kg)   SpO2 94%   BMI 30.78 kg/m²    Temp (24hrs), Av °F (36.7 °C), Min:97.7 °F (36.5 °C), Max:98.2 °F (36.8 °C)    General appearance - well appearing, no in pain or distress   Mental status - alert and cooperative   Eyes - pupils equal and reactive, extraocular eye movements intact   Ears - bilateral TM's and external ear canals normal Mouth - mucous membranes moist, pharynx normal without lesions   Neck - supple, no significant adenopathy   Lymphatics - no palpable lymphadenopathy, no hepatosplenomegaly   Chest - clear to auscultation, no wheezes, rales or rhonchi, symmetric air entry   Heart - normal rate, regular rhythm, normal S1, S2, no murmurs  Abdomen - soft, nontender, nondistended, no masses or organomegaly   Neurological - alert, oriented, normal speech, no focal findings or movement disorder noted   Musculoskeletal - no joint tenderness, deformity or swelling   Extremities - peripheral pulses normal, no pedal edema, no clubbing or cyanosis   Skin - normal coloration and turgor, no rashes, no suspicious skin lesions noted ,    DATA:    Labs:   CBC:   Recent Labs     12/23/20  0512 12/24/20  0812   WBC 6.3 7.4   HGB 8.9* 9.7*   HCT 28.2* 31.4*    409     BMP:   Recent Labs     12/24/20  0812 12/25/20  0544    141   K 3.7 4.0   CO2 36* 31   BUN 17 24*   CREATININE 1.34* 1.43*   LABGLOM 39* 36*   GLUCOSE 131* 92     PT/INR:   No results for input(s): PROTIME, INR in the last 72 hours. IMAGING DATA:  @IMG@   XR CHEST (2 VW)   Final Result   Small-moderate left pleural effusion. Underlying opacity may represent   atelectasis with pneumonia not excluded. XR CHEST PORTABLE   Final Result   1. Left IJ central line in place, tip overlying the mid SVC   2. No evidence of a pneumothorax   3.  Small left pleural effusion, and left basilar opacity, differential   considerations include atelectasis versus pneumonia         XR CHEST (2 VW)    (Results Pending)       Primary Problem  Symptomatic anemia    Active Hospital Problems    Diagnosis Date Noted    Symptomatic anemia [D64.9] 12/19/2020    Pneumonia due to organism [J18.9]     Iron deficiency anemia due to chronic blood loss [D50.0] 12/18/2020    Acute on chronic diastolic CHF (congestive heart failure) ([facility] [I50.33] 07/21/2020  Obesity, Class I, BMI 30-34.9 [E66.9] 07/20/2020    Moderate major depression ([facility] [F32.1] 06/17/2020    PAD (peripheral artery disease) ([facility] [I73.9] 06/17/2020    CKD (chronic kidney disease) stage 3, GFR 30-59 ml/min [N18.30]     HA (generalized anxiety disorder) [F41.1] 12/13/2018    A-fib ([facility] [I48.91] 09/13/2018    Pulmonary hypertension ([facility] [I27.20] 09/13/2018    COPD (chronic obstructive pulmonary disease) ([facility] [J44.9] 09/13/2018    Acquired hypothyroidism [E03.9] 01/18/2013         IMPRESSION:   1. Severe anemia: Patient has history of chronic anemia. She is on chronic anticoagulation and history of female patient which can contribute to GI bleed. 2. SUNSHINE  3. Atrial fibrillation  4. COPD  5. GERD    RECOMMENDATIONS:  1. I reviewed the laboratory data, imaging studies, diagnosis, prognosis and treatment options with patient  2. She had upper endoscopy and colonoscopy on 12/2. She was noted to have 2 AVMs and had APC treatment  3. Now POSITIVE stool for occult blood. 4. EGD showed AVM, had APC  5. No hemolysis and review peripheral blood smear  6. Her iron panel revealed ferritin 37, iron 92, iron saturation 29 and TIBC 318.  t  7. No plan for BM biopsy at this point  8. Pulm planning thoracentesis  9. Follow with hematology in 2-3 weeks      Discussed with patient and Nurse. Thank you for asking us to see this patient. Macario Greene MD  Hematologist/Medical Oncologist    Cell: [phone]      This note is created with the assistance of a speech recognition program.  While intending to generate a document that actually reflects the content of the visit, the document can still have some errors including those of syntax and sound a like substitutions which may escape proof reading. It such instances, actual meaning can be extrapolated by contextual diversion.

## 2020-12-25 NOTE — PLAN OF CARE
Problem: Falls - Risk of:  Goal: Will remain free from falls  Description: Will remain free from falls  Outcome: Ongoing  Note: Pt remained absent from falls. Call light within reach. Bed locked and in lowest position. Problem: Falls - Risk of:  Goal: Absence of physical injury  Description: Absence of physical injury  Outcome: Ongoing  Note: Patient remains free of injury. safe environment maintained       Problem: Bleeding:  Goal: Will show no signs and symptoms of excessive bleeding  Description: Will show no signs and symptoms of excessive bleeding  Outcome: Ongoing  Note: No new s/s of excessive bleeding      Problem: Pain:  Goal: Pain level will decrease  Description: Pain level will decrease  Outcome: Ongoing  Note: Patient denies pain. Will continue to monitor      Problem: Pain:  Goal: Control of acute pain  Description: Control of acute pain  Outcome: Ongoing  Note: Patient denies pain. Will continue to monitor      Problem: Pain:  Goal: Control of chronic pain  Description: Control of chronic pain  Outcome: Ongoing  Note: Patient denies pain.  Will continue to monitor      Problem: Musculor/Skeletal Functional Status  Goal: Highest potential functional level  Outcome: Ongoing  Note: Functional level ongoing      Problem: Musculor/Skeletal Functional Status  Goal: Absence of falls  Outcome: Ongoing  Note: Free of falls

## 2020-12-25 NOTE — PROGRESS NOTES
Nutrition Assessment     Type and Reason for Visit: Initial(length of stay)    Nutrition Recommendations/Plan: Continue cardiac diet     Nutrition Assessment:  Pt was admitted for Anemia. She is consuming around 50-75% of food provided and is awaiting bed placement at [facility]. Malnutrition Assessment:  Malnutrition Status: No malnutrition     Current Nutrition Therapies:    DIET CARDIAC;     Anthropometric Measures:  · Height: 5' 5\" (165.1 cm)  · Current Body Wt: 184 lb (83.5 kg)   · BMI: 30.6    Nutrition Diagnosis:   · No nutrition diagnosis at this time related to   as evidenced by      Nutrition Interventions:   Food and/or Nutrient Delivery:  Continue Current Diet    Nutrition Monitoring and Evaluation:   Behavioral-Environmental Outcomes:  None Identified   Food/Nutrient Intake Outcomes:  Food and Nutrient Intake  Physical Signs/Symptoms Outcomes:  None Identified     Discharge Planning:    Continue current diet     Electronically signed by Laurent Devlin RD, LD on 12/25/20 at 2:18 PM EST    Contact: [phone]

## 2020-12-25 NOTE — PROGRESS NOTES
FAMILY MEDICINE  - PROGRESS NOTE    Date:  12/25/2020  Cristy Greenberg  [id number]      Chief Complaint   Patient presents with    Abnormal Lab     Patient comes to the ER with a hemoglobin of 6.6, per her physician, to get a blood transfusion.  Shortness of Breath     Dx with pneumonia today by her physician after having a chest x-ray Thursday. Has not started her antibiotics. Says the drug store has yet to deliver it. Interval History:  not changed, she appears tired this am. She has no new complaints. She does state that she wants to go home.       Subjective  Constitutional: positive for fatigue  Respiratory: positive for emphysema and shortness of breath  Cardiovascular: positive for irregular heart beat  Musculoskeletal:positive for arthralgias, myalgias and stiff joints  Behavioral/Psych: positive for anxiety and depression:    Objective:    /64   Pulse 80   Temp 98 °F (36.7 °C) (Oral)   Resp 18   Ht 5' 5\" (1.651 m)   Wt 184 lb 15.5 oz (83.9 kg)   SpO2 94%   BMI 30.78 kg/m²   General appearance - overweight  Mental status - drowsy  Eyes - pupils equal and reactive, extraocular eye movements intact  Ears - hearing grossly normal bilaterally  Nose - normal and patent, no erythema, discharge or polyps  Mouth - mucous membranes moist, pharynx normal without lesions  Neck - supple, no significant adenopathy  Lymphatics - no palpable lymphadenopathy, no hepatosplenomegaly  Chest - clear to auscultation, no wheezes, rales or rhonchi, symmetric air entry, decreased air entry noted posteriorly  Heart - irregularly irregular rhythm with rate 80  Abdomen - soft, nontender, nondistended, no masses or organomegaly  Breasts - not examined  Back exam - not examined  Neurological - neck supple without rigidity  Musculoskeletal - osteoarthritic changes noted in both hands Extremities - peripheral pulses normal, no pedal edema, no clubbing or cyanosis  Skin - normal coloration and turgor, no rashes, no suspicious skin lesions noted    Data:   Medications:   Current Facility-Administered Medications   Medication Dose Route Frequency Provider Last Rate Last Admin    lisinopril (PRINIVIL;ZESTRIL) tablet 2.5 mg  2.5 mg Oral Lunch Chuck Parisi MD        furosemide (LASIX) injection 40 mg  40 mg Intravenous BID Safia Santos MD   40 mg at 12/24/20 1737    metoprolol tartrate (LOPRESSOR) tablet 25 mg  25 mg Oral BID Viki Noonan MD   25 mg at 12/24/20 2037    ondansetron (ZOFRAN) injection 4 mg  4 mg Intravenous Q6H PRN Dwayne Clemente MD   4 mg at 12/22/20 1044    guaiFENesin (MUCINEX) extended release tablet 1,200 mg  1,200 mg Oral BID Viki Noonan MD   1,200 mg at 12/24/20 2038    fluticasone (FLONASE) 50 MCG/ACT nasal spray 2 spray  2 spray Each Nostril Daily Dwayne Clemente MD   2 spray at 12/22/20 0803    sodium chloride flush 0.9 % injection 10 mL  10 mL Intravenous 2 times per day Viki Noonan MD   10 mL at 12/24/20 2038    sodium chloride flush 0.9 % injection 10 mL  10 mL Intravenous PRN Dwayne Clemente MD        acetaminophen (TYLENOL) tablet 650 mg  650 mg Oral Q4H PRN Dwayne Clemente MD   650 mg at 12/22/20 2106    ferrous sulfate (IRON 325) tablet 325 mg  325 mg Oral Daily Dwayne Clemente MD   325 mg at 12/24/20 0801    ascorbic acid (VITAMIN C) tablet 1,000 mg  1,000 mg Oral Daily Dwayne Clemente MD   1,000 mg at 12/24/20 0801    atorvastatin (LIPITOR) tablet 10 mg  10 mg Oral Daily Dwayne Clemente MD   10 mg at 12/24/20 0801    dilTIAZem (CARDIZEM CD) extended release capsule 120 mg  120 mg Oral Daily Dwayne Clemente MD   120 mg at 12/24/20 0801    albuterol (PROVENTIL) nebulizer solution 2.5 mg  2.5 mg Nebulization Q6H PRN Dwayne Clemente MD   2.5 mg at 12/24/20 1111  clonazePAM (KLONOPIN) tablet 0.5 mg  0.5 mg Oral TID PRN Kay Hinojosa MD   0.5 mg at 12/23/20 1204    fluticasone (FLOVENT HFA) 110 MCG/ACT inhaler 2 puff  2 puff Inhalation BID Kay Hinojosa MD   2 puff at 12/24/20 2038    baclofen (LIORESAL) tablet 10 mg  10 mg Oral TID PRN Kay Hinojosa MD   10 mg at 12/20/20 2118    HYDROcodone-acetaminophen ([facility]) 5-325 MG per tablet 1 tablet  1 tablet Oral Q8H PRN Kay Hinojosa MD   1 tablet at 12/21/20 2336    [Held by provider] apixaban (ELIQUIS) tablet 5 mg  5 mg Oral BID Catie Michele MD   5 mg at 12/24/20 0801    levothyroxine (SYNTHROID) tablet 125 mcg  125 mcg Oral Daily Kay Hinojosa MD   125 mcg at 12/25/20 0790    pantoprazole (PROTONIX) tablet 40 mg  40 mg Oral QAM AC Kay Hinojosa MD   40 mg at 12/25/20 2604    DULoxetine (CYMBALTA) extended release capsule 60 mg  60 mg Oral Daily Dwayne Clemente MD   60 mg at 12/24/20 0801    tamsulosin (FLOMAX) capsule 0.4 mg  0.4 mg Oral Daily Dwayne Clemente MD   0.4 mg at 12/24/20 0801    potassium chloride (KLOR-CON M) extended release tablet 40 mEq  40 mEq Oral PRN Kay Hinojosa MD        Or    potassium bicarb-citric acid (EFFER-K) effervescent tablet 40 mEq  40 mEq Oral PRN Dwayne Clemente MD        Or    potassium chloride 10 mEq/100 mL IVPB (Peripheral Line)  10 mEq Intravenous PRN Kay Hinojosa MD           Intake/Output Summary (Last 24 hours) at 12/25/2020 0710  Last data filed at 12/25/2020 0505  Gross per 24 hour   Intake    Output 500 ml   Net -500 ml     Recent Results (from the past 24 hour(s))   Brain Natriuretic Peptide    Collection Time: 12/24/20  8:12 AM   Result Value Ref Range    Pro-BNP 8,534 (H) <300 pg/mL    BNP Interpretation Pro-BNP Reference Range:    TSH w/reflex to FT4    Collection Time: 12/24/20  8:12 AM   Result Value Ref Range    TSH 1.24 0.30 - 5.00 mIU/L   Magnesium    Collection Time: 12/24/20  8:12 AM   Result Value Ref Range Magnesium 2.2 1.6 - 2.6 mg/dL   CBC WITH AUTO DIFFERENTIAL    Collection Time: 12/24/20  8:12 AM   Result Value Ref Range    WBC 7.4 3.5 - 11.0 k/uL    RBC 3.24 (L) 4.0 - 5.2 m/uL    Hemoglobin 9.7 (L) 12.0 - 16.0 g/dL    Hematocrit 31.4 (L) 36 - 46 %    MCV 96.8 80 - 100 fL    MCH 30.0 26 - 34 pg    MCHC 31.0 31 - 37 g/dL    RDW 18.1 (H) 11.5 - 14.9 %    Platelets 214 437 - 410 k/uL    MPV 7.8 6.0 - 12.0 fL    NRBC Automated NOT REPORTED per 100 WBC    Differential Type NOT REPORTED     Seg Neutrophils 74 (H) 36 - 66 %    Lymphocytes 12 (L) 24 - 44 %    Monocytes 12 (H) 1 - 7 %    Eosinophils % 1 0 - 4 %    Basophils 1 0 - 2 %    Immature Granulocytes NOT REPORTED 0 %    Segs Absolute 5.40 1.3 - 9.1 k/uL    Absolute Lymph # 0.90 (L) 1.0 - 4.8 k/uL    Absolute Mono # 0.90 0.1 - 1.3 k/uL    Absolute Eos # 0.10 0.0 - 0.4 k/uL    Basophils Absolute 0.10 0.0 - 0.2 k/uL    Absolute Immature Granulocyte NOT REPORTED 0.00 - 0.30 k/uL    WBC Morphology NOT REPORTED     RBC Morphology NOT REPORTED     Platelet Estimate NOT REPORTED    Comprehensive Metabolic Panel w/ Reflex to MG    Collection Time: 12/24/20  8:12 AM   Result Value Ref Range    Glucose 131 (H) 70 - 99 mg/dL    BUN 17 8 - 23 mg/dL    CREATININE 1.34 (H) 0.50 - 0.90 mg/dL    Bun/Cre Ratio NOT REPORTED 9 - 20    Calcium 9.6 8.6 - 10.4 mg/dL    Sodium 141 135 - 144 mmol/L    Potassium 3.7 3.7 - 5.3 mmol/L    Chloride 97 (L) 98 - 107 mmol/L    CO2 36 (H) 20 - 31 mmol/L    Anion Gap 8 (L) 9 - 17 mmol/L    Alkaline Phosphatase 102 35 - 104 U/L    ALT 9 5 - 33 U/L    AST 13 <32 U/L    Total Bilirubin 0.39 0.3 - 1.2 mg/dL    Total Protein 6.8 6.4 - 8.3 g/dL    Alb 3.8 3.5 - 5.2 g/dL    Albumin/Globulin Ratio NOT REPORTED 1.0 - 2.5    GFR Non- 39 (L) >60 mL/min    GFR  47 (L) >60 mL/min    GFR Comment          GFR Staging NOT REPORTED    Procalcitonin    Collection Time: 12/24/20  8:12 AM   Result Value Ref Range Procalcitonin 0.11 (H) <0.09 ng/mL   Basic Metabolic Prof    Collection Time: 12/25/20  5:44 AM   Result Value Ref Range    Glucose 92 70 - 99 mg/dL    BUN 24 (H) 8 - 23 mg/dL    CREATININE 1.43 (H) 0.50 - 0.90 mg/dL    Bun/Cre Ratio NOT REPORTED 9 - 20    Calcium 9.7 8.6 - 10.4 mg/dL    Sodium 141 135 - 144 mmol/L    Potassium 4.0 3.7 - 5.3 mmol/L    Chloride 97 (L) 98 - 107 mmol/L    CO2 31 20 - 31 mmol/L    Anion Gap 13 9 - 17 mmol/L    GFR Non-African American 36 (L) >60 mL/min    GFR  44 (L) >60 mL/min    GFR Comment          GFR Staging NOT REPORTED      -----------------------------------------------------------------  RAD:  EKG:  Micro:     Assessment & Plan:    Patient Active Problem List:     Acquired hypothyroidism     Hypertension     Primary osteoarthritis of right knee     A-fib (HCC)     Acute encephalopathy     SIRS (systemic inflammatory response syndrome) (HCC)     Normocytic anemia     Pulmonary hypertension (HCC)     COPD (chronic obstructive pulmonary disease) (HCC)     History of GI bleed     Constipation     HA (generalized anxiety disorder)     Lumbar radiculopathy     Lumbosacral spondylosis without myelopathy     Benign hypertension with CKD (chronic kidney disease) stage III     CKD (chronic kidney disease) stage 3, GFR 30-59 ml/min     Alcohol withdrawal syndrome with complication (HCC)     Moderate major depression (HCC)     PAD (peripheral artery disease) (HCC)     Acute kidney injury (HCC)     Obesity, Class I, BMI 30-34.9     Acute on chronic diastolic CHF (congestive heart failure) (HCC)     GI bleed     Weight loss     Elevated alkaline phosphatase level     Thrombocytosis (HCC)     Iron deficiency anemia due to chronic blood loss     Iron malabsorption     Symptomatic anemia     Pneumonia due to organism           Plan:  -Symptomatic anemia - Hem/Onc following. -GI bleed - AVM on EGD. -Left pleural effusion - Pulmonology possibly doing a thoracentesis tomorrow. -SUNSHINE - CR rising, I will consult Renal.  -Acute on chronic diastolic CHF - Cardiology following.  -Continue current treatments.  -Complete orders per chart.     See orders   Disposition:    Electronically signed by Sammi Wright MD on 12/25/2020 at 7:10 AM

## 2020-12-25 NOTE — PROGRESS NOTES
Physical Therapy  Facility/Department: [facility] PROGRESSIVE CARE  Daily Treatment Note  NAME: Marleen Olsen  : 1945  MRN: [id number]    Date of Service: 2020    Discharge Recommendations:  Patient would benefit from continued therapy after discharge, Continue to assess pending progress        Assessment   Body structures, Functions, Activity limitations: Decreased functional mobility ; Decreased ADL status; Decreased strength;Decreased endurance;Decreased posture; Increased pain;Decreased balance  Treatment Diagnosis: Impaired functional mobility 2* anemia  Prognosis: Good  Barriers to Learning: none  REQUIRES PT FOLLOW UP: Yes  Activity Tolerance  Activity Tolerance: Patient limited by endurance     Patient Diagnosis(es): The primary encounter diagnosis was Symptomatic anemia. A diagnosis of Pneumonia due to organism was also pertinent to this visit. has a past medical history of A-fib ([address] Acquired hypothyroidism, Acute encephalopathy, Acute kidney injury ([address] Anxiety, Benign hypertension with CKD (chronic kidney disease) stage III, Chronic back pain, CKD (chronic kidney disease) stage 3, GFR 30-59 ml/min, Constipation, COPD (chronic obstructive pulmonary disease) ([address] Cough, Depression, ETOH abuse, Former smoker, HA (generalized anxiety disorder), History of GI bleed, Hyperlipidemia, Hypertension, Hypothyroid, Leg pain, Normocytic anemia, Osteoarthritis, PAD (peripheral artery disease) ([address] Primary osteoarthritis, Pulmonary hypertension ([address] Pure hypercholesterolemia, SIRS (systemic inflammatory response syndrome) ([address] Urge incontinence, and Wheezing. has a past surgical history that includes eye surgery (Bilateral); Colonoscopy; joint replacement; Upper gastrointestinal endoscopy (N/A, 2020); Colonoscopy (N/A, 2020); and Upper gastrointestinal endoscopy (N/A, 2020).     Restrictions  Restrictions/Precautions Restrictions/Precautions: General Precautions, Fall Risk(Fell last week after letting dogs out)  Required Braces or Orthoses?: No  Implants present? : Metal implants(L TKA)  Position Activity Restriction  Other position/activity restrictions: up as tolerated  Subjective   Subjective  Subjective: Pt in bed upon arrival, stated that she was not feeling well and that she was fatigued, pt finally agree following much encouragement. .  General Comment  Comments: Pt educated the importance of exercise and getting out of bed. Pain Assessment  Pain Assessment: 0-10  Pain Level: 0  Pain Type: Chronic pain  Pain Location: Head  Oxygen Therapy  SpO2: 94 %  Pulse Oximeter Device Mode: Intermittent  Pulse Oximeter Device Location: Finger  O2 Device: Nasal cannula  O2 Flow Rate (L/min): 2 L/min       Orientation     Cognition      Objective   Bed mobility  Rolling to Right: Stand by assistance  Supine to Sit: Stand by assistance  Sit to Supine: (Pt left up in chair following treatment.)  Scooting: Stand by assistance  Transfers  Sit to Stand: Contact guard assistance  Stand to sit: Contact guard assistance  Bed to Chair: Contact guard assistance;Stand by assistance  Comment: Pt report being very weak this a.m. Pt amb much better today with RW for balance.   Ambulation  Ambulation?: Yes  Ambulation 1  Surface: level tile  Device: Rolling Walker  Other Apparatus: O2  Assistance: Stand by assistance;Contact guard assistance(1 L)  Quality of Gait: slow karen, no LOB     Balance  Posture: Fair  Sitting - Static: Good;-  Sitting - Dynamic: Good;-  Standing - Static: Fair;+  Standing - Dynamic: Fair  Other exercises  Other exercises?: Yes  Other exercises 1: Ther ex both L/E seated x 10         Other Activities: Dangling protocol              G-Code     OutComes Score                                                     AM-PAC Score             Goals  Short term goals  Time Frame for Short term goals: pt to demo bed mobility Mod I. Short term goal 1: Pt to demo transfers Mod I. Short term goal 2: Pt to amb 75'-100' Mod I. Short term goal 3: pt to ascend/descend 1 threshold for home entry, SBA/CGA. Short term goal 4: pt to demo good technique for HEP with B LE strengthening by 1/2 MMG. Short term goal 5: Pt to tolerate 30-45 minute PT session with O2 sats WNL. Patient Goals   Patient goals : 4-5 days    Plan    Plan  Times per week: 5-6x/week  Specific instructions for Next Treatment: check Hgb, progress gait (SBQC vs walker), platform step, HEP, endurance activities  Current Treatment Recommendations: Strengthening, ROM, Balance Training, Functional Mobility Training, Transfer Training, Endurance Training, Gait Training, Stair training, Equipment Evaluation, Education, & procurement, Patient/Caregiver Education & Training, Safety Education & Training, Home Exercise Program  Safety Devices  Type of devices:  All fall risk precautions in place, Call light within reach, Gait belt, Patient at risk for falls, Nurse notified, Left in chair, Chair alarm in place     Therapy Time   Individual Concurrent Group Co-treatment   Time In 0827         Time Out 0851         Minutes Bebo Esteves, PTA   Electronically signed by Pierre Dockery PTA on 12/25/2020 at 11:13 AM

## 2020-12-25 NOTE — CARE COORDINATION
ONGOING DISCHARGE PLAN:    Reviewed patients chart regarding discharge plan and chart still confirms the plan is to discharge to [facility]  Patient has 8 more days left that our covered   Will not have a bed unitl 12/30/20     Will review plan with patient and or family      Will continue to follow for additional discharge needs.      Electronically signed by Greyson Solares RN on 12/25/2020 at 9:03 AM

## 2020-12-25 NOTE — PROGRESS NOTES
Pulmonary Progress Note  [facility]      Patient - Denise Renteria,  Age - 70 y.o.    - 1945      Room Number - 2097/2097-01   N -  [id number]   [facility]  Date of Admission -  2020  8:33 PM        Consulting [phone] Emma Connors MD  Primary Care Physician - Vee Duvall, APRN - CNP     SUBJECTIVE   Sitting in a chair looks comfortable    OBJECTIVE   VITALS    height is 5' 5\" (1.651 m) and weight is 184 lb 15.5 oz (83.9 kg). Her temperature is 97.7 °F (36.5 °C). Her blood pressure is 118/62 and her pulse is 74. Her respiration is 14 and oxygen saturation is 94%. Body mass index is 30.78 kg/m². Temperature Range: Temp: 97.7 °F (36.5 °C) Temp  Av °F (36.7 °C)  Min: 97.7 °F (36.5 °C)  Max: 98.2 °F (36.8 °C)  BP Range:  Systolic (65MND), PEN:700 , Min:111 , RGB:040     Diastolic (39QPK), PAB:20, Min:62, Max:77    Pulse Range: Pulse  Av  Min: 74  Max: 86  Respiration Range: Resp  Av  Min: 14  Max: 18  Current Pulse Ox[de-identified]  SpO2: 94 %  24HR Pulse Ox Range:  SpO2  Av.2 %  Min: 92 %  Max: 98 %  Oxygen Amount and Delivery: O2 Flow Rate (L/min): 2 L/min    Wt Readings from Last 3 Encounters:   20 184 lb 15.5 oz (83.9 kg)   20 187 lb 6.4 oz (85 kg)   20 188 lb (85.3 kg)       I/O (24 Hours)    Intake/Output Summary (Last 24 hours) at 2020 1412  Last data filed at 2020 0505  Gross per 24 hour   Intake    Output 500 ml   Net -500 ml       EXAM     General Appearance  Awake, alert, oriented, in no acute distress  HEENT - normocephalic, atraumatic.  []  Mallampati  [] Crowded airway   [] Macroglossia  []  Retrognathia  [] Micrognathia  []  Normal tongue size []  Normal Bite  [] Tita sign positive    Neck - Supple,  trachea midline   Lungs -bibasilar crackles  Cardiovascular - Heart sounds are normal.  Regular rate and rhythm Abdomen - Soft, nontender, nondistended, no masses or organomegaly  Neurologic - There are no focal motor or sensory deficits  Skin - No bruising or bleeding  Extremities - No clubbing, cyanosis, ++edema    MEDS      furosemide  40 mg Oral BID    lisinopril  2.5 mg Oral Lunch    metoprolol tartrate  25 mg Oral BID    guaiFENesin  1,200 mg Oral BID    fluticasone  2 spray Each Nostril Daily    sodium chloride flush  10 mL Intravenous 2 times per day    ferrous sulfate  325 mg Oral Daily    ascorbic acid  1,000 mg Oral Daily    atorvastatin  10 mg Oral Daily    dilTIAZem  120 mg Oral Daily    fluticasone  2 puff Inhalation BID    [Held by provider] apixaban  5 mg Oral BID    levothyroxine  125 mcg Oral Daily    pantoprazole  40 mg Oral QAM AC    DULoxetine  60 mg Oral Daily    tamsulosin  0.4 mg Oral Daily       ondansetron, sodium chloride flush, acetaminophen, albuterol, clonazePAM, baclofen, HYDROcodone-acetaminophen, potassium chloride **OR** potassium alternative oral replacement **OR** potassium chloride    LABS   CBC   Recent Labs     12/24/20  0812   WBC 7.4   HGB 9.7*   HCT 31.4*   MCV 96.8        BMP:   Lab Results   Component Value Date     12/25/2020    K 4.0 12/25/2020    CL 97 12/25/2020    CO2 31 12/25/2020    BUN 24 12/25/2020    LABALBU 3.8 12/24/2020    LABALBU 4.5 02/18/2012    CREATININE 1.43 12/25/2020    CALCIUM 9.7 12/25/2020    GFRAA 44 12/25/2020    LABGLOM 36 12/25/2020     ABGs:  Lab Results   Component Value Date    PHART 7.330 09/13/2018    PO2ART 74.8 09/13/2018    GKJ1PRO 47.3 09/13/2018      Lab Results   Component Value Date    MODE NOT REPORTED 09/13/2018     Ionized Calcium:  No results found for: IONCA  Magnesium:    Lab Results   Component Value Date    MG 2.2 12/24/2020     Phosphorus:    Lab Results   Component Value Date    PHOS 4.1 03/16/2020        LIVER PROFILE   Recent Labs     12/24/20  0812   AST 13   ALT 9   BILITOT 0.39   ALKPHOS 102

## 2020-12-25 NOTE — PROGRESS NOTES
[facility] CARDIOLOGY Progress Note    2020 10:09 AM      Subjective:  Ms. Gabi Crockett looks better. Apparently intermittently confused per nurses report. Patient denies any chest pain or worsening shortness of breath or palpitations or lightheadedness or dizziness. Review of systems:  No fever or chills. No headaches. Nurse updated overnight events. Reviewed prior entries by pulmonologist with plans to perform left thoracentesis and Eliquis is on hold for the procedure in preparation. LABS:     Recent Results (from the past 24 hour(s))   Basic Metabolic Prof    Collection Time: 20  5:44 AM   Result Value Ref Range    Glucose 92 70 - 99 mg/dL    BUN 24 (H) 8 - 23 mg/dL    CREATININE 1.43 (H) 0.50 - 0.90 mg/dL    Bun/Cre Ratio NOT REPORTED 9 - 20    Calcium 9.7 8.6 - 10.4 mg/dL    Sodium 141 135 - 144 mmol/L    Potassium 4.0 3.7 - 5.3 mmol/L    Chloride 97 (L) 98 - 107 mmol/L    CO2 31 20 - 31 mmol/L    Anion Gap 13 9 - 17 mmol/L    GFR Non-African American 36 (L) >60 mL/min    GFR  44 (L) >60 mL/min    GFR Comment          GFR Staging NOT REPORTED        Pulse Ox:  SpO2  Av.3 %  Min: 92 %  Max: 98 %    Supplemental O2: O2 Flow Rate (L/min): 2 L/min     Current Meds:    furosemide  40 mg Oral BID    lisinopril  2.5 mg Oral Lunch    metoprolol tartrate  25 mg Oral BID    guaiFENesin  1,200 mg Oral BID    fluticasone  2 spray Each Nostril Daily    sodium chloride flush  10 mL Intravenous 2 times per day    ferrous sulfate  325 mg Oral Daily    ascorbic acid  1,000 mg Oral Daily    atorvastatin  10 mg Oral Daily    dilTIAZem  120 mg Oral Daily    fluticasone  2 puff Inhalation BID    [Held by provider] apixaban  5 mg Oral BID    levothyroxine  125 mcg Oral Daily    pantoprazole  40 mg Oral QAM AC    DULoxetine  60 mg Oral Daily    tamsulosin  0.4 mg Oral Daily            VITAL SIGNS: /62   Pulse 74   Temp 97.7 °F (36.5 °C)   Resp 14   Ht 5' 5\" (1.651 m)   Wt 184 lb 15.5 oz (83.9 kg)   SpO2 92%   BMI 30.78 kg/m²  2 L/min      Admit Weight:  187 lb (84.8 kg)    Last 3 weights: Wt Readings from Last 3 Encounters:   12/25/20 184 lb 15.5 oz (83.9 kg)   12/18/20 187 lb 6.4 oz (85 kg)   12/14/20 188 lb (85.3 kg)       BMI: Body mass index is 30.78 kg/m². INPUT/OUTPUT:          Intake/Output Summary (Last 24 hours) at 12/25/2020 1009  Last data filed at 12/25/2020 0505  Gross per 24 hour   Intake    Output 500 ml   Net -500 ml         Telemetry shows atrial fibrillation. EXAM:     General appearance: awake, ? alert. Pleasant. Neck: No JVD or thyromegaly  Chest: Right side is clear and decreased breath sounds at left base posteriorly. No tenderness. No wheezing. Cardiac: Irregular rate and rhythm. No S3 gallop or rubs. Abdomen: soft, non-tender. Extremities: no cyanosis, no clubbing, no calf tenderness, no significant bilateral lower leg edema. Pulses: intact bilateral radial pulses. Skin:  warm and dry. Neuro:  Able to move all 4 extremities. CXR 12/24/2020:     Small-moderate left pleural effusion.  Underlying opacity may represent   atelectasis with pneumonia not excluded.      2 D ECHO July 2020:     Summary  Technically difficult study. Left ventricle is normal in size. Mild left ventricular hypertrophy. Global left ventricular systolic function is normal. Estimated LV EF >55 %. Left atrium is normal in size. Moderate tricuspid regurgitation. Mild pulmonary hypertension.  Estimated right ventricular systolic pressure  is [id number].     EKG 12/19/2020:     Atrial fibrillation  Low voltage QRS  Nonspecific ST and T wave abnormality  Abnormal ECG  When compared with ECG of 14-DEC-2020 13:24,  Nonspecific T wave abnormality, worse in Anterolateral leads       ASSESSMENT:

## 2020-12-26 ENCOUNTER — APPOINTMENT (OUTPATIENT)
Dept: ULTRASOUND IMAGING | Age: 75
DRG: 377 | End: 2020-12-26
Payer: MEDICARE

## 2020-12-26 ENCOUNTER — APPOINTMENT (OUTPATIENT)
Dept: GENERAL RADIOLOGY | Age: 75
DRG: 377 | End: 2020-12-26
Payer: MEDICARE

## 2020-12-26 LAB
ANION GAP SERPL CALCULATED.3IONS-SCNC: 9 MMOL/L (ref 9–17)
APPEARANCE FLUID: NORMAL
BASO FLUID: ABNORMAL %
BUN BLDV-MCNC: 31 MG/DL (ref 8–23)
BUN/CREAT BLD: ABNORMAL (ref 9–20)
CALCIUM SERPL-MCNC: 9.4 MG/DL (ref 8.6–10.4)
CHLORIDE BLD-SCNC: 98 MMOL/L (ref 98–107)
CO2: 36 MMOL/L (ref 20–31)
COLOR FLUID: NORMAL
CREAT SERPL-MCNC: 1.53 MG/DL (ref 0.5–0.9)
EOSINOPHIL FLUID: ABNORMAL %
FLUID DIFF COMMENT: ABNORMAL
GFR AFRICAN AMERICAN: 40 ML/MIN
GFR NON-AFRICAN AMERICAN: 33 ML/MIN
GFR SERPL CREATININE-BSD FRML MDRD: ABNORMAL ML/MIN/{1.73_M2}
GFR SERPL CREATININE-BSD FRML MDRD: ABNORMAL ML/MIN/{1.73_M2}
GLUCOSE BLD-MCNC: 129 MG/DL (ref 70–99)
GLUCOSE, FLUID: 143 MG/DL
LACTATE DEHYDROGENASE, FLUID: 70 U/L
LYMPHOCYTES, BODY FLUID: 91 %
MONOCYTE, FLUID: 7 %
NEUTROPHIL, FLUID: 2 %
OTHER CELLS FLUID: ABNORMAL %
PH FLUID: 8
POTASSIUM SERPL-SCNC: 3.7 MMOL/L (ref 3.7–5.3)
PROCALCITONIN: 0.19 NG/ML
RBC FLUID: 2450 /MM3
SODIUM BLD-SCNC: 143 MMOL/L (ref 135–144)
SPECIMEN TYPE: NORMAL
TOTAL PROTEIN, BODY FLUID: 1.7 G/DL
WBC FLUID: 400 /MM3

## 2020-12-26 PROCEDURE — 88112 CYTOPATH CELL ENHANCE TECH: CPT

## 2020-12-26 PROCEDURE — 6370000000 HC RX 637 (ALT 250 FOR IP): Performed by: INTERNAL MEDICINE

## 2020-12-26 PROCEDURE — 88104 CYTOPATH FL NONGYN SMEARS: CPT

## 2020-12-26 PROCEDURE — 97530 THERAPEUTIC ACTIVITIES: CPT

## 2020-12-26 PROCEDURE — 99232 SBSQ HOSP IP/OBS MODERATE 35: CPT | Performed by: FAMILY MEDICINE

## 2020-12-26 PROCEDURE — 84157 ASSAY OF PROTEIN OTHER: CPT

## 2020-12-26 PROCEDURE — 97166 OT EVAL MOD COMPLEX 45 MIN: CPT

## 2020-12-26 PROCEDURE — 88305 TISSUE EXAM BY PATHOLOGIST: CPT

## 2020-12-26 PROCEDURE — 84145 PROCALCITONIN (PCT): CPT

## 2020-12-26 PROCEDURE — 88108 CYTOPATH CONCENTRATE TECH: CPT

## 2020-12-26 PROCEDURE — 83986 ASSAY PH BODY FLUID NOS: CPT

## 2020-12-26 PROCEDURE — 87075 CULTR BACTERIA EXCEPT BLOOD: CPT

## 2020-12-26 PROCEDURE — 99232 SBSQ HOSP IP/OBS MODERATE 35: CPT | Performed by: INTERNAL MEDICINE

## 2020-12-26 PROCEDURE — 82945 GLUCOSE OTHER FLUID: CPT

## 2020-12-26 PROCEDURE — 87070 CULTURE OTHR SPECIMN AEROBIC: CPT

## 2020-12-26 PROCEDURE — 83615 LACTATE (LD) (LDH) ENZYME: CPT

## 2020-12-26 PROCEDURE — 1200000000 HC SEMI PRIVATE

## 2020-12-26 PROCEDURE — 36415 COLL VENOUS BLD VENIPUNCTURE: CPT

## 2020-12-26 PROCEDURE — 71045 X-RAY EXAM CHEST 1 VIEW: CPT

## 2020-12-26 PROCEDURE — 2580000003 HC RX 258: Performed by: INTERNAL MEDICINE

## 2020-12-26 PROCEDURE — 80048 BASIC METABOLIC PNL TOTAL CA: CPT

## 2020-12-26 PROCEDURE — 0W9B3ZZ DRAINAGE OF LEFT PLEURAL CAVITY, PERCUTANEOUS APPROACH: ICD-10-PCS | Performed by: INTERNAL MEDICINE

## 2020-12-26 PROCEDURE — 97535 SELF CARE MNGMENT TRAINING: CPT

## 2020-12-26 PROCEDURE — 6370000000 HC RX 637 (ALT 250 FOR IP): Performed by: FAMILY MEDICINE

## 2020-12-26 PROCEDURE — 87205 SMEAR GRAM STAIN: CPT

## 2020-12-26 PROCEDURE — 32555 ASPIRATE PLEURA W/ IMAGING: CPT

## 2020-12-26 RX ORDER — BUMETANIDE 1 MG/1
1 TABLET ORAL 2 TIMES DAILY
Status: DISCONTINUED | OUTPATIENT
Start: 2020-12-27 | End: 2020-12-29

## 2020-12-26 RX ADMIN — DULOXETINE HYDROCHLORIDE 60 MG: 60 CAPSULE, DELAYED RELEASE ORAL at 09:18

## 2020-12-26 RX ADMIN — METOPROLOL TARTRATE 25 MG: 25 TABLET, FILM COATED ORAL at 21:28

## 2020-12-26 RX ADMIN — ATORVASTATIN CALCIUM 10 MG: 10 TABLET, FILM COATED ORAL at 09:18

## 2020-12-26 RX ADMIN — Medication 10 ML: at 09:17

## 2020-12-26 RX ADMIN — LEVOTHYROXINE SODIUM 125 MCG: 125 TABLET ORAL at 06:02

## 2020-12-26 RX ADMIN — FLUTICASONE PROPIONATE 2 PUFF: 110 AEROSOL, METERED RESPIRATORY (INHALATION) at 09:17

## 2020-12-26 RX ADMIN — TAMSULOSIN HYDROCHLORIDE 0.4 MG: 0.4 CAPSULE ORAL at 09:18

## 2020-12-26 RX ADMIN — HYDROCODONE BITARTRATE AND ACETAMINOPHEN 1 TABLET: 5; 325 TABLET ORAL at 14:30

## 2020-12-26 RX ADMIN — APIXABAN 5 MG: 5 TABLET, FILM COATED ORAL at 21:29

## 2020-12-26 RX ADMIN — FLUTICASONE PROPIONATE 2 SPRAY: 50 SPRAY, METERED NASAL at 09:17

## 2020-12-26 RX ADMIN — OXYCODONE HYDROCHLORIDE AND ACETAMINOPHEN 1000 MG: 500 TABLET ORAL at 09:19

## 2020-12-26 RX ADMIN — FLUTICASONE PROPIONATE 2 PUFF: 110 AEROSOL, METERED RESPIRATORY (INHALATION) at 21:29

## 2020-12-26 RX ADMIN — DILTIAZEM HYDROCHLORIDE 120 MG: 120 CAPSULE, COATED, EXTENDED RELEASE ORAL at 09:19

## 2020-12-26 RX ADMIN — FUROSEMIDE 40 MG: 40 TABLET ORAL at 09:19

## 2020-12-26 RX ADMIN — GUAIFENESIN 1200 MG: 600 TABLET, EXTENDED RELEASE ORAL at 21:29

## 2020-12-26 RX ADMIN — FERROUS SULFATE TAB 325 MG (65 MG ELEMENTAL FE) 325 MG: 325 (65 FE) TAB at 09:18

## 2020-12-26 RX ADMIN — Medication 10 ML: at 21:29

## 2020-12-26 RX ADMIN — GUAIFENESIN 1200 MG: 600 TABLET, EXTENDED RELEASE ORAL at 09:17

## 2020-12-26 RX ADMIN — FUROSEMIDE 40 MG: 40 TABLET ORAL at 18:53

## 2020-12-26 RX ADMIN — PANTOPRAZOLE SODIUM 40 MG: 40 TABLET, DELAYED RELEASE ORAL at 06:02

## 2020-12-26 ASSESSMENT — PAIN SCALES - GENERAL
PAINLEVEL_OUTOF10: 8
PAINLEVEL_OUTOF10: 3

## 2020-12-26 NOTE — CONSULTS
NEPHROLOGY CONSULT       Reason for Consult: Acute kidney injury      Chief Complaint: Shortness of breath. History Obtained From: Patient and EHR records. History of Present Illness: This is a 70 y.o. female with history of hypertension, COPD, atrial fibrillation on eliquis, CHF with diastolic heart failure, who presented to the ER on 12/18/20 with progressive shortness of breath. Patient was recently admitted for GI bleed with severe anemia. Patient  has had increasing shortness of breath and fatigue and was told to go to the ER by her PCP due to a low  Hemoglobin of 6. 6. She received 2 units of packed red blood cells and Eliquis was placed on hold. She  had EGD on 12/23/20  showing 6 AVMs in jejunum which were cauterised. She has been diuresed with IV Lasix and switched to oral Lasix 40 mg twice daily. Chest x-ray did  show a left pleural effusion and she had left thoracentesis with 525 cc of fluid removed from the left chest.. Her creatinine has gradually risen from 1.1 mg/dl on 12/22/20 to 1.5 mg/dl today. Patient denies dysuria, gross hematuria, flank pain, nocturia, urgency, passing frothy urine or urinary incontinence. There has been no recent exposure to IV contrast.         Past Medical History:        Diagnosis Date    [facility]fib [facility]     Acquired hypothyroidism     Acute encephalopathy     Acute kidney injury ([facility]     Anxiety     Benign hypertension with CKD (chronic kidney disease) stage III     Chronic back pain     CKD (chronic kidney disease) stage 3, GFR 30-59 ml/min     Constipation     [facility]     Cough     Depression     ETOH abuse     Former smoker     3 packs a day for 26 years    HA (generalized anxiety disorder)     History of GI bleed     Hyperlipidemia     Hypertension     Hypothyroid 1/18/2013    Leg pain     Normocytic anemia     Osteoarthritis     PAD (peripheral artery disease) ([facility]     Primary osteoarthritis  Pulmonary hypertension (HCC)     Pure hypercholesterolemia     SIRS (systemic inflammatory response syndrome) (HCC)     Urge incontinence     Wheezing        Past Surgical History:        Procedure Laterality Date    COLONOSCOPY      COLONOSCOPY N/A 12/2/2020    COLONOSCOPY CONTROL OF BLEEDING performed by Kasia Merino MD at [address] [place] [address] Bilateral     cataracts    JOINT REPLACEMENT      Left knee on 9-11-18    THORACENTESIS  12/26/2020         UPPER GASTROINTESTINAL ENDOSCOPY N/A 12/2/2020    EGD performed by Kasia Merino MD at [address] [place] [address] 12/23/2020    PUSH ENTEROSCOPY/EGD CONTROL BLEEDING performed [facility]ino MD at [facility]       Current Medications:        furosemide (LASIX) tablet 40 mg, BID      lisinopril (PRINIVIL;ZESTRIL) tablet 2.5 mg, Lunch      metoprolol tartrate (LOPRESSOR) tablet 25 mg, BID      ondansetron (ZOFRAN) injection 4 mg, Q6H PRN      guaiFENesin (MUCINEX) extended release tablet 1,200 mg, BID      fluticasone (FLONASE) 50 MCG/ACT nasal spray 2 spray, Daily      sodium chloride flush 0.9 % injection 10 mL, 2 times per day      sodium chloride flush 0.9 % injection 10 mL, PRN      acetaminophen (TYLENOL) tablet 650 mg, Q4H PRN      ferrous sulfate (IRON 325) tablet 325 mg, Daily      ascorbic acid (VITAMIN C) tablet 1,000 mg, Daily      atorvastatin (LIPITOR) tablet 10 mg, Daily      dilTIAZem (CARDIZEM CD) extended release capsule 120 mg, Daily      albuterol (PROVENTIL) nebulizer solution 2.5 mg, Q6H PRN      clonazePAM (KLONOPIN) tablet 0.5 mg, TID PRN      fluticasone (FLOVENT HFA) 110 MCG/ACT inhaler 2 puff, BID      baclofen (LIORESAL) tablet 10 mg, TID PRN      HYDROcodone-acetaminophen (NORCO) 5-325 MG per tablet 1 tablet, Q8H PRN      apixaban (ELIQUIS) tablet 5 mg, BID      levothyroxine (SYNTHROID) tablet 125 mcg, Daily      pantoprazole (PROTONIX) tablet 40 mg, [facility]   DULoxetine (CYMBALTA) extended release capsule 60 mg, Daily      tamsulosin (FLOMAX) capsule 0.4 mg, Daily      potassium chloride (KLOR-CON M) extended release tablet 40 mEq, PRN    Or      potassium bicarb-citric acid (EFFER-K) effervescent tablet 40 mEq, PRN    Or      potassium chloride 10 mEq/100 mL IVPB (Peripheral Line), PRN        Allergies:  Tizanidine    Social History:   Social History     Socioeconomic History    Marital status:      Spouse name: Not on file    Number of children: Not on file    Years of education: Not on file    Highest education level: Not on file   Occupational History    Not on file   Social Needs    Financial resource strain: Not on file    Food insecurity     Worry: Not on file     Inability: Not on file   [employer] needs     Medical: Not on file     Non-medical: Not on file   Tobacco Use    Smoking status: Former Smoker     Packs/day: 3.00     Years: 26.00     Pack years: 78.00     Types: Cigarettes    Smokeless tobacco: Never Used   Substance and Sexual Activity    Alcohol use: Yes     Comment: occ    Drug use: No    Sexual activity: Not on file   Lifestyle    Physical activity     Days per week: Not on file     Minutes per session: Not on file    Stress: Not on file   Relationships    Social connections     Talks on phone: Not on file     Gets together: Not on file     Attends Yazidi service: Not on file     Active member of club or organization: Not on file     Attends meetings of clubs or organizations: Not on file     Relationship status: Not on file    Intimate partner violence     Fear of current or ex partner: Not on file     Emotionally abused: Not on file     Physically abused: Not on file     Forced sexual activity: Not on file   Other Topics Concern    Not on file   Social History Narrative    Not on file       Family History:   Family History   Problem Relation Age of Onset    Heart Disease Mother     COPD Mother LUNGS: rales +, diminished breath sounds. ABD-Soft non distended, BS+ Non tender no organomegally  BACK: Examination of the spine reveals  no spinal deformity, without tenderness,   MUSKULOSKELETAL: Adequately aligned spine. No joint erythema or tenderness. EXTREMITIES: 2 + lower extremity  edema. Peripheral pulses intact. NEURO:Alert oriented x 3 ,power 5/5 in all extremities      Labs:   CBC:  Recent Labs     12/24/20  0812   WBC 7.4   RBC 3.24*   HGB 9.7*   HCT 31.4*   MCV 96.8   MCH 30.0   MCHC 31.0   RDW 18.1*      MPV 7.8      BMP:   Recent Labs     12/24/20  0812 12/25/20  0544 12/26/20  0535    141 143   K 3.7 4.0 3.7   CL 97* 97* 98   CO2 36* 31 36*   BUN 17 24* 31*   CREATININE 1.34* 1.43* 1.53*   GLUCOSE 131* 92 129*   CALCIUM 9.6 9.7 9.4        Phosphorus:  No results for input(s): PHOS in the last 72 hours. Magnesium:   Recent Labs     12/24/20  0812   MG 2.2     Albumin:   Recent Labs     12/24/20  0812   LABALBU 3.8       IRON:    Lab Results   Component Value Date    IRON 39 12/20/2020     Iron Saturation:  No components found for: PERCENTFE  TIBC:    Lab Results   Component Value Date    TIBC 300 12/20/2020     FERRITIN:    Lab Results   Component Value Date    FERRITIN 37 12/18/2020     SPEP:   Lab Results   Component Value Date    PROT 6.8 12/24/2020    ALBCAL 4.1 03/16/2020    ALBPCT 62 03/16/2020    LABALPH 0.3 03/16/2020    LABALPH 0.8 03/16/2020    A1PCT 4 03/16/2020    A2PCT 12 03/16/2020    LABBETA 0.7 03/16/2020    BETAPCT 10 03/16/2020    GAMGLOB 0.8 03/16/2020    GGPCT 12 03/16/2020    PATH ELECTRONICALLY SIGNED. Samantha Aragon M.D. 03/16/2020     UPEP: No results found for: TPU     C3:   Lab Results   Component Value Date    C3 128 03/16/2020     C4:   Lab Results   Component Value Date    C4 36 03/16/2020     MPO ANCA:  No results found for: MPO .   PR3 ANCA:  No results found for: PR3  Urine Sodium:    Lab Results   Component Value Date    ANGÉLICA 27 09/10/2020 Urine Potassium:    Lab Results   Component Value Date    KUR 35.7 07/20/2020     Urine Chloride:  No components found for: CLU  Urine Ph:  No components found for: PO4U  Urine Osmolarity:  No results found for: OSMOU  Urine Creatinine:    Lab Results   Component Value Date    LABCREA 168.2 09/10/2020     Urine Eosinophils: No components found for: EOSU  Urine Protein:  No results found for: TPU  Urinalysis:  U/A:   Lab Results   Component Value Date    NITRU NEGATIVE 09/10/2020    COLORU YELLOW 09/10/2020    PHUR 5.5 09/10/2020    WBCUA 2 TO 5 09/10/2020    RBCUA 0 TO 2 09/10/2020    MUCUS NOT REPORTED 09/10/2020    TRICHOMONAS NOT REPORTED 09/10/2020    YEAST NOT REPORTED 09/10/2020    BACTERIA FEW 09/10/2020    CLARITYU Clear 01/31/2020    SPECGRAV 1.021 09/10/2020    LEUKOCYTESUR SMALL 09/10/2020    UROBILINOGEN Normal 09/10/2020    BILIRUBINUR  09/10/2020     Presumptive positive. Unable to confirm due to unavailability of reagent. BILIRUBINUR 0 01/31/2020    BLOODU Negative 01/31/2020    GLUCOSEU NEGATIVE 09/10/2020    KETUA NEGATIVE 09/10/2020    AMORPHOUS 2+ 09/10/2020         Radiology:  Reviewed as available. Assessment:  1. Acute kidney injury secondary to prerenal azotemia from diuretics, recent anemia  and transient hypotension. 2. CKD stage 3.secondary to nephrosclerosis Baseline creatinine 1.1 to 1.3 mg/dL. 3. Acute on chronic systolic heart failure with diastolic dysfunction    4. Bilateral lower  extremity edema     5. Acute anemia -stable s/p PRBC x2 -Hb 9.7 gm/dl today. 6.Left pleural effusion status post thoracentesis, likely transudative       Plan:  1. We will switch Lasix to Bumex 1 mg twice daily   2. 1500 mils fluid restriction with 2 g sodium diet daily. 3. Patient still requires more diuresis and we may need to accept a higher baseline creatinine to achieve euvolemia  4. Check urine protein creatinine  5. Continue low dose lisinopril 2.5 mg dly  6. Keep SBP> 100 mg. 7.BMP daily    Thank you for the consultation.       Electronically signed by Roxana Staley MD on 12/26/2020 at 4:28 PM

## 2020-12-26 NOTE — PLAN OF CARE
Problem: Falls - Risk of:  Goal: Will remain free from falls  Description: Will remain free from falls  Outcome: Ongoing  Goal: Absence of physical injury  Description: Absence of physical injury  Outcome: Ongoing     Problem: Bleeding:  Goal: Will show no signs and symptoms of excessive bleeding  Description: Will show no signs and symptoms of excessive bleeding  Outcome: Ongoing     Problem: Pain:  Goal: Pain level will decrease  Description: Pain level will decrease  Outcome: Ongoing  Goal: Control of acute pain  Description: Control of acute pain  Outcome: Ongoing  Goal: Control of chronic pain  Description: Control of chronic pain  Outcome: Ongoing     Problem: Musculor/Skeletal Functional Status  Goal: Highest potential functional level  Outcome: Ongoing  Goal: Absence of falls  Outcome: Ongoing

## 2020-12-26 NOTE — CARE COORDINATION
ONGOING DISCHARGE PLAN:    Spoke with patient regarding discharge plan and patient confirms that plan is still to have LSW continue to follow for DC to SNF. Per Previous Notes, Pt has 8 more days that would be covered at 100%. Pt. Really wants to go home & continue w/ [facility], [facility]. PT/OT on board, will follow rec. Cr today 1.53, Bun, 31. Nephro to see. Pt. Had Thoracentesis today, with 525 ML taken off. Pulmonary on board. Pt continues on 40 Oral Lasix, Bid. Will continue to follow for additional discharge needs.     Electronically signed by Eliza Fabian RN on 12/26/2020 at 3:52 PM

## 2020-12-26 NOTE — PROGRESS NOTES
FAMILY MEDICINE  - PROGRESS NOTE    Date:  12/26/2020  Nitin Obrien  [id number]      Chief Complaint   Patient presents with    Abnormal Lab     Patient comes to the ER with a hemoglobin of 6.6, per her physician, to get a blood transfusion.  Shortness of Breath     Dx with pneumonia today by her physician after having a chest x-ray Thursday. Has not started her antibiotics. Says the drug store has yet to deliver it. Interval History:  not changed much, she is sleeping soundly. Nursing reports confusion but the patient answers questions appropriately but is very drowsy. I spoke with her son Nirmala Berry and he is concerned that she might be taking her home supply of Norco in addition to what she receives as an inpatient.       Subjective  Constitutional: positive for fatigue  Respiratory: positive for emphysema and shortness of breath  Cardiovascular: positive for irregular heart beat  Musculoskeletal:positive for arthralgias, myalgias and stiff joints  Behavioral/Psych: positive for anxiety and depression:    Objective:    BP (!) 103/53   Pulse 79   Temp 97.7 °F (36.5 °C) (Axillary)   Resp 18   Ht 5' 5\" (1.651 m)   Wt 185 lb 10 oz (84.2 kg)   SpO2 94%   BMI 30.89 kg/m²   General appearance - overweight  Mental status - drowsy  Eyes - not examined  Ears - bilateral TM's and external ear canals normal  Nose - normal and patent, no erythema, discharge or polyps  Mouth - mucous membranes moist, pharynx normal without lesions  Neck - supple, no significant adenopathy  Lymphatics - no palpable lymphadenopathy, no hepatosplenomegaly  Chest - clear to auscultation, no wheezes, rales or rhonchi, symmetric air entry, decreased air entry noted posteriorly  Heart - irregularly irregular rhythm with rate 79  Abdomen - soft, nontender, nondistended, no masses or organomegaly  Breasts - not examined  Back exam - not examined Neurological - neck supple without rigidity  Musculoskeletal - osteoarthritic changes noted in both hands  Extremities - peripheral pulses normal, no pedal edema, no clubbing or cyanosis  Skin - normal coloration and turgor, no rashes, no suspicious skin lesions noted    Data:   Medications:   Current Facility-Administered Medications   Medication Dose Route Frequency Provider Last Rate Last Admin    furosemide (LASIX) tablet 40 mg  40 mg Oral BID Ryan Parisi MD   40 mg at 12/25/20 1826    lisinopril (PRINIVIL;ZESTRIL) tablet 2.5 mg  2.5 mg Oral Lunch Chuck DANISH Parisi MD   2.5 mg at 12/25/20 1207    metoprolol tartrate (LOPRESSOR) tablet 25 mg  25 mg Oral BID Dwayne Clemente MD   25 mg at 12/25/20 2209    ondansetron (ZOFRAN) injection 4 mg  4 mg Intravenous Q6H PRN Dwayne Clemente MD   4 mg at 12/22/20 1044    guaiFENesin (MUCINEX) extended release tablet 1,200 mg  1,200 mg Oral BID Zuleyma Pfeiffer MD   1,200 mg at 12/25/20 2209    fluticasone (FLONASE) 50 MCG/ACT nasal spray 2 spray  2 spray Each Nostril Daily Dwayne Clemente MD   2 spray at 12/25/20 1031    sodium chloride flush 0.9 % injection 10 mL  10 mL Intravenous 2 times per day Zuleyma Pfeiffer MD   10 mL at 12/25/20 2210    sodium chloride flush 0.9 % injection 10 mL  10 mL Intravenous PRN Dwayne Clemente MD        acetaminophen (TYLENOL) tablet 650 mg  650 mg Oral Q4H PRN Dwayne Clemente MD   650 mg at 12/22/20 2106    ferrous sulfate (IRON 325) tablet 325 mg  325 mg Oral Daily Dwayne Clemente MD   325 mg at 12/25/20 1028    ascorbic acid (VITAMIN C) tablet 1,000 mg  1,000 mg Oral Daily Dwayne Clemente MD   1,000 mg at 12/25/20 1027    atorvastatin (LIPITOR) tablet 10 mg  10 mg Oral Daily Dwayne Clemente MD   10 mg at 12/25/20 1027    dilTIAZem (CARDIZEM CD) extended release capsule 120 mg  120 mg Oral Daily Dwayne Clemente MD   120 mg at 12/25/20 1020  albuterol (PROVENTIL) nebulizer solution 2.5 mg  2.5 mg Nebulization Q6H PRN Dwayne Clemente MD   2.5 mg at 12/24/20 1110    clonazePAM (KLONOPIN) tablet 0.5 mg  0.5 mg Oral TID PRN Jason Goldsmith MD   0.5 mg at 12/25/20 1827    fluticasone (FLOVENT HFA) 110 MCG/ACT inhaler 2 puff  2 puff Inhalation BID Jason Goldsmith MD   2 puff at 12/25/20 2209    baclofen (LIORESAL) tablet 10 mg  10 mg Oral TID PRN Jason Goldsmith MD   10 mg at 12/20/20 2118    HYDROcodone-acetaminophen (Kaylah Lars) 5-325 MG per tablet 1 tablet  1 tablet Oral Q8H PRN Jason Goldsmith MD   1 tablet at 12/21/20 2336    [Held by provider] apixaban (ELIQUIS) tablet 5 mg  5 mg Oral BID Jennifer Rubio MD   5 mg at 12/24/20 0801    levothyroxine (SYNTHROID) tablet 125 mcg  125 mcg Oral Daily Jason Goldsmith MD   125 mcg at 12/26/20 0602    pantoprazole (PROTONIX) tablet 40 mg  40 mg Oral QAM AC Jason Goldsmith MD   40 mg at 12/26/20 0602    DULoxetine (CYMBALTA) extended release capsule 60 mg  60 mg Oral Daily Jason Goldsmith MD   60 mg at 12/25/20 1027    tamsulosin (FLOMAX) capsule 0.4 mg  0.4 mg Oral Daily Jason Goldsmith MD   0.4 mg at 12/25/20 1207    potassium chloride (KLOR-CON M) extended release tablet 40 mEq  40 mEq Oral PRN Jason Goldsmith MD        Or    potassium bicarb-citric acid (EFFER-K) effervescent tablet 40 mEq  40 mEq Oral PRN Jason Goldsmith MD        Or    potassium chloride 10 mEq/100 mL IVPB (Peripheral Line)  10 mEq Intravenous PRN Jason Goldsmith MD           Intake/Output Summary (Last 24 hours) at 12/26/2020 0807  Last data filed at 12/26/2020 0100  Gross per 24 hour   Intake 10 ml   Output 450 ml   Net -440 ml     Recent Results (from the past 24 hour(s))   Basic Metabolic Prof    Collection Time: 12/26/20  5:35 AM   Result Value Ref Range    Glucose 129 (H) 70 - 99 mg/dL    BUN 31 (H) 8 - 23 mg/dL    CREATININE 1.53 (H) 0.50 - 0.90 mg/dL    Bun/Cre Ratio NOT REPORTED 9 - 20    Calcium 9.4 8.6 - 10.4 mg/dL Sodium 143 135 - 144 mmol/L    Potassium 3.7 3.7 - 5.3 mmol/L    Chloride 98 98 - 107 mmol/L    CO2 36 (H) 20 - 31 mmol/L    Anion Gap 9 9 - 17 mmol/L    GFR Non-African American 33 (L) >60 mL/min    GFR African American 40 (L) >60 mL/min    GFR Comment          GFR Staging NOT REPORTED      -----------------------------------------------------------------  RAD:  EKG:  Micro:     Assessment & Plan:    Patient Active Problem List:     Acquired hypothyroidism     Hypertension     Primary osteoarthritis of right knee     A-fib (HCC)     Acute encephalopathy     SIRS (systemic inflammatory response syndrome) (HCC)     Normocytic anemia     Pulmonary hypertension (HCC)     COPD (chronic obstructive pulmonary disease) (HCC)     History of GI bleed     Constipation     HA (generalized anxiety disorder)     Lumbar radiculopathy     Lumbosacral spondylosis without myelopathy     Benign hypertension with CKD (chronic kidney disease) stage III     CKD (chronic kidney disease) stage 3, GFR 30-59 ml/min     Alcohol withdrawal syndrome with complication (HCC)     Moderate major depression (HCC)     PAD (peripheral artery disease) (HCC)     Acute kidney injury (HCC)     Obesity, Class I, BMI 30-34.9     Acute on chronic diastolic CHF (congestive heart failure) (HCC)     GI bleed     Weight loss     Elevated alkaline phosphatase level     Thrombocytosis (HCC)     Iron deficiency anemia due to chronic blood loss     Iron malabsorption     Symptomatic anemia     Pneumonia due to organism           Plan:  -Symptomatic anemia - Hem/Onc following. -GI bleed - AVM on EGD, no active bleeding.  -Left pleural effusion - Pulmonology planning for a thoracentesis, Eliquis on hold. -SUNSHINE - CR rising, Renal consulted.  -Acute on chronic diastolic CHF - Cardiology following.  -Continue current treatments.  -I will speak with nursing about the patient's personal belongings.  -Complete orders per chart.     See orders   Disposition: Electronically signed by Catie Michele MD on 12/26/2020 at 8:07 AM

## 2020-12-26 NOTE — PROCEDURES
Thoracentesis Procedure Note    Pre-operative Diagnosis: Pleural Effusion, left     Post-operative Diagnosis: Same    Indications: Dyspnea on exertion    Procedure Details:  Informed consent was obtained. The risks of the procedure were discussed including: infection, bleeding, pain, pneumothorax and failure to remove fluid at all. Time out was done in the usual fashion. Under sterile conditions the patient was properly positioned. A standard kit was used with Chorhexidine solution and sterile drapes. The entry site had been previously identified by ultrasound and marked. 1 % Lidocaine was used to anesthetize the rib space at the entry site. A pleural catheter  was inserted into the left pleural space. Fluid was removed through the catheter connected to a vacutainer bottle without  difficulty. After the fluid was drained to completion, the catheter was removed and pressure held over the entry site. A Band-aid was applied to the wound and the procedure completed. CXR was ordered at the end of procedure. Findings: 525 ml of iced tea colored pleural fluid was removed from the left  chest.     Complications:  None; patient tolerated the procedure well. Condition: Stable    Plan: Fluid will be sent to the lab for the following analysis:   LDH, Protein, Glucose, Cell count, Differential, Cytology, as well as for infection analysis (Gram stain, Bacterial culture). A follow up chest x-ray was ordered. Attending Attestation: I was present and perfromed the entire procedure.     Electronically signed by Dave Grigsby MD on 12/26/2020 at 2:42 PM
Postprocedural chest x-ray showed good position of the catheter with no evidence of hemothorax or pneumothorax. The patient tolerated the procedure well with no immediate complication evident.      Lillie Mayers MD  12:03 AM, 12/19/20

## 2020-12-26 NOTE — FLOWSHEET NOTE
Writer spoke with Lorenza Arora who is concerned about the intermittent confusion with his mom.   Note left on chart for Dr. Kaden Munoz to call him for updates

## 2020-12-26 NOTE — PROGRESS NOTES
Answering service for Dr. Sallie Rutledge will pass the message on to him that care is being taken over by the pt's current cardiologist; Dr. Kamilah Pearce.

## 2020-12-26 NOTE — PROGRESS NOTES
[address] [facility]    Occupational Therapy Evaluation  Date: 20  Patient Name: Stephie Mcfarlane       Room:   MRN: [id number]  Account: [de-identified]   : 1945  (80 y.o.) Gender: female     Discharge Recommendations:  Further Occupational Therapy is recommended upon facility discharge. Diagnosis: Symptomatic anemia  Additional Pertinent Hx: 72-year-old female presents with complaint of shortness of breath as well as abnormal lab values. Was recently admitted for a GI bleed, patient states she followed up with her hematologist this week and had lab work drawn. Patient states she was called by her PCP today and was told that her hemoglobin is below and she needed to go to the ED. Patient states that over the last couple days she is also been feeling increasingly short of breath. Patient states that the shortness of breath is worse with exertion. Patient states she is wearing 2 L of oxygen at home normally and had to go up to 3 recently. Patient states she was recently diagnosed with pneumonia by her PCP as well. Treatment Diagnosis: impaired self care status  Past Medical History:  has a past medical history of A-fib ([address] Acquired hypothyroidism, Acute encephalopathy, Acute kidney injury ([address] Anxiety, Benign hypertension with CKD (chronic kidney disease) stage III, Chronic back pain, CKD (chronic kidney disease) stage 3, GFR 30-59 ml/min, Constipation, COPD (chronic obstructive pulmonary disease) ([address] Cough, Depression, ETOH abuse, Former smoker, HA (generalized anxiety disorder), History of GI bleed, Hyperlipidemia, Hypertension, Hypothyroid, Leg pain, Normocytic anemia, Osteoarthritis, PAD (peripheral artery disease) ([address] Primary osteoarthritis, Pulmonary hypertension ([address] Pure hypercholesterolemia, SIRS (systemic inflammatory response syndrome) ([address] Urge incontinence, and Wheezing. Past Surgical History:   has a past surgical history that includes eye surgery (Bilateral); Colonoscopy; joint replacement; Upper gastrointestinal endoscopy (N/A, 12/2/2020); Colonoscopy (N/A, 12/2/2020); and Upper gastrointestinal endoscopy (N/A, 12/23/2020). Restrictions  Restrictions/Precautions: General Precautions, Fall Risk(Fell last week after letting dogs out)  Implants present? : Metal implants(L TKA)  Other position/activity restrictions: up as tolerated  Required Braces or Orthoses?: No       Subjective  Comments: WOODY peña'isa OT Evaluation this AM. Ok's up to chair with chair alarm, patient with decreased alertness/orientation initially, able to participate.   Overall Orientation Status: Impaired  Orientation Level: Oriented to person, Oriented to place, Disoriented to person, Disoriented to situation(able to state first/last name but not birthdate; unable to state current date despite cues)  Vision  Vision: Impaired  Vision Exceptions: Wears glasses at all times  Hearing  Hearing: Within functional limits  Social/Functional History  Lives With: Alone(w/ 2 dogs)  Type of Home: House  Home Layout: One level  Home Access: Stairs to enter with rails  Entrance Stairs - Number of Steps: 1 small threshold  Entrance Stairs - Rails: Left(grab bar)  Bathroom Shower/Tub: Tub/Shower unit, Curtain  Bathroom Toilet: Standard  Bathroom Equipment: Shower chair, Grab bars in shower, Grab bars around toilet  Bathroom Accessibility: Accessible  Home Equipment: Oxygen, Quad cane, Rolling walker, 4 wheeled walker, Hospital bed  ADL Assistance: Independent  Homemaking Assistance: Independent  Homemaking Responsibilities: Yes  Ambulation Assistance: Independent(uses SBQC - walker too large for home)  Transfer Assistance: Independent  Active : Yes  Mode of Transportation: [facility]  Occupation: Retired  Type of occupation: customer service,   IADL Comments: sleeps in chair due to back Additional Comments: Son lives 3 blocks away - works at [employer] full time first shift. Daughter in law works in [employer]. 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 Pt reports [employer] working on getting her an aide to assist with cleaning. Objective      Cognition  Overall Cognitive Status: Exceptions  Arousal/Alertness: Delayed responses to stimuli, Inconsistent responses to stimuli(clearing up as session progresses)  Following Commands: Follows one step commands with repetition, Follows multistep commands with increased time  Attention Span: Attends with cues to redirect, Difficulty dividing attention  Memory: Decreased recall of recent events  Safety Judgement: Decreased awareness of need for safety, Decreased awareness of need for assistance  Insights: Decreased awareness of deficits  Initiation: Requires cues for some  Sequencing: Requires cues for some   Sensation  Overall Sensation Status: Impaired   ADL  Feeding: Minimal assistance(RN feeding patient pills in applesauce)  Grooming: Minimal assistance  UE Bathing: Minimal assistance  LE Bathing: Moderate assistance  UE Dressing: Minimal assistance  LE Dressing: Moderate assistance(donning brief, assist to thread and pull up over hips; able to don socks seated edge of bed (slight assist for minor adjustments))  Toileting: Minimal assistance(assist with transfer, able to perform seated hygiene, able to pull up brief with Min A)  Additional Comments: Patient able to don hospital footies with slight assist for minor adjustments, performed toileting with bedside commode- assist donning new pull up brief. Other assist levels based on clinical observation/reasoning.     UE Function           LUE Strength  Gross LUE Strength: WFL  LUE Strength Comment: grossly 4+/5     LUE Tone: Normotonic     LUE AROM (degrees)  LUE AROM : WFL     Left Hand AROM (degrees)  Left Hand AROM: WFL  RUE Strength  Gross RUE Strength: WFL  RUE Strength Comment: grossly 4+/5 RUE Tone: Normotonic     RUE AROM (degrees)  RUE AROM : WFL     Right Hand AROM (degrees)  Right Hand AROM: WFL    Fine Motor Skills  Coordination  Movements Are Fluid And Coordinated: Yes  Coordination and Movement description: Decreased speed                           Mobility  Supine to Sit: Stand by assistance  Sit to Supine: Unable to assess       Balance  Sitting Balance: Stand by assistance(seated edge of bed)  Standing Balance: Contact guard assistance  Standing Balance  Time: 1 minute  Activity: standing for toileting tasks  Comment: UE support on walker / CGA  Functional Mobility  Assist Level: Minimal assistance  Functional Mobility Comments: few steps from edge of bed > bedside commode > recliner chair with rolling walker - assist for rolling walker management, slow pace  Bed mobility  Supine to Sit: Stand by assistance  Sit to Supine: Unable to assess  Scooting: Stand by assistance  Comment: head of bed elevated, remained in recliner chair with chair alarm set at end of session     Transfers  Stand Step Transfers: Contact guard assistance  Sit to stand: Contact guard assistance  Stand to sit: Contact guard assistance  Transfer Comments: Good hand placement on walker  Toilet Transfers  Toilet - Technique: Ambulating(few steps from edge of bed > bedside commode > recliner chair)  Equipment Used: Standard bedside commode  Toilet Transfer: Contact guard assistance      Assessment  Performance deficits / Impairments: Decreased functional mobility , Decreased ADL status, Decreased strength, Decreased endurance, Decreased cognition, Decreased balance, Decreased safe awareness  Assessment: Patient requires assistance for self care and mobility at this time. Patient is a high fall risk due to decreased cognition/orientation this date. Patient is not safe to return to previous living arrangement.  Patient to benefit from continued OT during hospital stay as well as after discharge Treatment Diagnosis: impaired self care status  Prognosis: Good  Decision Making: Medium Complexity  REQUIRES OT FOLLOW UP: Yes  Activity Tolerance: Patient Tolerated treatment well, Treatment limited secondary to decreased cognition         Functional Outcome Measures  AM-PAC Daily Activity Inpatient   How much help for putting on and taking off regular lower body clothing?: A Lot  How much help for Bathing?: A Lot  How much help for Toileting?: A Little  How much help for putting on and taking off regular upper body clothing?: A Little  How much help for taking care of personal grooming?: A Little  How much help for eating meals?: A Little  AM-[facility] Inpatient Daily Activity Raw Score: 16  AM-PAC Inpatient ADL T-Scale Score : 35.96  ADL Inpatient CMS 0-100% Score: 53.32  ADL Inpatient CMS G-Code Modifier : CK       Goals  Short term goals  Time Frame for Short term goals: by discharge, patient will  Short term goal 1: perform functional transfers/mobility during ADL routine with SBA using rolling walker with Good safety  Short term goal 2: increase standing tolerance to 5-7 minutes with UE support as needed and SBA during functional task  Short term goal 3: perform toileting routine with SBA  Short term goal 4: perform basic ADL tasks with SBA  Short term goal 5: actively participate in 15+ minutes of therapeutic exercise/activity to promote increased safety and independence with self care and mobility tasks    Plan  Safety Devices  Safety Devices in place: Yes  Type of devices: Call light within reach, Chair alarm in place, Left in chair, Patient at risk for falls, Gait belt, Nurse notified     Plan  Times per week: 4-5  Current Treatment Recommendations: Strengthening, Endurance Training, Patient/Caregiver Education & Training, Equipment Evaluation, Education, & procurement, Cognitive Reorientation, Balance Training, Functional Mobility Training, Safety Education & Training  OT Education OT Education: OT Role, Plan of Care, ADL Adaptive Strategies, Transfer Training, Equipment, Orientation       OT Individual Minutes  Time In: 0818  Time Out: 9003  Minutes: 39    Electronically signed by Austin Ace OT on 12/26/20 at 11:58 AM EST

## 2020-12-26 NOTE — PROGRESS NOTES
Today's Date: 12/26/2020  Patient Name: Lawrence Pardo  Date of admission: 12/18/2020  8:33 PM  Patient's age: 70 y.o., 1945  Admission Dx: Symptomatic anemia [D64.9]    Reason for Consult: anemia  Requesting Physician: Blanca Pride MD    CHIEF COMPLAINT:    Chief Complaint   Patient presents with    Abnormal Lab     Patient comes to the ER with a hemoglobin of 6.6, per her physician, to get a blood transfusion.  Shortness of Breath     Dx with pneumonia today by her physician after having a chest x-ray Thursday. Has not started her antibiotics. Says the drug store has yet to deliver it. SUBJECTIVE:    Patient seen and examined  NO fever chills  No recent H&H available. Patient still reports mild dyspnea with exertion. Anticipating thoracentesis today. Renal function is worsening. HISTORY OF PRESENT ILLNESS IN BRIEF:    Lawrence Pardo  is a 70 y.o. female who is admitted to the hospital for abnormal lab and hemoglobin of 6.6. Patient has recently been evaluated by Dr. Dane Live hematologist as outpatient on 12/18 for chronic anemia, possible iron deficiency, history of AV malformation. He has ordered some labs and there was a plan to start patient on IV iron supplements if her lab work suggest significant iron deficiency. Her lab work at the office showed hemoglobin of 6.6 and therefore she was instructed to go to ER. Patient has been maintained on chronically anticoagulation with Eliquis for her history of atrial fibrillation. HYDROcodone-acetaminophen (NORCO) 5-325 MG per tablet Take 1 tablet by mouth every 8 hours as needed for Pain for up to 30 days. 11/30/20 12/30/20 Yes WILLIE Pinzon CNP   lisinopril-hydroCHLOROthiazide (PRINZIDE;ZESTORETIC) 10-12.5 MG per tablet Take 1 tablet by mouth daily 11/30/20  Yes WILLIE Pinzon CNP   ELIQUIS 5 MG TABS tablet TAKE ONE TABLET BY MOUTH TWICE A DAY 11/30/20  Yes WILLIE Pinzon CNP   levothyroxine (SYNTHROID) 125 MCG tablet Take 1 tablet by mouth daily 11/30/20  Yes WILLIE Pinzon CNP   metoprolol succinate (TOPROL XL) 25 MG extended release tablet TAKE 1 TABLET BY MOUTH DAILY. (PLEASE MAKE  APPT FOR FUTURE REFILLS) 11/23/20  Yes WILLIE Pinzon CNP   clonazePAM (KLONOPIN) 0.5 MG tablet Take 1 tablet by mouth 3 times daily as needed for Anxiety for up to 30 days.  11/17/20 12/19/20 Yes WILLIE Pinzon CNP   fluticasone (FLOVENT HFA) 220 MCG/ACT inhaler Inhale 2 puffs into the lungs 2 times daily 11/17/20  Yes WILLIE Pinzon CNP   albuterol (PROVENTIL) (2.5 MG/3ML) 0.083% nebulizer solution Take 3 mLs by nebulization every 6 hours as needed for Wheezing 11/5/20  Yes WILLIE Pinzon CNP   dilTIAZem (CARDIZEM CD) 120 MG extended release capsule Take 1 capsule by mouth daily 10/19/20  Yes WILLIE Pinzon CNP   albuterol sulfate HFA (PROAIR HFA) 108 (90 Base) MCG/ACT inhaler Inhale 2 puffs into the lungs every 6 hours as needed for Wheezing   Yes Historical Provider, MD   fluticasone (FLONASE) 50 MCG/ACT nasal spray USE ONE SPRAY TO EACH NOSTRIL ONCE ADAY 9/4/20  Yes WILLIE Avina CNP   atorvastatin (LIPITOR) 10 MG tablet TAKE 1 TABLET BY MOUTH ONCE DAILY 9/4/20  Yes Messer [place], APRN - CNP   Ascorbic Acid (C-1000 PO) Take 1 capsule by mouth daily    Yes Historical Provider, MD   ferrous sulfate 325 (65 Fe) MG tablet Take 325 mg by mouth daily  8/30/18  Yes Historical Provider, MD Oxygen Concentrator continuous    Historical Provider, MD   Lift Chair MISC Use daily for comfort  Patient not taking: Reported on 12/18/2020 11/30/20   WILLIE Zamora CNP   PROAIR  (82 Base) MCG/ACT inhaler Inhale 2 puffs into the lungs every 6 hours as needed for Wheezing 11/17/20   WILLIE Zamora CNP   levalbuterol [facility] HFA) 45 MCG/ACT inhaler Inhale 1 puff into the lungs every 4 hours as needed for Wheezing 8/19/20   WILLIE Zamora CNP   Respiratory Therapy Supplies (NEBULIZER/TUBING/MOUTHPIECE) KIT Use every 4-6 hours prn for copd 6/15/20   WILLIE Zamora CNP     Current Facility-Administered Medications   Medication Dose Route Frequency Provider Last Rate Last Admin    furosemide (LASIX) tablet 40 mg  40 mg Oral BID Abner Parisi MD   40 mg at 12/26/20 0919    lisinopril (PRINIVIL;ZESTRIL) tablet 2.5 mg  2.5 mg Oral Lunch Chuck Parisi MD   2.5 mg at 12/25/20 1207    metoprolol tartrate (LOPRESSOR) tablet 25 mg  25 mg Oral BID Kelsi Haddad MD   25 mg at 12/25/20 2209    ondansetron (ZOFRAN) injection 4 mg  4 mg Intravenous Q6H PRN Dwayne Clemente MD   4 mg at 12/22/20 1044    guaiFENesin (MUCINEX) extended release tablet 1,200 mg  1,200 mg Oral BID Kelsi Haddad MD   1,200 mg at 12/26/20 0917    fluticasone (FLONASE) 50 MCG/ACT nasal spray 2 spray  2 spray Each Nostril Daily Dwayne Clemente MD   2 spray at 12/26/20 0917    sodium chloride flush 0.9 % injection 10 mL  10 mL Intravenous 2 times per day Kelsi Haddad MD   10 mL at 12/26/20 0917    sodium chloride flush 0.9 % injection 10 mL  10 mL Intravenous PRN Dwayne Clemente MD        acetaminophen (TYLENOL) tablet 650 mg  650 mg Oral Q4H PRN Dwayne Clemente MD   650 mg at 12/22/20 2106    ferrous sulfate (IRON 325) tablet 325 mg  325 mg Oral Daily Kelsi Haddad MD   325 mg at 12/26/20 4711  ascorbic acid (VITAMIN C) tablet 1,000 mg  1,000 mg Oral Daily Dwayne Clemente MD   1,000 mg at 12/26/20 0919    atorvastatin (LIPITOR) tablet 10 mg  10 mg Oral Daily Dwayne Clemente MD   10 mg at 12/26/20 0918    dilTIAZem (CARDIZEM CD) extended release capsule 120 mg  120 mg Oral Daily Manfred Felix MD   120 mg at 12/26/20 0919    albuterol (PROVENTIL) nebulizer solution 2.5 mg  2.5 mg Nebulization Q6H PRN Dwayne Clemente MD   2.5 mg at 12/24/20 1110    clonazePAM (KLONOPIN) tablet 0.5 mg  0.5 mg Oral TID PRN Manfred Felix MD   0.5 mg at 12/25/20 1827    fluticasone (FLOVENT HFA) 110 MCG/ACT inhaler 2 puff  2 puff Inhalation BID Manfred Felix MD   2 puff at 12/26/20 0917    baclofen (LIORESAL) tablet 10 mg  10 mg Oral TID PRN Manfred Felix MD   10 mg at 12/20/20 2118    HYDROcodone-acetaminophen (NORCO) 5-325 MG per tablet 1 tablet  1 tablet Oral Q8H PRN Manfred Felix MD   1 tablet at 12/21/20 2336    [Held by provider] apixaban (ELIQUIS) tablet 5 mg  5 mg Oral BID Cailin Quiñones MD   5 mg at 12/24/20 0801    levothyroxine (SYNTHROID) tablet 125 mcg  125 mcg Oral Daily Dwayne Clemente MD   125 mcg at 12/26/20 0602    pantoprazole (PROTONIX) tablet 40 mg  40 mg Oral QAM AC Dwayne Clemente MD   40 mg at 12/26/20 0602    DULoxetine (CYMBALTA) extended release capsule 60 mg  60 mg Oral Daily Manfred Felix MD   60 mg at 12/26/20 0918    tamsulosin (FLOMAX) capsule 0.4 mg  0.4 mg Oral Daily Dwayne Clemente MD   0.4 mg at 12/26/20 3669    potassium chloride (KLOR-CON M) extended release tablet 40 mEq  40 mEq Oral PRN Manfred Felix MD        Or    potassium bicarb-citric acid (EFFER-K) effervescent tablet 40 mEq  40 mEq Oral PRN Dwayne Clemente MD        Or    potassium chloride 10 mEq/100 mL IVPB (Peripheral Line)  10 mEq Intravenous PRN Manfred Felix MD           Allergies:  Tizanidine Mouth - mucous membranes moist, pharynx normal without lesions   Neck - supple, no significant adenopathy   Lymphatics - no palpable lymphadenopathy, no hepatosplenomegaly   Chest - clear to auscultation, no wheezes, rales or rhonchi, symmetric air entry   Heart - normal rate, regular rhythm, normal S1, S2, no murmurs  Abdomen - soft, nontender, nondistended, no masses or organomegaly   Neurological - alert, oriented, normal speech, no focal findings or movement disorder noted   Musculoskeletal - no joint tenderness, deformity or swelling   Extremities - peripheral pulses normal, no pedal edema, no clubbing or cyanosis   Skin - normal coloration and turgor, no rashes, no suspicious skin lesions noted ,    DATA:    Labs:   CBC:   Recent Labs     12/24/20  0812   WBC 7.4   HGB 9.7*   HCT 31.4*        BMP:   Recent Labs     12/25/20  0544 12/26/20  0535    143   K 4.0 3.7   CO2 31 36*   BUN 24* 31*   CREATININE 1.43* 1.53*   LABGLOM 36* 33*   GLUCOSE 92 129*     PT/INR:   No results for input(s): PROTIME, INR in the last 72 hours. IMAGING DATA:  @IMG@   XR CHEST (2 VW)   Final Result   Small-moderate left pleural effusion. Underlying opacity may represent   atelectasis with pneumonia not excluded. XR CHEST PORTABLE   Final Result   1. Left IJ central line in place, tip overlying the mid SVC   2. No evidence of a pneumothorax   3.  Small left pleural effusion, and left basilar opacity, differential   considerations include atelectasis versus pneumonia             Primary Problem  Symptomatic anemia    Active Hospital Problems    Diagnosis Date Noted    Symptomatic anemia [D64.9] 12/19/2020    Pneumonia due to organism [J18.9]     Iron deficiency anemia due to chronic blood loss [D50.0] 12/18/2020    Acute on chronic diastolic CHF (congestive heart failure) ([facility] [I50.33] 07/21/2020    Obesity, Class I, BMI 30-34.9 [E66.9] 07/20/2020    Moderate major depression ([address] [F32.1] 06/17/2020  PAD (peripheral artery disease) ([facility] [I73.9] 06/17/2020    CKD (chronic kidney disease) stage 3, GFR 30-59 ml/min [N18.30]     HA (generalized anxiety disorder) [F41.1] 12/13/2018    A-fib ([facility] [I48.91] 09/13/2018    Pulmonary hypertension ([facility] [I27.20] 09/13/2018    COPD (chronic obstructive pulmonary disease) ([facility] [J44.9] 09/13/2018    Acquired hypothyroidism [E03.9] 01/18/2013         IMPRESSION:   1. Severe anemia: Patient has history of chronic anemia. She is on chronic anticoagulation and history of female patient which can contribute to GI bleed. 2. SUNSHINE  3. Atrial fibrillation  4. COPD  5. GERD    RECOMMENDATIONS:  1. I reviewed the laboratory data, imaging studies, diagnosis, prognosis and treatment options with patient  2. She had upper endoscopy and colonoscopy on 12/2. She was noted to have 2 AVMs and had APC treatment  3. Now POSITIVE stool for occult blood. 4. EGD showed AVM, had APC  5. No hemolysis and review peripheral blood smear   6. Check H&H in the morning. 7. Her iron panel revealed ferritin 37, iron 92, iron saturation 29 and TIBC 318.  t  8. No plan for BM biopsy at this point  9. Thoracentesis today  10. Follow with hematology in 2-3 weeks      Discussed with patient and Nurse. Thank you for asking us to see this patient. [facility]        This note is created with the assistance of a speech recognition program.  While intending to generate a document that actually reflects the content of the visit, the document can still have some errors including those of syntax and sound a like substitutions which may escape proof reading. It such instances, actual meaning can be extrapolated by contextual diversion.

## 2020-12-26 NOTE — PLAN OF CARE
Problem: Falls - Risk of:  Goal: Will remain free from falls  12/26/2020 1646 by Linda Davis RN  Outcome: Met This Shift: Patient remained free from falls and injury per shift; call light within reach, bed kept at lowest position, pathway clear, hourly rounds implemented.      Problem: Pain:  Goal: Pain level will decrease  12/26/2020 1646 by Linda Davis RN  Outcome: Ongoing: Patient given po prn pain med per shift post thoracentesis   12/26/2020 0420 by Yolette Elkins RN  Outcome: Ongoing     12/26/2020 0420 by Yolette Elkins RN  Outcome: Ongoing

## 2020-12-26 NOTE — PROGRESS NOTES
Pulmonary Progress Note  [facility]      Patient - Cristy Greenberg,  Age - 70 y.o.    - 1945      Room Number - 2097/2097-01   N -  [id number]   [facility]  Date of Admission -  2020  8:33 PM        Consulting 838 Emma Connors MD  Primary Care Physician - Enrique Garland, APRN - CNP     SUBJECTIVE   Still reports mild dyspnea exertion, nurses report confusion but she is always been appropriate to me    OBJECTIVE   VITALS    height is 5' 5\" (1.651 m) and weight is 185 lb 10 oz (84.2 kg). Her oral temperature is 97.9 °F (36.6 °C). Her blood pressure is 107/56 (abnormal) and her pulse is 83. Her respiration is 19 and oxygen saturation is 95%. Body mass index is 30.89 kg/m². Temperature Range: Temp: 97.9 °F (36.6 °C) Temp  Av.1 °F (36.7 °C)  Min: 97.7 °F (36.5 °C)  Max: 98.8 °F (37.1 °C)  BP Range:  Systolic (71RXG), CJU:245 , Min:103 , ITJ:697     Diastolic (73WCZ), GUT:47, Min:53, Max:65    Pulse Range: Pulse  Av.3  Min: 79  Max: 83  Respiration Range: Resp  Av.3  Min: 18  Max: 19  Current Pulse Ox[de-identified]  SpO2: 95 %  24HR Pulse Ox Range:  SpO2  Av.3 %  Min: 94 %  Max: 97 %  Oxygen Amount and Delivery: O2 Flow Rate (L/min): 2 L/min    Wt Readings from Last 3 Encounters:   20 185 lb 10 oz (84.2 kg)   20 187 lb 6.4 oz (85 kg)   20 188 lb (85.3 kg)       I/O (24 Hours)    Intake/Output Summary (Last 24 hours) at 2020 1202  Last data filed at 2020 0100  Gross per 24 hour   Intake 10 ml   Output 450 ml   Net -440 ml       EXAM     General Appearance  Awake, alert, oriented, in no acute distress  HEENT - normocephalic, atraumatic.  []  Mallampati  [] Crowded airway   [] Macroglossia  []  Retrognathia  [] Micrognathia  []  Normal tongue size []  Normal Bite  [] Tita sign positive    Neck - Supple,  trachea midline Lungs -diminished breath sounds left side  Cardiovascular - Heart sounds are normal.  Regular rate and rhythm   Abdomen - Soft, nontender, nondistended, no masses or organomegaly  Neurologic - There are no focal motor or sensory deficits  Skin - No bruising or bleeding  Extremities - No clubbing, cyanosis, edema    MEDS      furosemide  40 mg Oral BID    lisinopril  2.5 mg Oral Lunch    metoprolol tartrate  25 mg Oral BID    guaiFENesin  1,200 mg Oral BID    fluticasone  2 spray Each Nostril Daily    sodium chloride flush  10 mL Intravenous 2 times per day    ferrous sulfate  325 mg Oral Daily    ascorbic acid  1,000 mg Oral Daily    atorvastatin  10 mg Oral Daily    dilTIAZem  120 mg Oral Daily    fluticasone  2 puff Inhalation BID    [Held by provider] apixaban  5 mg Oral BID    levothyroxine  125 mcg Oral Daily    pantoprazole  40 mg Oral QAM AC    DULoxetine  60 mg Oral Daily    tamsulosin  0.4 mg Oral Daily       ondansetron, sodium chloride flush, acetaminophen, albuterol, clonazePAM, baclofen, HYDROcodone-acetaminophen, potassium chloride **OR** potassium alternative oral replacement **OR** potassium chloride    LABS   CBC   Recent Labs     12/24/20  0812   WBC 7.4   HGB 9.7*   HCT 31.4*   MCV 96.8        BMP:   Lab Results   Component Value Date     12/26/2020    K 3.7 12/26/2020    CL 98 12/26/2020    CO2 36 12/26/2020    BUN 31 12/26/2020    LABALBU 3.8 12/24/2020    LABALBU 4.5 02/18/2012    CREATININE 1.53 12/26/2020    CALCIUM 9.4 12/26/2020    GFRAA 40 12/26/2020    LABGLOM 33 12/26/2020     ABGs:  Lab Results   Component Value Date    PHART 7.330 09/13/2018    PO2ART 74.8 09/13/2018    NHP6JPC 47.3 09/13/2018      Lab Results   Component Value Date    MODE NOT REPORTED 09/13/2018     Ionized Calcium:  No results found for: IONCA  Magnesium:    Lab Results   Component Value Date    MG 2.2 12/24/2020     Phosphorus:    Lab Results   Component Value Date PHOS 4.1 03/16/2020        LIVER PROFILE   Recent Labs     12/24/20  0812   AST 13   ALT 9   BILITOT 0.39   ALKPHOS 102     INR No results for input(s): INR in the last 72 hours. PTT   Lab Results   Component Value Date    APTT 36.9 (H) 12/19/2020         RADIOLOGY     (See actual reports for details)    ASSESSMENT/PLAN   Principal Problem:    Symptomatic anemia  Active Problems:    Acquired hypothyroidism    A-fib (HCC)    Pulmonary hypertension (HCC)    COPD (chronic obstructive pulmonary disease) (HCC)    HA (generalized anxiety disorder)    CKD (chronic kidney disease) stage 3, GFR 30-59 ml/min    Moderate major depression (HCC)    PAD (peripheral artery disease) (HCC)    Obesity, Class I, BMI 30-34.9    Acute on chronic diastolic CHF (congestive heart failure) (HCC)    Iron deficiency anemia due to chronic blood loss    Pneumonia due to organism  Resolved Problems:    * No resolved hospital problems.  *    We will go ahead and perform a thoracentesis  She is unsteady and nurses will need to hold her  We will bring ultrasound to the bedside if there is not enough fluid we will not do thoracentesis  Eliquis has been held and we can resume it after thoracentesis is done  She may need to have some increase in her creatinine to keep her I's and O's negative  Electronically signed by Vivienne Zimmerman MD on 12/26/2020 at 12:02 PM

## 2020-12-27 LAB
ANION GAP SERPL CALCULATED.3IONS-SCNC: 6 MMOL/L (ref 9–17)
BUN BLDV-MCNC: 22 MG/DL (ref 8–23)
BUN/CREAT BLD: ABNORMAL (ref 9–20)
CALCIUM SERPL-MCNC: 9 MG/DL (ref 8.6–10.4)
CHLORIDE BLD-SCNC: 100 MMOL/L (ref 98–107)
CO2: 40 MMOL/L (ref 20–31)
CREAT SERPL-MCNC: 1.04 MG/DL (ref 0.5–0.9)
CREATININE URINE: 23.2 MG/DL (ref 28–217)
GFR AFRICAN AMERICAN: >60 ML/MIN
GFR NON-AFRICAN AMERICAN: 52 ML/MIN
GFR SERPL CREATININE-BSD FRML MDRD: ABNORMAL ML/MIN/{1.73_M2}
GFR SERPL CREATININE-BSD FRML MDRD: ABNORMAL ML/MIN/{1.73_M2}
GLUCOSE BLD-MCNC: 121 MG/DL (ref 70–99)
HCT VFR BLD CALC: 31.6 % (ref 36–46)
HEMOGLOBIN: 10 G/DL (ref 12–16)
POTASSIUM SERPL-SCNC: 3.1 MMOL/L (ref 3.7–5.3)
SODIUM BLD-SCNC: 146 MMOL/L (ref 135–144)
TOTAL PROTEIN, URINE: 5 MG/DL

## 2020-12-27 PROCEDURE — 6370000000 HC RX 637 (ALT 250 FOR IP): Performed by: INTERNAL MEDICINE

## 2020-12-27 PROCEDURE — 6370000000 HC RX 637 (ALT 250 FOR IP): Performed by: FAMILY MEDICINE

## 2020-12-27 PROCEDURE — 99232 SBSQ HOSP IP/OBS MODERATE 35: CPT | Performed by: INTERNAL MEDICINE

## 2020-12-27 PROCEDURE — 85018 HEMOGLOBIN: CPT

## 2020-12-27 PROCEDURE — 80048 BASIC METABOLIC PNL TOTAL CA: CPT

## 2020-12-27 PROCEDURE — 99232 SBSQ HOSP IP/OBS MODERATE 35: CPT | Performed by: FAMILY MEDICINE

## 2020-12-27 PROCEDURE — 85014 HEMATOCRIT: CPT

## 2020-12-27 PROCEDURE — 2580000003 HC RX 258: Performed by: INTERNAL MEDICINE

## 2020-12-27 PROCEDURE — 1200000000 HC SEMI PRIVATE

## 2020-12-27 PROCEDURE — 36415 COLL VENOUS BLD VENIPUNCTURE: CPT

## 2020-12-27 PROCEDURE — 82570 ASSAY OF URINE CREATININE: CPT

## 2020-12-27 PROCEDURE — 84156 ASSAY OF PROTEIN URINE: CPT

## 2020-12-27 RX ADMIN — BUMETANIDE 1 MG: 1 TABLET ORAL at 19:15

## 2020-12-27 RX ADMIN — METOPROLOL TARTRATE 25 MG: 25 TABLET, FILM COATED ORAL at 21:27

## 2020-12-27 RX ADMIN — HYDROCODONE BITARTRATE AND ACETAMINOPHEN 1 TABLET: 5; 325 TABLET ORAL at 21:32

## 2020-12-27 RX ADMIN — GUAIFENESIN 1200 MG: 600 TABLET, EXTENDED RELEASE ORAL at 21:28

## 2020-12-27 RX ADMIN — FLUTICASONE PROPIONATE 2 PUFF: 110 AEROSOL, METERED RESPIRATORY (INHALATION) at 21:28

## 2020-12-27 RX ADMIN — OXYCODONE HYDROCHLORIDE AND ACETAMINOPHEN 1000 MG: 500 TABLET ORAL at 09:47

## 2020-12-27 RX ADMIN — APIXABAN 5 MG: 5 TABLET, FILM COATED ORAL at 09:46

## 2020-12-27 RX ADMIN — TAMSULOSIN HYDROCHLORIDE 0.4 MG: 0.4 CAPSULE ORAL at 09:47

## 2020-12-27 RX ADMIN — ATORVASTATIN CALCIUM 10 MG: 10 TABLET, FILM COATED ORAL at 09:46

## 2020-12-27 RX ADMIN — LISINOPRIL 2.5 MG: 5 TABLET ORAL at 14:48

## 2020-12-27 RX ADMIN — PANTOPRAZOLE SODIUM 40 MG: 40 TABLET, DELAYED RELEASE ORAL at 06:06

## 2020-12-27 RX ADMIN — FERROUS SULFATE TAB 325 MG (65 MG ELEMENTAL FE) 325 MG: 325 (65 FE) TAB at 09:46

## 2020-12-27 RX ADMIN — POTASSIUM CHLORIDE 40 MEQ: 1500 TABLET, EXTENDED RELEASE ORAL at 14:35

## 2020-12-27 RX ADMIN — APIXABAN 5 MG: 5 TABLET, FILM COATED ORAL at 21:27

## 2020-12-27 RX ADMIN — Medication 10 ML: at 09:47

## 2020-12-27 RX ADMIN — Medication 10 ML: at 21:28

## 2020-12-27 RX ADMIN — BUMETANIDE 1 MG: 1 TABLET ORAL at 09:45

## 2020-12-27 RX ADMIN — METOPROLOL TARTRATE 25 MG: 25 TABLET, FILM COATED ORAL at 09:53

## 2020-12-27 RX ADMIN — FLUTICASONE PROPIONATE 2 SPRAY: 50 SPRAY, METERED NASAL at 09:47

## 2020-12-27 RX ADMIN — LEVOTHYROXINE SODIUM 125 MCG: 125 TABLET ORAL at 06:06

## 2020-12-27 RX ADMIN — FLUTICASONE PROPIONATE 2 PUFF: 110 AEROSOL, METERED RESPIRATORY (INHALATION) at 09:47

## 2020-12-27 RX ADMIN — DILTIAZEM HYDROCHLORIDE 120 MG: 120 CAPSULE, COATED, EXTENDED RELEASE ORAL at 09:45

## 2020-12-27 RX ADMIN — HYDROCODONE BITARTRATE AND ACETAMINOPHEN 1 TABLET: 5; 325 TABLET ORAL at 04:25

## 2020-12-27 RX ADMIN — DULOXETINE HYDROCHLORIDE 60 MG: 60 CAPSULE, DELAYED RELEASE ORAL at 09:46

## 2020-12-27 RX ADMIN — GUAIFENESIN 1200 MG: 600 TABLET, EXTENDED RELEASE ORAL at 09:46

## 2020-12-27 ASSESSMENT — PAIN SCALES - GENERAL
PAINLEVEL_OUTOF10: 8
PAINLEVEL_OUTOF10: 6
PAINLEVEL_OUTOF10: 7
PAINLEVEL_OUTOF10: 7

## 2020-12-27 NOTE — PLAN OF CARE
Problem: Falls - Risk of:  Goal: Will remain free from falls  Description: Will remain free from falls  12/27/2020 0422 by Sheron Shah RN  Outcome: Ongoing  Note: Pt. Free of falls and injuries this shift. Problem: Bleeding:  Goal: Will show no signs and symptoms of excessive bleeding  Description: Will show no signs and symptoms of excessive bleeding  12/27/2020 0422 by Sheron Shah RN  Outcome: Ongoing     Problem: Pain:  Goal: Pain level will decrease  Description: Pain level will decrease  12/27/2020 0422 by Sheron Shah RN  Outcome: Ongoing  Note: Adequate pain control achieved this shift. See MAR. Problem: Skin Integrity:  Goal: Will show no infection signs and symptoms  Description: Will show no infection signs and symptoms  Outcome: Ongoing  Note: Skin integrity improved/maintained this shift. See head to toe assessment.

## 2020-12-27 NOTE — CARE COORDINATION
ONGOING DISCHARGE PLAN:    Spoke with patient regarding discharge plan and patient confirms that plan is still  to have LSW continue to follow for DC to SNF.     Per Previous Notes, Pt has 8 more days that would be covered at 100%.     Pt. Really wants to go home & continue w/ [facility], [facility].      PT/OT on board, Therapy rec SNF. LSW was following for [facility], however, per notes, they do not have a bed until 12/30/20. Writer spoke to pt. In regards to this & she would be agreeable to [facility] as a 2nd choice. Will notify LSW to follow [facility]. Cr today 1.04, Bun 22, K+ 3.1. Nephro continues to follow. Oral Bumex, 1 mg, Bid, Started today. Will continue to follow for additional discharge needs.     Electronically signed by Eliza Fabian RN on 12/27/2020 at 1:49 PM

## 2020-12-27 NOTE — PROGRESS NOTES
FAMILY MEDICINE  - PROGRESS NOTE    Date:  12/27/2020  Hipolito Sorensen  [id number]      Chief Complaint   Patient presents with    Abnormal Lab     Patient comes to the ER with a hemoglobin of 6.6, per her physician, to get a blood transfusion.  Shortness of Breath     Dx with pneumonia today by her physician after having a chest x-ray Thursday. Has not started her antibiotics. Says the drug store has yet to deliver it. Interval History:  not changed, no significant events overnight. She is agreeable to d/c to SNF. Social work will be looking for placement tomorrow. Patient seen late morning and she is alert & oriented.       Subjective  Constitutional: positive for overweight  Respiratory: positive for emphysema and shortness of breath  Cardiovascular: positive for irregular heart beat  Musculoskeletal:positive for arthralgias, myalgias and stiff joints  Behavioral/Psych: positive for anxiety and depression:    Objective:    /72   Pulse 90   Temp 98.2 °F (36.8 °C) (Oral)   Resp 18   Ht 5' 5\" (1.651 m)   Wt 176 lb 5.9 oz (80 kg)   SpO2 97%   BMI 29.35 kg/m²   General appearance - alert, well appearing, and in no distress and overweight  Mental status - alert, oriented to person, place, and time  Eyes - pupils equal and reactive, extraocular eye movements intact  Ears - hearing grossly normal bilaterally  Nose - normal and patent, no erythema, discharge or polyps  Mouth - mucous membranes moist, pharynx normal without lesions  Neck - supple, no significant adenopathy  Lymphatics - no palpable lymphadenopathy, no hepatosplenomegaly  Chest - clear to auscultation, no wheezes, rales or rhonchi, symmetric air entry, decreased air entry noted posteriorly  Heart - irregularly irregular rhythm with rate 90  Abdomen - soft, nontender, nondistended, no masses or organomegaly  Breasts - not examined  Back exam - not examined Neurological - alert, oriented, normal speech, no focal findings or movement disorder noted  Musculoskeletal - osteoarthritic changes noted in both hands  Extremities - peripheral pulses normal, no pedal edema, no clubbing or cyanosis  Skin - normal coloration and turgor, no rashes, no suspicious skin lesions noted    Data:   Medications:   Current Facility-Administered Medications   Medication Dose Route Frequency Provider Last Rate Last Admin    bumetanide (BUMEX) tablet 1 mg  1 mg Oral BID Wali Emery MD        lisinopril (PRINIVIL;ZESTRIL) tablet 2.5 mg  2.5 mg Oral Lunch Chuck Parisi MD   2.5 mg at 12/25/20 1207    metoprolol tartrate (LOPRESSOR) tablet 25 mg  25 mg Oral BID Ilsa Aase, MD   25 mg at 12/26/20 2128    ondansetron (ZOFRAN) injection 4 mg  4 mg Intravenous Q6H PRN Dwayne Clemente MD   4 mg at 12/22/20 1044    guaiFENesin (MUCINEX) extended release tablet 1,200 mg  1,200 mg Oral BID Dwayne Clemente MD   1,200 mg at 12/26/20 2129    fluticasone (FLONASE) 50 MCG/ACT nasal spray 2 spray  2 spray Each Nostril Daily Dwayne Clemente MD   2 spray at 12/26/20 0917    sodium chloride flush 0.9 % injection 10 mL  10 mL Intravenous 2 times per day Ilsa Aase, MD   10 mL at 12/26/20 2129    sodium chloride flush 0.9 % injection 10 mL  10 mL Intravenous PRN Dwyane Clemente MD        acetaminophen (TYLENOL) tablet 650 mg  650 mg Oral Q4H PRN Dwayne Clemente MD   650 mg at 12/22/20 2106    ferrous sulfate (IRON 325) tablet 325 mg  325 mg Oral Daily Dwayne Clemente MD   325 mg at 12/26/20 0918    ascorbic acid (VITAMIN C) tablet 1,000 mg  1,000 mg Oral Daily Dwayne Clemente MD   1,000 mg at 12/26/20 0919    atorvastatin (LIPITOR) tablet 10 mg  10 mg Oral Daily Dwayne Clemente MD   10 mg at 12/26/20 0918    dilTIAZem (CARDIZEM CD) extended release capsule 120 mg  120 mg Oral Daily Dwayne Clemente MD   120 mg at 12/26/20 5021  albuterol (PROVENTIL) nebulizer solution 2.5 mg  2.5 mg Nebulization Q6H PRN Dwayne Clemente MD   2.5 mg at 12/24/20 1110    clonazePAM (KLONOPIN) tablet 0.5 mg  0.5 mg Oral TID PRN Salvatore Burgess MD   0.5 mg at 12/25/20 1827    fluticasone (FLOVENT HFA) 110 MCG/ACT inhaler 2 puff  2 puff Inhalation BID Salvatore Burgess MD   2 puff at 12/26/20 2129    baclofen (LIORESAL) tablet 10 mg  10 mg Oral TID PRN Salvatore Burgess MD   10 mg at 12/20/20 2118    HYDROcodone-acetaminophen ([address] [place] [address]) 5-325 MG per tablet 1 tablet  1 tablet Oral Q8H PRN Salvatore Burgess MD   1 tablet at 12/27/20 0425    apixaban (ELIQUIS) tablet 5 mg  5 mg Oral BID Lisa Das MD   5 mg at 12/26/20 2129    levothyroxine (SYNTHROID) tablet 125 mcg  125 mcg Oral Daily Dwayne Clemente MD   125 mcg at 12/27/20 0606    pantoprazole (PROTONIX) tablet 40 mg  40 mg Oral QAM AC Salvatore Burgess MD   40 mg at 12/27/20 0606    DULoxetine (CYMBALTA) extended release capsule 60 mg  60 mg Oral Daily Salvatore Burgess MD   60 mg at 12/26/20 0918    tamsulosin (FLOMAX) capsule 0.4 mg  0.4 mg Oral Daily Dwayne Clemente MD   0.4 mg at 12/26/20 1914    potassium chloride (KLOR-CON M) extended release tablet 40 mEq  40 mEq Oral PRN Salvatore Burgess MD        Or    potassium bicarb-citric acid (EFFER-K) effervescent tablet 40 mEq  40 mEq Oral PRN Dwayne Clemente MD        Or    potassium chloride 10 mEq/100 mL IVPB (Peripheral Line)  10 mEq Intravenous PRN Salvatore Burgess MD           Intake/Output Summary (Last 24 hours) at 12/27/2020 0808  Last data filed at 12/27/2020 0607  Gross per 24 hour   Intake 200 ml   Output 1950 ml   Net -1750 ml     Recent Results (from the past 24 hour(s))   pH, Body Fluid    Collection Time: 12/26/20  4:01 PM   Result Value Ref Range    Specimen Type . LUNG     pH, Fluid 8.0    Lactate Dehydrogenase, Body Fluid    Collection Time: 12/26/20  4:01 PM   Result Value Ref Range    Specimen Type . LUNG     LD, Fluid 70 U/L Glucose, Body Fluid    Collection Time: 12/26/20  4:01 PM   Result Value Ref Range    Specimen Type . LUNG     Glucose, Fluid 143 mg/dL   Protein, Body Fluid    Collection Time: 12/26/20  4:01 PM   Result Value Ref Range    Specimen Type . LUNG     Total Protein, Body Fluid 1.7 g/dL   Body fluid cell count    Collection Time: 12/26/20  4:01 PM   Result Value Ref Range    Color, Fluid DARK YELLOW     Appearance, Fluid CLOUDY     WBC, Fluid 400 /mm3    RBC, Fluid 2,450 /mm3    Specimen Type . LUNG    Differential, Body Fluid    Collection Time: 12/26/20  4:01 PM   Result Value Ref Range    Fluid Diff Comment TO BE REVIEWED BY PATHOLOGIST     Neutrophil Count, Fluid 2 (H) 0 %    Lymphocytes, Body Fluid 91 (H) 0 %    Monocyte Count, Fluid 7 (H) 0 %    Eos, Fluid      0 %    Basos, Fluid      0 %    Other Cells, Fluid      0 %   Culture, Body Fluid    Collection Time: 12/26/20  4:03 PM    Specimen: Thoracentesis; Body Fluid   Result Value Ref Range    Specimen Description . THORACENTESIS FLUID     Special Requests NOT REPORTED     Direct Exam NO NEUTROPHILS SEEN     Direct Exam NO BACTERIA SEEN     Culture NO GROWTH 13 HOURS    Creatinine, Random Urine    Collection Time: 12/27/20  4:31 AM   Result Value Ref Range    Creatinine, Ur 23.2 (L) 28.0 - 217.0 mg/dL   Protein, urine, random    Collection Time: 12/27/20  4:31 AM   Result Value Ref Range    Total Protein, Urine 5 mg/dL   Basic Metabolic Prof    Collection Time: 12/27/20  6:03 AM   Result Value Ref Range    Glucose 121 (H) 70 - 99 mg/dL    BUN 22 8 - 23 mg/dL    CREATININE 1.04 (H) 0.50 - 0.90 mg/dL    Bun/Cre Ratio NOT REPORTED 9 - 20    Calcium 9.0 8.6 - 10.4 mg/dL    Sodium 146 (H) 135 - 144 mmol/L    Potassium 3.1 (L) 3.7 - 5.3 mmol/L    Chloride 100 98 - 107 mmol/L    CO2 40 (H) 20 - 31 mmol/L    Anion Gap 6 (L) 9 - 17 mmol/L    GFR Non-African American 52 (L) >60 mL/min    GFR African American >60 >60 mL/min    GFR Comment          GFR Staging NOT REPORTED HEMOGLOBIN AND HEMATOCRIT, BLOOD    Collection Time: 12/27/20  6:03 AM   Result Value Ref Range    Hemoglobin 10.0 (L) 12.0 - 16.0 g/dL    Hematocrit 31.6 (L) 36 - 46 %     -----------------------------------------------------------------  RAD:  EKG:  Micro:     Assessment & Plan:    Patient Active Problem List:     Acquired hypothyroidism     Hypertension     Primary osteoarthritis of right knee     A-fib (HCC)     Acute encephalopathy     SIRS (systemic inflammatory response syndrome) ([facility])     Normocytic anemia     Pulmonary hypertension (HCC)     COPD (chronic obstructive pulmonary disease) ([facility])     History of GI bleed     Constipation     HA (generalized anxiety disorder)     Lumbar radiculopathy     Lumbosacral spondylosis without myelopathy     Benign hypertension with CKD (chronic kidney disease) stage III     CKD (chronic kidney disease) stage 3, GFR 30-59 ml/min     Alcohol withdrawal syndrome with complication (HCC)     Moderate major depression ([facility])     PAD (peripheral artery disease) ([facility])     Acute kidney injury ([facility] [address]     Obesity, Class I, BMI 30-34.9     Acute on chronic diastolic CHF (congestive heart failure) ([facility])     GI bleed     Weight loss     Elevated alkaline phosphatase level     Thrombocytosis (HCC)     Iron deficiency anemia due to chronic blood loss     Iron malabsorption     Symptomatic anemia     Pneumonia due to organism           Plan:  -Symptomatic anemia - hgb stable, Hem/Onc following.  -Left pleural effusion - s/p thoracentesis done yesterday. -SUNSHINE - improving, plans per Nephrology.  -Acute on chronic diastolic CHF - Cardiology following. -D/C plan is to SNF.  -Continue current treatments.  -Complete orders per chart.     See orders   Disposition:    Electronically signed by David Alaniz MD on 12/27/2020 at 8:08 AM

## 2020-12-27 NOTE — PROGRESS NOTES
Pulmonary Progress Note  [facility]      Patient - Le Pappas,  Age - 70 y.o.    - 1945      Room Number - 2097/2097-01   N -  [id number]   [facility]  Date of Admission -  2020  8:33 PM    [address], [place]  Primary Care Physician - Citlali Bose, APRN - CNP     SUBJECTIVE   Patient is resting quietly. She is sitting up in the chair. She is on 1 L of oxygen. She does not appear to be short of breath    OBJECTIVE   VITALS    height is 5' 5\" (1.651 m) and weight is 176 lb 5.9 oz (80 kg). Her oral temperature is 98.2 °F (36.8 °C). Her blood pressure is 124/62 and her pulse is 80. Her respiration is 16 and oxygen saturation is 97%. Body mass index is 29.35 kg/m². Temperature Range: Temp: 98.2 °F (36.8 °C) Temp  Av °F (36.7 °C)  Min: 97.7 °F (36.5 °C)  Max: 98.2 °F (36.8 °C)  BP Range:  Systolic (25XDK), QPN:370 , Min:124 , PXL:046     Diastolic (87RAB), PWH:32, Min:55, Max:72    Pulse Range: Pulse  Av.3  Min: 80  Max: 90  Respiration Range: Resp  Av.3  Min: 16  Max: 18  Current Pulse Ox[de-identified]  SpO2: 97 %  24HR Pulse Ox Range:  SpO2  Av %  Min: 97 %  Max: 99 %  Oxygen Amount and Delivery: O2 Flow Rate (L/min): 2 L/min    Wt Readings from Last 3 Encounters:   20 176 lb 5.9 oz (80 kg)   20 187 lb 6.4 oz (85 kg)   20 188 lb (85.3 kg)       I/O (24 Hours)    Intake/Output Summary (Last 24 hours) at 2020 1432  Last data filed at 2020 1258  Gross per 24 hour   Intake 700 ml   Output 1950 ml   Net -1250 ml       EXAM     General Appearance  Awake, alert, oriented, in no acute distress  HEENT - normocephalic, atraumatic. Neck - Supple,  trachea midline   Lungs -some trace crackles at the bases posteriorly bilaterally  Heart Exam:PMI normal. No lifts, heaves, or thrills. RRR.  No murmurs, clicks, gallops, or rubs Abdomen Exam: Abdomen soft, non-tender. BS normal. Extremity Exam: No sign of cyanosis  MEDS      bumetanide  1 mg Oral BID    lisinopril  2.5 mg Oral Lunch    metoprolol tartrate  25 mg Oral BID    guaiFENesin  1,200 mg Oral BID    fluticasone  2 spray Each Nostril Daily    sodium chloride flush  10 mL Intravenous 2 times per day    ferrous sulfate  325 mg Oral Daily    ascorbic acid  1,000 mg Oral Daily    atorvastatin  10 mg Oral Daily    dilTIAZem  120 mg Oral Daily    fluticasone  2 puff Inhalation BID    apixaban  5 mg Oral BID    levothyroxine  125 mcg Oral Daily    pantoprazole  40 mg Oral QAM AC    DULoxetine  60 mg Oral Daily    tamsulosin  0.4 mg Oral Daily       ondansetron, sodium chloride flush, acetaminophen, albuterol, clonazePAM, baclofen, HYDROcodone-acetaminophen, potassium chloride **OR** potassium alternative oral replacement **OR** potassium chloride    LABS   CBC   Recent Labs     12/27/20  0603   HGB 10.0*   HCT 31.6*     BMP:   Lab Results   Component Value Date     12/27/2020    K 3.1 12/27/2020     12/27/2020    CO2 40 12/27/2020    BUN 22 12/27/2020    LABALBU 3.8 12/24/2020    LABALBU 4.5 02/18/2012    CREATININE 1.04 12/27/2020    CALCIUM 9.0 12/27/2020    GFRAA >60 12/27/2020    LABGLOM 52 12/27/2020     ABGs:  Lab Results   Component Value Date    PHART 7.330 09/13/2018    PO2ART 74.8 09/13/2018    DFV0FMX 47.3 09/13/2018      Lab Results   Component Value Date    MODE NOT REPORTED 09/13/2018     Ionized Calcium:  No results found for: IONCA  Magnesium:    Lab Results   Component Value Date    MG 2.2 12/24/2020     Phosphorus:    Lab Results   Component Value Date    PHOS 4.1 03/16/2020        LIVER PROFILE No results for input(s): AST, ALT, LIPASE, BILIDIR, BILITOT, ALKPHOS in the last 72 hours. Invalid input(s): AMYLASE,  ALB  INR No results for input(s): INR in the last 72 hours.   PTT   Lab Results   Component Value Date    APTT 36.9 (H) 12/19/2020 RADIOLOGY     (See actual reports for details)    ASSESSMENT/PLAN     Patient Active Problem List   Diagnosis    Acquired hypothyroidism    Hypertension    Primary osteoarthritis of right knee    A-fib ([address]    Acute encephalopathy    SIRS (systemic inflammatory response syndrome) (HCC)    Normocytic anemia    Pulmonary hypertension (HCC)    COPD (chronic obstructive pulmonary disease) (HCC)    History of GI bleed    Constipation    HA (generalized anxiety disorder)    Lumbar radiculopathy    Lumbosacral spondylosis without myelopathy    Benign hypertension with CKD (chronic kidney disease) stage III    CKD (chronic kidney disease) stage 3, GFR 30-59 ml/min    Alcohol withdrawal syndrome with complication (HCC)    Moderate major depression ([address]    PAD (peripheral artery disease) (HCC)    Acute kidney injury ([address]    Obesity, Class I, BMI 30-34.9    Acute on chronic diastolic CHF (congestive heart failure) ([facility]    GI bleed    Weight loss    Elevated alkaline phosphatase level    Thrombocytosis (HCC)    Iron deficiency anemia due to chronic blood loss    Iron malabsorption    Symptomatic anemia    Pneumonia due to organism     Patient is status post thoracentesis on left. 525 mL of fluid was removed. The chemistries are consistent with a transudate of a pleural effusion. Pleural space infection highly unlikely with a negative Gram stain. She is not appearing to have an empyema. Suspect volume overload to be the culprit of the effusions. On Bumex. Nephrology following.       Electronically signed by Mickey Bazzi MD on 12/27/2020 at 2:32 PM

## 2020-12-27 NOTE — CONSULTS
NEPHROLOGY CONSULT     Interval history  Shortness of breath is improving. Patient had a thoracentesis yesterday with 500 cc removed from the left chest. Renal  Function is stable    History of Present Illness: This is a 70 y.o. female with history of hypertension, COPD, atrial fibrillation on eliquis, CHF with diastolic heart failure, who presented to the ER on 12/18/20 with progressive shortness of breath. Patient was recently admitted for GI bleed with severe anemia. Patient  has had increasing shortness of breath and fatigue and was told to go to the ER by her PCP due to a low  Hemoglobin of 6. 6. She received 2 units of packed red blood cells and Eliquis was placed on hold. She  had EGD on 12/23/20  showing 6 AVMs in jejunum which were cauterised. She has been diuresed with IV Lasix and switched to oral Lasix 40 mg twice daily. Chest x-ray did  show a left pleural effusion and she had left thoracentesis with 525 cc of fluid removed from the left chest.. Her creatinine has gradually risen from 1.1 mg/dl on 12/22/20 to 1.5 mg/dl today. Patient denies dysuria, gross hematuria, flank pain, nocturia, urgency, passing frothy urine or urinary incontinence. There has been no recent exposure to IV contrast.         Past Medical History:        Diagnosis Date    A-fib ([facility]     Acquired hypothyroidism     Acute encephalopathy     Acute kidney injury ([address]     Anxiety     Benign hypertension with CKD (chronic kidney disease) stage III     Chronic back pain     CKD (chronic kidney disease) stage 3, GFR 30-59 ml/min     Constipation     COPD (chronic obstructive pulmonary disease) (HCC)     Cough     Depression     ETOH abuse     Former smoker     3 packs a day for 26 years    HA (generalized anxiety disorder)     History of GI bleed     Hyperlipidemia     Hypertension     Hypothyroid 1/18/2013    Leg pain     Normocytic anemia     Osteoarthritis     PAD (peripheral artery disease) ([address]  Primary osteoarthritis     Pulmonary hypertension (HCC)     Pure hypercholesterolemia     SIRS (systemic inflammatory response syndrome) (HCC)     Urge incontinence     Wheezing        Past Surgical History:        Procedure Laterality Date    COLONOSCOPY      COLONOSCOPY N/A 12/2/2020    COLONOSCOPY CONTROL OF BLEEDING performed by Nicole Krishna MD at [address] [place] [address] Bilateral     cataracts    JOINT REPLACEMENT      Left knee on 9-11-18    THORACENTESIS  12/26/2020         UPPER GASTROINTESTINAL ENDOSCOPY N/A 12/2/2020    EGD performed by Nicole Krishna MD at [address] [place] [address] 12/23/2020    PUSH ENTEROSCOPY/EGD CONTROL BLEEDING performed by Nicole Krishna MD at [facility]       Current Medications:        bumetanide (BUMEX) tablet 1 mg, BID      lisinopril (PRINIVIL;ZESTRIL) tablet 2.5 mg, Lunch      metoprolol tartrate (LOPRESSOR) tablet 25 mg, BID      ondansetron (ZOFRAN) injection 4 mg, Q6H PRN      guaiFENesin (MUCINEX) extended release tablet 1,200 mg, BID      fluticasone (FLONASE) 50 MCG/ACT nasal spray 2 spray, Daily      sodium chloride flush 0.9 % injection 10 mL, 2 times per day      sodium chloride flush 0.9 % injection 10 mL, PRN      acetaminophen (TYLENOL) tablet 650 mg, Q4H PRN      ferrous sulfate (IRON 325) tablet 325 mg, Daily      ascorbic acid (VITAMIN C) tablet 1,000 mg, Daily      atorvastatin (LIPITOR) tablet 10 mg, Daily      dilTIAZem (CARDIZEM CD) extended release capsule 120 mg, Daily      albuterol (PROVENTIL) nebulizer solution 2.5 mg, Q6H PRN      clonazePAM (KLONOPIN) tablet 0.5 mg, TID PRN      fluticasone (FLOVENT HFA) 110 MCG/ACT inhaler 2 puff, BID      baclofen (LIORESAL) tablet 10 mg, TID PRN      HYDROcodone-acetaminophen (NORCO) 5-325 MG per tablet 1 tablet, Q8H PRN      apixaban (ELIQUIS) tablet 5 mg, BID      levothyroxine (SYNTHROID) tablet 125 mcg, Daily   pantoprazole (PROTONIX) tablet 40 mg, QAM AC      DULoxetine (CYMBALTA) extended release capsule 60 mg, Daily      tamsulosin (FLOMAX) capsule 0.4 mg, Daily      potassium chloride (KLOR-CON M) extended release tablet 40 mEq, PRN    Or      potassium bicarb-citric acid (EFFER-K) effervescent tablet 40 mEq, PRN    Or      potassium chloride 10 mEq/100 mL IVPB (Peripheral Line), PRN        Allergies:  Tizanidine    Social History:   Social History     Socioeconomic History    Marital status:      Spouse name: Not on file    Number of children: Not on file    Years of education: Not on file    Highest education level: Not on file   Occupational History    Not on file   Social Needs    Financial resource strain: Not on file    Food insecurity     Worry: Not on file     Inability: Not on file    Transportation needs     Medical: Not on file     Non-medical: Not on file   Tobacco Use    Smoking status: Former Smoker     Packs/day: 3.00     Years: 26.00     Pack years: 78.00     Types: Cigarettes    Smokeless tobacco: Never Used   Substance and Sexual Activity    Alcohol use: Yes     Comment: occ    Drug use: No    Sexual activity: Not on file   Lifestyle    Physical activity     Days per week: Not on file     Minutes per session: Not on file    Stress: Not on file   Relationships    Social connections     Talks on phone: Not on file     Gets together: Not on file     Attends [employer] service: Not on file     Active member of club or organization: Not on file     Attends meetings of clubs or organizations: Not on file     Relationship status: Not on file    Intimate partner violence     Fear of current or ex partner: Not on file     Emotionally abused: Not on file     Physically abused: Not on file     Forced sexual activity: Not on file   Other Topics Concern    Not on file   Social History Narrative    Not on file       Family History:   Family History   Problem Relation Age of Onset  Heart Disease Mother     COPD Mother     Emphysema Sister     Cancer Other         Cousin - breast cancer       Review of Systems:    Constitutional: No fever, no chills, no night sweats, fatigue, generalized weakness, loss of appetite  HEENT:  No headache, otalgia, itchy eyes, epistaxis, nasal discharge or sore throat. Cardiac:  No chest pain, +dyspnea, orthopnea or PND, palpitations  Chest:  No cough, hemoptysis, pleuritic chest pain, wheezing,SOB  Abdomen:  No abdominal pain, nausea, vomiting, diarrhea, melena, dysphagia hematemesis,constipation, abdominal bloating, flank pain  Neuro:  No CVA, TIA or seizure like activity. Skin:   No rashes, no itching. :   No hematuria, no pyuria, no dysuria, no flank pain. Extremities:  2 +Leg  swelling or joint pains. Objective:  CURRENT TEMPERATURE:  Temp: 98.4 °F (36.9 °C)  MAXIMUM TEMPERATURE OVER 24HRS:  Temp (24hrs), Av.1 °F (36.7 °C), Min:97.7 °F (36.5 °C), Max:98.4 °F (36.9 °C)    CURRENT RESPIRATORY RATE:  Resp: 17  CURRENT PULSE:  Pulse: 102(Coughing while taking VS)  CURRENT BLOOD PRESSURE:  BP: (!) 128/111(Coughing while taking VS)  24HR BLOOD PRESSURE RANGE:  Systolic (84HEY), XYK:951 , Min:124 , NBV:834   ; Diastolic (85WYL), MGI:30, Min:55, Max:111    24HR INTAKE/OUTPUT:      Intake/Output Summary (Last 24 hours) at 2020 1629  Last data filed at 2020 1430  Gross per 24 hour   Intake 1120 ml   Output 1950 ml   Net -830 ml     Patient Vitals for the past 96 hrs (Last 3 readings):   Weight   20 0035 176 lb 5.9 oz (80 kg)   20 0420 185 lb 10 oz (84.2 kg)   20 0251 184 lb 15.5 oz (83.9 kg)         Physical Exam:  GENERAL APPEARANCE: Alert and cooperative, and appears to be in no acute distress. HEAD: normocephalic  EYES: PERRL, EOMI. Not pale, anicteric   NOSE:  No nasal discharge. THROAT:  Oral cavity and pharynx normal. Moist  NECK: Neck supple, non-tender without lymphadenopathy, masses or thyromegaly.  JVD-neg CARDIAC: Normal S1 and S2. No S3, S4 or murmurs. Rhythm is regular. LUNGS: rales +, diminished breath sounds. ABD-Soft non distended, BS+ Non tender no organomegally  BACK: Examination of the spine reveals  no spinal deformity, without tenderness,   MUSKULOSKELETAL: Adequately aligned spine. No joint erythema or tenderness. EXTREMITIES: 2 + lower extremity  edema. Peripheral pulses intact. NEURO:Alert oriented x 3 ,power 5/5 in all extremities      Labs:   CBC:  Recent Labs     12/27/20  0603   HGB 10.0*   HCT 31.6*      BMP:   Recent Labs     12/25/20  0544 12/26/20  0535 12/27/20  0603    143 146*   K 4.0 3.7 3.1*   CL 97* 98 100   CO2 31 36* 40*   BUN 24* 31* 22   CREATININE 1.43* 1.53* 1.04*   GLUCOSE 92 129* 121*   CALCIUM 9.7 9.4 9.0        Phosphorus:  No results for input(s): PHOS in the last 72 hours. Magnesium:   No results for input(s): MG in the last 72 hours. Albumin:   No results for input(s): LABALBU in the last 72 hours. IRON:    Lab Results   Component Value Date    IRON 39 12/20/2020     Iron Saturation:  No components found for: PERCENTFE  TIBC:    Lab Results   Component Value Date    TIBC 300 12/20/2020     FERRITIN:    Lab Results   Component Value Date    FERRITIN 37 12/18/2020     SPEP:   Lab Results   Component Value Date    PROT 6.8 12/24/2020    ALBCAL 4.1 03/16/2020    ALBPCT 62 03/16/2020    LABALPH 0.3 03/16/2020    LABALPH 0.8 03/16/2020    A1PCT 4 03/16/2020    A2PCT 12 03/16/2020    LABBETA 0.7 03/16/2020    BETAPCT 10 03/16/2020    GAMGLOB 0.8 03/16/2020    GGPCT 12 03/16/2020    PATH ELECTRONICALLY SIGNED. Louise Martin M.D. 03/16/2020     UPEP: No results found for: TPU     C3:   Lab Results   Component Value Date    C3 128 03/16/2020     C4:   Lab Results   Component Value Date    C4 36 03/16/2020     MPO ANCA:  No results found for: MPO .   PR3 ANCA:  No results found for: PR3  Urine Sodium:    Lab Results   Component Value Date    ANGÉLICA 27 09/10/2020 Urine Potassium:    Lab Results   Component Value Date    KUR 35.7 07/20/2020     Urine Chloride:  No components found for: CLU  Urine Ph:  No components found for: PO4U  Urine Osmolarity:  No results found for: OSMOU  Urine Creatinine:    Lab Results   Component Value Date    LABCREA 23.2 12/27/2020     Urine Eosinophils: No components found for: EOSU  Urine Protein:  No results found for: TPU  Urinalysis:  U/A:   Lab Results   Component Value Date    NITRU NEGATIVE 09/10/2020    COLORU YELLOW 09/10/2020    PHUR 5.5 09/10/2020    WBCUA 2 TO 5 09/10/2020    RBCUA 0 TO 2 09/10/2020    MUCUS NOT REPORTED 09/10/2020    TRICHOMONAS NOT REPORTED 09/10/2020    YEAST NOT REPORTED 09/10/2020    BACTERIA FEW 09/10/2020    CLARITYU Clear 01/31/2020    SPECGRAV 1.021 09/10/2020    LEUKOCYTESUR SMALL 09/10/2020    UROBILINOGEN Normal 09/10/2020    BILIRUBINUR  09/10/2020     Presumptive positive. Unable to confirm due to unavailability of reagent. BILIRUBINUR 0 01/31/2020    BLOODU Negative 01/31/2020    GLUCOSEU NEGATIVE 09/10/2020    KETUA NEGATIVE 09/10/2020    AMORPHOUS 2+ 09/10/2020         Radiology:  Reviewed as available. Assessment:  1. Acute kidney injury secondary to prerenal azotemia from diuretics, recent anemia  and transient hypotension-renal  function stable     2. CKD stage 3.secondary to nephrosclerosis Baseline creatinine 1.1 to 1.3 mg/dL. 3. Acute on chronic systolic heart failure with diastolic dysfunction    4. Bilateral lower  extremity edema     5. Acute anemia -stable s/p PRBC x2 -Hb 10.0  gm/dl today. 6.Left pleural effusion status post thoracentesis, likely transudative       Plan:  1. Continue Bumex 1 mg twice daily   2. 1500 mils fluid restriction with 2 g sodium diet daily. 3. Patient still requires more diuresis and we may need to accept a higher baseline creatinine to achieve euvolemia  4. Check urine protein creatinine  5. Continue low dose lisinopril 2.5 mg dly 6. Keep SBP> 100 mg. 7. BMP daily    Thank you for the consultation.       Electronically signed by Germán Guevara MD on 12/27/2020 at 4:29 PM

## 2020-12-27 NOTE — PROGRESS NOTES
Today's Date: 12/27/2020  Patient Name: Marisela Contreras  Date of admission: 12/18/2020  8:33 PM  Patient's age: 70 y.o., 1945  Admission Dx: Symptomatic anemia [D64.9]    Reason for Consult: anemia  Requesting Physician: Angelica Singh MD    CHIEF COMPLAINT:    Chief Complaint   Patient presents with    Abnormal Lab     Patient comes to the ER with a hemoglobin of 6.6, per her physician, to get a blood transfusion.  Shortness of Breath     Dx with pneumonia today by her physician after having a chest x-ray Thursday. Has not started her antibiotics. Says the drug store has yet to deliver it. SUBJECTIVE:    Patient seen and examined  Labs and vitals reviewed. Hemoglobin is stable  Creatinine improved  Patient resting comfortably. HISTORY OF PRESENT ILLNESS IN BRIEF:    Marisela Contreras  is a 70 y.o. female who is admitted to the hospital for abnormal lab and hemoglobin of 6.6. Patient has recently been evaluated by Dr. Anahi Jacobo hematologist as outpatient on 12/18 for chronic anemia, possible iron deficiency, history of AV malformation. He has ordered some labs and there was a plan to start patient on IV iron supplements if her lab work suggest significant iron deficiency. Her lab work at the office showed hemoglobin of 6.6 and therefore she was instructed to go to ER. Patient has been maintained on chronically anticoagulation with Eliquis for her history of atrial fibrillation. Past Medical History:   has a past medical history of A-fib ([facility] Acquired hypothyroidism, Acute encephalopathy, Acute kidney injury ([facility] [address] Anxiety, Benign hypertension with CKD (chronic kidney disease) stage III, Chronic back pain, CKD (chronic kidney disease) stage 3, GFR 30-59 ml/min, Constipation, COPD (chronic obstructive pulmonary disease) ([facility] [facility] Cough, Depression, ETOH abuse, Former smoker, HA (generalized anxiety disorder), History of GI bleed, Hyperlipidemia, Hypertension, Hypothyroid, Leg pain, Normocytic anemia, Osteoarthritis, PAD (peripheral artery disease) ([facility] Primary osteoarthritis, Pulmonary hypertension ([facility] [address] Pure hypercholesterolemia, SIRS (systemic inflammatory response syndrome) ([facility] [facility] Urge incontinence, and Wheezing. Past Surgical History:   has a past surgical history that includes eye surgery (Bilateral); Colonoscopy; joint replacement; Upper gastrointestinal endoscopy (N/A, 12/2/2020); Colonoscopy (N/A, 12/2/2020); Upper gastrointestinal endoscopy (N/A, 12/23/2020); and Thoracentesis (12/26/2020). Medications:    Prior to Admission medications    Medication Sig Start Date End Date Taking?  Authorizing Provider   tamsulosin (FLOMAX) 0.4 MG capsule TAKE 1 CAPSULE BY MOUTH DAILY 12/15/20  Yes WILLIE Ngo CNP   omeprazole (PRILOSEC) 40 MG delayed release capsule TAKE 1 CAPSULE BY MOUTH EVERY MORNING BEFORE BREAKFAST 12/14/20  Yes WILLIE Ngo CNP   DULoxetine (CYMBALTA) 60 MG extended release capsule Take 1 capsule by mouth daily 12/14/20  Yes WILLIE Ngo CNP   furosemide (LASIX) 40 MG tablet TAKE 1 TABLET BY MOUTH DAILY  Patient taking differently: Take 40 mg by mouth daily Currently taking 2 tablets daily  12/18/20 11/30/20  Yes WILLIE Ngo CNP   baclofen (LIORESAL) 10 MG tablet TAKE ONE TABLET BY MOUTH THREE TIMES A DAY AS NEEDED. 11/30/20  Yes WILLIE Ngo CNP HYDROcodone-acetaminophen (NORCO) 5-325 MG per tablet Take 1 tablet by mouth every 8 hours as needed for Pain for up to 30 days. 11/30/20 12/30/20 Yes WILLIE Osborn CNP   lisinopril-hydroCHLOROthiazide (PRINZIDE;ZESTORETIC) 10-12.5 MG per tablet Take 1 tablet by mouth daily 11/30/20  Yes WILILE Osborn CNP   ELIQUIS 5 MG TABS tablet TAKE ONE TABLET BY MOUTH TWICE A DAY 11/30/20  Yes WILLIE Osborn CNP   levothyroxine (SYNTHROID) 125 MCG tablet Take 1 tablet by mouth daily 11/30/20  Yes WILLIE Osborn CNP   metoprolol succinate (TOPROL XL) 25 MG extended release tablet TAKE 1 TABLET BY MOUTH DAILY. (PLEASE MAKE DR HER FOR FUTURE REFILLS) 11/23/20  Yes WILLIE Osborn CNP   clonazePAM (KLONOPIN) 0.5 MG tablet Take 1 tablet by mouth 3 times daily as needed for Anxiety for up to 30 days.  11/17/20 12/19/20 Yes WILLIE Osborn CNP   fluticasone (FLOVENT HFA) 220 MCG/ACT inhaler Inhale 2 puffs into the lungs 2 times daily 11/17/20  Yes WILLIE Osborn CNP   albuterol (PROVENTIL) (2.5 MG/3ML) 0.083% nebulizer solution Take 3 mLs by nebulization every 6 hours as needed for Wheezing 11/5/20  Yes WILLIE Osborn CNP   dilTIAZem (CARDIZEM CD) 120 MG extended release capsule Take 1 capsule by mouth daily 10/19/20  Yes WILLIE Osborn CNP   albuterol sulfate HFA (PROAIR HFA) 108 (90 Base) MCG/ACT inhaler Inhale 2 puffs into the lungs every 6 hours as needed for Wheezing   Yes Historical Provider, MD   fluticasone (FLONASE) 50 MCG/ACT nasal spray USE ONE SPRAY TO EACH NOSTRIL ONCE ADAY 9/4/20  Yes Danell Dakins, APRN - CNP   atorvastatin (LIPITOR) 10 MG tablet TAKE 1 TABLET BY MOUTH ONCE DAILY 9/4/20  Yes Denise Dakins, APRN - CNP   Ascorbic Acid (C-1000 PO) Take 1 capsule by mouth daily    Yes Historical Provider, MD   ferrous sulfate 325 (65 Fe) MG tablet Take 325 mg by mouth daily  8/30/18  Yes Historical Provider, MD Oxygen Concentrator continuous    Historical Provider, MD   Lift Chair MISC Use daily for comfort  Patient not taking: Reported on 12/18/2020 11/30/20   WILLIE Villa CNP   PROAIR  (71 Base) MCG/ACT inhaler Inhale 2 puffs into the lungs every 6 hours as needed for Wheezing 11/17/20   WILLIE Villa CNP   levalbuterol [facility] HFA) 45 MCG/ACT inhaler Inhale 1 puff into the lungs every 4 hours as needed for Wheezing 8/19/20   WILLIE Villa CNP   Respiratory Therapy Supplies (NEBULIZER/TUBING/MOUTHPIECE) KIT Use every 4-6 hours prn for copd 6/15/20   WILLIE Villa CNP     Current Facility-Administered Medications   Medication Dose Route Frequency Provider Last Rate Last Admin    bumetanide (BUMEX) tablet 1 mg  1 mg Oral BID Love Andrade MD        lisinopril (PRINIVIL;ZESTRIL) tablet 2.5 mg  2.5 mg Oral Lunch Chuck Parisi MD   2.5 mg at 12/25/20 1207    metoprolol tartrate (LOPRESSOR) tablet 25 mg  25 mg Oral BID Lauryn Melgar MD   25 mg at 12/26/20 2128    ondansetron (ZOFRAN) injection 4 mg  4 mg Intravenous Q6H PRN Dwayne Clemente MD   4 mg at 12/22/20 1044    guaiFENesin (MUCINEX) extended release tablet 1,200 mg  1,200 mg Oral BID Lauryn Melgar MD   1,200 mg at 12/26/20 2129    fluticasone (FLONASE) 50 MCG/ACT nasal spray 2 spray  2 spray Each Nostril Daily Dwayne Clemente MD   2 spray at 12/26/20 0917    sodium chloride flush 0.9 % injection 10 mL  10 mL Intravenous 2 times per day Lauryn Melgar MD   10 mL at 12/26/20 2129    sodium chloride flush 0.9 % injection 10 mL  10 mL Intravenous PRN Dwayne Clemente MD        acetaminophen (TYLENOL) tablet 650 mg  650 mg Oral Q4H PRN Dwayne Clemente MD   650 mg at 12/22/20 2106    ferrous sulfate (IRON 325) tablet 325 mg  325 mg Oral Daily Lauryn Melgar MD   325 mg at 12/26/20 3809  ascorbic acid (VITAMIN C) tablet 1,000 mg  1,000 mg Oral Daily Dwayne Clemente MD   1,000 mg at 12/26/20 0919    atorvastatin (LIPITOR) tablet 10 mg  10 mg Oral Daily Dwayne Clemente MD   10 mg at 12/26/20 0918    dilTIAZem (CARDIZEM CD) extended release capsule 120 mg  120 mg Oral Daily Kasia Merino MD   120 mg at 12/26/20 0919    albuterol (PROVENTIL) nebulizer solution 2.5 mg  2.5 mg Nebulization Q6H PRN Dwayne Clemente MD   2.5 mg at 12/24/20 1110    clonazePAM (KLONOPIN) tablet 0.5 mg  0.5 mg Oral TID PRN Kasia Merino MD   0.5 mg at 12/25/20 1827    fluticasone (FLOVENT HFA) 110 MCG/ACT inhaler 2 puff  2 puff Inhalation BID Kasia Merino MD   2 puff at 12/26/20 2129    baclofen (LIORESAL) tablet 10 mg  10 mg Oral TID PRN Kasia Merino MD   10 mg at 12/20/20 2118    HYDROcodone-acetaminophen (Nirmala Acushnet) 5-325 MG per tablet 1 tablet  1 tablet Oral Q8H PRN Kasia Merino MD   1 tablet at 12/27/20 0425    apixaban (ELIQUIS) tablet 5 mg  5 mg Oral BID Mariam Sims MD   5 mg at 12/26/20 2129    levothyroxine (SYNTHROID) tablet 125 mcg  125 mcg Oral Daily Dwayne Clemente MD   125 mcg at 12/27/20 0606    pantoprazole (PROTONIX) tablet 40 mg  40 mg Oral QAM AC Kasia Merino MD   40 mg at 12/27/20 0606    DULoxetine (CYMBALTA) extended release capsule 60 mg  60 mg Oral Daily Kasia Merino MD   60 mg at 12/26/20 0918    tamsulosin (FLOMAX) capsule 0.4 mg  0.4 mg Oral Daily Dwayne Clemente MD   0.4 mg at 12/26/20 1229    potassium chloride (KLOR-CON M) extended release tablet 40 mEq  40 mEq Oral PRN Kasia Merino MD        Or    potassium bicarb-citric acid (EFFER-K) effervescent tablet 40 mEq  40 mEq Oral PRN Dwayne Clemente MD        Or    potassium chloride 10 mEq/100 mL IVPB (Peripheral Line)  10 mEq Intravenous PRN Kasia Merino MD           Allergies:  Tizanidine Social History:   reports that she has quit smoking. Her smoking use included cigarettes. She has a 78.00 pack-year smoking history. She has never used smokeless tobacco. She reports current alcohol use. She reports that she does not use drugs. Family History: family history includes COPD in her mother; Cancer in an other family member; Emphysema in her sister; Heart Disease in her mother. REVIEW OF SYSTEMS:    Constitutional: ++fatigue, No fever or chills. No night sweats, no weight loss   Eyes: No eye discharge, double vision, or eye pain   HEENT: negative for sore mouth, sore throat, hoarseness and voice change   Respiratory: negative for cough , sputum, dyspnea, wheezing, hemoptysis, chest pain   Cardiovascular: negative for chest pain, dyspnea, palpitations, orthopnea, PND   Gastrointestinal: negative for nausea, vomiting, diarrhea, constipation, abdominal pain, Dysphagia, hematemesis and hematochezia   Genitourinary: negative for frequency, dysuria, nocturia, urinary incontinence, and hematuria   Integument: negative for rash, skin lesions, bruises.    Hematologic/Lymphatic: negative for easy bruising, bleeding, lymphadenopathy, or petechiae   Endocrine: negative for heat or cold intolerance,weight changes, change in bowel habits and hair loss   Musculoskeletal: negative for myalgias, arthralgias, pain, joint swelling,and bone pain   Neurological: negative for headaches, dizziness, seizures, weakness, numbness    PHYSICAL EXAM:      /72   Pulse 90   Temp 98.2 °F (36.8 °C) (Oral)   Resp 18   Ht 5' 5\" (1.651 m)   Wt 176 lb 5.9 oz (80 kg)   SpO2 97%   BMI 29.35 kg/m²    Temp (24hrs), Av °F (36.7 °C), Min:97.7 °F (36.5 °C), Max:98.2 °F (36.8 °C)    General appearance - well appearing, no in pain or distress   Mental status - alert and cooperative   Eyes - pupils equal and reactive, extraocular eye movements intact   Ears - bilateral TM's and external ear canals normal Mouth - mucous membranes moist, pharynx normal without lesions   Neck - supple, no significant adenopathy   Lymphatics - no palpable lymphadenopathy, no hepatosplenomegaly   Chest - clear to auscultation, no wheezes, rales or rhonchi, symmetric air entry   Heart - normal rate, regular rhythm, normal S1, S2, no murmurs  Abdomen - soft, nontender, nondistended, no masses or organomegaly   Neurological - alert, oriented, normal speech, no focal findings or movement disorder noted   Musculoskeletal - no joint tenderness, deformity or swelling   Extremities - peripheral pulses normal, no pedal edema, no clubbing or cyanosis   Skin - normal coloration and turgor, no rashes, no suspicious skin lesions noted ,    DATA:    Labs:   CBC:   Recent Labs     12/27/20  0603   HGB 10.0*   HCT 31.6*     BMP:   Recent Labs     12/26/20  0535 12/27/20  0603    146*   K 3.7 3.1*   CO2 36* 40*   BUN 31* 22   CREATININE 1.53* 1.04*   LABGLOM 33* 52*   GLUCOSE 129* 121*     PT/INR:   No results for input(s): PROTIME, INR in the last 72 hours. IMAGING DATA:  @IMG@   XR CHEST PORTABLE   Final Result   Cardiomegaly unchanged. Blunting of left costophrenic angle possibly due to small pleural effusion. Right pleural effusion on prior not appreciated         US THORACENTESIS Which side should the procedure be performed? Left   Final Result   Moderate left pleural effusion. XR CHEST (2 VW)   Final Result   Small-moderate left pleural effusion. Underlying opacity may represent   atelectasis with pneumonia not excluded. XR CHEST PORTABLE   Final Result   1. Left IJ central line in place, tip overlying the mid SVC   2. No evidence of a pneumothorax   3.  Small left pleural effusion, and left basilar opacity, differential   considerations include atelectasis versus pneumonia             Primary Problem  Symptomatic anemia    Active Hospital Problems    Diagnosis Date Noted    Symptomatic anemia [D64.9] 12/19/2020  Pneumonia due to organism [J18.9]     Iron deficiency anemia due to chronic blood loss [D50.0] 12/18/2020    Acute on chronic diastolic CHF (congestive heart failure) ([facility] [I50.33] 07/21/2020    Obesity, Class I, BMI 30-34.9 [E66.9] 07/20/2020    Moderate major depression ([facility] [F32.1] 06/17/2020    PAD (peripheral artery disease) ([facility]) [I73.9] 06/17/2020    CKD (chronic kidney disease) stage 3, GFR 30-59 ml/min [N18.30]     HA (generalized anxiety disorder) [F41.1] 12/13/2018    A-fib ([facility] [I48.91] 09/13/2018    Pulmonary hypertension ([facility] [I27.20] 09/13/2018    COPD (chronic obstructive pulmonary disease) ([facility] [J44.9] 09/13/2018    Acquired hypothyroidism [E03.9] 01/18/2013         IMPRESSION:   1. Severe anemia: Patient has history of chronic anemia. She is on chronic anticoagulation and history of female patient which can contribute to GI bleed. 2. SUNSHINE  3. Atrial fibrillation  4. COPD  5. GERD    RECOMMENDATIONS:  1. I reviewed the laboratory data, imaging studies, diagnosis, prognosis and treatment options with patient  2. She had upper endoscopy and colonoscopy on 12/2. She was noted to have 2 AVMs and had APC treatment  3. Now POSITIVE stool for occult blood. 4. EGD showed AVM, had APC  5. Continue to monitor H&H  6. No plans for bone marrow biopsy at this point  7. Follow-up on cytology from thoracentesis. 8. Follow with hematology in 2-3 weeks      Discussed with patient and Nurse. Thank you for asking us to see this patient. Kaden Doty        This note is created with the assistance of a speech recognition program.  While intending to generate a document that actually reflects the content of the visit, the document can still have some errors including those of syntax and sound a like substitutions which may escape proof reading. It such instances, actual meaning can be extrapolated by contextual diversion.

## 2020-12-27 NOTE — PLAN OF CARE
Problem: Falls - Risk of:  Goal: Will remain free from falls  12/27/2020 1451 by Isaac Vinson, RN  Outcome: Met This Shift: Patient remained free from falls and injury per shift; call light within reach, bed kept at lowest position, pathway clear, hourly rounds implemented. 12/27/2020 0422 by Sima Maya RN  Outcome: Ongoing  Note: Pt. Free of falls and injuries this shift.       Problem: Skin Integrity:  Goal: Absence of new skin breakdown  Outcome: Met This Shift: No new skin breakdown/pressure ulcer per shift

## 2020-12-28 LAB
ANION GAP SERPL CALCULATED.3IONS-SCNC: 4 MMOL/L (ref 9–17)
BUN BLDV-MCNC: 14 MG/DL (ref 8–23)
BUN/CREAT BLD: ABNORMAL (ref 9–20)
CALCIUM SERPL-MCNC: 9.1 MG/DL (ref 8.6–10.4)
CHLORIDE BLD-SCNC: 96 MMOL/L (ref 98–107)
CO2: 44 MMOL/L (ref 20–31)
CREAT SERPL-MCNC: 0.92 MG/DL (ref 0.5–0.9)
GFR AFRICAN AMERICAN: >60 ML/MIN
GFR NON-AFRICAN AMERICAN: 60 ML/MIN
GFR SERPL CREATININE-BSD FRML MDRD: ABNORMAL ML/MIN/{1.73_M2}
GFR SERPL CREATININE-BSD FRML MDRD: ABNORMAL ML/MIN/{1.73_M2}
GLUCOSE BLD-MCNC: 112 MG/DL (ref 70–99)
POTASSIUM SERPL-SCNC: 3.3 MMOL/L (ref 3.7–5.3)
PROCALCITONIN: 0.08 NG/ML
SODIUM BLD-SCNC: 144 MMOL/L (ref 135–144)

## 2020-12-28 PROCEDURE — 6370000000 HC RX 637 (ALT 250 FOR IP): Performed by: INTERNAL MEDICINE

## 2020-12-28 PROCEDURE — 97116 GAIT TRAINING THERAPY: CPT

## 2020-12-28 PROCEDURE — 97110 THERAPEUTIC EXERCISES: CPT

## 2020-12-28 PROCEDURE — 6360000002 HC RX W HCPCS: Performed by: INTERNAL MEDICINE

## 2020-12-28 PROCEDURE — 84145 PROCALCITONIN (PCT): CPT

## 2020-12-28 PROCEDURE — 6370000000 HC RX 637 (ALT 250 FOR IP): Performed by: FAMILY MEDICINE

## 2020-12-28 PROCEDURE — 80048 BASIC METABOLIC PNL TOTAL CA: CPT

## 2020-12-28 PROCEDURE — 1200000000 HC SEMI PRIVATE

## 2020-12-28 PROCEDURE — 99232 SBSQ HOSP IP/OBS MODERATE 35: CPT | Performed by: INTERNAL MEDICINE

## 2020-12-28 PROCEDURE — 99232 SBSQ HOSP IP/OBS MODERATE 35: CPT | Performed by: FAMILY MEDICINE

## 2020-12-28 PROCEDURE — 2580000003 HC RX 258: Performed by: INTERNAL MEDICINE

## 2020-12-28 PROCEDURE — 36415 COLL VENOUS BLD VENIPUNCTURE: CPT

## 2020-12-28 RX ADMIN — POTASSIUM CHLORIDE 40 MEQ: 1500 TABLET, EXTENDED RELEASE ORAL at 09:27

## 2020-12-28 RX ADMIN — BUMETANIDE 1 MG: 1 TABLET ORAL at 18:03

## 2020-12-28 RX ADMIN — METOPROLOL TARTRATE 25 MG: 25 TABLET, FILM COATED ORAL at 09:27

## 2020-12-28 RX ADMIN — GUAIFENESIN 1200 MG: 600 TABLET, EXTENDED RELEASE ORAL at 09:27

## 2020-12-28 RX ADMIN — DILTIAZEM HYDROCHLORIDE 120 MG: 120 CAPSULE, COATED, EXTENDED RELEASE ORAL at 09:28

## 2020-12-28 RX ADMIN — FERROUS SULFATE TAB 325 MG (65 MG ELEMENTAL FE) 325 MG: 325 (65 FE) TAB at 09:27

## 2020-12-28 RX ADMIN — TAMSULOSIN HYDROCHLORIDE 0.4 MG: 0.4 CAPSULE ORAL at 09:27

## 2020-12-28 RX ADMIN — ATORVASTATIN CALCIUM 10 MG: 10 TABLET, FILM COATED ORAL at 09:27

## 2020-12-28 RX ADMIN — HYDROCODONE BITARTRATE AND ACETAMINOPHEN 1 TABLET: 5; 325 TABLET ORAL at 11:54

## 2020-12-28 RX ADMIN — FLUTICASONE PROPIONATE 2 SPRAY: 50 SPRAY, METERED NASAL at 09:38

## 2020-12-28 RX ADMIN — LISINOPRIL 2.5 MG: 5 TABLET ORAL at 12:00

## 2020-12-28 RX ADMIN — Medication 10 ML: at 21:31

## 2020-12-28 RX ADMIN — ACETAMINOPHEN 650 MG: 325 TABLET, FILM COATED ORAL at 23:17

## 2020-12-28 RX ADMIN — Medication 10 ML: at 09:39

## 2020-12-28 RX ADMIN — PANTOPRAZOLE SODIUM 40 MG: 40 TABLET, DELAYED RELEASE ORAL at 05:24

## 2020-12-28 RX ADMIN — GUAIFENESIN 1200 MG: 600 TABLET, EXTENDED RELEASE ORAL at 21:32

## 2020-12-28 RX ADMIN — APIXABAN 5 MG: 5 TABLET, FILM COATED ORAL at 21:31

## 2020-12-28 RX ADMIN — BACLOFEN 10 MG: 10 TABLET ORAL at 21:42

## 2020-12-28 RX ADMIN — DULOXETINE HYDROCHLORIDE 60 MG: 60 CAPSULE, DELAYED RELEASE ORAL at 09:28

## 2020-12-28 RX ADMIN — FLUTICASONE PROPIONATE 2 PUFF: 110 AEROSOL, METERED RESPIRATORY (INHALATION) at 21:31

## 2020-12-28 RX ADMIN — OXYCODONE HYDROCHLORIDE AND ACETAMINOPHEN 1000 MG: 500 TABLET ORAL at 09:28

## 2020-12-28 RX ADMIN — APIXABAN 5 MG: 5 TABLET, FILM COATED ORAL at 14:19

## 2020-12-28 RX ADMIN — METOPROLOL TARTRATE 25 MG: 25 TABLET, FILM COATED ORAL at 21:31

## 2020-12-28 RX ADMIN — FLUTICASONE PROPIONATE 2 PUFF: 110 AEROSOL, METERED RESPIRATORY (INHALATION) at 09:38

## 2020-12-28 RX ADMIN — LEVOTHYROXINE SODIUM 125 MCG: 125 TABLET ORAL at 05:24

## 2020-12-28 RX ADMIN — BUMETANIDE 1 MG: 1 TABLET ORAL at 09:28

## 2020-12-28 RX ADMIN — ONDANSETRON 4 MG: 2 INJECTION INTRAMUSCULAR; INTRAVENOUS at 11:54

## 2020-12-28 ASSESSMENT — PAIN SCALES - GENERAL
PAINLEVEL_OUTOF10: 3
PAINLEVEL_OUTOF10: 5
PAINLEVEL_OUTOF10: 3

## 2020-12-28 NOTE — PLAN OF CARE
Problem: Falls - Risk of:  Goal: Will remain free from falls  Description: Will remain free from falls  12/28/2020 0406 by Lavon Alvarez RN  Outcome: Ongoing  Note: Pt. Free of falls and injuries this shift. Problem: Bleeding:  Goal: Will show no signs and symptoms of excessive bleeding  Description: Will show no signs and symptoms of excessive bleeding  12/28/2020 0406 by Lavon Alvarez RN  Outcome: Ongoing     Problem: Pain:  Goal: Pain level will decrease  Description: Pain level will decrease  12/28/2020 0406 by Lavon Alvarez RN  Outcome: Ongoing  Note: Adequate pain control achieved this shift. See MAR. Problem: Skin Integrity:  Goal: Will show no infection signs and symptoms  Description: Will show no infection signs and symptoms  12/28/2020 0406 by Lavon Alvarez RN  Outcome: Ongoing  Note: Skin integrity improved/maintained this shift. See head to toe assessment.

## 2020-12-28 NOTE — PROGRESS NOTES
RN attempted to wean patient's oxygen but patient dropped into the 80s.  Placed back on nasal cannula

## 2020-12-28 NOTE — PROGRESS NOTES
Pulmonary Progress Note  [facility]      Patient - Hipolito Sorensen,  Age - 70 y.o.    - 1945      Room Number - 2097/2097-01   MRN -  [id number]   [facility]  Date of Admission -  2020  8:33 PM        [address], [place]  Primary Care Physician - WILLIE Alfaro - CNP     SUBJECTIVE   Patient appears to be in fair spirits. Currently she is on 1 L of oxygen. Saturations 9495% on 1 L. She normally does not wear oxygen on a regular basis per her report    OBJECTIVE   VITALS    height is 5' 5\" (1.651 m) and weight is 176 lb 5.9 oz (80 kg). Her oral temperature is 97.9 °F (36.6 °C). Her blood pressure is 115/66 and her pulse is 85. Her respiration is 18 and oxygen saturation is 94%. Body mass index is 29.35 kg/m². Temperature Range: Temp: 97.9 °F (36.6 °C) Temp  Av.1 °F (36.7 °C)  Min: 97.9 °F (36.6 °C)  Max: 98.5 °F (36.9 °C)  BP Range:  Systolic (62HRS), UPK:734 , Min:115 , KEVIN:187     Diastolic (65BYT), GSF:61, Min:66, Max:77    Pulse Range: Pulse  Av.8  Min: 67  Max: 91  Respiration Range: Resp  Av  Min: 18  Max: 18  Current Pulse Ox[de-identified]  SpO2: 94 %  24HR Pulse Ox Range:  SpO2  Av.3 %  Min: 94 %  Max: 99 %  Oxygen Amount and Delivery: O2 Flow Rate (L/min): 1 L/min    Wt Readings from Last 3 Encounters:   20 176 lb 5.9 oz (80 kg)   20 187 lb 6.4 oz (85 kg)   20 188 lb (85.3 kg)       I/O (24 Hours)    Intake/Output Summary (Last 24 hours) at 2020 1538  Last data filed at 2020 0521  Gross per 24 hour   Intake 200 ml   Output    Net 200 ml       EXAM     General Appearance  Awake, alert, oriented, in no acute distress  HEENT - normocephalic, atraumatic. Neck - Supple,  trachea midline   Lungs -coarse breath sounds no crackles rales or wheeze  Heart Exam:PMI normal. No lifts, heaves, or thrills. RRR.  No murmurs Abdomen Exam: Abdomen soft, non-tender. BS normal. No Extremity Exam: No signs of cyanosis    MEDS      bumetanide  1 mg Oral BID    lisinopril  2.5 mg Oral Lunch    metoprolol tartrate  25 mg Oral BID    guaiFENesin  1,200 mg Oral BID    fluticasone  2 spray Each Nostril Daily    sodium chloride flush  10 mL Intravenous 2 times per day    ferrous sulfate  325 mg Oral Daily    ascorbic acid  1,000 mg Oral Daily    atorvastatin  10 mg Oral Daily    dilTIAZem  120 mg Oral Daily    fluticasone  2 puff Inhalation BID    apixaban  5 mg Oral BID    levothyroxine  125 mcg Oral Daily    pantoprazole  40 mg Oral QAM AC    DULoxetine  60 mg Oral Daily    tamsulosin  0.4 mg Oral Daily       ondansetron, sodium chloride flush, acetaminophen, albuterol, clonazePAM, baclofen, HYDROcodone-acetaminophen, potassium chloride **OR** potassium alternative oral replacement **OR** potassium chloride    LABS   CBC   Recent Labs     12/27/20  0603   HGB 10.0*   HCT 31.6*     BMP:   Lab Results   Component Value Date     12/28/2020    K 3.3 12/28/2020    CL 96 12/28/2020    CO2 44 12/28/2020    BUN 14 12/28/2020    LABALBU 3.8 12/24/2020    LABALBU 4.5 02/18/2012    CREATININE 0.92 12/28/2020    CALCIUM 9.1 12/28/2020    GFRAA >60 12/28/2020    LABGLOM 60 12/28/2020     ABGs:  Lab Results   Component Value Date    PHART 7.330 09/13/2018    PO2ART 74.8 09/13/2018    DGH7PLG 47.3 09/13/2018      Lab Results   Component Value Date    MODE NOT REPORTED 09/13/2018     Ionized Calcium:  No results found for: IONCA  Magnesium:    Lab Results   Component Value Date    MG 2.2 12/24/2020     Phosphorus:    Lab Results   Component Value Date    PHOS 4.1 03/16/2020        LIVER PROFILE No results for input(s): AST, ALT, LIPASE, BILIDIR, BILITOT, ALKPHOS in the last 72 hours. Invalid input(s): AMYLASE,  ALB  INR No results for input(s): INR in the last 72 hours.   PTT   Lab Results   Component Value Date APTT 36.9 (H) 12/19/2020         RADIOLOGY     (See actual reports for details)    ASSESSMENT/PLAN     Patient Active Problem List   Diagnosis    Acquired hypothyroidism    Hypertension    Primary osteoarthritis of right knee    [facility]    Acute encephalopathy    SIRS (systemic inflammatory response syndrome) (HCC)    Normocytic anemia    Pulmonary hypertension (HCC)    COPD (chronic obstructive pulmonary disease) (HCC)    History of GI bleed    Constipation    HA (generalized anxiety disorder)    Lumbar radiculopathy    Lumbosacral spondylosis without myelopathy    Benign hypertension with CKD (chronic kidney disease) stage III    CKD (chronic kidney disease) stage 3, GFR 30-59 ml/min    Alcohol withdrawal syndrome with complication (HCC)    Moderate major depression (HCC)    PAD (peripheral artery disease) (HCC)    Acute kidney injury ([address]    Obesity, Class I, BMI 30-34.9    Acute on chronic diastolic CHF (congestive heart failure) (HCC)    GI bleed    Weight loss    Elevated alkaline phosphatase level    Thrombocytosis (HCC)    Iron deficiency anemia due to chronic blood loss    Iron malabsorption    Symptomatic anemia    Pneumonia due to organism     Status post thoracentesis. Cytology actually pending. No evidence of any pleural space infection  Transitive pleural effusion is noted. We will wean O2 down as tolerated.     Volume status per nephrology and other services  Electronically signed by Allison Harris MD on 12/28/2020 at 3:38 PM

## 2020-12-28 NOTE — PROGRESS NOTES
Physical Therapy    [address]   Physical Therapy Progress Note    Date: 20  Patient Name: Adin Garza       Room:   MRN: [id number]   Account: [de-identified]   : 1945  (80 y.o.) Gender: female        Diagnosis: Symptomatic anemia  Past Medical History:  has a past medical history of A-fib ([address] Acquired hypothyroidism, Acute encephalopathy, Acute kidney injury ([address] Anxiety, Benign hypertension with CKD (chronic kidney disease) stage III, Chronic back pain, CKD (chronic kidney disease) stage 3, GFR 30-59 ml/min, Constipation, COPD (chronic obstructive pulmonary disease) ([address] Cough, Depression, ETOH abuse, Former smoker, HA (generalized anxiety disorder), History of GI bleed, Hyperlipidemia, Hypertension, Hypothyroid, Leg pain, Normocytic anemia, Osteoarthritis, PAD (peripheral artery disease) ([address] Primary osteoarthritis, Pulmonary hypertension ([address] Pure hypercholesterolemia, SIRS (systemic inflammatory response syndrome) ([address] Urge incontinence, and Wheezing. Past Surgical History:   has a past surgical history that includes eye surgery (Bilateral); Colonoscopy; joint replacement; Upper gastrointestinal endoscopy (N/A, 2020); Colonoscopy (N/A, 2020); Upper gastrointestinal endoscopy (N/A, 2020); and Thoracentesis (2020). Additional Pertinent Hx: COPD, Afib, L TKA, 2-3 L O2 at home       Restrictions/Precautions  Restrictions/Precautions: General Precautions; Fall Risk(Fell last week after letting dogs out)  Required Braces or Orthoses?: No  Implants present? : Metal implants(L TKA)  Position Activity Restriction  Other position/activity restrictions: up as tolerated       Comments: Pt sitting up in recliner upon arrival to room. Agreeable to P.T.     Vital Signs  Patient Currently in Pain: Denies    Transfers:  Sit to Stand: Stand by assistance  Stand to sit: Stand by assistance                 Ambulation 1  Surface: level tile Device: Rolling Walker  Other Apparatus: O2(1L)  Assistance: Stand by assistance  Quality of Gait: slow steady pace. Good use of RW  Gait Deviations: Slow Hanna  Distance: 46ft x 1, 15ft x 2  Comments: SPO2 96% pre amb, 92% post amb. Rated 7/10 on PRE        Stairs/Curb  Stairs?: Yes  Stairs  # Steps : 2  Stairs Height: 4\"  Device: Rolling walker  Assistance: Stand by assistance  Comment: Pt demonstrated ability to ascend/decend 4\" box step holding onto RW to  simulate home situation. Pt reports she has a grab bar at home to hold onto when entering the home. BALANCE Posture: Fair  Sitting - Static: Good  Sitting - Dynamic: Good;-  Standing - Static: Good;-(W/RW)  Standing - Dynamic: Fair;+(w/RW)  Comments: standing assessed with RW    EXERCISES    Other exercises?: Yes  Other exercises 1: Pt issued and reviewed LE seated and supine ex program  Other exercises 2: (B) LE seated ex including orange t-band x 10           Activity Tolerance: Patient Tolerated treatment well, Patient limited by endurance  PT Equipment Recommendations  Equipment Needed: No  Other Comments  Comments: Pt left sitting in bathroom with call light in reach. RN notified    Patient Education  New Education Provided:  (B) LE ex program seated and supine  Learner:patient  Method: demonstration, explanation and handout       Outcome: demonstrated understanding     Current Treatment Recommendations: Strengthening, ROM, Balance Training, Functional Mobility Training, Transfer Training, Endurance Training, Gait Training, Stair training, Equipment Evaluation, Education, & procurement, Patient/Caregiver Education & Training, Safety Education & Training, Home Exercise Program    Conditions Requiring Skilled Therapeutic Intervention  Body structures, Functions, Activity limitations: Decreased functional mobility ; Decreased ADL status; Decreased strength;Decreased endurance;Decreased posture; Increased pain;Decreased balance  Prognosis: Good REQUIRES PT FOLLOW UP: Yes  Treatment Initiated : gait, transfers, education  Discharge Recommendations: Patient would benefit from continued therapy after discharge;Continue to assess pending progress    Goals  Short term goals  Time Frame for Short term goals: pt to demo bed mobility Mod I. Short term goal 1: Pt to demo transfers Mod I. Short term goal 2: Pt to amb 75'-100' Mod I. Short term goal 3: pt to ascend/descend 1 threshold for home entry, SBA/CGA. Short term goal 4: pt to demo good technique for HEP with B LE strengthening by 1/2 MMG. Short term goal 5: Pt to tolerate 30-45 minute PT session with O2 sats WNL.         12/28/20 0949   PT Individual Minutes   Time In 2615   Time Out 1020   Minutes 46       Electronically signed by Pam Barahona PTA on 12/28/20 at 10:25 AM EST

## 2020-12-28 NOTE — PROGRESS NOTES
Progress Note  Date:2020       Room:2097/2097-01  Patient Name:La Paz     YOB: 1945     Age:75 y.o. Subjective    Subjective:  Symptoms:  Improved. Activity level: Returning to normal.       Review of Systems   Constitutional: Negative for activity change and appetite change. Objective         Vitals Last 24 Hours:  TEMPERATURE:  Temp  Av.3 °F (36.8 °C)  Min: 97.9 °F (36.6 °C)  Max: 98.5 °F (36.9 °C)  RESPIRATIONS RANGE: Resp  Av.8  Min: 17  Max: 18  PULSE OXIMETRY RANGE: SpO2  Av %  Min: 95 %  Max: 99 %  PULSE RANGE: Pulse  Av  Min: 67  Max: 102  BLOOD PRESSURE RANGE: Systolic (42XVT), WNP:859 , Min:125 , AGX:799   ; Diastolic (36DOC), LCP:24, Min:68, Max:111    I/O (24Hr): Intake/Output Summary (Last 24 hours) at 2020 1043  Last data filed at 2020 0521  Gross per 24 hour   Intake 620 ml   Output    Net 620 ml     Objective:  General Appearance:  Comfortable. Vital signs: (most recent): Blood pressure 125/68, pulse 91, temperature 98.2 °F (36.8 °C), temperature source Oral, resp. rate 18, height 5' 5\" (1.651 m), weight 176 lb 5.9 oz (80 kg), SpO2 95 %. Vital signs are normal.    Lungs:  Normal effort and normal respiratory rate. There are decreased breath sounds. Heart: Normal rate. Regular rhythm. S1 normal and S2 normal.      Labs/Imaging/Diagnostics    Labs:  CBC:  Recent Labs     20  0603   HGB 10.0*   HCT 31.6*     CHEMISTRIES:  Recent Labs     20  0535 20  0603 20  0646    146* 144   K 3.7 3.1* 3.3*   CL 98 100 96*   CO2 36* 40* 44*   BUN 31* 22 14   CREATININE 1.53* 1.04* 0.92*   GLUCOSE 129* 121* 112*     PT/INR:No results for input(s): PROTIME, INR in the last 72 hours. APTT:No results for input(s): APTT in the last 72 hours. LIVER PROFILE:No results for input(s): AST, ALT, BILIDIR, BILITOT, ALKPHOS in the last 72 hours.     Imaging Last 24 Hours:  Xr Chest Portable Result Date: 12/26/2020  EXAMINATION: ONE XRAY VIEW OF THE CHEST 12/26/2020 2:45 pm COMPARISON: 12/24/2020 HISTORY: ORDERING SYSTEM PROVIDED HISTORY: post thoracentesis TECHNOLOGIST PROVIDED HISTORY: post thoracentesis Reason for Exam: Post Thoracentesis Acuity: Chronic Type of Exam: Ongoing FINDINGS: Cardiomegaly unchanged. Blunting of left costophrenic angle may reflect trace pleural effusion. No right pleural effusion appreciated. No pneumothorax. Bones grossly intact. Cardiomegaly unchanged. Blunting of left costophrenic angle possibly due to small pleural effusion. Right pleural effusion on prior not appreciated     Us Thoracentesis Which Side Should The Procedure Be Performed? Left    Result Date: 12/26/2020  PROCEDURE: ULTRASOUND CHEST FOR THORACENTESIS 12/26/2020 HISTORY: ORDERING SYSTEM PROVIDED HISTORY: pleural effusion TECHNOLOGIST PROVIDED HISTORY: pleural effusion Which side should the procedure be performed? ->Left Acuity: Acute Type of Exam: Initial FINDINGS: Ultrasound left chest shows a moderate pleural effusion. Fluid was localized for subsequent thoracentesis performed by Dr. Markos Aquino. Please correlate with procedure report for additional details. Moderate left pleural effusion.      Assessment//Plan           Hospital Problems           Last Modified POA    * (Principal) Symptomatic anemia 12/19/2020 Yes    Acquired hypothyroidism 12/19/2020 Yes    A-fib ([address] 12/19/2020 Yes    Pulmonary hypertension ([address] 12/19/2020 Yes    COPD (chronic obstructive pulmonary disease) ([address] 12/19/2020 Yes    HA (generalized anxiety disorder) 12/19/2020 Yes    CKD (chronic kidney disease) stage 3, GFR 30-59 ml/min 12/19/2020 Yes    Moderate major depression ([address] 12/19/2020 Yes    PAD (peripheral artery disease) ([address] 12/19/2020 Yes    Obesity, Class I, BMI 30-34.9 12/19/2020 Yes    Acute on chronic diastolic CHF (congestive heart failure) ([address] 12/19/2020 Yes Iron deficiency anemia due to chronic blood loss 12/19/2020 Yes    Pneumonia due to organism 12/19/2020 Yes        Assessment & Plan  Symptomatic anemia - resolved yesterday. OP f/u with hematology    Volume overload s/p thoracentesis - on per nephrology.        Electronically signed by Patricia Conley MD on 12/28/20 at 10:43 AM EST

## 2020-12-28 NOTE — PROGRESS NOTES
NEPHROLOGY PROGRESS NOTE    Interval history  Shortness of breath is improving. Patient had a thoracentesis  with 500 cc removed from the left chest.   Renal  Function is stable  REMAINS edematous. Non oliguric. History of Present Illness: This is a 70 y.o. female with history of hypertension, COPD, atrial fibrillation on eliquis, CHF with diastolic heart failure, who presented to the ER on 12/18/20 with progressive shortness of breath. Patient was recently admitted for GI bleed with severe anemia. Patient  has had increasing shortness of breath and fatigue and was told to go to the ER by her PCP due to a low  Hemoglobin of 6. 6. She received 2 units of packed red blood cells and Eliquis was placed on hold. She  had EGD on 12/23/20  showing 6 AVMs in jejunum which were cauterised. She has been diuresed with IV Lasix and switched to oral Lasix 40 mg twice daily. Chest x-ray did  show a left pleural effusion and she had left thoracentesis with 525 cc of fluid removed from the left chest.. Her creatinine has gradually risen from 1.1 mg/dl on 12/22/20 to 1.5 mg/dl today. Patient denies dysuria, gross hematuria, flank pain, nocturia, urgency, passing frothy urine or urinary incontinence. There has been no recent exposure to IV contrast.         Past Medical History:        Diagnosis Date    [facility]     Acquired hypothyroidism     Acute encephalopathy     Acute kidney injury ([facility]     Anxiety     Benign hypertension with CKD (chronic kidney disease) stage III     Chronic back pain     CKD (chronic kidney disease) stage 3, GFR 30-59 ml/min     Constipation     COPD (chronic obstructive pulmonary disease) (HCC)     Cough     Depression     ETOH abuse     Former smoker     3 packs a day for 26 years    HA (generalized anxiety disorder)     History of GI bleed     Hyperlipidemia     Hypertension     Hypothyroid 1/18/2013    Leg pain     Normocytic anemia     Osteoarthritis  PAD (peripheral artery disease) (HCC)     Primary osteoarthritis     Pulmonary hypertension (HCC)     Pure hypercholesterolemia     SIRS (systemic inflammatory response syndrome) (HCC)     Urge incontinence     Wheezing        Past Surgical History:        Procedure Laterality Date    COLONOSCOPY      COLONOSCOPY N/A 12/2/2020    COLONOSCOPY CONTROL OF BLEEDING performed by David Appiah MD at [address] [place] [address] Bilateral     cataracts    JOINT REPLACEMENT      Left knee on 9-11-18    THORACENTESIS  12/26/2020         UPPER GASTROINTESTINAL ENDOSCOPY N/A 12/2/2020    EGD performed by David Appiah MD at [address] [place] [address] 12/23/2020    PUSH ENTEROSCOPY/EGD CONTROL BLEEDING performed by David Appiah MD at [facility]       Current Medications:        bumetanide (BUMEX) tablet 1 mg, BID      lisinopril (PRINIVIL;ZESTRIL) tablet 2.5 mg, Lunch      metoprolol tartrate (LOPRESSOR) tablet 25 mg, BID      ondansetron (ZOFRAN) injection 4 mg, Q6H PRN      guaiFENesin (MUCINEX) extended release tablet 1,200 mg, BID      fluticasone (FLONASE) 50 MCG/ACT nasal spray 2 spray, Daily      sodium chloride flush 0.9 % injection 10 mL, 2 times per day      sodium chloride flush 0.9 % injection 10 mL, PRN      acetaminophen (TYLENOL) tablet 650 mg, Q4H PRN      ferrous sulfate (IRON 325) tablet 325 mg, Daily      ascorbic acid (VITAMIN C) tablet 1,000 mg, Daily      atorvastatin (LIPITOR) tablet 10 mg, Daily      dilTIAZem (CARDIZEM CD) extended release capsule 120 mg, Daily      albuterol (PROVENTIL) nebulizer solution 2.5 mg, Q6H PRN      clonazePAM (KLONOPIN) tablet 0.5 mg, TID PRN      fluticasone (FLOVENT HFA) 110 MCG/ACT inhaler 2 puff, BID      baclofen (LIORESAL) tablet 10 mg, TID PRN      HYDROcodone-acetaminophen (NORCO) 5-325 MG per tablet 1 tablet, Q8H PRN      apixaban (ELIQUIS) tablet 5 mg, BID   levothyroxine (SYNTHROID) tablet 125 mcg, Daily      pantoprazole (PROTONIX) tablet 40 mg, QAM AC      DULoxetine (CYMBALTA) extended release capsule 60 mg, Daily      tamsulosin (FLOMAX) capsule 0.4 mg, Daily      potassium chloride (KLOR-CON M) extended release tablet 40 mEq, PRN    Or      potassium bicarb-citric acid (EFFER-K) effervescent tablet 40 mEq, PRN    Or      potassium chloride 10 mEq/100 mL IVPB (Peripheral Line), PRN        Allergies:  Tizanidine    Social History:   Social History     Socioeconomic History    Marital status:      Spouse name: Not on file    Number of children: Not on file    Years of education: Not on file    Highest education level: Not on file   Occupational History    Not on file   Social Needs    Financial resource strain: Not on file    Food insecurity     Worry: Not on file     Inability: Not on file    Transportation needs     Medical: Not on file     Non-medical: Not on file   Tobacco Use    Smoking status: Former Smoker     Packs/day: 3.00     Years: 26.00     Pack years: 78.00     Types: Cigarettes    Smokeless tobacco: Never Used   Substance and Sexual Activity    Alcohol use: Yes     Comment: occ    Drug use: No    Sexual activity: Not on file   Lifestyle    Physical activity     Days per week: Not on file     Minutes per session: Not on file    Stress: Not on file   Relationships    Social connections     Talks on phone: Not on file     Gets together: Not on file     Attends [employer] service: Not on file     Active member of club or organization: Not on file     Attends meetings of clubs or organizations: Not on file     Relationship status: Not on file    Intimate partner violence     Fear of current or ex partner: Not on file     Emotionally abused: Not on file     Physically abused: Not on file     Forced sexual activity: Not on file   Other Topics Concern    Not on file   Social History Narrative    Not on file Objective:  CURRENT TEMPERATURE:  Temp: 97.9 °F (36.6 °C)  MAXIMUM TEMPERATURE OVER 24HRS:  Temp (24hrs), Av.1 °F (36.7 °C), Min:97.9 °F (36.6 °C), Max:98.5 °F (36.9 °C)    CURRENT RESPIRATORY RATE:  Resp: 18  CURRENT PULSE:  Pulse: 85  CURRENT BLOOD PRESSURE:  BP: 115/66  24HR BLOOD PRESSURE RANGE:  Systolic (53BVR), UXR:579 , Min:115 , VTJ:295   ; Diastolic (97RND), KSE:65, Min:66, Max:77    24HR INTAKE/OUTPUT:      Intake/Output Summary (Last 24 hours) at 20208  Last data filed at 2020  Gross per 24 hour   Intake 920 ml   Output 1200 ml   Net -280 ml     Patient Vitals for the past 96 hrs (Last 3 readings):   Weight   20 0035 176 lb 5.9 oz (80 kg)   20 0420 185 lb 10 oz (84.2 kg)   20 0251 184 lb 15.5 oz (83.9 kg)         Physical Exam:  GENERAL APPEARANCE: Alert and cooperative, and appears to be in no acute distress. HEAD: normocephalic  EYES: PERRL, EOMI. Not pale, anicteric   NOSE:  No nasal discharge. THROAT:  Oral cavity and pharynx normal. Moist  NECK: Neck supple, non-tender without lymphadenopathy, masses or thyromegaly. JVD-neg  CARDIAC: Normal S1 and S2. No S3, S4 or murmurs. Rhythm is regular. LUNGS: rales +, diminished breath sounds. ABD-Soft non distended, BS+ Non tender no organomegally  BACK: Examination of the spine reveals  no spinal deformity, without tenderness,   MUSKULOSKELETAL: Adequately aligned spine. No joint erythema or tenderness. EXTREMITIES: 2 + lower extremity  edema. Peripheral pulses intact. NEURO:Alert oriented x 3 ,power 5/5 in all extremities      Labs:   CBC:  Recent Labs     20  0603   HGB 10.0*   HCT 31.6*      BMP:   Recent Labs     20  0535 20  0603 20  0646    146* 144   K 3.7 3.1* 3.3*   CL 98 100 96*   CO2 36* 40* 44*   BUN 31* 22 14   CREATININE 1.53* 1.04* 0.92*   GLUCOSE 129* 121* 112*   CALCIUM 9.4 9.0 9.1        Phosphorus:  No results for input(s): PHOS in the last 72 hours. Magnesium:   No results for input(s): MG in the last 72 hours. Albumin:   No results for input(s): LABALBU in the last 72 hours. IRON:    Lab Results   Component Value Date    IRON 39 12/20/2020     Iron Saturation:  No components found for: PERCENTFE  TIBC:    Lab Results   Component Value Date    TIBC 300 12/20/2020     FERRITIN:    Lab Results   Component Value Date    FERRITIN 37 12/18/2020     SPEP:   Lab Results   Component Value Date    PROT 6.8 12/24/2020    ALBCAL 4.1 03/16/2020    ALBPCT 62 03/16/2020    LABALPH 0.3 03/16/2020    LABALPH 0.8 03/16/2020    A1PCT 4 03/16/2020    A2PCT 12 03/16/2020    LABBETA 0.7 03/16/2020    BETAPCT 10 03/16/2020    GAMGLOB 0.8 03/16/2020    GGPCT 12 03/16/2020    PATH ELECTRONICALLY SIGNED. Beka Mixon M.D. 03/16/2020     UPEP: No results found for: TPU     C3:   Lab Results   Component Value Date    C3 128 03/16/2020     C4:   Lab Results   Component Value Date    C4 36 03/16/2020     MPO ANCA:  No results found for: MPO .   PR3 ANCA:  No results found for: PR3  Urine Sodium:    Lab Results   Component Value Date    ANGÉLICA 27 09/10/2020      Urine Potassium:    Lab Results   Component Value Date    KUR 35.7 07/20/2020     Urine Chloride:  No components found for: CLU  Urine Ph:  No components found for: PO4U  Urine Osmolarity:  No results found for: OSMOU  Urine Creatinine:    Lab Results   Component Value Date    LABCREA 23.2 12/27/2020     Urine Eosinophils: No components found for: EOSU  Urine Protein:  No results found for: TPU  Urinalysis:  U/A:   Lab Results   Component Value Date    NITRU NEGATIVE 09/10/2020    COLORU YELLOW 09/10/2020    PHUR 5.5 09/10/2020    WBCUA 2 TO 5 09/10/2020    RBCUA 0 TO 2 09/10/2020    MUCUS NOT REPORTED 09/10/2020    TRICHOMONAS NOT REPORTED 09/10/2020    YEAST NOT REPORTED 09/10/2020    BACTERIA FEW 09/10/2020    CLARITYU Clear 01/31/2020    SPECGRAV 1.021 09/10/2020    LEUKOCYTESUR SMALL 09/10/2020 UROBILINOGEN Normal 09/10/2020    BILIRUBINUR  09/10/2020     Presumptive positive. Unable to confirm due to unavailability of reagent. BILIRUBINUR 0 01/31/2020    BLOODU Negative 01/31/2020    GLUCOSEU NEGATIVE 09/10/2020    KETUA NEGATIVE 09/10/2020    AMORPHOUS 2+ 09/10/2020         Radiology:  Reviewed as available. Assessment:  1. Acute kidney injury secondary to prerenal azotemia from diuretics, recent anemia  and transient hypotension-renal  function stable     2. CKD stage 3.secondary to nephrosclerosis Baseline creatinine 1.1 to 1.3 mg/dL. 3. Acute on chronic systolic heart failure with diastolic dysfunction    4. Bilateral lower  extremity edema     5. Acute anemia -stable s/p PRBC x2 -Hb 10.0  gm/dl today. 6.Left pleural effusion status post thoracentesis, likely transudative       Plan:  1. Continue Bumex 1 mg twice daily   2. 1500 mils fluid restriction with 2 g sodium diet daily. 4. Check urine protein creatinine  5. Continue low dose lisinopril 2.5 mg dly  6. Keep SBP> 100 mg. 7. BMP daily    Thank you for the consultation.       Electronically signed by Dhara Kennedy MD on 12/28/2020 at 6:28 PM

## 2020-12-28 NOTE — PROGRESS NOTES
ERICA sent referral to [facility] (they do not have beds) and [facility] (they do not take pt's insurance). ERICA spoke to pt who said she will go home and her son Houston Burgess will help her. ERICA spoke to Houston Burgess [phone] who said he is not sure if that will work. Son said pt's home is not ready for her return and it will require a dumpster to get it ready. Houston Burgess will talk to his wife and call this worker back. ( pt will be accepted at [facility] if pt agrees).

## 2020-12-29 VITALS
TEMPERATURE: 98.2 F | RESPIRATION RATE: 16 BRPM | WEIGHT: 169.53 LBS | BODY MASS INDEX: 28.25 KG/M2 | HEIGHT: 65 IN | SYSTOLIC BLOOD PRESSURE: 97 MMHG | OXYGEN SATURATION: 96 % | HEART RATE: 100 BPM | DIASTOLIC BLOOD PRESSURE: 49 MMHG

## 2020-12-29 LAB
ABSOLUTE EOS #: 0.2 K/UL (ref 0–0.4)
ABSOLUTE IMMATURE GRANULOCYTE: ABNORMAL K/UL (ref 0–0.3)
ABSOLUTE LYMPH #: 1.1 K/UL (ref 1–4.8)
ABSOLUTE MONO #: 0.9 K/UL (ref 0.1–1.3)
ANION GAP SERPL CALCULATED.3IONS-SCNC: ABNORMAL MMOL/L (ref 9–17)
BASOPHILS # BLD: 1 % (ref 0–2)
BASOPHILS ABSOLUTE: 0.1 K/UL (ref 0–0.2)
BUN BLDV-MCNC: 12 MG/DL (ref 8–23)
BUN/CREAT BLD: ABNORMAL (ref 9–20)
CALCIUM SERPL-MCNC: 8.9 MG/DL (ref 8.6–10.4)
CHLORIDE BLD-SCNC: 93 MMOL/L (ref 98–107)
CO2: >45 MMOL/L (ref 20–31)
CREAT SERPL-MCNC: 0.91 MG/DL (ref 0.5–0.9)
DIFFERENTIAL TYPE: ABNORMAL
EOSINOPHILS RELATIVE PERCENT: 4 % (ref 0–4)
GFR AFRICAN AMERICAN: >60 ML/MIN
GFR NON-AFRICAN AMERICAN: >60 ML/MIN
GFR SERPL CREATININE-BSD FRML MDRD: ABNORMAL ML/MIN/{1.73_M2}
GFR SERPL CREATININE-BSD FRML MDRD: ABNORMAL ML/MIN/{1.73_M2}
GLUCOSE BLD-MCNC: 129 MG/DL (ref 70–99)
HCT VFR BLD CALC: 33.7 % (ref 36–46)
HEMOGLOBIN: 10.3 G/DL (ref 12–16)
IMMATURE GRANULOCYTES: ABNORMAL %
LYMPHOCYTES # BLD: 16 % (ref 24–44)
MCH RBC QN AUTO: 30.1 PG (ref 26–34)
MCHC RBC AUTO-ENTMCNC: 30.7 G/DL (ref 31–37)
MCV RBC AUTO: 97.9 FL (ref 80–100)
MONOCYTES # BLD: 12 % (ref 1–7)
NRBC AUTOMATED: ABNORMAL PER 100 WBC
PDW BLD-RTO: 19.3 % (ref 11.5–14.9)
PLATELET # BLD: 411 K/UL (ref 150–450)
PLATELET ESTIMATE: ABNORMAL
PMV BLD AUTO: 7.7 FL (ref 6–12)
POTASSIUM SERPL-SCNC: 3.7 MMOL/L (ref 3.7–5.3)
RBC # BLD: 3.44 M/UL (ref 4–5.2)
RBC # BLD: ABNORMAL 10*6/UL
SEG NEUTROPHILS: 67 % (ref 36–66)
SEGMENTED NEUTROPHILS ABSOLUTE COUNT: 4.7 K/UL (ref 1.3–9.1)
SODIUM BLD-SCNC: 148 MMOL/L (ref 135–144)
SURGICAL PATHOLOGY REPORT: NORMAL
WBC # BLD: 7 K/UL (ref 3.5–11)
WBC # BLD: ABNORMAL 10*3/UL

## 2020-12-29 PROCEDURE — 2580000003 HC RX 258: Performed by: INTERNAL MEDICINE

## 2020-12-29 PROCEDURE — 97110 THERAPEUTIC EXERCISES: CPT

## 2020-12-29 PROCEDURE — 99232 SBSQ HOSP IP/OBS MODERATE 35: CPT | Performed by: INTERNAL MEDICINE

## 2020-12-29 PROCEDURE — 6370000000 HC RX 637 (ALT 250 FOR IP): Performed by: FAMILY MEDICINE

## 2020-12-29 PROCEDURE — 6370000000 HC RX 637 (ALT 250 FOR IP): Performed by: INTERNAL MEDICINE

## 2020-12-29 PROCEDURE — 99232 SBSQ HOSP IP/OBS MODERATE 35: CPT | Performed by: FAMILY MEDICINE

## 2020-12-29 PROCEDURE — 97116 GAIT TRAINING THERAPY: CPT

## 2020-12-29 PROCEDURE — 85025 COMPLETE CBC W/AUTO DIFF WBC: CPT

## 2020-12-29 PROCEDURE — 80048 BASIC METABOLIC PNL TOTAL CA: CPT

## 2020-12-29 PROCEDURE — 6360000002 HC RX W HCPCS: Performed by: INTERNAL MEDICINE

## 2020-12-29 PROCEDURE — 97530 THERAPEUTIC ACTIVITIES: CPT

## 2020-12-29 PROCEDURE — 36415 COLL VENOUS BLD VENIPUNCTURE: CPT

## 2020-12-29 RX ORDER — ACETAZOLAMIDE 250 MG/1
250 TABLET ORAL DAILY
Qty: 7 TABLET | Refills: 0 | Status: ON HOLD | OUTPATIENT
Start: 2020-12-30 | End: 2021-01-23 | Stop reason: HOSPADM

## 2020-12-29 RX ORDER — LISINOPRIL 2.5 MG/1
2.5 TABLET ORAL
Qty: 30 TABLET | Refills: 3 | Status: SHIPPED | OUTPATIENT
Start: 2020-12-30

## 2020-12-29 RX ORDER — HYDROCHLOROTHIAZIDE 25 MG/1
25 TABLET ORAL DAILY
Qty: 90 TABLET | Refills: 1 | Status: ON HOLD | OUTPATIENT
Start: 2020-12-29 | End: 2021-02-12 | Stop reason: HOSPADM

## 2020-12-29 RX ORDER — ACETAZOLAMIDE 250 MG/1
250 TABLET ORAL DAILY
Status: DISCONTINUED | OUTPATIENT
Start: 2020-12-29 | End: 2020-12-29 | Stop reason: HOSPADM

## 2020-12-29 RX ADMIN — LISINOPRIL 2.5 MG: 5 TABLET ORAL at 11:28

## 2020-12-29 RX ADMIN — FLUTICASONE PROPIONATE 2 SPRAY: 50 SPRAY, METERED NASAL at 08:29

## 2020-12-29 RX ADMIN — GUAIFENESIN 1200 MG: 600 TABLET, EXTENDED RELEASE ORAL at 08:21

## 2020-12-29 RX ADMIN — PANTOPRAZOLE SODIUM 40 MG: 40 TABLET, DELAYED RELEASE ORAL at 05:12

## 2020-12-29 RX ADMIN — OXYCODONE HYDROCHLORIDE AND ACETAMINOPHEN 1000 MG: 500 TABLET ORAL at 08:20

## 2020-12-29 RX ADMIN — CHLOROTHIAZIDE SODIUM 500 MG: 500 INJECTION, POWDER, LYOPHILIZED, FOR SOLUTION INTRAVENOUS at 11:29

## 2020-12-29 RX ADMIN — APIXABAN 5 MG: 5 TABLET, FILM COATED ORAL at 08:21

## 2020-12-29 RX ADMIN — TAMSULOSIN HYDROCHLORIDE 0.4 MG: 0.4 CAPSULE ORAL at 08:20

## 2020-12-29 RX ADMIN — DILTIAZEM HYDROCHLORIDE 120 MG: 120 CAPSULE, COATED, EXTENDED RELEASE ORAL at 08:21

## 2020-12-29 RX ADMIN — Medication 10 ML: at 08:23

## 2020-12-29 RX ADMIN — FLUTICASONE PROPIONATE 2 PUFF: 110 AEROSOL, METERED RESPIRATORY (INHALATION) at 08:21

## 2020-12-29 RX ADMIN — METOPROLOL TARTRATE 25 MG: 25 TABLET, FILM COATED ORAL at 08:21

## 2020-12-29 RX ADMIN — LEVOTHYROXINE SODIUM 125 MCG: 125 TABLET ORAL at 05:12

## 2020-12-29 RX ADMIN — DULOXETINE HYDROCHLORIDE 60 MG: 60 CAPSULE, DELAYED RELEASE ORAL at 08:21

## 2020-12-29 RX ADMIN — ACETAZOLAMIDE 250 MG: 250 TABLET ORAL at 16:36

## 2020-12-29 RX ADMIN — ATORVASTATIN CALCIUM 10 MG: 10 TABLET, FILM COATED ORAL at 08:20

## 2020-12-29 RX ADMIN — FERROUS SULFATE TAB 325 MG (65 MG ELEMENTAL FE) 325 MG: 325 (65 FE) TAB at 08:20

## 2020-12-29 RX ADMIN — BUMETANIDE 1 MG: 1 TABLET ORAL at 08:21

## 2020-12-29 ASSESSMENT — PAIN SCALES - GENERAL
PAINLEVEL_OUTOF10: 0
PAINLEVEL_OUTOF10: 0

## 2020-12-29 ASSESSMENT — ENCOUNTER SYMPTOMS
SHORTNESS OF BREATH: 0
COUGH: 0

## 2020-12-29 NOTE — PROGRESS NOTES
Physical Therapy  Facility/Department: [facility] MED SURG  Daily Treatment Note  NAME: Paris Vidales  : 1945  MRN: [id number]    Date of Service: 2020    Discharge Recommendations:  Patient would benefit from continued therapy after discharge, Continue to assess pending progress   PT Equipment Recommendations  Equipment Needed: No    Assessment   Body structures, Functions, Activity limitations: Decreased functional mobility ; Decreased ADL status; Decreased strength;Decreased endurance;Decreased posture; Increased pain;Decreased balance  Prognosis: Good  REQUIRES PT FOLLOW UP: Yes  Activity Tolerance  Activity Tolerance: Patient Tolerated treatment well;Patient limited by endurance  Other Comments  Comments: Pt left sitting in bathroom with call light in reach. RN notified     Patient Diagnosis(es): The primary encounter diagnosis was Symptomatic anemia. A diagnosis of Pneumonia due to organism was also pertinent to this visit. has a past medical history of A-fib ([address] Acquired hypothyroidism, Acute encephalopathy, Acute kidney injury ([address] Anxiety, Benign hypertension with CKD (chronic kidney disease) stage III, Chronic back pain, CKD (chronic kidney disease) stage 3, GFR 30-59 ml/min, Constipation, COPD (chronic obstructive pulmonary disease) ([address] Cough, Depression, ETOH abuse, Former smoker, HA (generalized anxiety disorder), History of GI bleed, Hyperlipidemia, Hypertension, Hypothyroid, Leg pain, Normocytic anemia, Osteoarthritis, PAD (peripheral artery disease) ([address] Primary osteoarthritis, Pulmonary hypertension ([address] Pure hypercholesterolemia, SIRS (systemic inflammatory response syndrome) ([address] Urge incontinence, and Wheezing. has a past surgical history that includes eye surgery (Bilateral); Colonoscopy; joint replacement; Upper gastrointestinal endoscopy (N/A, 2020); Colonoscopy (N/A, 2020); Upper gastrointestinal endoscopy (N/A, 2020); and Thoracentesis (2020). Restrictions  Restrictions/Precautions  Restrictions/Precautions: General Precautions, Fall Risk(Fell last week after letting dogs out)  Required Braces or Orthoses?: No  Implants present? : Metal implants(L TKA)  Position Activity Restriction  Other position/activity restrictions: up as tolerated  Subjective   Subjective  Subjective: Pt sitting up in bed upon arrival to room, agreeable to therapy. General Comment  Comments: Pt report feeling weak, continued to be very pleasant and cooperative. Pt is very anxious about going home,. Pain Assessment  Pain Assessment: 0-10  Pain Level: 0  Oxygen Therapy  SpO2: 93 %  O2 Device: None (Room air)  O2 Flow Rate (L/min): 2 L/min       Orientation     Cognition      Objective   Bed mobility  Rolling to Left: Stand by assistance  Supine to Sit: Stand by assistance  Sit to Supine: (Pt remained sitting in chair.)  Scooting: Stand by assistance  Transfers  Sit to Stand: Stand by assistance  Stand to sit: Stand by assistance  Bed to Chair: Stand by assistance  Stand Pivot Transfers: Stand by assistance(w/RW)  Ambulation 1  Surface: level tile  Device: Rolling Walker  Other Apparatus: O2(2 L)  Assistance: Stand by assistance  Quality of Gait: slow steady pace. Good use of RW  Gait Deviations: Slow Hanna;Decreased step length;Decreased step height  Distance: 30' x 2  Comments: SPO2 90% pre amb, 92% post amb. Pt recovered quickly  Stairs/Curb  Stairs?: Yes  Stairs  # Steps : 2  Stairs Height: 6\"  Device: Rolling walker  Assistance: Stand by assistance  Comment: Pt demonstrated ability to ascend/decend 6\" box step holding onto RW to simulate home situation. Pt reports she has a grab bar at home to hold onto when entering the home.      Balance  Posture: Fair  Sitting - Static: Good  Sitting - Dynamic: Good;-  Standing - Static: Good;-(W/RW)  Standing - Dynamic: Fair;+(w/RW)  Comments: standing assessed with RW  Other exercises  Other exercises?: Yes Other exercises 1: Reviewed seated ex both L/E x 10         Other Activities: Dangling protocol              G-Code     OutComes Score                                                     AM-PAC Score             Goals  Short term goals  Time Frame for Short term goals: pt to demo bed mobility Mod I. Short term goal 1: Pt to demo transfers Mod I. Short term goal 2: Pt to amb 75'-100' Mod I. Short term goal 3: pt to ascend/descend 1 threshold for home entry, SBA/CGA. Short term goal 4: pt to demo good technique for HEP with B LE strengthening by 1/2 MMG. Short term goal 5: Pt to tolerate 30-45 minute PT session with O2 sats WNL. Patient Goals   Patient goals : 4-5 days    Plan    Plan  Times per week: 5-6x/week  Specific instructions for Next Treatment: check Hgb, progress gait (SBQC vs walker), platform step, HEP, endurance activities  Current Treatment Recommendations: Strengthening, ROM, Balance Training, Functional Mobility Training, Transfer Training, Endurance Training, Gait Training, Stair training, Equipment Evaluation, Education, & procurement, Patient/Caregiver Education & Training, Safety Education & Training, Home Exercise Program  Safety Devices  Type of devices:  All fall risk precautions in place, Gait belt, Left in chair, Call light within reach, Chair alarm in place     Therapy Time   Individual Concurrent Group Co-treatment   Time In 1007         Time Out [address] PTA   Electronically signed by Diana Sherman PTA on 12/29/2020 at 6:53 PM

## 2020-12-29 NOTE — PLAN OF CARE
Problem: Falls - Risk of:  Goal: Will remain free from falls  Description: Will remain free from falls  Outcome: Ongoing   Pt assessed as a fall risk this shift. Remains free from falls and accidental injury at this time. Fall precautions in place, including falling star sign and fall risk band on pt. Floor free from obstacles, and bed is locked and in lowest position. Adequate lighting provided. Pt encouraged to call before getting OOB for any need. Bed alarm activated. Will continue to monitor needs during hourly rounding, and reinforce education on use of call light.     Problem: Pain:  Goal: Pain level will decrease  Description: Pain level will decrease  Outcome: Ongoing   Pt received tylenol for pain    Problem: Skin Integrity:  Goal: Will show no infection signs and symptoms  Description: Will show no infection signs and symptoms  Outcome: Ongoing   No new skin breakdown noted this shift

## 2020-12-29 NOTE — PROGRESS NOTES
Report called to [id number] at [facility] [employer] All items collected and bagged with patient.

## 2020-12-29 NOTE — PROGRESS NOTES
Past Medical History:   has a past medical history of A-fib ([facility] [address] Acquired hypothyroidism, Acute encephalopathy, Acute kidney injury ([facility] [address] [facility] Anxiety, Benign hypertension with CKD (chronic kidney disease) stage III, Chronic back pain, CKD (chronic kidney disease) stage 3, GFR 30-59 ml/min, Constipation, COPD (chronic obstructive pulmonary disease) ([address] Cough, Depression, ETOH abuse, Former smoker, HA (generalized anxiety disorder), History of GI bleed, Hyperlipidemia, Hypertension, Hypothyroid, Leg pain, Normocytic anemia, Osteoarthritis, PAD (peripheral artery disease) ([facility] [address] [facility] Primary osteoarthritis, Pulmonary hypertension ([facility] Pure hypercholesterolemia, SIRS (systemic inflammatory response syndrome) ([facility] [address] Urge incontinence, and Wheezing. Past Surgical History:   has a past surgical history that includes eye surgery (Bilateral); Colonoscopy; joint replacement; Upper gastrointestinal endoscopy (N/A, 12/2/2020); Colonoscopy (N/A, 12/2/2020); Upper gastrointestinal endoscopy (N/A, 12/23/2020); and Thoracentesis (12/26/2020). Medications:    Prior to Admission medications    Medication Sig Start Date End Date Taking?  Authorizing Provider   tamsulosin (FLOMAX) 0.4 MG capsule TAKE 1 CAPSULE BY MOUTH DAILY 12/15/20  Yes WILLIE Alfaro CNP   omeprazole (PRILOSEC) 40 MG delayed release capsule TAKE 1 CAPSULE BY MOUTH EVERY MORNING BEFORE BREAKFAST 12/14/20  Yes WILLIE Alfaro - CNP   DULoxetine (CYMBALTA) 60 MG extended release capsule Take 1 capsule by mouth daily 12/14/20  Yes WILLIE Alfaro - CNP   furosemide (LASIX) 40 MG tablet TAKE 1 TABLET BY MOUTH DAILY  Patient taking differently: Take 40 mg by mouth daily Currently taking 2 tablets daily  12/18/20 11/30/20  Yes WILLIE Alfaro - CNP   baclofen (LIORESAL) 10 MG tablet TAKE ONE TABLET BY MOUTH THREE TIMES A DAY AS NEEDED. 11/30/20  Yes WILLIE Alfaro CNP HYDROcodone-acetaminophen (NORCO) 5-325 MG per tablet Take 1 tablet by mouth every 8 hours as needed for Pain for up to 30 days. 11/30/20 12/30/20 Yes WILLIE Guan CNP   ELIQUIS 5 MG TABS tablet TAKE ONE TABLET BY MOUTH TWICE A DAY 11/30/20  Yes WILLIE Guan CNP   levothyroxine (SYNTHROID) 125 MCG tablet Take 1 tablet by mouth daily 11/30/20  Yes WILLIE Guan CNP   metoprolol succinate (TOPROL XL) 25 MG extended release tablet TAKE 1 TABLET BY MOUTH DAILY. (PLEASE MAKE DR APPT FOR FUTURE REFILLS) 11/23/20  Yes WILLIE Guan CNP   clonazePAM (KLONOPIN) 0.5 MG tablet Take 1 tablet by mouth 3 times daily as needed for Anxiety for up to 30 days.  11/17/20 12/19/20 Yes WILLIE Guan CNP   fluticasone (FLOVENT HFA) 220 MCG/ACT inhaler Inhale 2 puffs into the lungs 2 times daily 11/17/20  Yes WILLIE Guan CNP   albuterol (PROVENTIL) (2.5 MG/3ML) 0.083% nebulizer solution Take 3 mLs by nebulization every 6 hours as needed for Wheezing 11/5/20  Yes WILLIE Guan CNP   dilTIAZem (CARDIZEM CD) 120 MG extended release capsule Take 1 capsule by mouth daily 10/19/20  Yes WILLIE Guan CNP   albuterol sulfate HFA (PROAIR HFA) 108 (90 Base) MCG/ACT inhaler Inhale 2 puffs into the lungs every 6 hours as needed for Wheezing   Yes Historical Provider, MD   fluticasone (FLONASE) 50 MCG/ACT nasal spray USE ONE SPRAY TO EACH NOSTRIL ONCE ADAY 9/4/20  Yes WILLIE Naqvi CNP   atorvastatin (LIPITOR) 10 MG tablet TAKE 1 TABLET BY MOUTH ONCE DAILY 9/4/20  Yes WILLIE Naqvi CNP   Ascorbic Acid (C-1000 PO) Take 1 capsule by mouth daily    Yes Historical Provider, MD   ferrous sulfate 325 (65 Fe) MG tablet Take 325 mg by mouth daily  8/30/18  Yes Historical Provider, MD   Oxygen Concentrator continuous    Historical Provider, MD   Lift Chair MISC Use daily for comfort Patient not taking: Reported on 12/18/2020 11/30/20   WILLIE Kelley CNP   PROAIR  (93 Base) MCG/ACT inhaler Inhale 2 puffs into the lungs every 6 hours as needed for Wheezing 11/17/20   WILLIE Kelley CNP   levalbuterol [facility] HFA) 45 MCG/ACT inhaler Inhale 1 puff into the lungs every 4 hours as needed for Wheezing 8/19/20   WILLIE Kelley CNP   Respiratory Therapy Supplies (NEBULIZER/TUBING/MOUTHPIECE) KIT Use every 4-6 hours prn for copd 6/15/20   WILLIE Kelley CNP     Current Facility-Administered Medications   Medication Dose Route Frequency Provider Last Rate Last Admin    bumetanide (BUMEX) tablet 1 mg  1 mg Oral BID Lissett Edge MD   1 mg at 12/29/20 3551    lisinopril (PRINIVIL;ZESTRIL) tablet 2.5 mg  2.5 mg Oral Lunch Chuck Parisi MD   2.5 mg at 12/28/20 1200    metoprolol tartrate (LOPRESSOR) tablet 25 mg  25 mg Oral BID Diana Cortez MD   25 mg at 12/29/20 0821    ondansetron (ZOFRAN) injection 4 mg  4 mg Intravenous Q6H PRN Dwayne Clemente MD   4 mg at 12/28/20 1154    guaiFENesin (MUCINEX) extended release tablet 1,200 mg  1,200 mg Oral BID Diana Cortez MD   1,200 mg at 12/29/20 0821    fluticasone (FLONASE) 50 MCG/ACT nasal spray 2 spray  2 spray Each Nostril Daily Dwayne Clemente MD   2 spray at 12/29/20 0829    sodium chloride flush 0.9 % injection 10 mL  10 mL Intravenous 2 times per day Diana Cortez MD   10 mL at 12/29/20 0823    sodium chloride flush 0.9 % injection 10 mL  10 mL Intravenous PRN Dwayne Clemente MD        acetaminophen (TYLENOL) tablet 650 mg  650 mg Oral Q4H PRN Dwayne Clemente MD   650 mg at 12/28/20 2317    ferrous sulfate (IRON 325) tablet 325 mg  325 mg Oral Daily Dwayne Clemente MD   325 mg at 12/29/20 0820    ascorbic acid (VITAMIN C) tablet 1,000 mg  1,000 mg Oral Daily Dwayne Clemente MD   1,000 mg at 12/29/20 0820  atorvastatin (LIPITOR) tablet 10 mg  10 mg Oral Daily Dwayne Clemente MD   10 mg at 12/29/20 0820    dilTIAZem (CARDIZEM CD) extended release capsule 120 mg  120 mg Oral Daily Dwayne Clemente MD   120 mg at 12/29/20 0821    albuterol (PROVENTIL) nebulizer solution 2.5 mg  2.5 mg Nebulization Q6H PRN Dwayne Clemente MD   2.5 mg at 12/24/20 1110    clonazePAM (KLONOPIN) tablet 0.5 mg  0.5 mg Oral TID PRN Tiburcio Post MD   0.5 mg at 12/25/20 1827    fluticasone (FLOVENT HFA) 110 MCG/ACT inhaler 2 puff  2 puff Inhalation BID Tiburcio Post MD   2 puff at 12/29/20 0821    baclofen (LIORESAL) tablet 10 mg  10 mg Oral TID PRN Tiburcio Post MD   10 mg at 12/28/20 2142    HYDROcodone-acetaminophen (NORCO) 5-325 MG per tablet 1 tablet  1 tablet Oral Q8H PRN Tiburcio Post MD   1 tablet at 12/28/20 1154    apixaban (ELIQUIS) tablet 5 mg  5 mg Oral BID Reba Locke MD   5 mg at 12/29/20 5818    levothyroxine (SYNTHROID) tablet 125 mcg  125 mcg Oral Daily Dwayne Clemente MD   125 mcg at 12/29/20 0512    pantoprazole (PROTONIX) tablet 40 mg  40 mg Oral QAM AC Tiburcio Post MD   40 mg at 12/29/20 0512    DULoxetine (CYMBALTA) extended release capsule 60 mg  60 mg Oral Daily Tiburcio Post MD   60 mg at 12/29/20 0821    tamsulosin (FLOMAX) capsule 0.4 mg  0.4 mg Oral Daily Dwayne Clemente MD   0.4 mg at 12/29/20 0820    potassium chloride (KLOR-CON M) extended release tablet 40 mEq  40 mEq Oral PRN Tiburcio Post MD   40 mEq at 12/28/20 3680    Or    potassium bicarb-citric acid (EFFER-K) effervescent tablet 40 mEq  40 mEq Oral PRN Dwayne Clemente MD        Or    potassium chloride 10 mEq/100 mL IVPB (Peripheral Line)  10 mEq Intravenous PRN Tiburcio Post MD           Allergies:  Tizanidine Social History:   reports that she has quit smoking. Her smoking use included cigarettes. She has a 78.00 pack-year smoking history. She has never used smokeless tobacco. She reports current alcohol use. She reports that she does not use drugs. Family History: family history includes COPD in her mother; Cancer in an other family member; Emphysema in her sister; Heart Disease in her mother. REVIEW OF SYSTEMS:    Constitutional: ++fatigue, No fever or chills. No night sweats, no weight loss   Eyes: No eye discharge, double vision, or eye pain   HEENT: negative for sore mouth, sore throat, hoarseness and voice change   Respiratory: negative for cough , sputum, dyspnea, wheezing, hemoptysis, chest pain   Cardiovascular: negative for chest pain, dyspnea, palpitations, orthopnea, PND   Gastrointestinal: negative for nausea, vomiting, diarrhea, constipation, abdominal pain, Dysphagia, hematemesis and hematochezia   Genitourinary: negative for frequency, dysuria, nocturia, urinary incontinence, and hematuria   Integument: negative for rash, skin lesions, bruises.    Hematologic/Lymphatic: negative for easy bruising, bleeding, lymphadenopathy, or petechiae   Endocrine: negative for heat or cold intolerance,weight changes, change in bowel habits and hair loss   Musculoskeletal: negative for myalgias, arthralgias, pain, joint swelling,and bone pain   Neurological: negative for headaches, dizziness, seizures, weakness, numbness    PHYSICAL EXAM:      /66   Pulse 86   Temp 98.2 °F (36.8 °C) (Oral)   Resp 19   Ht 5' 5\" (1.651 m)   Wt 169 lb 8.5 oz (76.9 kg)   SpO2 94%   BMI 28.21 kg/m²    Temp (24hrs), Av.2 °F (36.8 °C), Min:97.9 °F (36.6 °C), Max:98.4 °F (36.9 °C)    General appearance - well appearing, no in pain or distress   Mental status - alert and cooperative   Eyes - pupils equal and reactive, extraocular eye movements intact   Ears - bilateral TM's and external ear canals normal Mouth - mucous membranes moist, pharynx normal without lesions   Neck - supple, no significant adenopathy   Lymphatics - no palpable lymphadenopathy, no hepatosplenomegaly   Chest - clear to auscultation, no wheezes, rales or rhonchi, symmetric air entry   Heart - normal rate, regular rhythm, normal S1, S2, no murmurs  Abdomen - soft, nontender, nondistended, no masses or organomegaly   Neurological - alert, oriented, normal speech, no focal findings or movement disorder noted   Musculoskeletal - no joint tenderness, deformity or swelling   Extremities - peripheral pulses normal, no pedal edema, no clubbing or cyanosis   Skin - normal coloration and turgor, no rashes, no suspicious skin lesions noted ,    DATA:    Labs:   CBC:   Recent Labs     12/27/20  0603 12/29/20  0529   WBC  --  7.0   HGB 10.0* 10.3*   HCT 31.6* 33.7*   PLT  --  411     BMP:   Recent Labs     12/28/20  0646 12/29/20  0529    148*   K 3.3* 3.7   CO2 44* >45*   BUN 14 12   CREATININE 0.92* 0.91*   LABGLOM 60* >60   GLUCOSE 112* 129*     PT/INR:   No results for input(s): PROTIME, INR in the last 72 hours. IMAGING DATA:  @IMG@   XR CHEST PORTABLE   Final Result   Cardiomegaly unchanged. Blunting of left costophrenic angle possibly due to small pleural effusion. Right pleural effusion on prior not appreciated         US THORACENTESIS Which side should the procedure be performed? Left   Final Result   Moderate left pleural effusion. XR CHEST (2 VW)   Final Result   Small-moderate left pleural effusion. Underlying opacity may represent   atelectasis with pneumonia not excluded. XR CHEST PORTABLE   Final Result   1. Left IJ central line in place, tip overlying the mid SVC   2. No evidence of a pneumothorax   3.  Small left pleural effusion, and left basilar opacity, differential   considerations include atelectasis versus pneumonia             Primary Problem  Symptomatic anemia    Active Hospital Problems This note is created with the assistance of a speech recognition program.  While intending to generate a document that actually reflects the content of the visit, the document can still have some errors including those of syntax and sound a like substitutions which may escape proof reading. It such instances, actual meaning can be extrapolated by contextual diversion.

## 2020-12-29 NOTE — PROGRESS NOTES
Progress Note  Date:2020       Room:-01  Patient Name:[place] Paz     YOB: 1945     Age:75 y.o. Subjective    Subjective:  Symptoms:  Stable. No shortness of breath or cough. Diet:  Adequate intake. Activity level: Impaired due to weakness. Pain:  She reports no pain. Review of Systems   Constitutional: Negative for activity change and appetite change. Respiratory: Negative for cough and shortness of breath. Objective         Vitals Last 24 Hours:  TEMPERATURE:  Temp  Av.2 °F (36.8 °C)  Min: 97.9 °F (36.6 °C)  Max: 98.4 °F (36.9 °C)  RESPIRATIONS RANGE: Resp  Av.3  Min: 18  Max: 20  PULSE OXIMETRY RANGE: SpO2  Av %  Min: 91 %  Max: 97 %  PULSE RANGE: Pulse  Av.3  Min: 85  Max: 103  BLOOD PRESSURE RANGE: Systolic (20FEK), OUI:607 , Min:114 , VWD:488   ; Diastolic (09LGO), PYJ:98, Min:61, Max:68    I/O (24Hr): Intake/Output Summary (Last 24 hours) at 2020 1109  Last data filed at 2020 0514  Gross per 24 hour   Intake 1020 ml   Output 2100 ml   Net -1080 ml     Objective:  General Appearance:  Comfortable. Vital signs: (most recent): Blood pressure 117/66, pulse 86, temperature 98.2 °F (36.8 °C), temperature source Oral, resp. rate 19, height 5' 5\" (1.651 m), weight 169 lb 8.5 oz (76.9 kg), SpO2 94 %. Vital signs are normal.    Lungs:  Normal effort and normal respiratory rate. There are decreased breath sounds. Heart: Normal rate. Regular rhythm.   S1 normal and S2 normal.      Labs/Imaging/Diagnostics    Labs:  CBC:  Recent Labs     20  0603 20  0529   WBC  --  7.0   RBC  --  3.44*   HGB 10.0* 10.3*   HCT 31.6* 33.7*   MCV  --  97.9   RDW  --  19.3*   PLT  --  411     CHEMISTRIES:  Recent Labs     20  0603 20  0646 20  0529   * 144 148*   K 3.1* 3.3* 3.7    96* 93*   CO2 40* 44* >45*   BUN 22 14 12   CREATININE 1.04* 0.92* 0.91*   GLUCOSE 121* 112* 129*

## 2020-12-29 NOTE — PROGRESS NOTES
SW spoke to pt's son. He will come to the hospital and talk to pt along with this worker. Addendum: SW and son spoke to pt regarding SNF. Pt is agreeable to short stay. Pt is accepted at [facility]. Transport set  for [facility]. Son informed.

## 2020-12-29 NOTE — PROGRESS NOTES
NEPHROLOGY PROGRESS NOTE    Interval history: Patient was seen and examined today and she is comfortable at rest.  She is s/p leftthoracentesis with removal of 500 mL. Shortness of breath is improving. She is nonoliguric on Bumex 1 mg p.o. twice daily. History of Present Illness: This is a 70 y.o. female with history of hypertension, COPD, atrial fibrillation on eliquis, CHF with diastolic heart failure, who presented to the ER on 20 with progressive shortness of breath. Patient was recently admitted for GI bleed with severe anemia. Patient  has had increasing shortness of breath and fatigue and was told to go to the ER by her PCP due to a low  Hemoglobin of 6. 6. She received 2 units of packed red blood cells and Eliquis was placed on hold. She  had EGD on 20  showing 6 AVMs in jejunum which were cauterised. She has been diuresed with IV Lasix and switched to oral Lasix 40 mg twice daily. Chest x-ray did  show a left pleural effusion and she had left thoracentesis with 525 cc of fluid removed from the left chest.. Her creatinine has gradually risen from 1.1 mg/dl on 20 to 1.5 mg/dl today. Patient denies dysuria, gross hematuria, flank pain, nocturia, urgency, passing frothy urine or urinary incontinence. There has been no recent exposure to IV contrast.     Objective:  CURRENT TEMPERATURE:  Temp: 98.2 °F (36.8 °C)  MAXIMUM TEMPERATURE OVER 24HRS:  Temp (24hrs), Av.2 °F (36.8 °C), Min:97.9 °F (36.6 °C), Max:98.4 °F (36.9 °C)    CURRENT RESPIRATORY RATE:  Resp: 19  CURRENT PULSE:  Pulse: 86  CURRENT BLOOD PRESSURE:  BP: 117/66  24HR BLOOD PRESSURE RANGE:  Systolic (24ODO), KLV:448 , Min:114 , VOT:600   ; Diastolic (91JQT), BLX:74, Min:61, Max:68    24HR INTAKE/OUTPUT:      Intake/Output Summary (Last 24 hours) at 2020 0920  Last data filed at 2020 0514  Gross per 24 hour   Intake 1020 ml   Output 2100 ml   Net -1080 ml Patient Vitals for the past 96 hrs (Last 3 readings):   Weight   12/28/20 2129 169 lb 8.5 oz (76.9 kg)   12/27/20 0035 176 lb 5.9 oz (80 kg)   12/26/20 0420 185 lb 10 oz (84.2 kg)     Physical Exam:  GENERAL APPEARANCE: Alert and cooperative, and appears to be in no acute distress. CARDIAC: Normal S1 and S2. No S3, S4 or murmurs. Rhythm is regular. LUNGS: rales +, diminished breath sounds. ABDOMEN: Soft non distended, BS+ Non tender no organomegally  MUSKULOSKELETAL: Adequately aligned spine. No joint erythema or tenderness. EXTREMITIES: 2 + lower extremity  edema. Peripheral pulses intact. NEURO:Alert oriented x 3 ,power 5/5 in all extremities    Labs:   CBC:  Recent Labs     12/27/20  0603 12/29/20  0529   WBC  --  7.0   RBC  --  3.44*   HGB 10.0* 10.3*   HCT 31.6* 33.7*   MCV  --  97.9   MCH  --  30.1   MCHC  --  30.7*   RDW  --  19.3*   PLT  --  411   MPV  --  7.7      BMP:   Recent Labs     12/27/20  0603 12/28/20  0646 12/29/20  0529   * 144 148*   K 3.1* 3.3* 3.7    96* 93*   CO2 40* 44* >45*   BUN 22 14 12   CREATININE 1.04* 0.92* 0.91*   GLUCOSE 121* 112* 129*   CALCIUM 9.0 9.1 8.9        IRON:    Lab Results   Component Value Date    IRON 39 12/20/2020     TIBC:    Lab Results   Component Value Date    TIBC 300 12/20/2020     FERRITIN:    Lab Results   Component Value Date    FERRITIN 37 12/18/2020     SPEP:   Lab Results   Component Value Date    PROT 6.8 12/24/2020    ALBCAL 4.1 03/16/2020    ALBPCT 62 03/16/2020    LABALPH 0.3 03/16/2020    LABALPH 0.8 03/16/2020    A1PCT 4 03/16/2020    A2PCT 12 03/16/2020    LABBETA 0.7 03/16/2020    BETAPCT 10 03/16/2020    GAMGLOB 0.8 03/16/2020    GGPCT 12 03/16/2020    PATH ELECTRONICALLY SIGNED.  Beka Mixon M.D. 03/16/2020     UPEP: No results found for: TPU     C3:   Lab Results   Component Value Date    C3 128 03/16/2020     C4:   Lab Results   Component Value Date    C4 36 03/16/2020     Urine Sodium:    Lab Results Component Value Date    ANGÉLICA 27 09/10/2020      Urine Potassium:    Lab Results   Component Value Date    KUR 35.7 07/20/2020     Urine Creatinine:    Lab Results   Component Value Date    LABCREA 23.2 12/27/2020     Urinalysis:  U/A:   Lab Results   Component Value Date    NITRU NEGATIVE 09/10/2020    COLORU YELLOW 09/10/2020    PHUR 5.5 09/10/2020    WBCUA 2 TO 5 09/10/2020    RBCUA 0 TO 2 09/10/2020    MUCUS NOT REPORTED 09/10/2020    TRICHOMONAS NOT REPORTED 09/10/2020    YEAST NOT REPORTED 09/10/2020    BACTERIA FEW 09/10/2020    CLARITYU Clear 01/31/2020    SPECGRAV 1.021 09/10/2020    LEUKOCYTESUR SMALL 09/10/2020    UROBILINOGEN Normal 09/10/2020    BILIRUBINUR  09/10/2020     Presumptive positive. Unable to confirm due to unavailability of reagent. BILIRUBINUR 0 01/31/2020    BLOODU Negative 01/31/2020    GLUCOSEU NEGATIVE 09/10/2020    KETUA NEGATIVE 09/10/2020    AMORPHOUS 2+ 09/10/2020     Assessment/plan:    1. Acute kidney injury - secondary to prerenal azotemia from diuretics, recent anemia  and transient hypotension. Renal function is improved to baseline. However, patient has worsening metabolic alkalosis suggestive of intravascular volume contraction. Plan: Strict input and output documentation. Two gram sodium diet   Avoid NSAID'S. 2.  CKD stage 3 - secondary to nephrosclerosis [baseline creatinine 1.1 to 1.3 mg/dL]. 3.  Acute on chronic systolic heart failure with diastolic dysfunction -continue diuretics as ordered. 4.  Acute anemia -stable s/p PRBC x2 -Hb 10.3  gm/dl today. 5.  Left pleural effusion - status post thoracentesis, likely transudative     6. Hyponatremia associated with metabolic alkalosis - we will switch to thiazide diuretics. Prognosis is guarded.     Electronically signed by Meme Jose MD on 12/29/2020 at 9:20 AM

## 2020-12-29 NOTE — PROGRESS NOTES
Dr. Farooq Peaks paged regarding discharge medications. Patient to discharge on diamox 250 mg for 7 days, and hydrochlorothiazide 25 mg daily.

## 2020-12-29 NOTE — PROGRESS NOTES
Pulmonary Progress Note  [facility]      Patient - Cristy Greenberg,  Age - 70 y.o.    - 1945      Room Number - -01   N -  [id number]   [facility]  Date of Admission -  2020  8:33 PM        [address], [place]  Primary Care Physician - Enrique Garland, APRN - CNP     SUBJECTIVE   Patient is sitting on the side of the bed on room air. I did discuss with nursing staff she did have oxygen desaturations with walking. Patient does have home oxygen but uses it \"as needed. \" She tells me that she has never seen a lung physician prior to this admission. Vital signs remained stable. Physical therapy is recommending skilled nursing placement. She remains alert. Denies any chest pain. No shortness of breath. OBJECTIVE   VITALS    height is 5' 5\" (1.651 m) and weight is 169 lb 8.5 oz (76.9 kg). Her oral temperature is 98.2 °F (36.8 °C). Her blood pressure is 117/66 and her pulse is 86. Her respiration is 19 and oxygen saturation is 94%. Body mass index is 28.21 kg/m².   Temperature Range: Temp: 98.2 °F (36.8 °C) Temp  Av.2 °F (36.8 °C)  Min: 98.1 °F (36.7 °C)  Max: 98.4 °F (36.9 °C)  BP Range:  Systolic (73SRZ), EJY:041 , Min:114 , YTM:071     Diastolic (69ZSO), WPB:46, Min:61, Max:68    Pulse Range: Pulse  Av  Min: 86  Max: 103  Respiration Range: Resp  Av.7  Min: 19  Max: 20  Current Pulse Ox[de-identified]  SpO2: 94 %  24HR Pulse Ox Range:  SpO2  Av %  Min: 91 %  Max: 97 %  Oxygen Amount and Delivery: O2 Flow Rate (L/min): 1 L/min    Wt Readings from Last 3 Encounters:   20 169 lb 8.5 oz (76.9 kg)   20 187 lb 6.4 oz (85 kg)   20 188 lb (85.3 kg)       I/O (24 Hours)    Intake/Output Summary (Last 24 hours) at 2020 1325  Last data filed at 2020 0514  Gross per 24 hour   Intake 1020 ml   Output 2100 ml   Net -1080 ml       EXAM General Appearance  Awake, alert, oriented, in no acute distress  HEENT - normocephalic, atraumatic.  []  Mallampati  [] Crowded airway   [] Macroglossia  []  Retrognathia  [] Micrognathia  []  Normal tongue size []  Normal Bite  [] [place] sign positive    Neck - Supple,  trachea midline   Lungs -fine crackles in left lower base  Cardiovascular - Heart sounds are normal.  Regular rate and rhythm   Abdomen - Soft, nontender, nondistended, no masses or organomegaly  Neurologic - There are no focal motor or sensory deficits  Skin - No bruising or bleeding  Extremities - No clubbing, cyanosis, edema    MEDS      chlorothiazide (DIURIL) IVPB  500 mg Intravenous Q12H    acetaZOLAMIDE  250 mg Oral Daily    lisinopril  2.5 mg Oral Lunch    metoprolol tartrate  25 mg Oral BID    guaiFENesin  1,200 mg Oral BID    fluticasone  2 spray Each Nostril Daily    sodium chloride flush  10 mL Intravenous 2 times per day    ferrous sulfate  325 mg Oral Daily    ascorbic acid  1,000 mg Oral Daily    atorvastatin  10 mg Oral Daily    dilTIAZem  120 mg Oral Daily    fluticasone  2 puff Inhalation BID    apixaban  5 mg Oral BID    levothyroxine  125 mcg Oral Daily    pantoprazole  40 mg Oral QAM AC    DULoxetine  60 mg Oral Daily    tamsulosin  0.4 mg Oral Daily       ondansetron, sodium chloride flush, acetaminophen, albuterol, clonazePAM, baclofen, HYDROcodone-acetaminophen, potassium chloride **OR** potassium alternative oral replacement **OR** potassium chloride    LABS   CBC   Recent Labs     12/29/20  0529   WBC 7.0   HGB 10.3*   HCT 33.7*   MCV 97.9        BMP:   Lab Results   Component Value Date     12/29/2020    K 3.7 12/29/2020    CL 93 12/29/2020    CO2 >45 12/29/2020    BUN 12 12/29/2020    LABALBU 3.8 12/24/2020    LABALBU 4.5 02/18/2012    CREATININE 0.91 12/29/2020    CALCIUM 8.9 12/29/2020    GFRAA >60 12/29/2020    LABGLOM >60 12/29/2020     ABGs:  Lab Results   Component Value Date PHART 7.330 09/13/2018    PO2ART 74.8 09/13/2018    NNM8MPV 47.3 09/13/2018      Lab Results   Component Value Date    MODE NOT REPORTED 09/13/2018     Ionized Calcium:  No results found for: IONCA  Magnesium:    Lab Results   Component Value Date    MG 2.2 12/24/2020     Phosphorus:    Lab Results   Component Value Date    PHOS 4.1 03/16/2020        LIVER PROFILE No results for input(s): AST, ALT, LIPASE, BILIDIR, BILITOT, ALKPHOS in the last 72 hours. Invalid input(s): AMYLASE,  ALB  INR No results for input(s): INR in the last 72 hours. PTT   Lab Results   Component Value Date    APTT 36.9 (H) 12/19/2020         RADIOLOGY     (See actual reports for details)    ASSESSMENT/PLAN   Principal Problem:    Symptomatic anemia  Active Problems:    Acquired hypothyroidism    A-fib (HCC)    Pulmonary hypertension (HCC)    COPD (chronic obstructive pulmonary disease) ([facility])    HA (generalized anxiety disorder)    CKD (chronic kidney disease) stage 3, GFR 30-59 ml/min    Moderate major depression ([facility])    PAD (peripheral artery disease) ([facility])    Obesity, Class I, BMI 30-34.9    Acute on chronic diastolic CHF (congestive heart failure) ([facility])    Iron deficiency anemia due to chronic blood loss    Pneumonia due to organism  Resolved Problems:    * No resolved hospital problems. *      1. Acute hypoxemic respiratory. Not normally on oxygen  2. Left-sided pleural effusion status post thoracentesis 525 mL removed-NG date of effusion  3. Recent symptomatic anemia  4. GI bleed with AVM on EGD. 5.  Acute on chronic diastolic heart failure  6. Acute kidney injury  7. History of hypertension  8.   History of atrial fibrillation-on Eliquis  9.  hypokalemia      Plan:  Fluid culture remains pending though does not seem to be infectious  Electrolyte replacement per protocol  Seen per nephrology or cardiology service  No objection to skilled nursing placement from pulmonary standpoint Will need outpatient follow-up once home from skilled nursing placement will need follow-up in our office [phone]  I spoke to RN    Electronically signed by WILLIE Chandler CNP on 12/29/2020 at 1:25 PM

## 2021-01-01 LAB
CULTURE: NORMAL
DIRECT EXAM: NORMAL
DIRECT EXAM: NORMAL
Lab: NORMAL
SPECIMEN DESCRIPTION: NORMAL

## 2021-01-05 NOTE — DISCHARGE SUMMARY
Family Medicine Discharge Summary    Trish Lopez  :  1945  MRN:  [id number]    Admit date:  2020  Discharge date:  2020    Admitting Physician:  Orlene Hatchet, MD    Discharge Diagnoses:    Patient Active Problem List   Diagnosis    Acquired hypothyroidism    Hypertension    Primary osteoarthritis of right knee    A-fib ([address]    Acute encephalopathy    SIRS (systemic inflammatory response syndrome) ([address]    Normocytic anemia    Pulmonary hypertension ([address]    COPD (chronic obstructive pulmonary disease) ([address]    History of GI bleed    Constipation    HA (generalized anxiety disorder)    Lumbar radiculopathy    Lumbosacral spondylosis without myelopathy    Benign hypertension with CKD (chronic kidney disease) stage III    CKD (chronic kidney disease) stage 3, GFR 30-59 ml/min    Alcohol withdrawal syndrome with complication (HCC)    Moderate [facility] depression [facility]    PAD (peripheral artery disease) (HCC)    Acute kidney injury ([address]    Obesity, Class I, BMI 30-34.9    Acute on chronic diastolic CHF (congestive heart failure) (HCC)    GI bleed    Weight loss    Elevated alkaline phosphatase level    Thrombocytosis (HCC)    Iron deficiency anemia due to chronic blood loss    Iron malabsorption    Symptomatic anemia    Pneumonia due to organism       Admission Condition:  guarded  Discharged Condition:  fair    Hospital Course:   71 yo admitted with symptomatic anemia and shortness of breath. She required PRBC transfusion and diuresis. Stool was positive for blood. EGD and push enteroscopy revealed 6 AVMs in the jejunum.      Discharge Medications:       Ganga Chung\"   Home Medication Instructions DF    Printed on:21 1348   Medication Information                      acetaZOLAMIDE (DIAMOX) 250 MG tablet  Take 1 tablet by mouth daily for 7 days             albuterol (PROVENTIL) (2.5 MG/3ML) 0.083% nebulizer solution Take 3 mLs by nebulization every 6 hours as needed for Wheezing             albuterol sulfate HFA (PROAIR HFA) 108 (90 Base) MCG/ACT inhaler  Inhale 2 puffs into the lungs every 6 hours as needed for Wheezing             Ascorbic Acid (C-1000 PO)  Take 1 capsule by mouth daily              atorvastatin (LIPITOR) 10 MG tablet  TAKE 1 TABLET BY MOUTH ONCE DAILY             baclofen (LIORESAL) 10 MG tablet  TAKE ONE TABLET BY MOUTH THREE TIMES A DAY AS NEEDED. clonazePAM (KLONOPIN) 0.5 MG tablet  Take 1 tablet by mouth 3 times daily as needed for Anxiety for up to 30 days.              dilTIAZem (CARDIZEM CD) 120 MG extended release capsule  Take 1 capsule by mouth daily             DULoxetine (CYMBALTA) 60 MG extended release capsule  Take 1 capsule by mouth daily             ELIQUIS 5 MG TABS tablet  TAKE ONE TABLET BY MOUTH TWICE A DAY             ferrous sulfate 325 (65 Fe) MG tablet  Take 325 mg by mouth daily              fluticasone (FLONASE) 50 MCG/ACT nasal spray  USE ONE SPRAY TO EACH NOSTRIL ONCE ADAY             fluticasone (FLOVENT HFA) 220 MCG/ACT inhaler  Inhale 2 puffs into the lungs 2 times daily             hydroCHLOROthiazide (HYDRODIURIL) 25 MG tablet  Take 1 tablet by mouth daily             levalbuterol (XOPENEX HFA) 45 MCG/ACT inhaler  Inhale 1 puff into the lungs every 4 hours as needed for Wheezing             levothyroxine (SYNTHROID) 125 MCG tablet  Take 1 tablet by mouth daily             Lift Chair MISC  Use daily for comfort             lisinopril (PRINIVIL;ZESTRIL) 2.5 MG tablet  Take 1 tablet by mouth Daily with lunch             metoprolol tartrate (LOPRESSOR) 25 MG tablet  Take 1 tablet by mouth 2 times daily             omeprazole (PRILOSEC) 40 MG delayed release capsule  TAKE 1 CAPSULE BY MOUTH EVERY MORNING BEFORE BREAKFAST             Oxygen Concentrator  continuous             PROAIR  (90 Base) MCG/ACT inhaler Inhale 2 puffs into the lungs every 6 hours as needed for Wheezing             Respiratory Therapy Supplies (NEBULIZER/TUBING/MOUTHPIECE) KIT  Use every 4-6 hours prn for copd             tamsulosin (FLOMAX) 0.4 MG capsule  TAKE 1 CAPSULE BY MOUTH DAILY                 Consults:  Pulmonology, hematology, GI, cardiology, nephrology      Significant Diagnostic Studies:  EGD, labs, radiology      Treatments:   Supportive care    Disposition:   SNF  . Signed:   Gladys Bourgeois MD, FAAFP  1/5/2021, 1:49 PM

## 2021-01-08 ENCOUNTER — TELEPHONE (OUTPATIENT)
Dept: ONCOLOGY | Age: 76
End: 2021-01-08

## 2021-01-19 ENCOUNTER — APPOINTMENT (OUTPATIENT)
Dept: GENERAL RADIOLOGY | Age: 76
DRG: 811 | End: 2021-01-19
Payer: MEDICARE

## 2021-01-19 ENCOUNTER — HOSPITAL ENCOUNTER (INPATIENT)
Age: 76
LOS: 4 days | Discharge: NURSING, REHAB OR LONG-TERM CARE | DRG: 811 | End: 2021-01-24
Attending: EMERGENCY MEDICINE | Admitting: FAMILY MEDICINE
Payer: MEDICARE

## 2021-01-19 DIAGNOSIS — K92.2 GASTROINTESTINAL HEMORRHAGE, UNSPECIFIED GASTROINTESTINAL HEMORRHAGE TYPE: ICD-10-CM

## 2021-01-19 DIAGNOSIS — D64.9 ANEMIA, UNSPECIFIED TYPE: Primary | ICD-10-CM

## 2021-01-19 LAB
ABSOLUTE EOS #: 0.08 K/UL (ref 0–0.4)
ABSOLUTE IMMATURE GRANULOCYTE: ABNORMAL K/UL (ref 0–0.3)
ABSOLUTE LYMPH #: 1.22 K/UL (ref 1–4.8)
ABSOLUTE MONO #: 0.76 K/UL (ref 0.1–1.3)
ALBUMIN SERPL-MCNC: 3.4 G/DL (ref 3.5–5.2)
ALBUMIN/GLOBULIN RATIO: ABNORMAL (ref 1–2.5)
ALP BLD-CCNC: 119 U/L (ref 35–104)
ALT SERPL-CCNC: 13 U/L (ref 5–33)
ANION GAP SERPL CALCULATED.3IONS-SCNC: 3 MMOL/L (ref 9–17)
AST SERPL-CCNC: 17 U/L
BASOPHILS # BLD: 2 % (ref 0–2)
BASOPHILS ABSOLUTE: 0.15 K/UL (ref 0–0.2)
BILIRUB SERPL-MCNC: 0.2 MG/DL (ref 0.3–1.2)
BNP INTERPRETATION: ABNORMAL
BUN BLDV-MCNC: 22 MG/DL (ref 8–23)
BUN/CREAT BLD: ABNORMAL (ref 9–20)
CALCIUM SERPL-MCNC: 8.7 MG/DL (ref 8.6–10.4)
CHLORIDE BLD-SCNC: 95 MMOL/L (ref 98–107)
CO2: 40 MMOL/L (ref 20–31)
CREAT SERPL-MCNC: 1.36 MG/DL (ref 0.5–0.9)
DIFFERENTIAL TYPE: ABNORMAL
EOSINOPHILS RELATIVE PERCENT: 1 % (ref 0–4)
GFR AFRICAN AMERICAN: 46 ML/MIN
GFR NON-AFRICAN AMERICAN: 38 ML/MIN
GFR SERPL CREATININE-BSD FRML MDRD: ABNORMAL ML/MIN/{1.73_M2}
GFR SERPL CREATININE-BSD FRML MDRD: ABNORMAL ML/MIN/{1.73_M2}
GLUCOSE BLD-MCNC: 101 MG/DL (ref 70–99)
HCT VFR BLD CALC: 17.5 % (ref 36–46)
HEMOGLOBIN: 5.5 G/DL (ref 12–16)
IMMATURE GRANULOCYTES: ABNORMAL %
INR BLD: 1.3
LYMPHOCYTES # BLD: 16 % (ref 24–44)
MAGNESIUM: 2.4 MG/DL (ref 1.6–2.6)
MCH RBC QN AUTO: 30.3 PG (ref 26–34)
MCHC RBC AUTO-ENTMCNC: 31.3 G/DL (ref 31–37)
MCV RBC AUTO: 96.7 FL (ref 80–100)
MONOCYTES # BLD: 10 % (ref 1–7)
MORPHOLOGY: ABNORMAL
MORPHOLOGY: ABNORMAL
NRBC AUTOMATED: ABNORMAL PER 100 WBC
PARTIAL THROMBOPLASTIN TIME: 31.7 SEC (ref 24–36)
PATHOLOGIST REVIEW: NORMAL
PDW BLD-RTO: 17.5 % (ref 11.5–14.9)
PLATELET # BLD: 345 K/UL (ref 150–450)
PLATELET ESTIMATE: ABNORMAL
PMV BLD AUTO: 8.9 FL (ref 6–12)
POTASSIUM SERPL-SCNC: 4.9 MMOL/L (ref 3.7–5.3)
PRO-BNP: 3366 PG/ML
PROTHROMBIN TIME: 16 SEC (ref 11.8–14.6)
RBC # BLD: 1.81 M/UL (ref 4–5.2)
RBC # BLD: ABNORMAL 10*6/UL
SEG NEUTROPHILS: 71 % (ref 36–66)
SEGMENTED NEUTROPHILS ABSOLUTE COUNT: 5.39 K/UL (ref 1.3–9.1)
SODIUM BLD-SCNC: 138 MMOL/L (ref 135–144)
TOTAL PROTEIN: 6 G/DL (ref 6.4–8.3)
TROPONIN INTERP: ABNORMAL
TROPONIN T: ABNORMAL NG/ML
TROPONIN, HIGH SENSITIVITY: 21 NG/L (ref 0–14)
WBC # BLD: 7.6 K/UL (ref 3.5–11)
WBC # BLD: ABNORMAL 10*3/UL

## 2021-01-19 PROCEDURE — 85730 THROMBOPLASTIN TIME PARTIAL: CPT

## 2021-01-19 PROCEDURE — 86850 RBC ANTIBODY SCREEN: CPT

## 2021-01-19 PROCEDURE — 84484 ASSAY OF TROPONIN QUANT: CPT

## 2021-01-19 PROCEDURE — 86920 COMPATIBILITY TEST SPIN: CPT

## 2021-01-19 PROCEDURE — 6360000002 HC RX W HCPCS: Performed by: STUDENT IN AN ORGANIZED HEALTH CARE EDUCATION/TRAINING PROGRAM

## 2021-01-19 PROCEDURE — 36415 COLL VENOUS BLD VENIPUNCTURE: CPT

## 2021-01-19 PROCEDURE — 80053 COMPREHEN METABOLIC PANEL: CPT

## 2021-01-19 PROCEDURE — 71045 X-RAY EXAM CHEST 1 VIEW: CPT

## 2021-01-19 PROCEDURE — 99285 EMERGENCY DEPT VISIT HI MDM: CPT

## 2021-01-19 PROCEDURE — 86901 BLOOD TYPING SEROLOGIC RH(D): CPT

## 2021-01-19 PROCEDURE — 83880 ASSAY OF NATRIURETIC PEPTIDE: CPT

## 2021-01-19 PROCEDURE — 96361 HYDRATE IV INFUSION ADD-ON: CPT

## 2021-01-19 PROCEDURE — 85610 PROTHROMBIN TIME: CPT

## 2021-01-19 PROCEDURE — 86900 BLOOD TYPING SEROLOGIC ABO: CPT

## 2021-01-19 PROCEDURE — 96374 THER/PROPH/DIAG INJ IV PUSH: CPT

## 2021-01-19 PROCEDURE — 83735 ASSAY OF MAGNESIUM: CPT

## 2021-01-19 PROCEDURE — 36430 TRANSFUSION BLD/BLD COMPNT: CPT

## 2021-01-19 PROCEDURE — 2580000003 HC RX 258: Performed by: STUDENT IN AN ORGANIZED HEALTH CARE EDUCATION/TRAINING PROGRAM

## 2021-01-19 PROCEDURE — 93005 ELECTROCARDIOGRAM TRACING: CPT | Performed by: STUDENT IN AN ORGANIZED HEALTH CARE EDUCATION/TRAINING PROGRAM

## 2021-01-19 PROCEDURE — C9113 INJ PANTOPRAZOLE SODIUM, VIA: HCPCS | Performed by: STUDENT IN AN ORGANIZED HEALTH CARE EDUCATION/TRAINING PROGRAM

## 2021-01-19 PROCEDURE — 85025 COMPLETE CBC W/AUTO DIFF WBC: CPT

## 2021-01-19 RX ORDER — PANTOPRAZOLE SODIUM 40 MG/10ML
40 INJECTION, POWDER, LYOPHILIZED, FOR SOLUTION INTRAVENOUS 2 TIMES DAILY
Status: DISCONTINUED | OUTPATIENT
Start: 2021-01-19 | End: 2021-01-24 | Stop reason: HOSPADM

## 2021-01-19 RX ORDER — SODIUM CHLORIDE 9 MG/ML
INJECTION, SOLUTION INTRAVENOUS PRN
Status: DISCONTINUED | OUTPATIENT
Start: 2021-01-19 | End: 2021-01-24 | Stop reason: HOSPADM

## 2021-01-19 RX ORDER — 0.9 % SODIUM CHLORIDE 0.9 %
500 INTRAVENOUS SOLUTION INTRAVENOUS ONCE
Status: COMPLETED | OUTPATIENT
Start: 2021-01-19 | End: 2021-01-19

## 2021-01-19 RX ORDER — SODIUM CHLORIDE 9 MG/ML
10 INJECTION INTRAVENOUS ONCE
Status: COMPLETED | OUTPATIENT
Start: 2021-01-19 | End: 2021-01-19

## 2021-01-19 RX ORDER — ACETAMINOPHEN 325 MG/1
650 TABLET ORAL EVERY 4 HOURS PRN
COMMUNITY

## 2021-01-19 RX ADMIN — SODIUM CHLORIDE 10 ML: 9 INJECTION INTRAMUSCULAR; INTRAVENOUS; SUBCUTANEOUS at 23:39

## 2021-01-19 RX ADMIN — SODIUM CHLORIDE 500 ML: 9 INJECTION, SOLUTION INTRAVENOUS at 22:41

## 2021-01-19 RX ADMIN — PANTOPRAZOLE SODIUM 40 MG: 40 INJECTION, POWDER, FOR SOLUTION INTRAVENOUS at 23:39

## 2021-01-19 NOTE — TELEPHONE ENCOUNTER
I TRIED TO CALL LEONARDO JUAN TO SEE IF SHE HAS NEW INSURANCE AND IF NOT IF SHE WANTS TO SCHEDULE HER IRON INFUSIONS. I HAD TO LEAVE A MESSAGE TO CALL THE OFFICE BACK TO LET ME KNOW.

## 2021-01-20 PROBLEM — E43 SEVERE MALNUTRITION (HCC): Chronic | Status: ACTIVE | Noted: 2021-01-20

## 2021-01-20 PROBLEM — E66.3 OVERWEIGHT (BMI 25.0-29.9): Status: ACTIVE | Noted: 2021-01-20

## 2021-01-20 PROBLEM — D64.9 SEVERE ANEMIA: Status: ACTIVE | Noted: 2021-01-20

## 2021-01-20 PROBLEM — D64.9 ANEMIA: Status: ACTIVE | Noted: 2021-01-20

## 2021-01-20 LAB
ABSOLUTE EOS #: 0.1 K/UL (ref 0–0.4)
ABSOLUTE IMMATURE GRANULOCYTE: ABNORMAL K/UL (ref 0–0.3)
ABSOLUTE LYMPH #: 1.1 K/UL (ref 1–4.8)
ABSOLUTE MONO #: 0.9 K/UL (ref 0.1–1.3)
ALBUMIN SERPL-MCNC: 3.2 G/DL (ref 3.5–5.2)
ALBUMIN/GLOBULIN RATIO: ABNORMAL (ref 1–2.5)
ALP BLD-CCNC: 112 U/L (ref 35–104)
ALT SERPL-CCNC: 12 U/L (ref 5–33)
ANION GAP SERPL CALCULATED.3IONS-SCNC: 4 MMOL/L (ref 9–17)
AST SERPL-CCNC: 17 U/L
BASOPHILS # BLD: 1 % (ref 0–2)
BASOPHILS ABSOLUTE: 0.1 K/UL (ref 0–0.2)
BILIRUB SERPL-MCNC: 0.31 MG/DL (ref 0.3–1.2)
BUN BLDV-MCNC: 20 MG/DL (ref 8–23)
BUN/CREAT BLD: ABNORMAL (ref 9–20)
CALCIUM SERPL-MCNC: 8.7 MG/DL (ref 8.6–10.4)
CHLORIDE BLD-SCNC: 100 MMOL/L (ref 98–107)
CO2: 39 MMOL/L (ref 20–31)
CREAT SERPL-MCNC: 1.05 MG/DL (ref 0.5–0.9)
DIFFERENTIAL TYPE: ABNORMAL
EKG ATRIAL RATE: 68 BPM
EKG Q-T INTERVAL: 366 MS
EKG QRS DURATION: 64 MS
EKG QTC CALCULATION (BAZETT): 400 MS
EKG R AXIS: 65 DEGREES
EKG T AXIS: 58 DEGREES
EKG VENTRICULAR RATE: 72 BPM
EOSINOPHILS RELATIVE PERCENT: 2 % (ref 0–4)
GFR AFRICAN AMERICAN: >60 ML/MIN
GFR NON-AFRICAN AMERICAN: 51 ML/MIN
GFR SERPL CREATININE-BSD FRML MDRD: ABNORMAL ML/MIN/{1.73_M2}
GFR SERPL CREATININE-BSD FRML MDRD: ABNORMAL ML/MIN/{1.73_M2}
GLUCOSE BLD-MCNC: 109 MG/DL (ref 70–99)
HCT VFR BLD CALC: 21.4 % (ref 36–46)
HCT VFR BLD CALC: 24.2 % (ref 36–46)
HCT VFR BLD CALC: 25.3 % (ref 36–46)
HEMOGLOBIN: 6.9 G/DL (ref 12–16)
HEMOGLOBIN: 7.8 G/DL (ref 12–16)
HEMOGLOBIN: 8.3 G/DL (ref 12–16)
IMMATURE GRANULOCYTES: ABNORMAL %
LYMPHOCYTES # BLD: 14 % (ref 24–44)
MCH RBC QN AUTO: 30.5 PG (ref 26–34)
MCHC RBC AUTO-ENTMCNC: 32.1 G/DL (ref 31–37)
MCV RBC AUTO: 95 FL (ref 80–100)
MONOCYTES # BLD: 12 % (ref 1–7)
NRBC AUTOMATED: ABNORMAL PER 100 WBC
PDW BLD-RTO: 17.1 % (ref 11.5–14.9)
PLATELET # BLD: 335 K/UL (ref 150–450)
PLATELET ESTIMATE: ABNORMAL
PMV BLD AUTO: 9.1 FL (ref 6–12)
POTASSIUM SERPL-SCNC: 4.7 MMOL/L (ref 3.7–5.3)
RBC # BLD: 2.25 M/UL (ref 4–5.2)
RBC # BLD: ABNORMAL 10*6/UL
SEG NEUTROPHILS: 71 % (ref 36–66)
SEGMENTED NEUTROPHILS ABSOLUTE COUNT: 5.6 K/UL (ref 1.3–9.1)
SODIUM BLD-SCNC: 143 MMOL/L (ref 135–144)
TOTAL PROTEIN: 6 G/DL (ref 6.4–8.3)
WBC # BLD: 7.8 K/UL (ref 3.5–11)
WBC # BLD: ABNORMAL 10*3/UL

## 2021-01-20 PROCEDURE — 85014 HEMATOCRIT: CPT

## 2021-01-20 PROCEDURE — 6370000000 HC RX 637 (ALT 250 FOR IP): Performed by: FAMILY MEDICINE

## 2021-01-20 PROCEDURE — P9016 RBC LEUKOCYTES REDUCED: HCPCS

## 2021-01-20 PROCEDURE — 36430 TRANSFUSION BLD/BLD COMPNT: CPT

## 2021-01-20 PROCEDURE — 2580000003 HC RX 258: Performed by: STUDENT IN AN ORGANIZED HEALTH CARE EDUCATION/TRAINING PROGRAM

## 2021-01-20 PROCEDURE — C9113 INJ PANTOPRAZOLE SODIUM, VIA: HCPCS | Performed by: STUDENT IN AN ORGANIZED HEALTH CARE EDUCATION/TRAINING PROGRAM

## 2021-01-20 PROCEDURE — 99223 1ST HOSP IP/OBS HIGH 75: CPT | Performed by: FAMILY MEDICINE

## 2021-01-20 PROCEDURE — 2060000000 HC ICU INTERMEDIATE R&B

## 2021-01-20 PROCEDURE — 84075 ASSAY ALKALINE PHOSPHATASE: CPT

## 2021-01-20 PROCEDURE — 36415 COLL VENOUS BLD VENIPUNCTURE: CPT

## 2021-01-20 PROCEDURE — 85018 HEMOGLOBIN: CPT

## 2021-01-20 PROCEDURE — APPNB30 APP NON BILLABLE TIME 0-30 MINS: Performed by: NURSE PRACTITIONER

## 2021-01-20 PROCEDURE — 84080 ASSAY ALKALINE PHOSPHATASES: CPT

## 2021-01-20 PROCEDURE — 85025 COMPLETE CBC W/AUTO DIFF WBC: CPT

## 2021-01-20 PROCEDURE — 86900 BLOOD TYPING SEROLOGIC ABO: CPT

## 2021-01-20 PROCEDURE — 6360000002 HC RX W HCPCS: Performed by: STUDENT IN AN ORGANIZED HEALTH CARE EDUCATION/TRAINING PROGRAM

## 2021-01-20 PROCEDURE — 6370000000 HC RX 637 (ALT 250 FOR IP): Performed by: STUDENT IN AN ORGANIZED HEALTH CARE EDUCATION/TRAINING PROGRAM

## 2021-01-20 PROCEDURE — 99221 1ST HOSP IP/OBS SF/LOW 40: CPT | Performed by: INTERNAL MEDICINE

## 2021-01-20 PROCEDURE — 80053 COMPREHEN METABOLIC PANEL: CPT

## 2021-01-20 PROCEDURE — 93010 ELECTROCARDIOGRAM REPORT: CPT | Performed by: INTERNAL MEDICINE

## 2021-01-20 PROCEDURE — 99222 1ST HOSP IP/OBS MODERATE 55: CPT | Performed by: INTERNAL MEDICINE

## 2021-01-20 RX ORDER — BACLOFEN 10 MG/1
10 TABLET ORAL 3 TIMES DAILY PRN
Status: DISCONTINUED | OUTPATIENT
Start: 2021-01-20 | End: 2021-01-24 | Stop reason: HOSPADM

## 2021-01-20 RX ORDER — PANTOPRAZOLE SODIUM 40 MG/1
40 TABLET, DELAYED RELEASE ORAL
Status: DISCONTINUED | OUTPATIENT
Start: 2021-01-20 | End: 2021-01-20 | Stop reason: SDUPTHER

## 2021-01-20 RX ORDER — ACETAMINOPHEN 325 MG/1
650 TABLET ORAL EVERY 4 HOURS PRN
Status: DISCONTINUED | OUTPATIENT
Start: 2021-01-20 | End: 2021-01-24 | Stop reason: HOSPADM

## 2021-01-20 RX ORDER — SODIUM CHLORIDE 0.9 % (FLUSH) 0.9 %
10 SYRINGE (ML) INJECTION PRN
Status: DISCONTINUED | OUTPATIENT
Start: 2021-01-20 | End: 2021-01-24 | Stop reason: HOSPADM

## 2021-01-20 RX ORDER — ALBUTEROL SULFATE 2.5 MG/3ML
2.5 SOLUTION RESPIRATORY (INHALATION) EVERY 6 HOURS PRN
Status: DISCONTINUED | OUTPATIENT
Start: 2021-01-20 | End: 2021-01-24 | Stop reason: HOSPADM

## 2021-01-20 RX ORDER — CLONAZEPAM 1 MG/1
0.5 TABLET ORAL 3 TIMES DAILY PRN
Status: DISCONTINUED | OUTPATIENT
Start: 2021-01-20 | End: 2021-01-24 | Stop reason: HOSPADM

## 2021-01-20 RX ORDER — ACETAMINOPHEN 325 MG/1
650 TABLET ORAL EVERY 4 HOURS PRN
Status: DISCONTINUED | OUTPATIENT
Start: 2021-01-20 | End: 2021-01-20 | Stop reason: SDUPTHER

## 2021-01-20 RX ORDER — SODIUM CHLORIDE 0.9 % (FLUSH) 0.9 %
10 SYRINGE (ML) INJECTION EVERY 12 HOURS SCHEDULED
Status: DISCONTINUED | OUTPATIENT
Start: 2021-01-20 | End: 2021-01-24 | Stop reason: HOSPADM

## 2021-01-20 RX ORDER — FERROUS SULFATE 325(65) MG
325 TABLET ORAL DAILY
Status: DISCONTINUED | OUTPATIENT
Start: 2021-01-20 | End: 2021-01-24 | Stop reason: HOSPADM

## 2021-01-20 RX ORDER — SODIUM CHLORIDE 9 MG/ML
INJECTION, SOLUTION INTRAVENOUS PRN
Status: DISCONTINUED | OUTPATIENT
Start: 2021-01-20 | End: 2021-01-24 | Stop reason: HOSPADM

## 2021-01-20 RX ORDER — ACETAZOLAMIDE 250 MG/1
250 TABLET ORAL DAILY
Status: DISCONTINUED | OUTPATIENT
Start: 2021-01-20 | End: 2021-01-24 | Stop reason: HOSPADM

## 2021-01-20 RX ORDER — DILTIAZEM HYDROCHLORIDE 120 MG/1
120 CAPSULE, COATED, EXTENDED RELEASE ORAL DAILY
Status: DISCONTINUED | OUTPATIENT
Start: 2021-01-20 | End: 2021-01-22

## 2021-01-20 RX ORDER — HYDROCHLOROTHIAZIDE 25 MG/1
25 TABLET ORAL DAILY
Status: DISCONTINUED | OUTPATIENT
Start: 2021-01-20 | End: 2021-01-22

## 2021-01-20 RX ORDER — LISINOPRIL 5 MG/1
2.5 TABLET ORAL
Status: DISCONTINUED | OUTPATIENT
Start: 2021-01-20 | End: 2021-01-24 | Stop reason: HOSPADM

## 2021-01-20 RX ORDER — TAMSULOSIN HYDROCHLORIDE 0.4 MG/1
0.4 CAPSULE ORAL DAILY
Status: DISCONTINUED | OUTPATIENT
Start: 2021-01-20 | End: 2021-01-24 | Stop reason: HOSPADM

## 2021-01-20 RX ORDER — ATORVASTATIN CALCIUM 10 MG/1
10 TABLET, FILM COATED ORAL DAILY
Status: DISCONTINUED | OUTPATIENT
Start: 2021-01-20 | End: 2021-01-24 | Stop reason: HOSPADM

## 2021-01-20 RX ORDER — LEVOTHYROXINE SODIUM 0.12 MG/1
125 TABLET ORAL DAILY
Status: DISCONTINUED | OUTPATIENT
Start: 2021-01-20 | End: 2021-01-24 | Stop reason: HOSPADM

## 2021-01-20 RX ORDER — ASCORBIC ACID 500 MG
500 TABLET ORAL DAILY
Status: DISCONTINUED | OUTPATIENT
Start: 2021-01-20 | End: 2021-01-24 | Stop reason: HOSPADM

## 2021-01-20 RX ORDER — FLUTICASONE PROPIONATE 50 MCG
1 SPRAY, SUSPENSION (ML) NASAL DAILY
Status: DISCONTINUED | OUTPATIENT
Start: 2021-01-20 | End: 2021-01-24 | Stop reason: HOSPADM

## 2021-01-20 RX ORDER — SODIUM CHLORIDE 9 MG/ML
INJECTION, SOLUTION INTRAVENOUS CONTINUOUS
Status: DISCONTINUED | OUTPATIENT
Start: 2021-01-20 | End: 2021-01-24 | Stop reason: HOSPADM

## 2021-01-20 RX ORDER — DULOXETIN HYDROCHLORIDE 60 MG/1
60 CAPSULE, DELAYED RELEASE ORAL DAILY
Status: DISCONTINUED | OUTPATIENT
Start: 2021-01-20 | End: 2021-01-24 | Stop reason: HOSPADM

## 2021-01-20 RX ORDER — FLUTICASONE PROPIONATE 220 UG/1
2 AEROSOL, METERED RESPIRATORY (INHALATION) 2 TIMES DAILY
Status: DISCONTINUED | OUTPATIENT
Start: 2021-01-20 | End: 2021-01-24 | Stop reason: HOSPADM

## 2021-01-20 RX ADMIN — PANTOPRAZOLE SODIUM 40 MG: 40 INJECTION, POWDER, FOR SOLUTION INTRAVENOUS at 07:22

## 2021-01-20 RX ADMIN — LISINOPRIL 2.5 MG: 5 TABLET ORAL at 11:40

## 2021-01-20 RX ADMIN — PANTOPRAZOLE SODIUM 40 MG: 40 INJECTION, POWDER, FOR SOLUTION INTRAVENOUS at 20:53

## 2021-01-20 RX ADMIN — ACETAMINOPHEN 650 MG: 325 TABLET, FILM COATED ORAL at 16:00

## 2021-01-20 RX ADMIN — SODIUM CHLORIDE: 9 INJECTION, SOLUTION INTRAVENOUS at 18:03

## 2021-01-20 RX ADMIN — FLUTICASONE PROPIONATE 2 PUFF: 220 AEROSOL, METERED RESPIRATORY (INHALATION) at 20:57

## 2021-01-20 RX ADMIN — SODIUM CHLORIDE: 9 INJECTION, SOLUTION INTRAVENOUS at 04:16

## 2021-01-20 RX ADMIN — CLONAZEPAM 0.5 MG: 1 TABLET ORAL at 21:10

## 2021-01-20 ASSESSMENT — ENCOUNTER SYMPTOMS
ABDOMINAL PAIN: 0
CONSTIPATION: 0
VOMITING: 0
NAUSEA: 0
COUGH: 1
ANAL BLEEDING: 0
BACK PAIN: 0
SHORTNESS OF BREATH: 1
DIARRHEA: 0
BLOOD IN STOOL: 0

## 2021-01-20 ASSESSMENT — PAIN SCALES - GENERAL
PAINLEVEL_OUTOF10: 5
PAINLEVEL_OUTOF10: 0
PAINLEVEL_OUTOF10: 4

## 2021-01-20 NOTE — PROGRESS NOTES
Patient arrived to room 2022 via cart. Transferred to bed. A&O x 4. In no acute distress. Vitals stable. Blood transfusion running. On O2 4L. All questions answered.  Assessment complete

## 2021-01-20 NOTE — CONSULTS
Current Facility-Administered Medications   Medication Dose Route Frequency Provider Last Rate Last Admin    sodium chloride flush 0.9 % injection 10 mL  10 mL Intravenous 2 times per day Juaquin Phalen, DO        sodium chloride flush 0.9 % injection 10 mL  10 mL Intravenous PRN Juaquin Phalen, DO        acetaminophen (TYLENOL) tablet 650 mg  650 mg Oral Q4H PRN Juaquin Elliotten, DO        0.9 % sodium chloride infusion   Intravenous Continuous Juaquinriky Elliotten, DO 75 mL/hr at 01/20/21 0416 New Bag at 01/20/21 0416    ferrous sulfate (IRON 325) tablet 325 mg  325 mg Oral Daily Guanaco Rice MD        ascorbic acid (VITAMIN C) tablet 500 mg  500 mg Oral Daily Guanaco Rice MD        fluticasone (FLONASE) 50 MCG/ACT nasal spray 1 spray  1 spray Each Nostril Daily Guanaco Rice MD        dilTIAZem (CARDIZEM CD) extended release capsule 120 mg  120 mg Oral Daily Guanaco Rice MD        albuterol (PROVENTIL) nebulizer solution 2.5 mg  2.5 mg Nebulization Q6H PRN Guanaco Rice MD        clonazePAM Jailene Spaniel) tablet 0.5 mg  0.5 mg Oral TID PRN Guanaco Rice MD        fluticasone (FLOVENT HFA) 220 MCG/ACT inhaler 2 puff  2 puff Inhalation BID Guanaco Rice MD        baclofen (LIORESAL) tablet 10 mg  10 mg Oral TID PRN Guanaco Rice MD        [Held by provider] apixaban (ELIQUIS) tablet 5 mg  5 mg Oral BID Guanaco Rice MD        levothyroxine (SYNTHROID) tablet 125 mcg  125 mcg Oral Daily Guanaco Rice MD        DULoxetine (CYMBALTA) extended release capsule 60 mg  60 mg Oral Daily Guanaco Rice MD        tamsulosin [facility]) capsule 0.4 mg  0.4 mg Oral Daily Guanaco Rice MD        acetaZOLAMIDE (DIAMOX) tablet 250 mg  250 mg Oral Daily Guanaco Rice MD        hydroCHLOROthiazide (HYDRODIURIL) tablet 25 mg  25 mg Oral Daily Guanaco Rice MD        lisinopril (PRINIVIL;ZESTRIL) tablet 2.5 mg  2.5 mg Oral Lunch Guanaco Rice MD   2.5 mg at 01/20/21 1140  metoprolol tartrate (LOPRESSOR) tablet 25 mg  25 mg Oral BID Yvrose Casiano MD        atorvastatin (LIPITOR) tablet 10 mg  10 mg Oral Daily Yvrose Casiano MD        0.9 % sodium chloride infusion   Intravenous PRN Yvrose Casiano MD        0.9 % sodium chloride infusion   Intravenous PRN Ke Boxer, DO        pantoprazole (PROTONIX) injection 40 mg  40 mg Intravenous BID Ke Boxer, DO   40 mg at 01/20/21 8046      PULMONARY  CONSULT NOTE      Date of Admission: 1/19/2021  9:05 PM    Reason for Consult: COVID    Referring Physician: Dr Towana Seip    PCP: WILLIE Dinh - CNP     History of Present Illness:     69 yo F with Hx recurrent anemia, 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 admitted with low hemoglobin; was covid + in past;   Denies chest pain/ SOB/ cough      Problem:  Principal Problem:     PMH:   Past Medical History:   Diagnosis Date    [facility]     Acquired hypothyroidism     Acute encephalopathy     Acute kidney injury [facility]     Anxiety     Benign hypertension with CKD (chronic kidney disease) stage III     Chronic back pain     CKD (chronic kidney disease) stage 3, GFR 30-59 ml/min     Constipation     COPD (chronic obstructive pulmonary disease) (HCC)     Cough     Depression     ETOH abuse     Former smoker     3 packs a day for 26 years    HA (generalized anxiety disorder)     History of GI bleed     Hyperlipidemia     Hypertension     Hypothyroid 1/18/2013    Leg pain     Normocytic anemia     Osteoarthritis     PAD (peripheral artery disease) ([facility]     Primary osteoarthritis     Pulmonary hypertension ([facility]     Pure hypercholesterolemia     SIRS (systemic inflammatory response syndrome) (HCC)     Urge incontinence     Wheezing        PSH:   Past Surgical History:   Procedure Laterality Date    COLONOSCOPY      COLONOSCOPY N/A 12/2/2020    COLONOSCOPY CONTROL OF BLEEDING performed by Joselo Jane MD at [address] [place] [address] Bilateral     cataracts  JOINT REPLACEMENT      Left knee on 9-11-18    THORACENTESIS  12/26/2020         UPPER GASTROINTESTINAL ENDOSCOPY N/A 12/2/2020    EGD performed by Bj Price MD at [address] [place] [address] 12/23/2020    PUSH ENTEROSCOPY/EGD CONTROL BLEEDING [facility]       Allergies: Allergies   Allergen Reactions    Tizanidine Other (See Comments)     Patient states it makes her loopy       Home Meds:  Medications Prior to Admission: acetaminophen (TYLENOL) 325 MG tablet, Take 650 mg by mouth every 4 hours as needed for Pain Not to exceed 3gm/24hrs  atorvastatin (LIPITOR) 10 MG tablet, TAKE 1 TABLET BY MOUTH ONCE DAILY  hydroCHLOROthiazide (HYDRODIURIL) 25 MG tablet, Take 1 tablet by mouth daily  lisinopril (PRINIVIL;ZESTRIL) 2.5 MG tablet, Take 1 tablet by mouth Daily with lunch  metoprolol tartrate (LOPRESSOR) 25 MG tablet, Take 1 tablet by mouth 2 times daily  tamsulosin (FLOMAX) 0.4 MG capsule, TAKE 1 CAPSULE BY MOUTH DAILY  omeprazole (PRILOSEC) 40 MG delayed release capsule, TAKE 1 CAPSULE BY MOUTH EVERY MORNING BEFORE BREAKFAST  DULoxetine (CYMBALTA) 60 MG extended release capsule, Take 1 capsule by mouth daily  baclofen (LIORESAL) 10 MG tablet, TAKE ONE TABLET BY MOUTH THREE TIMES A DAY AS NEEDED.  ELIQUIS 5 MG TABS tablet, TAKE ONE TABLET BY MOUTH TWICE A DAY  levothyroxine (SYNTHROID) 125 MCG tablet, Take 1 tablet by mouth daily  clonazePAM (KLONOPIN) 0.5 MG tablet, Take 1 tablet by mouth 3 times daily as needed for Anxiety for up to 30 days.   fluticasone (FLOVENT HFA) 220 MCG/ACT inhaler, Inhale 2 puffs into the lungs 2 times daily  albuterol (PROVENTIL) (2.5 MG/3ML) 0.083% nebulizer solution, Take 3 mLs by nebulization every 6 hours as needed for Wheezing  dilTIAZem (CARDIZEM CD) 120 MG extended release capsule, Take 1 capsule by mouth daily albuterol sulfate HFA (PROAIR HFA) 108 (90 Base) MCG/ACT inhaler, Inhale 2 puffs into the lungs every 6 hours as needed for Wheezing  fluticasone (FLONASE) 50 MCG/ACT nasal spray, USE ONE SPRAY TO EACH NOSTRIL ONCE ADAY  levalbuterol (XOPENEX HFA) 45 MCG/ACT inhaler, Inhale 1 puff into the lungs every 4 hours as needed for Wheezing  Ascorbic Acid (C-1000 PO), Take 1 capsule by mouth daily   ferrous sulfate 325 (65 Fe) MG tablet, Take 325 mg by mouth daily   acetaZOLAMIDE (DIAMOX) 250 MG tablet, Take 1 tablet by mouth daily for 7 days  Oxygen Concentrator, continuous  Lift Chair MISC, Use daily for comfort (Patient not taking: Reported on 12/18/2020)  PROAIR  (90 Base) MCG/ACT inhaler, Inhale 2 puffs into the lungs every 6 hours as needed for Wheezing  Respiratory Therapy Supplies (NEBULIZER/TUBING/MOUTHPIECE) KIT, Use every 4-6 hours prn for copd    Social History:   Social History     Socioeconomic History    Marital status:      Spouse name: Not on file    Number of children: Not on file    Years of education: Not on file    Highest education level: Not on file   Occupational History    Not on file   Social Needs    Financial resource strain: Not on file    Food insecurity     Worry: Not on file     Inability: Not on file   [facility] needs     Medical: Not on file     Non-medical: Not on file   Tobacco Use    Smoking status: Former Smoker     Packs/day: 3.00     Years: 26.00     Pack years: 78.00     Types: Cigarettes    Smokeless tobacco: Never Used   Substance and Sexual Activity    Alcohol use: Not Currently     Comment: occ    Drug use: No    Sexual activity: Not on file   Lifestyle    Physical activity     Days per week: Not on file     Minutes per session: Not on file    Stress: Not on file   Relationships    Social connections     Talks on phone: Not on file     Gets together: Not on file     Attends Anabaptist service: Not on file Active member of club or organization: Not on file     Attends meetings of clubs or organizations: Not on file     Relationship status: Not on file    Intimate partner violence     Fear of current or ex partner: Not on file     Emotionally abused: Not on file     Physically abused: Not on file     Forced sexual activity: Not on file   Other Topics Concern    Not on file   Social History Narrative    Not on file       Family History:   Family History   Problem Relation Age of Onset    Heart Disease Mother     COPD Mother     Emphysema Sister     Cancer Other         Cousin - breast cancer       Review of Systems  Fever/ chills - no  Chest pain - no  Cough - no  Expectoration / hemoptysis - no  shortness of breath - +  Headache - no  Sinus drainage/ sore throat - no  abdominal pain - no  Swelling feet -   Nausea/ vomiting/ diarrhea/ constipation - no  Weakness/ dizziness    Physical Exam  Vital Signs: /67   Pulse 95   Temp 97.6 °F (36.4 °C) (Oral)   Resp 18   Ht 5' 5\" (1.651 m)   Wt 156 lb 12 oz (71.1 kg)   SpO2 100%   BMI 26.08 kg/m²       Admission Weight: Weight: 170 lb (77.1 kg)    General Appearance: Healthy, alert, active, cooperative, and in no distress  Head: Normocephalic, without obvious abnormality, atraumatic  Neck: no adenopathy, no JVD, supple, symmetrical, trachea midline\"thyroid not enlarged, symmetric, no tenderness/mass/nodule  Lungs: fair air entry bilaterally; breath sounds- vesicular; rhonchi- absent; rales/ crackles- absent  Heart: : regular rate and rhythm, S1, S2 normal, no murmur, click, rub or gallop  Abdomen: soft, non-tender; bowel sounds normal; no masses,  no organomegaly  Extremities: extremities normal, atraumatic, no cyanosis or edema  Skin: skin color, texture, turgor normal. No rashes or lesions  Neurologic: Grossly normal    [unfilled]    Recent labs, Imaging studies reviewed      Data ReviewCBC:   Recent Labs     01/19/21 2120 01/20/21  2060 WBC 7.6 7.8   RBC 1.81* 2.25*   HGB 5.5* 6.9*   HCT 17.5* 21.4*    335     BMP:   Recent Labs     01/19/21 2120 01/20/21  0518   GLUCOSE 101* 109*    143   K 4.9 4.7   BUN 22 20   CREATININE 1.36* 1.05*   CALCIUM 8.7 8.7     ABGs: No results for input(s): PHART, PO2ART, YZQ3RON, SHT3HOQ, BEART, S2JWUTVT, UNX9RPW in the last 72 hours.    PT/INR:  No results found for: PTINR      ASSESSMENT / PLAN:    COVID - no need for steroid/ remdesivir  Anemia- pRBC    Electronically signed by Simran Booth on 01/20/21 at 1:52 PM.

## 2021-01-20 NOTE — FLOWSHEET NOTE
Patient expressed anxiety about being in the hospital as well as having Covid. She appreciated listening presence and welcomed prayer, joining in with the Lord's Prayer. 01/20/21 1059   Encounter Summary   Services provided to: Patient   Referral/Consult From: Rounding  (by phone)   Continue Visiting   (1-20-21)   Complexity of Encounter Moderate   Length of Encounter 15 minutes   Spiritual Assessment Completed Yes   Routine   Type Initial   Spiritual/Jainism   Type Spiritual support   Assessment Anxious   Intervention Active listening;Explored feelings, thoughts, concerns;Prayer;Sustaining presence/ Ministry of presence; Discussed illness/injury and it's impact   Outcome Expressed gratitude;Engaged in conversation;Expressed feelings/needs/concerns;Receptive

## 2021-01-20 NOTE — ED PROVIDER NOTES
EMERGENCY DEPARTMENT ENCOUNTER   ATTENDING ATTESTATION     Pt Name: Marianne Moses  MRN: [id number]  [place] 1945  Date of evaluation: 1/19/21       Marianne Moses is a 70 y.o. female who presents with Anemia (Hgb: 5.5)      MDM:   This is a 51-year-old female with history of recurrent anemia had multiple AVMs in her jejunum with previous transfusions who comes in today with outpatient low hemoglobin the patient was recently diagnosed with coronavirus she is describing lightheadedness mild cough and mild shortness of breath. 12:14 AM EST  Patient's hemoglobin went from 10.3-5.5 from December 29 2/1/2019. We will order her a unit of blood. Her blood pressure is mildly low at 93/49 we will continue to monitor she is awake alert the colloid should help with her blood pressure. Her respiratory rate is intermittently high she does have coronavirus there is no focal consolidation or volume overload on her chest x-ray. Patient will be admitted for further management of her anemia. GI and Dr. Mechelle Larson have been consulted. CRITICAL CARE: There was a high probability of clinically significant/life threatening deterioration in this patient's condition which required my urgent intervention. Total critical care time was 10 minutes. This excludes any time for separately reportable procedures. Vitals:   Vitals:    01/19/21 2111   BP: (!) 93/59   Pulse: 78   Resp: 17   Temp: 97.6 °F (36.4 °C)   TempSrc: Oral   SpO2: 100%   Weight: 170 lb (77.1 kg)   Height: 5' 5\" (1.651 m)         I personally evaluated and examined the patient in conjunction with the resident and agree with the assessment, treatment plan, and disposition of the patient as recorded by the resident. I performed a history and physical examination of the patient and discussed management with the resident. I reviewed the residents note and agree with the documented findings and plan of care. Any areas of disagreement are noted on the chart. I was personally present for the key portions of any procedures. I have documented in the chart those procedures where I was not present during the key portions. I have personally reviewed all images and agree with the resident's interpretation. I have reviewed the emergency nurses triage note. I agree with the chief complaint, past medical history, past surgical history, allergies, medications, social and family history as documented unless otherwise noted.     Susan Burns MD  Attending Emergency Physician     Susan Burns MD  01/20/21 4699

## 2021-01-20 NOTE — PLAN OF CARE
Problem: Falls - Risk of:  Goal: Will remain free from falls  Description: Will remain free from falls  1/20/2021 1448 by Tobias Carey RN  Note: No falls noted this shift. Patient ambulates with x1 staff assistance without difficulty. Bed kept in low position. Safe environment maintained. Bedside table & call light in reach. Uses call light appropriately when needing assistance. 1/20/2021 0338 by César Ledezma RN  Outcome: Ongoing  Note: Pt remained absent from falls. Call light within reach. Bed locked and in lowest position. Goal: Absence of physical injury  Description: Absence of physical injury  1/20/2021 0338 by César Ledezma RN  Outcome: Ongoing  Note: Patient remains free of injury.  safe environment maintained

## 2021-01-20 NOTE — ED NOTES
Write spoke with [facility] regarding pts admission- facility verbalizes understanding.      [facility]  01/20/21 1916

## 2021-01-20 NOTE — ED NOTES
Mode of arrival (squad #, walk in, police, etc) : EMS        Chief complaint(s): Hgb: 5.5        Arrival Note (brief scenario, treatment PTA, etc). : Pt was sent her by PCP r/t Hgb: 5.5. Pt denies chest pain, but states she \"is having a little difficulty breathing\". Pt states today is her first day out of her 2 week long quarantine post Covid. Pt is on 4L NC. Pt is A&Ox4, eupneic, weak gait. C= \"Have you ever felt that you should Cut down on your drinking? \"  No  A= \"Have people Annoyed you by criticizing your drinking? \"  No  G= \"Have you ever felt bad or Guilty about your drinking? \"  No  E= \"Have you ever had a drink as an Eye-opener first thing in the morning to steady your nerves or to help a hangover? \"  No      Deferred []      Reason for deferring: N/A    *If yes to two or more: probable alcohol abuse. Dunia Rodrigues RN  01/19/21 1685

## 2021-01-20 NOTE — ED NOTES
Pt. Transported to room 2022 via stretcher on 4L O2. Handoff to PCT Adia Sebastian @ [phone].       Polk Actis  01/20/21 0203

## 2021-01-20 NOTE — PROGRESS NOTES
Comprehensive Nutrition Assessment    Type and Reason for Visit:  Positive Nutrition Screen(weight loss, poor appetite poor intakes, chewing and swallowing difficulty)    Nutrition Recommendations/Plan:   1. Continue Clear Liquid diet  2. Start Ensure Clear 3x daily. 3. Monitor nutrition progression. If and when diet advanced consider dental soft diet to help with chewing and swallowing. Also change Ensure Clear to regular oral supplements. Nutrition Assessment:  Patient admitted with diagnosis of severe anemia Hgb 5.5, will be receiving blood transfusion today. Has a history of Jejunal and colon arterial venous malfunction. Writer is familiar with patient from previous visits. Patient had positive COVID-19 1/10/21, still has shortness of breath and fatigue. Patient's chewing and swallowing problem is due to being edentulous but patient don't like pureed food. Currently on Clear Liquid diet, will start Ensure Clear with each meal. To note: patient agreed to and liked liked dental soft diet in the past.    Malnutrition Assessment:  Malnutrition Status:  Severe malnutrition    Context:  Acute Illness     Findings of the 6 clinical characteristics of malnutrition:  Energy Intake:  7 - 50% or less of estimated energy requirements for 5 or more days  Weight Loss:  7 - Greater than 7.5% over 3 months     Body Fat Loss:  Unable to assess     Muscle Mass Loss:  Unable to assess    Fluid Accumulation:  No significant fluid accumulation     Strength:  Not Performed    Estimated Daily Nutrient Needs:  Energy (kcal):  6179-0492 kcal based on 23-25 kcal/kg of admission; Weight Used for Energy Requirements:  Admission     Protein (g):  80-94 gm based on 1.4-1.6 gm/kg of ideal; Weight Used for Protein Requirements:  Ideal          Nutrition Related Findings:  No edema. Bowel sounds active.  Transfer from [address] [place] [address]:  None       Current Nutrition Therapies:    DIET CLEAR LIQUID;

## 2021-01-20 NOTE — CARE COORDINATION
CASE MANAGEMENT NOTE:    Admission Date:  1/19/2021 Dee Barnes is a 70 y.o.  female    Admitted for : Anemia [D64.9]  Severe anemia [D64.9]    Met with:  Patient - Via telephone conversation    PCP:  [facility]:  [address] Medicare and Medicaid      Current Residence/ Living Arrangements:  in nursing home - Came to us from [facility] and may return there - LSW notified to follow             Current Services PTA:  NA    Is patient agreeable to VNS: Yes - Has had [facility] in the past    Freedom of choice provided:  Yes    List of [address] provided: No    VNS chosen:  Yes    DME:  straight cane and walker and hospital bed    Home Oxygen: Yes @ 2L 24/7    Nebulizer: No    CPAP/BIPAP: No    Supplier: [employer]    Potential Assistance Needed: Yes    SNF needed: Yes    Freedom of choice and list provided: NA    Pharmacy:  [address] on [address]       Does Patient want to use MEDS to BEDS? No    Is patient currently receiving oral anticoagulation therapy? Yes -= Eliquis PTA    Is the Patient an [facility] with Readmission Risk Score greater than 14%? No  If yes, pt needs a follow up appointment made within 7 days. Family Members/Caregivers that pt would like involved in their care:    Yes    If yes, list name here:  Zeferino Swenson [employer] Provider:  [id number] [facility]             Is patient in Isolation/One on One/Altered Mental Status? Yes  If yes, skip next question. If no, would they like an I-Pad to  use? NA  If yes, call [phone]. Discharge Plan:  1/20: Gera Landry 4575 + on 1/1 - Possibly removing from isolation. From SNF [facility] and LSW notified to follow. Eliquis PTA. Hgb 5.5 and received 2 units PRBC's - GI consult. Clear liquid diet.  DALIA NEEDS SIGNED/COMPLETED. Luiz Camacho                 Electronically signed by: Marisela Patel RN on 1/20/2021 at 1:03 PM

## 2021-01-20 NOTE — PLAN OF CARE
Nutrition Problem #1: Severe malnutrition  Intervention: Food and/or Nutrient Delivery: Continue Current Diet, Start Oral Nutrition Supplement  Nutritional Goals: Meet greater than 75% of estimated nutrition needs

## 2021-01-20 NOTE — ED NOTES
Dr. Alexandra Vaughan and this RN at bedside to perform Hemoccult rectal exam. Pt tolerates well.       Hardik Pereira, [place]  01/19/21 4771

## 2021-01-20 NOTE — ED NOTES
Admission Dx: Anemia    Pts Chief Complaints on Arrival: anemia    ADL's - Self-care    Pending Diagnostics:  Repeat hgb/hct post transfusion    Residence PTA: assisted living facility    Special Considerations/Circumstances:  Blood transfusion running 100mL/hr, 4L NC    Vitals: Current vital signs:  BP (!) 96/54   Pulse 85   Temp 97.3 °F (36.3 °C) (Temporal)   Resp (!) 100   Ht 5' 5\" (1.651 m)   Wt 170 lb (77.1 kg)   SpO2 100%   BMI 28.29 kg/m²                MEWS Score: 2          Angel Hernandez, RN  01/20/21 400 [facility], RN  01/20/21 8510

## 2021-01-20 NOTE — ED NOTES
EKG completed. Pt placed on cardiac monitor.      [facility], [address] [place] [address]  01/19/21 3870

## 2021-01-20 NOTE — CONSULTS
 Primary osteoarthritis     Pulmonary hypertension (HCC)     Pure hypercholesterolemia     SIRS (systemic inflammatory response syndrome) (HCC)     Urge incontinence     Wheezing       Past Surgical History:   Procedure Laterality Date    COLONOSCOPY      COLONOSCOPY N/A 12/2/2020    COLONOSCOPY CONTROL OF BLEEDING performed by Karli Oh MD at [facility] Bilateral     cataracts    JOINT REPLACEMENT      Left knee on 9-11-18    THORACENTESIS  12/26/2020         UPPER GASTROINTESTINAL ENDOSCOPY N/A 12/2/2020    EGD performed by Karli Oh MD at [facility] 12/23/2020    PUSH ENTEROSCOPY/EGD CONTROL BLEEDING performed by Karli Oh MD at [facility]      Past Endoscopic History as above    Admission Meds  No current facility-administered medications on file prior to encounter.       Current Outpatient Medications on File Prior to Encounter   Medication Sig Dispense Refill    acetaminophen (TYLENOL) 325 MG tablet Take 650 mg by mouth every 4 hours as needed for Pain Not to exceed 3gm/24hrs      atorvastatin (LIPITOR) 10 MG tablet TAKE 1 TABLET BY MOUTH ONCE DAILY 90 tablet 1    hydroCHLOROthiazide (HYDRODIURIL) 25 MG tablet Take 1 tablet by mouth daily 90 tablet 1    lisinopril (PRINIVIL;ZESTRIL) 2.5 MG tablet Take 1 tablet by mouth Daily with lunch 30 tablet 3    metoprolol tartrate (LOPRESSOR) 25 MG tablet Take 1 tablet by mouth 2 times daily 60 tablet 3    tamsulosin (FLOMAX) 0.4 MG capsule TAKE 1 CAPSULE BY MOUTH DAILY 30 capsule 3    omeprazole (PRILOSEC) 40 MG delayed release capsule TAKE 1 CAPSULE BY MOUTH EVERY MORNING BEFORE BREAKFAST 90 capsule 1    DULoxetine (CYMBALTA) 60 MG extended release capsule Take 1 capsule by mouth daily 90 capsule 1    baclofen (LIORESAL) 10 MG tablet TAKE ONE TABLET BY MOUTH THREE TIMES A DAY AS NEEDED. 270 tablet 3    ELIQUIS 5 MG TABS tablet TAKE ONE TABLET BY MOUTH TWICE A  tablet 3 PSYCH HISTORY:  Depression No  Anxiety No  Suicide No       Family History   Problem Relation Age of Onset    Heart Disease Mother     COPD Mother     Emphysema Sister     Cancer Other         Cousin - breast cancer      No family history of colon cancer, Crohn's disease, or ulcerative colitis. Review of Systems  Constitutional: negative  Eyes: negative  Ears, nose, mouth, throat, and face: negative  Respiratory: negative  Cardiovascular: negative  Gastrointestinal: negative  Genitourinary:negative  Integument/breast: negative  Hematologic/lymphatic: negative  Musculoskeletal:negative  Endocrine: negative           Physical Exam  Blood pressure 119/67, pulse 95, temperature 97.6 °F (36.4 °C), temperature source Oral, resp. rate 18, height 5' 5\" (1.651 m), weight 156 lb 12 oz (71.1 kg), SpO2 100 %. Kimberley Pleitez .Due to the current efforts to prevent transmission of COVID-19 and also the need to preserve PPE for other caregivers, a face-to-face encounter with the patient was not performed. That being said, all relevant records and diagnostic tests were reviewed, including laboratory results and imaging. Please reference any relevant documentation elsewhere. Care will be coordinated with the primary service. Data Review:    Recent Labs     01/19/21 2120 01/20/21 0518 01/20/21  1549   WBC 7.6 7.8  --    HGB 5.5* 6.9* 8.3*   HCT 17.5* 21.4* 25.3*   MCV 96.7 95.0  --     335  --      Recent Labs     01/19/21 2120 01/20/21 0518    143   K 4.9 4.7   CL 95* 100   CO2 40* 39*   BUN 22 20   CREATININE 1.36* 1.05*     Recent Labs     01/19/21 2120 01/20/21  0518   AST 17 17   ALT 13 12   BILITOT 0.20* 0.31   ALKPHOS 119* 112*     No results for input(s): LIPASE, AMYLASE in the last 72 hours. Recent Labs     01/19/21 2120   PROTIME 16.0*   INR 1.3     No results for input(s): PTT in the last 72 hours. No results for input(s): OCCULTBLD in the last 72 hours.   CEA:  No results found for: CEA Ca 125:  No results found for:   Ca 19-9:  No results found for:   Ca 15-3:  No results found for:   AFP:  No components found for: AFAFP  Beta HCG:  No components found for: BHCG  Neuron Specific Enolase:  No results found for: NSE  Imaging Studies:                           All appropriate imaging studies and reports reviewed: Yes and No                 Assessment:     Principal Problem:    Symptomatic anemia  Active Problems:    Acquired hypothyroidism    Hypertension    COPD (chronic obstructive pulmonary disease) (HCC)    HA (generalized anxiety disorder)    Benign hypertension with CKD (chronic kidney disease) stage III    Moderate major depression (HCC)    PAD (peripheral artery disease) (HCC)    Acute on chronic diastolic CHF (congestive heart failure) (HCC)    Iron deficiency anemia due to chronic blood loss    Anemia    Severe anemia    Overweight (BMI 25.0-29. 9)    Severe malnutrition ([address]  Resolved Problems:    * No resolved hospital problems. *    *Severe iron deficiency anemia   History of AVMs which were cauterized  COVID-19 infection  Slight elevation of the alkaline phosphatase  Weight loss 20 pounds    Recommendations:   Anemia work-up and iron infusion if needed  She will need CT scan of the abdomen to evaluate any cause for the alkaline phosphatase elevation but that could be done when she is not in isolation  PPI  Outpatient capsule endoscopy  H&H monitoring and transfuse as needed                      Thank you for allowing me to participate in the care of your patient. Please feel free to contact me with any questions or concerns.      Damian Titus MD

## 2021-01-20 NOTE — ACP (ADVANCE CARE PLANNING)
Advance Care Planning     Advance Care Planning Activator (Inpatient)  Conversation Note      Date of ACP Conversation: 1/19/2021    Conversation Conducted with: Patient with Decision Making Capacity    ACP Activator: 42Evangelina Pedroza makes decisions on behalf of the incapacitated patient: Decision Maker is asked to consider and make decisions based on patient values, known preferences, or best interests. Health Care Decision Maker:     Current Designated Health Care Decision Maker:   (If there is a valid [place] named in the [facility] Makers\" box in the ACP activity, but it is not visible above, be sure to open that field and then select the health care decision maker relationship (ie \"primary\") in the blank space to the right of the name.) Validate  this information as still accurate & up-to-date; edit [place] field as needed.)    Note: Assess and validate information in current ACP documents, as indicated. If no Decision Maker listed above or available through scanned documents, then:    If no Authorized Decision Maker has previously been identified, then patient chooses [place]:  \"Who would you like to name as your primary health care decision-maker? \"               Name: Zach Bah       Relationship: Son          Phone number: [phone]  Shruti Tavera this person be reached easily? \" Yes  \"Who would you like to name as your back-up decision maker? \"   Name: Jaswinder Galvan        Relationship: Son          Phone number: [phone]  Shruti Tavera this person be reached easily? \" Yes    Note: If the relationship of these Decision-Makers to the patient does NOT follow your state's Next of Kin hierarchy, recommend that patient complete ACP document that meets state-specific requirements to allow them to act on the patient's behalf when appropriate.     Care Preferences    Ventilation: \"If you were in your present state of health and suddenly became very ill and were unable to breathe on your own, what would your preference be about the use of a ventilator (breathing machine) if it were available to you? \"      Would the patient desire the use of ventilator (breathing machine)?: no    \"If your health worsens and it becomes clear that your chance of recovery is unlikely, what would your preference be about the use of a ventilator (breathing machine) if it were available to you? \"     Would the patient desire the use of ventilator (breathing machine)?: No      Resuscitation  \"CPR works best to restart the heart when there is a sudden event, like a heart attack, in someone who is otherwise healthy. Unfortunately, CPR does not typically restart the heart for people who have serious health conditions or who are very sick. \"    \"In the event your heart stopped as a result of an underlying serious health condition, would you want attempts to be made to restart your heart (answer \"yes\" for attempt to resuscitate) or would you prefer a natural death (answer \"no\" for do not attempt to resuscitate)? \" no      NOTE: If the patient has a valid advance directive AND now provides care preference(s) that are inconsistent with that prior directive, advise the patient to consider either: creating a new advance directive that complies with state-specific requirements; or, if that is not possible, orally revoking that prior directive in accordance with state-specific requirements, which must be documented in the EHR. [] Yes   [] No   Educated Patient / Chantel Shane regarding differences between Advance Directives and portable DNR orders.     Length of ACP Conversation in minutes: 10 minutes      Conversation Outcomes:  [x] ACP discussion completed  [] Existing advance directive reviewed with patient; no changes to patient's previously recorded wishes  [] New Advance Directive completed [] Portable Do Not Rescitate prepared for Provider review and signature  [] POLST/POST/MOLST/MOST prepared for Provider review and signature      Follow-up plan:    [] Schedule follow-up conversation to continue planning  [x] Referred individual to Provider for additional questions/concerns   [] Advised patient/agent/surrogate to review completed ACP document and update if needed with changes in condition, patient preferences or care setting    [x] This note routed to one or more involved healthcare providers

## 2021-01-20 NOTE — ED NOTES
Report given to Coy Holt from [facility], [address] [place] [address]. Report method by phone   The following was reviewed with receiving RN:   Current vital signs:  BP (!) 96/54   Pulse 85   Temp 97.3 °F (36.3 °C) (Temporal)   Resp (!) 100   Ht 5' 5\" (1.651 m)   Wt 170 lb (77.1 kg)   SpO2 100%   BMI 28.29 kg/m²                MEWS Score: 2     Any medication or safety alerts were reviewed. Any pending diagnostics and notifications were also reviewed, as well as any safety concerns or issues, abnormal labs, abnormal imaging, and abnormal assessment findings. Questions were answered.           Hardik Pereira, [address] [place] [address]  01/20/21 0116

## 2021-01-20 NOTE — PROGRESS NOTES
Perfect serve sent to Dr. Darian Pickens concerning code status.     Spoke with Dr. Zoraida River would like to keep pt in isolation until 01/21/21

## 2021-01-20 NOTE — ED PROVIDER NOTES
[facility]  Emergency Department Encounter  EmergencyMedicine Resident     Pt Name:La Shepherd  MRN: [id number]  FMULODVYT 1945  Date of evaluation: 1/20/21  PCP:  Cory Schirmer, APRN - CNP    CHIEF COMPLAINT       Chief Complaint   Patient presents with    Anemia     Hgb: 5.5       HISTORY OF PRESENT ILLNESS  (Location/Symptom, Timing/Onset, Context/Setting, Quality, Duration, Modifying Factors, Severity.)    This patient was evaluated in the Emergency Department for symptoms described in the history of present illness. He/she was evaluated in the context of the global COVID-19 pandemic, which necessitated consideration that the patient might be at risk for infection with the SARS-CoV-2 virus that causes COVID-19. Institutional protocols and algorithms that pertain to the evaluation of patients at risk for COVID-19 are in a state of rapid change based on information released by regulatory bodies including the CDC and federal and state organizations. These policies and algorithms were followed during the patient's care in the ED. Gurvinder Mclean is a 70 y.o. female with a past medical history includes A. fib on Eliquis who is presenting the emergency department from nursing home for abnormal hemoglobin of 5.5. Patient reports intermittent lightheadedness that is worse with position changes over the past 2 to 3 days, no symptoms of vertigo or headache. Patient has known history of GI bleed and AV malformation in the ascending colon that was diagnosed on colonoscopy by Dr. Herman Redd on 12/2. Patient denies any bright red blood per rectum. Notes that the stools are green/black due to iron supplements. Denies any abdominal pain, constipation, dysuria, hematuria, nausea, vomiting. Also reporting symptoms of fatigue. Of note patient was recently diagnosed with COVID-19 infection. States that she was quarantined and symptoms improved but that she still has a persistent cough and intermittently short of breath. PAST MEDICAL / SURGICAL / SOCIAL / FAMILY HISTORY      has a past medical history of A-fib ([facility] [address] Acquired hypothyroidism, Acute encephalopathy, Acute kidney injury ([facility] [address] Anxiety, Benign hypertension with CKD (chronic kidney disease) stage III, Chronic back pain, CKD (chronic kidney disease) stage 3, GFR 30-59 ml/min, Constipation, COPD (chronic obstructive pulmonary disease) ([address] Cough, Depression, ETOH abuse, Former smoker, HA (generalized anxiety disorder), History of GI bleed, Hyperlipidemia, Hypertension, Hypothyroid, Leg pain, Normocytic anemia, Osteoarthritis, PAD (peripheral artery disease) ([facility] [address] Primary osteoarthritis, Pulmonary hypertension ([facility] [address] Pure hypercholesterolemia, SIRS (systemic inflammatory response syndrome) ([facility] [address] Urge incontinence, and Wheezing. has a past surgical history that includes eye surgery (Bilateral); Colonoscopy; joint replacement; Upper gastrointestinal endoscopy (N/A, 12/2/2020); Colonoscopy (N/A, 12/2/2020); Upper gastrointestinal endoscopy (N/A, 12/23/2020); and Thoracentesis (12/26/2020).     Social History     Socioeconomic History    Marital status:      Spouse name: Not on file    Number of children: Not on file    Years of education: Not on file    Highest education level: Not on file   Occupational History    Not on file   Social Needs    Financial resource strain: Not on file    Food insecurity     Worry: Not on file     Inability: Not on file    Transportation needs     Medical: Not on file     Non-medical: Not on file   Tobacco Use    Smoking status: Former Smoker     Packs/day: 3.00     Years: 26.00     Pack years: 78.00     Types: Cigarettes    Smokeless tobacco: Never Used   Substance and Sexual Activity    Alcohol use: Not Currently Comment: occ    Drug use: No    Sexual activity: Not on file   Lifestyle    Physical activity     Days per week: Not on file     Minutes per session: Not on file    Stress: Not on file   Relationships    Social connections     Talks on phone: Not on file     Gets together: Not on file     Attends Shinto service: Not on file     Active member of club or organization: Not on file     Attends meetings of clubs or organizations: Not on file     Relationship status: Not on file    Intimate partner violence     Fear of current or ex partner: Not on file     Emotionally abused: Not on file     Physically abused: Not on file     Forced sexual activity: Not on file   Other Topics Concern    Not on file   Social History Narrative    Not on file       Family History   Problem Relation Age of Onset    Heart Disease Mother     COPD Mother     Emphysema Sister     Cancer Other         Cousin - breast cancer       Allergies:  Tizanidine    Home Medications:  Prior to Admission medications    Medication Sig Start Date End Date Taking?  Authorizing Provider   acetaminophen (TYLENOL) 325 MG tablet Take 650 mg by mouth every 4 hours as needed for Pain Not to exceed 3gm/24hrs   Yes Historical Provider, MD   atorvastatin (LIPITOR) 10 MG tablet TAKE 1 TABLET BY MOUTH ONCE DAILY 1/11/21  Yes WILLIE Nath CNP   hydroCHLOROthiazide (HYDRODIURIL) 25 MG tablet Take 1 tablet by mouth daily 12/29/20  Yes Salma sU MD   lisinopril (PRINIVIL;ZESTRIL) 2.5 MG tablet Take 1 tablet by mouth Daily with lunch 12/30/20  Yes Salma Us MD   metoprolol tartrate (LOPRESSOR) 25 MG tablet Take 1 tablet by mouth 2 times daily 12/29/20  Yes Salma Us MD   tamsulosin (FLOMAX) 0.4 MG capsule TAKE 1 CAPSULE BY MOUTH DAILY 12/15/20  Yes WILLIE Nath CNP omeprazole (PRILOSEC) 40 MG delayed release capsule TAKE 1 CAPSULE BY MOUTH EVERY MORNING BEFORE BREAKFAST 12/14/20  Yes WILLIE Still CNP   DULoxetine (CYMBALTA) 60 MG extended release capsule Take 1 capsule by mouth daily 12/14/20  Yes WILLIE Still CNP   baclofen (LIORESAL) 10 MG tablet TAKE ONE TABLET BY MOUTH THREE TIMES A DAY AS NEEDED. 11/30/20  Yes WILLIE Still CNP   ELIQUIS 5 MG TABS tablet TAKE ONE TABLET BY MOUTH TWICE A DAY 11/30/20  Yes WILLIE Still CNP   levothyroxine (SYNTHROID) 125 MCG tablet Take 1 tablet by mouth daily 11/30/20  Yes WILLIE Still CNP   clonazePAM (KLONOPIN) 0.5 MG tablet Take 1 tablet by mouth 3 times daily as needed for Anxiety for up to 30 days.  11/17/20 1/19/21 Yes WILLIE Still CNP   fluticasone (FLOVENT HFA) 220 MCG/ACT inhaler Inhale 2 puffs into the lungs 2 times daily 11/17/20  Yes WILLIE Still CNP   albuterol (PROVENTIL) (2.5 MG/3ML) 0.083% nebulizer solution Take 3 mLs by nebulization every 6 hours as needed for Wheezing 11/5/20  Yes WILLIE Still CNP   dilTIAZem (CARDIZEM CD) 120 MG extended release capsule Take 1 capsule by mouth daily 10/19/20  Yes WILLIE Still CNP   albuterol sulfate HFA (PROAIR HFA) 108 (90 Base) MCG/ACT inhaler Inhale 2 puffs into the lungs every 6 hours as needed for Wheezing   Yes Historical Provider, MD   fluticasone (FLONASE) 50 MCG/ACT nasal spray USE ONE SPRAY TO EACH NOSTRIL ONCE ADAY 9/4/20  Yes WILLIE Farmer CNP   levalbuterol [facility] HFA) 45 MCG/ACT inhaler Inhale 1 puff into the lungs every 4 hours as needed for Wheezing 8/19/20  Yes WILLIE Still CNP   Ascorbic Acid (C-1000 PO) Take 1 capsule by mouth daily    Yes Historical Provider, MD   ferrous sulfate 325 (65 Fe) MG tablet Take 325 mg by mouth daily  8/30/18  Yes Historical Provider, MD acetaZOLAMIDE (DIAMOX) 250 MG tablet Take 1 tablet by mouth daily for 7 days 12/30/20 1/6/21  Massiel Cabrales MD   Oxygen Concentrator continuous    Historical Provider, MD   Lift Chair MISC Use daily for comfort  Patient not taking: Reported on 12/18/2020 11/30/20   WILLIE Noble CNP   PROAIR  (42 Base) MCG/ACT inhaler Inhale 2 puffs into the lungs every 6 hours as needed for Wheezing 11/17/20   WILLIE Noble CNP   Respiratory Therapy Supplies (NEBULIZER/TUBING/MOUTHPIECE) KIT Use every 4-6 hours prn for copd 6/15/20   WILLIE Noble CNP       REVIEW OF SYSTEMS    (2-9 systems for level 4, 10 or more for level 5)      Review of Systems   Constitutional: Negative for chills and fever. HENT:        No loss of taste or smell   Eyes: Negative for visual disturbance. Respiratory: Positive for cough and shortness of breath. Cardiovascular: Negative for chest pain. Gastrointestinal: Negative for abdominal pain, anal bleeding, blood in stool, constipation, diarrhea, nausea and vomiting. History of GI bleed   Genitourinary: Negative for dysuria and hematuria. Musculoskeletal: Negative for back pain, neck pain and neck stiffness. Skin: Negative for rash. Neurological: Negative for dizziness, weakness, numbness and headaches. Hematological:        On Eliquis       PHYSICAL EXAM   (up to 7 for level 4, 8 or more for level 5)      INITIAL VITALS:   BP (!) 106/51   Pulse 77   Temp 98.2 °F (36.8 °C) (Oral)   Resp 18   Ht 5' 5\" (1.651 m)   Wt 156 lb 12 oz (71.1 kg)   SpO2 100%   BMI 26.08 kg/m²     Physical Exam  Constitutional:       General: She is not in acute distress. Appearance: She is ill-appearing. She is not toxic-appearing. HENT:      Head: Normocephalic and atraumatic. Nose: Nose normal.      Mouth/Throat:      Mouth: Mucous membranes are dry. Pharynx: No oropharyngeal exudate or posterior oropharyngeal erythema.    Eyes: Extraocular Movements: Extraocular movements intact. Conjunctiva/sclera: Conjunctivae normal.      Pupils: Pupils are equal, round, and reactive to light. Neck:      Musculoskeletal: Normal range of motion. No neck rigidity or muscular tenderness. Cardiovascular:      Rate and Rhythm: Normal rate. Rhythm irregular. Pulses: Normal pulses. Pulmonary:      Effort: Pulmonary effort is normal. No respiratory distress. Breath sounds: No stridor. No wheezing or rhonchi. Comments: On supplemental O2 via nasal cannula  Abdominal:      General: There is no distension. Palpations: Abdomen is soft. Tenderness: There is no abdominal tenderness. There is no guarding or rebound. Genitourinary:     Comments: Rectal exam performed chaperone present. No gross blood. Hemoccult positive  Musculoskeletal: Normal range of motion. General: No swelling or deformity. Comments: No peripheral edema or calf tenderness. Skin:     General: Skin is warm and dry. Coloration: Skin is pale. Neurological:      General: No focal deficit present. Mental Status: She is alert and oriented to person, place, and time. Cranial Nerves: No cranial nerve deficit. Sensory: No sensory deficit. Motor: No weakness.       Coordination: Coordination normal.   Psychiatric:         Behavior: Behavior normal.         DIFFERENTIAL  DIAGNOSIS     PLAN (LABS / IMAGING / EKG):  Orders Placed This Encounter   Procedures    XR CHEST PORTABLE    CBC Auto Differential    Troponin    Brain Natriuretic Peptide    Comprehensive Metabolic Panel    Magnesium    Protime-INR    APTT    Path Review, Smear    Hemoglobin and Hematocrit, Blood, Post Transfusion    Diet NPO Effective Now    VITAL SIGNS PER TRANSFUSION PROTOCOL    Verify hospital blood consent form has been signed and witnessed    TRANSFUSION REACTION MANAGEMENT    Vital signs per unit routine    Notify physician [facility] Notify physician    Strict Bedrest    Telemetry Monitoring    Full Code    Inpatient consult to GI    Inpatient consult to Primary Care Provider    EKG 12 Lead    TYPE AND SCREEN    TYPE AND SCREEN    PREPARE RBC (CROSSMATCH), 1 Units    PATIENT STATUS (FROM ED OR OR/PROCEDURAL) Inpatient       MEDICATIONS ORDERED:  Orders Placed This Encounter   Medications    0.9 % sodium chloride bolus    0.9 % sodium chloride infusion    AND Linked Order Group     pantoprazole (PROTONIX) injection 40 mg     sodium chloride (PF) 0.9 % injection 10 mL    sodium chloride flush 0.9 % injection 10 mL    sodium chloride flush 0.9 % injection 10 mL    acetaminophen (TYLENOL) tablet 650 mg    0.9 % sodium chloride infusion         DIAGNOSTIC RESULTS / EMERGENCY DEPARTMENT COURSE / MDM     Results for orders placed or performed during the hospital encounter of 01/19/21   CBC Auto Differential   Result Value Ref Range    WBC 7.6 3.5 - 11.0 k/uL    RBC 1.81 (L) 4.0 - 5.2 m/uL    Hemoglobin 5.5 (LL) 12.0 - 16.0 g/dL    Hematocrit 17.5 (L) 36 - 46 %    MCV 96.7 80 - 100 fL    MCH 30.3 26 - 34 pg    MCHC 31.3 31 - 37 g/dL    RDW 17.5 (H) 11.5 - 14.9 %    Platelets 633 907 - 198 k/uL    MPV 8.9 6.0 - 12.0 fL    NRBC Automated NOT REPORTED per 100 WBC    Differential Type NOT REPORTED     Immature Granulocytes NOT REPORTED 0 %    Absolute Immature Granulocyte NOT REPORTED 0.00 - 0.30 k/uL    WBC Morphology NOT REPORTED     RBC Morphology NOT REPORTED     Platelet Estimate NOT REPORTED     Seg Neutrophils 71 (H) 36 - 66 %    Lymphocytes 16 (L) 24 - 44 %    Monocytes 10 (H) 1 - 7 %    Eosinophils % 1 0 - 4 %    Basophils 2 0 - 2 %    Segs Absolute 5.39 1.3 - 9.1 k/uL    Absolute Lymph # 1.22 1.0 - 4.8 k/uL    Absolute Mono # 0.76 0.1 - 1.3 k/uL    Absolute Eos # 0.08 0.0 - 0.4 k/uL    Basophils Absolute 0.15 0.0 - 0.2 k/uL    Morphology 1+ ELLIPTOCYTES     Morphology ANISOCYTOSIS PRESENT    Troponin   Result Value Ref Range Troponin, High Sensitivity 21 (H) 0 - 14 ng/L    Troponin T NOT REPORTED <0.03 ng/mL    Troponin Interp NOT REPORTED    Brain Natriuretic Peptide   Result Value Ref Range    Pro-BNP 3,366 (H) <300 pg/mL    BNP Interpretation Pro-BNP Reference Range:    Comprehensive Metabolic Panel   Result Value Ref Range    Glucose 101 (H) 70 - 99 mg/dL    BUN 22 8 - 23 mg/dL    CREATININE 1.36 (H) 0.50 - 0.90 mg/dL    Bun/Cre Ratio NOT REPORTED 9 - 20    Calcium 8.7 8.6 - 10.4 mg/dL    Sodium 138 135 - 144 mmol/L    Potassium 4.9 3.7 - 5.3 mmol/L    Chloride 95 (L) 98 - 107 mmol/L    CO2 40 (H) 20 - 31 mmol/L    Anion Gap 3 (L) 9 - 17 mmol/L    Alkaline Phosphatase 119 (H) 35 - 104 U/L    ALT 13 5 - 33 U/L    AST 17 <32 U/L    Total Bilirubin 0.20 (L) 0.3 - 1.2 mg/dL    Total Protein 6.0 (L) 6.4 - 8.3 g/dL    Alb 3.4 (L) 3.5 - 5.2 g/dL    Albumin/Globulin Ratio NOT REPORTED 1.0 - 2.5    GFR Non- 38 (L) >60 mL/min    GFR  46 (L) >60 mL/min    GFR Comment          GFR Staging NOT REPORTED    Magnesium   Result Value Ref Range    Magnesium 2.4 1.6 - 2.6 mg/dL   Protime-INR   Result Value Ref Range    Protime 16.0 (H) 11.8 - 14.6 sec    INR 1.3    APTT   Result Value Ref Range    PTT 31.7 24.0 - 36.0 sec   Path Review, Smear   Result Value Ref Range    Pathologist Review NOT REPORTED    TYPE AND SCREEN   Result Value Ref Range    Expiration Date 01/22/2021,2359     Arm Band Number [id number]     ABO/Rh B POSITIVE     Antibody Screen NEGATIVE     Unit Number [id number]     Product Code Leukocyte Reduced Red Cell     Unit Divison 00     Dispense Status ISSUED     Transfusion Status OK TO TRANSFUSE     Crossmatch Result COMPATIBLE          RADIOLOGY:  XR CHEST PORTABLE   Final Result   No acute cardiopulmonary abnormality.               EKG  EKG Interpretation    Interpreted by me    Rhythm: Irregularly irregular  Rate: Ventricular rate is 75  Axis: normal  Ectopy: none  Conduction: normal ST Segments: no acute change  T Waves: no acute change  Q Waves: none  Poor R wave progression  Low voltage in aVL    Clinical Impression: A. fib with rate control, no cute ST or T wave changes    All EKG's are interpreted by the Emergency Department Physician who either signs or Co-signs this chart in the absence of a cardiologist.    IMPRESSION/MDM/EMERGENCY DEPARTMENT COURSE:  Patient came to emergency department, HPI and physical exam were conducted. All nursing notes were reviewed. 60-year-old female with past mattressed includes A. fib with RVR on Eliquis presenting emergency department with intermittent dizziness/lightheadedness this is worse with position changes over the past few days. Abnormal hemoglobin earlier today at 5.5. Known history of GI bleed with AV malformation. Denies any recent blood in stool or rectal bleeding. No abdominal pain, nausea or vomiting. Mild shortness of breath and cough. Recent diagnosis of COVID-19; patient is status post isolation per her report. Hypotensive 93/49, mildly tachypneic, hypoxic on room air but improved with supplemental oxygen via nasal cannula. Patient is alert and oriented. Appears ill with pale complexion but no acute distress. Does not appear acutely toxic. Heart irregularly irregular with rate controlled. Lungs are clear to auscultate by without wheezes rales or rhonchi. Abdomen is soft, nontender nondistended. Rectal exam performed with chaperone present, no gross blood, Hemoccult positive. No peripheral edema. Normal peripheral pulses. Remainder of exam unremarkable. Concern for anemia, likely GI source. Will obtain basic labs including type and screen. Given recent shortness of breath and concern for increased stress on heart with anemia will obtain basic cardiac work-up although this is likely due to recent COVID-19 infection. Will not repeat COVID-19 testing at this time. Likely admission with GI consult, possible transfusion if hemoglobin is truly 5.5. ED Course as of Jan 20 0051 Tue Jan 19, 2021 2234 Hemoglobin Quant(!!): 5.5 [ZT]   2237 Anemia of 5.5, will transfuse 1 unit of PRBCs and reevaluate. [ZT]   2243 Troponin slightly elevated but at patient's baseline. Magnesium within normal limits. Troponin, High Sensitivity(!): 21 [ZT] will obtain repeat trop. 2244 Mild SUNSHINE of 1.36 from baseline 0.9. [ZT]   2244 BNP is elevated at 3000 but this is below patient's baseline. [ZT]   2247 Rectal exam performed with chaperone present. No gross blood. Hemoccult positive. [ZT]   1977 Spoke to GI attending who requested patient be was placed on PPI; protonix 40mg IV BID. They will follow in the morning. [ZT]   Wed Jan 20, 2021   0010 Spoke to Dr. Olvin Rivera who agreed to admit. Given resp symptoms and pt reporting recent positive covid, will admit to covid unit. [ZT]      ED Course User Index  [ZT] Yash Gee DO     CONSULTS:  IP CONSULT TO GI  IP CONSULT TO PRIMARY CARE PROVIDER    FINAL IMPRESSION      1. Anemia, unspecified type    2. Gastrointestinal hemorrhage, unspecified gastrointestinal hemorrhage type          DISPOSITION / PLAN     DISPOSITION Admitted 01/20/2021 12:13:08 AM      PATIENT REFERRED TO:  No follow-up provider specified.     DISCHARGE MEDICATIONS:  Current Discharge Medication List          Yash Gee DO  Emergency Medicine Resident    (Please note that portions of thisnote were completed with a voice recognition program.  Efforts were made to edit the dictations but occasionally words are mis-transcribed.) Andrea Atkinson DO  Resident  01/20/21 1773

## 2021-01-20 NOTE — PROGRESS NOTES
Spoke with Dr. Ayesha Pagan. Nurse and Dr. Rickey Gaitan with Pt. Dnrcca with medication only.  2 RN verification

## 2021-01-20 NOTE — PLAN OF CARE
Case d/w dr Jessica Villasenor, will continue ppi and iron replacement will need ct of abd when out of isolation . Korey Beltran, APRN - CNP

## 2021-01-20 NOTE — H&P
Family Medicine Admit Note    PCP: WILLIE Zuleta CNP    Date of Admission: 1/19/2021    Date of Service: Pt seen/examined on 1/20/21 and Admitted to Inpatient. Chief Complaint:  Anemia      History Of Present Illness: The patient is a 70 y.o. female who presents to [address] [place] [address] with reports of a low hemoglobin of 5.5. She is currently residing in a SNF. She reports intermittent lightheadedness that worsens with position changes for the past few days. She has a know hx of A. 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 She reports that her stools are dark green due to her iron supplements. She reports that she was also diagnosed with COVID 19 infection. She reports that she was quarantined and her sx's improved but she still has a cough and intermittent shortness of breath. She denies any fevers, chills, vision changes, chest pain, abdominal pain, N/V, diarrhea, dysuria, hematuria, headaches, dizziness, numbness or confusion. Patient interviewed via telephone.     Past Medical History:        Diagnosis Date    A-fib [facility])     Acquired hypothyroidism     Acute encephalopathy     Acute kidney injury ([facility] [address]     Anxiety     Benign hypertension with CKD (chronic kidney disease) stage III     Chronic back pain     CKD (chronic kidney disease) stage 3, GFR 30-59 ml/min     Constipation     COPD (chronic obstructive pulmonary disease) (HCC)     Cough     Depression     ETOH abuse     Former smoker     3 packs a day for 26 years    HA (generalized anxiety disorder)     History of GI bleed     Hyperlipidemia     Hypertension     Hypothyroid 1/18/2013    Leg pain     Normocytic anemia     Osteoarthritis     PAD (peripheral artery disease) (HCC)     Primary osteoarthritis     Pulmonary hypertension ([address]     Pure hypercholesterolemia     SIRS (systemic inflammatory response syndrome) ([address]  Urge incontinence     Wheezing        Past Surgical History:        Procedure Laterality Date    COLONOSCOPY      COLONOSCOPY N/A 12/2/2020    COLONOSCOPY CONTROL OF BLEEDING performed by Loan Dowell MD at [facility] Bilateral     cataracts    JOINT REPLACEMENT      Left knee on 9-11-18    THORACENTESIS  12/26/2020         UPPER GASTROINTESTINAL ENDOSCOPY N/A 12/2/2020    EGD performed by Loan Dowell MD at [facility] 12/23/2020    PUSH ENTEROSCOPY/EGD CONTROL BLEEDING performed [facility]ell MD [facility]       Medications Prior to Admission:    Prior to Admission medications    Medication Sig Start Date End Date Taking?  Authorizing Provider   acetaminophen (TYLENOL) 325 MG tablet Take 650 mg by mouth every 4 hours as needed for Pain Not to exceed 3gm/24hrs   Yes Historical Provider, MD   atorvastatin (LIPITOR) 10 MG tablet TAKE 1 TABLET BY MOUTH ONCE DAILY 1/11/21  Yes WILLIE Dougherty CNP   hydroCHLOROthiazide (HYDRODIURIL) 25 MG tablet Take 1 tablet by mouth daily 12/29/20  Yes Maximino Bender MD   lisinopril (PRINIVIL;ZESTRIL) 2.5 MG tablet Take 1 tablet by mouth Daily with lunch 12/30/20  Yes Maximino Bender MD   metoprolol tartrate (LOPRESSOR) 25 MG tablet Take 1 tablet by mouth 2 times daily 12/29/20  Yes Maximino Bender MD   tamsulosin (FLOMAX) 0.4 MG capsule TAKE 1 CAPSULE BY MOUTH DAILY 12/15/20  Yes WILLIE Dougherty CNP   omeprazole (PRILOSEC) 40 MG delayed release capsule TAKE 1 CAPSULE BY MOUTH EVERY MORNING BEFORE BREAKFAST 12/14/20  Yes WILLIE Dougherty CNP   DULoxetine (CYMBALTA) 60 MG extended release capsule Take 1 capsule by mouth daily 12/14/20  Yes WILLIE Dougherty CNP   baclofen (LIORESAL) 10 MG tablet TAKE ONE TABLET BY MOUTH THREE TIMES A DAY AS NEEDED. 11/30/20  Yes WILLIE Dougherty CNP ELIQUIS 5 MG TABS tablet TAKE ONE TABLET BY MOUTH TWICE A DAY 11/30/20  Yes Cory Schirmer, APRN - CNP   levothyroxine (SYNTHROID) 125 MCG tablet Take 1 tablet by mouth daily 11/30/20  Yes Cory Schirmer, APRN - CNP   clonazePAM (KLONOPIN) 0.5 MG tablet Take 1 tablet by mouth 3 times daily as needed for Anxiety for up to 30 days.  11/17/20 1/19/21 Yes Cory Schirmer, APRN - CNP   fluticasone (FLOVENT HFA) 220 MCG/ACT inhaler Inhale 2 puffs into the lungs 2 times daily 11/17/20  Yes Cory Schirmer, APRN - CNP   albuterol (PROVENTIL) (2.5 MG/3ML) 0.083% nebulizer solution Take 3 mLs by nebulization every 6 hours as needed for Wheezing 11/5/20  Yes Cory Schirmer, APRN - CNP   dilTIAZem (CARDIZEM CD) 120 MG extended release capsule Take 1 capsule by mouth daily 10/19/20  Yes Cory Schirmer, APRN - CNP   albuterol sulfate HFA (PROAIR HFA) 108 (90 Base) MCG/ACT inhaler Inhale 2 puffs into the lungs every 6 hours as needed for Wheezing   Yes Historical Provider, MD   fluticasone (FLONASE) 50 MCG/ACT nasal spray USE ONE SPRAY TO EACH NOSTRIL ONCE ADAY 9/4/20  Yes WILLIE Winter CNP   levalbuterol [facility] HFA) 45 MCG/ACT inhaler Inhale 1 puff into the lungs every 4 hours as needed for Wheezing 8/19/20  Yes Cory Schirmer, APRN - CNP   Ascorbic Acid (C-1000 PO) Take 1 capsule by mouth daily    Yes Historical Provider, MD   ferrous sulfate 325 (65 Fe) MG tablet Take 325 mg by mouth daily  8/30/18  Yes Historical Provider, MD   acetaZOLAMIDE (DIAMOX) 250 MG tablet Take 1 tablet by mouth daily for 7 days 12/30/20 1/6/21  Anna Nino MD   Oxygen Concentrator continuous    Historical Provider, MD   Lift Chair MISC Use daily for comfort  Patient not taking: Reported on 12/18/2020 11/30/20   Cory Schirmer, APRN - CNP   PROAIR  (90 Base) MCG/ACT inhaler Inhale 2 puffs into the lungs every 6 hours as needed for Wheezing 11/17/20   Cory Schirmer, APRN - CNP Respiratory Therapy Supplies (NEBULIZER/TUBING/MOUTHPIECE) KIT Use every 4-6 hours prn for copd 6/15/20   WILLIE Dihn - CNP       Allergies:  Tizanidine    Social History:  The patient currently lives in a SNF    TOBACCO:   reports that she has quit smoking. Her smoking use included cigarettes. She has a 78.00 pack-year smoking history. She has never used smokeless tobacco.  ETOH:   reports previous alcohol use. Review of Systems - General ROS: positive for  - fatigue and overweight  Psychological ROS: positive for - anxiety  Ophthalmic ROS: negative  ENT ROS: negative  Hematological and Lymphatic ROS: positive for - pallor  Endocrine ROS: positive for - hypothyroid  Respiratory ROS: positive for - cough and COPD  Cardiovascular ROS: positive for - irregular heartbeat  Gastrointestinal ROS: negative  Musculoskeletal ROS: positive for - joint stiffness      Family History:          Problem Relation Age of Onset    Heart Disease Mother     COPD Mother     Emphysema Sister     Cancer Other         Cousin - breast cancer       PHYSICAL EXAM:    BP (!) 110/44   Pulse 79   Temp 98 °F (36.7 °C) (Oral)   Resp 18   Ht 5' 5\" (1.651 m)   Wt 156 lb 12 oz (71.1 kg)   SpO2 98%   BMI 26.08 kg/m²     Physical exam deferred due to excessive risk of exposure while in isolation and in an effort to conserve PPE during the global COVID 19 pandemic. CXR:  I have reviewed the CXR with the following interpretation: no acute process  EKG:  I have reviewed the EKG with the following interpretation: A.  Fib    CBC   Recent Labs     01/19/21 2120 01/20/21  0518   WBC 7.6 7.8   HGB 5.5* 6.9*   HCT 17.5* 21.4*    335      RENAL  Recent Labs     01/19/21 2120 01/20/21  0518    143   K 4.9 4.7   CL 95* 100   CO2 40* 39*   BUN 22 20   CREATININE 1.36* 1.05*     LFT'S  Recent Labs     01/19/21 2120 01/20/21  0518   AST 17 17   ALT 13 12   BILITOT 0.20* 0.31   ALKPHOS 119* 112*     COAG  Recent Labs 01/19/21 2120   INR 1.3     CARDIAC ENZYMES  No results for input(s): CKTOTAL, CKMB, CKMBINDEX, TROPONINI in the last 72 hours. U/A:    Lab Results   Component Value Date    NITRITE Negative 01/31/2020    COLORU YELLOW 09/10/2020    WBCUA 2 TO 5 09/10/2020    RBCUA 0 TO 2 09/10/2020    MUCUS NOT REPORTED 09/10/2020    BACTERIA FEW 09/10/2020    CLARITYU Clear 01/31/2020    SPECGRAV 1.021 09/10/2020    LEUKOCYTESUR SMALL 09/10/2020    BLOODU Negative 01/31/2020    GLUCOSEU NEGATIVE 09/10/2020    AMORPHOUS 2+ 09/10/2020       ABG    Lab Results   Component Value Date    IGI4RIM 24.9 09/13/2018    Y9QVBNIU 93.0 09/13/2018    PHART 7.330 09/13/2018    TYB8VQR 47.3 09/13/2018    PO2ART 74.8 09/13/2018           Active Hospital Problems    Diagnosis Date Noted    Anemia [D64.9] 01/20/2021    Severe anemia [D64.9] 01/20/2021    Overweight (BMI 25.0-29. 9) [E66.3] 01/20/2021    Symptomatic anemia [D64.9] 12/19/2020    Iron deficiency anemia due to chronic blood loss [D50.0] 12/18/2020    Acute on chronic diastolic CHF (congestive heart failure) (HCC) [I50.33] 07/21/2020    Moderate major depression (HCC) [F32.1] 06/17/2020    PAD (peripheral artery disease) ([facility] [address] [I73.9] 06/17/2020    Benign hypertension with CKD (chronic kidney disease) stage III [I12.9, N18.30]     HA (generalized anxiety disorder) [F41.1] 12/13/2018    COPD (chronic obstructive pulmonary disease) ([facility] [address] [J44.9] 09/13/2018    Hypertension [I10]     Acquired hypothyroidism [E03.9] 01/18/2013         ASSESSMENT/PLAN:  Patient admitted with Severe anemia. Co-morbidities as above.     Orders Placed This Encounter   Procedures    XR CHEST PORTABLE    CBC Auto Differential    Troponin    Brain Natriuretic Peptide    Comprehensive Metabolic Panel    Magnesium    Protime-INR    APTT    Path Review, Smear    Hemoglobin and Hematocrit, Blood, Post Transfusion    CBC with DIFF    Comprehensive Metabolic Panel w/ Reflex to MG  ALKALINE PHOSPHATASE, ISOENZYMES    Hgb/Hct    Hepatic Function Panel    Hemoglobin and Hematocrit, Blood, Post Transfusion    DIET CLEAR LIQUID;    VITAL SIGNS PER TRANSFUSION PROTOCOL    Verify hospital blood consent form has been signed and witnessed    TRANSFUSION REACTION MANAGEMENT    Vital signs per unit routine    Notify physician    Notify physician    Strict Bedrest    Telemetry Monitoring   [facility] blood consent form has been signed and witnessed   [facility]    Inpatient consult to GI    Inpatient consult to Primary Care Provider    Inpatient consult to Infectious Diseases    Inpatient consult to Pulmonology    Dietary Nutrition Supplements: Clear Liquid Oral Supplement    EKG 12 Lead    EKG REPORT    TYPE AND SCREEN    TYPE AND SCREEN    PREPARE RBC (CROSSMATCH), 1 Units    PREPARE RBC (CROSSMATCH), 1 Units    PATIENT STATUS (DIRECT) Inpatient    PATIENT STATUS (FROM ED OR OR/PROCEDURAL) Inpatient         DVT Prophylaxis:   Diet: Diet NPO Effective Now  Code Status: Full Code      Dispo - admitted to [facility] overflow       @[facility]@

## 2021-01-20 NOTE — PROGRESS NOTES
ERICA spoke to Chase Mosher at "[employer] Pt can return upon discharge. ERICA did speak to pt who wants to return. She said she really likes [facility]. ERICA awaits discharge order.

## 2021-01-20 NOTE — PLAN OF CARE
Problem: Falls - Risk of:  Goal: Will remain free from falls  Description: Will remain free from falls  Outcome: Ongoing  Note: Pt remained absent from falls. Call light within reach. Bed locked and in lowest position. Problem: Falls - Risk of:  Goal: Absence of physical injury  Description: Absence of physical injury  Outcome: Ongoing  Note: Patient remains free of injury.  safe environment maintained

## 2021-01-20 NOTE — PLAN OF CARE
PRE CONSULT ROUNDING NOTE  HPI  70year old female with pmh of afb on eliquis hypothyroidism CKD COPD etoh abuse HTN iron deficiency anemia, AVM's covid 19 infection diagnosed 1/10 constipation who presented to the ED for abnormal hgb. She was found to have hgb of 5.5. Care discussed with the primary RN. Per the RN she has not had melena or hematochezia. She was supposed to have a outpt videocapsule study to evaluate the small bowel. she reported fatigue and light headedness. She was seen by our service in December of last year for iron deficiency anemia and shortness of breath. She has had a 20 # weight loss. hgb currently 6.9 alk phos 112. Endoscopy 12/23/20 egd with push enteroscopy (bleibel) gastritis 6 avms in jejunum that were cauterized, 12/2/20 (bleibel) colonoscopy avm in the descending and ascending colon scattered diverticula    Family reports no colon liver pancreatic or stomach cancer no UC/crohns  Social quit smoking rare etoh or illicit drugs  /95   Pulse 93   Temp 98.1 °F (36.7 °C) (Oral)   Resp 18   Ht 5' 5\" (1.651 m)   Wt 156 lb 12 oz (71.1 kg)   SpO2 100%   BMI 26.08 kg/m²     ROS not done due to pt in isolation   meds labs imaging and past medical records were reviewed    . Arsenio Verdugo Due to the current efforts to prevent transmission of COVID-19 and also the need to preserve PPE for other caregivers, a face-to-face encounter with the patient was not performed. That being said, all relevant records and diagnostic tests were reviewed, including laboratory results and imaging. Please reference any relevant documentation elsewhere. Care will be coordinated with the primary service.         Assessment  Iron deficiency anemia  Hx of jejunal and colon avm's  covid 19 infection  Elevated alk phos  Weight loss    Plan  Will discuss endoscopy with md but will need outpt videocapsule to eval the small bowel   Hold the [facility]  continue ppi and iron replacement Trend hh and keep hgb >7, will be receiving transfusion today  ID consulted for covid 19  Recheck lft in am elevated alk phos could be from covid  Formal gi consult to follow  . Giacomo Wilson, APRN - CNP

## 2021-01-21 LAB
ABSOLUTE EOS #: 0.1 K/UL (ref 0–0.4)
ABSOLUTE IMMATURE GRANULOCYTE: ABNORMAL K/UL (ref 0–0.3)
ABSOLUTE LYMPH #: 0.8 K/UL (ref 1–4.8)
ABSOLUTE MONO #: 0.8 K/UL (ref 0.1–1.3)
ALBUMIN SERPL-MCNC: 3.1 G/DL (ref 3.5–5.2)
ALBUMIN/GLOBULIN RATIO: ABNORMAL (ref 1–2.5)
ALP BLD-CCNC: 104 U/L (ref 35–104)
ALT SERPL-CCNC: 11 U/L (ref 5–33)
AST SERPL-CCNC: 17 U/L
BASOPHILS # BLD: 2 % (ref 0–2)
BASOPHILS ABSOLUTE: 0.1 K/UL (ref 0–0.2)
BILIRUB SERPL-MCNC: 0.4 MG/DL (ref 0.3–1.2)
BILIRUBIN DIRECT: 0.15 MG/DL
BILIRUBIN, INDIRECT: 0.25 MG/DL (ref 0–1)
C-REACTIVE PROTEIN: 4 MG/L (ref 0–5)
D-DIMER QUANTITATIVE: 0.71 MG/L FEU (ref 0–0.59)
DIFFERENTIAL TYPE: ABNORMAL
EOSINOPHILS RELATIVE PERCENT: 2 % (ref 0–4)
FERRITIN: 56 UG/L (ref 13–150)
GLOBULIN: ABNORMAL G/DL (ref 1.5–3.8)
HCT VFR BLD CALC: 21.3 % (ref 36–46)
HCT VFR BLD CALC: 22.3 % (ref 36–46)
HCT VFR BLD CALC: 24.1 % (ref 36–46)
HCT VFR BLD CALC: 24.3 % (ref 36–46)
HEMOGLOBIN: 6.8 G/DL (ref 12–16)
HEMOGLOBIN: 7.1 G/DL (ref 12–16)
HEMOGLOBIN: 7.7 G/DL (ref 12–16)
HEMOGLOBIN: 7.7 G/DL (ref 12–16)
IMMATURE GRANULOCYTES: ABNORMAL %
LACTATE DEHYDROGENASE: 170 U/L (ref 135–214)
LYMPHOCYTES # BLD: 13 % (ref 24–44)
MCH RBC QN AUTO: 29.8 PG (ref 26–34)
MCHC RBC AUTO-ENTMCNC: 32.1 G/DL (ref 31–37)
MCV RBC AUTO: 93 FL (ref 80–100)
MONOCYTES # BLD: 13 % (ref 1–7)
NRBC AUTOMATED: ABNORMAL PER 100 WBC
PDW BLD-RTO: 17.1 % (ref 11.5–14.9)
PLATELET # BLD: 309 K/UL (ref 150–450)
PLATELET ESTIMATE: ABNORMAL
PMV BLD AUTO: 8.8 FL (ref 6–12)
PROCALCITONIN: 0.05 NG/ML
RBC # BLD: 2.59 M/UL (ref 4–5.2)
RBC # BLD: ABNORMAL 10*6/UL
SEG NEUTROPHILS: 70 % (ref 36–66)
SEGMENTED NEUTROPHILS ABSOLUTE COUNT: 4.1 K/UL (ref 1.3–9.1)
TOTAL PROTEIN: 5.7 G/DL (ref 6.4–8.3)
WBC # BLD: 5.9 K/UL (ref 3.5–11)
WBC # BLD: ABNORMAL 10*3/UL

## 2021-01-21 PROCEDURE — 36415 COLL VENOUS BLD VENIPUNCTURE: CPT

## 2021-01-21 PROCEDURE — 84145 PROCALCITONIN (PCT): CPT

## 2021-01-21 PROCEDURE — 6370000000 HC RX 637 (ALT 250 FOR IP): Performed by: NURSE PRACTITIONER

## 2021-01-21 PROCEDURE — APPSS30 APP SPLIT SHARED TIME 16-30 MINUTES: Performed by: NURSE PRACTITIONER

## 2021-01-21 PROCEDURE — 2580000003 HC RX 258: Performed by: STUDENT IN AN ORGANIZED HEALTH CARE EDUCATION/TRAINING PROGRAM

## 2021-01-21 PROCEDURE — C9113 INJ PANTOPRAZOLE SODIUM, VIA: HCPCS | Performed by: STUDENT IN AN ORGANIZED HEALTH CARE EDUCATION/TRAINING PROGRAM

## 2021-01-21 PROCEDURE — 94664 DEMO&/EVAL PT USE INHALER: CPT

## 2021-01-21 PROCEDURE — 80076 HEPATIC FUNCTION PANEL: CPT

## 2021-01-21 PROCEDURE — 85025 COMPLETE CBC W/AUTO DIFF WBC: CPT

## 2021-01-21 PROCEDURE — 86140 C-REACTIVE PROTEIN: CPT

## 2021-01-21 PROCEDURE — 99232 SBSQ HOSP IP/OBS MODERATE 35: CPT | Performed by: FAMILY MEDICINE

## 2021-01-21 PROCEDURE — 94760 N-INVAS EAR/PLS OXIMETRY 1: CPT

## 2021-01-21 PROCEDURE — 85014 HEMATOCRIT: CPT

## 2021-01-21 PROCEDURE — 2700000000 HC OXYGEN THERAPY PER DAY

## 2021-01-21 PROCEDURE — 85379 FIBRIN DEGRADATION QUANT: CPT

## 2021-01-21 PROCEDURE — 99232 SBSQ HOSP IP/OBS MODERATE 35: CPT | Performed by: INTERNAL MEDICINE

## 2021-01-21 PROCEDURE — 36430 TRANSFUSION BLD/BLD COMPNT: CPT

## 2021-01-21 PROCEDURE — 6370000000 HC RX 637 (ALT 250 FOR IP): Performed by: STUDENT IN AN ORGANIZED HEALTH CARE EDUCATION/TRAINING PROGRAM

## 2021-01-21 PROCEDURE — 83615 LACTATE (LD) (LDH) ENZYME: CPT

## 2021-01-21 PROCEDURE — 6360000002 HC RX W HCPCS: Performed by: STUDENT IN AN ORGANIZED HEALTH CARE EDUCATION/TRAINING PROGRAM

## 2021-01-21 PROCEDURE — 2060000000 HC ICU INTERMEDIATE R&B

## 2021-01-21 PROCEDURE — 82728 ASSAY OF FERRITIN: CPT

## 2021-01-21 PROCEDURE — 6370000000 HC RX 637 (ALT 250 FOR IP): Performed by: FAMILY MEDICINE

## 2021-01-21 PROCEDURE — 94640 AIRWAY INHALATION TREATMENT: CPT

## 2021-01-21 PROCEDURE — 85018 HEMOGLOBIN: CPT

## 2021-01-21 RX ORDER — IPRATROPIUM BROMIDE AND ALBUTEROL SULFATE 2.5; .5 MG/3ML; MG/3ML
1 SOLUTION RESPIRATORY (INHALATION)
Status: DISCONTINUED | OUTPATIENT
Start: 2021-01-21 | End: 2021-01-22

## 2021-01-21 RX ORDER — SODIUM CHLORIDE 0.9 % (FLUSH) 0.9 %
10 SYRINGE (ML) INJECTION EVERY 12 HOURS
Status: DISCONTINUED | OUTPATIENT
Start: 2021-01-21 | End: 2021-01-24 | Stop reason: HOSPADM

## 2021-01-21 RX ORDER — SODIUM CHLORIDE 9 MG/ML
INJECTION, SOLUTION INTRAVENOUS PRN
Status: DISCONTINUED | OUTPATIENT
Start: 2021-01-21 | End: 2021-01-24 | Stop reason: HOSPADM

## 2021-01-21 RX ORDER — SODIUM CHLORIDE 0.9 % (FLUSH) 0.9 %
10 SYRINGE (ML) INJECTION PRN
Status: DISCONTINUED | OUTPATIENT
Start: 2021-01-21 | End: 2021-01-24 | Stop reason: HOSPADM

## 2021-01-21 RX ADMIN — LISINOPRIL 2.5 MG: 5 TABLET ORAL at 12:22

## 2021-01-21 RX ADMIN — BACLOFEN 10 MG: 10 TABLET ORAL at 07:40

## 2021-01-21 RX ADMIN — HYDROCHLOROTHIAZIDE 25 MG: 25 TABLET ORAL at 07:40

## 2021-01-21 RX ADMIN — IPRATROPIUM BROMIDE AND ALBUTEROL SULFATE 1 AMPULE: .5; 3 SOLUTION RESPIRATORY (INHALATION) at 19:23

## 2021-01-21 RX ADMIN — LEVOTHYROXINE SODIUM 125 MCG: 125 TABLET ORAL at 07:43

## 2021-01-21 RX ADMIN — METOPROLOL TARTRATE 25 MG: 25 TABLET, FILM COATED ORAL at 07:40

## 2021-01-21 RX ADMIN — TAMSULOSIN HYDROCHLORIDE 0.4 MG: 0.4 CAPSULE ORAL at 07:41

## 2021-01-21 RX ADMIN — FLUTICASONE PROPIONATE 1 SPRAY: 50 SPRAY, METERED NASAL at 07:43

## 2021-01-21 RX ADMIN — OXYCODONE HYDROCHLORIDE AND ACETAMINOPHEN 500 MG: 500 TABLET ORAL at 07:39

## 2021-01-21 RX ADMIN — FLUTICASONE PROPIONATE 2 PUFF: 220 AEROSOL, METERED RESPIRATORY (INHALATION) at 07:44

## 2021-01-21 RX ADMIN — DILTIAZEM HYDROCHLORIDE 120 MG: 120 CAPSULE, COATED, EXTENDED RELEASE ORAL at 07:40

## 2021-01-21 RX ADMIN — ACETAMINOPHEN 650 MG: 325 TABLET, FILM COATED ORAL at 18:43

## 2021-01-21 RX ADMIN — PANTOPRAZOLE SODIUM 40 MG: 40 INJECTION, POWDER, FOR SOLUTION INTRAVENOUS at 07:41

## 2021-01-21 RX ADMIN — IPRATROPIUM BROMIDE AND ALBUTEROL SULFATE 1 AMPULE: .5; 3 SOLUTION RESPIRATORY (INHALATION) at 15:11

## 2021-01-21 RX ADMIN — ACETAZOLAMIDE 250 MG: 250 TABLET ORAL at 07:43

## 2021-01-21 RX ADMIN — DULOXETINE HYDROCHLORIDE 60 MG: 60 CAPSULE, DELAYED RELEASE ORAL at 07:40

## 2021-01-21 RX ADMIN — SODIUM CHLORIDE: 9 INJECTION, SOLUTION INTRAVENOUS at 19:43

## 2021-01-21 RX ADMIN — ATORVASTATIN CALCIUM 10 MG: 10 TABLET, FILM COATED ORAL at 07:40

## 2021-01-21 ASSESSMENT — PAIN SCALES - GENERAL
PAINLEVEL_OUTOF10: 8
PAINLEVEL_OUTOF10: 8

## 2021-01-21 ASSESSMENT — PAIN DESCRIPTION - FREQUENCY: FREQUENCY: CONTINUOUS

## 2021-01-21 ASSESSMENT — PAIN DESCRIPTION - ORIENTATION: ORIENTATION: POSTERIOR

## 2021-01-21 ASSESSMENT — PAIN DESCRIPTION - LOCATION: LOCATION: HEAD

## 2021-01-21 NOTE — PROGRESS NOTES
Writer clarified with Dr Dona Campos if pt needs telemetry, as she is out of [facility] isolation. Dr Dona Campos would like telemetry and PCU status.

## 2021-01-21 NOTE — PROGRESS NOTES
FAMILY MEDICINE  - PROGRESS NOTE    Date:  1/21/2021  Marianne Moses  [id number]      Chief Complaint   Patient presents with    Anemia     Hgb: 5.5         Interval History:  Improved, hgb came up to 7.7. This am she is still in [facility] isolation. No significant events overnight. Subjective  Respiratory: positive for emphysema  Hematologic/lymphatic: positive for pallor  Behavioral/Psych: positive for anxiety, depression and overweight  Endocrine: positive for hypothyroid:    Objective:    /73   Pulse 91   Temp 98.1 °F (36.7 °C) (Oral)   Resp 20   Ht 5' 5\" (1.651 m)   Wt 156 lb 12 oz (71.1 kg)   SpO2 97%   BMI 26.08 kg/m²     Physical exam deferred due to excessive risk of exposure and in an effort to conserve PPE during the global COVID 19 pandemic.     Data:   Medications:   Current Facility-Administered Medications   Medication Dose Route Frequency Provider Last Rate Last Admin    sodium chloride flush 0.9 % injection 10 mL  10 mL Intravenous 2 times per day Suzanna Be DO        sodium chloride flush 0.9 % injection 10 mL  10 mL Intravenous PRN Suzanna Be DO        acetaminophen (TYLENOL) tablet 650 mg  650 mg Oral Q4H PRN Suzanna Be DO   650 mg at 01/20/21 1600    0.9 % sodium chloride infusion   Intravenous Continuous Suzanna Be DO 75 mL/hr at 01/20/21 1803 New Bag at 01/20/21 1803    ferrous sulfate (IRON 325) tablet 325 mg  325 mg Oral Daily Mayco Nobles MD        ascorbic acid (VITAMIN C) tablet 500 mg  500 mg Oral Daily Mayco Nobles MD        fluticasone (FLONASE) 50 MCG/ACT nasal spray 1 spray  1 spray Each Nostril Daily Mayco Nobles MD        dilTIAZem (CARDIZEM CD) extended release capsule 120 mg  120 mg Oral Daily Mayco Nobles MD        albuterol (PROVENTIL) nebulizer solution 2.5 mg  2.5 mg Nebulization Q6H PRN Mayco Nobles MD  clonazePAM (KLONOPIN) tablet 0.5 mg  0.5 mg Oral TID PRN Kika Panda MD   0.5 mg at 01/20/21 2110    fluticasone (FLOVENT HFA) 220 MCG/ACT inhaler 2 puff  2 puff Inhalation BID Kika Panda MD   2 puff at 01/20/21 2057    baclofen (LIORESAL) tablet 10 mg  10 mg Oral TID PRN Kika Panda MD        [Held by provider] apixaban (ELIQUIS) tablet 5 mg  5 mg Oral BID Kika Panda MD        levothyroxine (SYNTHROID) tablet 125 mcg  125 mcg Oral Daily Kika Panda MD        DULoxetine (CYMBALTA) extended release capsule 60 mg  60 mg Oral Daily Kika Panda MD        tamsulosin (FLOMAX) capsule 0.4 mg  0.4 mg Oral Daily Kika Panda MD        acetaZOLAMIDE (DIAMOX) tablet 250 mg  250 mg Oral Daily Kika Panda MD        hydroCHLOROthiazide (HYDRODIURIL) tablet 25 mg  25 mg Oral Daily Kika Panda MD        lisinopril (PRINIVIL;ZESTRIL) tablet 2.5 mg  2.5 mg Oral Lunch Kika Panda MD   2.5 mg at 01/20/21 1140    metoprolol tartrate (LOPRESSOR) tablet 25 mg  25 mg Oral BID Kika Panda MD        atorvastatin (LIPITOR) tablet 10 mg  10 mg Oral Daily Kika Panda MD        0.9 % sodium chloride infusion   Intravenous PRN Kika Panda MD        0.9 % sodium chloride infusion   Intravenous PRN Amanda Gamboa DO        pantoprazole (PROTONIX) injection 40 mg  40 mg Intravenous BID Amanda Gamboa DO   40 mg at 01/20/21 2053     No intake or output data in the 24 hours ending 01/21/21 0629  Recent Results (from the past 24 hour(s))   Hgb/Hct    Collection Time: 01/20/21  3:49 PM   Result Value Ref Range    Hemoglobin 8.3 (L) 12.0 - 16.0 g/dL    Hematocrit 25.3 (L) 36 - 46 %   Hgb/Hct    Collection Time: 01/20/21  9:41 PM   Result Value Ref Range    Hemoglobin 7.8 (L) 12.0 - 16.0 g/dL    Hematocrit 24.2 (L) 36 - 46 %   CBC with DIFF    Collection Time: 01/21/21  4:30 AM   Result Value Ref Range    WBC 5.9 3.5 - 11.0 k/uL    RBC 2.59 (L) 4.0 - 5.2 m/uL Hemoglobin 7.7 (L) 12.0 - 16.0 g/dL    Hematocrit 24.1 (L) 36 - 46 %    MCV 93.0 80 - 100 fL    MCH 29.8 26 - 34 pg    MCHC 32.1 31 - 37 g/dL    RDW 17.1 (H) 11.5 - 14.9 %    Platelets 045 883 - 778 k/uL    MPV 8.8 6.0 - 12.0 fL    NRBC Automated NOT REPORTED per 100 WBC    Differential Type NOT REPORTED     Immature Granulocytes NOT REPORTED 0 %    Absolute Immature Granulocyte NOT REPORTED 0.00 - 0.30 k/uL    WBC Morphology NOT REPORTED     RBC Morphology NOT REPORTED     Platelet Estimate NOT REPORTED     Seg Neutrophils 70 (H) 36 - 66 %    Lymphocytes 13 (L) 24 - 44 %    Monocytes 13 (H) 1 - 7 %    Eosinophils % 2 0 - 4 %    Basophils 2 0 - 2 %    Segs Absolute 4.10 1.3 - 9.1 k/uL    Absolute Lymph # 0.80 (L) 1.0 - 4.8 k/uL    Absolute Mono # 0.80 0.1 - 1.3 k/uL    Absolute Eos # 0.10 0.0 - 0.4 k/uL    Basophils Absolute 0.10 0.0 - 0.2 k/uL   Hepatic Function Panel    Collection Time: 01/21/21  4:30 AM   Result Value Ref Range    Alb 3.1 (L) 3.5 - 5.2 g/dL    Alkaline Phosphatase 104 35 - 104 U/L    ALT 11 5 - 33 U/L    AST 17 <32 U/L    Total Bilirubin 0.40 0.3 - 1.2 mg/dL    Bilirubin, Direct 0.15 <0.31 mg/dL    Bilirubin, Indirect 0.25 0.00 - 1.00 mg/dL    Total Protein 5.7 (L) 6.4 - 8.3 g/dL    Globulin NOT REPORTED 1.5 - 3.8 g/dL    Albumin/Globulin Ratio NOT REPORTED 1.0 - 2.5     -----------------------------------------------------------------  RAD:  EKG:  Micro:     Assessment & Plan:    Patient Active Problem List:     Acquired hypothyroidism     Hypertension     Primary osteoarthritis of right knee     A-fib (HCC)     Acute encephalopathy     SIRS (systemic inflammatory response syndrome) (HCC)     Normocytic anemia     Pulmonary hypertension (HCC)     COPD (chronic obstructive pulmonary disease) (HCC)     History of GI bleed     Constipation     HA (generalized anxiety disorder)     Lumbar radiculopathy     Lumbosacral spondylosis without myelopathy Benign hypertension with CKD (chronic kidney disease) stage III     CKD (chronic kidney disease) stage 3, GFR 30-59 ml/min     Alcohol withdrawal syndrome with complication (HCC)     Moderate major depression (HCC)     PAD (peripheral artery disease) (HCC)     Acute kidney injury ([facility] [address]     Obesity, Class I, BMI 30-34.9     Acute on chronic diastolic CHF (congestive heart failure) (HCC)     GI bleed     Weight loss     Elevated alkaline phosphatase level     Thrombocytosis (HCC)     Iron deficiency anemia due to chronic blood loss     Iron malabsorption     Symptomatic anemia     Pneumonia due to organism     Anemia     Severe anemia     Overweight (BMI 25.0-29. 9)     Severe malnutrition (HCC)           Plan:  -Severe anemia - no obvious bleeding, hx of 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, on iron supplement, GI following. -COPD - stable, Pulmonology following.  -Recent COVID 19 infection - tested positive 1/1/21, ID following.  -A. Fib - on Eliquis.  -Continue current treatments. -D/C plan is to return to SNF. -Complete orders per chart.       See orders   Disposition:    Electronically signed by Brenda Whitaker MD on 1/21/2021 at 6:29 AM

## 2021-01-21 NOTE — CARE COORDINATION
DISCHARGE PLANNING NOTE:    COVID +    From [facility] and will return - LSW notified to follow     Possibly being removed from [facility] today. Hgb up to 7.7 after 2 units PRBC's    GI on board and to do outpatient video capsule study    ELevated HR today. Will continue to follow along.      Electronically signed by Franklin Galo RN on 1/21/2021 at 11:36 AM

## 2021-01-21 NOTE — FLOWSHEET NOTE
Patient is adament that she brought her cane and purse with her to the ER. Writer spoke with Emily Durham from [facility], she stated that she did not bring that with her. Discussed with patient.

## 2021-01-21 NOTE — FLOWSHEET NOTE
Patient received from overflow unit. Oriented to room, call system, call light within reach, bed low position, locked, side rails up x 2.

## 2021-01-21 NOTE — PROGRESS NOTES
PULMONARY PROGRESS NOTE:    REASON FOR VISIT: covid  Interval History:    Shortness of Breath: at baseline  Cough: +, difficult to expectorate  Sputum: no          Hemoptysis: no  Chest Pain: no  Fever: no                   Swelling Feet: no  Headache: no                                           Nausea, Emesis, Abdominal Pain: no  Diarrhea: no         Constipation: no    Events since last visit: none    PAST MEDICAL HISTORY:      Scheduled Meds:   ipratropium-albuterol  1 ampule Inhalation Q4H WA    sodium chloride flush  10 mL Intravenous 2 times per day    ferrous sulfate  325 mg Oral Daily    ascorbic acid  500 mg Oral Daily    fluticasone  1 spray Each Nostril Daily    dilTIAZem  120 mg Oral Daily    fluticasone  2 puff Inhalation BID    [Held by provider] apixaban  5 mg Oral BID    levothyroxine  125 mcg Oral Daily    DULoxetine  60 mg Oral Daily    tamsulosin  0.4 mg Oral Daily    acetaZOLAMIDE  250 mg Oral Daily    hydroCHLOROthiazide  25 mg Oral Daily    lisinopril  2.5 mg Oral Lunch    metoprolol tartrate  25 mg Oral BID    atorvastatin  10 mg Oral Daily    pantoprazole  40 mg Intravenous BID     Continuous Infusions:   sodium chloride 75 mL/hr at 01/20/21 1803    sodium chloride      sodium chloride       PRN Meds:sodium chloride flush, acetaminophen, albuterol, clonazePAM, baclofen, sodium chloride, sodium chloride        PHYSICAL EXAMINATION:  BP (!) 150/84   Pulse 128   Temp 98.1 °F (36.7 °C) (Oral)   Resp 21   Ht 5' 5\" (1.651 m)   Wt 156 lb 12 oz (71.1 kg)   SpO2 97%   BMI 26.08 kg/m²     General : Awake, alert,   Neck  supple, no lymphadenopathy, JVD not raised  Heart  regular rhythm, S1 and S2 normal; no additional sounds heard  Lungs  Air Entry- fair bilaterally; breath sounds : vesicular;   rales/crackles - absent  Abdomen  soft, no tenderness  Upper Extremities  - no cyanosis, mottling; edema : absent  Lower Extremities: no cyanosis, mottling; edema : absent Current Laboratory, Radiologic, Microbiologic, and Diagnostic studies reviewed  Data ReviewCBC:   Recent Labs     01/19/21 2120 01/20/21  0518 01/20/21  0518 01/20/21  2141 01/21/21  0430 01/21/21  1028   WBC 7.6 7.8  --   --  5.9  --    RBC 1.81* 2.25*  --   --  2.59*  --    HGB 5.5* 6.9*   < > 7.8* 7.7* 7.7*   HCT 17.5* 21.4*   < > 24.2* 24.1* 24.3*    335  --   --  309  --     < > = values in this interval not displayed. BMP:   Recent Labs     01/19/21 2120 01/20/21 0518   GLUCOSE 101* 109*    143   K 4.9 4.7   BUN 22 20   CREATININE 1.36* 1.05*   CALCIUM 8.7 8.7     ABGs: No results for input(s): PHART, PO2ART, MGW3OYC, CEE8FZW, BEART, E9XIIGDA, VQK8VXY in the last 72 hours.    PT/INR:  No results found for: PTINR    ASSESSMENT / PLAN:    Past Dx of COVID - no need for treatment  COPD - add BD  Anemia - monitor H&H  Hx AVM in bowels    Electronically signed by Luiza Vazquez on 01/21/21 at 12:05 PM.

## 2021-01-21 NOTE — PLAN OF CARE
Problem: Falls - Risk of:  Goal: Will remain free from falls  Description: Will remain free from falls  1/21/2021 0335 by Anna Wu RN  Outcome: Met This Shift  Note: Patient was free of falls and injuries this shift. 1/20/2021 1448 by Armando Zhou RN  Note: No falls noted this shift. Patient ambulates with x1 staff assistance without difficulty. Bed kept in low position. Safe environment maintained. Bedside table & call light in reach. Uses call light appropriately when needing assistance.     Goal: Absence of physical injury  Description: Absence of physical injury  Outcome: Met This Shift     Problem: Nutrition  Goal: Optimal nutrition therapy  Outcome: Ongoing

## 2021-01-21 NOTE — PROGRESS NOTES
[facility] -   Infectious diseases evaluation  admission date 1/19/2021    reason for consultation:   COVID-19 infection  Impression :   Current:  · COVID-19 infection was tested positive on January 1 of 2021  · Anemia  · Atrial fibrillation on Eliquis  · History of AV malformation  · Hypothyroidism  · Hypertension  · Chronic kidney disease  · COPD  · History of smoking  · Peripheral arterial disease  · Pulmonary hypertension        Recommendations   · No indication for remdesivir or Decadron  · Out of the 2-week window for remdesivir   · Follow CBC, renal function and inflammatory markers  · Continue supportive care  · Discussed with nursing staff  · Discontinue droplet plus isolation            History of Present Illness:   Initial history: Eli Art is a 70y.o.-year-old female was sent to the hospital from nursing home for anemia with hemoglobin of 5.5. The patient had lightheadedness for the to 3 days prior to admission. He was recently diagnosed with COVID-19 infection, still have dry cough and intermittent shortness of breath  Chest x-ray 1/19/2021 showed no acute abnormality  He was hospitalized last month with suspected COPD exacerbation and anemia, had pleural effusion status post thoracentesis 12/26/2021  Interval changes  1/21/2021   Patient on 2 L of oxygen per nasal cannula, mild cough, episode of tachycardia earlier, denied fever or chills, no other complaints  Patient Vitals for the past 8 hrs:   BP Temp Temp src Pulse Resp SpO2   01/21/21 1215  98.2 °F (36.8 °C) Oral 88 19 94 %   01/21/21 0730 (!) 150/84 98.1 °F (36.7 °C) Oral 128 21 97 %   01/21/21 0600    91 20 97 %           I have personally reviewed the past medical history, past surgical history, medications, social history, and family history, and I haveupdated the database accordingly.       Allergies:   Tizanidine     Review of Systems:     Review of Systems Other than above 12 system review was negative  Physical Examination :       Alert oriented x3 not under distress  Head atraumatic normocephalic  Pupils round equal reactive to light and accommodation  Neck supple no JVD  Chest clear to auscultation bilaterally, no wheezing  Heart S1-S2 normal no murmur  Abdomen soft nontender present bowel sounds  Lower extremities no edema, no cyanosis  Skin no rash no jaundice  Past Medical History:     Past Medical History:   Diagnosis Date    [facility]     Acquired hypothyroidism     Acute encephalopathy     Acute kidney [facility]     Anxiety     Benign hypertension with CKD (chronic kidney disease) stage III     Chronic back pain     CKD (chronic kidney disease) stage 3, GFR 30-59 ml/min     Constipation     [facility]     Cough     Depression     ETOH abuse     Former smoker     3 packs a day for 26 years    HA (generalized anxiety disorder)     History of GI bleed     Hyperlipidemia     Hypertension     Hypothyroid 1/18/2013    Leg pain     Normocytic anemia     Osteoarthritis     PAD (peripheral artery disease) (HCC)     Primary osteoarthritis     Pulmonary hypertension ([facility]     Pure hypercholesterolemia     SIRS (systemic inflammatory response syndrome) ([facility])     Urge incontinence     Wheezing        Past Surgical  History:     Past Surgical History:   Procedure Laterality Date    COLONOSCOPY      COLONOSCOPY N/A 12/2/2020    COLONOSCOPY CONTROL OF BLEEDING performed by Sue Buchanan MD at [facility] Bilateral     cataracts    JOINT REPLACEMENT      Left knee on 9-11-18    THORACENTESIS  12/26/2020         UPPER GASTROINTESTINAL ENDOSCOPY N/A 12/2/2020    EGD performed by Sue Buchanan MD at [facility] 12/23/2020    PUSH ENTEROSCOPY/EGD CONTROL BLEEDING performed by Sue Buchanan MD at [facility]       Medications:  ipratropium-albuterol  1 ampule Inhalation Q4H WA    sodium chloride flush  10 mL Intravenous 2 times per day    ferrous sulfate  325 mg Oral Daily    ascorbic acid  500 mg Oral Daily    fluticasone  1 spray Each Nostril Daily    dilTIAZem  120 mg Oral Daily    fluticasone  2 puff Inhalation BID    [Held by provider] apixaban  5 mg Oral BID    levothyroxine  125 mcg Oral Daily    DULoxetine  60 mg Oral Daily    tamsulosin  0.4 mg Oral Daily    acetaZOLAMIDE  250 mg Oral Daily    hydroCHLOROthiazide  25 mg Oral Daily    lisinopril  2.5 mg Oral Lunch    metoprolol tartrate  25 mg Oral BID    atorvastatin  10 mg Oral Daily    pantoprazole  40 mg Intravenous BID       Social History:     Social History     Socioeconomic History    Marital status:      Spouse name: Not on file    Number of children: Not on file    Years of education: Not on file    Highest education level: Not on file   Occupational History    Not on file   Social Needs    Financial resource strain: Not on file    Food insecurity     Worry: Not on file     Inability: Not on file   [employer] needs     Medical: Not on file     Non-medical: Not on file   Tobacco Use    Smoking status: Former Smoker     Packs/day: 3.00     Years: 26.00     Pack years: 78.00     Types: Cigarettes    Smokeless tobacco: Never Used   Substance and Sexual Activity    Alcohol use: Not Currently     Comment: occ    Drug use: No    Sexual activity: Not on file   Lifestyle    Physical activity     Days per week: Not on file     Minutes per session: Not on file    Stress: Not on file   Relationships    Social connections     Talks on phone: Not on file     Gets together: Not on file     Attends Rastafari service: Not on file     Active member of club or organization: Not on file     Attends meetings of clubs or organizations: Not on file     Relationship status: Not on file    Intimate partner violence Fear of current or ex partner: Not on file     Emotionally abused: Not on file     Physically abused: Not on file     Forced sexual activity: Not on file   Other Topics Concern    Not on file   Social History Narrative    Not on file       Family History:     Family History   Problem Relation Age of Onset    Heart Disease Mother     COPD Mother     Emphysema Sister     Cancer Other         Cousin - breast cancer      Medical Decision Making:   I have independently reviewed/ordered the following labs:    CBC with Differential:   Recent Labs     01/20/21  0518 01/20/21  0518 01/21/21  0430 01/21/21  1028   WBC 7.8  --  5.9  --    HGB 6.9*   < > 7.7* 7.7*   HCT 21.4*   < > 24.1* 24.3*     --  309  --    LYMPHOPCT 14*  --  13*  --    MONOPCT 12*  --  13*  --     < > = values in this interval not displayed. BMP:  Recent Labs     01/19/21  2120 01/20/21  0518    143   K 4.9 4.7   CL 95* 100   CO2 40* 39*   BUN 22 20   CREATININE 1.36* 1.05*   MG 2.4  --      Hepatic Function Panel:   Recent Labs     01/20/21  0518 01/21/21  0430   PROT 6.0* 5.7*   LABALBU 3.2* 3.1*   BILIDIR  --  0.15   IBILI  --  0.25   BILITOT 0.31 0.40   ALKPHOS 112* 104   ALT 12 11   AST 17 17     No results for input(s): RPR in the last 72 hours. No results for input(s): HIV in the last 72 hours. No results for input(s): BC in the last 72 hours. Lab Results   Component Value Date    CREATININE 1.05 01/20/2021    GLUCOSE 109 01/20/2021    GLUCOSE 113 02/18/2012       Detailed results: Thank you for allowing us to participate in the care of this patient. Please call with questions. This note is created with the assistance of a speech recognition program.  While intending to generate adocument that actually reflects the content of the visit, the document can still have some errors including those of syntax and sound a like substitutions which may escape proof reading. It such instances, actual meaningcan be extrapolated by contextual diversion.     Vi Bay MD  Office: [phone]  Perfect serve / office [phone]

## 2021-01-21 NOTE — PROGRESS NOTES
[facility]    Gastroenterology Daily Progress Note      Patient: Soren Wu   :    1945   Facility:   [facility]  Date:     2021  Consultant:   Brionna Mcdaniel CNP      SUBJECTIVE  70 y.o. female admitted 2021 with Anemia [D64.9]  Severe anemia [D64.9] and seen for iron deficiency anemia, hx AVM's. Elevated alk phos. The pt was seen and examined. She has been taken out of covid isolation. hgb 7.7. she has not had melena hematochezia and denies abdominal pain. Alk phos is normalized, isoenzyme is pending.          OBJECTIVE  Scheduled Meds:   sodium chloride flush  10 mL Intravenous 2 times per day    ferrous sulfate  325 mg Oral Daily    ascorbic acid  500 mg Oral Daily    fluticasone  1 spray Each Nostril Daily    dilTIAZem  120 mg Oral Daily    fluticasone  2 puff Inhalation BID    [Held by provider] apixaban  5 mg Oral BID    levothyroxine  125 mcg Oral Daily    DULoxetine  60 mg Oral Daily    tamsulosin  0.4 mg Oral Daily    acetaZOLAMIDE  250 mg Oral Daily    hydroCHLOROthiazide  25 mg Oral Daily    lisinopril  2.5 mg Oral Lunch    metoprolol tartrate  25 mg Oral BID    atorvastatin  10 mg Oral Daily    pantoprazole  40 mg Intravenous BID       Vital Signs:  BP (!) 150/84   Pulse 128   Temp 98.1 °F (36.7 °C) (Oral)   Resp 21   Ht 5' 5\" (1.651 m)   Wt 156 lb 12 oz (71.1 kg)   SpO2 97%   BMI 26.08 kg/m²      Physical Exam:   General Appearance: alert and oriented to person, place and time, well-developed and well-nourished, in no acute distress  Skin: warm and dry, no rash or erythema  Head: normocephalic and atraumatic  Eyes: pupils equal, round, and reactive to light, extraocular eye movements intact, conjunctivae normal  ENT: hearing grossly normal bilaterally  Neck: neck supple and non tender without mass, no thyromegaly or thyroid nodules, no cervical lymphadenopathy Pulmonary/Chest: clear to auscultation bilaterally- no wheezes, rales or rhonchi, normal air movement, no respiratory distress  Cardiovascular: tachycardic rate, regular rhythm, normal S1 and S2, no murmurs, rubs, clicks or gallops, distal pulses intact, no carotid bruits  Abdomen: soft, non-tender, non-distended, normal bowel sounds, no masses or organomegaly  Extremities: no cyanosis, clubbing or edema  Musculoskeletal: normal range of motion, no joint swelling, deformity or tenderness  Neurologic: no cranial nerve deficit and muscle strength normal    Lab and Imaging Review     CBC  Recent Labs     01/19/21 2120 01/20/21  0518 01/20/21  1549 01/20/21  2141 01/21/21  0430   WBC 7.6 7.8  --   --  5.9   HGB 5.5* 6.9* 8.3* 7.8* 7.7*   HCT 17.5* 21.4* 25.3* 24.2* 24.1*   MCV 96.7 95.0  --   --  93.0    335  --   --  309       BMP  Recent Labs     01/19/21 2120 01/20/21  0518    143   K 4.9 4.7   CL 95* 100   CO2 40* 39*   BUN 22 20   CREATININE 1.36* 1.05*   GLUCOSE 101* 109*   CALCIUM 8.7 8.7       LFTS  Recent Labs     01/19/21 2120 01/20/21  0518 01/21/21  0430   ALKPHOS 119* 112* 104   ALT 13 12 11   AST 17 17 17   PROT 6.0* 6.0* 5.7*   BILITOT 0.20* 0.31 0.40   BILIDIR  --   --  0.15   LABALBU 3.4* 3.2* 3.1*       PT/INR  Recent Labs     01/19/21 2120   PROTIME 16.0*   INR 1.3       ASSESSMENT/PLAN:  1. Symptomatic iron deficiency anemia; no overt bleeding hx avm's in jejunum  -continue iron replacement and ppi  -will need outpt videocapsule with follow up  -if overt bleeding occurs will repeat egd    2.elevated alk phos-level normal today  -await isoenzyme  -ct abd with contrast when creat improves        This plan was formulated in collaboration with Dr. Fidelia Delgado.     Electronically signed by: WILLIE Fuchs CNP on 1/21/2021 at 8:10 AM       Attending Physician Statement  I have discussed the care of Jay Marcum and I have examined the patient myselft independently, and taken ros and hpi , including pertinent history and exam findings,  with the author of this note . I have reviewed the key elements of all parts of the encounter with the nurse practitioner/resident.     I agree with the assessment, plan and orders as documented by the above health care provider       No sign of active bleeding at this time hemoglobin is been stable  Await the isoenzymes on the alkaline phosphatase  We will image her abdomen when the creatinine is better    Electronically signed by Merrill Arias MD

## 2021-01-21 NOTE — PROGRESS NOTES
PULMONARY PROGRESS NOTE:    Interval History: anemia    Shortness of Breath: baseline  Cough: no  Sputum: trouble expectorating, tells me her nebulizer helps with this at home  Hemoptysis: no  Chest Pain: no  Fever: no  Swelling Feet: no  Headache: no  Nausea, Emesis, Abdominal Pain: no  Diarrhea: no  Constipation: no    Events since last visit: none    PAST MEDICAL HISTORY:    Smoking:     PHYSICAL EXAMINATION:  afebrile  General : Awake, alert, oriented to time, place, and person, 93% on 2 l nc  Neck  supple, no lymphadenopathy, JVD not raised  Heart  regular rhythm, S1 and S2 normal; no additional sounds heard  Lungs  Air Entry- fair bilaterally; breath sounds: vesicular  Abdomen  soft, no tenderness  Upper Extremities  - no cyanosis, mottling; edema: absent  Lower Extremities: no cyanosis, mottling; edema : absent  Current Laboratory, Radiologic, Microbiologic, and Diagnostic studies reviewed    ASSESSMENT / PLAN:    Past Dx of COVID - no need for treatment  COPD - add BD  Anemia - monitor H&H     Plan of care discussed with Dr Neli Delgadillo, WILLIE - CNP

## 2021-01-21 NOTE — CONSULTS
Remainder of examination deferred due to excessive risk conferred by physical examination during a global pandemic due to coronavirus 19.   In a setting where local and national supplies of personal protective equipment are depleted  Past Medical History:     Past Medical History:   Diagnosis Date    [facility]     Acquired hypothyroidism     Acute encephalopathy     Acute kidney [facility]     Anxiety     Benign hypertension with CKD (chronic kidney disease) stage III     Chronic back pain     CKD (chronic kidney disease) stage 3, GFR 30-59 ml/min     Constipation     [facility]     Cough     Depression     ETOH abuse     Former smoker     3 packs a day for 26 years    HA (generalized anxiety disorder)     History of GI bleed     Hyperlipidemia     Hypertension     Hypothyroid 1/18/2013    Leg pain     Normocytic anemia     Osteoarthritis     [facility]AD [facility]peripheral [facility] disease) ([facility]     Primary osteoarthritis     Pulmonary hypertension ([facility]     Pure hypercholesterolemia     SIRS (systemic inflammatory response syndrome) ([facility])     Urge incontinence     Wheezing        Past Surgical  History:     Past Surgical History:   Procedure Laterality Date    COLONOSCOPY      COLONOSCOPY N/A 12/2/2020    COLONOSCOPY CONTROL OF BLEEDING performed by Ruthann Garsia MD at [address] [place] [address] Bilateral     cataracts    JOINT REPLACEMENT      Left knee on 9-11-18    THORACENTESIS  12/26/2020         UPPER GASTROINTESTINAL ENDOSCOPY N/A 12/2/2020    EGD performed by Ruthann Garsia MD at [address] [place] [address] 12/23/2020    PUSH ENTEROSCOPY/EGD CONTROL BLEEDING performed by [facility]ann [facility]ia MD [facility]       Medications:      sodium chloride flush  10 mL Intravenous 2 times per day    ferrous sulfate  325 mg Oral Daily    ascorbic acid  500 mg Oral Daily    fluticasone  1 spray Each Nostril Daily  dilTIAZem  120 mg Oral Daily    fluticasone  2 puff Inhalation BID    [Held by provider] apixaban  5 mg Oral BID    levothyroxine  125 mcg Oral Daily    DULoxetine  60 mg Oral Daily    tamsulosin  0.4 mg Oral Daily    acetaZOLAMIDE  250 mg Oral Daily    hydroCHLOROthiazide  25 mg Oral Daily    lisinopril  2.5 mg Oral Lunch    metoprolol tartrate  25 mg Oral BID    atorvastatin  10 mg Oral Daily    pantoprazole  40 mg Intravenous BID       Social History:     Social History     Socioeconomic History    Marital status:      Spouse name: Not on file    Number of children: Not on file    Years of education: Not on file    Highest education level: Not on file   Occupational History    Not on file   Social Needs    Financial resource strain: Not on file    Food insecurity     Worry: Not on file     Inability: Not on file   [employer] needs     Medical: Not on file     Non-medical: Not on file   Tobacco Use    Smoking status: Former Smoker     Packs/day: 3.00     Years: 26.00     Pack years: 78.00     Types: Cigarettes    Smokeless tobacco: Never Used   Substance and Sexual Activity    Alcohol use: Not Currently     Comment: occ    Drug use: No    Sexual activity: Not on file   Lifestyle    Physical activity     Days per week: Not on file     Minutes per session: Not on file    Stress: Not on file   Relationships    Social connections     Talks on phone: Not on file     Gets together: Not on file     Attends Mandaeism service: Not on file     Active member of club or organization: Not on file     Attends meetings of clubs or organizations: Not on file     Relationship status: Not on file    Intimate partner violence     Fear of current or ex partner: Not on file     Emotionally abused: Not on file     Physically abused: Not on file     Forced sexual activity: Not on file   Other Topics Concern    Not on file   Social History Narrative    Not on file       Family History: Family History   Problem Relation Age of Onset    Heart Disease Mother     COPD Mother     Emphysema Sister     Cancer Other         Cousin - breast cancer      Medical Decision Making:   I have independently reviewed/ordered the following labs:    CBC with Differential:   Recent Labs     01/19/21 2120 01/20/21  0518 01/20/21  1549   WBC 7.6 7.8  --    HGB 5.5* 6.9* 8.3*   HCT 17.5* 21.4* 25.3*    335  --    LYMPHOPCT 16* 14*  --    MONOPCT 10* 12*  --      BMP:  Recent Labs     01/19/21 2120 01/20/21  0518    143   K 4.9 4.7   CL 95* 100   CO2 40* 39*   BUN 22 20   CREATININE 1.36* 1.05*   MG 2.4  --      Hepatic Function Panel:   Recent Labs     01/19/21 2120 01/20/21  0518   PROT 6.0* 6.0*   LABALBU 3.4* 3.2*   BILITOT 0.20* 0.31   ALKPHOS 119* 112*   ALT 13 12   AST 17 17     No results for input(s): RPR in the last 72 hours. No results for input(s): HIV in the last 72 hours. No results for input(s): BC in the last 72 hours. Lab Results   Component Value Date    CREATININE 1.05 01/20/2021    GLUCOSE 109 01/20/2021    GLUCOSE 113 02/18/2012       Detailed results: Thank you for allowing us to participate in the care of this patient. Please call with questions. This note is created with the assistance of a speech recognition program.  While intending to generate adocument that actually reflects the content of the visit, the document can still have some errors including those of syntax and sound a like substitutions which may escape proof reading. It such instances, actual meaningcan be extrapolated by contextual diversion.     Srinath Adams MD  Office: [phone]  Perfect serve / office [phone]

## 2021-01-22 LAB
ABSOLUTE EOS #: 0.17 K/UL (ref 0–0.4)
ABSOLUTE IMMATURE GRANULOCYTE: ABNORMAL K/UL (ref 0–0.3)
ABSOLUTE LYMPH #: 0.84 K/UL (ref 1–4.8)
ABSOLUTE MONO #: 0.62 K/UL (ref 0.1–1.3)
BASOPHILS # BLD: 0 % (ref 0–2)
BASOPHILS ABSOLUTE: 0 K/UL (ref 0–0.2)
DIFFERENTIAL TYPE: ABNORMAL
EOSINOPHILS RELATIVE PERCENT: 3 % (ref 0–4)
HCT VFR BLD CALC: 25.8 % (ref 36–46)
HCT VFR BLD CALC: 26.7 % (ref 36–46)
HCT VFR BLD CALC: 27 % (ref 36–46)
HCT VFR BLD CALC: 27.4 % (ref 36–46)
HEMOGLOBIN: 8.3 G/DL (ref 12–16)
HEMOGLOBIN: 8.5 G/DL (ref 12–16)
HEMOGLOBIN: 8.6 G/DL (ref 12–16)
HEMOGLOBIN: 8.8 G/DL (ref 12–16)
IMMATURE GRANULOCYTES: ABNORMAL %
LYMPHOCYTES # BLD: 15 % (ref 24–44)
MCH RBC QN AUTO: 29.9 PG (ref 26–34)
MCHC RBC AUTO-ENTMCNC: 32.2 G/DL (ref 31–37)
MCV RBC AUTO: 93.1 FL (ref 80–100)
MONOCYTES # BLD: 11 % (ref 1–7)
MORPHOLOGY: ABNORMAL
MORPHOLOGY: ABNORMAL
NRBC AUTOMATED: ABNORMAL PER 100 WBC
PDW BLD-RTO: 17.9 % (ref 11.5–14.9)
PLATELET # BLD: 290 K/UL (ref 150–450)
PLATELET ESTIMATE: ABNORMAL
PMV BLD AUTO: 8.8 FL (ref 6–12)
RBC # BLD: 2.87 M/UL (ref 4–5.2)
RBC # BLD: ABNORMAL 10*6/UL
SEG NEUTROPHILS: 71 % (ref 36–66)
SEGMENTED NEUTROPHILS ABSOLUTE COUNT: 3.97 K/UL (ref 1.3–9.1)
WBC # BLD: 5.6 K/UL (ref 3.5–11)
WBC # BLD: ABNORMAL 10*3/UL

## 2021-01-22 PROCEDURE — 6370000000 HC RX 637 (ALT 250 FOR IP): Performed by: FAMILY MEDICINE

## 2021-01-22 PROCEDURE — 6370000000 HC RX 637 (ALT 250 FOR IP): Performed by: NURSE PRACTITIONER

## 2021-01-22 PROCEDURE — 85014 HEMATOCRIT: CPT

## 2021-01-22 PROCEDURE — 99232 SBSQ HOSP IP/OBS MODERATE 35: CPT | Performed by: INTERNAL MEDICINE

## 2021-01-22 PROCEDURE — C9113 INJ PANTOPRAZOLE SODIUM, VIA: HCPCS | Performed by: STUDENT IN AN ORGANIZED HEALTH CARE EDUCATION/TRAINING PROGRAM

## 2021-01-22 PROCEDURE — P9016 RBC LEUKOCYTES REDUCED: HCPCS

## 2021-01-22 PROCEDURE — 6370000000 HC RX 637 (ALT 250 FOR IP): Performed by: INTERNAL MEDICINE

## 2021-01-22 PROCEDURE — 2060000000 HC ICU INTERMEDIATE R&B

## 2021-01-22 PROCEDURE — 6370000000 HC RX 637 (ALT 250 FOR IP): Performed by: STUDENT IN AN ORGANIZED HEALTH CARE EDUCATION/TRAINING PROGRAM

## 2021-01-22 PROCEDURE — 2700000000 HC OXYGEN THERAPY PER DAY

## 2021-01-22 PROCEDURE — 2580000003 HC RX 258: Performed by: STUDENT IN AN ORGANIZED HEALTH CARE EDUCATION/TRAINING PROGRAM

## 2021-01-22 PROCEDURE — 94640 AIRWAY INHALATION TREATMENT: CPT

## 2021-01-22 PROCEDURE — 94761 N-INVAS EAR/PLS OXIMETRY MLT: CPT

## 2021-01-22 PROCEDURE — 85018 HEMOGLOBIN: CPT

## 2021-01-22 PROCEDURE — 86900 BLOOD TYPING SEROLOGIC ABO: CPT

## 2021-01-22 PROCEDURE — 6360000002 HC RX W HCPCS: Performed by: STUDENT IN AN ORGANIZED HEALTH CARE EDUCATION/TRAINING PROGRAM

## 2021-01-22 PROCEDURE — 2580000003 HC RX 258: Performed by: FAMILY MEDICINE

## 2021-01-22 PROCEDURE — 6360000002 HC RX W HCPCS: Performed by: NURSE PRACTITIONER

## 2021-01-22 PROCEDURE — 36415 COLL VENOUS BLD VENIPUNCTURE: CPT

## 2021-01-22 PROCEDURE — 99231 SBSQ HOSP IP/OBS SF/LOW 25: CPT | Performed by: INTERNAL MEDICINE

## 2021-01-22 PROCEDURE — 85025 COMPLETE CBC W/AUTO DIFF WBC: CPT

## 2021-01-22 PROCEDURE — 99232 SBSQ HOSP IP/OBS MODERATE 35: CPT | Performed by: FAMILY MEDICINE

## 2021-01-22 PROCEDURE — 36430 TRANSFUSION BLD/BLD COMPNT: CPT

## 2021-01-22 PROCEDURE — APPSS30 APP SPLIT SHARED TIME 16-30 MINUTES: Performed by: NURSE PRACTITIONER

## 2021-01-22 RX ORDER — LEVALBUTEROL 1.25 MG/.5ML
1.25 SOLUTION, CONCENTRATE RESPIRATORY (INHALATION)
Status: DISCONTINUED | OUTPATIENT
Start: 2021-01-22 | End: 2021-01-24 | Stop reason: HOSPADM

## 2021-01-22 RX ORDER — GUAIFENESIN 600 MG/1
600 TABLET, EXTENDED RELEASE ORAL 2 TIMES DAILY
Status: DISCONTINUED | OUTPATIENT
Start: 2021-01-22 | End: 2021-01-24 | Stop reason: HOSPADM

## 2021-01-22 RX ORDER — DILTIAZEM HYDROCHLORIDE 180 MG/1
180 CAPSULE, COATED, EXTENDED RELEASE ORAL
Status: DISCONTINUED | OUTPATIENT
Start: 2021-01-23 | End: 2021-01-24 | Stop reason: HOSPADM

## 2021-01-22 RX ORDER — SODIUM CHLORIDE FOR INHALATION 0.9 %
3 VIAL, NEBULIZER (ML) INHALATION EVERY 8 HOURS PRN
Status: DISCONTINUED | OUTPATIENT
Start: 2021-01-22 | End: 2021-01-24 | Stop reason: HOSPADM

## 2021-01-22 RX ORDER — METHYLPREDNISOLONE SODIUM SUCCINATE 40 MG/ML
40 INJECTION, POWDER, LYOPHILIZED, FOR SOLUTION INTRAMUSCULAR; INTRAVENOUS ONCE
Status: COMPLETED | OUTPATIENT
Start: 2021-01-22 | End: 2021-01-22

## 2021-01-22 RX ORDER — METOPROLOL TARTRATE 50 MG/1
50 TABLET, FILM COATED ORAL 2 TIMES DAILY
Status: DISCONTINUED | OUTPATIENT
Start: 2021-01-22 | End: 2021-01-24 | Stop reason: HOSPADM

## 2021-01-22 RX ADMIN — LEVALBUTEROL HYDROCHLORIDE 1.25 MG: 1.25 SOLUTION, CONCENTRATE RESPIRATORY (INHALATION) at 14:54

## 2021-01-22 RX ADMIN — GUAIFENESIN 600 MG: 600 TABLET, EXTENDED RELEASE ORAL at 13:50

## 2021-01-22 RX ADMIN — LEVALBUTEROL HYDROCHLORIDE 1.25 MG: 1.25 SOLUTION, CONCENTRATE RESPIRATORY (INHALATION) at 18:52

## 2021-01-22 RX ADMIN — METHYLPREDNISOLONE SODIUM SUCCINATE 40 MG: 40 INJECTION, POWDER, FOR SOLUTION INTRAMUSCULAR; INTRAVENOUS at 13:50

## 2021-01-22 RX ADMIN — ACETAZOLAMIDE 250 MG: 250 TABLET ORAL at 10:29

## 2021-01-22 RX ADMIN — PANTOPRAZOLE SODIUM 40 MG: 40 INJECTION, POWDER, FOR SOLUTION INTRAVENOUS at 09:54

## 2021-01-22 RX ADMIN — Medication 10 ML: at 09:56

## 2021-01-22 RX ADMIN — IPRATROPIUM BROMIDE AND ALBUTEROL SULFATE 1 AMPULE: .5; 3 SOLUTION RESPIRATORY (INHALATION) at 07:15

## 2021-01-22 RX ADMIN — HYDROCHLOROTHIAZIDE 25 MG: 25 TABLET ORAL at 09:54

## 2021-01-22 RX ADMIN — FERROUS SULFATE TAB 325 MG (65 MG ELEMENTAL FE) 325 MG: 325 (65 FE) TAB at 09:54

## 2021-01-22 RX ADMIN — OXYCODONE HYDROCHLORIDE AND ACETAMINOPHEN 500 MG: 500 TABLET ORAL at 09:54

## 2021-01-22 RX ADMIN — BACLOFEN 10 MG: 10 TABLET ORAL at 13:55

## 2021-01-22 RX ADMIN — LEVOTHYROXINE SODIUM 125 MCG: 125 TABLET ORAL at 09:54

## 2021-01-22 RX ADMIN — TAMSULOSIN HYDROCHLORIDE 0.4 MG: 0.4 CAPSULE ORAL at 09:54

## 2021-01-22 RX ADMIN — BACLOFEN 10 MG: 10 TABLET ORAL at 21:01

## 2021-01-22 RX ADMIN — METOPROLOL TARTRATE 50 MG: 50 TABLET, FILM COATED ORAL at 20:56

## 2021-01-22 RX ADMIN — METOPROLOL TARTRATE 25 MG: 25 TABLET, FILM COATED ORAL at 09:54

## 2021-01-22 RX ADMIN — GUAIFENESIN 600 MG: 600 TABLET, EXTENDED RELEASE ORAL at 20:57

## 2021-01-22 RX ADMIN — FLUTICASONE PROPIONATE 1 SPRAY: 50 SPRAY, METERED NASAL at 10:29

## 2021-01-22 RX ADMIN — ACETAMINOPHEN 650 MG: 325 TABLET, FILM COATED ORAL at 16:13

## 2021-01-22 RX ADMIN — SODIUM CHLORIDE: 9 INJECTION, SOLUTION INTRAVENOUS at 16:11

## 2021-01-22 RX ADMIN — FLUTICASONE PROPIONATE 2 PUFF: 220 AEROSOL, METERED RESPIRATORY (INHALATION) at 20:58

## 2021-01-22 RX ADMIN — IPRATROPIUM BROMIDE 0.5 MG: 0.5 SOLUTION RESPIRATORY (INHALATION) at 14:53

## 2021-01-22 RX ADMIN — DULOXETINE HYDROCHLORIDE 60 MG: 60 CAPSULE, DELAYED RELEASE ORAL at 09:54

## 2021-01-22 RX ADMIN — ATORVASTATIN CALCIUM 10 MG: 10 TABLET, FILM COATED ORAL at 09:54

## 2021-01-22 RX ADMIN — PANTOPRAZOLE SODIUM 40 MG: 40 INJECTION, POWDER, FOR SOLUTION INTRAVENOUS at 20:56

## 2021-01-22 RX ADMIN — IPRATROPIUM BROMIDE 0.5 MG: 0.5 SOLUTION RESPIRATORY (INHALATION) at 18:52

## 2021-01-22 RX ADMIN — LISINOPRIL 2.5 MG: 5 TABLET ORAL at 13:55

## 2021-01-22 RX ADMIN — SODIUM CHLORIDE, PRESERVATIVE FREE 10 ML: 5 INJECTION INTRAVENOUS at 09:54

## 2021-01-22 RX ADMIN — DILTIAZEM HYDROCHLORIDE 120 MG: 120 CAPSULE, COATED, EXTENDED RELEASE ORAL at 09:54

## 2021-01-22 RX ADMIN — IPRATROPIUM BROMIDE AND ALBUTEROL SULFATE 1 AMPULE: .5; 3 SOLUTION RESPIRATORY (INHALATION) at 11:04

## 2021-01-22 RX ADMIN — FLUTICASONE PROPIONATE 2 PUFF: 220 AEROSOL, METERED RESPIRATORY (INHALATION) at 10:29

## 2021-01-22 ASSESSMENT — PAIN SCALES - GENERAL
PAINLEVEL_OUTOF10: 4
PAINLEVEL_OUTOF10: 2
PAINLEVEL_OUTOF10: 0

## 2021-01-22 ASSESSMENT — PAIN DESCRIPTION - PAIN TYPE: TYPE: CHRONIC PAIN

## 2021-01-22 ASSESSMENT — PAIN DESCRIPTION - ORIENTATION: ORIENTATION: POSTERIOR

## 2021-01-22 ASSESSMENT — PAIN DESCRIPTION - DESCRIPTORS: DESCRIPTORS: ACHING

## 2021-01-22 ASSESSMENT — PAIN DESCRIPTION - FREQUENCY
FREQUENCY: INTERMITTENT
FREQUENCY: INTERMITTENT

## 2021-01-22 NOTE — PROGRESS NOTES
FAMILY MEDICINE  - PROGRESS NOTE    Date:  1/22/2021  Fely Wasserman  [id number]      Chief Complaint   Patient presents with    Anemia     Hgb: 5.5         Interval History:  Improved, her hgb is up to 8.8 after another 1 unit PRBCs. She got up to go the restroom and her HR went into the 140's with slow recovery.         Subjective  Respiratory: positive for emphysema  Hematologic/lymphatic: positive for pallor  Behavioral/Psych: positive for anxiety, depression and overweight  Endocrine: positive for hypothyroid:    Objective:    BP (!) 113/56   Pulse 92   Temp 98.2 °F (36.8 °C) (Oral)   Resp 18   Ht 5' 5\" (1.651 m)   Wt 163 lb 5.8 oz (74.1 kg)   SpO2 97%   BMI 27.18 kg/m²   General appearance - alert, well appearing, and in no distress and overweight  Mental status - alert, oriented to person, place, and time  Eyes - pupils equal and reactive, extraocular eye movements intact  Ears - hearing grossly normal bilaterally  Nose - normal and patent, no erythema, discharge or polyps  Mouth - mucous membranes moist, pharynx normal without lesions  Neck - supple, no significant adenopathy  Lymphatics - no palpable lymphadenopathy, no hepatosplenomegaly  Chest - clear to auscultation, no wheezes, rales or rhonchi, symmetric air entry, decreased air entry noted posteriorly  Heart - irregularly irregular rhythm with rate 92  Abdomen - soft, nontender, nondistended, no masses or organomegaly  Breasts - not examined  Back exam - not examined  Neurological - alert, oriented, normal speech, no focal findings or movement disorder noted  Musculoskeletal - osteoarthritic changes noted in both hands  Extremities - peripheral pulses normal, no pedal edema, no clubbing or cyanosis  Skin - normal coloration and turgor, no rashes, no suspicious skin lesions noted    Data:   Medications:   Current Facility-Administered Medications Medication Dose Route Frequency Provider Last Rate Last Admin    ipratropium-albuterol (DUONEB) nebulizer solution 1 ampule  1 ampule Inhalation Q4H WA WILLIE Aguiar - CNP   1 ampule at 01/22/21 0715    0.9 % sodium chloride infusion   Intravenous PRN Guanaco Rice MD        sodium chloride flush 0.9 % injection 10 mL  10 mL Intravenous PRN Guanaco Rice MD        sodium chloride flush 0.9 % injection 10 mL  10 mL Intravenous Q12H Guanaco Rice MD        sodium chloride flush 0.9 % injection 10 mL  10 mL Intravenous 2 times per day Juaquin Phalen, DO        sodium chloride flush 0.9 % injection 10 mL  10 mL Intravenous PRN Juaquin Phalen, DO        acetaminophen (TYLENOL) tablet 650 mg  650 mg Oral Q4H PRN Juaquin Phalen, DO   650 mg at 01/21/21 1843    0.9 % sodium chloride infusion   Intravenous Continuous Juaquin Phalen, DO 75 mL/hr at 01/21/21 1943 New Bag at 01/21/21 1943    ferrous sulfate (IRON 325) tablet 325 mg  325 mg Oral Daily Guanaco Rice MD        ascorbic acid (VITAMIN C) tablet 500 mg  500 mg Oral Daily Guanaco Rice MD   500 mg at 01/21/21 0739    fluticasone (FLONASE) 50 MCG/ACT nasal spray 1 spray  1 spray Each Nostril Daily Guanaco Rice MD   1 spray at 01/21/21 0743    dilTIAZem (CARDIZEM CD) extended release capsule 120 mg  120 mg Oral Daily Guanaco Rice MD   120 mg at 01/21/21 0740    albuterol (PROVENTIL) nebulizer solution 2.5 mg  2.5 mg Nebulization Q6H PRN Guanaco Rice MD        clonazePAM Jailene Aguilar) tablet 0.5 mg  0.5 mg Oral TID PRN Guanaco Rice MD   0.5 mg at 01/20/21 2110    fluticasone (FLOVENT HFA) 220 MCG/ACT inhaler 2 puff  2 puff Inhalation BID Guanaco Rice MD   2 puff at 01/21/21 0744    baclofen (LIORESAL) tablet 10 mg  10 mg Oral TID PRN Guanaco Rice MD   10 mg at 01/21/21 0740    [Held by provider] apixaban (ELIQUIS) tablet 5 mg  5 mg Oral BID Guanaco Rice MD  levothyroxine (SYNTHROID) tablet 125 mcg  125 mcg Oral Daily Kika Panda MD   125 mcg at 01/21/21 0743    DULoxetine (CYMBALTA) extended release capsule 60 mg  60 mg Oral Daily Kika Panda MD   60 mg at 01/21/21 0740    tamsulosin (FLOMAX) capsule 0.4 mg  0.4 mg Oral Daily Kika Panda MD   0.4 mg at 01/21/21 0741    acetaZOLAMIDE (DIAMOX) tablet 250 mg  250 mg Oral Daily Kika Panda MD   250 mg at 01/21/21 0743    hydroCHLOROthiazide (HYDRODIURIL) tablet 25 mg  25 mg Oral Daily Kika Panda MD   25 mg at 01/21/21 0740    lisinopril (PRINIVIL;ZESTRIL) tablet 2.5 mg  2.5 mg Oral Lunch Kika Panda MD   2.5 mg at 01/21/21 1222    metoprolol tartrate (LOPRESSOR) tablet 25 mg  25 mg Oral BID Kika Panda MD   25 mg at 01/21/21 0740    atorvastatin (LIPITOR) tablet 10 mg  10 mg Oral Daily Kika Panda MD   10 mg at 01/21/21 0740    0.9 % sodium chloride infusion   Intravenous PRN Kika Panda MD        0.9 % sodium chloride infusion   Intravenous PRN Amanda Gamboa DO        pantoprazole (PROTONIX) injection 40 mg  40 mg Intravenous BID Amanda Gamboa DO   40 mg at 01/21/21 0741       Intake/Output Summary (Last 24 hours) at 1/22/2021 0753  Last data filed at 1/22/2021 0433  Gross per 24 hour   Intake 1760 ml   Output    Net 1760 ml     Recent Results (from the past 24 hour(s))   Hgb/Hct    Collection Time: 01/21/21 10:28 AM   Result Value Ref Range    Hemoglobin 7.7 (L) 12.0 - 16.0 g/dL    Hematocrit 24.3 (L) 36 - 46 %   Hgb/Hct    Collection Time: 01/21/21  4:41 PM   Result Value Ref Range    Hemoglobin 7.1 (L) 12.0 - 16.0 g/dL    Hematocrit 22.3 (L) 36 - 46 %   Hgb/Hct    Collection Time: 01/21/21 10:07 PM   Result Value Ref Range    Hemoglobin 6.8 (LL) 12.0 - 16.0 g/dL    Hematocrit 21.3 (L) 36 - 46 %   CBC with DIFF    Collection Time: 01/22/21  4:16 AM   Result Value Ref Range    WBC 5.6 3.5 - 11.0 k/uL    RBC 2.87 (L) 4.0 - 5.2 m/uL Hemoglobin 8.6 (L) 12.0 - 16.0 g/dL    Hematocrit 26.7 (L) 36 - 46 %    MCV 93.1 80 - 100 fL    MCH 29.9 26 - 34 pg    MCHC 32.2 31 - 37 g/dL    RDW 17.9 (H) 11.5 - 14.9 %    Platelets 963 516 - 860 k/uL    MPV 8.8 6.0 - 12.0 fL    NRBC Automated NOT REPORTED per 100 WBC    Differential Type NOT REPORTED     Immature Granulocytes NOT REPORTED 0 %    Absolute Immature Granulocyte NOT REPORTED 0.00 - 0.30 k/uL    WBC Morphology NOT REPORTED     RBC Morphology NOT REPORTED     Platelet Estimate NOT REPORTED     Seg Neutrophils 71 (H) 36 - 66 %    Lymphocytes 15 (L) 24 - 44 %    Monocytes 11 (H) 1 - 7 %    Eosinophils % 3 0 - 4 %    Basophils 0 0 - 2 %    Segs Absolute 3.97 1.3 - 9.1 k/uL    Absolute Lymph # 0.84 (L) 1.0 - 4.8 k/uL    Absolute Mono # 0.62 0.1 - 1.3 k/uL    Absolute Eos # 0.17 0.0 - 0.4 k/uL    Basophils Absolute 0.00 0.0 - 0.2 k/uL    Morphology ANISOCYTOSIS PRESENT     Morphology SLT TEARDROPS      -----------------------------------------------------------------  RAD:  EKG:  Micro:     Assessment & Plan:    Patient Active Problem List:     Acquired hypothyroidism     Hypertension     Primary osteoarthritis of right knee     A-fib (HCC)     Acute encephalopathy     SIRS (systemic inflammatory response syndrome) ([facility])     Normocytic anemia     Pulmonary hypertension (HCC)     COPD (chronic obstructive pulmonary disease) ([facility])     History of GI bleed     Constipation     HA (generalized anxiety disorder)     Lumbar radiculopathy     Lumbosacral spondylosis without myelopathy     Benign hypertension with CKD (chronic kidney disease) stage III     CKD (chronic kidney disease) stage 3, GFR 30-59 ml/min     Alcohol withdrawal syndrome with complication (HCC)     Moderate major depression (HCC)     PAD (peripheral artery disease) (HCC)     Acute kidney injury (HCC)     Obesity, Class I, BMI 30-34.9     Acute on chronic diastolic CHF (congestive heart failure) ([facility])     GI bleed     Weight loss Elevated alkaline phosphatase level     Thrombocytosis (HCC)     Iron deficiency anemia due to chronic blood loss     Iron malabsorption     Symptomatic anemia     Pneumonia due to organism     Anemia     Severe anemia     Overweight (BMI 25.0-29. 9)     Severe malnutrition ([address]           Plan:  -Severe anemia - no obvious bleeding, another unit of PRBCs transfused early this am, hx of AVM jejunum, GI following.  -A. Fib with episodes of RVR - Cardiology consulted. -COPD - stable, Pulmonology following. -D/C plan is to return to SNF.  -Continue current treatments.  -Complete orders per chart.     See orders   Disposition:    Electronically signed by Garret Fuentes MD on 1/22/2021 at 7:53 AM

## 2021-01-22 NOTE — DISCHARGE INSTR - COC
Continuity of Care Form    Patient Name: Jaqui Hand   :  1945  MRN:  Verito Sep date:  2021  Discharge date:  21    Code Status Order: Limited   Advance Directives:   Advance Care Flowsheet Documentation       Date/Time Healthcare Directive Type of Healthcare Directive Copy in [address] Agent's Name Healthcare Agent's Phone Number    21 [phone]  No, patient does not have an advance directive for healthcare treatment -- -- -- -- --            Admitting Physician:  Yvrose Casiano MD  PCP: Apurva Hanna APRN - CNP    Discharging Nurse: [facility] Unit/Room#: 2117/2117-01  Discharging Unit Phone Number: [phone]    Emergency Contact:   Extended Emergency Contact Information  Primary Emergency Contact: Radha Hendrix  Home Phone: [phone]  Relation: Child  Secondary Emergency Contact: Tiffanie Veliz  Home Phone: [phone]  Relation: Child    Past Surgical History:  Past Surgical History:   Procedure Laterality Date    COLONOSCOPY      COLONOSCOPY N/A 2020    COLONOSCOPY CONTROL OF BLEEDING performed by Joselo Jane MD at [address] [facility] Bilateral     cataracts    JOINT REPLACEMENT      Left knee on 18    THORACENTESIS  2020         UPPER GASTROINTESTINAL ENDOSCOPY N/A 2020    EGD performed by Joselo Jane MD at [facility] 2020    PUSH ENTEROSCOPY/EGD CONTROL BLEEDING performed by Joselo Jane MD at [facility] ENDO       Immunization History:   Immunization History   Administered Date(s) Administered    Influenza Vaccine, unspecified formulation 2018    Influenza Virus Vaccine 2017    Influenza, High Dose (Fluzone 65 yrs and older) 10/19/2012    Influenza, Quadv, IM, PF (6 mo and older Fluzone, Flulaval, Fluarix, and 3 yrs and older Afluria) 2020  Influenza, Triv, inactivated, subunit, adjuvanted, IM (Fluad 65 yrs and older) 09/09/2019    Pneumococcal Conjugate 13-valent ([id number]) 12/13/2018    Pneumococcal Polysaccharide ([id number]) 05/12/2015    Tetanus 01/01/1998    Tetanus Toxoid, absorbed 01/01/1998    Zoster Recombinant (Shingrix) 01/31/2020, 02/01/2020       Active Problems:  Patient Active Problem List   Diagnosis Code    Acquired hypothyroidism E03.9    Hypertension I10    Primary osteoarthritis of right knee M17.11    A-fib ([facility]) I48.91    Acute encephalopathy G93.40    SIRS (systemic inflammatory response syndrome) ([facility]) R65.10    Normocytic anemia D64.9    Pulmonary hypertension ([facility]) I27.20    COPD (chronic obstructive pulmonary disease) ([facility]) J44.9    History of GI bleed Z87.19    Constipation K59.00    HA (generalized anxiety disorder) F41.1    Lumbar radiculopathy M54.16    Lumbosacral spondylosis without myelopathy M47.817    Benign hypertension with CKD (chronic kidney disease) stage III I12.9, N18.30    CKD (chronic kidney disease) stage 3, GFR 30-59 ml/min N18.30    Alcohol withdrawal syndrome with complication ([facility]) H26.344    Moderate major depression ([facility]) F32.1    PAD (peripheral artery disease) ([facility]) I73.9    Acute kidney injury ([facility]) N17.9    Obesity, Class I, BMI 30-34.9 E66.9    Acute on chronic diastolic CHF (congestive heart failure) ([facility]) I50.33    GI bleed K92.2    Weight loss R63.4    Elevated alkaline phosphatase level R74.8    Thrombocytosis ([facility]) D47.3    Iron deficiency anemia due to chronic blood loss D50.0    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 9) E66.3    Severe malnutrition ([address] E43       Isolation/Infection:   Isolation            No Isolation          Patient Infection Status Infection Onset Added Last Indicated Last Indicated By Review Planned Expiration Resolved Resolved By    None active    Resolved    COVID-19 Rule Out 12/18/20 12/18/20 12/19/20 COVID-19 (Ordered)   12/19/20 Rule-Out Test Resulted            Nurse Assessment:  Last Vital Signs: BP (!) 113/56   Pulse 92   Temp 98.2 °F (36.8 °C) (Oral)   Resp 18   Ht 5' 5\" (1.651 m)   Wt 163 lb 5.8 oz (74.1 kg)   SpO2 97%   BMI 27.18 kg/m²     Last documented pain score (0-10 scale): Pain Level: 8  Last Weight:   Wt Readings from Last 1 Encounters:   01/22/21 163 lb 5.8 oz (74.1 kg)     Mental Status:  oriented and alert    IV Access:  - None    Nursing Mobility/ADLs:  Walking   Independent  Transfer  Independent  Bathing  Assisted  Dressing  Assisted  Toileting  Assisted  Feeding  Assisted  Med Admin  Assisted  Med Delivery   none    Wound Care Documentation and Therapy:        Elimination:  Continence:   · Bowel: Yes  · Bladder: Yes  Urinary Catheter: None   Colostomy/Ileostomy/Ileal Conduit: No       Date of Last BM: 1/23/2021    Intake/Output Summary (Last 24 hours) at 1/22/2021 0753  Last data filed at 1/22/2021 0433  Gross per 24 hour   Intake 1760 ml   Output    Net 1760 ml     I/O last 3 completed shifts: In: 7331 [P.O.:350; I.V.:725; Blood:340; Other:345]  Out: -     Safety Concerns: At Risk for Falls    Impairments/Disabilities:      None    Nutrition Therapy:  Current Nutrition Therapy:   - Oral Diet:  Cardiac    Routes of Feeding: Oral  Liquids: Thin Liquids  Daily Fluid Restriction: no  Last Modified Barium Swallow with Video (Video Swallowing Test): not done    Treatments at the Time of Hospital Discharge:   Respiratory Treatments: N/A  Oxygen Therapy:  is on oxygen at 2 L/min per nasal cannula.   Ventilator:    - No ventilator support    Rehab Therapies: Physical Therapy and Occupational Therapy  Weight Bearing Status/Restrictions: No weight bearing restirctions Other Medical Equipment (for information only, NOT a DME order): None  Other Treatments: States she usually uses a cane. Patient's personal belongings (please select all that are sent with patient):  Glasses    RN SIGNATURE:  Electronically signed by German Espinal RN on 1/24/21 at 10:38 AM EST    CASE MANAGEMENT/SOCIAL WORK SECTION    Inpatient Status Date: 1/20/21    Readmission Risk Assessment Score:  Readmission Risk              Risk of Unplanned Readmission:        31           Discharging to Facility/ [address] [place] [address] (if applicable)   · Name:  · Address:  · Dialysis Schedule:  · Phone:  · Fax:    / signature: Electronically signed by Chito Chanel RN on 1/24/21 at 11:11 AM EST    PHYSICIAN SECTION    Prognosis: Fair    Condition at Discharge: Stable    Rehab Potential (if transferring to Rehab): Fair    Recommended Labs or Other Treatments After Discharge:     Physician Certification: I certify the above information and transfer of Marianne Moses  is necessary for the continuing treatment of the diagnosis listed and that she requires [facility] for greater 30 days.      Update Admission H&P: No change in H&P    PHYSICIAN SIGNATURE:  Electronically signed by Mayco Nobles MD on 1/22/21 at 7:54 AM EST

## 2021-01-22 NOTE — PROGRESS NOTES
PULMONARY PROGRESS NOTE:    REASON FOR VISIT: covid  Interval History:    Shortness of Breath: at baseline  Cough: +, difficult to expectorate  Sputum: no          Hemoptysis: no  Chest Pain: no  Fever: no                   Swelling Feet: no  Headache: no                                           Nausea, Emesis, Abdominal Pain: no  Diarrhea: no         Constipation: no    Events since last visit: none    PAST MEDICAL HISTORY:      Scheduled Meds:   ipratropium-albuterol  1 ampule Inhalation Q4H WA    sodium chloride flush  10 mL Intravenous Q12H    sodium chloride flush  10 mL Intravenous 2 times per day    ferrous sulfate  325 mg Oral Daily    ascorbic acid  500 mg Oral Daily    fluticasone  1 spray Each Nostril Daily    dilTIAZem  120 mg Oral Daily    fluticasone  2 puff Inhalation BID    [Held by provider] apixaban  5 mg Oral BID    levothyroxine  125 mcg Oral Daily    DULoxetine  60 mg Oral Daily    tamsulosin  0.4 mg Oral Daily    acetaZOLAMIDE  250 mg Oral Daily    hydroCHLOROthiazide  25 mg Oral Daily    lisinopril  2.5 mg Oral Lunch    metoprolol tartrate  25 mg Oral BID    atorvastatin  10 mg Oral Daily    pantoprazole  40 mg Intravenous BID     Continuous Infusions:   sodium chloride      sodium chloride 75 mL/hr at 01/21/21 1943    sodium chloride      sodium chloride       PRN Meds:sodium chloride, sodium chloride flush, sodium chloride flush, acetaminophen, albuterol, clonazePAM, baclofen, sodium chloride, sodium chloride        PHYSICAL EXAMINATION:  BP (!) 113/56   Pulse 92   Temp 98.2 °F (36.8 °C) (Oral)   Resp 18   Ht 5' 5\" (1.651 m)   Wt 163 lb 5.8 oz (74.1 kg)   SpO2 97%   BMI 27.18 kg/m²     General : Awake, alert,   Neck  supple, no lymphadenopathy, JVD not raised  Heart  regular rhythm, S1 and S2 normal; no additional sounds heard  Lungs  Air Entry- fair bilaterally; breath sounds : vesicular; rhonchi +   rales/crackles - absent Abdomen  soft, no tenderness  Upper Extremities  - no cyanosis, mottling; edema : absent  Lower Extremities: no cyanosis, mottling; edema : absent    Current Laboratory, Radiologic, Microbiologic, and Diagnostic studies reviewed  Data ReviewCBC:   Recent Labs     01/20/21  0518 01/20/21  0518 01/21/21  0430 01/21/21  0430 01/21/21  1641 01/21/21  2207 01/22/21  0416   WBC 7.8  --  5.9  --   --   --  5.6   RBC 2.25*  --  2.59*  --   --   --  2.87*   HGB 6.9*   < > 7.7*   < > 7.1* 6.8* 8.6*   HCT 21.4*   < > 24.1*   < > 22.3* 21.3* 26.7*     --  309  --   --   --  290    < > = values in this interval not displayed. BMP:   Recent Labs     01/19/21 2120 01/20/21 0518   GLUCOSE 101* 109*    143   K 4.9 4.7   BUN 22 20   CREATININE 1.36* 1.05*   CALCIUM 8.7 8.7     ABGs: No results for input(s): PHART, PO2ART, LYQ2DVH, DTK7HET, BEART, K5DANAJQ, DDJ3ZCQ in the last 72 hours.    PT/INR:  No results found for: PTINR    ASSESSMENT / PLAN:    Past Dx of COVID - no need for treatment  COPD - add BD; one dose solumedrol  Anemia - monitor H&H  Hx AVM in bowels    Electronically signed by Atif Stanton on 01/22/21 at 8:12 AM.

## 2021-01-22 NOTE — PROGRESS NOTES
[facility] -   Infectious diseases evaluation  admission date 1/19/2021    reason for consultation:   COVID-19 infection  Impression :   Current:  · COVID-19 infection was tested positive on January 1 of 2021  · Anemia  · Atrial fibrillation on Eliquis  · History of AV malformation  · Hypothyroidism  · Hypertension  · Chronic kidney disease  · COPD  · History of smoking  · Peripheral arterial disease  · Pulmonary hypertension        Recommendations   · No indication for  Decadron  · Out of the 2-week window for remdesivir   · Continue supportive care  · She was removed from a droplet plus isolation            History of Present Illness:   Initial history: Lizette Jerome is a 70y.o.-year-old female was sent to the hospital from nursing home for anemia with hemoglobin of 5.5. The patient had lightheadedness for the to 3 days prior to admission. He was recently diagnosed with COVID-19 infection, still have dry cough and intermittent shortness of breath  Chest x-ray 1/19/2021 showed no acute abnormality  He was hospitalized last month with suspected COPD exacerbation and anemia, had pleural effusion status post thoracentesis 12/26/2021  Interval changes  1/22/2021   She is feeling better, remains on 2 L of oxygen per nasal cannula, denied cough or increased shortness of breath, no nausea or vomiting, no other complaints. Patient Vitals for the past 8 hrs:   BP Temp Temp src Pulse Resp SpO2   01/22/21 1351 (!) 104/41 98.2 °F (36.8 °C) Oral 98 16 98 %   01/22/21 1104      94 %   01/22/21 0945 (!) 115/54 98.2 °F (36.8 °C) Oral 115 18 94 %   01/22/21 0715     18 97 %           I have personally reviewed the past medical history, past surgical history, medications, social history, and family history, and I haveupdated the database accordingly.       Allergies:   Tizanidine     Review of Systems:     Review of Systems  Other than above 12 system review was negative  metoprolol tartrate  50 mg Oral BID    guaiFENesin  600 mg Oral BID    ipratropium  0.5 mg Nebulization 4x daily    levalbuterol  1.25 mg Nebulization Q4H While awake    sodium chloride flush  10 mL Intravenous Q12H    sodium chloride flush  10 mL Intravenous 2 times per day    ferrous sulfate  325 mg Oral Daily    ascorbic acid  500 mg Oral Daily    fluticasone  1 spray Each Nostril Daily    fluticasone  2 puff Inhalation BID    [Held by provider] apixaban  5 mg Oral BID    levothyroxine  125 mcg Oral Daily    DULoxetine  60 mg Oral Daily    tamsulosin  0.4 mg Oral Daily    acetaZOLAMIDE  250 mg Oral Daily    lisinopril  2.5 mg Oral Lunch    atorvastatin  10 mg Oral Daily    pantoprazole  40 mg Intravenous BID       Social History:     Social History     Socioeconomic History    Marital status:      Spouse name: Not on file    Number of children: Not on file    Years of education: Not on file    Highest education level: Not on file   Occupational History    Not on file   Social Needs    Financial resource strain: Not on file    Food insecurity     Worry: Not on file     Inability: Not on file   [employer] needs     Medical: Not on file     Non-medical: Not on file   Tobacco Use    Smoking status: Former Smoker     Packs/day: 3.00     Years: 26.00     Pack years: 78.00     Types: Cigarettes    Smokeless tobacco: Never Used   Substance and Sexual Activity    Alcohol use: Not Currently     Comment: occ    Drug use: No    Sexual activity: Not on file   Lifestyle    Physical activity     Days per week: Not on file     Minutes per session: Not on file    Stress: Not on file   Relationships    Social connections     Talks on phone: Not on file     Gets together: Not on file     Attends [employer] service: Not on file     Active member of club or organization: Not on file     Attends meetings of clubs or organizations: Not on file     Relationship status: Not on file  Intimate partner violence     Fear of current or ex partner: Not on file     Emotionally abused: Not on file     Physically abused: Not on file     Forced sexual activity: Not on file   Other Topics Concern    Not on file   Social History Narrative    Not on file       Family History:     Family History   Problem Relation Age of Onset    Heart Disease Mother     COPD Mother     Emphysema Sister     Cancer Other         Cousin - breast cancer      Medical Decision Making:   I have independently reviewed/ordered the following labs:    CBC with Differential:   Recent Labs     01/21/21  0430 01/21/21  0430 01/22/21  0416 01/22/21  0937   WBC 5.9  --  5.6  --    HGB 7.7*   < > 8.6* 8.8*   HCT 24.1*   < > 26.7* 27.4*     --  290  --    LYMPHOPCT 13*  --  15*  --    MONOPCT 13*  --  11*  --     < > = values in this interval not displayed. BMP:  Recent Labs     01/19/21  2120 01/20/21  0518    143   K 4.9 4.7   CL 95* 100   CO2 40* 39*   BUN 22 20   CREATININE 1.36* 1.05*   MG 2.4  --      Hepatic Function Panel:   Recent Labs     01/20/21  0518 01/21/21  0430   PROT 6.0* 5.7*   LABALBU 3.2* 3.1*   BILIDIR  --  0.15   IBILI  --  0.25   BILITOT 0.31 0.40   ALKPHOS 112* 104   ALT 12 11   AST 17 17     No results for input(s): RPR in the last 72 hours. No results for input(s): HIV in the last 72 hours. No results for input(s): BC in the last 72 hours. Lab Results   Component Value Date    CREATININE 1.05 01/20/2021    GLUCOSE 109 01/20/2021    GLUCOSE 113 02/18/2012       Detailed results: Thank you for allowing us to participate in the care of this patient. Please call with questions.

## 2021-01-22 NOTE — PROGRESS NOTES
-- Chronic respiratory failure with hypercapnia  -- Other - I will add my own diagnosis  -- Disagree - Not applicable / Not valid  -- Disagree - Clinically unable to determine / Unknown  -- Refer to Clinical Documentation Reviewer    PROVIDER RESPONSE TEXT:    This patient has chronic respiratory failure with hypoxia. Query created by: Cirilo Da Silva on 1/21/2021 12:07 PM      QUERY TEXT:    Pt admitted with anemia and has CHF documented. If possible, please document   in progress notes and discharge summary further specificity regarding the type   and acuity of CHF:    The medical record reflects the following:  Risk Factors: HTN, atrial fibrillation  Clinical Indicators: 1/19 pro-BNP 3366, 1/19 CXR no acute cardiopulmonary   findings; 7/6/2020 ECHO:  EF > 55%, mild LVH, moderate TR  Treatment: diagnostic testing,  Home medications Diamox, HCTZ, and lisinopril   reordered on 1/21  Options provided:  -- Chronic HFpEF  -- Chronic Diastolic CHF  -- Other - I will add my own diagnosis  -- Disagree - Not applicable / Not valid  -- Disagree - Clinically unable to determine / Unknown  -- Refer to Clinical Documentation Reviewer    PROVIDER RESPONSE TEXT:    This patient has chronic diastolic CHF.     Query created by: Cirilo Da Silva on 1/21/2021 12:11 PM      Electronically signed by:  Misael Goncalves MD 1/21/2021 11:51 PM

## 2021-01-22 NOTE — PROGRESS NOTES
[facility]    Gastroenterology Daily Progress Note      Patient: Savannah Nyhan   :    1945   Facility:   [facility]  Date:     2021  Consultant:   Cesario Eng CNP      SUBJECTIVE  70 y.o. female admitted 2021 with Anemia [D64.9]  Severe anemia [D64.9] and seen for iron deficiency anemia and elevated alk phos. The pt was seen and examined. hgb 8. 6. no melena or hematochezia. Alk phos isoenzyme pending.          OBJECTIVE  Scheduled Meds:   ipratropium-albuterol  1 ampule Inhalation Q4H WA    sodium chloride flush  10 mL Intravenous Q12H    sodium chloride flush  10 mL Intravenous 2 times per day    ferrous sulfate  325 mg Oral Daily    ascorbic acid  500 mg Oral Daily    fluticasone  1 spray Each Nostril Daily    dilTIAZem  120 mg Oral Daily    fluticasone  2 puff Inhalation BID    [Held by provider] apixaban  5 mg Oral BID    levothyroxine  125 mcg Oral Daily    DULoxetine  60 mg Oral Daily    tamsulosin  0.4 mg Oral Daily    acetaZOLAMIDE  250 mg Oral Daily    hydroCHLOROthiazide  25 mg Oral Daily    lisinopril  2.5 mg Oral Lunch    metoprolol tartrate  25 mg Oral BID    atorvastatin  10 mg Oral Daily    pantoprazole  40 mg Intravenous BID       Vital Signs:  BP (!) 113/56   Pulse 92   Temp 98.2 °F (36.8 °C) (Oral)   Resp 18   Ht 5' 5\" (1.651 m)   Wt 163 lb 5.8 oz (74.1 kg)   SpO2 97%   BMI 27.18 kg/m²      Physical Exam:     General Appearance: alert and oriented to person, place and time, well-developed and well-nourished, in no acute distress  Skin: warm and dry, no rash or erythema  Head: normocephalic and atraumatic  Eyes: pupils equal, round, and reactive to light, extraocular eye movements intact, conjunctivae normal  ENT: hearing grossly normal bilaterally  Neck: neck supple and non tender without mass, no thyromegaly or thyroid nodules, no cervical lymphadenopathy Pulmonary/Chest: clear to auscultation bilaterally- no wheezes, rales or rhonchi, normal air movement, no respiratory distress  Cardiovascular: normal rate, regular rhythm, normal S1 and S2, no murmurs, rubs, clicks or gallops, distal pulses intact, no carotid bruits  Abdomen: soft, non-tender, non-distended, normal bowel sounds, no masses or organomegaly  Extremities: no cyanosis, clubbing or edema  Musculoskeletal: normal range of motion, no joint swelling, deformity or tenderness  Neurologic: no cranial nerve deficit and muscle strength normal    Lab and Imaging Review     CBC  Recent Labs     01/20/21 0518 01/20/21 0518 01/21/21 0430 01/21/21  0430 01/21/21  1641 01/21/21 2207 01/22/21 0416   WBC 7.8  --  5.9  --   --   --  5.6   HGB 6.9*   < > 7.7*   < > 7.1* 6.8* 8.6*   HCT 21.4*   < > 24.1*   < > 22.3* 21.3* 26.7*   MCV 95.0  --  93.0  --   --   --  93.1     --  309  --   --   --  290    < > = values in this interval not displayed. BMP  Recent Labs     01/19/21 2120 01/20/21 0518    143   K 4.9 4.7   CL 95* 100   CO2 40* 39*   BUN 22 20   CREATININE 1.36* 1.05*   GLUCOSE 101* 109*   CALCIUM 8.7 8.7       LFTS  Recent Labs     01/19/21 2120 01/20/21 0518 01/21/21 0430   ALKPHOS 119* 112* 104   ALT 13 12 11   AST 17 17 17   PROT 6.0* 6.0* 5.7*   BILITOT 0.20* 0.31 0.40   BILIDIR  --   --  0.15   LABALBU 3.4* 3.2* 3.1*       PT/INR  Recent Labs     01/19/21 2120   PROTIME 16.0*   INR 1.3         ANEMIA STUDIES  Recent Labs     01/21/21  0430   FERRITIN 56         ASSESSMENT/PLAN:  1.  Symptomatic iron deficiency anemia; no overt bleeding hx avm's in jejunum  -continue iron replacement and ppi  -will need outpt videocapsule with follow up  -if overt bleeding occurs will repeat egd     2.elevated alk phos-level currently normal   -await isoenzyme results  -ct abd with contrast when creat improves-can do as outpt    D/w dr Richa Enamorado gi signing off This plan was formulated in collaboration with  . Electronically signed by: WILLIE Fontaine CNP on 1/22/2021 at 8:17 AM       Attending Physician Statement  I have discussed the care of Nancie Lees and   I have examined the patient myselft independently, and taken ros and hpi , including pertinent history and exam findings,  with the author of this note . I have reviewed the key elements of all parts of the encounter with the nurse practitioner/resident.     I agree with the assessment, plan and orders as documented by the above health care provider     Capsule endoscopy as an outpatient  Iron replacement  Follow-up on the alkaline phosphatase elevation  CT with contrast when creatinine is better  We will sign off call if there is any need for us to see back    Electronically signed by Carmelo Matias MD

## 2021-01-22 NOTE — CARE COORDINATION
This patient is from [facility]. and she can return there whenever she is ready/DC'd.

## 2021-01-23 LAB
ABSOLUTE EOS #: 0 K/UL (ref 0–0.4)
ABSOLUTE IMMATURE GRANULOCYTE: ABNORMAL K/UL (ref 0–0.3)
ABSOLUTE LYMPH #: 0.5 K/UL (ref 1–4.8)
ABSOLUTE MONO #: 0.5 K/UL (ref 0.1–1.3)
ALK PHOS BONE SPECIFIC: 35 U/L (ref 0–55)
ALK PHOS OTHER CALC: 0 U/L
ALK PHOSPHATASE: 114 U/L (ref 40–120)
ALKALINE PHOSPHATASE LIVER FRACTION: 79 U/L (ref 0–94)
BASOPHILS # BLD: 0 % (ref 0–2)
BASOPHILS ABSOLUTE: 0 K/UL (ref 0–0.2)
DIFFERENTIAL TYPE: ABNORMAL
EOSINOPHILS RELATIVE PERCENT: 0 % (ref 0–4)
HCT VFR BLD CALC: 24.4 % (ref 36–46)
HCT VFR BLD CALC: 25.1 % (ref 36–46)
HCT VFR BLD CALC: 25.6 % (ref 36–46)
HCT VFR BLD CALC: 25.9 % (ref 36–46)
HEMOGLOBIN: 7.8 G/DL (ref 12–16)
HEMOGLOBIN: 8.1 G/DL (ref 12–16)
HEMOGLOBIN: 8.2 G/DL (ref 12–16)
HEMOGLOBIN: 8.5 G/DL (ref 12–16)
IMMATURE GRANULOCYTES: ABNORMAL %
LYMPHOCYTES # BLD: 9 % (ref 24–44)
MCH RBC QN AUTO: 30.5 PG (ref 26–34)
MCHC RBC AUTO-ENTMCNC: 32.7 G/DL (ref 31–37)
MCV RBC AUTO: 93.5 FL (ref 80–100)
MONOCYTES # BLD: 9 % (ref 1–7)
NRBC AUTOMATED: ABNORMAL PER 100 WBC
PDW BLD-RTO: 17.6 % (ref 11.5–14.9)
PLATELET # BLD: 305 K/UL (ref 150–450)
PLATELET ESTIMATE: ABNORMAL
PMV BLD AUTO: 8.8 FL (ref 6–12)
RBC # BLD: 2.77 M/UL (ref 4–5.2)
RBC # BLD: ABNORMAL 10*6/UL
SEG NEUTROPHILS: 82 % (ref 36–66)
SEGMENTED NEUTROPHILS ABSOLUTE COUNT: 4.7 K/UL (ref 1.3–9.1)
WBC # BLD: 5.8 K/UL (ref 3.5–11)
WBC # BLD: ABNORMAL 10*3/UL

## 2021-01-23 PROCEDURE — 2060000000 HC ICU INTERMEDIATE R&B

## 2021-01-23 PROCEDURE — 99231 SBSQ HOSP IP/OBS SF/LOW 25: CPT | Performed by: INTERNAL MEDICINE

## 2021-01-23 PROCEDURE — C9113 INJ PANTOPRAZOLE SODIUM, VIA: HCPCS | Performed by: STUDENT IN AN ORGANIZED HEALTH CARE EDUCATION/TRAINING PROGRAM

## 2021-01-23 PROCEDURE — 94761 N-INVAS EAR/PLS OXIMETRY MLT: CPT

## 2021-01-23 PROCEDURE — 6370000000 HC RX 637 (ALT 250 FOR IP): Performed by: FAMILY MEDICINE

## 2021-01-23 PROCEDURE — 85018 HEMOGLOBIN: CPT

## 2021-01-23 PROCEDURE — 85014 HEMATOCRIT: CPT

## 2021-01-23 PROCEDURE — 6370000000 HC RX 637 (ALT 250 FOR IP): Performed by: INTERNAL MEDICINE

## 2021-01-23 PROCEDURE — 6360000002 HC RX W HCPCS: Performed by: STUDENT IN AN ORGANIZED HEALTH CARE EDUCATION/TRAINING PROGRAM

## 2021-01-23 PROCEDURE — 2700000000 HC OXYGEN THERAPY PER DAY

## 2021-01-23 PROCEDURE — 85025 COMPLETE CBC W/AUTO DIFF WBC: CPT

## 2021-01-23 PROCEDURE — 2580000003 HC RX 258: Performed by: STUDENT IN AN ORGANIZED HEALTH CARE EDUCATION/TRAINING PROGRAM

## 2021-01-23 PROCEDURE — 6370000000 HC RX 637 (ALT 250 FOR IP): Performed by: NURSE PRACTITIONER

## 2021-01-23 PROCEDURE — 94640 AIRWAY INHALATION TREATMENT: CPT

## 2021-01-23 PROCEDURE — 36415 COLL VENOUS BLD VENIPUNCTURE: CPT

## 2021-01-23 PROCEDURE — 6360000002 HC RX W HCPCS: Performed by: NURSE PRACTITIONER

## 2021-01-23 RX ORDER — LEVALBUTEROL 1.25 MG/.5ML
1.25 SOLUTION, CONCENTRATE RESPIRATORY (INHALATION)
Qty: 1 EACH | Refills: 0
Start: 2021-01-23 | End: 2021-02-10

## 2021-01-23 RX ORDER — GUAIFENESIN 600 MG/1
600 TABLET, EXTENDED RELEASE ORAL 2 TIMES DAILY
Qty: 1 TABLET | Refills: 0
Start: 2021-01-23 | End: 2021-02-10

## 2021-01-23 RX ADMIN — LEVALBUTEROL HYDROCHLORIDE 1.25 MG: 1.25 SOLUTION, CONCENTRATE RESPIRATORY (INHALATION) at 11:32

## 2021-01-23 RX ADMIN — CLONAZEPAM 0.5 MG: 1 TABLET ORAL at 01:08

## 2021-01-23 RX ADMIN — PANTOPRAZOLE SODIUM 40 MG: 40 INJECTION, POWDER, FOR SOLUTION INTRAVENOUS at 09:15

## 2021-01-23 RX ADMIN — ACETAZOLAMIDE 250 MG: 250 TABLET ORAL at 09:15

## 2021-01-23 RX ADMIN — FLUTICASONE PROPIONATE 2 PUFF: 220 AEROSOL, METERED RESPIRATORY (INHALATION) at 21:25

## 2021-01-23 RX ADMIN — FLUTICASONE PROPIONATE 2 PUFF: 220 AEROSOL, METERED RESPIRATORY (INHALATION) at 09:14

## 2021-01-23 RX ADMIN — FERROUS SULFATE TAB 325 MG (65 MG ELEMENTAL FE) 325 MG: 325 (65 FE) TAB at 09:11

## 2021-01-23 RX ADMIN — FLUTICASONE PROPIONATE 1 SPRAY: 50 SPRAY, METERED NASAL at 09:14

## 2021-01-23 RX ADMIN — DILTIAZEM HYDROCHLORIDE 180 MG: 180 CAPSULE, COATED, EXTENDED RELEASE ORAL at 11:32

## 2021-01-23 RX ADMIN — CLONAZEPAM 0.5 MG: 1 TABLET ORAL at 21:31

## 2021-01-23 RX ADMIN — SODIUM CHLORIDE, PRESERVATIVE FREE 10 ML: 5 INJECTION INTRAVENOUS at 21:21

## 2021-01-23 RX ADMIN — METOPROLOL TARTRATE 50 MG: 50 TABLET, FILM COATED ORAL at 21:21

## 2021-01-23 RX ADMIN — PANTOPRAZOLE SODIUM 40 MG: 40 INJECTION, POWDER, FOR SOLUTION INTRAVENOUS at 21:21

## 2021-01-23 RX ADMIN — TAMSULOSIN HYDROCHLORIDE 0.4 MG: 0.4 CAPSULE ORAL at 09:16

## 2021-01-23 RX ADMIN — SODIUM CHLORIDE: 9 INJECTION, SOLUTION INTRAVENOUS at 21:26

## 2021-01-23 RX ADMIN — OXYCODONE HYDROCHLORIDE AND ACETAMINOPHEN 500 MG: 500 TABLET ORAL at 09:10

## 2021-01-23 RX ADMIN — LEVALBUTEROL HYDROCHLORIDE 1.25 MG: 1.25 SOLUTION, CONCENTRATE RESPIRATORY (INHALATION) at 07:30

## 2021-01-23 RX ADMIN — DULOXETINE HYDROCHLORIDE 60 MG: 60 CAPSULE, DELAYED RELEASE ORAL at 09:11

## 2021-01-23 RX ADMIN — ATORVASTATIN CALCIUM 10 MG: 10 TABLET, FILM COATED ORAL at 09:10

## 2021-01-23 RX ADMIN — GUAIFENESIN 600 MG: 600 TABLET, EXTENDED RELEASE ORAL at 21:24

## 2021-01-23 RX ADMIN — LEVOTHYROXINE SODIUM 125 MCG: 125 TABLET ORAL at 09:11

## 2021-01-23 RX ADMIN — GUAIFENESIN 600 MG: 600 TABLET, EXTENDED RELEASE ORAL at 09:11

## 2021-01-23 RX ADMIN — SODIUM CHLORIDE, PRESERVATIVE FREE 10 ML: 5 INJECTION INTRAVENOUS at 09:16

## 2021-01-23 RX ADMIN — LEVALBUTEROL HYDROCHLORIDE 1.25 MG: 1.25 SOLUTION, CONCENTRATE RESPIRATORY (INHALATION) at 16:08

## 2021-01-23 RX ADMIN — METOPROLOL TARTRATE 50 MG: 50 TABLET, FILM COATED ORAL at 09:11

## 2021-01-23 RX ADMIN — BACLOFEN 10 MG: 10 TABLET ORAL at 21:31

## 2021-01-23 RX ADMIN — IPRATROPIUM BROMIDE 0.5 MG: 0.5 SOLUTION RESPIRATORY (INHALATION) at 07:30

## 2021-01-23 RX ADMIN — LISINOPRIL 2.5 MG: 5 TABLET ORAL at 11:32

## 2021-01-23 RX ADMIN — IPRATROPIUM BROMIDE 0.5 MG: 0.5 SOLUTION RESPIRATORY (INHALATION) at 11:32

## 2021-01-23 RX ADMIN — IPRATROPIUM BROMIDE 0.5 MG: 0.5 SOLUTION RESPIRATORY (INHALATION) at 19:01

## 2021-01-23 RX ADMIN — LEVALBUTEROL HYDROCHLORIDE 1.25 MG: 1.25 SOLUTION, CONCENTRATE RESPIRATORY (INHALATION) at 19:01

## 2021-01-23 RX ADMIN — IPRATROPIUM BROMIDE 0.5 MG: 0.5 SOLUTION RESPIRATORY (INHALATION) at 16:09

## 2021-01-23 NOTE — PROGRESS NOTES
PULMONARY PROGRESS NOTE:    Interval History: anemia    Shortness of Breath: baseline  Cough: no  Sputum: yes  Hemoptysis: no  Chest Pain: no  Fever: no  Swelling Feet: no  Headache: no  Nausea, Emesis, Abdominal Pain: no  Diarrhea: no  Constipation: no    Events since last visit: none    PAST MEDICAL HISTORY:    Smoking:     PHYSICAL EXAMINATION:  afebrile  General : Awake, alert, oriented to time, place, and person, 98% on 2 l nc  Neck  supple, no lymphadenopathy, JVD not raised  Heart  irregular 80  Lungs  Air Entry- fair bilaterally; breath sounds: clear  abd   soft, no tenderness  Upper Extremities  - no cyanosis, mottling; edema: absent  Lower Extremities: no cyanosis, mottling; edema : absent  Current Laboratory, Radiologic, Microbiologic, and Diagnostic studies reviewed    ASSESSMENT / PLAN:    Past Dx of COVID - no need for treatment  COPD - BD to xopenex, add mucinex  Anemia - monitor H&H - stable  Stable pulmonary wise     Plan of care discussed with Dr Kristin De La Torre, APRN - CNP

## 2021-01-23 NOTE — PLAN OF CARE
Problem: Falls - Risk of:  Goal: Will remain free from falls  Description: Will remain free from falls  Outcome: Ongoing   No falls this shift. Problem: Falls - Risk of:  Goal: Absence of physical injury  Description: Absence of physical injury  Outcome: Ongoing   No injury noted this shift. Problem: Nutrition  Goal: Optimal nutrition therapy  Outcome: Ongoing   Pt tolerating po intake this shift. Problem: Pain:  Goal: Pain level will decrease  Description: Pain level will decrease  Outcome: Ongoing   Pt currently resting. Rated pain at a 3 this shift. Prn tylenol and baclofen helpful.

## 2021-01-23 NOTE — PROGRESS NOTES
Comprehensive Nutrition Assessment    Type and Reason for Visit:  Reassess    Nutrition Recommendations/Plan: Will continue cardiac diet and Ensure Clear at breakfast and dinner, Will provide Ensure Enlive at lunch    Nutrition Assessment:  Pt is progressing nutritionally. She is consuming more than 75% of Cardiac diet  and likes the [employer] but would prefer chocolate. Malnutrition Assessment:  Malnutrition Status:  Severe malnutrition    Context:  Acute Illness     Findings of the 6 clinical characteristics of malnutrition:  Energy Intake:  7 - 50% or less of estimated energy requirements for 5 or more days  Weight Loss:  7 - Greater than 7.5% over 3 months     Body Fat Loss:  Unable to assess     Muscle Mass Loss:  Unable to assess    Fluid Accumulation:  No significant fluid accumulation     Strength:  Not Performed    Estimated Daily Nutrient Needs:  Energy (kcal):  1079-6497 kcal based on 23-25 kcal/kg of admission; Weight Used for Energy Requirements:  Admission     Protein (g):  80-94 gm based on 1.4-1.6 gm/kg of ideal; Weight Used for Protein Requirements:  Ideal          Nutrition Related Findings:  mild BLE edema, Labs/Meds: Reviewed, BM 1/22      Wounds:  None       Current Nutrition Therapies:    DIET CARDIAC;   Dietary Nutrition Supplements: Low Calorie High Protein Supplement    Anthropometric Measures:  · Height: 5' 5\" (165.1 cm)  · Current Body Weight: 166 lb (75.3 kg)   · Admission Body Weight: 156 lb 12 oz (71.1 kg)    · Usual Body Weight: 190 lb (86.2 kg)(per past records 9/11/2020)     Ideal Body Weight: 125 lbs;   Nutrition Diagnosis:   · Severe malnutrition related to impaired respiratory function, altered GI function as evidenced by NPO or clear liquid status due to medical condition, poor intake prior to admission, weight loss 7.5% in 3 months, GI abnormality    Nutrition Interventions: Food and/or Nutrient Delivery:  Continue Current Diet, Modify Oral Nutrition Supplement  Nutrition Education/Counseling:  No recommendation at this time   Coordination of Nutrition Care:  Continue to monitor while inpatient    Goals:  Meet greater than 75% of estimated nutrition needs       Nutrition Monitoring and Evaluation:   Food/Nutrient Intake Outcomes:  Food and Nutrient Intake, Supplement Intake  Physical Signs/Symptoms Outcomes:  Biochemical Data, GI Status, Skin, Weight, Fluid Status or Edema     Discharge Planning:    Continue current diet     Electronically signed by Ruchi Hood RD, LD on 1/23/21 at 3:37 PM EST    Contact: [phone]

## 2021-01-23 NOTE — PROGRESS NOTES
[facility] -   Infectious diseases evaluation  admission date 1/19/2021    reason for consultation:   COVID-19 infection  Impression :   Current:  · COVID-19 infection was tested positive on January 1 of 2021  · Anemia  · Atrial fibrillation on Eliquis  · History of AV malformation  · Hypothyroidism  · Hypertension  · Chronic kidney disease  · COPD  · History of smoking  · Peripheral arterial disease  · Pulmonary hypertension        Recommendations   · No indication for  Decadron  · Out of the 2-week window for remdesivir   · Continue supportive care  · She was removed from a droplet plus isolation            History of Present Illness:   Initial history: Bita Wells is a 70y.o.-year-old female was sent to the hospital from nursing home for anemia with hemoglobin of 5.5. The patient had lightheadedness for the to 3 days prior to admission. He was recently diagnosed with COVID-19 infection, still have dry cough and intermittent shortness of breath  Chest x-ray 1/19/2021 showed no acute abnormality  He was hospitalized last month with suspected COPD exacerbation and anemia, had pleural effusion status post thoracentesis 12/26/2021  Interval changes  1/23/2021   She remains on oxygen via nasal cannula, no new events  Patient Vitals for the past 8 hrs:   BP Temp Pulse Resp SpO2   01/23/21 1610     98 %   01/23/21 1330 (!) 103/56 97.9 °F (36.6 °C) 84 16    01/23/21 1136     98 %           I have personally reviewed the past medical history, past surgical history, medications, social history, and family history, and I haveupdated the database accordingly.       Allergies:   Tizanidine     Review of Systems:     Review of Systems  Other than above 12 system review was negative  Physical Examination :       Alert  not under distress  Head atraumatic normocephalic  Neck supple no JVD  Chest clear to auscultation bilaterally, no wheezing Heart S1-S2 normal no murmur  Abdomen soft nontender present bowel sounds  Lower extremities no edema, no cyanosis  Skin no rash no jaundice  Past Medical History:     Past Medical History:   Diagnosis Date    [facility]fib ([facility]     Acquired hypothyroidism     Acute encephalopathy     Acute kidney injury ([facility]     Anxiety     Benign hypertension with CKD (chronic kidney disease) stage III     Chronic back pain     CKD (chronic kidney disease) stage 3, GFR 30-59 ml/min     Constipation     COPD (chronic obstructive pulmonary disease) [facility])     Cough     Depression     ETOH abuse     Former smoker     3 packs a day for 26 years    HA (generalized anxiety disorder)     History of GI bleed     Hyperlipidemia     Hypertension     Hypothyroid 1/18/2013    Leg pain     Normocytic anemia     Osteoarthritis     PAD (peripheral artery disease) ([facility])     Primary osteoarthritis     Pulmonary hypertension ([facility]     Pure hypercholesterolemia     SIRS (systemic inflammatory response syndrome) ([facility])     Urge incontinence     Wheezing        Past Surgical  History:     Past Surgical History:   Procedure Laterality Date    COLONOSCOPY      COLONOSCOPY N/A 12/2/2020    COLONOSCOPY CONTROL OF BLEEDING performed by Noble Busby MD at [address] [place] [address] Bilateral     cataracts    JOINT REPLACEMENT      Left knee on 9-11-18    THORACENTESIS  12/26/2020         UPPER GASTROINTESTINAL ENDOSCOPY N/A 12/2/2020    EGD performed by Noble Busby MD at [address] [place] [address] 12/23/2020    PUSH ENTEROSCOPY/EGD CONTROL BLEEDING performed [facility] at [facility]       Medications:      dilTIAZem  180 mg Oral Lunch    metoprolol tartrate  50 mg Oral BID    guaiFENesin  600 mg Oral BID    ipratropium  0.5 mg Nebulization 4x daily    levalbuterol  1.25 mg Nebulization Q4H While awake    sodium chloride flush  10 mL Intravenous Q12H  sodium chloride flush  10 mL Intravenous 2 times per day    ferrous sulfate  325 mg Oral Daily    ascorbic acid  500 mg Oral Daily    fluticasone  1 spray Each Nostril Daily    fluticasone  2 puff Inhalation BID    [Held by provider] apixaban  5 mg Oral BID    levothyroxine  125 mcg Oral Daily    DULoxetine  60 mg Oral Daily    tamsulosin  0.4 mg Oral Daily    acetaZOLAMIDE  250 mg Oral Daily    lisinopril  2.5 mg Oral Lunch    atorvastatin  10 mg Oral Daily    pantoprazole  40 mg Intravenous BID       Social History:     Social History     Socioeconomic History    Marital status:      Spouse name: Not on file    Number of children: Not on file    Years of education: Not on file    Highest education level: Not on file   Occupational History    Not on file   Social Needs    Financial resource strain: Not on file    Food insecurity     Worry: Not on file     Inability: Not on file   [employer] needs     Medical: Not on file     Non-medical: Not on file   Tobacco Use    Smoking status: Former Smoker     Packs/day: 3.00     Years: 26.00     Pack years: 78.00     Types: Cigarettes    Smokeless tobacco: Never Used   Substance and Sexual Activity    Alcohol use: Not Currently     Comment: occ    Drug use: No    Sexual activity: Not on file   Lifestyle    Physical activity     Days per week: Not on file     Minutes per session: Not on file    Stress: Not on file   Relationships    Social connections     Talks on phone: Not on file     Gets together: Not on file     Attends Latter-day service: Not on file     Active member of club or organization: Not on file     Attends meetings of clubs or organizations: Not on file     Relationship status: Not on file    Intimate partner violence     Fear of current or ex partner: Not on file     Emotionally abused: Not on file     Physically abused: Not on file     Forced sexual activity: Not on file   Other Topics Concern    Not on file Social History Narrative    Not on file       Family History:     Family History   Problem Relation Age of Onset    Heart Disease Mother     COPD Mother     Emphysema Sister     Cancer Other         Cousin - breast cancer      Medical Decision Making:   I have independently reviewed/ordered the following labs:    CBC with Differential:   Recent Labs     01/22/21  0416 01/22/21  0416 01/23/21  0544 01/23/21  0950 01/23/21  1557   WBC 5.6  --  5.8  --   --    HGB 8.6*   < > 8.5* 8.2* 8.1*   HCT 26.7*   < > 25.9* 25.6* 25.1*     --  305  --   --    LYMPHOPCT 15*  --  9*  --   --    MONOPCT 11*  --  9*  --   --     < > = values in this interval not displayed. BMP:  No results for input(s): NA, K, CL, CO2, BUN, CREATININE, MG in the last 72 hours. Invalid input(s): CA  Hepatic Function Panel:   Recent Labs     01/21/21  0430   PROT 5.7*   LABALBU 3.1*   BILIDIR 0.15   IBILI 0.25   BILITOT 0.40   ALKPHOS 104   ALT 11   AST 17     Lab Results   Component Value Date    CREATININE 1.05 01/20/2021    GLUCOSE 109 01/20/2021    GLUCOSE 113 02/18/2012       Detailed results: Thank you for allowing us to participate in the care of this patient. Please call with questions. This note is created with the assistance of a speech recognition program.  While intending to generate adocument that actually reflects the content of the visit, the document can still have some errors including those of syntax and sound a like substitutions which may escape proof reading. It such instances, actual meaningcan be extrapolated by contextual diversion.     Darryl Caputo MD  Office: [phone]  Perfect serve / office [phone]

## 2021-01-23 NOTE — PROGRESS NOTES
Patient sleeping, not awakened. Chart reviewed. 98.4, 16, 90, 114/72    Hemoglobin stable    Transfer back to SNF when other services agree.      Tracy Velazquez MD, Philip Rojas  1/23/2021

## 2021-01-23 NOTE — CONSULTS
[address]  [phone]               Cardiology Consult           Date of Admission:  1/19/2021  Date of Consultation:  1/23/2021      PCP:  WILLIE Moss CNP      Chief Complaint: Atrial fibrillation    History of Present Illness: Murtis Castleman is a 70 y.o. female who presents with severe anemia. She denies any chest pain or pressure. She i has s required packed red blood cell transfusion. We were asked to see her for atrial fibrillation. Currently her heart rate is well controlled on current diltiazem as well as metoprolol. PMH:   has a past medical history of A-fib ([address] Acquired hypothyroidism, Acute encephalopathy, Acute kidney injury ([address] Anxiety, Benign hypertension with CKD (chronic kidney disease) stage III, Chronic back pain, CKD (chronic kidney disease) stage 3, GFR 30-59 ml/min, Constipation, COPD (chronic obstructive pulmonary disease) ([address] Cough, Depression, ETOH abuse, Former smoker, HA (generalized anxiety disorder), History of GI bleed, Hyperlipidemia, Hypertension, Hypothyroid, Leg pain, Normocytic anemia, Osteoarthritis, PAD (peripheral artery disease) ([address] Primary osteoarthritis, Pulmonary hypertension ([address] Pure hypercholesterolemia, SIRS (systemic inflammatory response syndrome) ([address] Urge incontinence, and Wheezing. PSH:   has a past surgical history that includes eye surgery (Bilateral); Colonoscopy; joint replacement; Upper gastrointestinal endoscopy (N/A, 12/2/2020); Colonoscopy (N/A, 12/2/2020); Upper gastrointestinal endoscopy (N/A, 12/23/2020); and Thoracentesis (12/26/2020). Allergies: Allergies   Allergen Reactions    Tizanidine Other (See Comments)     Patient states it makes her loopy        Home Meds:    Prior to Admission medications    Medication Sig Start Date End Date Taking?  Authorizing Provider ipratropium (ATROVENT) 0.02 % nebulizer solution Take 2.5 mLs by nebulization 4 times daily 1/23/21  Yes Geo Smith MD   levalbuterol Tally Yajaira) 1.25 MG/0.5ML nebulizer solution Take 0.5 mLs by nebulization every 4 hours (while awake) 1/23/21  Yes Geo mSith MD   guaiFENesin [facility]) 600 MG extended release tablet Take 1 tablet by mouth 2 times daily 1/23/21  Yes Geo Smith MD   acetaminophen (TYLENOL) 325 MG tablet Take 650 mg by mouth every 4 hours as needed for Pain Not to exceed 3gm/24hrs   Yes Historical Provider, MD   atorvastatin (LIPITOR) 10 MG tablet TAKE 1 TABLET BY MOUTH ONCE DAILY 1/11/21  Yes WILLIE Jones CNP   hydroCHLOROthiazide (HYDRODIURIL) 25 MG tablet Take 1 tablet by mouth daily 12/29/20  Yes Nitza Garcia MD   lisinopril (PRINIVIL;ZESTRIL) 2.5 MG tablet Take 1 tablet by mouth Daily with lunch 12/30/20  Yes Nitza Garcia MD   metoprolol tartrate (LOPRESSOR) 25 MG tablet Take 1 tablet by mouth 2 times daily 12/29/20  Yes Nitza Garcia MD   tamsulosin (FLOMAX) 0.4 MG capsule TAKE 1 CAPSULE BY MOUTH DAILY 12/15/20  Yes WILLIE Jones CNP   omeprazole (PRILOSEC) 40 MG delayed release capsule TAKE 1 CAPSULE BY MOUTH EVERY MORNING BEFORE BREAKFAST 12/14/20  Yes WILLIE Jones CNP   DULoxetine (CYMBALTA) 60 MG extended release capsule Take 1 capsule by mouth daily 12/14/20  Yes WILLIE Jones CNP   baclofen (LIORESAL) 10 MG tablet TAKE ONE TABLET BY MOUTH THREE TIMES A DAY AS NEEDED. 11/30/20  Yes WILLIE Jones CNP   levothyroxine (SYNTHROID) 125 MCG tablet Take 1 tablet by mouth daily 11/30/20  Yes WILLIE Jones CNP   clonazePAM (KLONOPIN) 0.5 MG tablet Take 1 tablet by mouth 3 times daily as needed for Anxiety for up to 30 days.  11/17/20 1/19/21 Yes WILLIE Jones - CNP fluticasone (FLOVENT HFA) 220 MCG/ACT inhaler Inhale 2 puffs into the lungs 2 times daily 11/17/20  Yes WILLIE Hutson CNP   dilTIAZem (CARDIZEM CD) 120 MG extended release capsule Take 1 capsule by mouth daily 10/19/20  Yes WILLIE Hutson CNP   fluticasone (FLONASE) 50 MCG/ACT nasal spray USE ONE SPRAY TO EACH NOSTRIL ONCE ADAY 9/4/20  Yes WILLIE Regalado CNP   levalbuterol [facility] HFA) 45 MCG/ACT inhaler Inhale 1 puff into the lungs every 4 hours as needed for Wheezing 8/19/20  Yes WILLIE Hutson CNP   Ascorbic Acid (C-1000 PO) Take 1 capsule by mouth daily    Yes Historical Provider, MD   ferrous sulfate 325 (65 Fe) MG tablet Take 325 mg by mouth daily  8/30/18  Yes Historical Provider, MD   Oxygen Concentrator continuous    Historical Provider, MD   Lift Chair MISC Use daily for comfort  Patient not taking: Reported on 12/18/2020 11/30/20   WILLIE Hutson CNP   Respiratory Therapy Supplies (NEBULIZER/TUBING/MOUTHPIECE) KIT Use every 4-6 hours prn for copd 6/15/20   Lori Hutson 112 Meds:    Current Facility-Administered Medications   Medication Dose Route Frequency Provider Last Rate Last Admin    dilTIAZem (CARDIZEM CD) extended release capsule 180 mg  180 mg Oral Lunch Chuck Parisi MD        metoprolol tartrate (LOPRESSOR) tablet 50 mg  50 mg Oral BID Ernesto Parisi MD   50 mg at 01/23/21 0911    guaiFENesin (MUCINEX) extended release tablet 600 mg  600 mg Oral BID WILLIE Dupree CNP   600 mg at 01/23/21 0911    ipratropium (ATROVENT) 0.02 % nebulizer solution 0.5 mg  0.5 mg Nebulization 4x daily WILLIE Dupree CNP   0.5 mg at 01/23/21 0730    levalbuterol (XOPENEX) nebulizer solution 1.25 mg  1.25 mg Nebulization Q4H While awake WILLIE Dupree CNP   1.25 mg at 01/23/21 0730    sodium chloride nebulizer 0.9 % solution 3 mL  3 mL Nebulization Q8H PRN Refabhay Wells, APRN - CNP  0.9 % sodium chloride infusion   Intravenous PRN Shukri Tyler MD        sodium chloride flush 0.9 % injection 10 mL  10 mL Intravenous PRN Shukri Tyler MD        sodium chloride flush 0.9 % injection 10 mL  10 mL Intravenous Q12H Shukri Tyler MD   10 mL at 01/22/21 0956    sodium chloride flush 0.9 % injection 10 mL  10 mL Intravenous 2 times per day Lorelie Stammer, DO   10 mL at 01/23/21 0916    sodium chloride flush 0.9 % injection 10 mL  10 mL Intravenous PRN Lorelie Stammer, DO        acetaminophen (TYLENOL) tablet 650 mg  650 mg Oral Q4H PRN Lorelie Stammer, DO   650 mg at 01/22/21 1613    0.9 % sodium chloride infusion   Intravenous Continuous Lorelie Stammer, DO 75 mL/hr at 01/22/21 1611 New Bag at 01/22/21 1611    ferrous sulfate (IRON 325) tablet 325 mg  325 mg Oral Daily Shukri Tyler MD   325 mg at 01/23/21 0911    ascorbic acid (VITAMIN C) tablet 500 mg  500 mg Oral Daily Shukri Tyler MD   500 mg at 01/23/21 0910    fluticasone (FLONASE) 50 MCG/ACT nasal spray 1 spray  1 spray Each Nostril Daily Shukri Tyler MD   1 spray at 01/23/21 0914    albuterol (PROVENTIL) nebulizer solution 2.5 mg  2.5 mg Nebulization Q6H PRN Shukri Tyler MD        clonazePAM [facility]of[facility] [place]) tablet 0.5 mg  0.5 mg Oral TID PRN Shukri Tyler MD   0.5 mg at 01/23/21 0108    fluticasone (FLOVENT HFA) 220 MCG/ACT inhaler 2 puff  2 puff Inhalation BID Shukri Tyler MD   2 puff at 01/23/21 0914    baclofen (LIORESAL) tablet 10 mg  10 mg Oral TID PRN Shukri Tyler MD   10 mg at 01/22/21 2101    [Held by provider] apixaban (ELIQUIS) tablet 5 mg  5 mg Oral BID Shukri Tyler MD        levothyroxine (SYNTHROID) tablet 125 mcg  125 mcg Oral Daily Shukri Tyler MD   125 mcg at 01/23/21 0911    DULoxetine (CYMBALTA) extended release capsule 60 mg  60 mg Oral Daily Shukri Tyler MD   60 mg at 01/23/21 1012  tamsulosin (FLOMAX) capsule 0.4 mg  0.4 mg Oral Daily Osman Turcios MD   0.4 mg at 01/23/21 1101    acetaZOLAMIDE (DIAMOX) tablet 250 mg  250 mg Oral Daily Osman Turcios MD   250 mg at 01/23/21 0915    lisinopril (PRINIVIL;ZESTRIL) tablet 2.5 mg  2.5 mg Oral Lunch Osman Turcios MD   2.5 mg at 01/22/21 1355    atorvastatin (LIPITOR) tablet 10 mg  10 mg Oral Daily Osman Turcios MD   10 mg at 01/23/21 0910    0.9 % sodium chloride infusion   Intravenous PRN Osman Turcios MD        0.9 % sodium chloride infusion   Intravenous PRN Linda Grigsby DO        pantoprazole (PROTONIX) injection 40 mg  40 mg Intravenous BID Linda Grigsby DO   40 mg at 01/23/21 0915       Social History:       TOBACCO:   reports that she has quit smoking. Her smoking use included cigarettes. She has a 78.00 pack-year smoking history. She has never used smokeless tobacco.  ETOH:   reports previous alcohol use. DRUGS:  reports no history of drug use. OCCUPATION:          Family Histroy:         Problem Relation Age of Onset    Heart Disease Mother     COPD Mother     Emphysema Sister     Cancer Other         Cousin - breast cancer           Review of Systems:   · Constitutional: there has been no unanticipated weight loss. Increasing weakness. · Eyes: No visual changes or diplopia. No scleral icterus. · ENT: No Headaches, hearing loss or vertigo. No mouth sores or sore throat. · Cardiovascular: No chest pain, dyspnea on exertion, palpitations or loss of consciousness. No cough, hemoptysis, pleuritic pain, or phlebitis. · Respiratory: Positive for dyspnea, no sputum production. No hematemesis. · Gastrointestinal: No abdominal pain, appetite loss, blood in stools, notes stools are dark consistent with her iron supplement. No change in bowel or bladder habits. · Genitourinary: No dysuria, trouble voiding, or hematuria. · Musculoskeletal:  No gait disturbance, weakness or joint complaints. · Integumentary: No rash or pruritis. · Neurological: No headache, diplopia, change in muscle strength, numbness or tingling. No change in gait, balance, coordination, mood, affect, memory, mentation, behavior. · Psychiatric: No anxiety, or depression. · Endocrine: No temperature intolerance. No excessive thirst, fluid intake, or urination. No tremor. · Hematologic/Lymphatic: No abnormal bruising or bleeding, blood clots or swollen lymph nodes. · Allergic/Immunologic: No nasal congestion or hives. Physical Exam    Vital Signs: /72   Pulse 90   Temp 98.4 °F (36.9 °C)   Resp 16   Ht 5' 5\" (1.651 m)   Wt 166 lb 10.7 oz (75.6 kg)   SpO2 98%   BMI 27.73 kg/m²  O2 Flow Rate (L/min): 2 L/min     Admission Weight: 170 lb (77.1 kg)     General appearance: Awake, Alert Cooperative    Head: Normocephalic, without obvious abnormality, atraumatic    Eyes: Conjunctivae/corneas clear. PERRL, EOM's intact. Fundi benign    Neck: no adenopathy, no carotid bruit, no JVD, supple, symmetrical, trachea midline and thyroid: not enlarged, symmetric, no tenderness/mass/nodules    Lungs: clear to auscultation bilaterally    Heart: irregular rate and rhythm, S1, S2 normal, no murmur, click, rub or gallop    Abdomen: Soft, non-tender. Bowel sounds normal. No masses,  no organomegaly    Extremities: extremities normal, atraumatic, no cyanosis or edema    Skin: Skin color, texture, turgor normal. No rashes or lesions    Neurologic: Grossly normal            Labs:      CBC:   Recent Labs     01/21/21  0430 01/21/21  0430 01/22/21  0416 01/22/21  0416 01/22/21  2222 01/23/21  0544 01/23/21  0950   WBC 5.9  --  5.6  --   --  5.8  --    HGB 7.7*   < > 8.6*   < > 8.3* 8.5* 8.2*   HCT 24.1*   < > 26.7*   < > 25.8* 25.9* 25.6*   MCV 93.0  --  93.1  --   --  93.5  --      --  290  --   --  305  --     < > = values in this interval not displayed. BMP: No results for input(s): NA, K, CL, CO2, PHOS, BUN, CREATININE in the last 72 hours. Invalid input(s): CA  PT/INR: No results for input(s): PROTIME, INR in the last 72 hours. APTT: No results for input(s): APTT in the last 72 hours. MAG: No results for input(s): MG in the last 72 hours. D Dimer:   Recent Labs     01/21/21  0430   DDIMER 0.71*     Troponin T No results for input(s): TROPHS in the last 72 hours. ProBNP Invalid input(s): PRO-BNP          Diagnosis:  Principal Problem:    Symptomatic anemia  Active Problems:    Acquired hypothyroidism    Hypertension    COPD (chronic obstructive pulmonary disease) ([facility])    HA (generalized anxiety disorder)    Benign hypertension with CKD (chronic kidney disease) stage III    Moderate major depression ([facility])    PAD (peripheral artery disease) ([facility])    Acute on chronic diastolic CHF (congestive heart failure) ([facility])    Iron deficiency anemia due to chronic blood loss    Anemia    Severe anemia    Overweight (BMI 25.0-29. 9)    Severe malnutrition ([address]  Resolved Problems:    * No resolved hospital problems. *  Atrial fibrillation: Rate is currently controlled on current diltiazem as well as metoprolol therapy. Apixaban on hold due to severe amemia. Normal left ventricular function:    Plan:    Continue current rate control medications. Optimally would find source of bleeding and anemia, to allow resuming chronic anticoagulation in the future. We will continue to monitor.   Electronically signed by Kyra Guidry DO on 1/23/2021 at 10:40 AM

## 2021-01-23 NOTE — PLAN OF CARE
Nutrition Problem #1: Severe malnutrition  Intervention: Food and/or Nutrient Delivery: Continue Current Diet, Modify Oral Nutrition Supplement  Nutritional Goals: Meet greater than 75% of estimated nutrition needs

## 2021-01-23 NOTE — PLAN OF CARE
Problem: Falls - Risk of:  Goal: Will remain free from falls  1/23/2021 0406 by Hayden Mcdaniels RN  Outcome: Ongoing  Note: Pt remains free from falls this shift. Bed in lowest position with wheels locked and 2/4 siderails up. Nonskid socks on. Call light and bedside table within reach. Hourly rounding to ensure pt safety. Will continue to monitor.    1/22/2021 2022 by Abdullahi Campbell RN  Outcome: Ongoing  Goal: Absence of physical injury  1/23/2021 0406 by Hayden Mcdaniels RN  Outcome: Ongoing  1/22/2021 2022 by Abdullahi Campbell RN  Outcome: Ongoing     Problem: Nutrition  Goal: Optimal nutrition therapy  1/23/2021 0406 by Hayden Mcdaniels RN  Outcome: Ongoing  1/22/2021 2022 by Abdullahi Campbell RN  Outcome: Ongoing     Problem: Pain:  Goal: Pain level will decrease  1/23/2021 0406 by Hayden Mcdaniels RN  Outcome: Ongoing  1/22/2021 2022 by Abdullahi Campbell RN  Outcome: Ongoing  Goal: Control of acute pain  1/23/2021 0406 by Hayden Mcdaniels RN  Outcome: Ongoing  1/22/2021 2022 by Abdullahi Campbell RN  Outcome: Ongoing  Goal: Control of chronic pain  1/23/2021 0406 by Hayden Mcdaniels RN  Outcome: Ongoing  1/22/2021 2022 by Adbullahi Campbell RN  Outcome: Ongoing

## 2021-01-23 NOTE — CARE COORDINATION
[facility] continues to have a bed for this patient when she is ready to return.

## 2021-01-24 VITALS
HEART RATE: 89 BPM | HEIGHT: 65 IN | TEMPERATURE: 98.1 F | OXYGEN SATURATION: 95 % | BODY MASS INDEX: 27.92 KG/M2 | RESPIRATION RATE: 19 BRPM | WEIGHT: 167.55 LBS | DIASTOLIC BLOOD PRESSURE: 57 MMHG | SYSTOLIC BLOOD PRESSURE: 102 MMHG

## 2021-01-24 LAB
ABSOLUTE EOS #: 0.1 K/UL (ref 0–0.4)
ABSOLUTE IMMATURE GRANULOCYTE: ABNORMAL K/UL (ref 0–0.3)
ABSOLUTE LYMPH #: 1.1 K/UL (ref 1–4.8)
ABSOLUTE MONO #: 0.8 K/UL (ref 0.1–1.3)
BASOPHILS # BLD: 2 % (ref 0–2)
BASOPHILS ABSOLUTE: 0.1 K/UL (ref 0–0.2)
DIFFERENTIAL TYPE: ABNORMAL
EOSINOPHILS RELATIVE PERCENT: 2 % (ref 0–4)
HCT VFR BLD CALC: 26.5 % (ref 36–46)
HCT VFR BLD CALC: 26.7 % (ref 36–46)
HEMOGLOBIN: 8.2 G/DL (ref 12–16)
HEMOGLOBIN: 8.2 G/DL (ref 12–16)
IMMATURE GRANULOCYTES: ABNORMAL %
LYMPHOCYTES # BLD: 18 % (ref 24–44)
MCH RBC QN AUTO: 29.9 PG (ref 26–34)
MCHC RBC AUTO-ENTMCNC: 31 G/DL (ref 31–37)
MCV RBC AUTO: 96.4 FL (ref 80–100)
MONOCYTES # BLD: 13 % (ref 1–7)
NRBC AUTOMATED: ABNORMAL PER 100 WBC
PDW BLD-RTO: 18.4 % (ref 11.5–14.9)
PLATELET # BLD: 290 K/UL (ref 150–450)
PLATELET ESTIMATE: ABNORMAL
PMV BLD AUTO: 8.1 FL (ref 6–12)
RBC # BLD: 2.75 M/UL (ref 4–5.2)
RBC # BLD: ABNORMAL 10*6/UL
SEG NEUTROPHILS: 65 % (ref 36–66)
SEGMENTED NEUTROPHILS ABSOLUTE COUNT: 3.8 K/UL (ref 1.3–9.1)
WBC # BLD: 5.8 K/UL (ref 3.5–11)
WBC # BLD: ABNORMAL 10*3/UL

## 2021-01-24 PROCEDURE — 85014 HEMATOCRIT: CPT

## 2021-01-24 PROCEDURE — 85018 HEMOGLOBIN: CPT

## 2021-01-24 PROCEDURE — 2700000000 HC OXYGEN THERAPY PER DAY

## 2021-01-24 PROCEDURE — 2580000003 HC RX 258: Performed by: STUDENT IN AN ORGANIZED HEALTH CARE EDUCATION/TRAINING PROGRAM

## 2021-01-24 PROCEDURE — 6370000000 HC RX 637 (ALT 250 FOR IP): Performed by: NURSE PRACTITIONER

## 2021-01-24 PROCEDURE — C9113 INJ PANTOPRAZOLE SODIUM, VIA: HCPCS | Performed by: STUDENT IN AN ORGANIZED HEALTH CARE EDUCATION/TRAINING PROGRAM

## 2021-01-24 PROCEDURE — 6360000002 HC RX W HCPCS: Performed by: STUDENT IN AN ORGANIZED HEALTH CARE EDUCATION/TRAINING PROGRAM

## 2021-01-24 PROCEDURE — 36415 COLL VENOUS BLD VENIPUNCTURE: CPT

## 2021-01-24 PROCEDURE — 6370000000 HC RX 637 (ALT 250 FOR IP): Performed by: INTERNAL MEDICINE

## 2021-01-24 PROCEDURE — 94761 N-INVAS EAR/PLS OXIMETRY MLT: CPT

## 2021-01-24 PROCEDURE — 85025 COMPLETE CBC W/AUTO DIFF WBC: CPT

## 2021-01-24 PROCEDURE — 6370000000 HC RX 637 (ALT 250 FOR IP): Performed by: FAMILY MEDICINE

## 2021-01-24 PROCEDURE — 6360000002 HC RX W HCPCS: Performed by: NURSE PRACTITIONER

## 2021-01-24 PROCEDURE — 94640 AIRWAY INHALATION TREATMENT: CPT

## 2021-01-24 PROCEDURE — 6370000000 HC RX 637 (ALT 250 FOR IP): Performed by: STUDENT IN AN ORGANIZED HEALTH CARE EDUCATION/TRAINING PROGRAM

## 2021-01-24 RX ADMIN — GUAIFENESIN 600 MG: 600 TABLET, EXTENDED RELEASE ORAL at 08:41

## 2021-01-24 RX ADMIN — FLUTICASONE PROPIONATE 1 SPRAY: 50 SPRAY, METERED NASAL at 08:44

## 2021-01-24 RX ADMIN — ACETAMINOPHEN 650 MG: 325 TABLET, FILM COATED ORAL at 08:41

## 2021-01-24 RX ADMIN — FLUTICASONE PROPIONATE 2 PUFF: 220 AEROSOL, METERED RESPIRATORY (INHALATION) at 08:44

## 2021-01-24 RX ADMIN — LISINOPRIL 2.5 MG: 5 TABLET ORAL at 12:55

## 2021-01-24 RX ADMIN — SODIUM CHLORIDE, PRESERVATIVE FREE 10 ML: 5 INJECTION INTRAVENOUS at 08:49

## 2021-01-24 RX ADMIN — ATORVASTATIN CALCIUM 10 MG: 10 TABLET, FILM COATED ORAL at 08:41

## 2021-01-24 RX ADMIN — IPRATROPIUM BROMIDE 0.5 MG: 0.5 SOLUTION RESPIRATORY (INHALATION) at 07:21

## 2021-01-24 RX ADMIN — LEVALBUTEROL HYDROCHLORIDE 1.25 MG: 1.25 SOLUTION, CONCENTRATE RESPIRATORY (INHALATION) at 10:54

## 2021-01-24 RX ADMIN — IPRATROPIUM BROMIDE 0.5 MG: 0.5 SOLUTION RESPIRATORY (INHALATION) at 10:54

## 2021-01-24 RX ADMIN — ACETAZOLAMIDE 250 MG: 250 TABLET ORAL at 08:44

## 2021-01-24 RX ADMIN — FERROUS SULFATE TAB 325 MG (65 MG ELEMENTAL FE) 325 MG: 325 (65 FE) TAB at 08:40

## 2021-01-24 RX ADMIN — LEVALBUTEROL HYDROCHLORIDE 1.25 MG: 1.25 SOLUTION, CONCENTRATE RESPIRATORY (INHALATION) at 07:21

## 2021-01-24 RX ADMIN — DILTIAZEM HYDROCHLORIDE 180 MG: 180 CAPSULE, COATED, EXTENDED RELEASE ORAL at 12:55

## 2021-01-24 RX ADMIN — CLONAZEPAM 0.5 MG: 1 TABLET ORAL at 08:40

## 2021-01-24 RX ADMIN — LEVOTHYROXINE SODIUM 125 MCG: 125 TABLET ORAL at 08:40

## 2021-01-24 RX ADMIN — TAMSULOSIN HYDROCHLORIDE 0.4 MG: 0.4 CAPSULE ORAL at 08:40

## 2021-01-24 RX ADMIN — PANTOPRAZOLE SODIUM 40 MG: 40 INJECTION, POWDER, FOR SOLUTION INTRAVENOUS at 08:42

## 2021-01-24 RX ADMIN — OXYCODONE HYDROCHLORIDE AND ACETAMINOPHEN 500 MG: 500 TABLET ORAL at 08:41

## 2021-01-24 RX ADMIN — DULOXETINE HYDROCHLORIDE 60 MG: 60 CAPSULE, DELAYED RELEASE ORAL at 08:41

## 2021-01-24 NOTE — CARE COORDINATION
DISCHARGE PLANNING NOTE:      Patient is discharging to [facility] today. Writer spoke with Nikki Woods at the facility and they can accept patient.  by Kulwinder Kimble is scheduled at 1:30 via stretcher. Please call report to [phone]. [facility] IS SIGNED AND COMPLETED.

## 2021-01-24 NOTE — PROGRESS NOTES
GI has signed off. Outpatient workup to continue. Cardiology indicates in their note chronic anticoagulation to restart in the future. If other services agree, d/c to SNF off anticoagulation until capsule endoscopy is done.      Anastasia Norris MD, FAAFP  1/24/2021 4135

## 2021-01-24 NOTE — PROGRESS NOTES
[address] [place]  [phone]          Progress Note    Patient Name:  Fani Jaime    :  1945  2021 11:40 AM      SUBJECTIVE       Ms. Lesia Maza  has no chest pain, shortness of breath, palpitations, nausea or vomiting. She feels markedly better today. Her heart rates been controlled. Hemoglobin has been stable. OBJECTIVE     Vital signs:    /62   Pulse 73   Temp 97.9 °F (36.6 °C) (Oral)   Resp 18   Ht 5' 5\" (1.651 m)   Wt 167 lb 8.8 oz (76 kg)   SpO2 96%   BMI 27.88 kg/m²  2 L/min  .tro    Admit Weight:  170 lb (77.1 kg)    Last 3 weights: Wt Readings from Last 3 Encounters:   21 167 lb 8.8 oz (76 kg)   20 169 lb 8.5 oz (76.9 kg)   20 187 lb 6.4 oz (85 kg)       BMI: Body mass index is 27.88 kg/m². Input/Output:       Intake/Output Summary (Last 24 hours) at 2021 1140  Last data filed at 2021 0645  Gross per 24 hour   Intake 900 ml   Output    Net 900 ml       Date 21 0000 - 21 2359   Shift 0246-8988 0322-2642 1692-5190 24 Hour Total   INTAKE   I.V.(mL/kg) 900(11.8)   900(11.8)   Shift Total(mL/kg) 900(11.8)   900(11.8)   OUTPUT   Shift Total(mL/kg)       Weight (kg) 76 76 76 76     Exam:     General appearance: awake and alert moves all ext   Lungs: no rhonchi, no wheezes, no rales  Heart: S1 and S2, irregular no murmur  Abdomen: positive bowel sounds, no bruits, no masses  Extremities: warm and dry, no cyanosis, no clubbing        Laboratory Studies:     CBC:   Recent Labs     21  0416 21  0416 21  0544 21  0544 21  1557 21  2217 21  0705   WBC 5.6  --  5.8  --   --   --  5.8   HGB 8.6*   < > 8.5*   < > 8.1* 7.8* 8.2*   HCT 26.7*   < > 25.9*   < > 25.1* 24.4* 26.5*   MCV 93.1  --  93.5  --   --   --  96.4     --  305  --   --   --  290    < > = values in this interval not displayed. BMP: No results for input(s): NA, K, CL, CO2, PHOS, BUN, CREATININE in the last 72 hours. Invalid input(s): CA  PT/INR: No results for input(s): PROTIME, INR in the last 72 hours. APTT: No results for input(s): APTT in the last 72 hours. MAG: No results for input(s): MG in the last 72 hours. D Dimer: No results for input(s): DDIMER in the last 72 hours. Troponin  No results for input(s): TROPHS in the last 72 hours. BNP No results for input(s): BNP in the last 72 hours. No results for input(s): PROBNP in the last 72 hours. Pulse Ox:  SpO2  Av.4 %  Min: 96 %  Max: 100 %  Supplemental O2: O2 Flow Rate (L/min): 2 L/min     Current Meds:    dilTIAZem  180 mg Oral Lunch    metoprolol tartrate  50 mg Oral BID    guaiFENesin  600 mg Oral BID    ipratropium  0.5 mg Nebulization 4x daily    levalbuterol  1.25 mg Nebulization Q4H While awake    sodium chloride flush  10 mL Intravenous Q12H    sodium chloride flush  10 mL Intravenous 2 times per day    ferrous sulfate  325 mg Oral Daily    ascorbic acid  500 mg Oral Daily    fluticasone  1 spray Each Nostril Daily    fluticasone  2 puff Inhalation BID    [Held by provider] apixaban  5 mg Oral BID    levothyroxine  125 mcg Oral Daily    DULoxetine  60 mg Oral Daily    tamsulosin  0.4 mg Oral Daily    acetaZOLAMIDE  250 mg Oral Daily    lisinopril  2.5 mg Oral Lunch    atorvastatin  10 mg Oral Daily    pantoprazole  40 mg Intravenous BID     Continuous Infusions:    sodium chloride      sodium chloride 75 mL/hr at 21 2126    sodium chloride      sodium chloride         Echo:      ASSESSMENT     Principal Problem:    Symptomatic anemia  Active Problems:    Acquired hypothyroidism    Hypertension    COPD (chronic obstructive pulmonary disease) ([facility])    HA (generalized anxiety disorder)    Benign hypertension with CKD (chronic kidney disease) stage III    Moderate major depression ([facility]) PAD (peripheral artery disease) ([facility])    Acute on chronic diastolic CHF (congestive heart failure) ([facility])    Iron deficiency anemia due to chronic blood loss    Anemia    Severe anemia    Overweight (BMI 25.0-29. 9)    Severe malnutrition ([address]  Resolved Problems:    * No resolved hospital problems. *      PLAN     Her anemia appears to be stable. She will have capsule endoscopy as an outpatient. Then reevaluation regarding starting anticoagulation for her Atrial fibrillation. She will follow up with Dr. Sheela Scheuermann  as an outpatient in approximately 2 to 3 weeks.

## 2021-01-24 NOTE — PLAN OF CARE
Problem: Falls - Risk of:  Goal: Will remain free from falls  Description: Will remain free from falls  Outcome: Ongoing  Goal: Absence of physical injury  Description: Absence of physical injury  Outcome: Ongoing     Problem: Nutrition  Goal: Optimal nutrition therapy  Description: Nutrition Problem #1: Severe malnutrition  Intervention: Food and/or Nutrient Delivery: Continue Current Diet, Modify Oral Nutrition Supplement  Nutritional Goals: Meet greater than 75% of estimated nutrition needs     Outcome: Ongoing     Problem: Pain:  Goal: Pain level will decrease  Description: Pain level will decrease  Outcome: Ongoing  Goal: Control of acute pain  Description: Control of acute pain  Outcome: Ongoing  Goal: Control of chronic pain  Description: Control of chronic pain  Outcome: Ongoing

## 2021-01-24 NOTE — PLAN OF CARE
Problem: Falls - Risk of:  Goal: Will remain free from falls  Description: Will remain free from falls  1/24/2021 1351 by Randoplh Arteaga RN  Outcome: Completed  1/24/2021 0407 by Eric Ceron RN  Outcome: Ongoing  Goal: Absence of physical injury  Description: Absence of physical injury  1/24/2021 1351 by Randolph Arteaga RN  Outcome: Completed  1/24/2021 0407 by Eric Ceron RN  Outcome: Ongoing     Problem: Nutrition  Goal: Optimal nutrition therapy  Description: Nutrition Problem #1: Severe malnutrition  Intervention: Food and/or Nutrient Delivery: Continue Current Diet, Modify Oral Nutrition Supplement  Nutritional Goals: Meet greater than 75% of estimated nutrition needs     1/24/2021 1351 by Randolph Arteaga RN  Outcome: Completed  1/24/2021 0407 by Eric Ceron RN  Outcome: Ongoing     Problem: Pain:  Goal: Pain level will decrease  Description: Pain level will decrease  1/24/2021 1351 by Randolph Arteaga RN  Outcome: Completed  1/24/2021 0407 by Eric Ceron RN  Outcome: Ongoing  Goal: Control of acute pain  Description: Control of acute pain  1/24/2021 1351 by Randolph Arteaga RN  Outcome: Completed  1/24/2021 0407 by Eric Ceron RN  Outcome: Ongoing  Goal: Control of chronic pain  Description: Control of chronic pain  1/24/2021 1351 by Randolph Arteaga RN  Outcome: Completed  1/24/2021 0407 by Eirc Ceron RN  Outcome: Ongoing

## 2021-01-25 ENCOUNTER — TELEPHONE (OUTPATIENT)
Dept: GASTROENTEROLOGY | Age: 76
End: 2021-01-25

## 2021-01-25 LAB
ABO/RH: NORMAL
ANTIBODY SCREEN: NEGATIVE
ARM BAND NUMBER: NORMAL
BLD PROD TYP BPU: NORMAL
CROSSMATCH RESULT: NORMAL
DISPENSE STATUS BLOOD BANK: NORMAL
EXPIRATION DATE: NORMAL
TRANSFUSION STATUS: NORMAL
UNIT DIVISION: 0
UNIT NUMBER: NORMAL

## 2021-01-25 NOTE — TELEPHONE ENCOUNTER
Received a call from Heavenly Leone at [facility] asking if the patient needs the appointment for tomorrow 1/26/21. The patient was just in the hospital.  Please follow up with Heavenly Leone and advise. If so they need to set up transportation.     Heavenly Leone [phone]

## 2021-01-26 NOTE — TELEPHONE ENCOUNTER
I TRIED TO CALL LEONARDO TO SEE ABOUT HER INSURANCE AND OR SCHEDULE HER IRON INFUSION. I WILL SEND HER A LETTER TO LET HER KNOW THAT I HAVE TRIED TO REACH HER AND IF SHE CALLS BACK SHE CAN BE SCHEDULE AS LONG AND THE INSURANCE IS THE SAME AND THE ONE USED TO PRECERT HER TREATMENT.

## 2021-02-02 ENCOUNTER — HOSPITAL ENCOUNTER (EMERGENCY)
Age: 76
Discharge: HOME | End: 2021-02-02
Attending: EMERGENCY MEDICINE
Payer: MEDICARE

## 2021-02-02 VITALS
SYSTOLIC BLOOD PRESSURE: 117 MMHG | HEART RATE: 88 BPM | BODY MASS INDEX: 27.82 KG/M2 | WEIGHT: 167 LBS | RESPIRATION RATE: 16 BRPM | DIASTOLIC BLOOD PRESSURE: 58 MMHG | HEIGHT: 65 IN | OXYGEN SATURATION: 98 % | TEMPERATURE: 98.5 F

## 2021-02-02 DIAGNOSIS — D64.9 CHRONIC ANEMIA: Primary | ICD-10-CM

## 2021-02-02 LAB
ABO/RH: NORMAL
ABSOLUTE EOS #: 0.2 K/UL (ref 0–0.4)
ABSOLUTE IMMATURE GRANULOCYTE: ABNORMAL K/UL (ref 0–0.3)
ABSOLUTE LYMPH #: 0.7 K/UL (ref 1–4.8)
ABSOLUTE MONO #: 1 K/UL (ref 0.1–1.3)
ALBUMIN SERPL-MCNC: 3.7 G/DL (ref 3.5–5.2)
ALBUMIN/GLOBULIN RATIO: ABNORMAL (ref 1–2.5)
ALP BLD-CCNC: 144 U/L (ref 35–104)
ALT SERPL-CCNC: 18 U/L (ref 5–33)
ANION GAP SERPL CALCULATED.3IONS-SCNC: 6 MMOL/L (ref 9–17)
ANTIBODY SCREEN: NEGATIVE
ARM BAND NUMBER: NORMAL
AST SERPL-CCNC: 19 U/L
BASOPHILS # BLD: 1 % (ref 0–2)
BASOPHILS ABSOLUTE: 0.1 K/UL (ref 0–0.2)
BILIRUB SERPL-MCNC: 0.39 MG/DL (ref 0.3–1.2)
BILIRUBIN DIRECT: 0.16 MG/DL
BILIRUBIN, INDIRECT: 0.23 MG/DL (ref 0–1)
BUN BLDV-MCNC: 12 MG/DL (ref 8–23)
BUN/CREAT BLD: ABNORMAL (ref 9–20)
CALCIUM SERPL-MCNC: 9.2 MG/DL (ref 8.6–10.4)
CHLORIDE BLD-SCNC: 101 MMOL/L (ref 98–107)
CO2: 36 MMOL/L (ref 20–31)
CREAT SERPL-MCNC: 0.72 MG/DL (ref 0.5–0.9)
DIFFERENTIAL TYPE: ABNORMAL
EOSINOPHILS RELATIVE PERCENT: 2 % (ref 0–4)
EXPIRATION DATE: NORMAL
GFR AFRICAN AMERICAN: >60 ML/MIN
GFR NON-AFRICAN AMERICAN: >60 ML/MIN
GFR SERPL CREATININE-BSD FRML MDRD: ABNORMAL ML/MIN/{1.73_M2}
GFR SERPL CREATININE-BSD FRML MDRD: ABNORMAL ML/MIN/{1.73_M2}
GLOBULIN: ABNORMAL G/DL (ref 1.5–3.8)
GLUCOSE BLD-MCNC: 103 MG/DL (ref 70–99)
HCT VFR BLD CALC: 24.8 % (ref 36–46)
HEMOGLOBIN: 7.7 G/DL (ref 12–16)
IMMATURE GRANULOCYTES: ABNORMAL %
INR BLD: 0.9
LYMPHOCYTES # BLD: 5 % (ref 24–44)
MCH RBC QN AUTO: 30 PG (ref 26–34)
MCHC RBC AUTO-ENTMCNC: 31.1 G/DL (ref 31–37)
MCV RBC AUTO: 96.4 FL (ref 80–100)
MONOCYTES # BLD: 8 % (ref 1–7)
NRBC AUTOMATED: ABNORMAL PER 100 WBC
PDW BLD-RTO: 17.9 % (ref 11.5–14.9)
PLATELET # BLD: 393 K/UL (ref 150–450)
PLATELET ESTIMATE: ABNORMAL
PMV BLD AUTO: 8.1 FL (ref 6–12)
POTASSIUM SERPL-SCNC: 4.2 MMOL/L (ref 3.7–5.3)
PROTHROMBIN TIME: 12.5 SEC (ref 11.8–14.6)
RBC # BLD: 2.58 M/UL (ref 4–5.2)
RBC # BLD: ABNORMAL 10*6/UL
SEG NEUTROPHILS: 84 % (ref 36–66)
SEGMENTED NEUTROPHILS ABSOLUTE COUNT: 10.3 K/UL (ref 1.3–9.1)
SODIUM BLD-SCNC: 143 MMOL/L (ref 135–144)
TOTAL PROTEIN: 7 G/DL (ref 6.4–8.3)
WBC # BLD: 12.2 K/UL (ref 3.5–11)
WBC # BLD: ABNORMAL 10*3/UL

## 2021-02-02 PROCEDURE — 86900 BLOOD TYPING SEROLOGIC ABO: CPT

## 2021-02-02 PROCEDURE — 86901 BLOOD TYPING SEROLOGIC RH(D): CPT

## 2021-02-02 PROCEDURE — 85610 PROTHROMBIN TIME: CPT

## 2021-02-02 PROCEDURE — 86850 RBC ANTIBODY SCREEN: CPT

## 2021-02-02 PROCEDURE — 80048 BASIC METABOLIC PNL TOTAL CA: CPT

## 2021-02-02 PROCEDURE — 99285 EMERGENCY DEPT VISIT HI MDM: CPT

## 2021-02-02 PROCEDURE — 36415 COLL VENOUS BLD VENIPUNCTURE: CPT

## 2021-02-02 PROCEDURE — 80076 HEPATIC FUNCTION PANEL: CPT

## 2021-02-02 PROCEDURE — 85025 COMPLETE CBC W/AUTO DIFF WBC: CPT

## 2021-02-02 NOTE — ED NOTES
Mode of arrival (squad #, walk in, police, etc) : walk in from home        Chief complaint(s): anemia        Arrival Note (brief scenario, treatment PTA, etc). : Pt arrives due to anemia. Pt was instructed to be seen by PCP for recent HGB of 6.8. Pt states this has been an on-going problem. Pt states she has had an upper GI and lower GI scope done and they were both normal. Pt states she has not noticed blood in stools. Pt denies dizziness, abdominal pain, n/v/d. Pt arrives wearing 3L NC. Pt states she is no longer taking eliquis. Pt is A&Ox4, eupneic, PWD. GCS=15. Call light in reach. C= \"Have you ever felt that you should Cut down on your drinking? \"  No  A= \"Have people Annoyed you by criticizing your drinking? \"  No  G= \"Have you ever felt bad or Guilty about your drinking? \"  No  E= \"Have you ever had a drink as an Eye-opener first thing in the morning to steady your nerves or to help a hangover? \"  No      Deferred []      Reason for deferring: N/A    *If yes to two or more: probable alcohol abuse. Zohra Shearer RN  02/02/21 1036

## 2021-02-02 NOTE — ED PROVIDER NOTES
[address]  EMERGENCY DEPARTMENT ENCOUNTER      Pt Name: Kalli Servin  MRN: [id number]  [place] 1945  Date of evaluation: 2/2/21      CHIEF COMPLAINT       Chief Complaint   Patient presents with    Anemia     HISTORY OF PRESENT ILLNESS   HPI 70 y.o. female presents with c/o anemia. Hemoglobin checked last night, 6.8. Has had chronic anemia. No acute bleeding. Chronic fatigue, unchanged. Currently recuperating in NH. Wants to get back to her usual home[place]. SOB unchanged. REVIEW OF SYSTEMS       Review of Systems   Constitutional: Positive for fatigue (chronic). HENT: Negative for congestion. Eyes: Negative for visual disturbance. Respiratory: Negative for cough. Cardiovascular: Negative for chest pain. Gastrointestinal: Negative for anal bleeding and blood in stool. Genitourinary: Negative for hematuria. Musculoskeletal: Negative for back pain. Neurological: Negative for dizziness and light-headedness. Psychiatric/Behavioral: Negative for confusion.        PAST MEDICAL HISTORY     Past Medical History:   Diagnosis Date    A-fib [facility])     Acquired hypothyroidism     Acute encephalopathy     [facility] kidney [facility] ([facility]     Anxiety     Benign hypertension with CKD (chronic kidney disease) stage III     Chronic back pain     CKD (chronic kidney disease) stage 3, GFR 30-59 ml/min     Constipation     COPD (chronic obstructive pulmonary disease) (HCC)     Cough     Depression     ETOH abuse     Former smoker     3 packs a day for 26 years    HA (generalized anxiety disorder)     History of GI bleed     Hyperlipidemia     Hypertension     Hypothyroid 1/18/2013    Leg pain     Normocytic anemia     Osteoarthritis     PAD (peripheral artery disease) (HCC)     Primary osteoarthritis     Pulmonary hypertension ([facility] [address]     Pure hypercholesterolemia     SIRS (systemic inflammatory response syndrome) (HCC)     Urge incontinence     Wheezing SURGICAL HISTORY       Past Surgical History:   Procedure Laterality Date    COLONOSCOPY      COLONOSCOPY N/A 12/2/2020    COLONOSCOPY CONTROL OF BLEEDING performed by Surinder Cruz MD at [facility] Bilateral     cataracts    JOINT REPLACEMENT      Left knee on 9-11-18    THORACENTESIS  12/26/2020         UPPER GASTROINTESTINAL ENDOSCOPY N/A 12/2/2020    EGD performed by Surinder Cruz MD at [facility] 12/23/2020    PUSH ENTEROSCOPY/EGD CONTROL BLEEDING performed by Surinder Cruz MD at [facility]       Discharge Medication List as of 2/2/2021 12:12 PM      CONTINUE these medications which have NOT CHANGED    Details   ipratropium (ATROVENT) 0.02 % nebulizer solution Take 2.5 mLs by nebulization 4 times daily, Disp-2.5 mL, R-3NO PRINT      levalbuterol (XOPENEX) 1.25 MG/0.5ML nebulizer solution Take 0.5 mLs by nebulization every 4 hours (while awake), Disp-1 each, R-0NO PRINT      guaiFENesin (MUCINEX) 600 MG extended release tablet Take 1 tablet by mouth 2 times daily, Disp-1 tablet, R-0NO PRINT      acetaminophen (TYLENOL) 325 MG tablet Take 650 mg by mouth every 4 hours as needed for Pain Not to exceed 3gm/24hrsHistorical Med      atorvastatin (LIPITOR) 10 MG tablet TAKE 1 TABLET BY MOUTH ONCE DAILY, Disp-90 tablet, R-1Normal      hydroCHLOROthiazide (HYDRODIURIL) 25 MG tablet Take 1 tablet by mouth daily, Disp-90 tablet, R-1Normal      lisinopril (PRINIVIL;ZESTRIL) 2.5 MG tablet Take 1 tablet by mouth Daily with lunch, Disp-30 tablet, R-3Normal      metoprolol tartrate (LOPRESSOR) 25 MG tablet Take 1 tablet by mouth 2 times daily, Disp-60 tablet, R-3Normal      Oxygen Concentrator CONTINUOUS, Historical Med      tamsulosin (FLOMAX) 0.4 MG capsule TAKE 1 CAPSULE BY MOUTH DAILY, Disp-30 capsule, R-3Normal omeprazole (PRILOSEC) 40 MG delayed release capsule TAKE 1 CAPSULE BY MOUTH EVERY MORNING BEFORE BREAKFAST, Disp-90 capsule, R-1Normal      DULoxetine (CYMBALTA) 60 MG extended release capsule Take 1 capsule by mouth daily, Disp-90 capsule, R-1Normal      baclofen (LIORESAL) 10 MG tablet TAKE ONE TABLET BY MOUTH THREE TIMES A DAY AS NEEDED., Disp-270 tablet,R-3Normal      Lift Chair MISC Disp-1 each,R-0, PrintUse daily for comfort      levothyroxine (SYNTHROID) 125 MCG tablet Take 1 tablet by mouth daily, Disp-30 tablet,R-5Normal      clonazePAM (KLONOPIN) 0.5 MG tablet Take 1 tablet by mouth 3 times daily as needed for Anxiety for up to 30 days. , Disp-90 tablet, R-0Normal      fluticasone (FLOVENT HFA) 220 MCG/ACT inhaler Inhale 2 puffs into the lungs 2 times daily, Disp-1 Inhaler,R-5Normal      dilTIAZem (CARDIZEM CD) 120 MG extended release capsule Take 1 capsule by mouth daily, Disp-30 capsule,R-5D/C 180mg doseNormal      fluticasone (FLONASE) 50 MCG/ACT nasal spray USE ONE SPRAY TO EACH NOSTRIL ONCE ADAY, Disp-16 g,R-2Normal      levalbuterol (XOPENEX HFA) 45 MCG/ACT inhaler Inhale 1 puff into the lungs every 4 hours as needed for Wheezing, Disp-1 Inhaler,R-3Historical Med      Respiratory Therapy Supplies (NEBULIZER/TUBING/MOUTHPIECE) KIT Disp-1 kit, R-0, PrintUse every 4-6 hours prn for copd      Ascorbic Acid (C-1000 PO) Take 1 capsule by mouth daily Historical Med      ferrous sulfate 325 (65 Fe) MG tablet Take 325 mg by mouth daily Historical Med             ALLERGIES     is allergic to tizanidine. FAMILY HISTORY     She indicated that her mother is . She indicated that her father is . She indicated that the status of her sister is unknown. She indicated that her brother is alive. She indicated that the status of her other is unknown.        SOCIAL HISTORY reports that she has quit smoking. Her smoking use included cigarettes. She has a 78.00 pack-year smoking history. She has never used smokeless tobacco. She reports previous alcohol use. She reports that she does not use drugs. PHYSICAL EXAM     INITIAL VITALS: BP (!) 117/58   Pulse 88   Temp 98.5 °F (36.9 °C) (Oral)   Resp 16   Ht 5' 5\" (1.651 m)   Wt 167 lb (75.8 kg)   SpO2 98%   BMI 27.79 kg/m²   Gen: nad  Head: Normocephalic, atraumatic  Eye: Pupils equal round reactive to light, no conjunctivitis  ENT: MMM  Neck: no JVD  Heart: Regular rate and rhythm no murmurs  Lungs: Clear to auscultation bilaterally, no respiratory distress  Chest wall: No crepitus, no tenderness palpation  Abdomen: Soft, nontender, nondistended, with no peritoneal signs  Neurologic: Patient is alert and oriented x3, motor and sensation is intact in all 4 extremities, fluent speech  Extremities: no edema    MEDICAL DECISION MAKING:     MDM  70 y.o. female presenting with anemia. Hemoglobin reported to be 6.8 at NH last night. No acute bleeding. Pt has chronic anemia. Will recheck hemoglobin, coags, renal function, type and screen as she may need a transfusion. Emergency Department course:    Hemoglobin returned 7.7. No need for transfusion at this time. D/w pt the results, treatment plan, warning precautions for prompt ED return and importance of close OP FU, she verbalizes understanding and agrees with the treatment plan. DIAGNOSTIC RESULTS       LABS: All lab results were reviewed by myself, and all abnormals are listed below.   Labs Reviewed   CBC WITH AUTO DIFFERENTIAL - Abnormal; Notable for the following components:       Result Value    WBC 12.2 (*)     RBC 2.58 (*)     Hemoglobin 7.7 (*)     Hematocrit 24.8 (*)     RDW 17.9 (*)     Seg Neutrophils 84 (*)     Lymphocytes 5 (*)     Monocytes 8 (*)     Segs Absolute 10.30 (*)     Absolute Lymph # 0.70 (*)     All other components within normal limits BASIC METABOLIC PANEL - Abnormal; Notable for the following components:    Glucose 103 (*)     CO2 36 (*)     Anion Gap 6 (*)     All other components within normal limits   HEPATIC FUNCTION PANEL - Abnormal; Notable for the following components:    Alkaline Phosphatase 144 (*)     All other components within normal limits   PROTIME-INR   TYPE AND SCREEN       EMERGENCY DEPARTMENT COURSE:   Vitals:    Vitals:    02/02/21 0959 02/02/21 1000 02/02/21 1015 02/02/21 1030   BP: 111/61 112/66 125/65 (!) 117/58   Pulse: 88      Resp: 16      Temp: 98.5 °F (36.9 °C)      TempSrc: Oral      SpO2: 100% 98% 98% 98%   Weight: 167 lb (75.8 kg)      Height: 5' 5\" (1.651 m)          The patient was given the following medications while in the emergency department:  No orders of the defined types were placed in this encounter. -------------------------  CRITICAL CARE:   CONSULTS: None  PROCEDURES: Procedures     FINAL IMPRESSION      1.  Chronic anemia          DISPOSITION/PLAN   DISPOSITION Decision To Discharge 02/02/2021 12:12:06 PM      PATIENT REFERRED TO:  James Price, APRN - CNP  [facility]  [address] ED  [facility]  [facility]  [phone]    If symptoms worsen      DISCHARGE MEDICATIONS:  Discharge Medication List as of 2/2/2021 12:12 PM            Helder Puentes MD  Attending Emergency Physician                      Helder Puentes MD  02/03/21 4272

## 2021-02-03 ASSESSMENT — ENCOUNTER SYMPTOMS
BLOOD IN STOOL: 0
BACK PAIN: 0
ANAL BLEEDING: 0
COUGH: 0

## 2021-02-05 NOTE — DISCHARGE SUMMARY
Family Medicine Discharge Summary    Eli Art  :  1945  MRN:  [id number]    Admit date:  2021  Discharge date:  2021    Admitting Physician:  Suzie Gonzalez MD    Discharge Diagnoses:    Patient Active Problem List   Diagnosis    Acquired hypothyroidism    Hypertension    Primary osteoarthritis of right knee    A-fib ([address]    Acute encephalopathy    SIRS (systemic inflammatory response syndrome) ([address]    Normocytic anemia    Pulmonary hypertension ([address]    COPD (chronic obstructive pulmonary disease) ([address]    History of GI bleed    Constipation    HA (generalized anxiety disorder)    Lumbar radiculopathy    Lumbosacral spondylosis without myelopathy    Benign hypertension with CKD (chronic kidney disease) stage III    CKD (chronic kidney disease) stage 3, GFR 30-59 ml/min    Alcohol withdrawal syndrome with complication (HCC)    Moderate major depression ([address]    PAD (peripheral artery disease) (HCC)    Acute kidney injury ([address]    Obesity, Class I, BMI 30-34.9    Acute on chronic diastolic CHF (congestive heart failure) (HCC)    GI bleed    Weight loss    Elevated alkaline phosphatase level    Thrombocytosis (HCC)    Iron deficiency anemia due to chronic blood loss    Iron malabsorption    Symptomatic anemia    Pneumonia due to organism    Anemia    Severe anemia    Overweight (BMI 25.0-29. 9)    Severe malnutrition ([address]       Admission Condition:  guarded  Discharged Condition:  fair Hospital Course:   69 yo presented to [facility] with hemoglobin of 5.5 that was symptomatic for lightheadedness with position changes. She has been COVID-19 positive in the past but did not require isolation or COVID-specific care during this admission. In December, she had an EGD with push enteroscopy during which 6 AVMs were cauterized; colonoscopy at that time also showed AVMs in descending and ascending colon with scattered diverticuli. She received iron replacement, PRBCs and PPI. She will have a capsule endoscopy as an outpatient in the future. Discharge Medications:       Linn Kelley \"La Chung\"   Home Medication Instructions PHA:[id number]    Printed on:02/05/21 1041   Medication Information                      acetaminophen (TYLENOL) 325 MG tablet  Take 650 mg by mouth every 4 hours as needed for Pain Not to exceed 3gm/24hrs             Ascorbic Acid (C-1000 PO)  Take 1 capsule by mouth daily              atorvastatin (LIPITOR) 10 MG tablet  TAKE 1 TABLET BY MOUTH ONCE DAILY             baclofen (LIORESAL) 10 MG tablet  TAKE ONE TABLET BY MOUTH THREE TIMES A DAY AS NEEDED. clonazePAM (KLONOPIN) 0.5 MG tablet  Take 1 tablet by mouth 3 times daily as needed for Anxiety for up to 30 days.              dilTIAZem (CARDIZEM CD) 120 MG extended release capsule  Take 1 capsule by mouth daily             DULoxetine (CYMBALTA) 60 MG extended release capsule  Take 1 capsule by mouth daily             ferrous sulfate 325 (65 Fe) MG tablet  Take 325 mg by mouth daily              fluticasone (FLONASE) 50 MCG/ACT nasal spray  USE ONE SPRAY TO EACH NOSTRIL ONCE ADAY             fluticasone (FLOVENT HFA) 220 MCG/ACT inhaler  Inhale 2 puffs into the lungs 2 times daily             guaiFENesin (MUCINEX) 600 MG extended release tablet  Take 1 tablet by mouth 2 times daily             hydroCHLOROthiazide (HYDRODIURIL) 25 MG tablet  Take 1 tablet by mouth daily ipratropium (ATROVENT) 0.02 % nebulizer solution  Take 2.5 mLs by nebulization 4 times daily             levalbuterol (XOPENEX HFA) 45 MCG/ACT inhaler  Inhale 1 puff into the lungs every 4 hours as needed for Wheezing             levalbuterol (XOPENEX) 1.25 MG/0.5ML nebulizer solution  Take 0.5 mLs by nebulization every 4 hours (while awake)             levothyroxine (SYNTHROID) 125 MCG tablet  Take 1 tablet by mouth daily             Lift Chair MISC  Use daily for comfort             lisinopril (PRINIVIL;ZESTRIL) 2.5 MG tablet  Take 1 tablet by mouth Daily with lunch             metoprolol tartrate (LOPRESSOR) 25 MG tablet  Take 1 tablet by mouth 2 times daily             omeprazole (PRILOSEC) 40 MG delayed release capsule  TAKE 1 CAPSULE BY MOUTH EVERY MORNING BEFORE BREAKFAST             Oxygen Concentrator  continuous             Respiratory Therapy Supplies (NEBULIZER/TUBING/MOUTHPIECE) KIT  Use every 4-6 hours prn for copd             tamsulosin (FLOMAX) 0.4 MG capsule  TAKE 1 CAPSULE BY MOUTH DAILY                 Consults:  GI, pulmonology, ID, cardiology    Significant Diagnostic Studies:  labs    Treatments:   Supportive therapies, PRBC transfusion    Disposition:  [facility]  Follow up with Maryrose Spatz, APRN - CNP in 1-2 weeks. Signed:   Francisco Gonsales MD, FAAFP  2/5/2021, 10:40 AM

## 2021-02-08 ENCOUNTER — HOSPITAL ENCOUNTER (OUTPATIENT)
Facility: MEDICAL CENTER | Age: 76
Discharge: HOME | End: 2021-02-08
Payer: MEDICARE

## 2021-02-08 ENCOUNTER — TELEPHONE (OUTPATIENT)
Dept: ONCOLOGY | Age: 76
End: 2021-02-08

## 2021-02-08 DIAGNOSIS — K90.9 IRON MALABSORPTION: ICD-10-CM

## 2021-02-08 DIAGNOSIS — D50.0 IRON DEFICIENCY ANEMIA DUE TO CHRONIC BLOOD LOSS: ICD-10-CM

## 2021-02-08 LAB
ABSOLUTE EOS #: 0.2 K/UL (ref 0–0.4)
ABSOLUTE IMMATURE GRANULOCYTE: 0 K/UL (ref 0–0.3)
ABSOLUTE LYMPH #: 0.95 K/UL (ref 1–4.8)
ABSOLUTE MONO #: 0.75 K/UL (ref 0.2–0.8)
BASOPHILS # BLD: 2 %
BASOPHILS ABSOLUTE: 0.14 K/UL (ref 0–0.2)
DIFFERENTIAL TYPE: ABNORMAL
EOSINOPHILS RELATIVE PERCENT: 3 % (ref 1–4)
FERRITIN: 68 UG/L (ref 13–150)
HCT VFR BLD CALC: 24.9 % (ref 36.3–47.1)
HEMOGLOBIN: 7 G/DL (ref 11.9–15.1)
IMMATURE GRANULOCYTES: 0 %
LYMPHOCYTES # BLD: 14 % (ref 24–44)
MCH RBC QN AUTO: 29.9 PG (ref 25.2–33.5)
MCHC RBC AUTO-ENTMCNC: 28.1 G/DL (ref 28.4–34.8)
MCV RBC AUTO: 106.4 FL (ref 82.6–102.9)
MONOCYTES # BLD: 11 % (ref 1–7)
MORPHOLOGY: ABNORMAL
NRBC AUTOMATED: 0 PER 100 WBC
PDW BLD-RTO: 16.2 % (ref 11.8–14.4)
PLATELET # BLD: 376 K/UL (ref 138–453)
PLATELET ESTIMATE: ABNORMAL
PMV BLD AUTO: 10.2 FL (ref 8.1–13.5)
RBC # BLD: 2.34 M/UL (ref 3.95–5.11)
RBC # BLD: ABNORMAL 10*6/UL
SEG NEUTROPHILS: 70 % (ref 36–66)
SEGMENTED NEUTROPHILS ABSOLUTE COUNT: 4.76 K/UL (ref 1.8–7.7)
WBC # BLD: 6.8 K/UL (ref 3.5–11.3)
WBC # BLD: ABNORMAL 10*3/UL

## 2021-02-08 PROCEDURE — 85025 COMPLETE CBC W/AUTO DIFF WBC: CPT

## 2021-02-08 PROCEDURE — 36415 COLL VENOUS BLD VENIPUNCTURE: CPT

## 2021-02-08 PROCEDURE — 82728 ASSAY OF FERRITIN: CPT

## 2021-02-08 NOTE — TELEPHONE ENCOUNTER
PATIENT WAS WHEELED UP TO HEMATOLOGY/ONCOLOGY AT ABOUT 12:00PM. PATIENT. PATIENT WAS ONLY SCHEDULED FOR LAB WORK. [facility] TRANSIT WAS CALLED AT 12:25PM TO PICK PATIENT UP FROM THE FIRST FLOOR WAITING AREA. [facility] STATED IT WILL TAKE ABOUT AN HOUR. PATIENT WAS INFORMED OF MD VISIT ON 2/15/21, AND I CALENDER WAS GIVEN. PATIENT WAS WHEELED DOWN TO MAIN FLOOR.

## 2021-02-08 NOTE — TELEPHONE ENCOUNTER
RECEIVED NOTICE OF HGB= 7.0 02/08/21    REVIEWED CBC WITH DR Braxton Mckeon AS FOLLOW UP IS SCHEDULED FOR 02/15/21. NO NEW ORDERS AT THIS TIME, CHRONIC ABNORMAL VALUE.

## 2021-02-09 ENCOUNTER — HOSPITAL ENCOUNTER (OUTPATIENT)
Facility: MEDICAL CENTER | Age: 76
End: 2021-02-09
Payer: MEDICARE

## 2021-02-09 ENCOUNTER — TELEPHONE (OUTPATIENT)
Dept: GASTROENTEROLOGY | Age: 76
End: 2021-02-09

## 2021-02-09 NOTE — TELEPHONE ENCOUNTER
Received a call from Jamal Thompson with [facility] stating that the patient was just released from the hospital.  Needs to schedule an out-patient capsule endoscopy. Please follow up.     Jamal Thompson [phone]

## 2021-02-10 ENCOUNTER — APPOINTMENT (OUTPATIENT)
Dept: GENERAL RADIOLOGY | Age: 76
DRG: 683 | End: 2021-02-10
Payer: MEDICARE

## 2021-02-10 ENCOUNTER — HOSPITAL ENCOUNTER (INPATIENT)
Age: 76
LOS: 2 days | Discharge: NURSING, REHAB OR LONG-TERM CARE | DRG: 683 | End: 2021-02-12
Attending: EMERGENCY MEDICINE | Admitting: FAMILY MEDICINE
Payer: MEDICARE

## 2021-02-10 ENCOUNTER — APPOINTMENT (OUTPATIENT)
Dept: ULTRASOUND IMAGING | Age: 76
DRG: 683 | End: 2021-02-10
Payer: MEDICARE

## 2021-02-10 DIAGNOSIS — R09.02 HYPOXIA: ICD-10-CM

## 2021-02-10 DIAGNOSIS — N17.9 ACUTE KIDNEY INJURY (HCC): Primary | ICD-10-CM

## 2021-02-10 PROBLEM — N19 RENAL FAILURE: Status: ACTIVE | Noted: 2021-02-10

## 2021-02-10 LAB
-: ABNORMAL
ABSOLUTE EOS #: 0.2 K/UL (ref 0–0.4)
ABSOLUTE IMMATURE GRANULOCYTE: ABNORMAL K/UL (ref 0–0.3)
ABSOLUTE LYMPH #: 0.6 K/UL (ref 1–4.8)
ABSOLUTE MONO #: 0.7 K/UL (ref 0.1–1.3)
ALBUMIN SERPL-MCNC: 3.4 G/DL (ref 3.5–5.2)
ALBUMIN/GLOBULIN RATIO: ABNORMAL (ref 1–2.5)
ALP BLD-CCNC: 140 U/L (ref 35–104)
ALT SERPL-CCNC: 11 U/L (ref 5–33)
AMORPHOUS: ABNORMAL
ANION GAP SERPL CALCULATED.3IONS-SCNC: 13 MMOL/L (ref 9–17)
AST SERPL-CCNC: 14 U/L
BACTERIA: ABNORMAL
BASOPHILS # BLD: 2 % (ref 0–2)
BASOPHILS ABSOLUTE: 0.2 K/UL (ref 0–0.2)
BILIRUB SERPL-MCNC: 0.34 MG/DL (ref 0.3–1.2)
BILIRUBIN URINE: NEGATIVE
BNP INTERPRETATION: ABNORMAL
BUN BLDV-MCNC: 33 MG/DL (ref 8–23)
BUN/CREAT BLD: ABNORMAL (ref 9–20)
CALCIUM SERPL-MCNC: 8.8 MG/DL (ref 8.6–10.4)
CASTS UA: ABNORMAL /LPF
CHLORIDE BLD-SCNC: 98 MMOL/L (ref 98–107)
CO2: 29 MMOL/L (ref 20–31)
COLOR: YELLOW
COMMENT UA: ABNORMAL
CREAT SERPL-MCNC: 2.31 MG/DL (ref 0.5–0.9)
CREATININE URINE: 48.8 MG/DL (ref 28–217)
CRYSTALS, UA: ABNORMAL /HPF
DIFFERENTIAL TYPE: ABNORMAL
EOSINOPHILS RELATIVE PERCENT: 2 % (ref 0–4)
EPITHELIAL CELLS UA: ABNORMAL /HPF
GFR AFRICAN AMERICAN: 25 ML/MIN
GFR NON-AFRICAN AMERICAN: 21 ML/MIN
GFR SERPL CREATININE-BSD FRML MDRD: ABNORMAL ML/MIN/{1.73_M2}
GFR SERPL CREATININE-BSD FRML MDRD: ABNORMAL ML/MIN/{1.73_M2}
GLUCOSE BLD-MCNC: 101 MG/DL (ref 70–99)
GLUCOSE URINE: NEGATIVE
HCT VFR BLD CALC: 22.2 % (ref 36–46)
HEMOGLOBIN: 7.1 G/DL (ref 12–16)
IMMATURE GRANULOCYTES: ABNORMAL %
INR BLD: 1.1
KETONES, URINE: NEGATIVE
LEUKOCYTE ESTERASE, URINE: ABNORMAL
LYMPHOCYTES # BLD: 8 % (ref 24–44)
MCH RBC QN AUTO: 30.6 PG (ref 26–34)
MCHC RBC AUTO-ENTMCNC: 31.9 G/DL (ref 31–37)
MCV RBC AUTO: 96.1 FL (ref 80–100)
MONOCYTES # BLD: 10 % (ref 1–7)
MUCUS: ABNORMAL
NITRITE, URINE: NEGATIVE
NRBC AUTOMATED: ABNORMAL PER 100 WBC
OTHER OBSERVATIONS UA: ABNORMAL
PDW BLD-RTO: 17.8 % (ref 11.5–14.9)
PH UA: 5.5 (ref 5–8)
PLATELET # BLD: 442 K/UL (ref 150–450)
PLATELET ESTIMATE: ABNORMAL
PMV BLD AUTO: 8.8 FL (ref 6–12)
POTASSIUM SERPL-SCNC: 5 MMOL/L (ref 3.7–5.3)
PRO-BNP: 4680 PG/ML
PROTEIN UA: NEGATIVE
PROTHROMBIN TIME: 14.3 SEC (ref 11.8–14.6)
RBC # BLD: 2.31 M/UL (ref 4–5.2)
RBC # BLD: ABNORMAL 10*6/UL
RBC UA: ABNORMAL /HPF
RENAL EPITHELIAL, UA: ABNORMAL /HPF
SEG NEUTROPHILS: 78 % (ref 36–66)
SEGMENTED NEUTROPHILS ABSOLUTE COUNT: 5.4 K/UL (ref 1.3–9.1)
SODIUM BLD-SCNC: 140 MMOL/L (ref 135–144)
SODIUM,UR: 77 MMOL/L
SPECIFIC GRAVITY UA: 1.01 (ref 1–1.03)
TOTAL CK: 41 U/L (ref 26–192)
TOTAL PROTEIN: 6.8 G/DL (ref 6.4–8.3)
TRICHOMONAS: ABNORMAL
TROPONIN INTERP: ABNORMAL
TROPONIN INTERP: ABNORMAL
TROPONIN T: ABNORMAL NG/ML
TROPONIN T: ABNORMAL NG/ML
TROPONIN, HIGH SENSITIVITY: 24 NG/L (ref 0–14)
TROPONIN, HIGH SENSITIVITY: 28 NG/L (ref 0–14)
TURBIDITY: ABNORMAL
UREA NITROGEN, UR: 262 MG/DL
URINE HGB: ABNORMAL
UROBILINOGEN, URINE: NORMAL
WBC # BLD: 7 K/UL (ref 3.5–11)
WBC # BLD: ABNORMAL 10*3/UL
WBC UA: ABNORMAL /HPF
YEAST: ABNORMAL

## 2021-02-10 PROCEDURE — 99285 EMERGENCY DEPT VISIT HI MDM: CPT

## 2021-02-10 PROCEDURE — 36415 COLL VENOUS BLD VENIPUNCTURE: CPT

## 2021-02-10 PROCEDURE — 71045 X-RAY EXAM CHEST 1 VIEW: CPT

## 2021-02-10 PROCEDURE — 84484 ASSAY OF TROPONIN QUANT: CPT

## 2021-02-10 PROCEDURE — 6370000000 HC RX 637 (ALT 250 FOR IP): Performed by: FAMILY MEDICINE

## 2021-02-10 PROCEDURE — 94760 N-INVAS EAR/PLS OXIMETRY 1: CPT

## 2021-02-10 PROCEDURE — 81001 URINALYSIS AUTO W/SCOPE: CPT

## 2021-02-10 PROCEDURE — 80053 COMPREHEN METABOLIC PANEL: CPT

## 2021-02-10 PROCEDURE — 84300 ASSAY OF URINE SODIUM: CPT

## 2021-02-10 PROCEDURE — 86900 BLOOD TYPING SEROLOGIC ABO: CPT

## 2021-02-10 PROCEDURE — 86920 COMPATIBILITY TEST SPIN: CPT

## 2021-02-10 PROCEDURE — 87088 URINE BACTERIA CULTURE: CPT

## 2021-02-10 PROCEDURE — 82550 ASSAY OF CK (CPK): CPT

## 2021-02-10 PROCEDURE — 6360000002 HC RX W HCPCS: Performed by: FAMILY MEDICINE

## 2021-02-10 PROCEDURE — 86901 BLOOD TYPING SEROLOGIC RH(D): CPT

## 2021-02-10 PROCEDURE — 86850 RBC ANTIBODY SCREEN: CPT

## 2021-02-10 PROCEDURE — 85025 COMPLETE CBC W/AUTO DIFF WBC: CPT

## 2021-02-10 PROCEDURE — 76770 US EXAM ABDO BACK WALL COMP: CPT

## 2021-02-10 PROCEDURE — 87086 URINE CULTURE/COLONY COUNT: CPT

## 2021-02-10 PROCEDURE — 2580000003 HC RX 258: Performed by: FAMILY MEDICINE

## 2021-02-10 PROCEDURE — 2700000000 HC OXYGEN THERAPY PER DAY

## 2021-02-10 PROCEDURE — 82570 ASSAY OF URINE CREATININE: CPT

## 2021-02-10 PROCEDURE — 83880 ASSAY OF NATRIURETIC PEPTIDE: CPT

## 2021-02-10 PROCEDURE — 84540 ASSAY OF URINE/UREA-N: CPT

## 2021-02-10 PROCEDURE — 87186 SC STD MICRODIL/AGAR DIL: CPT

## 2021-02-10 PROCEDURE — 85610 PROTHROMBIN TIME: CPT

## 2021-02-10 PROCEDURE — 93005 ELECTROCARDIOGRAM TRACING: CPT | Performed by: EMERGENCY MEDICINE

## 2021-02-10 PROCEDURE — 94640 AIRWAY INHALATION TREATMENT: CPT

## 2021-02-10 PROCEDURE — 1200000000 HC SEMI PRIVATE

## 2021-02-10 RX ORDER — LEVOTHYROXINE SODIUM 0.12 MG/1
125 TABLET ORAL DAILY
Status: DISCONTINUED | OUTPATIENT
Start: 2021-02-10 | End: 2021-02-12 | Stop reason: HOSPADM

## 2021-02-10 RX ORDER — HYDROCODONE BITARTRATE AND ACETAMINOPHEN 5; 325 MG/1; MG/1
1 TABLET ORAL EVERY 6 HOURS PRN
Status: DISCONTINUED | OUTPATIENT
Start: 2021-02-10 | End: 2021-02-12 | Stop reason: HOSPADM

## 2021-02-10 RX ORDER — FLUTICASONE PROPIONATE 110 UG/1
1 AEROSOL, METERED RESPIRATORY (INHALATION) 2 TIMES DAILY
COMMUNITY
End: 2021-04-14 | Stop reason: DRUGHIGH

## 2021-02-10 RX ORDER — BUSPIRONE HYDROCHLORIDE 10 MG/1
10 TABLET ORAL 3 TIMES DAILY
COMMUNITY

## 2021-02-10 RX ORDER — BACLOFEN 10 MG/1
10 TABLET ORAL 3 TIMES DAILY PRN
Status: DISCONTINUED | OUTPATIENT
Start: 2021-02-10 | End: 2021-02-12 | Stop reason: HOSPADM

## 2021-02-10 RX ORDER — FERROUS SULFATE 325(65) MG
325 TABLET ORAL DAILY
Status: DISCONTINUED | OUTPATIENT
Start: 2021-02-10 | End: 2021-02-12 | Stop reason: HOSPADM

## 2021-02-10 RX ORDER — ASCORBIC ACID 500 MG
1000 TABLET ORAL DAILY
Status: DISCONTINUED | OUTPATIENT
Start: 2021-02-10 | End: 2021-02-12 | Stop reason: HOSPADM

## 2021-02-10 RX ORDER — LISINOPRIL 5 MG/1
2.5 TABLET ORAL
Status: DISCONTINUED | OUTPATIENT
Start: 2021-02-10 | End: 2021-02-10

## 2021-02-10 RX ORDER — FLUTICASONE PROPIONATE 110 UG/1
1 AEROSOL, METERED RESPIRATORY (INHALATION) 2 TIMES DAILY
Status: DISCONTINUED | OUTPATIENT
Start: 2021-02-10 | End: 2021-02-12 | Stop reason: HOSPADM

## 2021-02-10 RX ORDER — ALBUTEROL SULFATE 2.5 MG/3ML
2.5 SOLUTION RESPIRATORY (INHALATION) 2 TIMES DAILY
Status: DISCONTINUED | OUTPATIENT
Start: 2021-02-10 | End: 2021-02-12 | Stop reason: HOSPADM

## 2021-02-10 RX ORDER — HYDROCHLOROTHIAZIDE 25 MG/1
25 TABLET ORAL DAILY
Status: DISCONTINUED | OUTPATIENT
Start: 2021-02-10 | End: 2021-02-10

## 2021-02-10 RX ORDER — GUAIFENESIN 600 MG/1
1200 TABLET, EXTENDED RELEASE ORAL EVERY 6 HOURS PRN
Status: DISCONTINUED | OUTPATIENT
Start: 2021-02-10 | End: 2021-02-12 | Stop reason: HOSPADM

## 2021-02-10 RX ORDER — ALBUTEROL SULFATE 90 UG/1
2 AEROSOL, METERED RESPIRATORY (INHALATION) EVERY 6 HOURS PRN
COMMUNITY

## 2021-02-10 RX ORDER — DULOXETIN HYDROCHLORIDE 60 MG/1
60 CAPSULE, DELAYED RELEASE ORAL DAILY
Status: DISCONTINUED | OUTPATIENT
Start: 2021-02-10 | End: 2021-02-12 | Stop reason: HOSPADM

## 2021-02-10 RX ORDER — FLUTICASONE PROPIONATE 50 MCG
1 SPRAY, SUSPENSION (ML) NASAL DAILY
Status: DISCONTINUED | OUTPATIENT
Start: 2021-02-10 | End: 2021-02-12 | Stop reason: HOSPADM

## 2021-02-10 RX ORDER — BUPROPION HYDROCHLORIDE 150 MG/1
150 TABLET ORAL EVERY MORNING
Status: ON HOLD | COMMUNITY
End: 2021-02-10

## 2021-02-10 RX ORDER — ACETAMINOPHEN 325 MG/1
650 TABLET ORAL EVERY 4 HOURS PRN
Status: DISCONTINUED | OUTPATIENT
Start: 2021-02-10 | End: 2021-02-12 | Stop reason: HOSPADM

## 2021-02-10 RX ORDER — FLUTICASONE PROPIONATE 220 UG/1
2 AEROSOL, METERED RESPIRATORY (INHALATION) 2 TIMES DAILY
Status: DISCONTINUED | OUTPATIENT
Start: 2021-02-10 | End: 2021-02-10 | Stop reason: SDUPTHER

## 2021-02-10 RX ORDER — HYDROCODONE BITARTRATE AND ACETAMINOPHEN 5; 325 MG/1; MG/1
1 TABLET ORAL EVERY 6 HOURS PRN
Status: ON HOLD | COMMUNITY
End: 2021-02-12 | Stop reason: HOSPADM

## 2021-02-10 RX ORDER — BUSPIRONE HYDROCHLORIDE 10 MG/1
10 TABLET ORAL 3 TIMES DAILY
Status: DISCONTINUED | OUTPATIENT
Start: 2021-02-10 | End: 2021-02-12 | Stop reason: HOSPADM

## 2021-02-10 RX ORDER — CLONAZEPAM 0.5 MG/1
0.5 TABLET ORAL 3 TIMES DAILY PRN
Status: DISCONTINUED | OUTPATIENT
Start: 2021-02-10 | End: 2021-02-12 | Stop reason: HOSPADM

## 2021-02-10 RX ORDER — SODIUM CHLORIDE 0.9 % (FLUSH) 0.9 %
10 SYRINGE (ML) INJECTION EVERY 12 HOURS SCHEDULED
Status: DISCONTINUED | OUTPATIENT
Start: 2021-02-10 | End: 2021-02-12 | Stop reason: HOSPADM

## 2021-02-10 RX ORDER — FLUTICASONE PROPIONATE 220 UG/1
2 AEROSOL, METERED RESPIRATORY (INHALATION) 2 TIMES DAILY
COMMUNITY

## 2021-02-10 RX ORDER — GUAIFENESIN 600 MG/1
1200 TABLET, EXTENDED RELEASE ORAL EVERY 6 HOURS PRN
COMMUNITY

## 2021-02-10 RX ORDER — ACETAMINOPHEN 325 MG/1
650 TABLET ORAL EVERY 4 HOURS PRN
Status: DISCONTINUED | OUTPATIENT
Start: 2021-02-10 | End: 2021-02-10 | Stop reason: SDUPTHER

## 2021-02-10 RX ORDER — SODIUM CHLORIDE 0.9 % (FLUSH) 0.9 %
10 SYRINGE (ML) INJECTION PRN
Status: DISCONTINUED | OUTPATIENT
Start: 2021-02-10 | End: 2021-02-12 | Stop reason: HOSPADM

## 2021-02-10 RX ORDER — DILTIAZEM HYDROCHLORIDE 120 MG/1
120 CAPSULE, COATED, EXTENDED RELEASE ORAL DAILY
Status: DISCONTINUED | OUTPATIENT
Start: 2021-02-10 | End: 2021-02-11

## 2021-02-10 RX ORDER — PANTOPRAZOLE SODIUM 40 MG/1
40 TABLET, DELAYED RELEASE ORAL
Status: DISCONTINUED | OUTPATIENT
Start: 2021-02-11 | End: 2021-02-12 | Stop reason: HOSPADM

## 2021-02-10 RX ORDER — TIOTROPIUM BROMIDE AND OLODATEROL 3.124; 2.736 UG/1; UG/1
2 SPRAY, METERED RESPIRATORY (INHALATION) DAILY
Status: ON HOLD | COMMUNITY
End: 2021-02-10

## 2021-02-10 RX ORDER — LEVALBUTEROL TARTRATE 45 UG/1
1 AEROSOL, METERED ORAL EVERY 4 HOURS PRN
Status: ON HOLD | COMMUNITY
End: 2021-02-12 | Stop reason: HOSPADM

## 2021-02-10 RX ORDER — ATORVASTATIN CALCIUM 10 MG/1
10 TABLET, FILM COATED ORAL DAILY
Status: DISCONTINUED | OUTPATIENT
Start: 2021-02-10 | End: 2021-02-12 | Stop reason: HOSPADM

## 2021-02-10 RX ORDER — TAMSULOSIN HYDROCHLORIDE 0.4 MG/1
0.4 CAPSULE ORAL DAILY
Status: DISCONTINUED | OUTPATIENT
Start: 2021-02-10 | End: 2021-02-12 | Stop reason: HOSPADM

## 2021-02-10 RX ADMIN — ATORVASTATIN CALCIUM 10 MG: 10 TABLET, FILM COATED ORAL at 22:10

## 2021-02-10 RX ADMIN — HYDROCODONE BITARTRATE AND ACETAMINOPHEN 1 TABLET: 5; 325 TABLET ORAL at 22:10

## 2021-02-10 RX ADMIN — BUSPIRONE HYDROCHLORIDE 10 MG: 10 TABLET ORAL at 22:06

## 2021-02-10 RX ADMIN — METOPROLOL TARTRATE 25 MG: 25 TABLET, FILM COATED ORAL at 22:07

## 2021-02-10 RX ADMIN — FLUTICASONE PROPIONATE 1 PUFF: 110 AEROSOL, METERED RESPIRATORY (INHALATION) at 22:06

## 2021-02-10 RX ADMIN — CLONAZEPAM 0.5 MG: 0.5 TABLET ORAL at 22:10

## 2021-02-10 RX ADMIN — ALBUTEROL SULFATE 2.5 MG: 2.5 SOLUTION RESPIRATORY (INHALATION) at 19:07

## 2021-02-10 RX ADMIN — SODIUM CHLORIDE, PRESERVATIVE FREE 10 ML: 5 INJECTION INTRAVENOUS at 22:11

## 2021-02-10 ASSESSMENT — ENCOUNTER SYMPTOMS: SHORTNESS OF BREATH: 1

## 2021-02-10 ASSESSMENT — PAIN SCALES - GENERAL: PAINLEVEL_OUTOF10: 6

## 2021-02-10 ASSESSMENT — PAIN DESCRIPTION - LOCATION
LOCATION: GENERALIZED
LOCATION: GENERALIZED

## 2021-02-10 ASSESSMENT — PAIN DESCRIPTION - PAIN TYPE: TYPE: ACUTE PAIN

## 2021-02-10 NOTE — ED NOTES
Patient transported to floor with this RN.  Left in care of [facility], RN     Jacqueline Chang RN  02/10/21 5759

## 2021-02-10 NOTE — TELEPHONE ENCOUNTER
See inpatient notes from 1/22/21, per Dr. Roro Brooks, patient to be scheduled for outpatient capsule endoscopy. Patient was consulted first by Dr. Herman Redd.

## 2021-02-10 NOTE — ED NOTES
Patient refusing admission at this time. Patient requesting to speak with son. Writer spoke with with son, Thao Delgadillo, who states that patient should be staying if the doctor recommends it. Patient spoke with son Vern Hwang and still refused admission. Patient requesting to get dressed and leave despitenot having a ride. Patient states \"my son is going to call child protective services and I want to be dressed when they get here to take me. \" Writer explained that son spoke about calling adult protective services and that they would not be coming to pick her up. Dr. Rufino Ford notified and at bedside with patient. Patient stating that she is peeing so her kidney must be working. Writer explained that urine production is not the sole indicator for kidney function. Patient eventually agreeable to stay for admission.       Phillip Ly RN  02/10/21 2794

## 2021-02-10 NOTE — ED TRIAGE NOTES
Mode of arrival (squad #, walk in, police, etc) : Walk In        Chief complaint(s): Shortness of breath, cough, dizziness        Arrival Note (brief scenario, treatment PTA, etc). : Pt arrives to ED c/o shortness of breath. Patient states that she was recently admitted to the hospital for anemia. Patient states that she was discharged from the hospital to a facility for rehab. Patient states that shew as was d/c from the facility to home yesterday Patient states that her shortness of breath has worsened since being home. Patient was found to be hypoxic on triage but states that she is supposed to wears 2L nasal cannula all the time. Patient states that she does not have a portable O2 tank. Patient states that her doctor had wanted her to come to the hospital a few days ago because her blood count was low but patient wanted to go home. Patient states that her doctor told her that if she got dizzy or lightheaded she needed to come to the ED. Patient does c/o some dizziness. Patient is placed on cardiac monitor and continuous pulse oximetry. C= \"Have you ever felt that you should Cut down on your drinking? \"  No  A= \"Have people Annoyed you by criticizing your drinking? \"  No  G= \"Have you ever felt bad or Guilty about your drinking? \"  No  E= \"Have you ever had a drink as an Eye-opener first thing in the morning to steady your nerves or to help a hangover? \"  No      Deferred []      Reason for deferring: N/A    *If yes to two or more: probable alcohol abuse. *

## 2021-02-10 NOTE — PROGRESS NOTES
I paged Dr. Kristie Garnica at 3:26 pm on 2/10/21. [facility]  2nd page was done at 5:45 pm on 2/10/21. Notified Dr. Valorie Price at 5:53 pm on 2/10/21. [facility]

## 2021-02-10 NOTE — ED PROVIDER NOTES
EMERGENCY DEPARTMENT ENCOUNTER    Pt Name: Fely Wasserman  MRN: [id number]  [place] 1945  Date of evaluation: 2/10/21  CHIEF COMPLAINT       Chief Complaint   Patient presents with    Cough    Shortness of Breath    Dizziness     HISTORY OF PRESENT ILLNESS     Shortness of Breath  Severity:  Severe  Onset quality:  Gradual  Duration:  1 day  Timing:  Constant  Progression:  Worsening  Chronicity:  Recurrent  Context comment:  Her oxygen concentrator is not working at home, just discharged yesterday from [address] [place] [address] rehab  Relieved by:  Oxygen  Worsened by:  Exertion  Ineffective treatments:  None tried    Has chronic anemia, on iron supplements      REVIEW OF SYSTEMS     Review of Systems   Respiratory: Positive for shortness of breath. All other systems reviewed and are negative.     PASTMEDICAL HISTORY     Past Medical History:   Diagnosis Date    A-fib [facility])     Acquired hypothyroidism     Acute encephalopathy     Acute kidney injury ([address]     Anxiety     Benign hypertension with CKD (chronic kidney disease) stage III     Chronic back pain     CKD (chronic kidney disease) stage 3, GFR 30-59 ml/min     Constipation     COPD [facility]ch[facility] obstructive [facility] disease) ([facility]     Cough     Depression     ETOH abuse     Former smoker     3 packs a day for 26 years    HA (generalized anxiety disorder)     History of GI bleed     Hyperlipidemia     Hypertension     Hypothyroid 1/18/2013    Leg pain     Normocytic anemia     Osteoarthritis     PAD (peripheral artery disease) ([facility])     Primary osteoarthritis     Pulmonary hypertension ([address]     Pure hypercholesterolemia     SIRS (systemic inflammatory response syndrome) ([facility])     Urge incontinence     Wheezing      Past Problem List  Patient Active Problem List   Diagnosis Code    Acquired hypothyroidism E03.9    Hypertension I10    Primary osteoarthritis of right knee M17.11    A-fib ([address] I48.91 rapid strep test positive   Amoxicillin 500 mg 3 x day for 10 days.  Fluids  Rest      Acute encephalopathy G93.40    SIRS (systemic inflammatory response syndrome) ([facility]) R65.10    Normocytic anemia D64.9    Pulmonary hypertension ([facility]) I27.20    COPD (chronic obstructive pulmonary disease) ([facility]) J44.9    History of GI bleed Z87.19    Constipation K59.00    HA (generalized anxiety disorder) F41.1    Lumbar radiculopathy M54.16    Lumbosacral spondylosis without myelopathy M47.817    Benign hypertension with CKD (chronic kidney disease) stage III I12.9, N18.30    CKD (chronic kidney disease) stage 3, GFR 30-59 ml/min N18.30    Alcohol withdrawal syndrome with complication ([facility]) N75.685    Moderate major depression ([facility]) F32.1    PAD (peripheral artery disease) ([facility]) I73.9    Acute kidney injury ([facility] N17.9    Obesity, Class I, BMI 30-34.9 E66.9    Acute on chronic diastolic CHF (congestive heart failure) ([facility]) I50.33    GI bleed K92.2    Weight loss R63.4    Elevated alkaline phosphatase level R74.8    Thrombocytosis ([facility]) D47.3    Iron deficiency anemia due to chronic blood loss D50.0    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 9) E66.3    Severe malnutrition ([address] E43    Renal failure N19     SURGICAL HISTORY       Past Surgical History:   Procedure Laterality Date    COLONOSCOPY      COLONOSCOPY N/A 12/2/2020    COLONOSCOPY CONTROL OF BLEEDING performed by Radha Fisher MD at [facility] Bilateral     cataracts    JOINT REPLACEMENT      Left knee on 9-11-18    THORACENTESIS  12/26/2020         UPPER GASTROINTESTINAL ENDOSCOPY N/A 12/2/2020    EGD performed by Radha Fisher MD at [facility] 12/23/2020    PUSH ENTEROSCOPY/EGD CONTROL BLEEDING performed by Radha Fisher MD at [facility]       Previous Medications ACETAMINOPHEN (TYLENOL) 325 MG TABLET    Take 650 mg by mouth every 4 hours as needed for Pain Not to exceed 3gm/24hrs    ALBUTEROL (ACCUNEB) 0.63 MG/3ML NEBULIZER SOLUTION    Take 1 ampule by nebulization every 6 hours as needed for Wheezing    ASCORBIC ACID (C-1000 PO)    Take 1 capsule by mouth daily     ATORVASTATIN (LIPITOR) 10 MG TABLET    TAKE 1 TABLET BY MOUTH ONCE DAILY    BACLOFEN (LIORESAL) 10 MG TABLET    TAKE ONE TABLET BY MOUTH THREE TIMES A DAY AS NEEDED. CLONAZEPAM (KLONOPIN) 0.5 MG TABLET    Take 1 tablet by mouth 3 times daily as needed for Anxiety for up to 30 days.     DILTIAZEM (CARDIZEM CD) 120 MG EXTENDED RELEASE CAPSULE    Take 1 capsule by mouth daily    DULOXETINE (CYMBALTA) 60 MG EXTENDED RELEASE CAPSULE    Take 1 capsule by mouth daily    FERROUS SULFATE 325 (65 FE) MG TABLET    Take 325 mg by mouth daily     FLUTICASONE (FLONASE) 50 MCG/ACT NASAL SPRAY    USE ONE SPRAY TO EACH NOSTRIL ONCE ADAY    FLUTICASONE (FLOVENT HFA) 220 MCG/ACT INHALER    Inhale 2 puffs into the lungs 2 times daily    FUROSEMIDE (LASIX) 20 MG TABLET    Take 1 tablet by mouth daily    GUAIFENESIN (MUCINEX) 600 MG EXTENDED RELEASE TABLET    Take 1 tablet by mouth 2 times daily    HYDROCHLOROTHIAZIDE (HYDRODIURIL) 25 MG TABLET    Take 1 tablet by mouth daily    HYDROCODONE-ACETAMINOPHEN 5-325 MG PO TABS, STARTER PACK,        IPRATROPIUM (ATROVENT) 0.02 % NEBULIZER SOLUTION    Take 2.5 mLs by nebulization 4 times daily    LEVALBUTEROL (XOPENEX HFA) 45 MCG/ACT INHALER    Inhale 1 puff into the lungs every 4 hours as needed for Wheezing    LEVALBUTEROL (XOPENEX) 1.25 MG/0.5ML NEBULIZER SOLUTION    Take 0.5 mLs by nebulization every 4 hours (while awake)    LEVOTHYROXINE (SYNTHROID) 125 MCG TABLET    Take 1 tablet by mouth daily    LIFT CHAIR MISC    Use daily for comfort    LISINOPRIL (PRINIVIL;ZESTRIL) 2.5 MG TABLET    Take 1 tablet by mouth Daily with lunch METOPROLOL TARTRATE (LOPRESSOR) 25 MG TABLET    Take 1 tablet by mouth 2 times daily    OMEPRAZOLE (PRILOSEC) 40 MG DELAYED RELEASE CAPSULE    TAKE 1 CAPSULE BY MOUTH EVERY MORNING BEFORE BREAKFAST    OXYGEN CONCENTRATOR    continuous    RESPIRATORY THERAPY SUPPLIES (NEBULIZER/TUBING/MOUTHPIECE) KIT    Use every 4-6 hours prn for copd    TAMSULOSIN (FLOMAX) 0.4 MG CAPSULE    TAKE 1 CAPSULE BY MOUTH DAILY     ALLERGIES     is allergic to tizanidine. FAMILY HISTORY     She indicated that her mother is . She indicated that her father is . She indicated that the status of her sister is unknown. She indicated that her brother is alive. She indicated that the status of her other is unknown. SOCIAL HISTORY       Social History     Tobacco Use    Smoking status: Former Smoker     Packs/day: 3.00     Years: 26.00     Pack years: 78.00     Types: Cigarettes    Smokeless tobacco: Never Used   Substance Use Topics    Alcohol use: Not Currently     Comment: occ    Drug use: No     PHYSICAL EXAM     INITIAL VITALS: BP (!) 123/43   Pulse 93   Temp 97.9 °F (36.6 °C) (Oral)   Resp 28   Ht 5' 5\" (1.651 m)   Wt 165 lb (74.8 kg)   SpO2 100%   BMI 27.46 kg/m²    Physical Exam  Vitals signs reviewed. Constitutional:       General: She is not in acute distress. Appearance: Normal appearance. She is well-developed. She is not diaphoretic. HENT:      Head: Normocephalic and atraumatic. Right Ear: External ear normal.      Left Ear: External ear normal.      Nose: Nose normal. No congestion. Mouth/Throat:      Mouth: Mucous membranes are moist.      Pharynx: Oropharynx is clear. Eyes:      General:         Right eye: No discharge. Left eye: No discharge. Conjunctiva/sclera: Conjunctivae normal.      Pupils: Pupils are equal, round, and reactive to light. Neck:      Musculoskeletal: Normal range of motion and neck supple. Trachea: No tracheal deviation. Cardiovascular:      Rate and Rhythm: Normal rate and regular rhythm. Pulses: Normal pulses. Heart sounds: Normal heart sounds. Pulmonary:      Effort: Pulmonary effort is normal. No respiratory distress. Breath sounds: Normal breath sounds. No stridor. No wheezing or rales. Abdominal:      Palpations: Abdomen is soft. Tenderness: There is no abdominal tenderness. There is no guarding or rebound. Musculoskeletal: Normal range of motion. General: No tenderness or deformity. Skin:     General: Skin is warm and dry. Capillary Refill: Capillary refill takes less than 2 seconds. Findings: No erythema or rash. Neurological:      General: No focal deficit present. Mental Status: She is alert and oriented to person, place, and time. Cranial Nerves: No cranial nerve deficit. Coordination: Coordination normal.   Psychiatric:         Mood and Affect: Mood normal.         Behavior: Behavior normal.         Thought Content: Thought content normal.         Judgment: Judgment normal.         MEDICAL DECISION MAKING:   Patient in acute renal failure, severe increase in bun and cr compared to baseline    ED Course as of Feb 10 1318   Wed Feb 10, 2021   1314 DW Dr Emma Martini for admit for renal failure and Dr Davonte Ruiz to see on floor    [WM]      ED Course User Index  [WM] Akash Ramirez MD       Procedures    DIAGNOSTIC RESULTS   EKG:All EKG's are interpreted by the Emergency Department Physician who either signs or Co-signs this chart in the absence of a cardiologist.  Atrial fibrillation, no acute ischemic changes      RADIOLOGY:All plain film, CT, MRI, and formal ultrasound images (except ED bedside ultrasound) are read by the radiologist, see reports below, unless otherwisenoted in MDM or here. XR CHEST PORTABLE   Final Result   Cardiomegaly and small bilateral pleural effusions. LABS: All lab results were reviewed by myself, and all abnormals are listed below. Labs Reviewed   CBC WITH AUTO DIFFERENTIAL - Abnormal; Notable for the following components:       Result Value    RBC 2.31 (*)     Hemoglobin 7.1 (*)     Hematocrit 22.2 (*)     RDW 17.8 (*)     Seg Neutrophils 78 (*)     Lymphocytes 8 (*)     Monocytes 10 (*)     Absolute Lymph # 0.60 (*)     All other components within normal limits   COMPREHENSIVE METABOLIC PANEL - Abnormal; Notable for the following components:    Glucose 101 (*)     BUN 33 (*)     CREATININE 2.31 (*)     Alkaline Phosphatase 140 (*)     Albumin 3.4 (*)     GFR Non- 21 (*)     GFR  25 (*)     All other components within normal limits   TROPONIN - Abnormal; Notable for the following components:    Troponin, High Sensitivity 28 (*)     All other components within normal limits   TROPONIN - Abnormal; Notable for the following components:    Troponin, High Sensitivity 24 (*)     All other components within normal limits   BRAIN NATRIURETIC PEPTIDE - Abnormal; Notable for the following components:    Pro-BNP 4,680 (*)     All other components within normal limits   PROTIME-INR   TYPE AND SCREEN       EMERGENCY DEPARTMENTCOURSE:         Vitals:    Vitals:    02/10/21 1115 02/10/21 1130 02/10/21 1200 02/10/21 1215   BP: (!) 122/54 (!) 123/43     Pulse: 99 108 90 93   Resp: (!) 33 29 30 28   Temp:       TempSrc:       SpO2: 92% 97% 100% 100%   Weight:       Height:           The patient was given the following medications while in the emergency department:  No orders of the defined types were placed in this encounter. CONSULTS:  IP CONSULT TO FAMILY MEDICINE  IP CONSULT TO NEPHROLOGY    FINAL IMPRESSION      1. Acute kidney injury ([facility]    2. Hypoxia          DISPOSITION/PLAN   DISPOSITION Admitted 02/10/2021 01:17:47 PM      PATIENT REFERRED TO:  No follow-up provider specified.   DISCHARGE MEDICATIONS:  New Prescriptions

## 2021-02-10 NOTE — PROGRESS NOTES
Medication History completed:    New medications: albuterol inhaler, Eliquis, bupropion XL, Stiolto Respimat    Medications discontinued: guaifenesin ER, Norco, levalbuterol inhaler, levalbuterol nebulizer solution    Changes to dosing:   Flovent inhaler changed to 110 mcg/act inhale 1 puff twice daily    Stated allergies: As listed    Other pertinent information: Medications confirmed with Juan Francisco Tian.      Thank you,  Abdirizak Steel, PharmD, BCPS  [phone]

## 2021-02-10 NOTE — FLOWSHEET NOTE
Pt resting in bed. Dr Cynthia Moss called back and new orders obtained. CK, UA, Urine NA, Creat, Urea  Post void residual check bladder scan if >250 ml then place ahn  For retention.   Kidney ultrasound

## 2021-02-11 PROBLEM — N30.00 ACUTE CYSTITIS WITHOUT HEMATURIA: Status: ACTIVE | Noted: 2021-02-11

## 2021-02-11 LAB
ANION GAP SERPL CALCULATED.3IONS-SCNC: 5 MMOL/L (ref 9–17)
BUN BLDV-MCNC: 25 MG/DL (ref 8–23)
BUN/CREAT BLD: ABNORMAL (ref 9–20)
CALCIUM SERPL-MCNC: 8.8 MG/DL (ref 8.6–10.4)
CHLORIDE BLD-SCNC: 102 MMOL/L (ref 98–107)
CO2: 36 MMOL/L (ref 20–31)
CREAT SERPL-MCNC: 1.59 MG/DL (ref 0.5–0.9)
CREATININE URINE: 137.9 MG/DL (ref 28–217)
EKG ATRIAL RATE: 91 BPM
EKG Q-T INTERVAL: 348 MS
EKG QRS DURATION: 56 MS
EKG QTC CALCULATION (BAZETT): 421 MS
EKG R AXIS: 53 DEGREES
EKG T AXIS: 46 DEGREES
EKG VENTRICULAR RATE: 88 BPM
EOSINOPHIL,URINE: NORMAL
GFR AFRICAN AMERICAN: 39 ML/MIN
GFR NON-AFRICAN AMERICAN: 32 ML/MIN
GFR SERPL CREATININE-BSD FRML MDRD: ABNORMAL ML/MIN/{1.73_M2}
GFR SERPL CREATININE-BSD FRML MDRD: ABNORMAL ML/MIN/{1.73_M2}
GLUCOSE BLD-MCNC: 108 MG/DL (ref 70–99)
HCT VFR BLD CALC: 22.2 % (ref 36–46)
HEMOGLOBIN: 6.9 G/DL (ref 12–16)
MCH RBC QN AUTO: 30.5 PG (ref 26–34)
MCHC RBC AUTO-ENTMCNC: 31.2 G/DL (ref 31–37)
MCV RBC AUTO: 97.7 FL (ref 80–100)
NRBC AUTOMATED: ABNORMAL PER 100 WBC
PDW BLD-RTO: 17.9 % (ref 11.5–14.9)
PLATELET # BLD: 409 K/UL (ref 150–450)
PMV BLD AUTO: 7.6 FL (ref 6–12)
POTASSIUM SERPL-SCNC: 4.6 MMOL/L (ref 3.7–5.3)
RBC # BLD: 2.28 M/UL (ref 4–5.2)
SODIUM BLD-SCNC: 143 MMOL/L (ref 135–144)
TOTAL PROTEIN, URINE: 26 MG/DL
URINE TOTAL PROTEIN CREATININE RATIO: 0.19 (ref 0–0.2)
WBC # BLD: 6.5 K/UL (ref 3.5–11)

## 2021-02-11 PROCEDURE — 2580000003 HC RX 258: Performed by: FAMILY MEDICINE

## 2021-02-11 PROCEDURE — 2580000003 HC RX 258: Performed by: INTERNAL MEDICINE

## 2021-02-11 PROCEDURE — 87205 SMEAR GRAM STAIN: CPT

## 2021-02-11 PROCEDURE — 2700000000 HC OXYGEN THERAPY PER DAY

## 2021-02-11 PROCEDURE — 1200000000 HC SEMI PRIVATE

## 2021-02-11 PROCEDURE — 85027 COMPLETE CBC AUTOMATED: CPT

## 2021-02-11 PROCEDURE — 82570 ASSAY OF URINE CREATININE: CPT

## 2021-02-11 PROCEDURE — 51798 US URINE CAPACITY MEASURE: CPT

## 2021-02-11 PROCEDURE — 80048 BASIC METABOLIC PNL TOTAL CA: CPT

## 2021-02-11 PROCEDURE — 84156 ASSAY OF PROTEIN URINE: CPT

## 2021-02-11 PROCEDURE — 93010 ELECTROCARDIOGRAM REPORT: CPT | Performed by: INTERNAL MEDICINE

## 2021-02-11 PROCEDURE — 36430 TRANSFUSION BLD/BLD COMPNT: CPT

## 2021-02-11 PROCEDURE — 6370000000 HC RX 637 (ALT 250 FOR IP): Performed by: FAMILY MEDICINE

## 2021-02-11 PROCEDURE — 99223 1ST HOSP IP/OBS HIGH 75: CPT | Performed by: FAMILY MEDICINE

## 2021-02-11 PROCEDURE — 94761 N-INVAS EAR/PLS OXIMETRY MLT: CPT

## 2021-02-11 PROCEDURE — 6370000000 HC RX 637 (ALT 250 FOR IP): Performed by: INTERNAL MEDICINE

## 2021-02-11 PROCEDURE — 36415 COLL VENOUS BLD VENIPUNCTURE: CPT

## 2021-02-11 PROCEDURE — 94640 AIRWAY INHALATION TREATMENT: CPT

## 2021-02-11 PROCEDURE — P9016 RBC LEUKOCYTES REDUCED: HCPCS

## 2021-02-11 PROCEDURE — 86900 BLOOD TYPING SEROLOGIC ABO: CPT

## 2021-02-11 PROCEDURE — 6360000002 HC RX W HCPCS: Performed by: FAMILY MEDICINE

## 2021-02-11 PROCEDURE — 82043 UR ALBUMIN QUANTITATIVE: CPT

## 2021-02-11 RX ORDER — FUROSEMIDE 10 MG/ML
20 INJECTION INTRAMUSCULAR; INTRAVENOUS DAILY
Status: DISCONTINUED | OUTPATIENT
Start: 2021-02-12 | End: 2021-02-12 | Stop reason: HOSPADM

## 2021-02-11 RX ORDER — SODIUM CHLORIDE 9 MG/ML
INJECTION, SOLUTION INTRAVENOUS PRN
Status: COMPLETED | OUTPATIENT
Start: 2021-02-11 | End: 2021-02-11

## 2021-02-11 RX ORDER — FUROSEMIDE 10 MG/ML
20 INJECTION INTRAMUSCULAR; INTRAVENOUS 2 TIMES DAILY
Status: DISCONTINUED | OUTPATIENT
Start: 2021-02-11 | End: 2021-02-11

## 2021-02-11 RX ORDER — MIDODRINE HYDROCHLORIDE 2.5 MG/1
2.5 TABLET ORAL
Status: DISCONTINUED | OUTPATIENT
Start: 2021-02-11 | End: 2021-02-12 | Stop reason: HOSPADM

## 2021-02-11 RX ORDER — SODIUM CHLORIDE 9 MG/ML
INJECTION, SOLUTION INTRAVENOUS CONTINUOUS
Status: DISCONTINUED | OUTPATIENT
Start: 2021-02-11 | End: 2021-02-12

## 2021-02-11 RX ORDER — CIPROFLOXACIN 500 MG/1
500 TABLET, FILM COATED ORAL
Status: DISCONTINUED | OUTPATIENT
Start: 2021-02-11 | End: 2021-02-12

## 2021-02-11 RX ORDER — DILTIAZEM HYDROCHLORIDE 120 MG/1
120 CAPSULE, COATED, EXTENDED RELEASE ORAL DAILY PRN
Status: DISCONTINUED | OUTPATIENT
Start: 2021-02-11 | End: 2021-02-12 | Stop reason: HOSPADM

## 2021-02-11 RX ADMIN — CLONAZEPAM 0.5 MG: 0.5 TABLET ORAL at 20:23

## 2021-02-11 RX ADMIN — APIXABAN 5 MG: 5 TABLET, FILM COATED ORAL at 08:05

## 2021-02-11 RX ADMIN — FLUTICASONE PROPIONATE 1 PUFF: 110 AEROSOL, METERED RESPIRATORY (INHALATION) at 20:24

## 2021-02-11 RX ADMIN — BUSPIRONE HYDROCHLORIDE 10 MG: 10 TABLET ORAL at 20:23

## 2021-02-11 RX ADMIN — APIXABAN 5 MG: 5 TABLET, FILM COATED ORAL at 22:07

## 2021-02-11 RX ADMIN — BUSPIRONE HYDROCHLORIDE 10 MG: 10 TABLET ORAL at 08:06

## 2021-02-11 RX ADMIN — CIPROFLOXACIN 500 MG: 500 TABLET, FILM COATED ORAL at 08:05

## 2021-02-11 RX ADMIN — METOPROLOL TARTRATE 25 MG: 25 TABLET, FILM COATED ORAL at 20:24

## 2021-02-11 RX ADMIN — SODIUM CHLORIDE: 9 INJECTION, SOLUTION INTRAVENOUS at 13:42

## 2021-02-11 RX ADMIN — SODIUM CHLORIDE, PRESERVATIVE FREE 10 ML: 5 INJECTION INTRAVENOUS at 08:13

## 2021-02-11 RX ADMIN — LEVOTHYROXINE SODIUM 125 MCG: 125 TABLET ORAL at 08:05

## 2021-02-11 RX ADMIN — PANTOPRAZOLE SODIUM 40 MG: 40 TABLET, DELAYED RELEASE ORAL at 06:45

## 2021-02-11 RX ADMIN — ALBUTEROL SULFATE 2.5 MG: 2.5 SOLUTION RESPIRATORY (INHALATION) at 07:03

## 2021-02-11 RX ADMIN — BACLOFEN 10 MG: 10 TABLET ORAL at 08:13

## 2021-02-11 RX ADMIN — FLUTICASONE PROPIONATE 1 SPRAY: 50 SPRAY, METERED NASAL at 08:06

## 2021-02-11 RX ADMIN — MIDODRINE HYDROCHLORIDE 2.5 MG: 2.5 TABLET ORAL at 17:00

## 2021-02-11 RX ADMIN — CLONAZEPAM 0.5 MG: 0.5 TABLET ORAL at 08:13

## 2021-02-11 RX ADMIN — ATORVASTATIN CALCIUM 10 MG: 10 TABLET, FILM COATED ORAL at 08:05

## 2021-02-11 RX ADMIN — METOPROLOL TARTRATE 25 MG: 25 TABLET, FILM COATED ORAL at 08:06

## 2021-02-11 RX ADMIN — TAMSULOSIN HYDROCHLORIDE 0.4 MG: 0.4 CAPSULE ORAL at 08:06

## 2021-02-11 RX ADMIN — HYDROCODONE BITARTRATE AND ACETAMINOPHEN 1 TABLET: 5; 325 TABLET ORAL at 08:13

## 2021-02-11 RX ADMIN — OXYCODONE HYDROCHLORIDE AND ACETAMINOPHEN 1000 MG: 500 TABLET ORAL at 08:06

## 2021-02-11 RX ADMIN — FERROUS SULFATE TAB 325 MG (65 MG ELEMENTAL FE) 325 MG: 325 (65 FE) TAB at 08:05

## 2021-02-11 RX ADMIN — ALBUTEROL SULFATE 2.5 MG: 2.5 SOLUTION RESPIRATORY (INHALATION) at 19:24

## 2021-02-11 RX ADMIN — BUSPIRONE HYDROCHLORIDE 10 MG: 10 TABLET ORAL at 13:41

## 2021-02-11 RX ADMIN — HYDROCODONE BITARTRATE AND ACETAMINOPHEN 1 TABLET: 5; 325 TABLET ORAL at 22:10

## 2021-02-11 RX ADMIN — SODIUM CHLORIDE: 9 INJECTION, SOLUTION INTRAVENOUS at 22:25

## 2021-02-11 RX ADMIN — DILTIAZEM HYDROCHLORIDE 120 MG: 120 CAPSULE, COATED, EXTENDED RELEASE ORAL at 08:05

## 2021-02-11 RX ADMIN — FLUTICASONE PROPIONATE 1 PUFF: 110 AEROSOL, METERED RESPIRATORY (INHALATION) at 08:06

## 2021-02-11 RX ADMIN — SODIUM CHLORIDE: 9 INJECTION, SOLUTION INTRAVENOUS at 10:33

## 2021-02-11 RX ADMIN — DULOXETINE HYDROCHLORIDE 60 MG: 60 CAPSULE, DELAYED RELEASE ORAL at 08:06

## 2021-02-11 ASSESSMENT — PAIN SCALES - GENERAL: PAINLEVEL_OUTOF10: 5

## 2021-02-11 NOTE — PROGRESS NOTES
Physical Therapy    DATE: 2021    NAME: Savannah Nyhan  MRN: [id number]   : 1945      Patient not seen this date for Physical Therapy due to:     Other: 21: Hgb 6.9, await updated values post transfusion      Electronically signed by Chemo Johnson PT on 2021 at 8:55 AM

## 2021-02-11 NOTE — H&P
 EYE SURGERY Bilateral     cataracts    JOINT REPLACEMENT      Left knee on 9-11-18    THORACENTESIS  12/26/2020         UPPER GASTROINTESTINAL ENDOSCOPY N/A 12/2/2020    EGD performed by Bud Mcmahan MD at [address] [place] [address] 12/23/2020    PUSH ENTEROSCOPY/EGD CONTROL BLEEDING performed by Bud Mc[facility]han MD at [facility]       Medications Prior to Admission:    Prior to Admission medications    Medication Sig Start Date End Date Taking? Authorizing Provider   albuterol sulfate HFA (PROAIR HFA) 108 (90 Base) MCG/ACT inhaler Inhale 2 puffs into the lungs every 6 hours as needed for Wheezing   Yes Historical Provider, MD   fluticasone (FLOVENT HFA) 110 MCG/ACT inhaler Inhale 1 puff into the lungs 2 times daily   Yes Historical Provider, MD   apixaban (ELIQUIS) 5 MG TABS tablet Take 5 mg by mouth 2 times daily   Yes Historical Provider, MD   busPIRone (BUSPAR) 10 MG tablet Take 10 mg by mouth 3 times daily   Yes Historical Provider, MD   levalbuterol (XOPENEX HFA) 45 MCG/ACT inhaler Inhale 1 puff into the lungs every 4 hours as needed for Wheezing   Yes Historical Provider, MD   HYDROcodone-acetaminophen (NORCO) 5-325 MG per tablet Take 1 tablet by mouth every 6 hours as needed for Pain.    Yes Historical Provider, MD   apixaban (ELIQUIS) 5 MG TABS tablet Take 5 mg by mouth 2 times daily   Yes Historical Provider, MD   fluticasone (FLOVENT HFA) 220 MCG/ACT inhaler Inhale 2 puffs into the lungs 2 times daily   Yes Historical Provider, MD   guaiFENesin (MUCINEX) 600 MG extended release tablet Take 1,200 mg by mouth every 6 hours as needed for Congestion   Yes Historical Provider, MD   albuterol (PROVENTIL) (2.5 MG/3ML) 0.083% nebulizer solution Take 2.5 mg by nebulization 2 times daily    Yes Historical Provider, MD   acetaminophen (TYLENOL) 325 MG tablet Take 650 mg by mouth every 4 hours as needed for Pain Not to exceed 3gm/24hrs   Yes Historical Provider, MD atorvastatin (LIPITOR) 10 MG tablet TAKE 1 TABLET BY MOUTH ONCE DAILY 1/11/21  Yes WILLIE Reynaga CNP   hydroCHLOROthiazide (HYDRODIURIL) 25 MG tablet Take 1 tablet by mouth daily 12/29/20  Yes Earnestine Nye MD   lisinopril (PRINIVIL;ZESTRIL) 2.5 MG tablet Take 1 tablet by mouth Daily with lunch 12/30/20  Yes Earnestine Nye MD   metoprolol tartrate (LOPRESSOR) 25 MG tablet Take 1 tablet by mouth 2 times daily 12/29/20  Yes Earnestine Nye MD   Oxygen Concentrator continuous   Yes Historical Provider, MD   tamsulosin (FLOMAX) 0.4 MG capsule TAKE 1 CAPSULE BY MOUTH DAILY 12/15/20  Yes WILLIE Reynaga CNP   omeprazole (PRILOSEC) 40 MG delayed release capsule TAKE 1 CAPSULE BY MOUTH EVERY MORNING BEFORE BREAKFAST 12/14/20  Yes JennifererWILLIE Dupree - CNP   DULoxetine (CYMBALTA) 60 MG extended release capsule Take 1 capsule by mouth daily 12/14/20  Yes JennifererWILLIE Dupree - CNP   baclofen (LIORESAL) 10 MG tablet TAKE ONE TABLET BY MOUTH THREE TIMES A DAY AS NEEDED. 11/30/20  Yes [place]rer[place] WILLIE Lowe - CNP   Lift Chair MISC Use daily for comfort 11/30/20  Yes WILLIE Reynaga - CNP   levothyroxine (SYNTHROID) 125 MCG tablet Take 1 tablet by mouth daily 11/30/20  Yes JennifererWILLIE Dupree - CNP   clonazePAM (KLONOPIN) 0.5 MG tablet Take 1 tablet by mouth 3 times daily as needed for Anxiety for up to 30 days.  11/17/20 2/10/21 Yes JennifererWILLIE Dupree - BOBY   dilTIAZem (CARDIZEM CD) 120 MG extended release capsule Take 1 capsule by mouth daily 10/19/20  Yes WILLIE Reynaga - BOBY   fluticasone [facility]) 50 MCG/ACT nasal spray USE ONE SPRAY TO EACH NOSTRIL ONCE ADAY 9/4/20  Yes WILLIE Mendoza - CNP   Respiratory Therapy Supplies (NEBULIZER/TUBING/MOUTHPIECE) KIT Use every 4-6 hours prn for copd 6/15/20  Yes Jennifererle WILLIE Lowe - CNP   Ascorbic Acid (C-1000 PO) Take 1 capsule by mouth daily    Yes Historical Provider, MD ferrous sulfate 325 (65 Fe) MG tablet Take 325 mg by mouth daily  8/30/18  Yes Historical Provider, MD       Allergies:  Tizanidine    Social History:  The patient currently lives at home but just discharged from SNF    TOBACCO:   reports that she has quit smoking. Her smoking use included cigarettes. She has a 78.00 pack-year smoking history. She has never used smokeless tobacco.  ETOH:   reports previous alcohol use. Review of Systems - General ROS: positive for  - overweight  Psychological ROS: positive for - anxiety and depression  Ophthalmic ROS: negative  ENT ROS: negative  Endocrine ROS: positive for - hypothyroid  Respiratory ROS: positive for - shortness of breath  Cardiovascular ROS: positive for - irregular heartbeat  Gastrointestinal ROS: negative  Musculoskeletal ROS: positive for - joint stiffness  Neurological ROS: negative      Family History:          Problem Relation Age of Onset    Heart Disease Mother     COPD Mother     Emphysema Sister     Cancer Other         Cousin - breast cancer       PHYSICAL EXAM:    BP (!) 114/52   Pulse 100   Temp 98.2 °F (36.8 °C) (Oral)   Resp 16   Ht 5' 5\" (1.651 m)   Wt 173 lb 1 oz (78.5 kg)   SpO2 95%   BMI 28.80 kg/m²     General appearance: No apparent distress appears stated age and cooperative. HEENT Normal cephalic, atraumatic without obvious deformity. Pupils equal, round, and reactive to light. Extra ocular muscles intact. Conjunctivae/corneas clear. Neck: Supple, No jugular venous distention/bruits. Trachea midline without thyromegaly or adenopathy with full range of motion. Lungs: Clear to auscultation, bilaterally without Rales/Wheezes/Rhonchi with good respiratory effort. Heart: Regular rate and rhythm with Normal S1/S2 without murmurs, rubs or gallops, point of maximum impulse non-displaced  Abdomen: Soft, non-tender or non-distended without rigidity or guarding and positive bowel sounds all four quadrants. Extremities: No clubbing, cyanosis, or edema bilaterally. Full range of motion without deformity and normal gait intact. Skin: Skin color, texture, turgor normal.  No rashes or lesions. Neurologic: Alert and oriented X 3, neurovascularly intact with sensory/motor intact upper extremities/lower extremities, bilaterally. Cranial nerves: II-XII intact, grossly non-focal.  Mental status: Alert, oriented, thought content appropriate. CXR:  I have reviewed the CXR with the following interpretation: cardiomegaly and small bilateral pleural effusions  EKG:  I have reviewed the EKG with the following interpretation: A. Fib with controlled rate    CBC   Recent Labs     02/08/21  1152 02/10/21  1020   WBC 6.8 7.0   HGB 7.0* 7.1*   HCT 24.9* 22.2*    442      RENAL  Recent Labs     02/10/21  1020      K 5.0   CL 98   CO2 29   BUN 33*   CREATININE 2.31*     LFT'S  Recent Labs     02/10/21  1020   AST 14   ALT 11   BILITOT 0.34   ALKPHOS 140*     COAG  Recent Labs     02/10/21  1020   INR 1.1     CARDIAC ENZYMES  Recent Labs     02/10/21  1858   CKTOTAL 41       U/A:    Lab Results   Component Value Date    NITRITE Negative 01/31/2020    COLORU YELLOW 02/10/2021    WBCUA 10 TO 20 02/10/2021    RBCUA 0 TO 2 02/10/2021    MUCUS NOT REPORTED 02/10/2021    BACTERIA FEW 02/10/2021    CLARITYU Clear 01/31/2020    SPECGRAV 1.007 02/10/2021    LEUKOCYTESUR MOD 02/10/2021    BLOODU Negative 01/31/2020    GLUCOSEU NEGATIVE 02/10/2021    AMORPHOUS NOT REPORTED 02/10/2021       ABG    Lab Results   Component Value Date    GXN4EWM 24.9 09/13/2018    P5GQVKIJ 93.0 09/13/2018    PHART 7.330 09/13/2018    IDK5VVJ 47.3 09/13/2018    PO2ART 74.8 09/13/2018           Active Hospital Problems    Diagnosis Date Noted    Acute cystitis without hematuria [N30.00] 02/11/2021    Renal failure [N19] 02/10/2021    Acute renal failure (ARF) ([address] [N17.9] 02/10/2021    Overweight (BMI 25.0-29. 9) [E66.3] 01/20/2021

## 2021-02-11 NOTE — PLAN OF CARE
Problem: Falls - Risk of:  Goal: Will remain free from falls  Description: Will remain free from falls  2/11/2021 1510 by Carlota Espinoza RN  Outcome: Ongoing  2/11/2021 0816 by Darci Whitehead RN  Outcome: Ongoing  Goal: Absence of physical injury  Description: Absence of physical injury  2/11/2021 1510 by Carlota Espinoza RN  Outcome: Ongoing  2/11/2021 0816 by Darci Whitehead RN  Outcome: Ongoing

## 2021-02-11 NOTE — CONSULTS
Department of Internal Medicine  Nephrology Niharika Montanez MD   Consult Note      SUBJECTIVE: This is a 70 y.o. female with a significant past medical history of ex chronic alcohol abuse, chronic obstructive pulmonary disease [ex cigarette smoker with 3 packs a day for 26 years], chronic atrial fibrillation, acquired hypothyroidism and chronic kidney disease stage II, who presented to the emergency department yesterday 2/10/2021 with complaints of shortness of breath, cough and dizziness. Vital signs were stable at presentation with blood pressure 123/43 mmHg and temperature 97.9 °F with oxygen saturation 100%. Laboratory studies at presentation were remarkable for hemoglobin 7.1 g/dL and BUN/creatinine 33/2.31 mg/dL and hence nephrology consultation. She had recently seen Dr. Anastasia Phillips in the office on 2/9/2021 and was restarted on furosemide 20 mg p.o. daily. Serum creatinine was 0.72 mg/dL on 2/2/2021. Chest x-ray obtained at presentation showed cardiomegaly with bilateral small pleural effusions. Will globin this morning dropped to 6.9 g/dL and patient is currently receiving transfusion of packed red blood cells. She does not have chest pain or palpitations. She denies bloody or dark stools and does not have hematemesis. Last colonoscopy was 4 years ago and it was apparently unremarkable.     Tizanidine    Past Medical History:   Diagnosis Date    A-fib ([address]     Acquired hypothyroidism     Acute encephalopathy     Acute kidney injury ([address]     Anxiety     Benign hypertension with CKD (chronic kidney disease) stage III     Chronic back pain     CKD (chronic kidney disease) stage 3, GFR 30-59 ml/min     Constipation     COPD (chronic obstructive pulmonary disease) (HCC)     Cough     Depression     ETOH abuse     Former smoker     3 packs a day for 26 years    HA (generalized anxiety disorder)     History of GI bleed     Hyperlipidemia     Hypertension  Hypothyroid 1/18/2013    Leg pain     Normocytic anemia     Osteoarthritis     PAD (peripheral artery disease) (HCC)     Primary osteoarthritis     Pulmonary hypertension (HCC)     Pure hypercholesterolemia     SIRS (systemic inflammatory response syndrome) (HCC)     Urge incontinence     Wheezing        Scheduled Meds:   ciprofloxacin  500 mg Oral Daily    sodium chloride flush  10 mL Intravenous 2 times per day    ferrous sulfate  325 mg Oral Daily    ascorbic acid  1,000 mg Oral Daily    fluticasone  1 spray Each Nostril Daily    dilTIAZem  120 mg Oral Daily    levothyroxine  125 mcg Oral Daily    pantoprazole  40 mg Oral QAM AC    DULoxetine  60 mg Oral Daily    tamsulosin  0.4 mg Oral Daily    metoprolol tartrate  25 mg Oral BID    atorvastatin  10 mg Oral Daily    albuterol  2.5 mg Nebulization BID    fluticasone  1 puff Inhalation BID    apixaban  5 mg Oral BID    busPIRone  10 mg Oral TID     Continuous Infusions:   sodium chloride       PRN Meds:.sodium chloride, sodium chloride flush, clonazePAM, baclofen, acetaminophen, HYDROcodone-acetaminophen, guaiFENesin    Family History   Problem Relation Age of Onset    Heart Disease Mother     COPD Mother     Emphysema Sister     Cancer Other         Cousin - breast cancer        Social History     Socioeconomic History    Marital status:      Spouse name: None    Number of children: None    Years of education: None    Highest education level: None   Occupational History    None   Social Needs    Financial resource strain: None    Food insecurity     Worry: None     Inability: None    Transportation needs     Medical: None     Non-medical: None   Tobacco Use    Smoking status: Former Smoker     Packs/day: 3.00     Years: 26.00     Pack years: 78.00     Types: Cigarettes    Smokeless tobacco: Never Used   Substance and Sexual Activity    Alcohol use: Not Currently     Comment: occ    Drug use:  No CO2 29 36*   BUN 33* 25*   CREATININE 2.31* 1.59*   GLUCOSE 101* 108*       Lab Results   Component Value Date    NITRU NEGATIVE 02/10/2021    COLORU YELLOW 02/10/2021    PHUR 5.5 02/10/2021    WBCUA 10 TO 20 02/10/2021    RBCUA 0 TO 2 02/10/2021    MUCUS NOT REPORTED 02/10/2021    TRICHOMONAS NOT REPORTED 02/10/2021    YEAST NOT REPORTED 02/10/2021    BACTERIA FEW 02/10/2021    CLARITYU Clear 01/31/2020    SPECGRAV 1.007 02/10/2021    LEUKOCYTESUR MOD 02/10/2021    UROBILINOGEN Normal 02/10/2021    BILIRUBINUR NEGATIVE 02/10/2021    BILIRUBINUR 0 01/31/2020    BLOODU Negative 01/31/2020    GLUCOSEU NEGATIVE 02/10/2021    KETUA NEGATIVE 02/10/2021    AMORPHOUS NOT REPORTED 02/10/2021     Urine Sodium:     Lab Results   Component Value Date    ANGÉLICA 77 02/10/2021     Urine Potassium:    Lab Results   Component Value Date    KUR 35.7 07/20/2020     Urine Chloride:    Lab Results   Component Value Date    CLUR 43 07/20/2020     Urine Creatinine:     Lab Results   Component Value Date    LABCREA 48.8 02/10/2021     IMPRESSION/RECOMMENDATIONS:    1. Acute kidney injury - most consistent with prerenal azotemia. During a recent nephrology office visit, she was restarted on furosemide 20 mg p.o. every other day. Obstructive uropathy is considered less likely. She still has peripheral edema. Plan: Gentle hydration with IV fluid 0.9 normal saline at 50 mL/h. Random urine protein/creatinine ratio. Random urine electrolytes. MERA and complements. Avoid nephrotoxic agents. Basic metabolic profile daily. 2.  Normocytic anemia - rule out GI bleed. Agree with transfusion of packed red blood cells. Check iron studies. 3.  Shortness of breath - likely due to severe anemia as well as acute exacerbation of COPD but pulmonary edema will need to be ruled out. IV Lasix 20 mg daily. 4.  Hypotension - rule out hypovolemia due to occult bleed. Although patient is on apixaban anticoagulation, renal ultrasound yesterday did not show retroperitoneal bleed. Prognosis is guarded.     Evi Claros MD FACP  Attending Nephrologist  2/11/2021 10:08 AM

## 2021-02-11 NOTE — FLOWSHEET NOTE
02/11/21 1145   Encounter Summary   Services provided to: Patient   Referral/Consult From: Marlene   Complexity of Encounter Low   Length of Encounter 15 minutes   Routine   Assessment Sleeping   Intervention Ruby

## 2021-02-11 NOTE — PROGRESS NOTES
ERICA sent referral to [facility] [employer] Pt was recently at the facility and discharged home. Facility will accept pt again but she will need a new pre-cert. Pt/ot notes are still needed. 7000 started.

## 2021-02-11 NOTE — PROGRESS NOTES
Nutrition Note    Chart reviewed due to Renal diet. Pt would benefit from 5 carbohydrate choices per tray No Added Salt diet. Will monitor need for K+ restriction.     Electronically signed by Faustino Goodwin RD, KAMALJIT on 2/11/21 at 12:01 PM EST    Contact: [phone]

## 2021-02-12 VITALS
HEIGHT: 65 IN | HEART RATE: 95 BPM | RESPIRATION RATE: 16 BRPM | WEIGHT: 173.06 LBS | OXYGEN SATURATION: 100 % | SYSTOLIC BLOOD PRESSURE: 111 MMHG | TEMPERATURE: 97.7 F | BODY MASS INDEX: 28.83 KG/M2 | DIASTOLIC BLOOD PRESSURE: 47 MMHG

## 2021-02-12 PROBLEM — I50.32 CHRONIC DIASTOLIC CHF (CONGESTIVE HEART FAILURE) (HCC): Status: ACTIVE | Noted: 2020-07-21

## 2021-02-12 LAB
ABO/RH: NORMAL
ANION GAP SERPL CALCULATED.3IONS-SCNC: 7 MMOL/L (ref 9–17)
ANTIBODY SCREEN: NEGATIVE
ARM BAND NUMBER: NORMAL
BLD PROD TYP BPU: NORMAL
BUN BLDV-MCNC: 22 MG/DL (ref 8–23)
BUN/CREAT BLD: ABNORMAL (ref 9–20)
CALCIUM SERPL-MCNC: 8.5 MG/DL (ref 8.6–10.4)
CHLORIDE BLD-SCNC: 102 MMOL/L (ref 98–107)
CO2: 35 MMOL/L (ref 20–31)
COMPLEMENT C3: 97 MG/DL (ref 90–180)
COMPLEMENT C4: 23 MG/DL (ref 10–40)
CREAT SERPL-MCNC: 1.27 MG/DL (ref 0.5–0.9)
CREATININE URINE: 134.5 MG/DL (ref 28–217)
CROSSMATCH RESULT: NORMAL
CULTURE: ABNORMAL
DISPENSE STATUS BLOOD BANK: NORMAL
EXPIRATION DATE: NORMAL
FREE KAPPA/LAMBDA RATIO: 2.56 (ref 0.26–1.65)
GFR AFRICAN AMERICAN: 50 ML/MIN
GFR NON-AFRICAN AMERICAN: 41 ML/MIN
GFR SERPL CREATININE-BSD FRML MDRD: ABNORMAL ML/MIN/{1.73_M2}
GFR SERPL CREATININE-BSD FRML MDRD: ABNORMAL ML/MIN/{1.73_M2}
GLUCOSE BLD-MCNC: 112 MG/DL (ref 70–99)
HCT VFR BLD CALC: 25.4 % (ref 36–46)
HEMOGLOBIN: 7.8 G/DL (ref 12–16)
IRON SATURATION: 8 % (ref 20–55)
IRON: 25 UG/DL (ref 37–145)
KAPPA FREE LIGHT CHAINS QNT: 5.56 MG/DL (ref 0.37–1.94)
LAMBDA FREE LIGHT CHAINS QNT: 2.17 MG/DL (ref 0.57–2.63)
Lab: ABNORMAL
MCH RBC QN AUTO: 29.7 PG (ref 26–34)
MCHC RBC AUTO-ENTMCNC: 30.9 G/DL (ref 31–37)
MCV RBC AUTO: 96 FL (ref 80–100)
MICROALBUMIN/CREAT 24H UR: 74 MG/L
MICROALBUMIN/CREAT UR-RTO: 55 MCG/MG CREAT
NRBC AUTOMATED: ABNORMAL PER 100 WBC
PDW BLD-RTO: 20 % (ref 11.5–14.9)
PLATELET # BLD: 397 K/UL (ref 150–450)
PMV BLD AUTO: 7.6 FL (ref 6–12)
POTASSIUM SERPL-SCNC: 4.5 MMOL/L (ref 3.7–5.3)
RBC # BLD: 2.64 M/UL (ref 4–5.2)
SODIUM BLD-SCNC: 144 MMOL/L (ref 135–144)
SPECIMEN DESCRIPTION: ABNORMAL
TOTAL CK: 27 U/L (ref 26–192)
TOTAL IRON BINDING CAPACITY: 301 UG/DL (ref 250–450)
TRANSFUSION STATUS: NORMAL
UNIT DIVISION: 0
UNIT NUMBER: NORMAL
UNSATURATED IRON BINDING CAPACITY: 276 UG/DL (ref 112–347)
WBC # BLD: 6.5 K/UL (ref 3.5–11)

## 2021-02-12 PROCEDURE — 6360000002 HC RX W HCPCS: Performed by: FAMILY MEDICINE

## 2021-02-12 PROCEDURE — 6370000000 HC RX 637 (ALT 250 FOR IP): Performed by: INTERNAL MEDICINE

## 2021-02-12 PROCEDURE — 36415 COLL VENOUS BLD VENIPUNCTURE: CPT

## 2021-02-12 PROCEDURE — 94640 AIRWAY INHALATION TREATMENT: CPT

## 2021-02-12 PROCEDURE — 97116 GAIT TRAINING THERAPY: CPT

## 2021-02-12 PROCEDURE — 82550 ASSAY OF CK (CPK): CPT

## 2021-02-12 PROCEDURE — 85027 COMPLETE CBC AUTOMATED: CPT

## 2021-02-12 PROCEDURE — 83550 IRON BINDING TEST: CPT

## 2021-02-12 PROCEDURE — 97166 OT EVAL MOD COMPLEX 45 MIN: CPT

## 2021-02-12 PROCEDURE — 6360000002 HC RX W HCPCS: Performed by: INTERNAL MEDICINE

## 2021-02-12 PROCEDURE — 99233 SBSQ HOSP IP/OBS HIGH 50: CPT | Performed by: FAMILY MEDICINE

## 2021-02-12 PROCEDURE — 97530 THERAPEUTIC ACTIVITIES: CPT

## 2021-02-12 PROCEDURE — 97162 PT EVAL MOD COMPLEX 30 MIN: CPT

## 2021-02-12 PROCEDURE — 94761 N-INVAS EAR/PLS OXIMETRY MLT: CPT

## 2021-02-12 PROCEDURE — 83883 ASSAY NEPHELOMETRY NOT SPEC: CPT

## 2021-02-12 PROCEDURE — 6370000000 HC RX 637 (ALT 250 FOR IP): Performed by: FAMILY MEDICINE

## 2021-02-12 PROCEDURE — 86038 ANTINUCLEAR ANTIBODIES: CPT

## 2021-02-12 PROCEDURE — 2700000000 HC OXYGEN THERAPY PER DAY

## 2021-02-12 PROCEDURE — 83540 ASSAY OF IRON: CPT

## 2021-02-12 PROCEDURE — 86160 COMPLEMENT ANTIGEN: CPT

## 2021-02-12 PROCEDURE — 80048 BASIC METABOLIC PNL TOTAL CA: CPT

## 2021-02-12 RX ORDER — FUROSEMIDE 20 MG/1
20 TABLET ORAL DAILY
Qty: 60 TABLET | Refills: 3 | Status: ON HOLD | OUTPATIENT
Start: 2021-02-12 | End: 2021-02-28 | Stop reason: HOSPADM

## 2021-02-12 RX ORDER — CIPROFLOXACIN 500 MG/1
500 TABLET, FILM COATED ORAL EVERY 12 HOURS SCHEDULED
Status: DISCONTINUED | OUTPATIENT
Start: 2021-02-12 | End: 2021-02-12 | Stop reason: HOSPADM

## 2021-02-12 RX ORDER — CIPROFLOXACIN 500 MG/1
500 TABLET, FILM COATED ORAL EVERY 12 HOURS SCHEDULED
Qty: 14 TABLET | Refills: 0
Start: 2021-02-12 | End: 2021-02-19

## 2021-02-12 RX ADMIN — FERROUS SULFATE TAB 325 MG (65 MG ELEMENTAL FE) 325 MG: 325 (65 FE) TAB at 09:31

## 2021-02-12 RX ADMIN — LEVOTHYROXINE SODIUM 125 MCG: 125 TABLET ORAL at 09:31

## 2021-02-12 RX ADMIN — BUSPIRONE HYDROCHLORIDE 10 MG: 10 TABLET ORAL at 14:26

## 2021-02-12 RX ADMIN — DULOXETINE HYDROCHLORIDE 60 MG: 60 CAPSULE, DELAYED RELEASE ORAL at 09:31

## 2021-02-12 RX ADMIN — FLUTICASONE PROPIONATE 1 PUFF: 110 AEROSOL, METERED RESPIRATORY (INHALATION) at 09:45

## 2021-02-12 RX ADMIN — FUROSEMIDE 20 MG: 10 INJECTION, SOLUTION INTRAMUSCULAR; INTRAVENOUS at 09:32

## 2021-02-12 RX ADMIN — OXYCODONE HYDROCHLORIDE AND ACETAMINOPHEN 1000 MG: 500 TABLET ORAL at 09:32

## 2021-02-12 RX ADMIN — ATORVASTATIN CALCIUM 10 MG: 10 TABLET, FILM COATED ORAL at 09:31

## 2021-02-12 RX ADMIN — APIXABAN 5 MG: 5 TABLET, FILM COATED ORAL at 09:31

## 2021-02-12 RX ADMIN — FLUTICASONE PROPIONATE 1 SPRAY: 50 SPRAY, METERED NASAL at 09:33

## 2021-02-12 RX ADMIN — MIDODRINE HYDROCHLORIDE 2.5 MG: 2.5 TABLET ORAL at 18:02

## 2021-02-12 RX ADMIN — METOPROLOL TARTRATE 25 MG: 25 TABLET, FILM COATED ORAL at 09:32

## 2021-02-12 RX ADMIN — MIDODRINE HYDROCHLORIDE 2.5 MG: 2.5 TABLET ORAL at 12:35

## 2021-02-12 RX ADMIN — BUSPIRONE HYDROCHLORIDE 10 MG: 10 TABLET ORAL at 09:31

## 2021-02-12 RX ADMIN — CIPROFLOXACIN 500 MG: 500 TABLET, FILM COATED ORAL at 09:31

## 2021-02-12 RX ADMIN — ALBUTEROL SULFATE 2.5 MG: 2.5 SOLUTION RESPIRATORY (INHALATION) at 07:11

## 2021-02-12 RX ADMIN — MIDODRINE HYDROCHLORIDE 2.5 MG: 2.5 TABLET ORAL at 09:31

## 2021-02-12 RX ADMIN — PANTOPRAZOLE SODIUM 40 MG: 40 TABLET, DELAYED RELEASE ORAL at 09:31

## 2021-02-12 RX ADMIN — TAMSULOSIN HYDROCHLORIDE 0.4 MG: 0.4 CAPSULE ORAL at 09:32

## 2021-02-12 NOTE — PROGRESS NOTES
has a past medical history of A-fib ([facility] [address] Acquired hypothyroidism, Acute encephalopathy, Acute kidney injury ([facility] [address] Anxiety, Benign hypertension with CKD (chronic kidney disease) stage III, Chronic back pain, CKD (chronic kidney disease) stage 3, GFR 30-59 ml/min, Constipation, COPD (chronic obstructive pulmonary disease) ([facility] [address] Cough, Depression, ETOH abuse, Former smoker, HA (generalized anxiety disorder), History of GI bleed, Hyperlipidemia, Hypertension, Hypothyroid, Leg pain, Normocytic anemia, Osteoarthritis, PAD (peripheral artery disease) ([facility] [address] Primary osteoarthritis, Pulmonary hypertension ([facility] [address] Pure hypercholesterolemia, SIRS (systemic inflammatory response syndrome) ([facility] [address] Urge incontinence, and Wheezing. has a past surgical history that includes eye surgery (Bilateral); Colonoscopy; joint replacement; Upper gastrointestinal endoscopy (N/A, 12/2/2020); Colonoscopy (N/A, 12/2/2020); Upper gastrointestinal endoscopy (N/A, 12/23/2020); and Thoracentesis (12/26/2020). Restrictions  Restrictions/Precautions  Restrictions/Precautions: General Precautions, Fall Risk  Required Braces or Orthoses?: No  Implants present? : Metal implants(L TKA 2018)  Position Activity Restriction  Other position/activity restrictions: up with assistance  Vision/Hearing  Vision: Impaired  Vision Exceptions: Wears glasses at all times(currently broken)  Hearing: Within functional limits     Subjective  General  Chart Reviewed: Yes  Patient assessed for rehabilitation services?: Yes  Additional Pertinent Hx: COPD, Afib  Family / Caregiver Present: No  Referring Practitioner: Bienvenido Cherry MD  Referral Date : 02/10/21  Diagnosis: renal failure  Follows Commands: Within Functional Limits  Subjective  Subjective: Pt in bed, resting on 2L O2. Very pleasant. WOODY Powers.  Hgb 7.8 today  Pain Screening  Patient Currently in Pain: Denies  Vital Signs  Patient Currently in Pain: Denies  Oxygen Therapy  SpO2: 90 % O2 Device: Nasal cannula  O2 Flow Rate (L/min): 2 L/min  Patient Observation  Observations: O2 sats drop to 88-90% on 2L O2 with rest - improves with pursed lip breathing, shortness of breath       Orientation  Orientation  Overall Orientation Status: Within Normal Limits  Social/Functional History  Social/Functional History  Lives With: Alone  Type of Home: House  Home Layout: One level  Home Access: Stairs to enter with rails  Entrance Stairs - Number of Steps: 1 small threshold  Entrance Stairs - Rails: Right(grab bar)  Bathroom Shower/Tub: Tub/Shower unit, Curtain  Bathroom Toilet: Standard  Bathroom Equipment: Shower chair, Grab bars in shower, Hand-held shower, Grab bars around toilet  Bathroom Accessibility: Accessible, Walker accessible  Home Equipment: Rolling walker, Quad cane, 4 wheeled walker, Reacher, Sock aid, Oxygen(plans to get alert button)  ADL Assistance: Independent  Homemaking Assistance: Independent  Homemaking Responsibilities: Yes  Ambulation Assistance: Independent(uses quad cane)  Transfer Assistance: Independent  Active : No  Occupation: Retired  Type of occupation: /customer service  IADL Comments: sleeps in recliner  Additional Comments: Son works full time - is supportive and checks in on patient. Son lives 3 blocks away. Planning to have aide services and home therapy come in when d/c from SNF recently. Pt was d/c from SNF for a few hours and came back in.    Cognition        Objective          AROM RLE (degrees)  RLE AROM: WFL  AROM LLE (degrees)  LLE AROM : WFL  AROM RUE (degrees)  RUE General AROM: See OT  AROM LUE (degrees)  LUE General AROM: See OT  Strength RLE  Strength RLE: WFL  Comment: Grossly 4-/5  Strength LLE  Strength LLE: WFL  Comment: Grossly 4-/5  Strength RUE  Comment: See OT  Strength LUE  Comment: See OT     Sensation  Overall Sensation Status: WNL(pt denies)  Bed mobility  Rolling to Right: Stand by assistance Supine to Sit: Stand by assistance  Sit to Supine: Stand by assistance  Scooting: Stand by assistance  Comment: HOB elevated, cues as needed. Pt not able to lay flat. No dizziness. Back to bed at end of session. Transfers  Sit to Stand: Contact guard assistance  Stand to sit: Contact guard assistance  Comment: CGA with quad cane on 2L. Cues for safe technique. No dizziness reported. Ambulation  Ambulation?: Yes  Ambulation 1  Surface: level tile  Device: Small Prabhjot Grzegorz  Other Apparatus: O2(2L)  Assistance: Contact guard assistance;Minimal assistance  Quality of Gait: increased sway, small steps, slow hanna, UE in high guard, mild unsteadiness without LOB  Gait Deviations: Slow Hanna;Decreased step length;Decreased step height  Distance: 50'  Comments: Mild shortness of breath - O2 sats 89-90% post amb on 2L. Mild unsteadiness. RW left in room for nursing. Stairs/Curb  Stairs?: No     Balance  Posture: Fair  Sitting - Static: Good  Sitting - Dynamic: Good  Standing - Static: Good;-  Standing - Dynamic: Fair  Comments: Fall risk, standing balance with quad cane. Plan   Plan  Times per week: 5-6x/week  Specific instructions for Next Treatment: progress gait (varied devices), endurance/nustep, therex  Current Treatment Recommendations: Strengthening, ROM, Balance Training, Functional Mobility Training, Transfer Training, Endurance Training, Gait Training, Equipment Evaluation, Education, & procurement, Patient/Caregiver Education & Training, Safety Education & Training  Safety Devices  Type of devices:  All fall risk precautions in place, Call light within reach, Gait belt, Patient at risk for falls, Bed alarm in place, Left in bed, Nurse notified(WOODY Lopez)    G-Code       OutComes Score                                                  AM-PAC Score     AM-PAC Inpatient Mobility without Stair Climbing Raw Score : 15 (02/12/21 7938) AM-PAC Inpatient without Stair Climbing T-Scale Score : 43.03 (02/12/21 0850)  Mobility Inpatient CMS 0-100% Score: 47.43 (02/12/21 0850)  Mobility Inpatient without Stair CMS G-Code Modifier : CK (02/12/21 0850)       Goals  Short term goals  Time Frame for Short term goals: 3-4 days  Short term goal 1: Pt to demo supervison [facility] bed mobility. Short term goal 2: Pt to perform transfers SBA. Short term goal 3: Pt to amb 75'-100' with least restrictive device CGA. Short term goal 4: Pt to tolerate 45 minute PT session with O2 sats maintained throughout session >90%. Short term goal 5: Pt to improve standing balance to GOOD. Short term goal 6: Pt to improve BLE strength by 1/2 MMG. Patient Goals   Patient goals :  To move better before going home       Therapy Time   Individual Concurrent Group Co-treatment   Time In 0850         Time Out 0929         Minutes 39         Timed Code Treatment Minutes: 25 Minutes       Garrison Nair, PT

## 2021-02-12 NOTE — PROGRESS NOTES
ERICA informed of discharge. [address] agreed to accept pt under her medicaid. ERICA completed 7000. Transport set for [address] via [employer]. ERICA informed pt who informed family. Orders faxed to facility.  Report number [phone]

## 2021-02-12 NOTE — FLOWSHEET NOTE
02/12/21 1654   Encounter Summary   Services provided to: Patient   Referral/Consult From: Rounding   Complexity of Encounter Low   Length of Encounter 15 minutes   Spiritual/Orthodox   Type Spiritual support   Assessment Sleeping   Intervention Prayer   Outcome Did not respond

## 2021-02-12 NOTE — PROGRESS NOTES
[address]   Occupational Therapy Evaluation  Date: 21  Patient Name: Francisco Guillen       Room: 4279/2673-14  MRN: [id number]  Account: [de-identified]   : 1945  (80 y.o.) Gender: female     Discharge Recommendations:  Further Occupational Therapy is recommended upon facility discharge. OT Equipment Recommendations  Equipment Needed: No    Referring Practitioner: Dr Miley Bridges  Diagnosis: Acute Renal Failure  Additional Pertinent Hx: Admitted in late Dec- >> SNF >> home for 6 Hours >> Readmitted to Hospital    Treatment Diagnosis: Altered Tolerance to care tasks  Past Medical History:  has a past medical history of A-fib ([address] Acquired hypothyroidism, Acute encephalopathy, Acute kidney injury ([address] Anxiety, Benign hypertension with CKD (chronic kidney disease) stage III, Chronic back pain, CKD (chronic kidney disease) stage 3, GFR 30-59 ml/min, Constipation, COPD (chronic obstructive pulmonary disease) ([address] Cough, Depression, ETOH abuse, Former smoker, HA (generalized anxiety disorder), History of GI bleed, Hyperlipidemia, Hypertension, Hypothyroid, Leg pain, Normocytic anemia, Osteoarthritis, PAD (peripheral artery disease) ([address] Primary osteoarthritis, Pulmonary hypertension ([address] Pure hypercholesterolemia, SIRS (systemic inflammatory response syndrome) ([address] Urge incontinence, and Wheezing. Past Surgical History:   has a past surgical history that includes eye surgery (Bilateral); Colonoscopy; joint replacement; Upper gastrointestinal endoscopy (N/A, 2020); Colonoscopy (N/A, 2020); Upper gastrointestinal endoscopy (N/A, 2020); and Thoracentesis (2020).     Restrictions  Restrictions/Precautions: General Precautions, Fall Risk  Implants present? : Metal implants  Other position/activity restrictions: up with assistance  Required Braces or Orthoses?: No     Vitals  Temp: 97.9 °F (36.6 °C)  Pulse: 81  Resp: 16  BP: (!) 102/54  Height: 5' 5\" (165.1 cm) Weight: 173 lb 1 oz (78.5 kg)  BMI (Calculated): 28.9  Oxygen Therapy  SpO2: 90 %  Pulse Oximeter Device Mode: Intermittent  Pulse Oximeter Device Location: Finger  O2 Device: Nasal cannula  Skin Assessment: Clean, dry, & intact  O2 Flow Rate (L/min): 2 L/min  Blood Gas  Performed?: No  Level of Consciousness: Alert (0)    Subjective  Subjective: Alert and cooperative, feeling better - just kind of wobbly when I walk  Overall Orientation Status: Within Functional Limits  Vision  Vision: Impaired  Vision Exceptions: Wears glasses at all times  Hearing  Hearing: Within functional limits  Social/Functional History  Lives With: Alone  Type of Home: House  Home Layout: One level  Home Access: Stairs to enter with rails  Entrance Stairs - Number of Steps: 1 small threshold  Entrance Stairs - Rails: Right  Bathroom Shower/Tub: Tub/Shower unit, Curtain  Bathroom Toilet: Standard  Bathroom Equipment: Shower chair, Grab bars in shower, Hand-held shower, Grab bars around toilet  Bathroom Accessibility: Accessible, Walker accessible  Home Equipment: Rolling walker, Quad cane, 4 wheeled walker, Reacher, Sock aid, Oxygen  Receives Help From: Home health, Personal care attendant(Was supposed to be starting with [facility] services and Home Care Therapy, but was readmitted)  ADL Assistance: Independent  Homemaking Assistance: Independent  Homemaking Responsibilities: Yes  Ambulation Assistance: Independent  Transfer Assistance: Independent  Active : No  Mode of Transportation: Family  Occupation: Retired  Type of occupation: /customer service  IADL Comments: sleeps in recliner  Additional Comments: Son works full time - is supportive and checks in on patient. Son lives 3 blocks away. Planning to have aide services and home therapy come in when d/c from SNF recently.  Pt was d/c from SNF for a few hours and came back in.        Objective      Cognition  Overall Cognitive Status: [facility] How much help for putting on and taking off regular upper body clothing?: A Little  How much help for taking care of personal grooming?: A Little  How much help for eating meals?: A Little  AM-PAC Inpatient Daily Activity Raw Score: 16  AM-PAC Inpatient ADL T-Scale Score : 35.96  ADL Inpatient CMS 0-100% Score: 53.32  ADL Inpatient CMS G-Code Modifier : CK       Goals  Patient Goals   Patient goals : Regain ability to care for herself  Short term goals  Time Frame for Short term goals: 1-4 days  Short term goal 1: perform functional transfers/mobility during ADL routine with SBA using rolling walker with Good safety technques  Short term goal 2: increase standing tolerance to 5-7 minutes with UE support as needed and SBA during functional task  Short term goal 3: perform toileting routine with SBA  Short term goal 4: perform basic ADL tasks with SBA  Short term goal 5: actively participate in 15+ minutes of therapeutic exercise/activity to promote increased safety and independence with self care and mobility tasks    Plan  Safety Devices  Safety Devices in place: Yes  Type of devices: Left in bed, Nurse notified, Call light within reach, Bed alarm in place     Plan  Times per week: 1-4 sessions  Times per day: Daily  Current Treatment Recommendations: Strengthening, Functional Mobility Training, Endurance Training, Safety Education & Training, Self-Care / ADL  OT Education  OT Education: OT Role, Precautions, Plan of Care, ADL Adaptive Strategies, Transfer Training, Energy Conservation    OT Equipment Recommendations  Equipment Needed: No       Electronically signed by Bri Brown OT on 2/12/21 at 12:31 PM EST

## 2021-02-12 NOTE — PROGRESS NOTES
FAMILY MEDICINE  - PROGRESS NOTE    Date:  2/12/2021  Savannah Nyhan  [id number]      Chief Complaint   Patient presents with    Cough    Shortness of Breath    Dizziness         Interval History:  Improved, her BUN/CR have improved and she reports that she feels fine. Her hgb stable at 7.8.       Subjective  Constitutional: positive for overweight  Eyes: negative  Ears, nose, mouth, throat, and face: negative  Respiratory: positive for emphysema  Cardiovascular: positive for irregular heart beat  Gastrointestinal: negative  Musculoskeletal:positive for arthralgias, muscle weakness and myalgias  Neurological: negative  Behavioral/Psych: positive for anxiety  Endocrine: positive for hypothyroid:    Objective:    BP (!) 102/54   Pulse 81   Temp 97.9 °F (36.6 °C) (Oral)   Resp 16   Ht 5' 5\" (1.651 m)   Wt 173 lb 1 oz (78.5 kg)   SpO2 96%   BMI 28.80 kg/m²   General appearance - alert, well appearing, and in no distress and overweight  Mental status - alert, oriented to person, place, and time  Eyes - pupils equal and reactive, extraocular eye movements intact  Ears - hearing grossly normal bilaterally  Nose - normal and patent, no erythema, discharge or polyps  Mouth - mucous membranes moist, pharynx normal without lesions  Neck - supple, no significant adenopathy  Lymphatics - no palpable lymphadenopathy, no hepatosplenomegaly  Chest - clear to auscultation, no wheezes, rales or rhonchi, symmetric air entry, decreased air entry noted posteriorly  Heart - irregularly irregular rhythm with rate 81  Abdomen - soft, nontender, nondistended, no masses or organomegaly  Breasts - not examined  Back exam - not examined  Neurological - alert, oriented, normal speech, no focal findings or movement disorder noted  Musculoskeletal - osteoarthritic changes noted in both hands  Extremities - peripheral pulses normal, no pedal edema, no clubbing or cyanosis Skin - normal coloration and turgor, no rashes, no suspicious skin lesions noted    Data:   Medications:   Current Facility-Administered Medications   Medication Dose Route Frequency Provider Last Rate Last Admin    ciprofloxacin (CIPRO) tablet 500 mg  500 mg Oral Daily Guanaco Rice MD   500 mg at 02/11/21 0805    dilTIAZem (CARDIZEM CD) extended release capsule 120 mg  120 mg Oral Daily PRN Anna Nino MD        furosemide (LASIX) injection 20 mg  20 mg Intravenous Daily Anna Nino MD        midodrine (PROAMATINE) tablet 2.5 mg  2.5 mg Oral TID WC Anna Nino MD   2.5 mg at 02/11/21 1700    0.9 % sodium chloride infusion   Intravenous Continuous Anna Nino MD 50 mL/hr at 02/11/21 2225 New Bag at 02/11/21 2225    sodium chloride flush 0.9 % injection 10 mL  10 mL Intravenous 2 times per day Guanaco Rice MD   10 mL at 02/11/21 0813    sodium chloride flush 0.9 % injection 10 mL  10 mL Intravenous PRN Guanaco Rice MD        ferrous sulfate (IRON 325) tablet 325 mg  325 mg Oral Daily Guanaco Rice MD   325 mg at 02/11/21 0805    ascorbic acid (VITAMIN C) tablet 1,000 mg  1,000 mg Oral Daily Guanaco Rice MD   1,000 mg at 02/11/21 0806    fluticasone (FLONASE) 50 MCG/ACT nasal spray 1 spray  1 spray Each Nostril Daily Guanaco Rice MD   1 spray at 02/11/21 0806    clonazePAM (KLONOPIN) tablet 0.5 mg  0.5 mg Oral TID PRN Guanaco Rice MD   0.5 mg at 02/11/21 2023    baclofen (LIORESAL) tablet 10 mg  10 mg Oral TID PRN Guanaco Rice MD   10 mg at 02/11/21 0813    levothyroxine (SYNTHROID) tablet 125 mcg  125 mcg Oral Daily Guanaco Rice MD   125 mcg at 02/11/21 0805    pantoprazole (PROTONIX) tablet 40 mg  40 mg Oral QAM AC Guanaco Rice MD   40 mg at 02/11/21 0645    DULoxetine (CYMBALTA) extended release capsule 60 mg  60 mg Oral Daily Guanaco Rice MD   60 mg at 02/11/21 5056  tamsulosin (FLOMAX) capsule 0.4 mg  0.4 mg Oral Daily Mark Anthony Maher MD   0.4 mg at 02/11/21 0806    metoprolol tartrate (LOPRESSOR) tablet 25 mg  25 mg Oral BID Mark Anthony Maher MD   25 mg at 02/11/21 2024    atorvastatin (LIPITOR) tablet 10 mg  10 mg Oral Daily Mark Anthony Maher MD   10 mg at 02/11/21 0805    acetaminophen (TYLENOL) tablet 650 mg  650 mg Oral Q4H PRN Mark Anthony Maher MD        albuterol (PROVENTIL) nebulizer solution 2.5 mg  2.5 mg Nebulization BID Mark Anthony Maher MD   2.5 mg at 02/12/21 0711    fluticasone (FLOVENT HFA) 110 MCG/ACT inhaler 1 puff  1 puff Inhalation BID Mark Anthony Maher MD   1 puff at 02/11/21 2024    apixaban (ELIQUIS) tablet 5 mg  5 mg Oral BID Mark Anthony Maher MD   5 mg at 02/11/21 2207    busPIRone (BUSPAR) tablet 10 mg  10 mg Oral TID Mark Anthony Maher MD   10 mg at 02/11/21 2023    HYDROcodone-acetaminophen (NORCO) 5-325 MG per tablet 1 tablet  1 tablet Oral Q6H PRN Mark Anthony Maher MD   1 tablet at 02/11/21 2210    guaiFENesin (MUCINEX) extended release tablet 1,200 mg  1,200 mg Oral Q6H PRN Mark Anthony Maher MD           Intake/Output Summary (Last 24 hours) at 2/12/2021 0752  Last data filed at 2/12/2021 0713  Gross per 24 hour   Intake 981 ml   Output 1074 ml   Net -93 ml     Recent Results (from the past 24 hour(s))   EOSINOPHILS, URINE    Collection Time: 02/11/21 10:10 PM   Result Value Ref Range    Eosinophil, Ur None    Protein / Creatinine Ratio, Urine    Collection Time: 02/11/21 10:10 PM   Result Value Ref Range    Total Protein, Urine 26 mg/dL    Creatinine, Ur 137.9 28.0 - 217.0 mg/dL    Urine Total Protein Creatinine Ratio 0.19 0.00 - 0.20   MICROALBUMIN, UR    Collection Time: 02/11/21 10:10 PM   Result Value Ref Range    Microalb, Ur 74 (H) <21 mg/L    Creatinine, Ur 134.5 28.0 - 217.0 mg/dL    Microalb/Crt.  Ratio 55 (H) <25 mcg/mg creat   CBC    Collection Time: 02/12/21  5:36 AM   Result Value Ref Range    WBC 6.5 3.5 - 11.0 k/uL RBC 2.64 (L) 4.0 - 5.2 m/uL    Hemoglobin 7.8 (L) 12.0 - 16.0 g/dL    Hematocrit 25.4 (L) 36 - 46 %    MCV 96.0 80 - 100 fL    MCH 29.7 26 - 34 pg    MCHC 30.9 (L) 31 - 37 g/dL    RDW 20.0 (H) 11.5 - 14.9 %    Platelets 805 649 - 064 k/uL    MPV 7.6 6.0 - 12.0 fL    NRBC Automated NOT REPORTED per 100 WBC   Basic Metabolic Panel    Collection Time: 02/12/21  5:36 AM   Result Value Ref Range    Glucose 112 (H) 70 - 99 mg/dL    BUN 22 8 - 23 mg/dL    CREATININE 1.27 (H) 0.50 - 0.90 mg/dL    Bun/Cre Ratio NOT REPORTED 9 - 20    Calcium 8.5 (L) 8.6 - 10.4 mg/dL    Sodium 144 135 - 144 mmol/L    Potassium 4.5 3.7 - 5.3 mmol/L    Chloride 102 98 - 107 mmol/L    CO2 35 (H) 20 - 31 mmol/L    Anion Gap 7 (L) 9 - 17 mmol/L    GFR Non-African American 41 (L) >60 mL/min    GFR African American 50 (L) >60 mL/min    GFR Comment          GFR Staging NOT REPORTED    CK    Collection Time: 02/12/21  5:36 AM   Result Value Ref Range    Total CK 27 26 - 192 U/L     -----------------------------------------------------------------  RAD:  EKG:  Micro:     Assessment & Plan:    Patient Active Problem List:     Acquired hypothyroidism     Hypertension     Primary osteoarthritis of right knee     A-fib (HCC)     Acute encephalopathy     SIRS (systemic inflammatory response syndrome) (HCC)     Normocytic anemia     Pulmonary hypertension (HCC)     COPD (chronic obstructive pulmonary disease) (HCC)     History of GI bleed     Constipation     HA (generalized anxiety disorder)     Lumbar radiculopathy     Lumbosacral spondylosis without myelopathy     Benign hypertension with CKD (chronic kidney disease) stage III     CKD (chronic kidney disease) stage 3, GFR 30-59 ml/min     Alcohol withdrawal syndrome with complication (HCC)     Moderate major depression (HCC)     PAD (peripheral artery disease) (HCC)     Acute kidney injury (HCC)     Obesity, Class I, BMI 30-34.9     Acute on chronic diastolic CHF (congestive heart failure) ([facility] GI bleed     Weight loss     Elevated alkaline phosphatase level     Thrombocytosis (HCC)     Iron deficiency anemia due to chronic blood loss     Iron malabsorption     Symptomatic anemia     Pneumonia due to organism     Anemia     Severe anemia     Overweight (BMI 25.0-29. 9)     Severe malnutrition ([address]     Renal failure     Acute renal failure (ARF) (HCC)     Acute cystitis without hematuria     Hypoxia           Plan:  -SUNSHINE - improved with IV hydration. -COPD - stable. -Chronic diastolic CHF - stable.  -Acute cystitis - improving on oral Cipro.  -A. Fib. - rate controlled.  -Okay to D/C to SNF. -Complete orders per chart.      See orders   Disposition:    Electronically signed by Curtis Harris MD on 2/12/2021 at 7:52 AM

## 2021-02-12 NOTE — PROGRESS NOTES
Physician Progress Note      Keya Ramírez               Arnold Number  CSN #:                  [id number]  :                       1945  ADMIT DATE:       2/10/2021 10:04 AM  DISCH DATE:  RESPONDING  PROVIDER #:        Danyelle Guevara MD          QUERY TEXT:    Patient admitted with SUNSHINE. Documentation reflects COPD exacerbation in   nephrology consult note on . If possible, please document in the progress   notes and discharge summary if COPD exacerbation was: The medical record reflects the following:  Risk Factors: COPD dependent on 2L O2, 78 pack year smoking history  Clinical Indicators: Patient with reports of increased SOB x days; per HPI of   H & P on :  She reports that her oxygen concentrator is not working at   home. She states that her sx's are worse with exertion and relieved with the   oxygen;  Per ED provider exam notes:  no respiratory distress, wheezing or   rales; Normal pulmonary effort and breath sounds;  Treatment: no oral or IV steroids, Albuterol nebulizer and Flovent inhaler   daily, supplemental oxygen, SaO2 monitoring  Options provided:  -- COPD exacerbation ruled out after study  -- COPD exacerbation as evidenced by, Please document evidence. -- Other - I will add my own diagnosis  -- Disagree - Not applicable / Not valid  -- Disagree - Clinically unable to determine / Unknown  -- Refer to Clinical Documentation Reviewer    PROVIDER RESPONSE TEXT:    COPD exacerbation ruled out after study. Query created by: Pj Gusman on 2021 1:08 PM      QUERY TEXT:    Pt admitted with SUNSHINE. Pt noted to have COPD dependent on oxygen. If possible,   please document in the progress notes and discharge summary if you are   evaluating and/or treating any of the following:     The medical record reflects the following:  Risk Factors: COPD, 78 pack year tobacco abuse history  Clinical Indicators: Home oxygen 2L/min per nc ATC, patient reports oxygen concentrator not working and that SOB is relieved by oxygen; on arrival SaO2   83% on room air, 100% on 5L nc, then % on home O2 of 2L/min per nc  Treatment: supplemental oxygen and monitoring SaO2  Options provided:  -- Chronic respiratory failure with hypoxia  -- Chronic respiratory failure with hypercapnia  -- Other - I will add my own diagnosis  -- Disagree - Not applicable / Not valid  -- Disagree - Clinically unable to determine / Unknown  -- Refer to Clinical Documentation Reviewer    PROVIDER RESPONSE TEXT:    This patient has chronic respiratory failure with hypoxia. Query created by: Mel Hills on 2/11/2021 1:12 PM      QUERY TEXT:    Pt admitted with SUNSHINE. Pt noted to have PMH of CHF. If possible, please   document in progress notes and discharge summary if you are evaluating and/or   treating any of the following: The medical record reflects the following:  Risk Factors: HTN, atrial fibrillation  Clinical Indicators: 2/10 CXR:  cardiomegaly and small bilateral pleural   effusions;  2/10 Pro-BNP 4680;  ECHO 7/6/2020:  mild LVH, EF >55%, Moderate   TR;  patient with reports of SOB which is worse with exertion;  Treatment: diagnostic testing as above;  HCTZ and lisinopril discontinued due   to low BP and SUNSHINE;  20mg IV Lasix ordered to start on 2/12  Options provided:  -- Chronic HFpEF  -- Chronic Diastolic CHF  -- Other - I will add my own diagnosis  -- Disagree - Not applicable / Not valid  -- Disagree - Clinically unable to determine / Unknown  -- Refer to Clinical Documentation Reviewer    PROVIDER RESPONSE TEXT:    This patient has chronic diastolic CHF.     Query created by: Mel Hills on 2/11/2021 1:19 PM      Electronically signed by:  Kaitlynn Padilla MD 2/12/2021 12:18 PM

## 2021-02-12 NOTE — PLAN OF CARE
Problem: Falls - Risk of:  Goal: Will remain free from falls  Description: Will remain free from falls  2/12/2021 1739 by Ivana Reyes RN  Outcome: Completed  2/12/2021 0428 by Yanira Noe RN  Outcome: Ongoing  Goal: Absence of physical injury  Description: Absence of physical injury  2/12/2021 1739 by Ivana Reyes RN  Outcome: Completed  2/12/2021 0428 by Yanira Noe RN  Outcome: Ongoing     Problem: Musculor/Skeletal Functional Status  Goal: Highest potential functional level  Outcome: Completed  Goal: Absence of falls  Outcome: Completed

## 2021-02-12 NOTE — PROGRESS NOTES
Department of Internal Medicine  Nephrology Carlene Barnett MD   Progress Note    Interval history: Patient was seen and examined today and she is feeling better. Energy level is improved s/p PRBC transfusion. She is nonoliguric and renal function is improving on furosemide 20 mg IV daily. History of presenting illness: This is a 70 y.o. female with a significant past medical history of ex chronic alcohol abuse, chronic obstructive pulmonary disease [ex cigarette smoker with 3 packs a day for 26 years], chronic atrial fibrillation, acquired hypothyroidism and chronic kidney disease stage II, who presented to the emergency department yesterday 2/10/2021 with complaints of shortness of breath, cough and dizziness. Vital signs were stable at presentation with blood pressure 123/43 mmHg and temperature 97.9 °F with oxygen saturation 100%. Laboratory studies at presentation were remarkable for hemoglobin 7.1 g/dL and BUN/creatinine 33/2.31 mg/dL and hence nephrology consultation. She had recently seen Dr. Brenden Corea in the office on 2/9/2021 and was restarted on furosemide 20 mg p.o. daily. Serum creatinine was 0.72 mg/dL on 2/2/2021. Chest x-ray obtained at presentation showed cardiomegaly with bilateral small pleural effusions. Will globin this morning dropped to 6.9 g/dL and patient is currently receiving transfusion of packed red blood cells. She does not have chest pain or palpitations. She denies bloody or dark stools and does not have hematemesis. Last colonoscopy was 4 years ago and it was apparently unremarkable.     Scheduled Meds:   ciprofloxacin  500 mg Oral Daily    furosemide  20 mg Intravenous Daily    midodrine  2.5 mg Oral TID WC    sodium chloride flush  10 mL Intravenous 2 times per day    ferrous sulfate  325 mg Oral Daily    ascorbic acid  1,000 mg Oral Daily    fluticasone  1 spray Each Nostril Daily    levothyroxine  125 mcg Oral Daily  pantoprazole  40 mg Oral QAM AC    DULoxetine  60 mg Oral Daily    tamsulosin  0.4 mg Oral Daily    metoprolol tartrate  25 mg Oral BID    atorvastatin  10 mg Oral Daily    albuterol  2.5 mg Nebulization BID    fluticasone  1 puff Inhalation BID    apixaban  5 mg Oral BID    busPIRone  10 mg Oral TID     Continuous Infusions:   sodium chloride 50 mL/hr at 02/11/21 2225     PRN Meds:.dilTIAZem, sodium chloride flush, clonazePAM, baclofen, acetaminophen, HYDROcodone-acetaminophen, guaiFENesin    Physical Exam:    VITALS:  BP (!) 102/54   Pulse 81   Temp 97.9 °F (36.6 °C) (Oral)   Resp 16   Ht 5' 5\" (1.651 m)   Wt 173 lb 1 oz (78.5 kg)   SpO2 90%   BMI 28.80 kg/m²   24HR INTAKE/OUTPUT:      Intake/Output Summary (Last 24 hours) at 2/12/2021 1026  Last data filed at 2/12/2021 1012  Gross per 24 hour   Intake 981 ml   Output 1125 ml   Net -144 ml     Constitutional: alert, appears stated age and cooperative    Skin: Skin color, texture, turgor normal. No rashes or lesions    Head: Normocephalic, without obvious abnormality, atraumatic     Cardiovascular/Edema: irregularly irregular rhythm    Respiratory:diminished breath sounds bilaterally    Abdomen: soft, non-tender; bowel sounds normal; no masses,  no organomegaly    Back: symmetric, no curvature. ROM normal. No CVA tenderness.     Extremities: edema +    Neuro:  Grossly normal      CBC:   Recent Labs     02/10/21  1020 02/11/21  0603 02/12/21  0536   WBC 7.0 6.5 6.5   HGB 7.1* 6.9* 7.8*    409 397     BMP:    Recent Labs     02/10/21  1020 02/11/21  0603 02/12/21  0536    143 144   K 5.0 4.6 4.5   CL 98 102 102   CO2 29 36* 35*   BUN 33* 25* 22   CREATININE 2.31* 1.59* 1.27*   GLUCOSE 101* 108* 112*       Lab Results   Component Value Date    NITRU NEGATIVE 02/10/2021    COLORU YELLOW 02/10/2021    PHUR 5.5 02/10/2021    WBCUA 10 TO 20 02/10/2021    RBCUA 0 TO 2 02/10/2021    MUCUS NOT REPORTED 02/10/2021 TRICHOMONAS NOT REPORTED 02/10/2021    YEAST NOT REPORTED 02/10/2021    BACTERIA FEW 02/10/2021    CLARITYU Clear 01/31/2020    SPECGRAV 1.007 02/10/2021    LEUKOCYTESUR MOD 02/10/2021    UROBILINOGEN Normal 02/10/2021    BILIRUBINUR NEGATIVE 02/10/2021    BILIRUBINUR 0 01/31/2020    BLOODU Negative 01/31/2020    GLUCOSEU NEGATIVE 02/10/2021    KETUA NEGATIVE 02/10/2021    AMORPHOUS NOT REPORTED 02/10/2021     Urine Sodium:     Lab Results   Component Value Date    ANGÉLICA 77 02/10/2021     Urine Potassium:    Lab Results   Component Value Date    KUR 35.7 07/20/2020     Urine Chloride:    Lab Results   Component Value Date    CLUR 43 07/20/2020     Urine Creatinine:     Lab Results   Component Value Date    LABCREA 137.9 02/11/2021    LABCREA 134.5 02/11/2021     IMPRESSION/RECOMMENDATIONS:    1. Acute kidney injury - most consistent with prerenal azotemia. Renal function is improving and peripheral edema is improving. Plan: Discontinue IV fluid. Avoid nephrotoxic agents. Basic metabolic profile daily. 2.  Normocytic anemia - rule out GI bleed. Ferritin 68 is suggestive of iron deficiency but iron saturation is pending. 3.  Shortness of breath - likely due to severe anemia as well as acute exacerbation of COPD but pulmonary edema will need to be ruled out. Continue IV Lasix 20 mg daily. 4.  Hypotension - rule out hypovolemia due to occult bleed. Although patient is on apixaban anticoagulation, renal ultrasound yesterday did not show retroperitoneal bleed. Continue midodrine 2.5 mg p.o. 3 times daily. Prognosis is guarded.     Niharika Montanez MD FACP  Attending Nephrologist  2/12/2021 10:26 AM

## 2021-02-12 NOTE — FLOWSHEET NOTE
OK per Dr Reynoso Charter for patient to be discharged to SNF.  Pt to start PO lasix 20 mg QD and follow up with 4 weeks with Dr Aguayo Nim

## 2021-02-13 NOTE — DISCHARGE SUMMARY
[facility] Discharge Summary      Patient ID: Mio Howard    MRN: [id number]     Acct:  [de-identified]       Patient's PCP: WILLIE Lemus CNP    Admit Date: 2/10/2021     Discharge Date: 2/12/2021      Admitting Physician: Sherice De La Rosa MD    Discharge Physician: Sherice De La Rosa MD     Discharge Diagnoses:    Primary Problem  Acute renal failure (ARF) [facility])    Active Hospital Problems    Diagnosis Date Noted    Acute cystitis without hematuria [N30.00] 02/11/2021    Hypoxia [R09.02]     Renal failure [N19] 02/10/2021    Acute renal failure (ARF) ([address] [N17.9] 02/10/2021    Overweight (BMI 25.0-29. 9) [E66.3] 01/20/2021    Severe malnutrition ([address] [E43] 01/20/2021    Iron deficiency anemia due to chronic blood loss [D50.0] 12/18/2020    Chronic diastolic CHF (congestive heart failure) ([address] [I50.32] 07/21/2020    PAD (peripheral artery disease) ([facility]) [I73.9] 06/17/2020    Moderate major depression ([address] [F32.1] 06/17/2020    HA (generalized anxiety disorder) [F41.1] 12/13/2018    COPD (chronic obstructive pulmonary disease) ([address] [J44.9] 09/13/2018    A-fib ([facility] [I48.91] 09/13/2018    Pulmonary hypertension ([address] [I27.20] 09/13/2018    Acquired hypothyroidism [E03.9] 01/18/2013     Past Medical History:   Diagnosis Date    A-fib ([address]     Acquired hypothyroidism     Acute encephalopathy     Acute kidney injury ([address]     Anxiety     Benign hypertension with CKD (chronic kidney disease) stage III     Chronic back pain     CKD (chronic kidney disease) stage 3, GFR 30-59 ml/min     Constipation     COPD (chronic obstructive pulmonary disease) (HCC)     Cough     Depression     ETOH abuse     Former smoker     3 packs a day for 26 years    HA (generalized anxiety disorder)     History of GI bleed     Hyperlipidemia     Hypertension     Hypothyroid 1/18/2013    Leg pain     Normocytic anemia     Osteoarthritis     PAD (peripheral artery disease) ([facility]  Primary osteoarthritis     Pulmonary hypertension (HCC)     Pure hypercholesterolemia     SIRS (systemic inflammatory response syndrome) (HCC)     Urge incontinence     Wheezing      The patient was seen and examined on day of discharge and this discharge summary is in conjunction with any daily progress note from day of discharge. Code Status:  Prior    Hospital Course: uncomplicated    Consults:  nephrology    Significant Diagnostic Studies: as above, and as follows: see chart    Treatments: as above    Disposition: SNF    Discharged Condition: Stable    Follow Up:  WILLIE Balderrama CNP in one week    Discharge Medications:    Moy Kapoor Juliet\"   Home Medication Instructions LDA:[id number]    Printed on:02/13/21 1961   Medication Information                      acetaminophen (TYLENOL) 325 MG tablet  Take 650 mg by mouth every 4 hours as needed for Pain Not to exceed 3gm/24hrs             albuterol (PROVENTIL) (2.5 MG/3ML) 0.083% nebulizer solution  Take 2.5 mg by nebulization 2 times daily              albuterol sulfate HFA (PROAIR HFA) 108 (90 Base) MCG/ACT inhaler  Inhale 2 puffs into the lungs every 6 hours as needed for Wheezing             apixaban (ELIQUIS) 5 MG TABS tablet  Take 5 mg by mouth 2 times daily             apixaban (ELIQUIS) 5 MG TABS tablet  Take 5 mg by mouth 2 times daily             Ascorbic Acid (C-1000 PO)  Take 1 capsule by mouth daily              atorvastatin (LIPITOR) 10 MG tablet  TAKE 1 TABLET BY MOUTH ONCE DAILY             baclofen (LIORESAL) 10 MG tablet  TAKE ONE TABLET BY MOUTH THREE TIMES A DAY AS NEEDED. busPIRone (BUSPAR) 10 MG tablet  Take 10 mg by mouth 3 times daily             ciprofloxacin (CIPRO) 500 MG tablet  Take 1 tablet by mouth every 12 hours for 7 days             clonazePAM (KLONOPIN) 0.5 MG tablet  Take 1 tablet by mouth 3 times daily as needed for Anxiety for up to 30 days.              dilTIAZem (CARDIZEM CD) 120 MG extended release capsule  Take 1 capsule by mouth daily             DULoxetine (CYMBALTA) 60 MG extended release capsule  Take 1 capsule by mouth daily             ferrous sulfate 325 (65 Fe) MG tablet  Take 325 mg by mouth daily              fluticasone (FLONASE) 50 MCG/ACT nasal spray  USE ONE SPRAY TO EACH NOSTRIL ONCE ADAY             fluticasone (FLOVENT HFA) 110 MCG/ACT inhaler  Inhale 1 puff into the lungs 2 times daily             fluticasone (FLOVENT HFA) 220 MCG/ACT inhaler  Inhale 2 puffs into the lungs 2 times daily             furosemide (LASIX) 20 MG tablet  Take 1 tablet by mouth daily             guaiFENesin (MUCINEX) 600 MG extended release tablet  Take 1,200 mg by mouth every 6 hours as needed for Congestion             levothyroxine (SYNTHROID) 125 MCG tablet  Take 1 tablet by mouth daily             Lift Chair MISC  Use daily for comfort             lisinopril (PRINIVIL;ZESTRIL) 2.5 MG tablet  Take 1 tablet by mouth Daily with lunch             metoprolol tartrate (LOPRESSOR) 25 MG tablet  Take 1 tablet by mouth 2 times daily             omeprazole (PRILOSEC) 40 MG delayed release capsule  TAKE 1 CAPSULE BY MOUTH EVERY MORNING BEFORE BREAKFAST             Oxygen Concentrator  continuous             Respiratory Therapy Supplies (NEBULIZER/TUBING/MOUTHPIECE) KIT  Use every 4-6 hours prn for copd             tamsulosin (FLOMAX) 0.4 MG capsule  TAKE 1 CAPSULE BY MOUTH DAILY                  Activity: activity as tolerated    Diet: renal diet    Time Spent on discharge is more than 35 minutes in the examination, evaluation, counseling and review of medications and discharge plan.       Electronically signed by Garret Fuentes MD on 2/13/2021 at 5:55 PM

## 2021-02-15 ENCOUNTER — OFFICE VISIT (OUTPATIENT)
Dept: ONCOLOGY | Age: 76
End: 2021-02-15
Payer: MEDICARE

## 2021-02-15 ENCOUNTER — TELEPHONE (OUTPATIENT)
Dept: ONCOLOGY | Age: 76
End: 2021-02-15

## 2021-02-15 ENCOUNTER — HOSPITAL ENCOUNTER (OUTPATIENT)
Dept: INFUSION THERAPY | Facility: MEDICAL CENTER | Age: 76
Discharge: HOME | End: 2021-02-15
Payer: MEDICARE

## 2021-02-15 VITALS
SYSTOLIC BLOOD PRESSURE: 115 MMHG | BODY MASS INDEX: 29.45 KG/M2 | DIASTOLIC BLOOD PRESSURE: 70 MMHG | HEART RATE: 66 BPM | TEMPERATURE: 98.8 F | RESPIRATION RATE: 20 BRPM | WEIGHT: 177 LBS

## 2021-02-15 DIAGNOSIS — D50.0 IRON DEFICIENCY ANEMIA DUE TO CHRONIC BLOOD LOSS: Primary | ICD-10-CM

## 2021-02-15 DIAGNOSIS — K90.9 IRON MALABSORPTION: ICD-10-CM

## 2021-02-15 LAB — ANTI-NUCLEAR ANTIBODY (ANA): NEGATIVE

## 2021-02-15 PROCEDURE — 96365 THER/PROPH/DIAG IV INF INIT: CPT

## 2021-02-15 PROCEDURE — 2580000003 HC RX 258: Performed by: INTERNAL MEDICINE

## 2021-02-15 PROCEDURE — 6360000002 HC RX W HCPCS: Performed by: INTERNAL MEDICINE

## 2021-02-15 PROCEDURE — 99214 OFFICE O/P EST MOD 30 MIN: CPT | Performed by: INTERNAL MEDICINE

## 2021-02-15 RX ORDER — METHYLPREDNISOLONE SODIUM SUCCINATE 125 MG/2ML
125 INJECTION, POWDER, LYOPHILIZED, FOR SOLUTION INTRAMUSCULAR; INTRAVENOUS ONCE
Status: CANCELLED | OUTPATIENT
Start: 2021-02-22 | End: 2021-02-22

## 2021-02-15 RX ORDER — SODIUM CHLORIDE 9 MG/ML
INJECTION, SOLUTION INTRAVENOUS CONTINUOUS
Status: CANCELLED | OUTPATIENT
Start: 2021-02-22

## 2021-02-15 RX ORDER — DIPHENHYDRAMINE HYDROCHLORIDE 50 MG/ML
50 INJECTION INTRAMUSCULAR; INTRAVENOUS ONCE
Status: CANCELLED | OUTPATIENT
Start: 2021-02-22 | End: 2021-02-22

## 2021-02-15 RX ORDER — SODIUM CHLORIDE 9 MG/ML
INJECTION, SOLUTION INTRAVENOUS CONTINUOUS
Status: DISCONTINUED | OUTPATIENT
Start: 2021-02-15 | End: 2021-02-16 | Stop reason: HOSPADM

## 2021-02-15 RX ORDER — SODIUM CHLORIDE 0.9 % (FLUSH) 0.9 %
5 SYRINGE (ML) INJECTION PRN
Status: CANCELLED | OUTPATIENT
Start: 2021-02-22

## 2021-02-15 RX ORDER — SODIUM CHLORIDE 0.9 % (FLUSH) 0.9 %
10 SYRINGE (ML) INJECTION PRN
Status: CANCELLED | OUTPATIENT
Start: 2021-02-22

## 2021-02-15 RX ORDER — HEPARIN SODIUM (PORCINE) LOCK FLUSH IV SOLN 100 UNIT/ML 100 UNIT/ML
500 SOLUTION INTRAVENOUS PRN
Status: CANCELLED | OUTPATIENT
Start: 2021-02-22

## 2021-02-15 RX ADMIN — FERRIC CARBOXYMALTOSE INJECTION 750 MG: 50 INJECTION, SOLUTION INTRAVENOUS at 12:14

## 2021-02-15 RX ADMIN — SODIUM CHLORIDE: 9 INJECTION, SOLUTION INTRAVENOUS at 12:13

## 2021-02-15 NOTE — PROGRESS NOTES
PAST MEDICAL HISTORY: has a past medical history of A-fib ([facility] Acquired hypothyroidism, Acute encephalopathy, Acute kidney injury ([facility] Anxiety, Benign hypertension with CKD (chronic kidney disease) stage III, Chronic back pain, CKD (chronic kidney disease) stage 3, GFR 30-59 ml/min, Constipation, COPD (chronic obstructive pulmonary disease) ([facility] Cough, Depression, ETOH abuse, Former smoker, HA (generalized anxiety disorder), History of GI bleed, Hyperlipidemia, Hypertension, Hypothyroid, Leg pain, Normocytic anemia, Osteoarthritis, PAD (peripheral artery disease) ([facility] Primary osteoarthritis, Pulmonary hypertension ([facility] Pure hypercholesterolemia, SIRS (systemic inflammatory response syndrome) ([facility] Urge incontinence, and Wheezing. PAST SURGICAL HISTORY: has a past surgical history that includes eye surgery (Bilateral); Colonoscopy; joint replacement; Upper gastrointestinal endoscopy (N/A, 12/2/2020); Colonoscopy (N/A, 12/2/2020); Upper gastrointestinal endoscopy (N/A, 12/23/2020); and Thoracentesis (12/26/2020). CURRENT MEDICATIONS:  has a current medication list which includes the following prescription(s): furosemide, ciprofloxacin, albuterol sulfate hfa, fluticasone, apixaban, buspirone, apixaban, fluticasone, guaifenesin, albuterol, acetaminophen, atorvastatin, lisinopril, metoprolol tartrate, oxygen concentrator, tamsulosin, omeprazole, duloxetine, baclofen, lift chair, levothyroxine, clonazepam, diltiazem, fluticasone, nebulizer/tubing/mouthpiece, ascorbic acid, and ferrous sulfate. ALLERGIES:  is allergic to tizanidine. FAMILY HISTORY: Negative for any hematological or oncological conditions. SOCIAL HISTORY:  reports that she has quit smoking. Her smoking use included cigarettes. She has a 78.00 pack-year smoking history. She has never used smokeless tobacco. She reports previous alcohol use. She reports that she does not use drugs.     REVIEW OF SYSTEMS: TIBC 301 02/12/2021    FERRITIN 68 02/08/2021       Chemistry        Component Value Date/Time     02/12/2021 0536    K 4.5 02/12/2021 0536     02/12/2021 0536    CO2 35 (H) 02/12/2021 0536    BUN 22 02/12/2021 0536    CREATININE 1.27 (H) 02/12/2021 0536        Component Value Date/Time    CALCIUM 8.5 (L) 02/12/2021 0536    ALKPHOS 140 (H) 02/10/2021 1020    AST 14 02/10/2021 1020    ALT 11 02/10/2021 1020    BILITOT 0.34 02/10/2021 1020            REVIEW OF RADIOLOGICAL RESULTS:     IMPRESSION:   1. Iron deficiency anemia, malabsorption component  2. COPD  3. AV malformation  4. Compliance issues  5. HX Afib, on Eliquis  6. Plan for work up and IV iron    PLAN:   1. Her lab work was reviewed, her anemia persist.   2. We discussed her symptoms which persist.   3. We will plan for initial dose of IV iron today. 4. Return in 2 months.

## 2021-02-15 NOTE — PROGRESS NOTES
Griselda Collin here for md visit and added on to infusion area for IV [employer], med list and allergies reviewed with md visit  IV started without difficulty   IV Injectafer started over 20 min  Report to Kelvin

## 2021-02-15 NOTE — PATIENT INSTRUCTIONS
Proceed with IV iron today. The patient wants only 1 dose and that will be given today.   We will see her back in 2 months with CBC and iron studies prior

## 2021-02-15 NOTE — PROGRESS NOTES
Received report from [address] completed and no reaction noted  Iv line flushed and iv discontinued with needle intact  Pressure dressing applied  Discharged per wheelchair

## 2021-02-15 NOTE — TELEPHONE ENCOUNTER
LEONARDO ARRIVES AMBULATORY FOR MD VISIT  DR Maggie Orantes IN TO SEE PATIENT  ORDERS RECEIVED  PROCEED W/IV JOSÉ MIGUEL TODAY  THE PT WANTS ONLY 1 DOSE & THAT WILL BE GIVEN TODAY  RV 2 MONTHS W/CDP & IRON STUDIES BEFORE RV  LABS CDP FE TIBC FERRITIN 5/3/21  MD VISIT 5/10/21 @2:15PM  AVS PRINTED AND GIVEN TO PATIENT WITH INSTRUCTIONS  PATIENT DISCHARGED TO [address] [place] [address]

## 2021-02-21 ENCOUNTER — APPOINTMENT (OUTPATIENT)
Dept: CT IMAGING | Age: 76
DRG: 377 | End: 2021-02-21
Payer: MEDICARE

## 2021-02-21 ENCOUNTER — HOSPITAL ENCOUNTER (INPATIENT)
Age: 76
LOS: 15 days | Discharge: NURSING, REHAB OR LONG-TERM CARE | DRG: 377 | End: 2021-03-08
Attending: EMERGENCY MEDICINE | Admitting: FAMILY MEDICINE
Payer: MEDICARE

## 2021-02-21 ENCOUNTER — APPOINTMENT (OUTPATIENT)
Dept: GENERAL RADIOLOGY | Age: 76
DRG: 377 | End: 2021-02-21
Payer: MEDICARE

## 2021-02-21 DIAGNOSIS — M17.11 PRIMARY OSTEOARTHRITIS OF RIGHT KNEE: ICD-10-CM

## 2021-02-21 DIAGNOSIS — K92.2 GASTROINTESTINAL HEMORRHAGE, UNSPECIFIED GASTROINTESTINAL HEMORRHAGE TYPE: Primary | ICD-10-CM

## 2021-02-21 DIAGNOSIS — Z98.890 STATUS POST THORACENTESIS: ICD-10-CM

## 2021-02-21 DIAGNOSIS — J43.1 PANLOBULAR EMPHYSEMA (HCC): ICD-10-CM

## 2021-02-21 DIAGNOSIS — F41.1 GAD (GENERALIZED ANXIETY DISORDER): ICD-10-CM

## 2021-02-21 DIAGNOSIS — M54.16 LUMBAR RADICULOPATHY: ICD-10-CM
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
## 2021-02-21 LAB
-: ABNORMAL
ABSOLUTE EOS #: 0.15 K/UL (ref 0–0.4)
ABSOLUTE IMMATURE GRANULOCYTE: ABNORMAL K/UL (ref 0–0.3)
ABSOLUTE LYMPH #: 0.83 K/UL (ref 1–4.8)
ABSOLUTE MONO #: 0.9 K/UL (ref 0.1–1.3)
AMORPHOUS: ABNORMAL
ANION GAP SERPL CALCULATED.3IONS-SCNC: 5 MMOL/L (ref 9–17)
BACTERIA: ABNORMAL
BASOPHILS # BLD: 1 % (ref 0–2)
BASOPHILS ABSOLUTE: 0.08 K/UL (ref 0–0.2)
BILIRUBIN URINE: NEGATIVE
BUN BLDV-MCNC: 22 MG/DL (ref 8–23)
BUN/CREAT BLD: ABNORMAL (ref 9–20)
CALCIUM SERPL-MCNC: 8.9 MG/DL (ref 8.6–10.4)
CASTS UA: ABNORMAL /LPF
CASTS UA: ABNORMAL /LPF
CHLORIDE BLD-SCNC: 101 MMOL/L (ref 98–107)
CO2: 37 MMOL/L (ref 20–31)
COLOR: YELLOW
COMMENT UA: ABNORMAL
CREAT SERPL-MCNC: 1.25 MG/DL (ref 0.5–0.9)
CRYSTALS, UA: ABNORMAL /HPF
DIFFERENTIAL TYPE: ABNORMAL
EOSINOPHILS RELATIVE PERCENT: 2 % (ref 0–4)
EPITHELIAL CELLS UA: ABNORMAL /HPF
GFR AFRICAN AMERICAN: 51 ML/MIN
GFR NON-AFRICAN AMERICAN: 42 ML/MIN
GFR SERPL CREATININE-BSD FRML MDRD: ABNORMAL ML/MIN/{1.73_M2}
GFR SERPL CREATININE-BSD FRML MDRD: ABNORMAL ML/MIN/{1.73_M2}
GLUCOSE BLD-MCNC: 109 MG/DL (ref 65–105)
GLUCOSE BLD-MCNC: 126 MG/DL (ref 70–99)
GLUCOSE URINE: NEGATIVE
HCT VFR BLD CALC: 22.1 % (ref 36–46)
HCT VFR BLD CALC: 25.9 % (ref 36–46)
HEMOGLOBIN: 6.8 G/DL (ref 12–16)
HEMOGLOBIN: 8.3 G/DL (ref 12–16)
IMMATURE GRANULOCYTES: ABNORMAL %
KETONES, URINE: NEGATIVE
LEUKOCYTE ESTERASE, URINE: NEGATIVE
LYMPHOCYTES # BLD: 11 % (ref 24–44)
MCH RBC QN AUTO: 31.2 PG (ref 26–34)
MCHC RBC AUTO-ENTMCNC: 30.7 G/DL (ref 31–37)
MCV RBC AUTO: 101.6 FL (ref 80–100)
MONOCYTES # BLD: 12 % (ref 1–7)
MORPHOLOGY: ABNORMAL
MUCUS: ABNORMAL
NITRITE, URINE: NEGATIVE
NRBC AUTOMATED: ABNORMAL PER 100 WBC
OTHER OBSERVATIONS UA: ABNORMAL
PDW BLD-RTO: 21.8 % (ref 11.5–14.9)
PH UA: 5.5 (ref 5–8)
PLATELET # BLD: 344 K/UL (ref 150–450)
PLATELET ESTIMATE: ABNORMAL
PMV BLD AUTO: 8.3 FL (ref 6–12)
POTASSIUM SERPL-SCNC: 4.4 MMOL/L (ref 3.7–5.3)
PROTEIN UA: ABNORMAL
RBC # BLD: 2.18 M/UL (ref 4–5.2)
RBC # BLD: ABNORMAL 10*6/UL
RBC UA: ABNORMAL /HPF
RENAL EPITHELIAL, UA: ABNORMAL /HPF
SEG NEUTROPHILS: 74 % (ref 36–66)
SEGMENTED NEUTROPHILS ABSOLUTE COUNT: 5.54 K/UL (ref 1.3–9.1)
SODIUM BLD-SCNC: 143 MMOL/L (ref 135–144)
SPECIFIC GRAVITY UA: 1.02 (ref 1–1.03)
TRICHOMONAS: ABNORMAL
TROPONIN INTERP: ABNORMAL
TROPONIN INTERP: ABNORMAL
TROPONIN T: ABNORMAL NG/ML
TROPONIN T: ABNORMAL NG/ML
TROPONIN, HIGH SENSITIVITY: 21 NG/L (ref 0–14)
TROPONIN, HIGH SENSITIVITY: 21 NG/L (ref 0–14)
TURBIDITY: CLEAR
URINE HGB: NEGATIVE
UROBILINOGEN, URINE: NORMAL
WBC # BLD: 7.5 K/UL (ref 3.5–11)
WBC # BLD: ABNORMAL 10*3/UL
WBC UA: ABNORMAL /HPF
YEAST: ABNORMAL

## 2021-02-21 PROCEDURE — 86920 COMPATIBILITY TEST SPIN: CPT

## 2021-02-21 PROCEDURE — 2060000000 HC ICU INTERMEDIATE R&B

## 2021-02-21 PROCEDURE — 85014 HEMATOCRIT: CPT

## 2021-02-21 PROCEDURE — 85018 HEMOGLOBIN: CPT

## 2021-02-21 PROCEDURE — 2580000003 HC RX 258: Performed by: EMERGENCY MEDICINE

## 2021-02-21 PROCEDURE — 84484 ASSAY OF TROPONIN QUANT: CPT

## 2021-02-21 PROCEDURE — 82947 ASSAY GLUCOSE BLOOD QUANT: CPT

## 2021-02-21 PROCEDURE — 6360000002 HC RX W HCPCS: Performed by: FAMILY MEDICINE

## 2021-02-21 PROCEDURE — 94760 N-INVAS EAR/PLS OXIMETRY 1: CPT

## 2021-02-21 PROCEDURE — 70450 CT HEAD/BRAIN W/O DYE: CPT

## 2021-02-21 PROCEDURE — 36430 TRANSFUSION BLD/BLD COMPNT: CPT

## 2021-02-21 PROCEDURE — 71045 X-RAY EXAM CHEST 1 VIEW: CPT

## 2021-02-21 PROCEDURE — 94640 AIRWAY INHALATION TREATMENT: CPT

## 2021-02-21 PROCEDURE — 86900 BLOOD TYPING SEROLOGIC ABO: CPT

## 2021-02-21 PROCEDURE — C9113 INJ PANTOPRAZOLE SODIUM, VIA: HCPCS | Performed by: EMERGENCY MEDICINE

## 2021-02-21 PROCEDURE — 80048 BASIC METABOLIC PNL TOTAL CA: CPT

## 2021-02-21 PROCEDURE — 99285 EMERGENCY DEPT VISIT HI MDM: CPT

## 2021-02-21 PROCEDURE — 6360000002 HC RX W HCPCS: Performed by: EMERGENCY MEDICINE

## 2021-02-21 PROCEDURE — 86901 BLOOD TYPING SEROLOGIC RH(D): CPT

## 2021-02-21 PROCEDURE — 2700000000 HC OXYGEN THERAPY PER DAY

## 2021-02-21 PROCEDURE — 6370000000 HC RX 637 (ALT 250 FOR IP): Performed by: FAMILY MEDICINE

## 2021-02-21 PROCEDURE — 70486 CT MAXILLOFACIAL W/O DYE: CPT

## 2021-02-21 PROCEDURE — 72125 CT NECK SPINE W/O DYE: CPT

## 2021-02-21 PROCEDURE — 86850 RBC ANTIBODY SCREEN: CPT

## 2021-02-21 PROCEDURE — 36415 COLL VENOUS BLD VENIPUNCTURE: CPT

## 2021-02-21 PROCEDURE — 93005 ELECTROCARDIOGRAM TRACING: CPT | Performed by: EMERGENCY MEDICINE

## 2021-02-21 PROCEDURE — 94664 DEMO&/EVAL PT USE INHALER: CPT

## 2021-02-21 PROCEDURE — 81001 URINALYSIS AUTO W/SCOPE: CPT

## 2021-02-21 PROCEDURE — P9016 RBC LEUKOCYTES REDUCED: HCPCS

## 2021-02-21 PROCEDURE — 85025 COMPLETE CBC W/AUTO DIFF WBC: CPT

## 2021-02-21 PROCEDURE — 2580000003 HC RX 258: Performed by: FAMILY MEDICINE

## 2021-02-21 RX ORDER — SODIUM CHLORIDE 9 MG/ML
INJECTION, SOLUTION INTRAVENOUS CONTINUOUS
Status: DISCONTINUED | OUTPATIENT
Start: 2021-02-21 | End: 2021-02-23

## 2021-02-21 RX ORDER — ATORVASTATIN CALCIUM 10 MG/1
10 TABLET, FILM COATED ORAL DAILY
Status: DISCONTINUED | OUTPATIENT
Start: 2021-02-21 | End: 2021-03-08 | Stop reason: HOSPADM

## 2021-02-21 RX ORDER — SODIUM CHLORIDE 0.9 % (FLUSH) 0.9 %
10 SYRINGE (ML) INJECTION EVERY 12 HOURS SCHEDULED
Status: DISCONTINUED | OUTPATIENT
Start: 2021-02-21 | End: 2021-03-08 | Stop reason: HOSPADM

## 2021-02-21 RX ORDER — FERROUS SULFATE 325(65) MG
325 TABLET ORAL DAILY
Status: DISCONTINUED | OUTPATIENT
Start: 2021-02-21 | End: 2021-02-24

## 2021-02-21 RX ORDER — FLUTICASONE PROPIONATE 50 MCG
1 SPRAY, SUSPENSION (ML) NASAL DAILY
Status: DISCONTINUED | OUTPATIENT
Start: 2021-02-21 | End: 2021-03-08 | Stop reason: HOSPADM

## 2021-02-21 RX ORDER — SODIUM CHLORIDE 0.9 % (FLUSH) 0.9 %
10 SYRINGE (ML) INJECTION PRN
Status: DISCONTINUED | OUTPATIENT
Start: 2021-02-21 | End: 2021-03-08 | Stop reason: HOSPADM

## 2021-02-21 RX ORDER — DULOXETIN HYDROCHLORIDE 60 MG/1
60 CAPSULE, DELAYED RELEASE ORAL DAILY
Status: DISCONTINUED | OUTPATIENT
Start: 2021-02-21 | End: 2021-03-08 | Stop reason: HOSPADM

## 2021-02-21 RX ORDER — LISINOPRIL 5 MG/1
2.5 TABLET ORAL
Status: DISCONTINUED | OUTPATIENT
Start: 2021-02-21 | End: 2021-03-08 | Stop reason: HOSPADM

## 2021-02-21 RX ORDER — LEVOTHYROXINE SODIUM 0.12 MG/1
125 TABLET ORAL DAILY
Status: DISCONTINUED | OUTPATIENT
Start: 2021-02-21 | End: 2021-03-08 | Stop reason: HOSPADM

## 2021-02-21 RX ORDER — FUROSEMIDE 20 MG/1
20 TABLET ORAL DAILY
Status: DISCONTINUED | OUTPATIENT
Start: 2021-02-21 | End: 2021-02-22

## 2021-02-21 RX ORDER — PROMETHAZINE HYDROCHLORIDE 25 MG/1
12.5 TABLET ORAL EVERY 6 HOURS PRN
Status: DISCONTINUED | OUTPATIENT
Start: 2021-02-21 | End: 2021-03-08 | Stop reason: HOSPADM

## 2021-02-21 RX ORDER — PANTOPRAZOLE SODIUM 40 MG/1
40 TABLET, DELAYED RELEASE ORAL
Status: DISCONTINUED | OUTPATIENT
Start: 2021-02-22 | End: 2021-02-21

## 2021-02-21 RX ORDER — DILTIAZEM HYDROCHLORIDE 120 MG/1
120 CAPSULE, COATED, EXTENDED RELEASE ORAL DAILY
Status: DISCONTINUED | OUTPATIENT
Start: 2021-02-21 | End: 2021-03-08 | Stop reason: HOSPADM

## 2021-02-21 RX ORDER — PANTOPRAZOLE SODIUM 40 MG/10ML
40 INJECTION, POWDER, LYOPHILIZED, FOR SOLUTION INTRAVENOUS DAILY
Status: DISCONTINUED | OUTPATIENT
Start: 2021-02-21 | End: 2021-03-01

## 2021-02-21 RX ORDER — SODIUM CHLORIDE 9 MG/ML
10 INJECTION INTRAVENOUS DAILY
Status: DISCONTINUED | OUTPATIENT
Start: 2021-02-21 | End: 2021-03-01

## 2021-02-21 RX ORDER — ACETAMINOPHEN 650 MG/1
650 SUPPOSITORY RECTAL EVERY 6 HOURS PRN
Status: DISCONTINUED | OUTPATIENT
Start: 2021-02-21 | End: 2021-03-08 | Stop reason: HOSPADM

## 2021-02-21 RX ORDER — ASCORBIC ACID 500 MG
1000 TABLET ORAL DAILY
Status: DISCONTINUED | OUTPATIENT
Start: 2021-02-21 | End: 2021-03-08 | Stop reason: HOSPADM

## 2021-02-21 RX ORDER — GUAIFENESIN 600 MG/1
1200 TABLET, EXTENDED RELEASE ORAL EVERY 6 HOURS PRN
Status: DISCONTINUED | OUTPATIENT
Start: 2021-02-21 | End: 2021-03-08 | Stop reason: HOSPADM

## 2021-02-21 RX ORDER — BUSPIRONE HYDROCHLORIDE 10 MG/1
10 TABLET ORAL 3 TIMES DAILY
Status: DISCONTINUED | OUTPATIENT
Start: 2021-02-21 | End: 2021-03-08 | Stop reason: HOSPADM

## 2021-02-21 RX ORDER — ACETAMINOPHEN 325 MG/1
650 TABLET ORAL EVERY 4 HOURS PRN
Status: DISCONTINUED | OUTPATIENT
Start: 2021-02-21 | End: 2021-03-05 | Stop reason: SDUPTHER

## 2021-02-21 RX ORDER — FLUTICASONE PROPIONATE 220 UG/1
2 AEROSOL, METERED RESPIRATORY (INHALATION) 2 TIMES DAILY
Status: DISCONTINUED | OUTPATIENT
Start: 2021-02-21 | End: 2021-03-08 | Stop reason: HOSPADM

## 2021-02-21 RX ORDER — ACETAMINOPHEN 325 MG/1
650 TABLET ORAL EVERY 6 HOURS PRN
Status: DISCONTINUED | OUTPATIENT
Start: 2021-02-21 | End: 2021-03-08 | Stop reason: HOSPADM

## 2021-02-21 RX ORDER — SODIUM CHLORIDE 9 MG/ML
INJECTION, SOLUTION INTRAVENOUS PRN
Status: DISCONTINUED | OUTPATIENT
Start: 2021-02-21 | End: 2021-03-08 | Stop reason: HOSPADM

## 2021-02-21 RX ORDER — ALBUTEROL SULFATE 2.5 MG/3ML
2.5 SOLUTION RESPIRATORY (INHALATION) 2 TIMES DAILY
Status: DISCONTINUED | OUTPATIENT
Start: 2021-02-21 | End: 2021-02-22

## 2021-02-21 RX ORDER — TAMSULOSIN HYDROCHLORIDE 0.4 MG/1
0.4 CAPSULE ORAL DAILY
Status: DISCONTINUED | OUTPATIENT
Start: 2021-02-21 | End: 2021-02-23

## 2021-02-21 RX ORDER — CLONAZEPAM 1 MG/1
0.5 TABLET ORAL 3 TIMES DAILY PRN
Status: DISCONTINUED | OUTPATIENT
Start: 2021-02-21 | End: 2021-03-08 | Stop reason: HOSPADM

## 2021-02-21 RX ORDER — ONDANSETRON 2 MG/ML
4 INJECTION INTRAMUSCULAR; INTRAVENOUS EVERY 6 HOURS PRN
Status: DISCONTINUED | OUTPATIENT
Start: 2021-02-21 | End: 2021-03-08 | Stop reason: HOSPADM

## 2021-02-21 RX ORDER — BACLOFEN 10 MG/1
10 TABLET ORAL 3 TIMES DAILY PRN
Status: DISCONTINUED | OUTPATIENT
Start: 2021-02-21 | End: 2021-03-08 | Stop reason: HOSPADM

## 2021-02-21 RX ADMIN — CLONAZEPAM 0.5 MG: 1 TABLET ORAL at 16:46

## 2021-02-21 RX ADMIN — ALBUTEROL SULFATE 2.5 MG: 2.5 SOLUTION RESPIRATORY (INHALATION) at 19:16

## 2021-02-21 RX ADMIN — SODIUM CHLORIDE: 9 INJECTION, SOLUTION INTRAVENOUS at 13:53

## 2021-02-21 RX ADMIN — FLUTICASONE PROPIONATE 1 SPRAY: 50 SPRAY, METERED NASAL at 21:41

## 2021-02-21 RX ADMIN — LISINOPRIL 2.5 MG: 5 TABLET ORAL at 16:46

## 2021-02-21 RX ADMIN — ATORVASTATIN CALCIUM 10 MG: 10 TABLET, FILM COATED ORAL at 16:47

## 2021-02-21 RX ADMIN — DILTIAZEM HYDROCHLORIDE 120 MG: 120 CAPSULE, COATED, EXTENDED RELEASE ORAL at 16:47

## 2021-02-21 RX ADMIN — FUROSEMIDE 20 MG: 20 TABLET ORAL at 16:46

## 2021-02-21 RX ADMIN — DULOXETINE 60 MG: 60 CAPSULE, DELAYED RELEASE ORAL at 16:48

## 2021-02-21 RX ADMIN — METOPROLOL TARTRATE 25 MG: 25 TABLET, FILM COATED ORAL at 21:40

## 2021-02-21 RX ADMIN — SODIUM CHLORIDE, PRESERVATIVE FREE 10 ML: 5 INJECTION INTRAVENOUS at 21:41

## 2021-02-21 RX ADMIN — BUSPIRONE HYDROCHLORIDE 10 MG: 10 TABLET ORAL at 21:40

## 2021-02-21 RX ADMIN — OXYCODONE HYDROCHLORIDE AND ACETAMINOPHEN 1000 MG: 500 TABLET ORAL at 16:46

## 2021-02-21 RX ADMIN — ALBUTEROL SULFATE 2.5 MG: 2.5 SOLUTION RESPIRATORY (INHALATION) at 14:36

## 2021-02-21 RX ADMIN — TAMSULOSIN HYDROCHLORIDE 0.4 MG: 0.4 CAPSULE ORAL at 16:48

## 2021-02-21 RX ADMIN — FERROUS SULFATE TAB 325 MG (65 MG ELEMENTAL FE) 325 MG: 325 (65 FE) TAB at 16:47

## 2021-02-21 RX ADMIN — SODIUM CHLORIDE 80 MG: 9 INJECTION, SOLUTION INTRAVENOUS at 12:57

## 2021-02-21 ASSESSMENT — PAIN SCALES - GENERAL
PAINLEVEL_OUTOF10: 0
PAINLEVEL_OUTOF10: 0

## 2021-02-21 NOTE — ED NOTES
Pt resting in bed alert x2, more alert, no signs of acute distress , will monitor , call light in reach      Kurt Gibson RN  02/21/21 5075

## 2021-02-21 NOTE — ED NOTES
Pt resting in bed alert, no signs of distress , will monitor, call light in reach      Yamileth Blackmon RN  02/21/21 9193

## 2021-02-21 NOTE — ED NOTES
Bed: 10  Expected date:   Expected time:   Means of arrival:   Comments:  WOODY Martinez  02/21/21 7613

## 2021-02-21 NOTE — ED NOTES
Mode of arrival:  ems      Residence prior to admit: home      Chief complaint on admission:a nurse was passing meds in the next room and heard a thud, went into room and found pt laying beside bed, no loc, no injury seen, pt denied pain         C= \"Have you ever felt that you should Cut down on your drinking? \"  No  A= \"Have people Annoyed you by criticizing your drinking? \"  No  G= \"Have you ever felt bad or Guilty about your drinking? \"  No  E= \"Have you ever had a drink as an Eye-opener first thing in the morning to steady your nerves or to help a hangover? \"  No      Deferred []      Reason for deferring: N/A    *If yes to two or more: probable alcohol abuse. Mildred Mcintoshutant, RN  02/21/21 1125

## 2021-02-21 NOTE — PROGRESS NOTES
Patient admit to room 2093. Telemetry applied, vitals obtained. Patient confused. Nurse placed bed alarm on patient. Bed locked lowest position. Call light in reach.

## 2021-02-21 NOTE — ED PROVIDER NOTES
 SIRS (systemic inflammatory response syndrome) (HCC)     Urge incontinence     Wheezing          SURGICAL HISTORY      has a past surgical history that includes eye surgery (Bilateral); Colonoscopy; joint replacement; Upper gastrointestinal endoscopy (N/A, 12/2/2020); Colonoscopy (N/A, 12/2/2020); Upper gastrointestinal endoscopy (N/A, 12/23/2020); and Thoracentesis (12/26/2020).       CURRENT MEDICATIONS       Current Discharge Medication List      CONTINUE these medications which have NOT CHANGED    Details   furosemide (LASIX) 20 MG tablet Take 1 tablet by mouth daily  Qty: 60 tablet, Refills: 3      albuterol sulfate HFA (PROAIR HFA) 108 (90 Base) MCG/ACT inhaler Inhale 2 puffs into the lungs every 6 hours as needed for Wheezing      fluticasone (FLOVENT HFA) 110 MCG/ACT inhaler Inhale 1 puff into the lungs 2 times daily      !! apixaban (ELIQUIS) 5 MG TABS tablet Take 5 mg by mouth 2 times daily      busPIRone (BUSPAR) 10 MG tablet Take 10 mg by mouth 3 times daily      !! apixaban (ELIQUIS) 5 MG TABS tablet Take 5 mg by mouth 2 times daily      fluticasone (FLOVENT HFA) 220 MCG/ACT inhaler Inhale 2 puffs into the lungs 2 times daily      guaiFENesin (MUCINEX) 600 MG extended release tablet Take 1,200 mg by mouth every 6 hours as needed for Congestion      albuterol (PROVENTIL) (2.5 MG/3ML) 0.083% nebulizer solution Take 2.5 mg by nebulization 2 times daily       acetaminophen (TYLENOL) 325 MG tablet Take 650 mg by mouth every 4 hours as needed for Pain Not to exceed 3gm/24hrs      atorvastatin (LIPITOR) 10 MG tablet TAKE 1 TABLET BY MOUTH ONCE DAILY  Qty: 90 tablet, Refills: 1    Associated Diagnoses: Pure hypercholesterolemia      lisinopril (PRINIVIL;ZESTRIL) 2.5 MG tablet Take 1 tablet by mouth Daily with lunch  Qty: 30 tablet, Refills: 3      metoprolol tartrate (LOPRESSOR) 25 MG tablet Take 1 tablet by mouth 2 times daily  Qty: 60 tablet, Refills: 3      Oxygen Concentrator continuous tamsulosin (FLOMAX) 0.4 MG capsule TAKE 1 CAPSULE BY MOUTH DAILY  Qty: 30 capsule, Refills: 3      omeprazole (PRILOSEC) 40 MG delayed release capsule TAKE 1 CAPSULE BY MOUTH EVERY MORNING BEFORE BREAKFAST  Qty: 90 capsule, Refills: 1    Associated Diagnoses: Gastroesophageal reflux disease, unspecified whether esophagitis present      DULoxetine (CYMBALTA) 60 MG extended release capsule Take 1 capsule by mouth daily  Qty: 90 capsule, Refills: 1    Associated Diagnoses: Moderate major depression (HCC)      baclofen (LIORESAL) 10 MG tablet TAKE ONE TABLET BY MOUTH THREE TIMES A DAY AS NEEDED. Qty: 270 tablet, Refills: 3    Associated Diagnoses: Chronic bilateral low back pain without sciatica      Lift Chair MISC Use daily for comfort  Qty: 1 each, Refills: 0    Associated Diagnoses: Chronic congestive heart failure, unspecified heart failure type ([address] Chronic bilateral low back pain without sciatica; General weakness; Other emphysema (HCC)      levothyroxine (SYNTHROID) 125 MCG tablet Take 1 tablet by mouth daily  Qty: 30 tablet, Refills: 5      clonazePAM (KLONOPIN) 0.5 MG tablet Take 1 tablet by mouth 3 times daily as needed for Anxiety for up to 30 days. Qty: 90 tablet, Refills: 0    Associated Diagnoses: HA (generalized anxiety disorder)      dilTIAZem (CARDIZEM CD) 120 MG extended release capsule Take 1 capsule by mouth daily  Qty: 30 capsule, Refills: 5    Comments: D/C 180mg dose      fluticasone (FLONASE) 50 MCG/ACT nasal spray USE ONE SPRAY TO EACH NOSTRIL ONCE ADAY  Qty: 16 g, Refills: 2    Associated Diagnoses:  Allergic conjunctivitis of both eyes and rhinitis      Respiratory Therapy Supplies (NEBULIZER/TUBING/MOUTHPIECE) KIT Use every 4-6 hours prn for copd  Qty: 1 kit, Refills: 0    Associated Diagnoses: Chronic obstructive pulmonary disease, unspecified COPD type ([facility])      Ascorbic Acid (C-1000 PO) Take 1 capsule by mouth daily ferrous sulfate 325 (65 Fe) MG tablet Take 325 mg by mouth daily        ! ! - Potential duplicate medications found. Please discuss with provider. ALLERGIES     is allergic to tizanidine. FAMILY HISTORY     She indicated that her mother is . She indicated that her father is . She indicated that the status of her sister is unknown. She indicated that her brother is alive. She indicated that the status of her other is unknown.     family history includes COPD in her mother; Cancer in an other family member; Emphysema in her sister; Heart Disease in her mother. SOCIAL HISTORY      reports that she has quit smoking. Her smoking use included cigarettes. She has a 78.00 pack-year smoking history. She has never used smokeless tobacco. She reports previous alcohol use. She reports that she does not use drugs. PHYSICAL EXAM     INITIAL VITALS:  height is 5' 8\" (1.727 m) and weight is 177 lb (80.3 kg). Her axillary temperature is 97.3 °F (36.3 °C). Her blood pressure is 128/59 (abnormal) and her pulse is 110. Her respiration is 28 and oxygen saturation is 98%. Physical Exam  Vitals signs and nursing note reviewed. Constitutional:       General: She is not in acute distress. HENT:      Head: Normocephalic. Contusion present. Eyes:      Extraocular Movements: Extraocular movements intact. Conjunctiva/sclera: Conjunctivae normal.      Pupils: Pupils are equal, round, and reactive to light. Comments: Contusion left eye   Neck:      Musculoskeletal: Muscular tenderness present. Cardiovascular:      Rate and Rhythm: Normal rate and regular rhythm. Heart sounds: Normal heart sounds. No murmur. Pulmonary:      Effort: Pulmonary effort is normal. No respiratory distress. Breath sounds: Normal breath sounds. Abdominal:      General: Bowel sounds are normal. There is no distension. Palpations: Abdomen is soft. Tenderness: There is no abdominal tenderness. Musculoskeletal:         General: No tenderness. Skin:     General: Skin is warm and dry. Findings: No rash. Neurological:      Mental Status: She is alert and oriented to person, place, and time. GCS: GCS eye subscore is 2. GCS verbal subscore is 4. GCS motor subscore is 6. Psychiatric:         Judgment: Judgment normal.           DIFFERENTIAL DIAGNOSIS/MDM:   70-year-old female presents from a fall either from standing or from bed. She is afebrile, nontoxic, normal vital signs. Not any acute distress. She is alert and oriented to her self. She really will not answer any other questions for me. Unclear what her neurologic baseline is. She has a GCS of 12 based on my exam.    She has some contusions on her face more significantly her left eye however EMS reports that they were told that this is an old injury within the past several weeks and not new today. Does have a contusion on the posterior scalp as well. She is on blood thinners. We will get a head CT, cervical spine CT, facial bone CT. Does not seem to have any signs of trauma anywhere else. We will also get a cardiac work-up in case this was a syncopal event. DIAGNOSTIC RESULTS     EKG: All EKG's are interpreted by the Emergency Department Physician who either signs or Co-signs this chart in the absence of a cardiologist.        RADIOLOGY:   I directly visualized the following  images and reviewed the radiologist interpretations:  CT FACIAL BONES WO CONTRAST   Final Result   No acute traumatic findings. Incompletely imaged left pleural effusion. CT CERVICAL SPINE WO CONTRAST   Final Result   No acute traumatic findings. Incompletely imaged left pleural effusion. CT HEAD WO CONTRAST   Final Result   No acute traumatic findings. Incompletely imaged left pleural effusion.          XR CHEST PORTABLE   Final Result   Small-moderate left lung base opacity may represent a pleural effusion and atelectasis with underlying pneumonia not excluded.                  ED BEDSIDE ULTRASOUND:      LABS:  Labs Reviewed   CBC WITH AUTO DIFFERENTIAL - Abnormal; Notable for the following components:       Result Value    RBC 2.18 (*)     Hemoglobin 6.8 (*)     Hematocrit 22.1 (*)     .6 (*)     MCHC 30.7 (*)     RDW 21.8 (*)     Seg Neutrophils 74 (*)     Lymphocytes 11 (*)     Monocytes 12 (*)     Absolute Lymph # 0.83 (*)     All other components within normal limits   BASIC METABOLIC PANEL - Abnormal; Notable for the following components:    Glucose 126 (*)     CREATININE 1.25 (*)     CO2 37 (*)     Anion Gap 5 (*)     GFR Non- 42 (*)     GFR  51 (*)     All other components within normal limits   TROPONIN - Abnormal; Notable for the following components:    Troponin, High Sensitivity 21 (*)     All other components within normal limits   TROPONIN - Abnormal; Notable for the following components:    Troponin, High Sensitivity 21 (*)     All other components within normal limits   URINE RT REFLEX TO CULTURE - Abnormal; Notable for the following components:    Protein, UA TRACE (*)     All other components within normal limits   MICROSCOPIC URINALYSIS - Abnormal; Notable for the following components:    Bacteria, UA FEW (*)     Amorphous, UA 1+ (*)     All other components within normal limits   POC GLUCOSE FINGERSTICK - Abnormal; Notable for the following components:    POC Glucose 109 (*)     All other components within normal limits   HEMOGLOBIN AND HEMATOCRIT, BLOOD   HEMOGLOBIN AND HEMATOCRIT, BLOOD   TYPE AND SCREEN   PREPARE RBC (CROSSMATCH)   TYPE AND SCREEN         EMERGENCY DEPARTMENT COURSE:   Vitals:    Vitals:    02/21/21 1145 02/21/21 1158 02/21/21 1310 02/21/21 1330   BP: 113/64 130/83 114/75 (!) 128/59   Pulse: 78 115 90 110   Resp:   14 28   Temp:    97.3 °F (36.3 °C)   TempSrc:    Axillary   SpO2: 100%  94% 98%   Weight:       Height:         1:55 PM EST Patient ended up being Hemoccult positive. Rectal exam performed with female chaperone. No gross blood on exam.    Hemoglobin is low at 6.8. Her hemoglobin was about 1 g higher than this when she was discharged from the hospital last.  Creatinine is at her baseline. Her CT head and cervical spine are unremarkable. She is cleared from a trauma standpoint at this time. She is much more awake and alert at this time. 1 unit of packed red blood cells ordered. I spoke with Dr. Isabell Waldron who accepted for admission. CRITICALCARE:      CONSULTS:  IP CONSULT TO PRIMARY CARE PROVIDER  IP CONSULT TO GI      PROCEDURES:      FINAL IMPRESSION      1. Gastrointestinal hemorrhage, unspecified gastrointestinal hemorrhage type    2. Fall from bed, initial encounter            DISPOSITION/PLAN   DISPOSITION Admitted 02/21/2021 12:28:46 PM          PATIENT REFERRED TO:  No follow-up provider specified.     DISCHARGE MEDICATIONS:  Current Discharge Medication List          (Please note that portions ofthis note were completed with a voice recognition program.  Efforts were made to edit the dictations but occasionally words are mis-transcribed.)    Devin Coto DO  Attending Emergency Physician          Devin Coto DO  02/21/21 0699

## 2021-02-21 NOTE — ED NOTES
Pt resting in bed alert , cath done per policy , will monitor , call light in reach      Krista English RN  02/21/21 1016

## 2021-02-22 LAB
ABO/RH: NORMAL
ANION GAP SERPL CALCULATED.3IONS-SCNC: 5 MMOL/L (ref 9–17)
ANTIBODY SCREEN: NEGATIVE
ARM BAND NUMBER: NORMAL
BILIRUBIN URINE: NEGATIVE
BLD PROD TYP BPU: NORMAL
BUN BLDV-MCNC: 16 MG/DL (ref 8–23)
BUN/CREAT BLD: ABNORMAL (ref 9–20)
CALCIUM SERPL-MCNC: 8.7 MG/DL (ref 8.6–10.4)
CHLORIDE BLD-SCNC: 106 MMOL/L (ref 98–107)
CO2: 38 MMOL/L (ref 20–31)
COLOR: YELLOW
COMMENT UA: NORMAL
CREAT SERPL-MCNC: 1.08 MG/DL (ref 0.5–0.9)
CROSSMATCH RESULT: NORMAL
DATE, STOOL #1: NORMAL
DATE, STOOL #2: NORMAL
DATE, STOOL #3: NORMAL
DISPENSE STATUS BLOOD BANK: NORMAL
EKG ATRIAL RATE: 53 BPM
EKG Q-T INTERVAL: 338 MS
EKG QRS DURATION: 64 MS
EKG QTC CALCULATION (BAZETT): 402 MS
EKG R AXIS: 59 DEGREES
EKG T AXIS: 41 DEGREES
EKG VENTRICULAR RATE: 85 BPM
EXPIRATION DATE: NORMAL
GFR AFRICAN AMERICAN: >60 ML/MIN
GFR NON-AFRICAN AMERICAN: 50 ML/MIN
GFR SERPL CREATININE-BSD FRML MDRD: ABNORMAL ML/MIN/{1.73_M2}
GFR SERPL CREATININE-BSD FRML MDRD: ABNORMAL ML/MIN/{1.73_M2}
GLUCOSE BLD-MCNC: 110 MG/DL (ref 70–99)
GLUCOSE URINE: NEGATIVE
HCT VFR BLD CALC: 22.8 % (ref 36–46)
HCT VFR BLD CALC: 24.2 % (ref 36–46)
HCT VFR BLD CALC: 24.5 % (ref 36–46)
HCT VFR BLD CALC: 25.3 % (ref 36–46)
HCT VFR BLD CALC: 25.6 % (ref 36–46)
HEMOCCULT SP1 STL QL: POSITIVE
HEMOCCULT SP2 STL QL: NORMAL
HEMOCCULT SP3 STL QL: NORMAL
HEMOGLOBIN: 7.1 G/DL (ref 12–16)
HEMOGLOBIN: 7.3 G/DL (ref 12–16)
HEMOGLOBIN: 7.5 G/DL (ref 12–16)
HEMOGLOBIN: 7.7 G/DL (ref 12–16)
HEMOGLOBIN: 7.8 G/DL (ref 12–16)
IRON SATURATION: 12 % (ref 20–55)
IRON: 32 UG/DL (ref 37–145)
KETONES, URINE: NEGATIVE
LEUKOCYTE ESTERASE, URINE: NEGATIVE
NITRITE, URINE: NEGATIVE
PH UA: 5.5 (ref 5–8)
POTASSIUM SERPL-SCNC: 4.3 MMOL/L (ref 3.7–5.3)
PROTEIN UA: NEGATIVE
SODIUM BLD-SCNC: 149 MMOL/L (ref 135–144)
SPECIFIC GRAVITY UA: 1.01 (ref 1–1.03)
SPECIMEN DESCRIPTION: NORMAL
TIME, STOOL #1: NORMAL
TIME, STOOL #2: NORMAL
TIME, STOOL #3: NORMAL
TOTAL IRON BINDING CAPACITY: 262 UG/DL (ref 250–450)
TRANSFUSION STATUS: NORMAL
TURBIDITY: CLEAR
UNIT DIVISION: 0
UNIT NUMBER: NORMAL
UNSATURATED IRON BINDING CAPACITY: 230 UG/DL (ref 112–347)
URINE HGB: NEGATIVE
UROBILINOGEN, URINE: NORMAL

## 2021-02-22 PROCEDURE — 51798 US URINE CAPACITY MEASURE: CPT

## 2021-02-22 PROCEDURE — 2580000003 HC RX 258: Performed by: FAMILY MEDICINE

## 2021-02-22 PROCEDURE — 85014 HEMATOCRIT: CPT

## 2021-02-22 PROCEDURE — 6360000002 HC RX W HCPCS: Performed by: NURSE PRACTITIONER

## 2021-02-22 PROCEDURE — 99222 1ST HOSP IP/OBS MODERATE 55: CPT | Performed by: INTERNAL MEDICINE

## 2021-02-22 PROCEDURE — P9047 ALBUMIN (HUMAN), 25%, 50ML: HCPCS | Performed by: INTERNAL MEDICINE

## 2021-02-22 PROCEDURE — 6360000002 HC RX W HCPCS: Performed by: FAMILY MEDICINE

## 2021-02-22 PROCEDURE — 2060000000 HC ICU INTERMEDIATE R&B

## 2021-02-22 PROCEDURE — 85018 HEMOGLOBIN: CPT

## 2021-02-22 PROCEDURE — C9113 INJ PANTOPRAZOLE SODIUM, VIA: HCPCS | Performed by: NURSE PRACTITIONER

## 2021-02-22 PROCEDURE — 6360000002 HC RX W HCPCS: Performed by: INTERNAL MEDICINE

## 2021-02-22 PROCEDURE — 6370000000 HC RX 637 (ALT 250 FOR IP): Performed by: NURSE PRACTITIONER

## 2021-02-22 PROCEDURE — 93010 ELECTROCARDIOGRAM REPORT: CPT | Performed by: INTERNAL MEDICINE

## 2021-02-22 PROCEDURE — 2580000003 HC RX 258: Performed by: NURSE PRACTITIONER

## 2021-02-22 PROCEDURE — 6370000000 HC RX 637 (ALT 250 FOR IP): Performed by: FAMILY MEDICINE

## 2021-02-22 PROCEDURE — 83540 ASSAY OF IRON: CPT

## 2021-02-22 PROCEDURE — 36415 COLL VENOUS BLD VENIPUNCTURE: CPT

## 2021-02-22 PROCEDURE — 81003 URINALYSIS AUTO W/O SCOPE: CPT

## 2021-02-22 PROCEDURE — 94640 AIRWAY INHALATION TREATMENT: CPT

## 2021-02-22 PROCEDURE — 2700000000 HC OXYGEN THERAPY PER DAY

## 2021-02-22 PROCEDURE — 80048 BASIC METABOLIC PNL TOTAL CA: CPT

## 2021-02-22 PROCEDURE — 82272 OCCULT BLD FECES 1-3 TESTS: CPT

## 2021-02-22 PROCEDURE — 83550 IRON BINDING TEST: CPT

## 2021-02-22 PROCEDURE — 94761 N-INVAS EAR/PLS OXIMETRY MLT: CPT

## 2021-02-22 PROCEDURE — 99233 SBSQ HOSP IP/OBS HIGH 50: CPT | Performed by: FAMILY MEDICINE

## 2021-02-22 PROCEDURE — 51702 INSERT TEMP BLADDER CATH: CPT

## 2021-02-22 RX ORDER — PSEUDOEPHEDRINE HYDROCHLORIDE 30 MG/1
60 TABLET ORAL EVERY 6 HOURS
Status: DISPENSED | OUTPATIENT
Start: 2021-02-22 | End: 2021-02-25

## 2021-02-22 RX ORDER — ALBUMIN (HUMAN) 12.5 G/50ML
12.5 SOLUTION INTRAVENOUS ONCE
Status: COMPLETED | OUTPATIENT
Start: 2021-02-22 | End: 2021-02-22

## 2021-02-22 RX ORDER — IPRATROPIUM BROMIDE AND ALBUTEROL SULFATE 2.5; .5 MG/3ML; MG/3ML
1 SOLUTION RESPIRATORY (INHALATION)
Status: DISCONTINUED | OUTPATIENT
Start: 2021-02-22 | End: 2021-03-08 | Stop reason: HOSPADM

## 2021-02-22 RX ORDER — FUROSEMIDE 10 MG/ML
20 INJECTION INTRAMUSCULAR; INTRAVENOUS DAILY
Status: DISCONTINUED | OUTPATIENT
Start: 2021-02-22 | End: 2021-02-26

## 2021-02-22 RX ORDER — FUROSEMIDE 10 MG/ML
20 INJECTION INTRAMUSCULAR; INTRAVENOUS ONCE
Status: COMPLETED | OUTPATIENT
Start: 2021-02-22 | End: 2021-02-22

## 2021-02-22 RX ADMIN — FUROSEMIDE 20 MG: 10 INJECTION, SOLUTION INTRAMUSCULAR; INTRAVENOUS at 12:21

## 2021-02-22 RX ADMIN — IPRATROPIUM BROMIDE AND ALBUTEROL SULFATE 1 AMPULE: 2.5; .5 SOLUTION RESPIRATORY (INHALATION) at 19:39

## 2021-02-22 RX ADMIN — SODIUM CHLORIDE, PRESERVATIVE FREE 10 ML: 5 INJECTION INTRAVENOUS at 21:09

## 2021-02-22 RX ADMIN — ALBUTEROL SULFATE 2.5 MG: 2.5 SOLUTION RESPIRATORY (INHALATION) at 07:13

## 2021-02-22 RX ADMIN — PSEUDOEPHEDRINE HCL 60 MG: 30 TABLET, FILM COATED ORAL at 16:35

## 2021-02-22 RX ADMIN — FLUTICASONE PROPIONATE 2 PUFF: 220 AEROSOL, METERED RESPIRATORY (INHALATION) at 21:14

## 2021-02-22 RX ADMIN — BUSPIRONE HYDROCHLORIDE 10 MG: 10 TABLET ORAL at 21:08

## 2021-02-22 RX ADMIN — BACLOFEN 10 MG: 10 TABLET ORAL at 21:08

## 2021-02-22 RX ADMIN — BUSPIRONE HYDROCHLORIDE 10 MG: 10 TABLET ORAL at 16:35

## 2021-02-22 RX ADMIN — FLUTICASONE PROPIONATE 2 PUFF: 220 AEROSOL, METERED RESPIRATORY (INHALATION) at 08:09

## 2021-02-22 RX ADMIN — SODIUM CHLORIDE: 9 INJECTION, SOLUTION INTRAVENOUS at 06:22

## 2021-02-22 RX ADMIN — SODIUM CHLORIDE 10 ML: 9 INJECTION INTRAMUSCULAR; INTRAVENOUS; SUBCUTANEOUS at 08:10

## 2021-02-22 RX ADMIN — PANTOPRAZOLE SODIUM 40 MG: 40 INJECTION, POWDER, FOR SOLUTION INTRAVENOUS at 08:10

## 2021-02-22 RX ADMIN — IPRATROPIUM BROMIDE AND ALBUTEROL SULFATE 1 AMPULE: 2.5; .5 SOLUTION RESPIRATORY (INHALATION) at 15:08

## 2021-02-22 RX ADMIN — FERROUS SULFATE TAB 325 MG (65 MG ELEMENTAL FE) 325 MG: 325 (65 FE) TAB at 08:09

## 2021-02-22 RX ADMIN — SODIUM CHLORIDE: 9 INJECTION, SOLUTION INTRAVENOUS at 00:00

## 2021-02-22 RX ADMIN — SODIUM CHLORIDE, PRESERVATIVE FREE 10 ML: 5 INJECTION INTRAVENOUS at 08:10

## 2021-02-22 RX ADMIN — ACETAMINOPHEN 650 MG: 325 TABLET ORAL at 01:23

## 2021-02-22 RX ADMIN — BUSPIRONE HYDROCHLORIDE 10 MG: 10 TABLET ORAL at 08:08

## 2021-02-22 RX ADMIN — LEVOTHYROXINE SODIUM 125 MCG: 125 TABLET ORAL at 05:52

## 2021-02-22 RX ADMIN — CLONAZEPAM 0.5 MG: 1 TABLET ORAL at 21:09

## 2021-02-22 RX ADMIN — OXYCODONE HYDROCHLORIDE AND ACETAMINOPHEN 1000 MG: 500 TABLET ORAL at 08:09

## 2021-02-22 RX ADMIN — ATORVASTATIN CALCIUM 10 MG: 10 TABLET, FILM COATED ORAL at 08:08

## 2021-02-22 RX ADMIN — FUROSEMIDE 20 MG: 10 INJECTION, SOLUTION INTRAMUSCULAR; INTRAVENOUS at 19:26

## 2021-02-22 RX ADMIN — FUROSEMIDE 20 MG: 20 TABLET ORAL at 08:08

## 2021-02-22 RX ADMIN — CLONAZEPAM 0.5 MG: 1 TABLET ORAL at 02:56

## 2021-02-22 RX ADMIN — TAMSULOSIN HYDROCHLORIDE 0.4 MG: 0.4 CAPSULE ORAL at 08:08

## 2021-02-22 RX ADMIN — METOPROLOL TARTRATE 25 MG: 25 TABLET, FILM COATED ORAL at 08:08

## 2021-02-22 RX ADMIN — PSEUDOEPHEDRINE HCL 60 MG: 30 TABLET, FILM COATED ORAL at 12:21

## 2021-02-22 RX ADMIN — DULOXETINE 60 MG: 60 CAPSULE, DELAYED RELEASE ORAL at 08:08

## 2021-02-22 RX ADMIN — FLUTICASONE PROPIONATE 1 SPRAY: 50 SPRAY, METERED NASAL at 08:09

## 2021-02-22 RX ADMIN — ACETAMINOPHEN 650 MG: 325 TABLET ORAL at 21:08

## 2021-02-22 RX ADMIN — DILTIAZEM HYDROCHLORIDE 120 MG: 120 CAPSULE, COATED, EXTENDED RELEASE ORAL at 08:09

## 2021-02-22 RX ADMIN — ALBUMIN (HUMAN) 12.5 G: 0.25 INJECTION, SOLUTION INTRAVENOUS at 18:49

## 2021-02-22 ASSESSMENT — PAIN SCALES - GENERAL
PAINLEVEL_OUTOF10: 8
PAINLEVEL_OUTOF10: 0
PAINLEVEL_OUTOF10: 0

## 2021-02-22 ASSESSMENT — ENCOUNTER SYMPTOMS
CONSTIPATION: 0
COLOR CHANGE: 0
SHORTNESS OF BREATH: 0
TROUBLE SWALLOWING: 0
ABDOMINAL PAIN: 1
ABDOMINAL PAIN: 0
COUGH: 0
CHOKING: 0
DIARRHEA: 0
NAUSEA: 0
BLOOD IN STOOL: 0
EYES NEGATIVE: 1
BACK PAIN: 0
ABDOMINAL DISTENTION: 0
VOMITING: 0
BLOOD IN STOOL: 1
WHEEZING: 0
VOICE CHANGE: 0
SORE THROAT: 0

## 2021-02-22 NOTE — CONSULTS
GI Consult Note:    Name: Jaqui Hand  MRN: [id number]     Acct: [de-identified]  Room: 2093/2093-01    Admit Date: 2/21/2021  PCP: WILLIE Dinh CNP    Physician Requesting Consult: Yvrose Casiano MD     Reason for Consult: Anemia. Chief Complaint:     Chief Complaint   Patient presents with    Fall       History Obtained From:     patient, nursing staff, electronic medical record    History of Present Illness: Jaqui Hand is a  70 y.o.  female who presents with Fall      Symptoms:  Patient seen at [facility] stepdown unit. She is admitted from the nursing home to emergency department with a history of fall. While in the emergency department she was found to have anemia with a hemoglobin of 6.8. This patient appears to have intermittent confusion. During my visit she denies GI symptoms including abdominal pain, bloating, nausea or vomiting. Bowel movements satisfactory without diarrhea or constipation. However appears to have dark stools may be secondary to iron therapy. She has a fair appetite. Denies dysphagia. Also denies GERD symptoms. She does get short of breath with exertion. No chest pain. She stated that her appetite has been decreased. She is losing weight progressively. She denies taking NSAIDs. This patient appears to have intermittent drop of hemoglobin needing transfusions. In fact she was here in the last part of December 2020 and at that time she had EGD, colonoscopy and push enteroscopy done. She was found to have AVMs in the jejunum and also in the colon that were cauterized by Dr. Campos Collins. She has been on anticoagulation therapy with Eliquis. According to the patient this is on hold from the last 1 week.     Past Medical History:     Past Medical History:   Diagnosis Date    A-fib [facility])     Acquired hypothyroidism     Acute encephalopathy     Acute kidney [facility] ([facility]     Anxiety  Benign hypertension with CKD (chronic kidney disease) stage III     Chronic back pain     CKD (chronic kidney disease) stage 3, GFR 30-59 ml/min     Constipation     COPD (chronic obstructive pulmonary disease) (HCC)     Cough     Depression     ETOH abuse     Former smoker     3 packs a day for 26 years    HA (generalized anxiety disorder)     History of GI bleed     Hyperlipidemia     Hypertension     Hypothyroid 1/18/2013    Leg pain     Normocytic anemia     Osteoarthritis     PAD (peripheral artery disease) (HCC)     Primary osteoarthritis     Pulmonary hypertension ([address]     Pure hypercholesterolemia     SIRS (systemic inflammatory response syndrome) (HCC)     Urge incontinence     Wheezing         Past Surgical History:     Past Surgical History:   Procedure Laterality Date    COLONOSCOPY      COLONOSCOPY N/A 12/2/2020    COLONOSCOPY CONTROL OF BLEEDING performed by Patricio Merino MD at [facility] Bilateral     cataracts    JOINT REPLACEMENT      Left knee on 9-11-18    THORACENTESIS  12/26/2020         UPPER GASTROINTESTINAL ENDOSCOPY N/A 12/2/2020    EGD performed by Patricio Merino MD at [address] [place] [address] N/A 12/23/2020    PUSH ENTEROSCOPY/EGD CONTROL BLEEDING performed by Patricio Merino MD at [facility]        Medications Prior to Admission:       Prior to Admission medications    Medication Sig Start Date End Date Taking?  Authorizing Provider   furosemide (LASIX) 20 MG tablet Take 1 tablet by mouth daily 2/12/21   Marjan Rich MD   albuterol sulfate HFA (PROAIR HFA) 108 (90 Base) MCG/ACT inhaler Inhale 2 puffs into the lungs every 6 hours as needed for Wheezing    Historical Provider, MD   fluticasone (FLOVENT HFA) 110 MCG/ACT inhaler Inhale 1 puff into the lungs 2 times daily    Historical Provider, MD   apixaban (ELIQUIS) 5 MG TABS tablet Take 5 mg by mouth 2 times daily    Historical Provider, MD clonazePAM (KLONOPIN) 0.5 MG tablet Take 1 tablet by mouth 3 times daily as needed for Anxiety for up to 30 days. 11/17/20 2/15/21  WILLIE Sorenson CNP   dilTIAZem (CARDIZEM CD) 120 MG extended release capsule Take 1 capsule by mouth daily 10/19/20   WILLIE Sorenson CNP   fluticasone [facility]) 50 MCG/ACT nasal spray USE ONE SPRAY TO EACH NOSTRIL ONCE ADAY 9/4/20   WILLIE Wallace CNP   Respiratory Therapy Supplies (NEBULIZER/TUBING/MOUTHPIECE) KIT Use every 4-6 hours prn for copd 6/15/20   WILLIE Sorenson CNP   Ascorbic Acid (C-1000 PO) Take 1 capsule by mouth daily     Historical Provider, MD   ferrous sulfate 325 (65 Fe) MG tablet Take 325 mg by mouth daily  8/30/18   Historical Provider, MD        Allergies:       Tizanidine    Social History:     Tobacco:    reports that she has quit smoking. Her smoking use included cigarettes. She has a 78.00 pack-year smoking history. She has never used smokeless tobacco.  Alcohol:      reports previous alcohol use. Drug Use: reports no history of drug use. Family History:     Family History   Problem Relation Age of Onset    Heart Disease Mother     COPD Mother     Emphysema Sister     Cancer Other         Cousin - breast cancer       Review of Systems:     Review of Systems   Constitutional: Positive for appetite change, fatigue and unexpected weight change. Negative for fever. HENT: Negative for mouth sores, sore throat, trouble swallowing and voice change. Eyes: Negative. Respiratory: Negative for cough, choking, shortness of breath and wheezing. Cardiovascular: Negative for chest pain, palpitations and leg swelling. Gastrointestinal: Negative for abdominal distention, abdominal pain, blood in stool, constipation, diarrhea, nausea and vomiting. Endocrine: Negative for polyphagia and polyuria. Genitourinary: Negative for difficulty urinating, frequency, hematuria, pelvic pain, urgency and vaginal bleeding. Musculoskeletal: Positive for arthralgias, gait problem and myalgias. Negative for back pain and joint swelling. Skin: Negative for color change, pallor and rash. Allergic/Immunologic: Negative for food allergies. Neurological: Positive for weakness. Negative for dizziness, seizures, light-headedness and headaches. Hematological: Negative for adenopathy. Does not bruise/bleed easily. Psychiatric/Behavioral: Positive for behavioral problems. Negative for sleep disturbance. The patient is nervous/anxious. Code Status:  Limited    Physical Exam:     Vitals:  BP (!) 91/56   Pulse 64   Temp 97.7 °F (36.5 °C) (Oral)   Resp 16   Ht 5' 8\" (1.727 m)   Wt 162 lb 14.7 oz (73.9 kg)   SpO2 98%   BMI 24.77 kg/m²   Temp (24hrs), Av.9 °F (36.6 °C), Min:97.7 °F (36.5 °C), Max:98.1 °F (36.7 °C)      Physical Exam  Vitals signs and nursing note reviewed. Constitutional:       Appearance: Normal appearance. She is well-developed. HENT:      Head: Normocephalic and atraumatic. Eyes:      Extraocular Movements: Extraocular movements intact. Conjunctiva/sclera: Conjunctivae normal.   Neck:      Thyroid: No thyromegaly. Vascular: No hepatojugular reflux or JVD. Trachea: No tracheal deviation. Cardiovascular:      Rate and Rhythm: Normal rate and regular rhythm. Heart sounds: Normal heart sounds. Pulmonary:      Effort: Pulmonary effort is normal. No respiratory distress. Breath sounds: Normal breath sounds. No wheezing or rales. Abdominal:      General: Bowel sounds are normal. There is no distension. Palpations: Abdomen is soft. There is no hepatomegaly or mass. Tenderness: There is no abdominal tenderness. There is no rebound. Hernia: No hernia is present. Musculoskeletal:         General: No tenderness. Lymphadenopathy:      Cervical: No cervical adenopathy. Skin:     General: Skin is warm and dry. Findings: No bruising, ecchymosis, erythema or rash. Neurological:      Mental Status: She is alert and oriented to person, place, and time. Cranial Nerves: No cranial nerve deficit. Psychiatric:         Mood and Affect: Mood normal.         Behavior: Behavior normal.         Thought Content:  Thought content normal.       Data:   CBC:   Lab Results   Component Value Date    WBC 7.5 02/21/2021    RBC 2.18 02/21/2021    RBC 4.50 02/18/2012    HGB 7.7 02/22/2021    HCT 25.3 02/22/2021    .6 02/21/2021    MCH 31.2 02/21/2021    MCHC 30.7 02/21/2021    RDW 21.8 02/21/2021     02/21/2021     02/18/2012    MPV 8.3 02/21/2021     CBC with Differential:  Lab Results   Component Value Date    WBC 7.5 02/21/2021    RBC 2.18 02/21/2021    RBC 4.50 02/18/2012    HGB 7.7 02/22/2021    HCT 25.3 02/22/2021     02/21/2021     02/18/2012    .6 02/21/2021    MCH 31.2 02/21/2021    MCHC 30.7 02/21/2021    RDW 21.8 02/21/2021    LYMPHOPCT 11 02/21/2021    MONOPCT 12 02/21/2021    BASOPCT 1 02/21/2021    MONOSABS 0.90 02/21/2021    LYMPHSABS 0.83 02/21/2021    EOSABS 0.15 02/21/2021    BASOSABS 0.08 02/21/2021    DIFFTYPE NOT REPORTED 02/21/2021     Hemoglobin/Hematocrit:  Lab Results   Component Value Date    HGB 7.7 02/22/2021    HCT 25.3 02/22/2021     CMP:    Lab Results   Component Value Date     02/22/2021    K 4.3 02/22/2021     02/22/2021    CO2 38 02/22/2021    BUN 16 02/22/2021    CREATININE 1.08 02/22/2021    GFRAA >60 02/22/2021    LABGLOM 50 02/22/2021    GLUCOSE 110 02/22/2021    GLUCOSE 113 02/18/2012    PROT 6.8 02/10/2021    LABALBU 3.4 02/10/2021    LABALBU 4.5 02/18/2012    CALCIUM 8.7 02/22/2021    BILITOT 0.34 02/10/2021    ALKPHOS 140 02/10/2021    AST 14 02/10/2021    ALT 11 02/10/2021     BMP:  Lab Results   Component Value Date     02/22/2021    K 4.3 02/22/2021     02/22/2021    CO2 38 02/22/2021    BUN 16 02/22/2021 LABALBU 3.4 02/10/2021    LABALBU 4.5 02/18/2012    CREATININE 1.08 02/22/2021    CALCIUM 8.7 02/22/2021    GFRAA >60 02/22/2021    LABGLOM 50 02/22/2021    GLUCOSE 110 02/22/2021    GLUCOSE 113 02/18/2012     PT/INR:    Lab Results   Component Value Date    PROTIME 14.3 02/10/2021    INR 1.1 02/10/2021     PTT:    Lab Results   Component Value Date    APTT 31.7 01/19/2021   [APTT}    Assesment:     Primary Problem  GI bleed    Active Hospital Problems    Diagnosis Date Noted    Severe malnutrition ([facility] [E43] 01/20/2021    Iron deficiency anemia due to chronic blood loss [D50.0] 12/18/2020    GI bleed [K92.2] 11/30/2020    Chronic diastolic CHF (congestive heart failure) ([facility]) [I50.32] 07/21/2020    Moderate major depression ([facility]) [F32.1] 06/17/2020    PAD (peripheral artery disease) ([facility]) [I73.9] 06/17/2020    CKD (chronic kidney disease) stage 3, GFR 30-59 ml/min [N18.30]     HA (generalized anxiety disorder) [F41.1] 12/13/2018    A-fib ([facility] [I48.91] 09/13/2018    COPD (chronic obstructive pulmonary disease) ([facility] [J44.9] 09/13/2018    Pulmonary hypertension ([facility] [I27.20] 09/13/2018    Hypertension [I10]     Acquired hypothyroidism [E03.9] 01/18/2013       Plan:     1. This patient has recurrent anemia. 2.   3. She had work-up done 2 months ago that was nonspecific except presence of AVMs that were cauterized. 4. Patient was on anticoagulation therapy which may be contributing to her recurrent anemia. 5. At present patient appears stable. 6. She may need capsule study of the small bowel and that will be arranged as an outpatient. 7. Diet as tolerated. 8. If patient is stable may be discharged and follow-up as an outpatient. Thank you for allowing me to participate in the care of your patient. Please feel free to contact me with anyquestions or concerns.      Electronically signed by Nichole Martínez MD on 2/22/2021 at 4:05 PM     Copy sent to WILLIE Willis - CNP Note is dictated utilizing voice recognition software. Unfortunately this leads to occasional typographical errors. Please contact our office if you have any questions.

## 2021-02-22 NOTE — PLAN OF CARE
Problem: Falls - Risk of:  Goal: Will remain free from falls  Description: Will remain free from falls  2/22/2021 1527 by Nehemiah Peters RN  Outcome: Ongoing     Problem: Falls - Risk of:  Goal: Absence of physical injury  Description: Absence of physical injury  Outcome: Ongoing     Problem: SAFETY  Goal: Free from accidental physical injury  Outcome: Ongoing     Problem: SAFETY  Goal: Free from intentional harm  Outcome: Ongoing     Problem: DAILY CARE  Goal: Daily care needs are met  2/22/2021 1527 by Nehemiah Peters RN  Outcome: Ongoing     Problem: PAIN  Goal: Patient's pain/discomfort is manageable  2/22/2021 1527 by Nehemiah Peters RN  Outcome: Ongoing     Problem: SKIN INTEGRITY  Goal: Skin integrity is maintained or improved  2/22/2021 1527 by Nehemiah Peters RN  Outcome: Ongoing     Problem: KNOWLEDGE DEFICIT  Goal: Patient/S.O. demonstrates understanding of disease process, treatment plan, medications, and discharge instructions.   Outcome: Ongoing     Problem: DISCHARGE BARRIERS  Goal: Patient's continuum of care needs are met  Outcome: Ongoing     Problem: Nutrition  Goal: Optimal nutrition therapy  Description: Nutrition Problem #1: Inadequate oral intake  Intervention: Food and/or Nutrient Delivery: Continue Current Diet, Start Oral Nutrition Supplement  Nutritional Goals: po intake greater than 50%     Outcome: Ongoing

## 2021-02-22 NOTE — PLAN OF CARE
PRE-CONSULT ROUNDING NOTE    HPI    72-year-old female admitted s/p fall in nursing home. Hgb on admission 6.8. Patient reports dark stools. No significant diarrhea, constipation. No abdominal pain, nausea, vomiting. Has fair appetite. No weight loss. Patient is intermittently confused. Has hx of A fib on Eliquis. However, patient reports that this has been on hold for the past 1 week. In December 2020 patient had EGD and colonoscopy with Dr. Rafaela Leventhal. EGD unremarkable. Colonoscopy revealed 4-5 mm AVM in ascending colon that was cauterized. She also had push enteroscopy on 12/23/20 remarkable for 6 AVMs in the Jejunum cauterized with APC. The scope was advanced about 40 cm distal to the ligament of Treitz. PMHX includes stage III CKD, OA, pulmonary hypertension, COPD, GERD, hx of alcohol abuse. Review of Systems   Constitutional: Negative for appetite change, fatigue, fever and unexpected weight change. HENT: Negative for mouth sores, sore throat, trouble swallowing and voice change. Eyes: Negative. Respiratory: Negative for cough, choking, shortness of breath and wheezing. Cardiovascular: Negative for chest pain, palpitations and leg swelling. Gastrointestinal: Positive for abdominal pain and blood in stool. Negative for abdominal distention, constipation, diarrhea, nausea and vomiting. Endocrine: Negative for polyphagia and polyuria. Genitourinary: Negative for difficulty urinating, frequency, hematuria, pelvic pain, urgency and vaginal bleeding. Musculoskeletal: Positive for arthralgias. Negative for back pain, gait problem and joint swelling. Skin: Negative for color change, pallor and rash. Allergic/Immunologic: Negative for food allergies. Neurological: Negative for dizziness, seizures, weakness, light-headedness and headaches. Hematological: Negative for adenopathy. Does not bruise/bleed easily. Psychiatric/Behavioral: Negative for sleep disturbance. The patient is not nervous/anxious. Physical Exam  Vitals signs and nursing note reviewed. Constitutional:       Appearance: She is well-developed. HENT:      Head: Normocephalic and atraumatic. Eyes:      General: No scleral icterus. Conjunctiva/sclera: Conjunctivae normal.      Pupils: Pupils are equal, round, and reactive to light. Neck:      Musculoskeletal: Normal range of motion and neck supple. Thyroid: No thyromegaly. Vascular: No hepatojugular reflux or JVD. Trachea: No tracheal deviation. Cardiovascular:      Rate and Rhythm: Normal rate and regular rhythm. Heart sounds: Normal heart sounds. Pulmonary:      Effort: Pulmonary effort is normal. No respiratory distress. Breath sounds: Normal breath sounds. No wheezing or rales. Abdominal:      General: Bowel sounds are normal. There is no distension. Palpations: Abdomen is soft. There is no hepatomegaly or mass. Tenderness: There is abdominal tenderness. There is no rebound. Hernia: No hernia is present. Musculoskeletal:         General: No tenderness. Comments: No joint swelling   Lymphadenopathy:      Cervical: No cervical adenopathy. Skin:     General: Skin is warm. Findings: No bruising, ecchymosis, erythema or rash. Neurological:      Mental Status: She is alert and oriented to person, place, and time. Cranial Nerves: No cranial nerve deficit. Psychiatric:         Thought Content:  Thought content normal.         ASSESSMENT/PLAN    Drop in hgb  FOBT +  Hx of jejunal AVMs - December 2020  Hx of AVM ascending colon -  December 2020  A.fib - on Eliquis    -Monitor for bleeding  -Trend H&H  -Stool for occult blood  -Tranfuse for hgb <7  -Will need outpatient VCE  -Hold AC  -Reevaluate in am

## 2021-02-22 NOTE — CARE COORDINATION
CASE MANAGEMENT NOTE:    Admission Date:  2/21/2021 Sam Kapoor is a 70 y.o.  female    Admitted for : GI bleed [K92.2]    Writer reviewed chart. PCP:  Say Mon NP                                Insurance:  [facility] Medicare/Medicaid      Current Residence/ Living Arrangements:  in nursing home - [facility].              Discharge Plan:  LSW to follow for return to [facility] [place] ([facility])                 Electronically signed by: Dennis Ramsay RN on 2/22/2021 at 9:46 AM

## 2021-02-22 NOTE — H&P
 Wheezing        Past Surgical History:        Procedure Laterality Date    COLONOSCOPY      COLONOSCOPY N/A 12/2/2020    COLONOSCOPY CONTROL OF BLEEDING performed by Patricio Merino MD at [facility] Bilateral     cataracts    JOINT REPLACEMENT      Left knee on 9-11-18    THORACENTESIS  12/26/2020         UPPER GASTROINTESTINAL ENDOSCOPY N/A 12/2/2020    EGD performed by Patricio Merino MD at [address] [facility] 12/23/2020    PUSH ENTEROSCOPY/EGD CONTROL BLEEDING performed by Patricio Merino MD at [address] [facility]       Medications Prior to Admission:    Prior to Admission medications    Medication Sig Start Date End Date Taking?  Authorizing Provider   furosemide (LASIX) 20 MG tablet Take 1 tablet by mouth daily 2/12/21   Marjan Rich MD   albuterol sulfate HFA (PROAIR HFA) 108 (90 Base) MCG/ACT inhaler Inhale 2 puffs into the lungs every 6 hours as needed for Wheezing    Historical Provider, MD   fluticasone (FLOVENT HFA) 110 MCG/ACT inhaler Inhale 1 puff into the lungs 2 times daily    Historical Provider, MD   apixaban (ELIQUIS) 5 MG TABS tablet Take 5 mg by mouth 2 times daily    Historical Provider, MD   busPIRone (BUSPAR) 10 MG tablet Take 10 mg by mouth 3 times daily    Historical Provider, MD   apixaban (ELIQUIS) 5 MG TABS tablet Take 5 mg by mouth 2 times daily    Historical Provider, MD   fluticasone (FLOVENT HFA) 220 MCG/ACT inhaler Inhale 2 puffs into the lungs 2 times daily    Historical Provider, MD   guaiFENesin (MUCINEX) 600 MG extended release tablet Take 1,200 mg by mouth every 6 hours as needed for Congestion    Historical Provider, MD   albuterol (PROVENTIL) (2.5 MG/3ML) 0.083% nebulizer solution Take 2.5 mg by nebulization 2 times daily     Historical Provider, MD   acetaminophen (TYLENOL) 325 MG tablet Take 650 mg by mouth every 4 hours as needed for Pain Not to exceed 3gm/24hrs    Historical Provider, MD atorvastatin (LIPITOR) 10 MG tablet TAKE 1 TABLET BY MOUTH ONCE DAILY 1/11/21   WILLIE Buitrago CNP   lisinopril (PRINIVIL;ZESTRIL) 2.5 MG tablet Take 1 tablet by mouth Daily with lunch 12/30/20   Rhonda Roy MD   metoprolol tartrate (LOPRESSOR) 25 MG tablet Take 1 tablet by mouth 2 times daily 12/29/20   Rhonda Roy MD   Oxygen Concentrator continuous    Historical Provider, MD   tamsulosin (FLOMAX) 0.4 MG capsule TAKE 1 CAPSULE BY MOUTH DAILY 12/15/20   WILLIE Buitrago CNP   omeprazole (PRILOSEC) 40 MG delayed release capsule TAKE 1 CAPSULE BY MOUTH EVERY MORNING BEFORE BREAKFAST 12/14/20   WILLIE Buitrago CNP   DULoxetine (CYMBALTA) 60 MG extended release capsule Take 1 capsule by mouth daily 12/14/20   WILLIE Buitrago CNP   baclofen (LIORESAL) 10 MG tablet TAKE ONE TABLET BY MOUTH THREE TIMES A DAY AS NEEDED. 11/30/20   WILLIE Buitrago CNP   Lift Chair MISC Use daily for comfort 11/30/20   WILLIE Buitrago CNP   levothyroxine (SYNTHROID) 125 MCG tablet Take 1 tablet by mouth daily 11/30/20   WILLIE Buitrago CNP   clonazePAM (KLONOPIN) 0.5 MG tablet Take 1 tablet by mouth 3 times daily as needed for Anxiety for up to 30 days.  11/17/20 2/15/21  WILLIE Buitrago CNP   dilTIAZem (CARDIZEM CD) 120 MG extended release capsule Take 1 capsule by mouth daily 10/19/20   WILLIE Buitrago CNP   fluticasone [facility]) 50 MCG/ACT nasal spray USE ONE SPRAY TO EACH NOSTRIL ONCE ADAY 9/4/20   Jake Dandy, APRN - CNP   Respiratory Therapy Supplies (NEBULIZER/TUBING/MOUTHPIECE) KIT Use every 4-6 hours prn for copd 6/15/20   WILLIE Buitrago CNP   Ascorbic Acid (C-1000 PO) Take 1 capsule by mouth daily     Historical Provider, MD   ferrous sulfate 325 (65 Fe) MG tablet Take 325 mg by mouth daily  8/30/18   Historical Provider, MD       Allergies:  Tizanidine Social History:  The patient currently lives in a SNF    TOBACCO:   reports that she has quit smoking. Her smoking use included cigarettes. She has a 78.00 pack-year smoking history. She has never used smokeless tobacco.  ETOH:   reports previous alcohol use. Review of Systems - General ROS: negative  Psychological ROS: positive for - anxiety  Ophthalmic ROS: positive for - uses glasses  ENT ROS: negative  Endocrine ROS: positive for - hypothyroid  Respiratory ROS: positive for - COPD  Cardiovascular ROS: positive for - irregular heartbeat  Gastrointestinal ROS: positive for - blood in stools  Musculoskeletal ROS: positive for - joint stiffness and muscular weakness  Neurological ROS: positive for - confusion and weakness      Family History:          Problem Relation Age of Onset    Heart Disease Mother     COPD Mother     Emphysema Sister     Cancer Other         Cousin - breast cancer       PHYSICAL EXAM:    BP (!) 94/54   Pulse 73   Temp 97.9 °F (36.6 °C) (Oral)   Resp 16   Ht 5' 8\" (1.727 m)   Wt 162 lb 14.7 oz (73.9 kg)   SpO2 96%   BMI 24.77 kg/m²     General appearance: No apparent distress appears stated age and cooperative. HEENT Normal cephalic, atraumatic without obvious deformity. Pupils equal, round, and reactive to light. Extra ocular muscles intact. Conjunctivae/corneas clear. Neck: Supple, No jugular venous distention/bruits. Trachea midline without thyromegaly or adenopathy with full range of motion. Lungs: Clear to auscultation, bilaterally without Rales/Wheezes/Rhonchi with good respiratory effort. Heart: Regular rate and rhythm with Normal S1/S2 without murmurs, rubs or gallops, point of maximum impulse non-displaced  Abdomen: Soft, non-tender or non-distended without rigidity or guarding and positive bowel sounds all four quadrants. Extremities: No clubbing, cyanosis, or edema bilaterally. Full range of motion without deformity and normal gait intact. Skin: Skin color, texture, turgor normal.  No rashes or lesions. Neurologic: Alert and oriented X 3, neurovascularly intact with sensory/motor intact upper extremities/lower extremities, bilaterally. Cranial nerves: II-XII intact, grossly non-focal.  Mental status: Alert, oriented, thought content appropriate. CXR:  I have reviewed the CXR with the following interpretation: small - moderate left basilar opacity  EKG:  I have reviewed the EKG with the following interpretation: A. Fib, rate controlled    CBC   Recent Labs     02/21/21  0930 02/21/21  1746 02/22/21  0008   WBC 7.5  --   --    HGB 6.8* 8.3* 7.5*   HCT 22.1* 25.9* 24.2*     --   --       RENAL  Recent Labs     02/21/21  0930      K 4.4      CO2 37*   BUN 22   CREATININE 1.25*     LFT'S  No results for input(s): AST, ALT, ALB, BILIDIR, BILITOT, ALKPHOS in the last 72 hours. COAG  No results for input(s): INR in the last 72 hours. CARDIAC ENZYMES  No results for input(s): CKTOTAL, CKMB, CKMBINDEX, TROPONINI in the last 72 hours.     U/A:    Lab Results   Component Value Date    NITRITE Negative 01/31/2020    COLORU YELLOW 02/21/2021    WBCUA 2 TO 5 02/21/2021    RBCUA 0 TO 2 02/21/2021    MUCUS NOT REPORTED 02/21/2021    BACTERIA FEW 02/21/2021    CLARITYU Clear 01/31/2020    SPECGRAV 1.017 02/21/2021    LEUKOCYTESUR NEGATIVE 02/21/2021    BLOODU Negative 01/31/2020    GLUCOSEU NEGATIVE 02/21/2021    AMORPHOUS 1+ 02/21/2021       ABG    Lab Results   Component Value Date    PMP2YTK 24.9 09/13/2018    P9NRVYVO 93.0 09/13/2018    PHART 7.330 09/13/2018    FZP5DTD 47.3 09/13/2018    PO2ART 74.8 09/13/2018           Active Hospital Problems    Diagnosis Date Noted    Severe malnutrition ([facility] [E43] 01/20/2021    Iron deficiency anemia due to chronic blood loss [D50.0] 12/18/2020    GI bleed [K92.2] 11/30/2020    Chronic diastolic [facility]est[facility] [I50.32] 07/21/2020  Moderate major depression ([facility] [F32.1] 06/17/2020    PAD (peripheral artery disease) ([facility] [I73.9] 06/17/2020    CKD (chronic kidney disease) stage 3, GFR 30-59 ml/min [N18.30]     HA (generalized anxiety disorder) [F41.1] 12/13/2018    A-fib ([facility] [I48.91] 09/13/2018    COPD (chronic obstructive pulmonary disease) ([facility] [J44.9] 09/13/2018    Pulmonary hypertension ([facility] [I27.20] 09/13/2018    Hypertension [I10]     Acquired hypothyroidism [E03.9] 01/18/2013         ASSESSMENT/PLAN:  Patient admitted with GI bleed, S/P fall. Co-morbidities as above. Orders Placed This Encounter   Procedures    CT CERVICAL SPINE WO CONTRAST    CT HEAD WO CONTRAST    XR CHEST PORTABLE    CT FACIAL BONES WO CONTRAST    CBC Auto Differential    Basic Metabolic Panel    Troponin    Urinalysis Reflex to Culture    Microscopic Urinalysis    Hemoglobin and Hematocrit, Blood    Iron and TIBC    Basic Metabolic Panel w/ Reflex to MG    Hemoglobin and Hematocrit, Blood, Post Transfusion    BLOOD OCCULT STOOL #1    DIET LOW FIBER;  Carb Control: 4 carb choices (60 gms)/meal    Vital signs per unit routine    Nursing to document all stools for color and consistency    Place intermittent pneumatic compression device    Up with assistance    Telemetry Monitoring    VITAL SIGNS PER TRANSFUSION PROTOCOL    Verify hospital blood consent form has been signed and witnessed   [facility]    Inpatient consult to Primary Care Provider    Consult to GI    Inpatient consult to Pulmonology    Initiate Oxygen Therapy Protocol    HHN Treatment    RT Communication Order    POC Glucose Fingerstick    EKG 12 Lead    EKG REPORT    TYPE AND SCREEN    PREPARE RBC (CROSSMATCH), 1 Units    TYPE AND SCREEN    Insert peripheral IV    PATIENT STATUS (FROM ED OR OR/PROCEDURAL) Inpatient         DVT Prophylaxis:   Diet: DIET CLEAR LIQUID;  Code Status: Limited Dispo - admitted to Progressive       @ANP@

## 2021-02-22 NOTE — PLAN OF CARE
Problem: Falls - Risk of:  Goal: Will remain free from falls  Description: Will remain free from falls  Outcome: Met This Shift  Pt assessed as a fall risk this shift. Remains free from falls and accidental injury at this time. Fall precautions in place, including falling star sign and fall risk band on pt. Floor free from obstacles, and bed is locked and in lowest position. Adequate lighting provided. Pt encouraged to call before getting OOB for any need. Bed alarm activated. Will continue to monitor needs during hourly rounding, and reinforce education on use of call light. Problem: DAILY CARE  Goal: Daily care needs are met  Outcome: Met This Shift  Patient and staff currently meeting all patient's daily care needs. Patient encouraged to participate and complete all ADLs per self. Will continue to monitor for opportunities for patient to meet all ADLs per self. Problem: PAIN  Goal: Patient's pain/discomfort is manageable  Outcome: Met This Shift   Pt medicated with pain medication prn. Assessed all pain characteristics including level, type, location, frequency, and onset. Non-pharmacologic interventions offered to pt as well. Pt states pain is tolerable at this time. Will continue to monitor. Problem: SKIN INTEGRITY  Goal: Skin integrity is maintained or improved  Outcome: Met This Shift   Skin assessment complete. Area kept free from moisture. Proper nourishment and fluids encouraged, as appropriate. Will continue to monitor for additional needs and changes in skin breakdown.

## 2021-02-22 NOTE — FLOWSHEET NOTE
02/22/21 1656   Provider Notification   Reason for Communication Evaluate   Provider Name Dr. Francis Calvert   Provider Notification Physician   Method of Communication Secure Message   Response Waiting for response   Notification Time [phone]   RN notified Dr. Francis Calvert of pt not having any urine output all day, she stated to consult Dr. Jericho Salamanca        [phone]: Spoke with Dr. Jericho Salamanca he ordered a ahn catheter albumin and lasix and pt will be seen in the morning.

## 2021-02-22 NOTE — PROGRESS NOTES
Comprehensive Nutrition Assessment    Type and Reason for Visit:  Initial, Positive Nutrition Screen(wt loss, poor appetite, difficulty chewing/swallowing)    Nutrition Recommendations/Plan: Will continue Low Fiber diet with 4 carbohydrate choices per tray and monitor intake. Will add Ensure High protein supplements to trays once daily to increase protein intake. Nutrition Assessment:  Pt admitted due to ECF due to fall and found to have GI bleed. Diet was advanced to Low fiber diet/4 carbohydrates per tray after lunch. Pt is known to this service due to frequent recent admits. Pt was able to swallow liquid diet at lunch with no difficulty. Malnutrition Assessment:  Malnutrition Status: At risk for malnutrition (Comment)    Context:  Acute Illness     Findings of the 6 clinical characteristics of malnutrition:  Energy Intake:  Mild decrease in energy intake (Comment)  Weight Loss:  1 - 7.5% over 3 months(possibly due in part to fluid shifts)     Body Fat Loss:  No significant body fat loss     Muscle Mass Loss:  No significant muscle mass loss    Fluid Accumulation:  1 - Mild Extremities   Strength:  Not Performed    Estimated Daily Nutrient Needs:  Energy (kcal):  (Elk x 1.2) 6548-5952 kcal; Weight Used for Energy Requirements:  (74 kg)     Protein (g):  1.3g/kg= 80 g; Weight Used for Protein Requirements:  Ideal          Nutrition Related Findings:  trace BLE edema, Labs (2/22) Na 149, BUN/Cr 16/1.08, Glu 110, Meds: Reviewed, H/O CKD, COPD      Wounds:  None       Current Nutrition Therapies:    DIET LOW FIBER;  Carb Control: 4 carb choices (60 gms)/meal    Anthropometric Measures:  · Height: 5' 8\" (172.7 cm)  · Current Body Weight: 162 lb (73.5 kg)   · Admission Body Weight: 162 lb (73.5 kg)    · Usual Body Weight: 178 lb (80.7 kg)(11/20)     · Ideal Body Weight: 140 lbs;BMI: 24.6  · BMI Categories: Normal Weight (BMI 22.0 to 24.9) age over 72       Nutrition Diagnosis: · Inadequate oral intake related to (poor appetite, present illness) as evidenced by poor intake prior to admission, weight loss    Nutrition Interventions:   Food and/or Nutrient Delivery:  Continue Current Diet, Start Oral Nutrition Supplement  Nutrition Education/Counseling:  Education not indicated   Coordination of Nutrition Care:  Continue to monitor while inpatient    Goals:  po intake greater than 50%       Nutrition Monitoring and Evaluation:   Food/Nutrient Intake Outcomes:  Food and Nutrient Intake, Supplement Intake  Physical Signs/Symptoms Outcomes:  Biochemical Data, Chewing or Swallowing, GI Status, Skin, Weight, Fluid Status or Edema     Discharge Planning:     Too soon to determine     Electronically signed by Lucy Pena RD, LD on 2/22/21 at 2:52 PM EST    Contact: [phone]

## 2021-02-23 ENCOUNTER — APPOINTMENT (OUTPATIENT)
Dept: GENERAL RADIOLOGY | Age: 76
DRG: 377 | End: 2021-02-23
Payer: MEDICARE

## 2021-02-23 PROBLEM — J96.01 ACUTE RESPIRATORY FAILURE WITH HYPOXIA (HCC): Status: ACTIVE | Noted: 2021-02-23

## 2021-02-23 LAB
ANION GAP SERPL CALCULATED.3IONS-SCNC: 5 MMOL/L (ref 9–17)
BUN BLDV-MCNC: 15 MG/DL (ref 8–23)
BUN/CREAT BLD: ABNORMAL (ref 9–20)
CALCIUM SERPL-MCNC: 8.2 MG/DL (ref 8.6–10.4)
CHLORIDE BLD-SCNC: 104 MMOL/L (ref 98–107)
CHLORIDE, UR: 113 MMOL/L
CO2: 36 MMOL/L (ref 20–31)
CREAT SERPL-MCNC: 1.03 MG/DL (ref 0.5–0.9)
GFR AFRICAN AMERICAN: >60 ML/MIN
GFR NON-AFRICAN AMERICAN: 53 ML/MIN
GFR SERPL CREATININE-BSD FRML MDRD: ABNORMAL ML/MIN/{1.73_M2}
GFR SERPL CREATININE-BSD FRML MDRD: ABNORMAL ML/MIN/{1.73_M2}
GLUCOSE BLD-MCNC: 100 MG/DL (ref 70–99)
HCT VFR BLD CALC: 23.5 % (ref 36–46)
HCT VFR BLD CALC: 24.6 % (ref 36–46)
HCT VFR BLD CALC: 25 % (ref 36–46)
HEMOGLOBIN: 7.4 G/DL (ref 12–16)
HEMOGLOBIN: 7.5 G/DL (ref 12–16)
HEMOGLOBIN: 7.7 G/DL (ref 12–16)
POTASSIUM SERPL-SCNC: 3.9 MMOL/L (ref 3.7–5.3)
SODIUM BLD-SCNC: 145 MMOL/L (ref 135–144)
SODIUM,UR: 82 MMOL/L

## 2021-02-23 PROCEDURE — 2580000003 HC RX 258: Performed by: INTERNAL MEDICINE

## 2021-02-23 PROCEDURE — 6370000000 HC RX 637 (ALT 250 FOR IP): Performed by: FAMILY MEDICINE

## 2021-02-23 PROCEDURE — 6360000002 HC RX W HCPCS: Performed by: NURSE PRACTITIONER

## 2021-02-23 PROCEDURE — 94761 N-INVAS EAR/PLS OXIMETRY MLT: CPT

## 2021-02-23 PROCEDURE — 85014 HEMATOCRIT: CPT

## 2021-02-23 PROCEDURE — 82436 ASSAY OF URINE CHLORIDE: CPT

## 2021-02-23 PROCEDURE — 94640 AIRWAY INHALATION TREATMENT: CPT

## 2021-02-23 PROCEDURE — 2060000000 HC ICU INTERMEDIATE R&B

## 2021-02-23 PROCEDURE — APPSS30 APP SPLIT SHARED TIME 16-30 MINUTES: Performed by: NURSE PRACTITIONER

## 2021-02-23 PROCEDURE — 6370000000 HC RX 637 (ALT 250 FOR IP): Performed by: NURSE PRACTITIONER

## 2021-02-23 PROCEDURE — 84300 ASSAY OF URINE SODIUM: CPT

## 2021-02-23 PROCEDURE — 80048 BASIC METABOLIC PNL TOTAL CA: CPT

## 2021-02-23 PROCEDURE — 99233 SBSQ HOSP IP/OBS HIGH 50: CPT | Performed by: FAMILY MEDICINE

## 2021-02-23 PROCEDURE — 71045 X-RAY EXAM CHEST 1 VIEW: CPT

## 2021-02-23 PROCEDURE — 2700000000 HC OXYGEN THERAPY PER DAY

## 2021-02-23 PROCEDURE — 99232 SBSQ HOSP IP/OBS MODERATE 35: CPT | Performed by: INTERNAL MEDICINE

## 2021-02-23 PROCEDURE — 2580000003 HC RX 258: Performed by: FAMILY MEDICINE

## 2021-02-23 PROCEDURE — 36415 COLL VENOUS BLD VENIPUNCTURE: CPT

## 2021-02-23 PROCEDURE — C9113 INJ PANTOPRAZOLE SODIUM, VIA: HCPCS | Performed by: NURSE PRACTITIONER

## 2021-02-23 PROCEDURE — 85018 HEMOGLOBIN: CPT

## 2021-02-23 PROCEDURE — 2580000003 HC RX 258: Performed by: NURSE PRACTITIONER

## 2021-02-23 RX ORDER — SODIUM CHLORIDE 450 MG/100ML
INJECTION, SOLUTION INTRAVENOUS CONTINUOUS
Status: DISCONTINUED | OUTPATIENT
Start: 2021-02-23 | End: 2021-02-25

## 2021-02-23 RX ORDER — HYDROCODONE BITARTRATE AND ACETAMINOPHEN 5; 325 MG/1; MG/1
1 TABLET ORAL EVERY 6 HOURS PRN
Status: DISCONTINUED | OUTPATIENT
Start: 2021-02-23 | End: 2021-03-08 | Stop reason: HOSPADM

## 2021-02-23 RX ADMIN — CLONAZEPAM 0.5 MG: 1 TABLET ORAL at 08:23

## 2021-02-23 RX ADMIN — IPRATROPIUM BROMIDE AND ALBUTEROL SULFATE 1 AMPULE: 2.5; .5 SOLUTION RESPIRATORY (INHALATION) at 07:27

## 2021-02-23 RX ADMIN — SODIUM CHLORIDE: 9 INJECTION, SOLUTION INTRAVENOUS at 08:11

## 2021-02-23 RX ADMIN — ATORVASTATIN CALCIUM 10 MG: 10 TABLET, FILM COATED ORAL at 08:46

## 2021-02-23 RX ADMIN — BACLOFEN 10 MG: 10 TABLET ORAL at 21:08

## 2021-02-23 RX ADMIN — DILTIAZEM HYDROCHLORIDE 120 MG: 120 CAPSULE, COATED, EXTENDED RELEASE ORAL at 08:38

## 2021-02-23 RX ADMIN — FUROSEMIDE 20 MG: 10 INJECTION, SOLUTION INTRAMUSCULAR; INTRAVENOUS at 08:35

## 2021-02-23 RX ADMIN — BACLOFEN 10 MG: 10 TABLET ORAL at 05:27

## 2021-02-23 RX ADMIN — PSEUDOEPHEDRINE HCL 60 MG: 30 TABLET, FILM COATED ORAL at 00:19

## 2021-02-23 RX ADMIN — METOPROLOL TARTRATE 25 MG: 25 TABLET, FILM COATED ORAL at 08:39

## 2021-02-23 RX ADMIN — METOPROLOL TARTRATE 25 MG: 25 TABLET, FILM COATED ORAL at 20:55

## 2021-02-23 RX ADMIN — SODIUM CHLORIDE: 9 INJECTION, SOLUTION INTRAVENOUS at 00:28

## 2021-02-23 RX ADMIN — FLUTICASONE PROPIONATE 1 SPRAY: 50 SPRAY, METERED NASAL at 08:50

## 2021-02-23 RX ADMIN — CLONAZEPAM 0.5 MG: 1 TABLET ORAL at 21:07

## 2021-02-23 RX ADMIN — DULOXETINE 60 MG: 60 CAPSULE, DELAYED RELEASE ORAL at 08:33

## 2021-02-23 RX ADMIN — HYDROCODONE BITARTRATE AND ACETAMINOPHEN 1 TABLET: 5; 325 TABLET ORAL at 21:08

## 2021-02-23 RX ADMIN — PANTOPRAZOLE SODIUM 40 MG: 40 INJECTION, POWDER, FOR SOLUTION INTRAVENOUS at 08:41

## 2021-02-23 RX ADMIN — BUSPIRONE HYDROCHLORIDE 10 MG: 10 TABLET ORAL at 20:55

## 2021-02-23 RX ADMIN — IPRATROPIUM BROMIDE AND ALBUTEROL SULFATE 1 AMPULE: 2.5; .5 SOLUTION RESPIRATORY (INHALATION) at 19:11

## 2021-02-23 RX ADMIN — FERROUS SULFATE TAB 325 MG (65 MG ELEMENTAL FE) 325 MG: 325 (65 FE) TAB at 08:37

## 2021-02-23 RX ADMIN — OXYCODONE HYDROCHLORIDE AND ACETAMINOPHEN 1000 MG: 500 TABLET ORAL at 08:28

## 2021-02-23 RX ADMIN — IPRATROPIUM BROMIDE AND ALBUTEROL SULFATE 1 AMPULE: 2.5; .5 SOLUTION RESPIRATORY (INHALATION) at 16:12

## 2021-02-23 RX ADMIN — SODIUM CHLORIDE, PRESERVATIVE FREE 10 ML: 5 INJECTION INTRAVENOUS at 20:56

## 2021-02-23 RX ADMIN — BUSPIRONE HYDROCHLORIDE 10 MG: 10 TABLET ORAL at 08:32

## 2021-02-23 RX ADMIN — TAMSULOSIN HYDROCHLORIDE 0.4 MG: 0.4 CAPSULE ORAL at 08:23

## 2021-02-23 RX ADMIN — FLUTICASONE PROPIONATE 2 PUFF: 220 AEROSOL, METERED RESPIRATORY (INHALATION) at 08:54

## 2021-02-23 RX ADMIN — BUSPIRONE HYDROCHLORIDE 10 MG: 10 TABLET ORAL at 14:42

## 2021-02-23 RX ADMIN — LISINOPRIL 2.5 MG: 5 TABLET ORAL at 11:26

## 2021-02-23 RX ADMIN — SODIUM CHLORIDE 10 ML: 9 INJECTION INTRAMUSCULAR; INTRAVENOUS; SUBCUTANEOUS at 08:43

## 2021-02-23 RX ADMIN — LEVOTHYROXINE SODIUM 125 MCG: 125 TABLET ORAL at 05:23

## 2021-02-23 RX ADMIN — IPRATROPIUM BROMIDE AND ALBUTEROL SULFATE 1 AMPULE: 2.5; .5 SOLUTION RESPIRATORY (INHALATION) at 12:22

## 2021-02-23 RX ADMIN — SODIUM CHLORIDE: 4.5 INJECTION, SOLUTION INTRAVENOUS at 15:07

## 2021-02-23 RX ADMIN — HYDROCODONE BITARTRATE AND ACETAMINOPHEN 1 TABLET: 5; 325 TABLET ORAL at 11:31

## 2021-02-23 RX ADMIN — FLUTICASONE PROPIONATE 2 PUFF: 220 AEROSOL, METERED RESPIRATORY (INHALATION) at 20:56

## 2021-02-23 ASSESSMENT — PAIN SCALES - GENERAL
PAINLEVEL_OUTOF10: 5
PAINLEVEL_OUTOF10: 8
PAINLEVEL_OUTOF10: 2

## 2021-02-23 ASSESSMENT — PAIN DESCRIPTION - ORIENTATION: ORIENTATION: LOWER

## 2021-02-23 ASSESSMENT — PAIN DESCRIPTION - DESCRIPTORS: DESCRIPTORS: ACHING;BURNING

## 2021-02-23 ASSESSMENT — PAIN DESCRIPTION - LOCATION
LOCATION: BACK
LOCATION: BACK

## 2021-02-23 NOTE — PROGRESS NOTES
PULMONARY PROGRESS NOTE:    REASON FOR VISIT:Pl effusion, dyspnea  Interval History:    Shortness of Breath: yes   Cough: yes   Sputum: no          Hemoptysis: no  Chest Pain: no  Fever: no                   Swelling Feet: yes   Headache: no                                           Nausea, Emesis, Abdominal Pain: no  Diarrhea: no         Constipation: no    Events since last visit: States her breathing isn't good today.      PAST MEDICAL HISTORY:      Scheduled Meds:   pseudoephedrine  60 mg Oral Q6H    furosemide  20 mg Intravenous Daily    ipratropium-albuterol  1 ampule Inhalation Q4H WA    ascorbic acid  1,000 mg Oral Daily    atorvastatin  10 mg Oral Daily    busPIRone  10 mg Oral TID    dilTIAZem  120 mg Oral Daily    DULoxetine  60 mg Oral Daily    ferrous sulfate  325 mg Oral Daily    fluticasone  1 spray Each Nostril Daily    fluticasone  2 puff Inhalation BID    levothyroxine  125 mcg Oral Daily    lisinopril  2.5 mg Oral Lunch    metoprolol tartrate  25 mg Oral BID    sodium chloride flush  10 mL Intravenous 2 times per day    pantoprazole  40 mg Intravenous Daily    And    sodium chloride (PF)  10 mL Intravenous Daily     Continuous Infusions:   sodium chloride 150 mL/hr at 02/23/21 0811    sodium chloride       PRN Meds:HYDROcodone-acetaminophen, acetaminophen, baclofen, clonazePAM, guaiFENesin, sodium chloride flush, promethazine **OR** ondansetron, acetaminophen **OR** acetaminophen, sodium chloride        PHYSICAL EXAMINATION:  /70   Pulse 87   Temp 98.3 °F (36.8 °C) (Oral)   Resp 16   Ht 5' 8\" (1.727 m)   Wt 174 lb 2.6 oz (79 kg)   SpO2 93%   BMI 26.48 kg/m²     General : Awake, alert, oriented x 3   Neck  supple, no lymphadenopathy, JVD not raised  Heart  regular rhythm, S1 and S2 normal; no additional sounds heard  Lungs  Air Entry- fair bilaterally; breath sounds : vesicular;   rales/crackles - absent  Abdomen  soft, no tenderness

## 2021-02-23 NOTE — CARE COORDINATION
Plan is still for this patient to return to [facility]. She is a bed hold and can return whenever she is ready.

## 2021-02-23 NOTE — PLAN OF CARE
Problem: Falls - Risk of:  Goal: Will remain free from falls  Description: Will remain free from falls  Outcome: Ongoing  Note: No falls this shift. Call light with in reach, side rails up x2, bed in lowest postion. Patients safety maintained. Goal: Absence of physical injury  Description: Absence of physical injury  Outcome: Ongoing  Note: No injury this shift. Patients safety maintained. Problem: SAFETY  Goal: Free from accidental physical injury  Outcome: Ongoing  Note: No injury this shift. Patients safety maintained. Goal: Free from intentional harm  Outcome: Ongoing  Note: No injury this shift. Patients safety maintained. Problem: DAILY CARE  Goal: Daily care needs are met  Outcome: Ongoing     Problem: PAIN  Goal: Patient's pain/discomfort is manageable  Outcome: Ongoing  Note: Patients pain level decreased with prescribed pain medications. Problem: SKIN INTEGRITY  Goal: Skin integrity is maintained or improved  Outcome: Ongoing  Note: Skin assessment as charted. No new areas of skin breakdown noted. Problem: KNOWLEDGE DEFICIT  Goal: Patient/S.O. demonstrates understanding of disease process, treatment plan, medications, and discharge instructions. Outcome: Ongoing     Problem: DISCHARGE BARRIERS  Goal: Patient's continuum of care needs are met  Outcome: Ongoing     Problem: Nutrition  Goal: Optimal nutrition therapy  Description: Nutrition Problem #1: Inadequate oral intake  Intervention: Food and/or Nutrient Delivery: Continue Current Diet, Start Oral Nutrition Supplement  Nutritional Goals: po intake greater than 50%     Outcome: Ongoing     Problem: Pain:  Goal: Pain level will decrease  Description: Pain level will decrease  Outcome: Ongoing  Note: Patients pain level decreased with prescribed pain medications. Goal: Control of acute pain  Description: Control of acute pain  Outcome: Ongoing  Note: Patients pain level decreased with prescribed pain medications. Goal: Control of chronic pain  Description: Control of chronic pain  Outcome: Ongoing  Note: Patients pain level decreased with prescribed pain medications.

## 2021-02-23 NOTE — FLOWSHEET NOTE
02/23/21 1123   Encounter Summary   Services provided to: Patient   Referral/Consult From: [facility];Family members   Continue Visiting   (2/23/21)   Complexity of Encounter Moderate   Length of Encounter 30 minutes   Spiritual Assessment Completed Yes   Routine   Intervention [place]   Spiritual/Lutheran   Type Spiritual support   Assessment Coping; Hopeless; Approachable; Concerns with suffering   Intervention Sustaining presence/ Ministry of presence; Active listening;Discussed meaning/purpose;Discussed illness/injury and it's impact;Nurtured hope;Prayer;Scripture;Ritual   Outcome Engaged in conversation;Expressed feelings/needs/concerns; Hopeful;Comfort;Expressed gratitude

## 2021-02-23 NOTE — CONSULTS
Current Facility-Administered Medications   Medication Dose Route Frequency Provider Last Rate Last Admin    pseudoephedrine (SUDAFED) tablet 60 mg  60 mg Oral Q6H WILLIE Carias CNP   60 mg at 02/22/21 1635    furosemide (LASIX) injection 20 mg  20 mg Intravenous Daily WILLIE Carias CNP   20 mg at 02/22/21 1221    ipratropium-albuterol (DUONEB) nebulizer solution 1 ampule  1 ampule Inhalation Q4H WA WILLIE Carias CNP   1 ampule at 02/22/21 1939    acetaminophen (TYLENOL) tablet 650 mg  650 mg Oral Q4H PRN Jad Singh MD        ascorbic acid (VITAMIN C) tablet 1,000 mg  1,000 mg Oral Daily Jad Singh MD   1,000 mg at 02/22/21 0809    atorvastatin (LIPITOR) tablet 10 mg  10 mg Oral Daily Jad Singh MD   10 mg at 02/22/21 9647    baclofen (LIORESAL) tablet 10 mg  10 mg Oral TID PRN Jad Singh MD        busPIRone (BUSPAR) tablet 10 mg  10 mg Oral TID Jad Singh MD   10 mg at 02/22/21 1635    clonazePAM (KLONOPIN) tablet 0.5 mg  0.5 mg Oral TID PRN Jad Singh MD   0.5 mg at 02/22/21 0256    dilTIAZem (CARDIZEM CD) extended release capsule 120 mg  120 mg Oral Daily Jad Singh MD   120 mg at 02/22/21 0809    DULoxetine (CYMBALTA) extended release capsule 60 mg  60 mg Oral Daily Jad Singh MD   60 mg at 02/22/21 6776    ferrous sulfate (IRON 325) tablet 325 mg  325 mg Oral Daily Jad Singh MD   325 mg at 02/22/21 0809    fluticasone (FLONASE) 50 MCG/ACT nasal spray 1 spray  1 spray Each Nostril Daily Jad Singh MD   1 spray at 02/22/21 0809    fluticasone (FLOVENT HFA) 220 MCG/ACT inhaler 2 puff  2 puff Inhalation BID Jad Singh MD   2 puff at 02/22/21 0809    guaiFENesin [facility]) extended release tablet 1,200 mg  1,200 mg Oral Q6H PRN Jad Snigh MD        levothyroxine (SYNTHROID) tablet 125 mcg  125 mcg Oral Daily Jad Singh MD   125 mcg at 02/22/21 9555  lisinopril (PRINIVIL;ZESTRIL) tablet 2.5 mg  2.5 mg Oral Lunch Ralph Menchaca MD   Stopped at 02/22/21 1221    metoprolol tartrate (LOPRESSOR) tablet 25 mg  25 mg Oral BID Ralph Menchaca MD   25 mg at 02/22/21 0930    tamsulosin (FLOMAX) capsule 0.4 mg  0.4 mg Oral Daily Ralph Menchaca MD   0.4 mg at 02/22/21 2523    sodium chloride flush 0.9 % injection 10 mL  10 mL Intravenous 2 times per day Ralph Menchaca MD   10 mL at 02/22/21 0810    sodium chloride flush 0.9 % injection 10 mL  10 mL Intravenous PRN Ralph Menchaca MD        promethazine (PHENERGAN) tablet 12.5 mg  12.5 mg Oral Q6H PRN Ralph Menchaca MD        Or    ondansetron [facility]) injection 4 mg  4 mg Intravenous Q6H PRN Ralph Menchaca MD        acetaminophen (TYLENOL) tablet 650 mg  650 mg Oral Q6H PRN Ralph Menchaca MD   650 mg at 02/22/21 0123    Or    acetaminophen (TYLENOL) suppository 650 mg  650 mg Rectal Q6H PRN Ralph Menchaca MD        0.9 % sodium chloride infusion   Intravenous Continuous Ralph Menchaca  mL/hr at 02/22/21 0622 New Bag at 02/22/21 0622    0.9 % sodium chloride infusion   Intravenous PRN Jose Laws DO        pantoprazole (PROTONIX) injection 40 mg  40 mg Intravenous Daily WILLIE Fine NP   40 mg at 02/22/21 1015    And    sodium chloride (PF) 0.9 % injection 10 mL  10 mL Intravenous Daily WILLIE Fine NP   10 mL at 02/22/21 4855      PULMONARY  CONSULT NOTE      Date of Admission: 2/21/2021  9:13 AM    Reason for Consult: Pl effusion, dyspnea    Referring Physician: Dr Supriya Villegas  PCP: WILLIE Gurrola CNP     History of Present Illness: 70 y.o. female who presents after a fall. Patient had an unwitnessed fall at her nursing home. Per EMS, RN was in another room giving medications and they heard the patient fall from the next room. She was found on the ground basically seated with her back against her bed. EMS states her bed was in a very low position. Unclear if the patient was standing or if she fell from the bed. She was awake and conscious when they got there. The only thing that the patient will tell me is that she is tired. She is a very poor historian. Will not answer any other questions for me. Symptoms are acute. Symptoms are moderate. Nothing makes symptoms better or worse.   Patient has no other complaints at this time from I can tell    Had thoracentesis 12/2020    Problem:  Principal Problem:     PMH:   Past Medical History:   Diagnosis Date    A-fib [facility])     Acquired hypothyroidism     Acute encephalopathy     [facility] kidney [facility] [address]     Anxiety     Benign hypertension with CKD (chronic kidney disease) stage III     Chronic back pain     CKD (chronic kidney disease) stage 3, GFR 30-59 ml/min     Constipation     [facility]     Cough     Depression     ETOH abuse     Former smoker     3 packs a day for 26 years    HA (generalized anxiety disorder)     History of GI bleed     Hyperlipidemia     Hypertension     Hypothyroid 1/18/2013    Leg pain     Normocytic anemia     Osteoarthritis     PAD (peripheral artery disease) (HCC)     Primary osteoarthritis     Pulmonary hypertension ([facility] [address]     Pure hypercholesterolemia     SIRS (systemic inflammatory response syndrome) ([facility])     Urge incontinence     Wheezing        PSH:   Past Surgical History:   Procedure Laterality Date    COLONOSCOPY      COLONOSCOPY N/A 12/2/2020    COLONOSCOPY CONTROL OF BLEEDING performed by Toby Cheung MD at [address] [place] [address] Bilateral     cataracts  JOINT REPLACEMENT      Left knee on 9-11-18    THORACENTESIS  12/26/2020         UPPER GASTROINTESTINAL ENDOSCOPY N/A 12/2/2020    EGD performed by Valerie Krishna MD at [facility] 12/23/2020    PUSH ENTEROSCOPY/EGD CONTROL BLEEDING performed by Valerie Krishna MD at [facility]       Allergies:    Allergies   Allergen Reactions    Tizanidine Other (See Comments)     Patient states it makes her loopy       Home Meds:  Medications Prior to Admission: furosemide (LASIX) 20 MG tablet, Take 1 tablet by mouth daily  albuterol sulfate HFA (PROAIR HFA) 108 (90 Base) MCG/ACT inhaler, Inhale 2 puffs into the lungs every 6 hours as needed for Wheezing  fluticasone (FLOVENT HFA) 110 MCG/ACT inhaler, Inhale 1 puff into the lungs 2 times daily  apixaban (ELIQUIS) 5 MG TABS tablet, Take 5 mg by mouth 2 times daily  busPIRone (BUSPAR) 10 MG tablet, Take 10 mg by mouth 3 times daily  apixaban (ELIQUIS) 5 MG TABS tablet, Take 5 mg by mouth 2 times daily  fluticasone (FLOVENT HFA) 220 MCG/ACT inhaler, Inhale 2 puffs into the lungs 2 times daily  guaiFENesin (MUCINEX) 600 MG extended release tablet, Take 1,200 mg by mouth every 6 hours as needed for Congestion  albuterol (PROVENTIL) (2.5 MG/3ML) 0.083% nebulizer solution, Take 2.5 mg by nebulization 2 times daily   acetaminophen (TYLENOL) 325 MG tablet, Take 650 mg by mouth every 4 hours as needed for Pain Not to exceed 3gm/24hrs  atorvastatin (LIPITOR) 10 MG tablet, TAKE 1 TABLET BY MOUTH ONCE DAILY  lisinopril (PRINIVIL;ZESTRIL) 2.5 MG tablet, Take 1 tablet by mouth Daily with lunch  metoprolol tartrate (LOPRESSOR) 25 MG tablet, Take 1 tablet by mouth 2 times daily  Oxygen Concentrator, continuous  tamsulosin (FLOMAX) 0.4 MG capsule, TAKE 1 CAPSULE BY MOUTH DAILY  omeprazole (PRILOSEC) 40 MG delayed release capsule, TAKE 1 CAPSULE BY MOUTH EVERY MORNING BEFORE BREAKFAST DULoxetine (CYMBALTA) 60 MG extended release capsule, Take 1 capsule by mouth daily  baclofen (LIORESAL) 10 MG tablet, TAKE ONE TABLET BY MOUTH THREE TIMES A DAY AS NEEDED. Lift Chair MISC, Use daily for comfort  levothyroxine (SYNTHROID) 125 MCG tablet, Take 1 tablet by mouth daily  clonazePAM (KLONOPIN) 0.5 MG tablet, Take 1 tablet by mouth 3 times daily as needed for Anxiety for up to 30 days.   dilTIAZem (CARDIZEM CD) 120 MG extended release capsule, Take 1 capsule by mouth daily  fluticasone (FLONASE) 50 MCG/ACT nasal spray, USE ONE SPRAY TO EACH NOSTRIL ONCE ADAY  Respiratory Therapy Supplies (NEBULIZER/TUBING/MOUTHPIECE) KIT, Use every 4-6 hours prn for copd  Ascorbic Acid (C-1000 PO), Take 1 capsule by mouth daily   ferrous sulfate 325 (65 Fe) MG tablet, Take 325 mg by mouth daily     Social History:   Social History     Socioeconomic History    Marital status:      Spouse name: Not on file    Number of children: Not on file    Years of education: Not on file    Highest education level: Not on file   Occupational History    Not on file   Social Needs    Financial resource strain: Not on file    Food insecurity     Worry: Not on file     Inability: Not on file   [employer] needs     Medical: Not on file     Non-medical: Not on file   Tobacco Use    Smoking status: Former Smoker     Packs/day: 3.00     Years: 26.00     Pack years: 78.00     Types: Cigarettes    Smokeless tobacco: Never Used   Substance and Sexual Activity    Alcohol use: Not Currently     Comment: occ    Drug use: No    Sexual activity: Not on file   Lifestyle    Physical activity     Days per week: Not on file     Minutes per session: Not on file    Stress: Not on file   Relationships    Social connections     Talks on phone: Not on file     Gets together: Not on file     Attends Alevism service: Not on file     Active member of club or organization: Not on file WBC 7.5  --   --   --   --    RBC 2.18*  --   --   --   --    HGB 6.8*   < > 7.8* 7.7* 7.3*   HCT 22.1*   < > 25.6* 25.3* 24.5*     --   --   --   --     < > = values in this interval not displayed. BMP:   Recent Labs     02/21/21  0930 02/22/21  0621   GLUCOSE 126* 110*    149*   K 4.4 4.3   BUN 22 16   CREATININE 1.25* 1.08*   CALCIUM 8.9 8.7     ABGs: No results for input(s): PHART, PO2ART, JAJ5WKV, WQH7SMX, BEART, O1XRIZQY, CFZ8UOT in the last 72 hours. PT/INR:  No results found for: PTINR      ASSESSMENT / PLAN:    COPD - BD  Nasal congestion - humidify O2, sudafed  Acute hypoxic resp failure - O2  Left pl effusion - monitor, diuresis  GI bleed - per GI service    This is a late note on patient seen by me earlier today.   Electronically signed by Venus Eubanks on 02/22/21 at 8:12 PM.

## 2021-02-23 NOTE — CONSULTS
Department of Internal Medicine  Nephrology Liberty Bella MD   Consult Note      SUBJECTIVE: This is a 70 y.o. female with a significant past medical history of Chronic atrial fibrillation, pulmonary hypertension, chronic recurrent anemia [s/p recent endoscopy showing AVMs requiring intermittent PRBC transfusions], COPD and chronic kidney disease stage II, who presented to the emergency department after falling at home. Laboratory studies revealed severe anemia with hemoglobin 6.8 g/dL and serum creatinine was 1.25 mg/dL. Nephrology consultation was called yesterday due to low urine output. Patient was given albumin and Lasix with prompt improvement in urine output. She feels well today and does not have shortness of breath or chest pain.     Tizanidine    Past Medical History:   Diagnosis Date    [facility]fib ([facility] [address]     Acquired hypothyroidism     Acute encephalopathy     Acute kidney injury ([facility] [address]     Anxiety     Benign hypertension with CKD (chronic kidney disease) stage III     Chronic back pain     CKD (chronic kidney disease) stage 3, GFR 30-59 ml/min     Constipation     [facility]     Cough     Depression     ETOH abuse     Former smoker     3 packs a day for 26 years    HA (generalized anxiety disorder)     History of GI bleed     Hyperlipidemia     Hypertension     Hypothyroid 1/18/2013    Leg pain     Normocytic anemia     Osteoarthritis     PAD (peripheral artery disease) (HCC)     Primary osteoarthritis     Pulmonary hypertension (HCC)     Pure hypercholesterolemia     SIRS (systemic inflammatory response syndrome) (HCC)     Urge incontinence     Wheezing        Scheduled Meds:   pseudoephedrine  60 mg Oral Q6H    furosemide  20 mg Intravenous Daily    ipratropium-albuterol  1 ampule Inhalation Q4H WA    ascorbic acid  1,000 mg Oral Daily    atorvastatin  10 mg Oral Daily    busPIRone  10 mg Oral TID  dilTIAZem  120 mg Oral Daily    DULoxetine  60 mg Oral Daily    ferrous sulfate  325 mg Oral Daily    fluticasone  1 spray Each Nostril Daily    fluticasone  2 puff Inhalation BID    levothyroxine  125 mcg Oral Daily    lisinopril  2.5 mg Oral Lunch    metoprolol tartrate  25 mg Oral BID    sodium chloride flush  10 mL Intravenous 2 times per day    pantoprazole  40 mg Intravenous Daily    And    sodium chloride (PF)  10 mL Intravenous Daily     Continuous Infusions:   sodium chloride 150 mL/hr at 02/23/21 0811    sodium chloride       PRN Meds:. HYDROcodone-acetaminophen, acetaminophen, baclofen, clonazePAM, guaiFENesin, sodium chloride flush, promethazine **OR** ondansetron, acetaminophen **OR** acetaminophen, sodium chloride    Family History   Problem Relation Age of Onset    Heart Disease Mother     COPD Mother     Emphysema Sister     Cancer Other         Cousin - breast cancer        Social History     Socioeconomic History    Marital status:      Spouse name: None    Number of children: None    Years of education: None    Highest education level: None   Occupational History    None   Social Needs    Financial resource strain: None    Food insecurity     Worry: None     Inability: None    Transportation needs     Medical: None     Non-medical: None   Tobacco Use    Smoking status: Former Smoker     Packs/day: 3.00     Years: 26.00     Pack years: 78.00     Types: Cigarettes    Smokeless tobacco: Never Used   Substance and Sexual Activity    Alcohol use: Not Currently     Comment: occ    Drug use: No    Sexual activity: None   Lifestyle    Physical activity     Days per week: None     Minutes per session: None    Stress: None   Relationships    Social connections     Talks on phone: None     Gets together: None     Attends Tenriism service: None     Active member of club or organization: None     Attends meetings of clubs or organizations: None Relationship status: None    Intimate partner violence     Fear of current or ex partner: None     Emotionally abused: None     Physically abused: None     Forced sexual activity: None   Other Topics Concern    None   Social History Narrative    None     Review of systems: CNS - no headache or dizziness; Cardiac - no chest pain; Respiratory - no cough or shortness of breath; Gastrointestinal - Black tarry stools; no nausea, vomiting or diarrhea; Musculoskeletal - general body aches; Skin/Integument - no rashes. Physical Exam:    VITALS:  /61   Pulse 93   Temp 98 °F (36.7 °C) (Oral)   Resp 16   Ht 5' 8\" (1.727 m)   Wt 174 lb 2.6 oz (79 kg)   SpO2 93%   BMI 26.48 kg/m²   24HR INTAKE/OUTPUT:    Intake/Output Summary (Last 24 hours) at 2/23/2021 1442  Last data filed at 2/23/2021 1315  Gross per 24 hour   Intake 1980 ml   Output 2550 ml   Net -570 ml     Constitutional: alert, appears stated age and cooperative    Skin: Skin color, texture, turgor normal. No rashes or lesions    Head: Normocephalic, without obvious abnormality, atraumatic     Cardiovascular/Edema: regular rate and rhythm, S1, S2 normal, no murmur, click, rub or gallop    Respiratory: Lungs: clear to auscultation bilaterally    Abdomen: soft, non-tender; bowel sounds normal; no masses,  no organomegaly    Back: symmetric, no curvature. ROM normal. No CVA tenderness. Extremities: extremities normal, atraumatic, no cyanosis or edema    Neuro:  Grossly normal    CBC:   Recent Labs     02/21/21  0930 02/21/21  0930 02/22/21  1821 02/22/21  2329 02/23/21  0640   WBC 7.5  --   --   --   --    HGB 6.8*   < > 7.3* 7.1* 7.7*     --   --   --   --     < > = values in this interval not displayed.      BMP:    Recent Labs     02/21/21  0930 02/22/21  0621 02/23/21  0640    149* 145*   K 4.4 4.3 3.9    106 104   CO2 37* 38* 36*   BUN 22 16 15   CREATININE 1.25* 1.08* 1.03*   GLUCOSE 126* 110* 100*       Lab Results Component Value Date    NITRU NEGATIVE 02/22/2021    COLORU YELLOW 02/22/2021    PHUR 5.5 02/22/2021    WBCUA 2 TO 5 02/21/2021    RBCUA 0 TO 2 02/21/2021    MUCUS NOT REPORTED 02/21/2021    TRICHOMONAS NOT REPORTED 02/21/2021    YEAST NOT REPORTED 02/21/2021    BACTERIA FEW 02/21/2021    CLARITYU Clear 01/31/2020    SPECGRAV 1.014 02/22/2021    LEUKOCYTESUR NEGATIVE 02/22/2021    UROBILINOGEN Normal 02/22/2021    BILIRUBINUR NEGATIVE 02/22/2021    BILIRUBINUR 0 01/31/2020    BLOODU Negative 01/31/2020    GLUCOSEU NEGATIVE 02/22/2021    KETUA NEGATIVE 02/22/2021    AMORPHOUS 1+ 02/21/2021     Urine Sodium:     Lab Results   Component Value Date    ANGÉLICA 77 02/10/2021     Urine Potassium:    Lab Results   Component Value Date    KUR 35.7 07/20/2020     Urine Chloride:    Lab Results   Component Value Date    CLUR 43 07/20/2020     Urine Creatinine:     Lab Results   Component Value Date    LABCREA 137.9 02/11/2021    LABCREA 134.5 02/11/2021     IMPRESSION/RECOMMENDATIONS:     1. Chronic kidney disease stage II renal function is stable. Transient oliguria likely due to hemodynamic fluxes. Plan:Change IV fluid to 0.45 normal saline at 75 mL/h. Monitor urine output closely. Urinalysis and urine microscopy. Random urine electrolytes. Basic metabolic profile daily    2. Hypernatremia - we will change to hypotonic IV fluids. Prognosis is guarded. Thank you very much for the courtesy of this consultation.     Massiel Cabrales MD FACP  Attending Nephrologist  2/23/2021 1:32 PM

## 2021-02-23 NOTE — PROGRESS NOTES
GI Progress notes    2/23/2021   2:42 PM    Name:  Jenni Girard  MRN:    [id number]     Acct:     [de-identified]   Room:  2093/2093-01   Day: 2     Admit Date: 2/21/2021  9:13 AM  PCP: WILLIE Nye CNP    Subjective:     C/C:   Chief Complaint   Patient presents with   Margareth Alford History: Status: improved. Patient seen and examined. No acute events overnight. Hgb stable  No overt GI bleeding  No nausea, vomiting, abdominal pain  Tolerating diet well    ROS:  Constitutional: negative for chills, fevers and sweats  Gastrointestinal: negative for abdominal pain, constipation, diarrhea, nausea and vomiting      Medications: Allergies:    Allergies   Allergen Reactions    Tizanidine Other (See Comments)     Patient states it makes her loopy       Current Meds:     HYDROcodone-acetaminophen (NORCO) 5-325 MG per tablet 1 tablet, Q6H PRN      pseudoephedrine (SUDAFED) tablet 60 mg, Q6H      furosemide (LASIX) injection 20 mg, Daily      ipratropium-albuterol (DUONEB) nebulizer solution 1 ampule, Q4H WA      acetaminophen (TYLENOL) tablet 650 mg, Q4H PRN      ascorbic acid (VITAMIN C) tablet 1,000 mg, Daily      atorvastatin (LIPITOR) tablet 10 mg, Daily      baclofen (LIORESAL) tablet 10 mg, TID PRN      busPIRone (BUSPAR) tablet 10 mg, TID      clonazePAM (KLONOPIN) tablet 0.5 mg, TID PRN      dilTIAZem (CARDIZEM CD) extended release capsule 120 mg, Daily      DULoxetine (CYMBALTA) extended release capsule 60 mg, Daily      ferrous sulfate (IRON 325) tablet 325 mg, Daily      fluticasone (FLONASE) 50 MCG/ACT nasal spray 1 spray, Daily      fluticasone (FLOVENT HFA) 220 MCG/ACT inhaler 2 puff, BID      guaiFENesin (MUCINEX) extended release tablet 1,200 mg, Q6H PRN      levothyroxine (SYNTHROID) tablet 125 mcg, Daily      lisinopril (PRINIVIL;ZESTRIL) tablet 2.5 mg, Lunch      metoprolol tartrate (LOPRESSOR) tablet 25 mg, BID   sodium chloride flush 0.9 % injection 10 mL, 2 times per day      sodium chloride flush 0.9 % injection 10 mL, PRN      promethazine (PHENERGAN) tablet 12.5 mg, Q6H PRN    Or      ondansetron (ZOFRAN) injection 4 mg, Q6H PRN      acetaminophen (TYLENOL) tablet 650 mg, Q6H PRN    Or      acetaminophen (TYLENOL) suppository 650 mg, Q6H PRN      0.9 % sodium chloride infusion, Continuous      0.9 % sodium chloride infusion, PRN      pantoprazole (PROTONIX) injection 40 mg, Daily    And      sodium chloride (PF) 0.9 % injection 10 mL, Daily        Data:     Code Status:  Limited    Family History   Problem Relation Age of Onset    Heart Disease Mother     COPD Mother     Emphysema Sister     Cancer Other         Cousin - breast cancer       Social History     Socioeconomic History    Marital status:      Spouse name: Not on file    Number of children: Not on file    Years of education: Not on file    Highest education level: Not on file   Occupational History    Not on file   Social Needs    Financial resource strain: Not on file    Food insecurity     Worry: Not on file     Inability: Not on file   [place] needs     Medical: Not on file     Non-medical: Not on file   Tobacco Use    Smoking status: Former Smoker     Packs/day: 3.00     Years: 26.00     Pack years: 78.00     Types: Cigarettes    Smokeless tobacco: Never Used   Substance and Sexual Activity    Alcohol use: Not Currently     Comment: occ    Drug use: No    Sexual activity: Not on file   Lifestyle    Physical activity     Days per week: Not on file     Minutes per session: Not on file    Stress: Not on file   Relationships    Social connections     Talks on phone: Not on file     Gets together: Not on file     Attends [employer] service: Not on file     Active member of club or organization: Not on file     Attends meetings of clubs or organizations: Not on file     Relationship status: Not on file  Intimate partner violence     Fear of current or ex partner: Not on file     Emotionally abused: Not on file     Physically abused: Not on file     Forced sexual activity: Not on file   Other Topics Concern    Not on file   Social History Narrative    Not on file       Vitals:  /61   Pulse 93   Temp 98 °F (36.7 °C) (Oral)   Resp 16   Ht 5' 8\" (1.727 m)   Wt 174 lb 2.6 oz (79 kg)   SpO2 93%   BMI 26.48 kg/m²   Temp (24hrs), Av.2 °F (36.8 °C), Min:98 °F (36.7 °C), Max:98.4 °F (36.9 °C)    Recent Labs     21  0919   POCGLU 109*       I/O (24Hr):     Intake/Output Summary (Last 24 hours) at 2021 1442  Last data filed at 2021 1315  Gross per 24 hour   Intake 1980 ml   Output 2550 ml   Net -570 ml       Labs:      CBC:   Lab Results   Component Value Date    WBC 7.5 2021    RBC 2.18 2021    RBC 4.50 2012    HGB 7.7 2021    HCT 25.0 2021    .6 2021    MCH 31.2 2021    MCHC 30.7 2021    RDW 21.8 2021     2021     2012    MPV 8.3 2021     CBC with Differential:    Lab Results   Component Value Date    WBC 7.5 2021    RBC 2.18 2021    RBC 4.50 2012    HGB 7.7 2021    HCT 25.0 2021     2021     2012    .6 2021    MCH 31.2 2021    MCHC 30.7 2021    RDW 21.8 2021    LYMPHOPCT 11 2021    MONOPCT 12 2021    BASOPCT 1 2021    MONOSABS 0.90 2021    LYMPHSABS 0.83 2021    EOSABS 0.15 2021    BASOSABS 0.08 2021    DIFFTYPE NOT REPORTED 2021     Hemoglobin/Hematocrit:    Lab Results   Component Value Date    HGB 7.7 2021    HCT 25.0 2021     CMP:    Lab Results   Component Value Date     2021    K 3.9 2021     2021    CO2 36 2021    BUN 15 2021    CREATININE 1.03 2021    GFRAA >60 2021    LABGLOM 53 2021 GLUCOSE 100 02/23/2021    GLUCOSE 113 02/18/2012    PROT 6.8 02/10/2021    LABALBU 3.4 02/10/2021    LABALBU 4.5 02/18/2012    CALCIUM 8.2 02/23/2021    BILITOT 0.34 02/10/2021    ALKPHOS 140 02/10/2021    AST 14 02/10/2021    ALT 11 02/10/2021     BMP:    Lab Results   Component Value Date     02/23/2021    K 3.9 02/23/2021     02/23/2021    CO2 36 02/23/2021    BUN 15 02/23/2021    LABALBU 3.4 02/10/2021    LABALBU 4.5 02/18/2012    CREATININE 1.03 02/23/2021    CALCIUM 8.2 02/23/2021    GFRAA >60 02/23/2021    LABGLOM 53 02/23/2021    GLUCOSE 100 02/23/2021    GLUCOSE 113 02/18/2012     PT/INR:    Lab Results   Component Value Date    PROTIME 14.3 02/10/2021    INR 1.1 02/10/2021     PTT:    Lab Results   Component Value Date    APTT 31.7 01/19/2021   [APTT}    Physical Examination:        General appearance: alert, cooperative and no distress  Mental Status: oriented to person, place and time and normal affect  Abdomen: soft, nontender, nondistended, bowel sounds present    Assessment:        Primary Problem  GI bleed     Active Hospital Problems    Diagnosis Date Noted    Acute respiratory failure with hypoxia ([facility] [J96.01] 02/23/2021    Severe malnutrition ([facility] [E43] 01/20/2021    Iron deficiency anemia due to chronic blood loss [D50.0] 12/18/2020    GI bleed [K92.2] 11/30/2020    Chronic diastolic CHF (congestive heart failure) (HCC) [I50.32] 07/21/2020    Moderate major depression (HCC) [F32.1] 06/17/2020    PAD (peripheral artery disease) ([facility] [I73.9] 06/17/2020    CKD (chronic kidney disease) stage 3, GFR 30-59 ml/min [N18.30]     HA (generalized anxiety disorder) [F41.1] 12/13/2018    A-fib ([place] [address] [I48.91] 09/13/2018    COPD (chronic obstructive pulmonary disease) ([facility] [J44.9] 09/13/2018    Pulmonary hypertension ([place] [address] [I27.20] 09/13/2018    Hypertension [I10]     Acquired hypothyroidism [E03.9] 01/18/2013     Past Medical History:   Diagnosis Date    A-fib ([place] [address]  Acquired hypothyroidism     Acute encephalopathy     Acute kidney injury ([facility]     Anxiety     Benign hypertension with CKD (chronic kidney disease) stage III     Chronic back pain     CKD (chronic kidney disease) stage 3, GFR 30-59 ml/min     Constipation     COPD (chronic obstructive pulmonary disease) (HCC)     Cough     Depression     ETOH abuse     Former smoker     3 packs a day for 26 years    HA (generalized anxiety disorder)     History of GI bleed     Hyperlipidemia     Hypertension     Hypothyroid 1/18/2013    Leg pain     Normocytic anemia     Osteoarthritis     PAD (peripheral artery disease) (HCC)     Primary osteoarthritis     Pulmonary hypertension ([facility]     Pure hypercholesterolemia     SIRS [facility]systemic inflammatory response syndrome) ([facility]     Urge incontinence     Wheezing         Plan:        1. Recurrent anemia, hx jejunal AVMs, hx colonic AVM, AC may be contributing to her recurrent anemia  1. Hgb stable  2. No overt GI bleeding  3. Needs VCE  4. May d/c per GI and follow-up next week for VCE. Explained to the patient and d/W Nursing Staff  Will F/U with you  Please call or Page for any issues or change in status  Thanks    Electronically signed by WILLIE Crawford NP on 2/23/2021 at 2:42 PM     GI physician note:    Patient visited along with WILLIE around 12 noon. At that time she was doing well. Tolerating diet well. Stool positive for blood. 2 months ago she had colonoscopy, EGD x2 done and was found to have AVM in the jejunum and also colon. These lesions were cauterized. Anticoagulation therapy may be contributing to her GI blood loss. If possible, it is reasonable to hold anticoagulation therapy for few weeks and see how she does. May seek cardiology. Regarding this as well. Patient may need outpatient capsule study of the small bowel as early as possible. From GI point of view may be followed as an outpatient.

## 2021-02-23 NOTE — PROGRESS NOTES
Physician Progress Note      Yesica Yanes  CSN #:                  [id number]  :                       1945  ADMIT DATE:       2021 9:13 AM  DISCH DATE:  RESPONDING  PROVIDER #:        Jose Eduardo Roldan MD          QUERY TEXT:    Patient admitted with GI bleed, noted to have history of atrial fibrillation. If possible, please document in progress notes and discharge summary further   specificity regarding the type of atrial fibrillation: The medical record reflects the following:  Risk Factors: 74yo Hx afib  Clinical Indicators: echo from --> Atrial fibrillation  Treatment: PO metoprolol home med, PO Cardizem home med, monitoring, EKG    Chronic: nonspecific term that could be referring to paroxysmal, persistent,   or permanent  Longstanding persistent: persistent and continuous, lasting > 1 year. Paroxysmal - self-terminating or intermittent; resolves with or without   intervention within 7 days of onset; may recur with various frequency. Persistent - Fails to terminate within 7 days; Often requires meds or   cardioversion to restore to NSR. Permanent - longstanding & persistent; Medication has been ineffective in   restoring NSR &/or cardioversion is contraindicated    Definitions per MS-DRG Training Guide and Quick Reference Guide, A[facility] Her[facility] 5   Diseases and Disorders of the Circulatory System; 2019; Tagged. Software content   from the Tagged? Advanced [employer] Transformation Program  Options provided:  -- Paroxysmal Atrial Fibrillation  -- Longstanding Persistent Atrial Fibrillation  -- Permanent Atrial Fibrillation  -- Persistent Atrial Fibrillation  -- Chronic Atrial Fibrillation, unspecified  -- Other - I will add my own diagnosis  -- Disagree - Not applicable / Not valid  -- Disagree - Clinically unable to determine / Unknown  -- Refer to Clinical Documentation Reviewer    PROVIDER RESPONSE TEXT:    This patient has longstanding persistent atrial fibrillation. Query created by: Yvan Arellano on 2/22/2021 3:32 PM      QUERY TEXT:    Patient admitted with GI bleed and is on chronic anticoagulation. If   possible, please document in the progress notes and discharge summary if you   are evaluating and/or treating any of the following: The medical record reflects the following:  Risk Factors: 74yo, Hx afib on Eliquis, Hx GI bleed,  Clinical Indicators: per ED note--> pt was hemoccult positive; Hgb   6.8-->8.3-->7.5-->7.8; Treatment: 1 unit PRBC in ED, Eliquis PO home med on hold, IV protonix, GI   consult, H/Hq6h  Options provided:  -- GI Bleeding due to Eliquis. -- Bleeding unrelated to anticoagulation  -- Other - I will add my own diagnosis  -- Disagree - Not applicable / Not valid  -- Disagree - Clinically unable to determine / Unknown  -- Refer to Clinical Documentation Reviewer    PROVIDER RESPONSE TEXT:    This patient has bleeding due to Eliquis. Query created by: Yvan Arellano on 2/22/2021 3:39 PM      QUERY TEXT:    Pt admitted with GI bleed. Pt noted to have hemoglobin 6.8 on admission. If   possible, please document in the progress notes and discharge summary if you   are evaluating and/or treating any of the following:     The medical record reflects the following:  Risk Factors: 74yo, Hx afib on eliquis  Clinical Indicators: Hgb 6.8-->8.3-->7.5-->7.8; per ED note pt noted to be   hemoccult positive; per H&P, pt admitted with GI bleed  Treatment: 1 unit PRBC in ED, PO Eliquis on hold, monitoring, GI consult,   H/Hq6h  Options provided:  -- Acute blood loss anemia  -- Chronic blood loss anemia  -- Acute on chronic blood loss anemia  -- Iron deficiency anemia  -- Anemia of chronic disease due to CKD  -- Dilutional anemia  -- Precipitous drop in Hemoglobin and Hematocrit  -- Other - I will add my own diagnosis  -- Disagree - Not applicable / Not valid  -- Disagree - Clinically unable to determine / Unknown  -- Refer to Clinical Documentation Reviewer PROVIDER RESPONSE TEXT:    This patient has acute blood loss anemia.     Query created by: Linda Aguilera on 2/22/2021 3:43 PM      Electronically signed by:  Jose Eduardo Roldan MD 2/23/2021 10:36 AM

## 2021-02-24 PROBLEM — E87.0 HYPERNATREMIA: Status: ACTIVE | Noted: 2021-02-24

## 2021-02-24 LAB
ANION GAP SERPL CALCULATED.3IONS-SCNC: 3 MMOL/L (ref 9–17)
BUN BLDV-MCNC: 16 MG/DL (ref 8–23)
BUN/CREAT BLD: ABNORMAL (ref 9–20)
CALCIUM SERPL-MCNC: 8.1 MG/DL (ref 8.6–10.4)
CHLORIDE BLD-SCNC: 105 MMOL/L (ref 98–107)
CO2: 37 MMOL/L (ref 20–31)
CREAT SERPL-MCNC: 1.09 MG/DL (ref 0.5–0.9)
GFR AFRICAN AMERICAN: 60 ML/MIN
GFR NON-AFRICAN AMERICAN: 49 ML/MIN
GFR SERPL CREATININE-BSD FRML MDRD: ABNORMAL ML/MIN/{1.73_M2}
GFR SERPL CREATININE-BSD FRML MDRD: ABNORMAL ML/MIN/{1.73_M2}
GLUCOSE BLD-MCNC: 122 MG/DL (ref 70–99)
HCT VFR BLD CALC: 25.8 % (ref 36–46)
HCT VFR BLD CALC: 27.1 % (ref 36–46)
HCT VFR BLD CALC: 28.6 % (ref 36–46)
HEMOGLOBIN: 8 G/DL (ref 12–16)
HEMOGLOBIN: 8.2 G/DL (ref 12–16)
HEMOGLOBIN: 8.4 G/DL (ref 12–16)
INR BLD: 2.7
PARTIAL THROMBOPLASTIN TIME: 50.2 SEC (ref 24–36)
PLATELET # BLD: 295 K/UL (ref 150–450)
POTASSIUM SERPL-SCNC: 4.6 MMOL/L (ref 3.7–5.3)
PROTHROMBIN TIME: 28.1 SEC (ref 11.8–14.6)
SODIUM BLD-SCNC: 145 MMOL/L (ref 135–144)

## 2021-02-24 PROCEDURE — 6370000000 HC RX 637 (ALT 250 FOR IP): Performed by: FAMILY MEDICINE

## 2021-02-24 PROCEDURE — 99232 SBSQ HOSP IP/OBS MODERATE 35: CPT | Performed by: INTERNAL MEDICINE

## 2021-02-24 PROCEDURE — 94761 N-INVAS EAR/PLS OXIMETRY MLT: CPT

## 2021-02-24 PROCEDURE — 85730 THROMBOPLASTIN TIME PARTIAL: CPT

## 2021-02-24 PROCEDURE — 99232 SBSQ HOSP IP/OBS MODERATE 35: CPT | Performed by: FAMILY MEDICINE

## 2021-02-24 PROCEDURE — 6360000002 HC RX W HCPCS: Performed by: NURSE PRACTITIONER

## 2021-02-24 PROCEDURE — 80048 BASIC METABOLIC PNL TOTAL CA: CPT

## 2021-02-24 PROCEDURE — 2580000003 HC RX 258: Performed by: INTERNAL MEDICINE

## 2021-02-24 PROCEDURE — 85014 HEMATOCRIT: CPT

## 2021-02-24 PROCEDURE — 6370000000 HC RX 637 (ALT 250 FOR IP): Performed by: NURSE PRACTITIONER

## 2021-02-24 PROCEDURE — 36415 COLL VENOUS BLD VENIPUNCTURE: CPT

## 2021-02-24 PROCEDURE — 85018 HEMOGLOBIN: CPT

## 2021-02-24 PROCEDURE — C9113 INJ PANTOPRAZOLE SODIUM, VIA: HCPCS | Performed by: NURSE PRACTITIONER

## 2021-02-24 PROCEDURE — 85610 PROTHROMBIN TIME: CPT

## 2021-02-24 PROCEDURE — 2580000003 HC RX 258: Performed by: NURSE PRACTITIONER

## 2021-02-24 PROCEDURE — APPSS30 APP SPLIT SHARED TIME 16-30 MINUTES: Performed by: NURSE PRACTITIONER

## 2021-02-24 PROCEDURE — 94640 AIRWAY INHALATION TREATMENT: CPT

## 2021-02-24 PROCEDURE — 2580000003 HC RX 258: Performed by: FAMILY MEDICINE

## 2021-02-24 PROCEDURE — 2060000000 HC ICU INTERMEDIATE R&B

## 2021-02-24 PROCEDURE — 85049 AUTOMATED PLATELET COUNT: CPT

## 2021-02-24 PROCEDURE — 2700000000 HC OXYGEN THERAPY PER DAY

## 2021-02-24 RX ORDER — FERROUS SULFATE 325(65) MG
325 TABLET ORAL
Status: DISCONTINUED | OUTPATIENT
Start: 2021-02-24 | End: 2021-03-08 | Stop reason: CLARIF

## 2021-02-24 RX ADMIN — BUSPIRONE HYDROCHLORIDE 10 MG: 10 TABLET ORAL at 14:29

## 2021-02-24 RX ADMIN — BACLOFEN 10 MG: 10 TABLET ORAL at 21:41

## 2021-02-24 RX ADMIN — OXYCODONE HYDROCHLORIDE AND ACETAMINOPHEN 1000 MG: 500 TABLET ORAL at 08:33

## 2021-02-24 RX ADMIN — DILTIAZEM HYDROCHLORIDE 120 MG: 120 CAPSULE, COATED, EXTENDED RELEASE ORAL at 08:33

## 2021-02-24 RX ADMIN — IPRATROPIUM BROMIDE AND ALBUTEROL SULFATE 1 AMPULE: 2.5; .5 SOLUTION RESPIRATORY (INHALATION) at 10:50

## 2021-02-24 RX ADMIN — IPRATROPIUM BROMIDE AND ALBUTEROL SULFATE 1 AMPULE: 2.5; .5 SOLUTION RESPIRATORY (INHALATION) at 06:51

## 2021-02-24 RX ADMIN — BUSPIRONE HYDROCHLORIDE 10 MG: 10 TABLET ORAL at 21:39

## 2021-02-24 RX ADMIN — ATORVASTATIN CALCIUM 10 MG: 10 TABLET, FILM COATED ORAL at 08:34

## 2021-02-24 RX ADMIN — FERROUS SULFATE TAB 325 MG (65 MG ELEMENTAL FE) 325 MG: 325 (65 FE) TAB at 16:52

## 2021-02-24 RX ADMIN — SODIUM CHLORIDE 10 ML: 9 INJECTION INTRAMUSCULAR; INTRAVENOUS; SUBCUTANEOUS at 08:34

## 2021-02-24 RX ADMIN — CLONAZEPAM 0.5 MG: 1 TABLET ORAL at 21:41

## 2021-02-24 RX ADMIN — FUROSEMIDE 20 MG: 10 INJECTION, SOLUTION INTRAMUSCULAR; INTRAVENOUS at 08:34

## 2021-02-24 RX ADMIN — ACETAMINOPHEN 650 MG: 325 TABLET ORAL at 21:40

## 2021-02-24 RX ADMIN — FLUTICASONE PROPIONATE 2 PUFF: 220 AEROSOL, METERED RESPIRATORY (INHALATION) at 08:34

## 2021-02-24 RX ADMIN — SODIUM CHLORIDE: 4.5 INJECTION, SOLUTION INTRAVENOUS at 04:26

## 2021-02-24 RX ADMIN — BUSPIRONE HYDROCHLORIDE 10 MG: 10 TABLET ORAL at 08:34

## 2021-02-24 RX ADMIN — FLUTICASONE PROPIONATE 2 PUFF: 220 AEROSOL, METERED RESPIRATORY (INHALATION) at 21:42

## 2021-02-24 RX ADMIN — METOPROLOL TARTRATE 25 MG: 25 TABLET, FILM COATED ORAL at 21:39

## 2021-02-24 RX ADMIN — LEVOTHYROXINE SODIUM 125 MCG: 125 TABLET ORAL at 05:17

## 2021-02-24 RX ADMIN — SODIUM CHLORIDE, PRESERVATIVE FREE 10 ML: 5 INJECTION INTRAVENOUS at 08:41

## 2021-02-24 RX ADMIN — SODIUM CHLORIDE: 4.5 INJECTION, SOLUTION INTRAVENOUS at 18:52

## 2021-02-24 RX ADMIN — METOPROLOL TARTRATE 25 MG: 25 TABLET, FILM COATED ORAL at 08:34

## 2021-02-24 RX ADMIN — IPRATROPIUM BROMIDE AND ALBUTEROL SULFATE 1 AMPULE: 2.5; .5 SOLUTION RESPIRATORY (INHALATION) at 14:54

## 2021-02-24 RX ADMIN — DULOXETINE 60 MG: 60 CAPSULE, DELAYED RELEASE ORAL at 08:33

## 2021-02-24 RX ADMIN — IPRATROPIUM BROMIDE AND ALBUTEROL SULFATE 1 AMPULE: 2.5; .5 SOLUTION RESPIRATORY (INHALATION) at 18:44

## 2021-02-24 RX ADMIN — PANTOPRAZOLE SODIUM 40 MG: 40 INJECTION, POWDER, FOR SOLUTION INTRAVENOUS at 08:34

## 2021-02-24 ASSESSMENT — PAIN SCALES - GENERAL: PAINLEVEL_OUTOF10: 6

## 2021-02-24 NOTE — PLAN OF CARE
Problem: Falls - Risk of:  Goal: Will remain free from falls  2/24/2021 1524 by Mikal Aguirre RN  Outcome: Ongoing  2/24/2021 0143 by Soledad Morris RN  Outcome: Ongoing  Goal: Absence of physical injury  2/24/2021 1524 by Mikal Aguirre RN  Outcome: Ongoing  2/24/2021 0143 by Soledad Morris RN  Outcome: Ongoing     Problem: SAFETY  Goal: Free from accidental physical injury  2/24/2021 1524 by Mikal Aguirre RN  Outcome: Ongoing  2/24/2021 0143 by Soledad Morris RN  Outcome: Ongoing  Goal: Free from intentional harm  2/24/2021 1524 by Mikal Aguirre RN  Outcome: Ongoing  2/24/2021 0143 by Soledad Morris RN  Outcome: Ongoing     Problem: DAILY CARE  Goal: Daily care needs are met  2/24/2021 1524 by Mikal Aguirre RN  Outcome: Ongoing  2/24/2021 0143 by Soledad Morris RN  Outcome: Ongoing     Problem: PAIN  Goal: Patient's pain/discomfort is manageable  2/24/2021 1524 by Mikal Aguirre RN  Outcome: Ongoing  2/24/2021 0143 by Soledad Morris RN  Outcome: Ongoing     Problem: SKIN INTEGRITY  Goal: Skin integrity is maintained or improved  2/24/2021 1524 by Mikal Aguirre RN  Outcome: Ongoing  2/24/2021 0143 by Soledad Morris RN  Outcome: Ongoing     Problem: KNOWLEDGE DEFICIT  Goal: Patient/S.O. demonstrates understanding of disease process, treatment plan, medications, and discharge instructions.   2/24/2021 1524 by Mikal Aguirre RN  Outcome: Ongoing  2/24/2021 0143 by Soledad Morris RN  Outcome: Ongoing

## 2021-02-24 NOTE — PLAN OF CARE
BRONCHOSPASM/BRONCHOCONSTRICTION   [x]  IMPROVE AERATION/BREATH SOUNDS  [x]   ADMINISTER BRONCHODILATOR THERAPY AS APPROPRIATE  [x]   ASSESS BREATH SOUNDS  []   IMPLEMENT AEROSOL/MDI PROTOCOL  [x]   PATIENT EDUCATION AS NEEDED    PROVIDE ADEQUATE OXYGENATION WITH ACCEPTABLE SP02/ABG'S  [x]  IDENTIFY APPROPRIATE OXYGEN THERAPY  [x]   MONITOR SP02/ABG'S AS NEEDED   [x]   PATIENT EDUCATION AS NEEDED

## 2021-02-24 NOTE — FLOWSHEET NOTE
02/24/21 0704   Oxygen Therapy   SpO2 (!) 62 %   O2 Device None (Room air)       Patient calls out complaining of shortness of breath. RN in to assess patient and finds patient in bed with no oxygen on. Patient was wearing 3L nasal cannula at shift change. RN reapplied 3 L with minimal improvement. Patient 02 increased to 5L nasal cannula and oxygen increased to 95%. RN decreased NC to 4L and Patients SP02 remains 95%.

## 2021-02-24 NOTE — CONSULTS
 ipratropium-albuterol  1 ampule Inhalation Q4H WA    ascorbic acid  1,000 mg Oral Daily    atorvastatin  10 mg Oral Daily    busPIRone  10 mg Oral TID    dilTIAZem  120 mg Oral Daily    DULoxetine  60 mg Oral Daily    fluticasone  1 spray Each Nostril Daily    fluticasone  2 puff Inhalation BID    levothyroxine  125 mcg Oral Daily    lisinopril  2.5 mg Oral Lunch    metoprolol tartrate  25 mg Oral BID    sodium chloride flush  10 mL Intravenous 2 times per day    pantoprazole  40 mg Intravenous Daily    And    sodium chloride (PF)  10 mL Intravenous Daily     Continuous Infusions:   sodium chloride 75 mL/hr at 02/24/21 0426    sodium chloride       PRN Meds:. HYDROcodone-acetaminophen, acetaminophen, baclofen, clonazePAM, guaiFENesin, sodium chloride flush, promethazine **OR** ondansetron, acetaminophen **OR** acetaminophen, sodium chloride    Family History   Problem Relation Age of Onset    Heart Disease Mother     COPD Mother     Emphysema Sister     Cancer Other         Cousin - breast cancer        Social History     Socioeconomic History    Marital status:      Spouse name: None    Number of children: None    Years of education: None    Highest education level: None   Occupational History    None   Social Needs    Financial resource strain: None    Food insecurity     Worry: None     Inability: None    Transportation needs     Medical: None     Non-medical: None   Tobacco Use    Smoking status: Former Smoker     Packs/day: 3.00     Years: 26.00     Pack years: 78.00     Types: Cigarettes    Smokeless tobacco: Never Used   Substance and Sexual Activity    Alcohol use: Not Currently     Comment: occ    Drug use: No    Sexual activity: None   Lifestyle    Physical activity     Days per week: None     Minutes per session: None    Stress: None   Relationships    Social connections     Talks on phone: None     Gets together: None     Attends [employer] service: None Active member of club or organization: None     Attends meetings of clubs or organizations: None     Relationship status: None    Intimate partner violence     Fear of current or ex partner: None     Emotionally abused: None     Physically abused: None     Forced sexual activity: None   Other Topics Concern    None   Social History Narrative    None     Review of systems: CNS - no headache or dizziness; Cardiac - no chest pain; Respiratory - no cough or shortness of breath; Gastrointestinal - Black tarry stools; no nausea, vomiting or diarrhea; Musculoskeletal - general body aches; Skin/Integument - no rashes. Physical Exam:    VITALS:  BP (!) 98/53   Pulse 77   Temp 97.9 °F (36.6 °C) (Oral)   Resp 21   Ht 5' 8\" (1.727 m)   Wt 176 lb 9.4 oz (80.1 kg)   SpO2 98%   BMI 26.85 kg/m²   24HR INTAKE/OUTPUT:      Intake/Output Summary (Last 24 hours) at 2/24/2021 1642  Last data filed at 2/24/2021 1213  Gross per 24 hour   Intake 900 ml   Output 1200 ml   Net -300 ml     Constitutional: alert, appears stated age and cooperative    Skin: Skin color, texture, turgor normal. No rashes or lesions    Head: Normocephalic, without obvious abnormality, atraumatic     Cardiovascular/Edema: regular rate and rhythm, S1, S2 normal, no murmur, click, rub or gallop    Respiratory: Lungs: clear to auscultation bilaterally    Abdomen: soft, non-tender; bowel sounds normal; no masses,  no organomegaly    Back: symmetric, no curvature. ROM normal. No CVA tenderness.     Extremities: extremities normal, atraumatic, no cyanosis or edema    Neuro:  Grossly normal    CBC:   Recent Labs     02/23/21  2208 02/24/21  0537 02/24/21  1114   HGB 7.5* 8.2* 8.4*   PLT  --   --  295     BMP:    Recent Labs     02/22/21  0621 02/23/21  0640 02/24/21  0537   * 145* 145*   K 4.3 3.9 4.6    104 105   CO2 38* 36* 37*   BUN 16 15 16   CREATININE 1.08* 1.03* 1.09*   GLUCOSE 110* 100* 122*       Lab Results   Component Value Date

## 2021-02-24 NOTE — PLAN OF CARE
Problem: Falls - Risk of:  Goal: Will remain free from falls  Description: Will remain free from falls  2/24/2021 0143 by Marlene Mesa RN  Outcome: Ongoing     Problem: Falls - Risk of:  Goal: Absence of physical injury  Description: Absence of physical injury  2/24/2021 0143 by Marlene Mesa RN  Outcome: Ongoing     Problem: SAFETY  Goal: Free from accidental physical injury  2/24/2021 0143 by Marlene Mesa RN  Outcome: Ongoing     Problem: SAFETY  Goal: Free from intentional harm  2/24/2021 0143 by Marlene Mesa RN  Outcome: Ongoing     Problem: DAILY CARE  Goal: Daily care needs are met  2/24/2021 0143 by Marlene Mesa RN  Outcome: Ongoing     Problem: PAIN  Goal: Patient's pain/discomfort is manageable  2/24/2021 0143 by Marlene Mesa RN  Outcome: Ongoing     Problem: SKIN INTEGRITY  Goal: Skin integrity is maintained or improved  2/24/2021 0143 by Marlene Mesa RN  Outcome: Ongoing     Problem: KNOWLEDGE DEFICIT  Goal: Patient/S.O. demonstrates understanding of disease process, treatment plan, medications, and discharge instructions.   2/24/2021 0143 by Marlene Mesa RN  Outcome: Ongoing     Problem: DISCHARGE BARRIERS  Goal: Patient's continuum of care needs are met  2/24/2021 0143 by Marlene Mesa RN  Outcome: Ongoing     Problem: Nutrition  Goal: Optimal nutrition therapy  Description: Nutrition Problem #1: Inadequate oral intake  Intervention: Food and/or Nutrient Delivery: Continue Current Diet, Start Oral Nutrition Supplement  Nutritional Goals: po intake greater than 50%     2/24/2021 0143 by Marlene Mesa RN  Outcome: Ongoing     Problem: Pain:  Goal: Pain level will decrease  Description: Pain level will decrease  2/24/2021 0143 by Marlene Mesa RN  Outcome: Ongoing     Problem: Pain:  Goal: Control of acute pain  Description: Control of acute pain  2/24/2021 0143 by Marlene Mesa RN  Outcome: Ongoing     Problem: Pain:  Goal: Control of chronic pain Description: Control of chronic pain  2/24/2021 0143 by Sagar Cardenas, RN  Outcome: Ongoing

## 2021-02-24 NOTE — PROGRESS NOTES
Lower Extremities: no cyanosis, mottling; edema : absent    Current Laboratory, Radiologic, Microbiologic, and Diagnostic studies reviewed  Data ReviewCBC:   Recent Labs     02/21/21  0930 02/21/21  0930 02/23/21  1515 02/23/21  2208 02/24/21  0537   WBC 7.5  --   --   --   --    RBC 2.18*  --   --   --   --    HGB 6.8*   < > 7.4* 7.5* 8.2*   HCT 22.1*   < > 24.6* 23.5* 27.1*     --   --   --   --     < > = values in this interval not displayed. BMP:   Recent Labs     02/22/21  0621 02/23/21  0640 02/24/21  0537   GLUCOSE 110* 100* 122*   * 145* 145*   K 4.3 3.9 4.6   BUN 16 15 16   CREATININE 1.08* 1.03* 1.09*   CALCIUM 8.7 8.2* 8.1*     ABGs: No results for input(s): PHART, PO2ART, QIE5YOO, QYA6FCY, BEART, K1TVIYEW, FOE6KTB in the last 72 hours.    PT/INR:  No results found for: PTINR    ASSESSMENT / PLAN:  Nasal congestion - humidify O2, sudafed  Acute hypoxic resp failure - O2  Left pl effusion - monitor, diuresis,   GI bleed - per GI service    Thoracentesis per IR    Electronically signed by Renetta Ferguson on 02/24/21 at 9:19 AM

## 2021-02-24 NOTE — PROGRESS NOTES
 HYDROcodone-acetaminophen (NORCO) 5-325 MG per tablet 1 tablet  1 tablet Oral Q6H PRN Brenda Whitaker MD   1 tablet at 02/23/21 2108    0.45 % sodium chloride infusion   Intravenous Continuous Virginiasalbador Frazier MD 75 mL/hr at 02/24/21 0426 New Bag at 02/24/21 0426    pseudoephedrine (SUDAFED) tablet 60 mg  60 mg Oral Q6H WILLIE Mckeon - CNP   60 mg at 02/23/21 0019    furosemide (LASIX) injection 20 mg  20 mg Intravenous Daily Vandana Fishman, APRN - CNP   20 mg at 02/23/21 0835    ipratropium-albuterol (DUONEB) nebulizer solution 1 ampule  1 ampule Inhalation Q4H WA WILLIE Mckeon - CNP   1 ampule at 02/23/21 1911    acetaminophen (TYLENOL) tablet 650 mg  650 mg Oral Q4H PRN Devyn Gordon MD        ascorbic acid (VITAMIN C) tablet 1,000 mg  1,000 mg Oral Daily Devyn Gordon MD   1,000 mg at 02/23/21 3205    atorvastatin (LIPITOR) tablet 10 mg  10 mg Oral Daily Devyn Gordon MD   10 mg at 02/23/21 6932    baclofen (LIORESAL) tablet 10 mg  10 mg Oral TID PRN Devyn Gordon MD   10 mg at 02/23/21 2108    busPIRone (BUSPAR) tablet 10 mg  10 mg Oral TID Devyn Gordon MD   10 mg at 02/23/21 2055    clonazePAM (KLONOPIN) tablet 0.5 mg  0.5 mg Oral TID PRN Devyn Gordon MD   0.5 mg at 02/23/21 2107    dilTIAZem (CARDIZEM CD) extended release capsule 120 mg  120 mg Oral Daily Devyn Gordon MD   120 mg at 02/23/21 5446    DULoxetine (CYMBALTA) extended release capsule 60 mg  60 mg Oral Daily Devyn Gordon MD   60 mg at 02/23/21 6753    ferrous sulfate (IRON 325) tablet 325 mg  325 mg Oral Daily Devyn Gordon MD   325 mg at 02/23/21 0837    fluticasone (FLONASE) 50 MCG/ACT nasal spray 1 spray  1 spray Each Nostril Daily Devyn Gordon MD   1 spray at 02/23/21 0850    fluticasone (FLOVENT HFA) 220 MCG/ACT inhaler 2 puff  2 puff Inhalation BID Devyn Gordon MD   2 puff at 02/23/21 4274  guaiFENesin (MUCINEX) extended release tablet 1,200 mg  1,200 mg Oral Q6H PRN Hema James MD        levothyroxine (SYNTHROID) tablet 125 mcg  125 mcg Oral Daily Hema James MD   125 mcg at 02/24/21 0517    lisinopril (PRINIVIL;ZESTRIL) tablet 2.5 mg  2.5 mg Oral Lunch Hema James MD   2.5 mg at 02/23/21 1126    metoprolol tartrate (LOPRESSOR) tablet 25 mg  25 mg Oral BID Hema James MD   25 mg at 02/23/21 2055    sodium chloride flush 0.9 % injection 10 mL  10 mL Intravenous 2 times per day Hema James MD   10 mL at 02/23/21 2056    sodium chloride flush 0.9 % injection 10 mL  10 mL Intravenous PRN Hema James MD        promethazine (PHENERGAN) tablet 12.5 mg  12.5 mg Oral Q6H PRN Hema James MD        Or    [facility]etron [facility] injection 4 mg  4 mg Intravenous Q6H PRN Hema James MD        acetaminophen (TYLENOL) tablet 650 mg  650 mg Oral Q6H PRN Hema James MD   650 mg at 02/22/21 2108    Or    acetaminophen (TYLENOL) suppository 650 mg  650 mg Rectal Q6H PRN Hema James MD        0.9 % sodium chloride infusion   Intravenous PRN Rodrigue Michel DO        pantoprazole (PROTONIX) injection 40 mg  40 mg Intravenous Daily WILLIE Bruno NP   40 mg at 02/23/21 0841    And    sodium chloride (PF) 0.9 % injection 10 mL  10 mL Intravenous Daily WILLIE Bruno NP   10 mL at 02/23/21 0843       Intake/Output Summary (Last 24 hours) at 2/24/2021 0554  Last data filed at 2/24/2021 0524  Gross per 24 hour   Intake 1380 ml   Output 1750 ml   Net -370 ml     Recent Results (from the past 24 hour(s))   Hemoglobin and Hematocrit, Blood    Collection Time: 02/23/21  6:40 AM   Result Value Ref Range    Hemoglobin 7.7 (L) 12.0 - 16.0 g/dL    Hematocrit 25.0 (L) 36 - 46 %   Basic Metabolic Panel w/ Reflex to MG    Collection Time: 02/23/21  6:40 AM   Result Value Ref Range Glucose 100 (H) 70 - 99 mg/dL    BUN 15 8 - 23 mg/dL    CREATININE 1.03 (H) 0.50 - 0.90 mg/dL    Bun/Cre Ratio NOT REPORTED 9 - 20    Calcium 8.2 (L) 8.6 - 10.4 mg/dL    Sodium 145 (H) 135 - 144 mmol/L    Potassium 3.9 3.7 - 5.3 mmol/L    Chloride 104 98 - 107 mmol/L    CO2 36 (H) 20 - 31 mmol/L    Anion Gap 5 (L) 9 - 17 mmol/L    GFR Non-African American 53 (L) >60 mL/min    GFR African American >60 >60 mL/min    GFR Comment          GFR Staging NOT REPORTED    CHLORIDE, URINE, RANDOM    Collection Time: 02/23/21  3:00 PM   Result Value Ref Range    Chloride, Ur 113 mmol/L   SODIUM, URINE, RANDOM    Collection Time: 02/23/21  3:00 PM   Result Value Ref Range    Sodium,Ur 82 mmol/L   Hemoglobin and Hematocrit, Blood    Collection Time: 02/23/21  3:15 PM   Result Value Ref Range    Hemoglobin 7.4 (L) 12.0 - 16.0 g/dL    Hematocrit 24.6 (L) 36 - 46 %   Hemoglobin and Hematocrit, Blood    Collection Time: 02/23/21 10:08 PM   Result Value Ref Range    Hemoglobin 7.5 (L) 12.0 - 16.0 g/dL    Hematocrit 23.5 (L) 36 - 46 %     -----------------------------------------------------------------  RAD:  EKG:  Micro:     Assessment & Plan:    Patient Active Problem List:     Acquired hypothyroidism     Hypertension     Primary osteoarthritis of right knee     A-fib (HCC)     Acute encephalopathy     SIRS (systemic inflammatory response syndrome) (HCC)     Normocytic anemia     Pulmonary hypertension (HCC)     COPD (chronic obstructive pulmonary disease) (HCC)     History of GI bleed     Constipation     HA (generalized anxiety disorder)     Lumbar radiculopathy     Lumbosacral spondylosis without myelopathy     Benign hypertension with CKD (chronic kidney disease) stage III     CKD (chronic kidney disease) stage 3, GFR 30-59 ml/min     Alcohol withdrawal syndrome with complication (HCC)     Moderate major depression (HCC)     PAD (peripheral artery disease) (HCC)     Acute kidney injury (HCC)     Obesity, Class I, BMI 30-34.9 Chronic diastolic CHF (congestive heart failure) (HCC)     GI bleed     Weight loss     Elevated alkaline phosphatase level     Thrombocytosis (HCC)     Iron deficiency anemia due to chronic blood loss     Iron malabsorption     Symptomatic anemia     Pneumonia due to organism     Anemia     Severe anemia     Overweight (BMI 25.0-29. 9)     Severe malnutrition ([address]     Renal failure     Acute renal failure (ARF) (HCC)     Acute cystitis without hematuria     Hypoxia     Acute respiratory failure with hypoxia ([facility])           Plan:  -Recurrent severe anemia - Eliquis held, Iron increased to TID with meals.  -Acute respiratory failure with COPD & pleural effusions - on O2 per NC, Thoracentesis by IR per Pulmonology. -Dehydration and Hypernatremia - Nephrology managing.  -Chronic diastolic CHF - stable. -Continue current treatments.  -Complete orders per chart.     See orders   Disposition:    Electronically signed by Nette Barlow MD on 2/24/2021 at 5:54 AM

## 2021-02-24 NOTE — PROGRESS NOTES
GI Progress notes    2/24/2021   2:43 PM    Name:  Savannah Nyhan  MRN:    [id number]     Acct:     [de-identified]   Room:  2093/2093-01   Day: 3     Admit Date: 2/21/2021  9:13 AM  PCP: WILLIE Nath CNP    Subjective:     C/C:   Chief Complaint   Patient presents with   [facility] History: Status: not changed. Patient seen and examined. No acute events overnight. Hgb stable  No overt GI bleeding  No nausea, vomiting, abdominal pain  Tolerating diet well    Planning on thoracentesis tomorrow    ROS:  Constitutional: negative for chills, fevers and sweats  Gastrointestinal: negative for abdominal pain, constipation, diarrhea, nausea and vomiting      Medications: Allergies:    Allergies   Allergen Reactions    Tizanidine Other (See Comments)     Patient states it makes her loopy       Current Meds:     ferrous sulfate (IRON 325) tablet 325 mg, TID WC      HYDROcodone-acetaminophen (NORCO) 5-325 MG per tablet 1 tablet, Q6H PRN      0.45 % sodium chloride infusion, Continuous      pseudoephedrine (SUDAFED) tablet 60 mg, Q6H      furosemide (LASIX) injection 20 mg, Daily      ipratropium-albuterol (DUONEB) nebulizer solution 1 ampule, Q4H WA      acetaminophen (TYLENOL) tablet 650 mg, Q4H PRN      ascorbic acid (VITAMIN C) tablet 1,000 mg, Daily      atorvastatin (LIPITOR) tablet 10 mg, Daily      baclofen (LIORESAL) tablet 10 mg, TID PRN      busPIRone (BUSPAR) tablet 10 mg, TID      clonazePAM (KLONOPIN) tablet 0.5 mg, TID PRN      dilTIAZem (CARDIZEM CD) extended release capsule 120 mg, Daily      DULoxetine (CYMBALTA) extended release capsule 60 mg, Daily      fluticasone (FLONASE) 50 MCG/ACT nasal spray 1 spray, Daily      fluticasone (FLOVENT HFA) 220 MCG/ACT inhaler 2 puff, BID      guaiFENesin (MUCINEX) extended release tablet 1,200 mg, Q6H PRN      levothyroxine (SYNTHROID) tablet 125 mcg, Daily      lisinopril (PRINIVIL;ZESTRIL) tablet 2.5 mg, Lunch  Intimate partner violence     Fear of current or ex partner: Not on file     Emotionally abused: Not on file     Physically abused: Not on file     Forced sexual activity: Not on file   Other Topics Concern    Not on file   Social History Narrative    Not on file       Vitals:  BP (!) 98/53   Pulse 77   Temp 97.9 °F (36.6 °C) (Oral)   Resp 18   Ht 5' 8\" (1.727 m)   Wt 176 lb 9.4 oz (80.1 kg)   SpO2 98%   BMI 26.85 kg/m²   Temp (24hrs), Av.8 °F (36.6 °C), Min:97.5 °F (36.4 °C), Max:98.1 °F (36.7 °C)    No results for input(s): POCGLU in the last 72 hours. I/O (24Hr):     Intake/Output Summary (Last 24 hours) at 2021 1443  Last data filed at 2021 1213  Gross per 24 hour   Intake 900 ml   Output 2550 ml   Net -1650 ml       Labs:      CBC:   Lab Results   Component Value Date    WBC 7.5 2021    RBC 2.18 2021    RBC 4.50 2012    HGB 8.4 2021    HCT 28.6 2021    .6 2021    MCH 31.2 2021    MCHC 30.7 2021    RDW 21.8 2021     2021     2012    MPV 8.3 2021     CBC with Differential:    Lab Results   Component Value Date    WBC 7.5 2021    RBC 2.18 2021    RBC 4.50 2012    HGB 8.4 2021    HCT 28.6 2021     2021     2012    .6 2021    MCH 31.2 2021    MCHC 30.7 2021    RDW 21.8 2021    LYMPHOPCT 11 2021    MONOPCT 12 2021    BASOPCT 1 2021    MONOSABS 0.90 2021    LYMPHSABS 0.83 2021    EOSABS 0.15 2021    BASOSABS 0.08 2021    DIFFTYPE NOT REPORTED 2021     Hemoglobin/Hematocrit:    Lab Results   Component Value Date    HGB 8.4 2021    HCT 28.6 2021     CMP:    Lab Results   Component Value Date     2021    K 4.6 2021     2021    CO2 37 2021    BUN 16 2021    CREATININE 1.09 2021    GFRAA 60 2021  [facility]     Acquired hypothyroidism     Acute encephalopathy     Acute kidney injury ([facility]     Anxiety     Benign hypertension with CKD (chronic kidney disease) stage III     Chronic back pain     CKD (chronic kidney disease) stage 3, GFR 30-59 ml/min     Constipation     COPD [facility]chronic obstructive [facility] disease) ([facility]     Cough     Depression     ETOH abuse     Former smoker     3 packs a day for 26 years    HA (generalized anxiety disorder)     History of GI bleed     Hyperlipidemia     Hypertension     Hypothyroid 1/18/2013    Leg pain     Normocytic anemia     Osteoarthritis     PAD (peripheral artery disease) ([facility])     Primary osteoarthritis     Pulmonary hypertension ([facility]     Pure hypercholesterolemia     SIRS (systemic inflammatory response syndrome) ([facility])     Urge incontinence     Wheezing         Plan:      1. Recurrent anemia, hx jejunal AVMs, hx colonic AVM, AC may be contributing to her recurrent anemia  1. Hgb stable, improved to 8.4  2. No overt GI bleeding  3. Needs VCE  4. May d/c per GI and follow-up next week for VCE. Explained to the patient and d/W Nursing Staff  Will F/U with you  Please call or Page for any issues or change in status  Thanks    Electronically signed by WILLIE Bruno - NP on 2/24/2021 at 2:43 PM         I independently performed an evaluation on [facility]. I have reviewed the above documentation completed by the Nurse Practitioner and agree with the documented findings and plan of care. I have personally evaluated this patient with the APRN and have completed at least one if not all key elements of the E/M (history, physical exam, and MDM). Please see my additional contributions to the HPI, physical exam, assessment, and medical decision making. Patient needs capsule study of the small bowel as an outpatient.     As she is doing well, we will see her on as-needed basis

## 2021-02-24 NOTE — PROGRESS NOTES
IR unable to do thoracentesis today due to INR level. NPO at MN order placed, hopeful thoracentesis tomorrow.    Dr. Nasim Sousa notified, repeat INR in the morning

## 2021-02-25 ENCOUNTER — APPOINTMENT (OUTPATIENT)
Dept: GENERAL RADIOLOGY | Age: 76
DRG: 377 | End: 2021-02-25
Payer: MEDICARE

## 2021-02-25 ENCOUNTER — APPOINTMENT (OUTPATIENT)
Dept: INTERVENTIONAL RADIOLOGY/VASCULAR | Age: 76
DRG: 377 | End: 2021-02-25
Payer: MEDICARE

## 2021-02-25 LAB
ANION GAP SERPL CALCULATED.3IONS-SCNC: 5 MMOL/L (ref 9–17)
APPEARANCE FLUID: NORMAL
BUN BLDV-MCNC: 15 MG/DL (ref 8–23)
BUN/CREAT BLD: ABNORMAL (ref 9–20)
CALCIUM SERPL-MCNC: 8.3 MG/DL (ref 8.6–10.4)
CHLORIDE BLD-SCNC: 102 MMOL/L (ref 98–107)
CO2: 36 MMOL/L (ref 20–31)
COLOR FLUID: NORMAL
CREAT SERPL-MCNC: 0.93 MG/DL (ref 0.5–0.9)
GFR AFRICAN AMERICAN: >60 ML/MIN
GFR NON-AFRICAN AMERICAN: 59 ML/MIN
GFR SERPL CREATININE-BSD FRML MDRD: ABNORMAL ML/MIN/{1.73_M2}
GFR SERPL CREATININE-BSD FRML MDRD: ABNORMAL ML/MIN/{1.73_M2}
GLUCOSE BLD-MCNC: 123 MG/DL (ref 70–99)
GLUCOSE, FLUID: 115 MG/DL
HCT VFR BLD CALC: 27 % (ref 36–46)
HCT VFR BLD CALC: 27.6 % (ref 36–46)
HCT VFR BLD CALC: 28 % (ref 36–46)
HCT VFR BLD CALC: 28.6 % (ref 36–46)
HEMOGLOBIN: 8.3 G/DL (ref 12–16)
HEMOGLOBIN: 8.6 G/DL (ref 12–16)
HEMOGLOBIN: 8.7 G/DL (ref 12–16)
HEMOGLOBIN: 8.7 G/DL (ref 12–16)
INR BLD: 1
LACTATE DEHYDROGENASE, FLUID: 55 U/L
PH FLUID: 8
POTASSIUM SERPL-SCNC: 4.9 MMOL/L (ref 3.7–5.3)
PROTHROMBIN TIME: 13.1 SEC (ref 11.8–14.6)
RBC FLUID: 7129 /MM3
SARS-COV-2, RAPID: DETECTED
SODIUM BLD-SCNC: 143 MMOL/L (ref 135–144)
SPECIMEN DESCRIPTION: ABNORMAL
SPECIMEN TYPE: NORMAL
TOTAL PROTEIN, BODY FLUID: 1.4 G/DL
WBC FLUID: 458 /MM3

## 2021-02-25 PROCEDURE — 87075 CULTR BACTERIA EXCEPT BLOOD: CPT

## 2021-02-25 PROCEDURE — 88108 CYTOPATH CONCENTRATE TECH: CPT

## 2021-02-25 PROCEDURE — 2580000003 HC RX 258: Performed by: FAMILY MEDICINE

## 2021-02-25 PROCEDURE — U0002 COVID-19 LAB TEST NON-CDC: HCPCS

## 2021-02-25 PROCEDURE — 2580000003 HC RX 258: Performed by: NURSE PRACTITIONER

## 2021-02-25 PROCEDURE — 6370000000 HC RX 637 (ALT 250 FOR IP): Performed by: NURSE PRACTITIONER

## 2021-02-25 PROCEDURE — 0W9B3ZZ DRAINAGE OF LEFT PLEURAL CAVITY, PERCUTANEOUS APPROACH: ICD-10-PCS | Performed by: RADIOLOGY

## 2021-02-25 PROCEDURE — 85014 HEMATOCRIT: CPT

## 2021-02-25 PROCEDURE — 71045 X-RAY EXAM CHEST 1 VIEW: CPT

## 2021-02-25 PROCEDURE — APPSS30 APP SPLIT SHARED TIME 16-30 MINUTES: Performed by: NURSE PRACTITIONER

## 2021-02-25 PROCEDURE — 99232 SBSQ HOSP IP/OBS MODERATE 35: CPT | Performed by: INTERNAL MEDICINE

## 2021-02-25 PROCEDURE — 87205 SMEAR GRAM STAIN: CPT

## 2021-02-25 PROCEDURE — C9113 INJ PANTOPRAZOLE SODIUM, VIA: HCPCS | Performed by: NURSE PRACTITIONER

## 2021-02-25 PROCEDURE — 85610 PROTHROMBIN TIME: CPT

## 2021-02-25 PROCEDURE — 99232 SBSQ HOSP IP/OBS MODERATE 35: CPT | Performed by: FAMILY MEDICINE

## 2021-02-25 PROCEDURE — 87070 CULTURE OTHR SPECIMN AEROBIC: CPT

## 2021-02-25 PROCEDURE — 85018 HEMOGLOBIN: CPT

## 2021-02-25 PROCEDURE — 94640 AIRWAY INHALATION TREATMENT: CPT

## 2021-02-25 PROCEDURE — 83615 LACTATE (LD) (LDH) ENZYME: CPT

## 2021-02-25 PROCEDURE — 82945 GLUCOSE OTHER FLUID: CPT

## 2021-02-25 PROCEDURE — 36415 COLL VENOUS BLD VENIPUNCTURE: CPT

## 2021-02-25 PROCEDURE — 32555 ASPIRATE PLEURA W/ IMAGING: CPT | Performed by: RADIOLOGY

## 2021-02-25 PROCEDURE — 2060000000 HC ICU INTERMEDIATE R&B

## 2021-02-25 PROCEDURE — 6370000000 HC RX 637 (ALT 250 FOR IP): Performed by: FAMILY MEDICINE

## 2021-02-25 PROCEDURE — 80048 BASIC METABOLIC PNL TOTAL CA: CPT

## 2021-02-25 PROCEDURE — 6360000002 HC RX W HCPCS: Performed by: NURSE PRACTITIONER

## 2021-02-25 PROCEDURE — 2580000003 HC RX 258: Performed by: INTERNAL MEDICINE

## 2021-02-25 PROCEDURE — 83986 ASSAY PH BODY FLUID NOS: CPT

## 2021-02-25 PROCEDURE — 2709999900 IR GUIDED THORACENTESIS PLEURAL

## 2021-02-25 PROCEDURE — 6360000002 HC RX W HCPCS: Performed by: INTERNAL MEDICINE

## 2021-02-25 PROCEDURE — 2700000000 HC OXYGEN THERAPY PER DAY

## 2021-02-25 PROCEDURE — 84157 ASSAY OF PROTEIN OTHER: CPT

## 2021-02-25 PROCEDURE — 94761 N-INVAS EAR/PLS OXIMETRY MLT: CPT

## 2021-02-25 RX ORDER — ALBUTEROL SULFATE 90 UG/1
2 AEROSOL, METERED RESPIRATORY (INHALATION) 4 TIMES DAILY
Status: DISCONTINUED | OUTPATIENT
Start: 2021-02-25 | End: 2021-02-26

## 2021-02-25 RX ORDER — FUROSEMIDE 10 MG/ML
20 INJECTION INTRAMUSCULAR; INTRAVENOUS ONCE
Status: COMPLETED | OUTPATIENT
Start: 2021-02-25 | End: 2021-02-25

## 2021-02-25 RX ADMIN — SODIUM CHLORIDE, PRESERVATIVE FREE 10 ML: 5 INJECTION INTRAVENOUS at 21:32

## 2021-02-25 RX ADMIN — METOPROLOL TARTRATE 25 MG: 25 TABLET, FILM COATED ORAL at 12:42

## 2021-02-25 RX ADMIN — OXYCODONE HYDROCHLORIDE AND ACETAMINOPHEN 1000 MG: 500 TABLET ORAL at 16:01

## 2021-02-25 RX ADMIN — ALBUTEROL SULFATE 2 PUFF: 90 AEROSOL, METERED RESPIRATORY (INHALATION) at 18:26

## 2021-02-25 RX ADMIN — FUROSEMIDE 20 MG: 10 INJECTION, SOLUTION INTRAMUSCULAR; INTRAVENOUS at 12:43

## 2021-02-25 RX ADMIN — BACLOFEN 10 MG: 10 TABLET ORAL at 15:55

## 2021-02-25 RX ADMIN — SODIUM CHLORIDE 10 ML: 9 INJECTION INTRAMUSCULAR; INTRAVENOUS; SUBCUTANEOUS at 12:43

## 2021-02-25 RX ADMIN — BUSPIRONE HYDROCHLORIDE 10 MG: 10 TABLET ORAL at 21:09

## 2021-02-25 RX ADMIN — BUSPIRONE HYDROCHLORIDE 10 MG: 10 TABLET ORAL at 15:54

## 2021-02-25 RX ADMIN — CLONAZEPAM 0.5 MG: 1 TABLET ORAL at 21:30

## 2021-02-25 RX ADMIN — ATORVASTATIN CALCIUM 10 MG: 10 TABLET, FILM COATED ORAL at 15:55

## 2021-02-25 RX ADMIN — BACLOFEN 10 MG: 10 TABLET ORAL at 21:30

## 2021-02-25 RX ADMIN — LEVOTHYROXINE SODIUM 125 MCG: 125 TABLET ORAL at 15:54

## 2021-02-25 RX ADMIN — METOPROLOL TARTRATE 25 MG: 25 TABLET, FILM COATED ORAL at 21:08

## 2021-02-25 RX ADMIN — DILTIAZEM HYDROCHLORIDE 120 MG: 120 CAPSULE, COATED, EXTENDED RELEASE ORAL at 15:54

## 2021-02-25 RX ADMIN — IPRATROPIUM BROMIDE AND ALBUTEROL SULFATE 1 AMPULE: 2.5; .5 SOLUTION RESPIRATORY (INHALATION) at 07:57

## 2021-02-25 RX ADMIN — FUROSEMIDE 20 MG: 10 INJECTION, SOLUTION INTRAMUSCULAR; INTRAVENOUS at 18:26

## 2021-02-25 RX ADMIN — ACETAMINOPHEN 650 MG: 325 TABLET ORAL at 21:09

## 2021-02-25 RX ADMIN — SODIUM CHLORIDE: 4.5 INJECTION, SOLUTION INTRAVENOUS at 07:39

## 2021-02-25 RX ADMIN — ALBUTEROL SULFATE 2 PUFF: 90 AEROSOL, METERED RESPIRATORY (INHALATION) at 21:11

## 2021-02-25 RX ADMIN — DULOXETINE 60 MG: 60 CAPSULE, DELAYED RELEASE ORAL at 15:55

## 2021-02-25 RX ADMIN — SODIUM CHLORIDE, PRESERVATIVE FREE 10 ML: 5 INJECTION INTRAVENOUS at 12:46

## 2021-02-25 RX ADMIN — FLUTICASONE PROPIONATE 2 PUFF: 220 AEROSOL, METERED RESPIRATORY (INHALATION) at 21:12

## 2021-02-25 RX ADMIN — FERROUS SULFATE TAB 325 MG (65 MG ELEMENTAL FE) 325 MG: 325 (65 FE) TAB at 15:55

## 2021-02-25 RX ADMIN — PANTOPRAZOLE SODIUM 40 MG: 40 INJECTION, POWDER, FOR SOLUTION INTRAVENOUS at 12:43

## 2021-02-25 ASSESSMENT — PAIN SCALES - GENERAL
PAINLEVEL_OUTOF10: 0
PAINLEVEL_OUTOF10: 5

## 2021-02-25 NOTE — CARE COORDINATION
ERICA received a request to get a contact for the OTILIA or Assistant OTILIA. ERICA did get her name, Tiesha MCKAY, OTILIA and the number to call to ask for her [phone]. The plan is still to DC back to [facility].

## 2021-02-25 NOTE — CONSULTS
Department of Internal Medicine  Nephrology Jens Moody MD   Consult Note      SUBJECTIVE: This is a 70 y.o. female with a significant past medical history of Chronic atrial fibrillation, pulmonary hypertension, chronic recurrent anemia [s/p recent endoscopy showing AVMs requiring intermittent PRBC transfusions], COPD and chronic kidney disease stage II, who presented to the emergency department after falling at home. Laboratory studies revealed severe anemia with hemoglobin 6.8 g/dL and serum creatinine was 1.25 mg/dL. Nephrology consultation was called yesterday due to low urine output. Patient was given albumin and Lasix with prompt improvement in urine output. She feels well today and does not have shortness of breath or chest pain. Interval history:  Patient denies abdominal pain, rectal bleeding, nausea or vomiting. Renal function is improving with creatinine at baseline. Hemoglobin 8.7 and stable. Scheduled Meds:   furosemide  20 mg Intravenous Once    ferrous sulfate  325 mg Oral TID WC    furosemide  20 mg Intravenous Daily    ipratropium-albuterol  1 ampule Inhalation Q4H WA    ascorbic acid  1,000 mg Oral Daily    atorvastatin  10 mg Oral Daily    busPIRone  10 mg Oral TID    dilTIAZem  120 mg Oral Daily    DULoxetine  60 mg Oral Daily    fluticasone  1 spray Each Nostril Daily    fluticasone  2 puff Inhalation BID    levothyroxine  125 mcg Oral Daily    lisinopril  2.5 mg Oral Lunch    metoprolol tartrate  25 mg Oral BID    sodium chloride flush  10 mL Intravenous 2 times per day    pantoprazole  40 mg Intravenous Daily    And    sodium chloride (PF)  10 mL Intravenous Daily     Continuous Infusions:   sodium chloride       PRN Meds:. HYDROcodone-acetaminophen, acetaminophen, baclofen, clonazePAM, guaiFENesin, sodium chloride flush, promethazine **OR** ondansetron, acetaminophen **OR** acetaminophen, sodium chloride    Family History Problem Relation Age of Onset    Heart Disease Mother     COPD Mother     Emphysema Sister     Cancer Other         Cousin - breast cancer        Social History     Socioeconomic History    Marital status:      Spouse name: None    Number of children: None    Years of education: None    Highest education level: None   Occupational History    None   Social Needs    Financial resource strain: None    Food insecurity     Worry: None     Inability: None    Transportation needs     Medical: None     Non-medical: None   Tobacco Use    Smoking status: Former Smoker     Packs/day: 3.00     Years: 26.00     Pack years: 78.00     Types: Cigarettes    Smokeless tobacco: Never Used   Substance and Sexual Activity    Alcohol use: Not Currently     Comment: occ    Drug use: No    Sexual activity: None   Lifestyle    Physical activity     Days per week: None     Minutes per session: None    Stress: None   Relationships    Social connections     Talks on phone: None     Gets together: None     Attends Rastafari service: None     Active member of club or organization: None     Attends meetings of clubs or organizations: None     Relationship status: None    Intimate partner violence     Fear of current or ex partner: None     Emotionally abused: None     Physically abused: None     Forced sexual activity: None   Other Topics Concern    None   Social History Narrative    None     Review of systems: CNS - no headache or dizziness; Cardiac - no chest pain; Respiratory - no cough or shortness of breath; Gastrointestinal - Black tarry stools; no nausea, vomiting or diarrhea; Musculoskeletal - general body aches; Skin/Integument - no rashes.     Physical Exam:    VITALS:  /65 Comment: post thoracentesis  Pulse 83   Temp 98.6 °F (37 °C) (Oral)   Resp 20   Ht 5' 8\" (1.727 m)   Wt 176 lb 5.9 oz (80 kg)   SpO2 95%   BMI 26.82 kg/m²   24HR INTAKE/OUTPUT: Intake/Output Summary (Last 24 hours) at 2/25/2021 1535  Last data filed at 2/25/2021 1323  Gross per 24 hour   Intake 1839 ml   Output 1500 ml   Net 339 ml     Constitutional: alert, appears stated age and cooperative    Skin: Skin color, texture, turgor normal. No rashes or lesions    Head: Normocephalic, without obvious abnormality, atraumatic     Cardiovascular/Edema: regular rate and rhythm, S1, S2 normal, no murmur, click, rub or gallop    Respiratory: Lungs: clear to auscultation bilaterally    Abdomen: soft, non-tender; bowel sounds normal; no masses,  no organomegaly    Back: symmetric, no curvature. ROM normal. No CVA tenderness.     Extremities: extremities normal, atraumatic, no cyanosis or edema    Neuro:  Grossly normal    CBC:   Recent Labs     02/24/21  1114 02/24/21  1908 02/25/21  0054 02/25/21  0741   HGB 8.4* 8.0* 8.3* 8.7*     --   --   --      BMP:    Recent Labs     02/23/21  0640 02/24/21  0537 02/25/21  0741   * 145* 143   K 3.9 4.6 4.9    105 102   CO2 36* 37* 36*   BUN 15 16 15   CREATININE 1.03* 1.09* 0.93*   GLUCOSE 100* 122* 123*       Lab Results   Component Value Date    NITRU NEGATIVE 02/22/2021    COLORU YELLOW 02/22/2021    PHUR 5.5 02/22/2021    WBCUA 2 TO 5 02/21/2021    RBCUA 0 TO 2 02/21/2021    MUCUS NOT REPORTED 02/21/2021    TRICHOMONAS NOT REPORTED 02/21/2021    YEAST NOT REPORTED 02/21/2021    BACTERIA FEW 02/21/2021    CLARITYU Clear 01/31/2020    SPECGRAV 1.014 02/22/2021    LEUKOCYTESUR NEGATIVE 02/22/2021    UROBILINOGEN Normal 02/22/2021    BILIRUBINUR NEGATIVE 02/22/2021    BILIRUBINUR 0 01/31/2020    BLOODU Negative 01/31/2020    GLUCOSEU NEGATIVE 02/22/2021    KETUA NEGATIVE 02/22/2021    AMORPHOUS 1+ 02/21/2021     Urine Sodium:     Lab Results   Component Value Date    ANGÉLICA 82 02/23/2021     Urine Potassium:    Lab Results   Component Value Date    KUR 35.7 07/20/2020     Urine Chloride:    Lab Results   Component Value Date ISHAAN 113 02/23/2021     Urine Creatinine:     Lab Results   Component Value Date    LABCREA 137.9 02/11/2021    LABCREA 134.5 02/11/2021     IMPRESSION/RECOMMENDATIONS:     1. Chronic kidney disease stage 3- renal function is stable. Transient oliguria likely due to hemodynamic fluxes. Plan:KVO IVF  Monitor urine output closely. .  Basic metabolic profile daily    2. Hypernatremia -  Oral Free water intake up to 1.5 L/day    3. Acute on chronic anemia. Hemoglobin 8.7 g and stable      4. History of chronic CHF with preserved ejection fraction-continue IV Lasix 20 mg daily  Additional IV Lasix 20 mg at 7 PM    5. Left   Pleural effusion-status post thoracentesis 1 L. Thank you very much for the courtesy of this consultation.     MD ROXIE Aldana  Attending Nephrologist  2/25/2021 3:35 PM

## 2021-02-25 NOTE — PLAN OF CARE
Problem: Falls - Risk of:  Goal: Will remain free from falls  Description: Will remain free from falls  Outcome: Ongoing  Goal: Absence of physical injury  Description: Absence of physical injury  Outcome: Ongoing     Problem: SAFETY  Goal: Free from accidental physical injury  Outcome: Ongoing  Goal: Free from intentional harm  Outcome: Ongoing     Problem: DAILY CARE  Goal: Daily care needs are met  Outcome: Ongoing     Problem: PAIN  Goal: Patient's pain/discomfort is manageable  Outcome: Ongoing     Problem: SKIN INTEGRITY  Goal: Skin integrity is maintained or improved  Outcome: Ongoing     Problem: KNOWLEDGE DEFICIT  Goal: Patient/S.O. demonstrates understanding of disease process, treatment plan, medications, and discharge instructions. Outcome: Ongoing     Problem: DISCHARGE BARRIERS  Goal: Patient's continuum of care needs are met  Outcome: Ongoing     Problem: Nutrition  Goal: Optimal nutrition therapy  Description: Nutrition Problem #1: Inadequate oral intake  Intervention: Food and/or Nutrient Delivery: Continue Current Diet, Start Oral Nutrition Supplement  Nutritional Goals: po intake greater than 50%     2/25/2021 1618 by Aguila Ndiaye RN  Outcome: Ongoing  2/25/2021 1405 by Sole Lamar RD, LD  Note: Nutrition Problem #1: Inadequate oral intake  Intervention: Food and/or Nutrient Delivery: Start Oral Diet, Start Oral Nutrition Supplement  Nutritional Goals: po intake greater than 50%      Problem: Pain:  Description: Pain management should include both nonpharmacologic and pharmacologic interventions.   Goal: Pain level will decrease  Description: Pain level will decrease  Outcome: Ongoing  Goal: Control of acute pain  Description: Control of acute pain  Outcome: Ongoing  Goal: Control of chronic pain  Description: Control of chronic pain  Outcome: Ongoing     Problem: Skin Integrity:  Goal: Will show no infection signs and symptoms  Description: Will show no infection signs and symptoms Outcome: Ongoing  Goal: Absence of new skin breakdown  Description: Absence of new skin breakdown  Outcome: Ongoing     Problem: Airway Clearance - Ineffective  Goal: Achieve or maintain patent airway  Outcome: Ongoing     Problem: Gas Exchange - Impaired  Goal: Absence of hypoxia  Outcome: Ongoing  Goal: Promote optimal lung function  Outcome: Ongoing     Problem: Breathing Pattern - Ineffective  Goal: Ability to achieve and maintain a regular respiratory rate  Outcome: Ongoing     Problem:  Body Temperature -  Risk of, Imbalanced  Goal: Ability to maintain a body temperature within defined limits  Outcome: Ongoing  Goal: Will regain or maintain usual level of consciousness  Outcome: Ongoing  Goal: Complications related to the disease process, condition or treatment will be avoided or minimized  Outcome: Ongoing     Problem: Isolation Precautions - Risk of Spread of Infection  Goal: Prevent transmission of infection  Outcome: Ongoing     Problem: Nutrition Deficits  Goal: Optimize nutritional status  Outcome: Ongoing     Problem: Risk for Fluid Volume Deficit  Goal: Maintain normal heart rhythm  Outcome: Ongoing  Goal: Maintain absence of muscle cramping  Outcome: Ongoing  Goal: Maintain normal serum potassium, sodium, calcium, phosphorus, and pH  Outcome: Ongoing     Problem: Loneliness or Risk for Loneliness  Goal: Demonstrate positive use of time alone when socialization is not possible  Outcome: Ongoing     Problem: Fatigue  Goal: Verbalize increase energy and improved vitality  Outcome: Ongoing     Problem: Patient Education: Go to Patient Education Activity  Goal: Patient/Family Education  Outcome: Ongoing

## 2021-02-25 NOTE — PROGRESS NOTES
PULMONARY PROGRESS NOTE:    REASON FOR VISIT:Pl effusion, dyspnea  Interval History:    Shortness of Breath: yes   Cough: no  Sputum: no          Hemoptysis: no  Chest Pain: no  Fever: no                   Swelling Feet: yes   Headache: no                                           Nausea, Emesis, Abdominal Pain: no  Diarrhea: no         Constipation: no    Events since last visit: Patient had screening covid test for thoracentesis and it was positive. She was originally positive on 1/1/21.      PAST MEDICAL HISTORY:      Scheduled Meds:   ferrous sulfate  325 mg Oral TID WC    furosemide  20 mg Intravenous Daily    ipratropium-albuterol  1 ampule Inhalation Q4H WA    ascorbic acid  1,000 mg Oral Daily    atorvastatin  10 mg Oral Daily    busPIRone  10 mg Oral TID    dilTIAZem  120 mg Oral Daily    DULoxetine  60 mg Oral Daily    fluticasone  1 spray Each Nostril Daily    fluticasone  2 puff Inhalation BID    levothyroxine  125 mcg Oral Daily    lisinopril  2.5 mg Oral Lunch    metoprolol tartrate  25 mg Oral BID    sodium chloride flush  10 mL Intravenous 2 times per day    pantoprazole  40 mg Intravenous Daily    And    sodium chloride (PF)  10 mL Intravenous Daily     Continuous Infusions:   sodium chloride 75 mL/hr at 02/25/21 0739    sodium chloride       PRN Meds:HYDROcodone-acetaminophen, acetaminophen, baclofen, clonazePAM, guaiFENesin, sodium chloride flush, promethazine **OR** ondansetron, acetaminophen **OR** acetaminophen, sodium chloride        PHYSICAL EXAMINATION:  /60   Pulse 97   Temp 98.4 °F (36.9 °C) (Oral)   Resp 20   Ht 5' 8\" (1.727 m)   Wt 176 lb 5.9 oz (80 kg)   SpO2 94%   BMI 26.82 kg/m²     General : Awake, alert, confused   Neck  supple, no lymphadenopathy, JVD not raised  Heart  regular rhythm, S1 and S2 normal; no additional sounds heard  Lungs  Air Entry- fair bilaterally; breath sounds : vesicular;   rales/crackles - absent, 94% on 2L Abdomen  soft, no tenderness  Upper Extremities  - no cyanosis, mottling; edema : absent  Lower Extremities: no cyanosis, mottling; edema : absent    Current Laboratory, Radiologic, Microbiologic, and Diagnostic studies reviewed  Data ReviewCBC:   Recent Labs     02/24/21  1114 02/24/21  1908 02/25/21  0054 02/25/21  0741   HGB 8.4* 8.0* 8.3* 8.7*   HCT 28.6* 25.8* 27.0* 28.6*     --   --   --      BMP:   Recent Labs     02/23/21  0640 02/24/21  0537 02/25/21  0741   GLUCOSE 100* 122* 123*   * 145* 143   K 3.9 4.6 4.9   BUN 15 16 15   CREATININE 1.03* 1.09* 0.93*   CALCIUM 8.2* 8.1* 8.3*     ABGs: No results for input(s): PHART, PO2ART, LHX6BMM, IPY8NQO, BEART, K2MLRWNF, REP2XCH in the last 72 hours.    PT/INR:  No results found for: PTINR    ASSESSMENT / PLAN:  Nasal congestion - humidify O2, sudafed  Acute hypoxic resp failure - O2  Left pl effusion - monitor, diuresis,   GI bleed - per GI service   Thoracentesis per IR  Previous Covid positive 1/1/21  Discussed with Dr. Fatuma Valentin       Electronically signed by Suma Watkins on 02/25/21

## 2021-02-25 NOTE — CARE COORDINATION
0845 rapid covid19 test obtained and sent to lab. Nose bleed after covid test Dr Syed Barrios notified.

## 2021-02-25 NOTE — CARE COORDINATION
ERICA received a request to see the patient and her son in this room. ERICA did speak with them and the decision was made that for sure she will return to [place] [id number] when she is ready to [place]. The son also wanted to make sure that Dr. Yony Lyn was aware that she sees a Cardiovascular surgeon from [facility] and that the [place] doctors are on the same page as far as medications and treatments are concerned. ERICA did inform [place], [place] planner of this question and Hakeem Pires will pass it along to the bedside nurse to ensure there be an answer for this question.

## 2021-02-25 NOTE — PROGRESS NOTES
Physician Progress Note      Alison Miranda  CSN #:                  [id number]  :                       1945  ADMIT DATE:       2021 9:13 AM  DISCH DATE:  RESPONDING  PROVIDER #:        Debra Anderson MD          QUERY TEXT:    Patient admitted with GI bleed. Noted documentation of acute respiratory   failure in pulmonary consult note on . In order to support the diagnosis   of acute respiratory failure, please include additional clinical indicators in   your documentation. Or please document if the diagnosis of acute respiratory   failure has been ruled out after further study. The medical record reflects the following:  Risk Factors: 76yo Hx COPD  Clinical Indicators: RR 16-26; pt satting % on 2L O2; per ED notes,   pulmonary effort is normal, no respiratory distress; per H&P, lungs clear to   auscultation with good respiratory effort; per pulm consult note, pt in no   distress; no noted documentation of accessory muscle use or inability to speak   in complete sentences  Treatment: 2L O2 via NC, monitoring, pulm consult, hospital admission,   albuterol nebulizer BID    Acute Respiratory Failure Clinical Indicators per [employer] [employer] Training Guide and   Quick Reference Guide:  pO2 < 60 mmHg or SpO2 (pulse oximetry) < 91% breathing room air  pCO2 > 50 and pH < 7.35  P/F ratio (pO2 / FIO2) < 300  pO2 decrease or pCO2 increase by 10 mmHg from baseline (if known)  Supplemental oxygen of 40% or more  Presence of respiratory distress, tachypnea, dyspnea, shortness of breath,   wheezing  Unable to speak in complete sentences  Use of accessory muscles to breathe  Extreme anxiety and feeling of impending doom  Tripod position  Confusion/altered mental status/obtunded  Options provided:  -- Acute Respiratory Failure as evidenced by, Please document evidence.   -- Acute Respiratory Failure ruled out after study  -- Other - I will add my own diagnosis -- Disagree - Not applicable / Not valid  -- Disagree - Clinically unable to determine / Unknown  -- Refer to Clinical Documentation Reviewer    PROVIDER RESPONSE TEXT:    This patient is in acute respiratory failure as evidenced by desaturation into   the 60s on room air while sleep.     Query created by: Ruma Whittington on 2/23/2021 9:24 AM      Electronically signed by:  Zoraida Euceda MD 2/25/2021 6:52 PM

## 2021-02-25 NOTE — PROGRESS NOTES
FAMILY MEDICINE  - PROGRESS NOTE    Date:  2/25/2021  Fani Jaime  [id number]      Chief Complaint   Patient presents with    Fall         Interval History:  not changed, she is sleeping this am but easily aroused. She has increased work of breathing and is on 3L O2 per NC.       Subjective  Constitutional: positive for fatigue and overweight  Respiratory: positive for emphysema, shortness of breath, wheezing and pleural effusion  Cardiovascular: positive for irregular heart beat  Hematologic/lymphatic: positive for pallor  Behavioral/Psych: positive for anxiety and depression:    Objective:    BP (!) 105/49   Pulse 84   Temp 98.2 °F (36.8 °C) (Oral)   Resp 18   Ht 5' 8\" (1.727 m)   Wt 176 lb 5.9 oz (80 kg)   SpO2 95%   BMI 26.82 kg/m²   General appearance - alert, well appearing, and in no distress and overweight  Mental status - alert, oriented to person, place, and time  Eyes - pupils equal and reactive, extraocular eye movements intact  Ears - hearing grossly normal bilaterally  Nose - normal and patent, no erythema, discharge or polyps  Mouth - mucous membranes moist, pharynx normal without lesions  Neck - supple, no significant adenopathy  Lymphatics - no palpable lymphadenopathy, no hepatosplenomegaly  Chest - decreased air entry noted posteriorly  Heart - irregularly irregular rhythm with rate 84  Abdomen - soft, nontender, nondistended, no masses or organomegaly  Breasts - not examined  Back exam - not examined  Neurological - alert, oriented, normal speech, no focal findings or movement disorder noted  Musculoskeletal - osteoarthritic changes noted in both hands  Extremities - peripheral pulses normal, no pedal edema, no clubbing or cyanosis  Skin - normal coloration and turgor, no rashes, no suspicious skin lesions noted    Data:   Medications:   Current Facility-Administered Medications Medication Dose Route Frequency Provider Last Rate Last Admin    ferrous sulfate (IRON 325) tablet 325 mg  325 mg Oral TID  Hedy Wu MD   325 mg at 02/24/21 1652    HYDROcodone-acetaminophen ([address]) 5-325 MG per tablet 1 tablet  1 tablet Oral Q6H PRN Hedy Wu MD   1 tablet at 02/23/21 2108    0.45 % sodium chloride infusion   Intravenous Continuous Evi Claros MD 75 mL/hr at 02/24/21 1852 New Bag at 02/24/21 1852    pseudoephedrine (SUDAFED) tablet 60 mg  60 mg Oral Q6H WILLIE Farr - CNP   60 mg at 02/23/21 0019    furosemide (LASIX) injection 20 mg  20 mg Intravenous Daily WILLIE Farr - CNP   20 mg at 02/24/21 0834    ipratropium-albuterol (DUONEB) nebulizer solution 1 ampule  1 ampule Inhalation Q4H WA WILLIE Farr - CNP   1 ampule at 02/24/21 1844    acetaminophen (TYLENOL) tablet 650 mg  650 mg Oral Q4H PRN Maranda Tong MD   650 mg at 02/24/21 2140    ascorbic acid (VITAMIN C) tablet 1,000 mg  1,000 mg Oral Daily Maranda Tong MD   1,000 mg at 02/24/21 5575    atorvastatin (LIPITOR) tablet 10 mg  10 mg Oral Daily Maranda Tong MD   10 mg at 02/24/21 7034    baclofen (LIORESAL) tablet 10 mg  10 mg Oral TID PRN Maranda Tong MD   10 mg at 02/24/21 2141    busPIRone (BUSPAR) tablet 10 mg  10 mg Oral TID Maranda Tong MD   10 mg at 02/24/21 2139    clonazePAM (KLONOPIN) tablet 0.5 mg  0.5 mg Oral TID PRN Maranda Tong MD   0.5 mg at 02/24/21 2141    dilTIAZem (CARDIZEM CD) extended release capsule 120 mg  120 mg Oral Daily Maranda Tong MD   120 mg at 02/24/21 1878    DULoxetine (CYMBALTA) extended release capsule 60 mg  60 mg Oral Daily Maranda Tong MD   60 mg at 02/24/21 0833    fluticasone (FLONASE) 50 MCG/ACT nasal spray 1 spray  1 spray Each Nostril Daily Maranda Tong MD   1 spray at 02/23/21 6624  fluticasone (FLOVENT HFA) 220 MCG/ACT inhaler 2 puff  2 puff Inhalation BID Ralph Menchaca MD   2 puff at 02/24/21 2142    guaiFENesin [facility]) extended release tablet 1,200 mg  1,200 mg Oral Q6H PRN Ralph Menchaca MD        levothyroxine (SYNTHROID) tablet 125 mcg  125 mcg Oral Daily Ralph Menchaca MD   125 mcg at 02/24/21 0517    lisinopril (PRINIVIL;ZESTRIL) tablet 2.5 mg  2.5 mg Oral Lunch Ralph Menchaca MD   2.5 mg at 02/23/21 1126    metoprolol tartrate (LOPRESSOR) tablet 25 mg  25 mg Oral BID Ralph Menchaca MD   25 mg at 02/24/21 2139    sodium chloride flush 0.9 % injection 10 mL  10 mL Intravenous 2 times per day Ralph Menchaca MD   10 mL at 02/24/21 0841    sodium chloride flush 0.9 % injection 10 mL  10 mL Intravenous PRN Ralph Menchaca MD        promethazine (PHENERGAN) tablet 12.5 mg  12.5 mg Oral Q6H PRN Ralph Menchaca MD        Or    ondansetron [facility]) injection 4 mg  4 mg Intravenous Q6H PRN Ralph Menchaca MD        acetaminophen (TYLENOL) tablet 650 mg  650 mg Oral Q6H PRN Ralph Menchaca MD   650 mg at 02/22/21 2108    Or    acetaminophen (TYLENOL) suppository 650 mg  650 mg Rectal Q6H PRN Ralph Menchaca MD        0.9 % sodium chloride infusion   Intravenous PRN Jose Laws DO        pantoprazole (PROTONIX) injection 40 mg  40 mg Intravenous Daily WILLIE Fine - NP   40 mg at 02/24/21 1909    And    sodium chloride (PF) 0.9 % injection 10 mL  10 mL Intravenous Daily WILLIE Fine - NP   10 mL at 02/24/21 0834       Intake/Output Summary (Last 24 hours) at 2/25/2021 0555  Last data filed at 2/25/2021 0549  Gross per 24 hour   Intake 1829 ml   Output 1350 ml   Net 479 ml     Recent Results (from the past 24 hour(s))   Hemoglobin and Hematocrit, Blood    Collection Time: 02/24/21 11:14 AM   Result Value Ref Range    Hemoglobin 8.4 (L) 12.0 - 16.0 g/dL    Hematocrit 28.6 (L) 36 - 46 % PT    Collection Time: 02/24/21 11:14 AM   Result Value Ref Range    Protime 28.1 (H) 11.8 - 14.6 sec    INR 2.7    APTT    Collection Time: 02/24/21 11:14 AM   Result Value Ref Range    PTT 50.2 (H) 24.0 - 36.0 sec   PLATELET COUNT    Collection Time: 02/24/21 11:14 AM   Result Value Ref Range    Platelets 129 394 - 292 k/uL   Hemoglobin and Hematocrit, Blood    Collection Time: 02/24/21  7:08 PM   Result Value Ref Range    Hemoglobin 8.0 (L) 12.0 - 16.0 g/dL    Hematocrit 25.8 (L) 36 - 46 %   Hemoglobin and Hematocrit, Blood    Collection Time: 02/25/21 12:54 AM   Result Value Ref Range    Hemoglobin 8.3 (L) 12.0 - 16.0 g/dL    Hematocrit 27.0 (L) 36 - 46 %     -----------------------------------------------------------------  RAD:  EKG:  Micro:     Assessment & Plan:    Patient Active Problem List:     Acquired hypothyroidism     Hypertension     Primary osteoarthritis of right knee     A-fib (HCC)     Acute encephalopathy     SIRS (systemic inflammatory response syndrome) (HCC)     Normocytic anemia     Pulmonary hypertension (HCC)     COPD (chronic obstructive pulmonary disease) (HCC)     History of GI bleed     Constipation     HA (generalized anxiety disorder)     Lumbar radiculopathy     Lumbosacral spondylosis without myelopathy     Benign hypertension with CKD (chronic kidney disease) stage III     CKD (chronic kidney disease) stage 3, GFR 30-59 ml/min     Alcohol withdrawal syndrome with complication (HCC)     Moderate major depression (HCC)     PAD (peripheral artery disease) (HCC)     Acute kidney injury ([facility] [address]     Obesity, Class I, BMI 30-34.9     Chronic diastolic CHF (congestive heart failure) (HCC)     GI bleed     Weight loss     Elevated alkaline phosphatase level     Thrombocytosis (HCC)     Iron deficiency anemia due to chronic blood loss     Iron malabsorption     Symptomatic anemia     Pneumonia due to organism     Anemia     Severe anemia     Overweight (BMI 25.0-29. 9)

## 2021-02-25 NOTE — PROGRESS NOTES
GI Progress notes    2/25/2021   2:19 PM    Name:  Savannah Nyhan  MRN:    [id number]     Acct:     [de-identified]   Room:  2093/2093-01   Day: 4     Admit Date: 2/21/2021  9:13 AM  PCP: WILLIE Nath CNP    Subjective:     C/C:   Chief Complaint   Patient presents with   [facility] History: Status: not changed. Patient seen and examined. Sitting up in chair eating lunch. No overt GI bleeding  Hgb has been stable, 8.7    ROS:  Constitutional: negative for chills, fevers and sweats  Gastrointestinal: negative for abdominal pain, constipation, diarrhea, nausea and vomiting      Medications: Allergies:    Allergies   Allergen Reactions    Tizanidine Other (See Comments)     Patient states it makes her loopy       Current Meds:     ferrous sulfate (IRON 325) tablet 325 mg, TID WC      HYDROcodone-acetaminophen (NORCO) 5-325 MG per tablet 1 tablet, Q6H PRN      0.45 % sodium chloride infusion, Continuous      furosemide (LASIX) injection 20 mg, Daily      ipratropium-albuterol (DUONEB) nebulizer solution 1 ampule, Q4H WA      acetaminophen (TYLENOL) tablet 650 mg, Q4H PRN      ascorbic acid (VITAMIN C) tablet 1,000 mg, Daily      atorvastatin (LIPITOR) tablet 10 mg, Daily      baclofen (LIORESAL) tablet 10 mg, TID PRN      busPIRone (BUSPAR) tablet 10 mg, TID      clonazePAM (KLONOPIN) tablet 0.5 mg, TID PRN      dilTIAZem (CARDIZEM CD) extended release capsule 120 mg, Daily      DULoxetine (CYMBALTA) extended release capsule 60 mg, Daily      fluticasone (FLONASE) 50 MCG/ACT nasal spray 1 spray, Daily      fluticasone (FLOVENT HFA) 220 MCG/ACT inhaler 2 puff, BID      guaiFENesin (MUCINEX) extended release tablet 1,200 mg, Q6H PRN      levothyroxine (SYNTHROID) tablet 125 mcg, Daily      lisinopril (PRINIVIL;ZESTRIL) tablet 2.5 mg, Lunch      metoprolol tartrate (LOPRESSOR) tablet 25 mg, BID      sodium chloride flush 0.9 % injection 10 mL, 2 times per day   sodium chloride flush 0.9 % injection 10 mL, PRN      promethazine (PHENERGAN) tablet 12.5 mg, Q6H PRN    Or      ondansetron (ZOFRAN) injection 4 mg, Q6H PRN      acetaminophen (TYLENOL) tablet 650 mg, Q6H PRN    Or      acetaminophen (TYLENOL) suppository 650 mg, Q6H PRN      0.9 % sodium chloride infusion, PRN      pantoprazole (PROTONIX) injection 40 mg, Daily    And      sodium chloride (PF) 0.9 % injection 10 mL, Daily        Data:     Code Status:  Limited    Family History   Problem Relation Age of Onset    Heart Disease Mother     COPD Mother     Emphysema Sister     Cancer Other         Cousin - breast cancer       Social History     Socioeconomic History    Marital status:      Spouse name: Not on file    Number of children: Not on file    Years of education: Not on file    Highest education level: Not on file   Occupational History    Not on file   Social Needs    Financial resource strain: Not on file    Food insecurity     Worry: Not on file     Inability: Not on file    Transportation needs     Medical: Not on file     Non-medical: Not on file   Tobacco Use    Smoking status: Former Smoker     Packs/day: 3.00     Years: 26.00     Pack years: 78.00     Types: Cigarettes    Smokeless tobacco: Never Used   Substance and Sexual Activity    Alcohol use: Not Currently     Comment: occ    Drug use: No    Sexual activity: Not on file   Lifestyle    Physical activity     Days per week: Not on file     Minutes per session: Not on file    Stress: Not on file   Relationships    Social connections     Talks on phone: Not on file     Gets together: Not on file     Attends Gnosticism service: Not on file     Active member of club or organization: Not on file     Attends meetings of clubs or organizations: Not on file     Relationship status: Not on file    Intimate partner violence     Fear of current or ex partner: Not on file     Emotionally abused: Not on file Physically abused: Not on file     Forced sexual activity: Not on file   Other Topics Concern    Not on file   Social History Narrative    Not on file       Vitals:  /60   Pulse 97   Temp 98.4 °F (36.9 °C) (Oral)   Resp 20   Ht 5' 8\" (1.727 m)   Wt 176 lb 5.9 oz (80 kg)   SpO2 94%   BMI 26.82 kg/m²   Temp (24hrs), Av.2 °F (36.8 °C), Min:97.9 °F (36.6 °C), Max:98.4 °F (36.9 °C)    No results for input(s): POCGLU in the last 72 hours. I/O (24Hr):     Intake/Output Summary (Last 24 hours) at 2021 1419  Last data filed at 2021 1323  Gross per 24 hour   Intake 1839 ml   Output 1500 ml   Net 339 ml       Labs:      CBC:   Lab Results   Component Value Date    WBC 7.5 2021    RBC 2.18 2021    RBC 4.50 2012    HGB 8.7 2021    HCT 28.6 2021    .6 2021    MCH 31.2 2021    MCHC 30.7 2021    RDW 21.8 2021     2021     2012    MPV 8.3 2021     CBC with Differential:    Lab Results   Component Value Date    WBC 7.5 2021    RBC 2.18 2021    RBC 4.50 2012    HGB 8.7 2021    HCT 28.6 2021     2021     2012    .6 2021    MCH 31.2 2021    MCHC 30.7 2021    RDW 21.8 2021    LYMPHOPCT 11 2021    MONOPCT 12 2021    BASOPCT 1 2021    MONOSABS 0.90 2021    LYMPHSABS 0.83 2021    EOSABS 0.15 2021    BASOSABS 0.08 2021    DIFFTYPE NOT REPORTED 2021     Hemoglobin/Hematocrit:    Lab Results   Component Value Date    HGB 8.7 2021    HCT 28.6 2021     CMP:    Lab Results   Component Value Date     2021    K 4.9 2021     2021    CO2 36 2021    BUN 15 2021    CREATININE 0.93 2021    GFRAA >60 2021    LABGLOM 59 2021    GLUCOSE 123 2021    GLUCOSE 113 2012    PROT 6.8 02/10/2021    LABALBU 3.4 02/10/2021 LABALBU 4.5 02/18/2012    CALCIUM 8.3 02/25/2021    BILITOT 0.34 02/10/2021    ALKPHOS 140 02/10/2021    AST 14 02/10/2021    ALT 11 02/10/2021     BMP:    Lab Results   Component Value Date     02/25/2021    K 4.9 02/25/2021     02/25/2021    CO2 36 02/25/2021    BUN 15 02/25/2021    LABALBU 3.4 02/10/2021    LABALBU 4.5 02/18/2012    CREATININE 0.93 02/25/2021    CALCIUM 8.3 02/25/2021    GFRAA >60 02/25/2021    LABGLOM 59 02/25/2021    GLUCOSE 123 02/25/2021    GLUCOSE 113 02/18/2012     PT/INR:    Lab Results   Component Value Date    PROTIME 13.1 02/25/2021    INR 1.0 02/25/2021     PTT:    Lab Results   Component Value Date    APTT 50.2 02/24/2021   [APTT}    Physical Examination:        General appearance: alert, cooperative and no distress  Mental Status: oriented to person, place and time and normal affect  Abdomen: soft, nontender, nondistended, bowel sounds present     Assessment:        Primary Problem  GI bleed     Active Hospital Problems    Diagnosis Date Noted    Hypernatremia [E87.0] 02/24/2021    Acute respiratory failure with hypoxia (HCC) [J96.01] 02/23/2021    Severe malnutrition ([facility] [address] [E43] 01/20/2021    Iron deficiency anemia due to chronic blood loss [D50.0] 12/18/2020    GI bleed [K92.2] 11/30/2020    Chronic diastolic CHF (congestive heart failure) (HCC) [I50.32] 07/21/2020    Moderate major depression (HCC) [F32.1] 06/17/2020    PAD (peripheral artery disease) ([facility] [address] [I73.9] 06/17/2020    CKD (chronic kidney disease) stage 3, GFR 30-59 ml/min [N18.30]     HA (generalized anxiety disorder) [F41.1] 12/13/2018    A-fib ([facility] [address] [I48.91] 09/13/2018    COPD (chronic obstructive pulmonary disease) ([facility] [J44.9] 09/13/2018    Pulmonary hypertension ([facility] [I27.20] 09/13/2018    Hypertension [I10]     Acquired hypothyroidism [E03.9] 01/18/2013     Past Medical History:   Diagnosis Date    A-fib [facility])     Acquired hypothyroidism     Acute encephalopathy  Acute kidney injury ([facility] [address]     Anxiety     Benign hypertension with CKD (chronic kidney disease) stage III     Chronic back pain     CKD (chronic kidney disease) stage 3, GFR 30-59 ml/min     Constipation     COPD (chronic obstructive pulmonary disease) ([facility])     Cough     Depression     ETOH abuse     Former smoker     3 packs a day for 26 years    AH (generalized anxiety disorder)     History of GI bleed     Hyperlipidemia     Hypertension     Hypothyroid 1/18/2013    Leg pain     Normocytic anemia     Osteoarthritis     PAD (peripheral artery disease) ([facility])     Primary osteoarthritis     [facility]monary [facility] ([facility]     Pure hypercholesterolemia     SIRS (systemic inflammatory response syndrome) ([facility])     Urge incontinence     Wheezing         Plan:        1. Recurrent anemia, hx jejunal AVMs, hx colonic AVM, AC may be contributing to her recurrent anemia  1. Hgb stable, improved to 8.7  2. No overt GI bleeding  3. Needs VCE  4. May d/c per GI and follow-up next week for VCE. 5. GI signing off    Explained to the patient and d/W Nursing Staff  Will F/U with you  Please call or Page for any issues or change in status  Thanks    Electronically signed by WILLIE Rizzo - NP on 2/25/2021 at 2:19 PM       I independently performed an evaluation on Lisa Candelaria. I have reviewed the above documentation completed by the Nurse Practitioner and agree with the documented findings and plan of care. I have personally evaluated this patient with the APRN and have completed at least one if not all key elements of the E/M (history, physical exam, and MDM). Please see my additional contributions to the HPI, physical exam, assessment, and medical decision making. Patient needs capsule study of the small bowel as an outpatient. Advised to see in the office in the next week if she is discharged.

## 2021-02-25 NOTE — PROGRESS NOTES
RN called and spoke with Tiesha from [facility] and confirmed that the patient was initially positive for covid on 1/1/21.

## 2021-02-25 NOTE — PROGRESS NOTES
Comprehensive Nutrition Assessment    Type and Reason for Visit:  Reassess    Nutrition Recommendations/Plan: Recommend restart 4 carbohydrate choices per tray diet with Ensure High protein supplements once daily as able. Nutrition Assessment:  Pt is presently npo for testing. Nursing documentation suggests pt is consuming more than 50% of food provided. Increase of fluid restriction to 1500 ml noted. Malnutrition Assessment:  Malnutrition Status:   At risk for malnutrition (Comment)    Context:  Acute Illness     Findings of the 6 clinical characteristics of malnutrition:  Energy Intake:  Mild decrease in energy intake (Comment)  Weight Loss:  1 - 7.5% over 3 months(possibly due in part to fluid shifts)     Body Fat Loss:  No significant body fat loss     Muscle Mass Loss:  No significant muscle mass loss    Fluid Accumulation:  1 - Mild Extremities   Strength:  Not Performed    Estimated Daily Nutrient Needs:  Energy (kcal):  (Alcorn x 1.2) 9674-7853 kcal; Weight Used for Energy Requirements:  (74 kg)     Protein (g):  1.3g/kg= 80 g; Weight Used for Protein Requirements:  Ideal          Nutrition Related Findings:  mild edema BLE, Labs (2/25) K 4.9, Glu 123, BUN/Cr 15/0.93, Meds: Reviewed, BM 2/22      Wounds:  None       Current Nutrition Therapies:    Diet NPO, After Midnight    Anthropometric Measures:  · Height: 5' 8\" (172.7 cm)  · Current Body Weight: 176 lb (79.8 kg)   · Admission Body Weight: 162 lb (73.5 kg)    · Usual Body Weight: 178 lb (80.7 kg)(11/20)     · Ideal Body Weight: 140 lbs; BMI: 26.8  · BMI Categories: Normal Weight (BMI 22.0 to 24.9) age over 72       Nutrition Diagnosis:   · Inadequate oral intake related to (poor appetite, present illness) as evidenced by poor intake prior to admission, weight loss    Nutrition Interventions:   Food and/or Nutrient Delivery:  Start Oral Diet, Start Oral Nutrition Supplement  Nutrition Education/Counseling:  Education not indicated Coordination of Nutrition Care:  Continue to monitor while inpatient    Goals:  po intake greater than 50%       Nutrition Monitoring and Evaluation:   Food/Nutrient Intake Outcomes:  Diet Advancement/Tolerance  Physical Signs/Symptoms Outcomes:  Biochemical Data, GI Status, Skin, Weight, Fluid Status or Edema     Discharge Planning:     Too soon to determine     Electronically signed by Hakeem Vigil RD, LD on 2/25/21 at 2:03 PM EST    Contact: [phone]

## 2021-02-26 LAB
ANION GAP SERPL CALCULATED.3IONS-SCNC: 6 MMOL/L (ref 9–17)
BASO FLUID: ABNORMAL %
BUN BLDV-MCNC: 13 MG/DL (ref 8–23)
BUN/CREAT BLD: ABNORMAL (ref 9–20)
CALCIUM SERPL-MCNC: 8.6 MG/DL (ref 8.6–10.4)
CHLORIDE BLD-SCNC: 101 MMOL/L (ref 98–107)
CO2: 41 MMOL/L (ref 20–31)
CREAT SERPL-MCNC: 0.82 MG/DL (ref 0.5–0.9)
EOSINOPHIL FLUID: ABNORMAL %
FLUID DIFF COMMENT: ABNORMAL
GFR AFRICAN AMERICAN: >60 ML/MIN
GFR NON-AFRICAN AMERICAN: >60 ML/MIN
GFR SERPL CREATININE-BSD FRML MDRD: ABNORMAL ML/MIN/{1.73_M2}
GFR SERPL CREATININE-BSD FRML MDRD: ABNORMAL ML/MIN/{1.73_M2}
GLUCOSE BLD-MCNC: 111 MG/DL (ref 70–99)
HCT VFR BLD CALC: 26.6 % (ref 36–46)
HCT VFR BLD CALC: 26.8 % (ref 36–46)
HCT VFR BLD CALC: 26.9 % (ref 36–46)
HCT VFR BLD CALC: 27 % (ref 36–46)
HEMOGLOBIN: 8.1 G/DL (ref 12–16)
HEMOGLOBIN: 8.3 G/DL (ref 12–16)
HEMOGLOBIN: 8.3 G/DL (ref 12–16)
HEMOGLOBIN: 8.4 G/DL (ref 12–16)
LYMPHOCYTES, BODY FLUID: 77 %
MONOCYTE, FLUID: 17 %
NEUTROPHIL, FLUID: 6 %
OTHER CELLS FLUID: ABNORMAL %
POTASSIUM SERPL-SCNC: 4 MMOL/L (ref 3.7–5.3)
SODIUM BLD-SCNC: 148 MMOL/L (ref 135–144)

## 2021-02-26 PROCEDURE — 6370000000 HC RX 637 (ALT 250 FOR IP): Performed by: FAMILY MEDICINE

## 2021-02-26 PROCEDURE — 85018 HEMOGLOBIN: CPT

## 2021-02-26 PROCEDURE — 94761 N-INVAS EAR/PLS OXIMETRY MLT: CPT

## 2021-02-26 PROCEDURE — 6370000000 HC RX 637 (ALT 250 FOR IP): Performed by: NURSE PRACTITIONER

## 2021-02-26 PROCEDURE — 6360000002 HC RX W HCPCS: Performed by: NURSE PRACTITIONER

## 2021-02-26 PROCEDURE — C9113 INJ PANTOPRAZOLE SODIUM, VIA: HCPCS | Performed by: NURSE PRACTITIONER

## 2021-02-26 PROCEDURE — 2580000003 HC RX 258: Performed by: NURSE PRACTITIONER

## 2021-02-26 PROCEDURE — 94640 AIRWAY INHALATION TREATMENT: CPT

## 2021-02-26 PROCEDURE — 2580000003 HC RX 258: Performed by: FAMILY MEDICINE

## 2021-02-26 PROCEDURE — 85014 HEMATOCRIT: CPT

## 2021-02-26 PROCEDURE — 99232 SBSQ HOSP IP/OBS MODERATE 35: CPT | Performed by: FAMILY MEDICINE

## 2021-02-26 PROCEDURE — 36415 COLL VENOUS BLD VENIPUNCTURE: CPT

## 2021-02-26 PROCEDURE — 80048 BASIC METABOLIC PNL TOTAL CA: CPT

## 2021-02-26 PROCEDURE — 2060000000 HC ICU INTERMEDIATE R&B

## 2021-02-26 PROCEDURE — 2700000000 HC OXYGEN THERAPY PER DAY

## 2021-02-26 RX ORDER — FUROSEMIDE 40 MG/1
40 TABLET ORAL DAILY
Status: DISCONTINUED | OUTPATIENT
Start: 2021-02-27 | End: 2021-03-05

## 2021-02-26 RX ADMIN — FERROUS SULFATE TAB 325 MG (65 MG ELEMENTAL FE) 325 MG: 325 (65 FE) TAB at 19:17

## 2021-02-26 RX ADMIN — IPRATROPIUM BROMIDE AND ALBUTEROL SULFATE 1 AMPULE: 2.5; .5 SOLUTION RESPIRATORY (INHALATION) at 15:39

## 2021-02-26 RX ADMIN — ATORVASTATIN CALCIUM 10 MG: 10 TABLET, FILM COATED ORAL at 09:00

## 2021-02-26 RX ADMIN — IPRATROPIUM BROMIDE AND ALBUTEROL SULFATE 1 AMPULE: 2.5; .5 SOLUTION RESPIRATORY (INHALATION) at 08:35

## 2021-02-26 RX ADMIN — METOPROLOL TARTRATE 25 MG: 25 TABLET, FILM COATED ORAL at 23:21

## 2021-02-26 RX ADMIN — HYDROCODONE BITARTRATE AND ACETAMINOPHEN 1 TABLET: 5; 325 TABLET ORAL at 23:21

## 2021-02-26 RX ADMIN — BUSPIRONE HYDROCHLORIDE 10 MG: 10 TABLET ORAL at 23:21

## 2021-02-26 RX ADMIN — CLONAZEPAM 0.5 MG: 1 TABLET ORAL at 23:21

## 2021-02-26 RX ADMIN — PANTOPRAZOLE SODIUM 40 MG: 40 INJECTION, POWDER, FOR SOLUTION INTRAVENOUS at 09:00

## 2021-02-26 RX ADMIN — SODIUM CHLORIDE, PRESERVATIVE FREE 10 ML: 5 INJECTION INTRAVENOUS at 09:05

## 2021-02-26 RX ADMIN — CLONAZEPAM 0.5 MG: 1 TABLET ORAL at 13:51

## 2021-02-26 RX ADMIN — FLUTICASONE PROPIONATE 2 PUFF: 220 AEROSOL, METERED RESPIRATORY (INHALATION) at 23:22

## 2021-02-26 RX ADMIN — LISINOPRIL 2.5 MG: 5 TABLET ORAL at 12:34

## 2021-02-26 RX ADMIN — DULOXETINE 60 MG: 60 CAPSULE, DELAYED RELEASE ORAL at 09:01

## 2021-02-26 RX ADMIN — DILTIAZEM HYDROCHLORIDE 120 MG: 120 CAPSULE, COATED, EXTENDED RELEASE ORAL at 09:01

## 2021-02-26 RX ADMIN — FERROUS SULFATE TAB 325 MG (65 MG ELEMENTAL FE) 325 MG: 325 (65 FE) TAB at 12:34

## 2021-02-26 RX ADMIN — FLUTICASONE PROPIONATE 1 SPRAY: 50 SPRAY, METERED NASAL at 09:05

## 2021-02-26 RX ADMIN — LEVOTHYROXINE SODIUM 125 MCG: 125 TABLET ORAL at 06:21

## 2021-02-26 RX ADMIN — METOPROLOL TARTRATE 25 MG: 25 TABLET, FILM COATED ORAL at 09:01

## 2021-02-26 RX ADMIN — FERROUS SULFATE TAB 325 MG (65 MG ELEMENTAL FE) 325 MG: 325 (65 FE) TAB at 09:01

## 2021-02-26 RX ADMIN — OXYCODONE HYDROCHLORIDE AND ACETAMINOPHEN 1000 MG: 500 TABLET ORAL at 09:01

## 2021-02-26 RX ADMIN — IPRATROPIUM BROMIDE AND ALBUTEROL SULFATE 1 AMPULE: 2.5; .5 SOLUTION RESPIRATORY (INHALATION) at 10:59

## 2021-02-26 RX ADMIN — SODIUM CHLORIDE, PRESERVATIVE FREE 10 ML: 5 INJECTION INTRAVENOUS at 23:20

## 2021-02-26 RX ADMIN — IPRATROPIUM BROMIDE AND ALBUTEROL SULFATE 1 AMPULE: 2.5; .5 SOLUTION RESPIRATORY (INHALATION) at 19:44

## 2021-02-26 RX ADMIN — FUROSEMIDE 20 MG: 10 INJECTION, SOLUTION INTRAMUSCULAR; INTRAVENOUS at 09:00

## 2021-02-26 RX ADMIN — FLUTICASONE PROPIONATE 2 PUFF: 220 AEROSOL, METERED RESPIRATORY (INHALATION) at 09:05

## 2021-02-26 RX ADMIN — BUSPIRONE HYDROCHLORIDE 10 MG: 10 TABLET ORAL at 09:00

## 2021-02-26 RX ADMIN — BUSPIRONE HYDROCHLORIDE 10 MG: 10 TABLET ORAL at 13:51

## 2021-02-26 RX ADMIN — SODIUM CHLORIDE 10 ML: 9 INJECTION INTRAMUSCULAR; INTRAVENOUS; SUBCUTANEOUS at 09:00

## 2021-02-26 ASSESSMENT — PAIN DESCRIPTION - PROGRESSION
CLINICAL_PROGRESSION: NOT CHANGED
CLINICAL_PROGRESSION: NOT CHANGED

## 2021-02-26 ASSESSMENT — PAIN DESCRIPTION - ONSET: ONSET: ON-GOING

## 2021-02-26 ASSESSMENT — PAIN DESCRIPTION - LOCATION: LOCATION: BACK

## 2021-02-26 ASSESSMENT — PAIN SCALES - GENERAL: PAINLEVEL_OUTOF10: 7

## 2021-02-26 NOTE — PROGRESS NOTES
Physician Progress Note      Nida Lowry  [facility] #:                  [id number]  :                       1945  ADMIT DATE:       2021 9:13 AM  DISCH DATE:  RESPONDING  PROVIDER #:        Tahir Keyes MD          QUERY TEXT:    Patient admitted with GI bleed. Patient with recent Covid infection and noted   positive Covid test on . If possible, please document in progress notes   and discharge summary if patient has an active Covid-19 infection or if   patient is testing positive for Covid-19 without a current active infection: The medical record reflects the following:  Risk Factors: 76yo, Recent COVID  Clinical Indicators:  COVID+; per pulm note pt had screening covid test   for thoracentesis and it was positive, she was originally positive on 21  Treatment: no isolation noted, no noted treatment for COVID, monitoring  Options provided:  -- Active Covid-19 infection is being treated and/or evaluated on current   encounter  -- Positive Covid test result without an active current Covid-19 infection  -- Other - I will add my own diagnosis  -- Disagree - Not applicable / Not valid  -- Disagree - Clinically unable to determine / Unknown  -- Refer to Clinical Documentation Reviewer    PROVIDER RESPONSE TEXT:    Patient is testing positive for Covid without an active Covid-19 infection.     Query created by: Val Casarez on 2021 7:38 AM      Electronically signed by:  Tahir Keyes MD 2021 11:41 AM

## 2021-02-26 NOTE — CONSULTS
Department of Internal Medicine  Nephrology Radha Zimmerman MD   Consult Note      SUBJECTIVE: This is a 70 y.o. female with a significant past medical history of Chronic atrial fibrillation, pulmonary hypertension, chronic recurrent anemia [s/p recent endoscopy showing AVMs requiring intermittent PRBC transfusions], COPD and chronic kidney disease stage II, who presented to the emergency department after falling at home. Laboratory studies revealed severe anemia with hemoglobin 6.8 g/dL and serum creatinine was 1.25 mg/dL. Nephrology consultation was called yesterday due to low urine output. Patient was given albumin and Lasix with prompt improvement in urine output. She feels well today and does not have shortness of breath or chest pain. Interval history:  Patient denies abdominal pain, rectal bleeding, nausea or vomiting. Renal function is improving with creatinine at baseline. Hemoglobin 8.3 and stable. Scheduled Meds:   [START ON 2/27/2021] furosemide  40 mg Oral Daily    ferrous sulfate  325 mg Oral TID WC    ipratropium-albuterol  1 ampule Inhalation Q4H WA    ascorbic acid  1,000 mg Oral Daily    atorvastatin  10 mg Oral Daily    busPIRone  10 mg Oral TID    dilTIAZem  120 mg Oral Daily    DULoxetine  60 mg Oral Daily    fluticasone  1 spray Each Nostril Daily    fluticasone  2 puff Inhalation BID    levothyroxine  125 mcg Oral Daily    lisinopril  2.5 mg Oral Lunch    metoprolol tartrate  25 mg Oral BID    sodium chloride flush  10 mL Intravenous 2 times per day    pantoprazole  40 mg Intravenous Daily    And    sodium chloride (PF)  10 mL Intravenous Daily     Continuous Infusions:   sodium chloride       PRN Meds:. HYDROcodone-acetaminophen, acetaminophen, baclofen, clonazePAM, guaiFENesin, sodium chloride flush, promethazine **OR** ondansetron, acetaminophen **OR** acetaminophen, sodium chloride    Family History   Problem Relation Age of Onset  Heart Disease Mother     COPD Mother     Emphysema Sister     Cancer Other         Cousin - breast cancer        Social History     Socioeconomic History    Marital status:      Spouse name: None    Number of children: None    Years of education: None    Highest education level: None   Occupational History    None   Social Needs    Financial resource strain: None    Food insecurity     Worry: None     Inability: None    Transportation needs     Medical: None     Non-medical: None   Tobacco Use    Smoking status: Former Smoker     Packs/day: 3.00     Years: 26.00     Pack years: 78.00     Types: Cigarettes    Smokeless tobacco: Never Used   Substance and Sexual Activity    Alcohol use: Not Currently     Comment: occ    Drug use: No    Sexual activity: None   Lifestyle    Physical activity     Days per week: None     Minutes per session: None    Stress: None   Relationships    Social connections     Talks on phone: None     Gets together: None     Attends Protestant service: None     Active member of club or organization: None     Attends meetings of clubs or organizations: None     Relationship status: None    Intimate partner violence     Fear of current or ex partner: None     Emotionally abused: None     Physically abused: None     Forced sexual activity: None   Other Topics Concern    None   Social History Narrative    None     Review of systems: CNS - no headache or dizziness; Cardiac - no chest pain; Respiratory - no cough or shortness of breath; Gastrointestinal - Black tarry stools; no nausea, vomiting or diarrhea; Musculoskeletal - general body aches; Skin/Integument - no rashes.     Physical Exam:    VITALS:  BP (!) 108/51   Pulse 88   Temp 98.6 °F (37 °C) (Oral)   Resp 16   Ht 5' 8\" (1.727 m)   Wt 163 lb 2.3 oz (74 kg)   SpO2 95%   BMI 24.81 kg/m²   24HR INTAKE/OUTPUT:      Intake/Output Summary (Last 24 hours) at 2/26/2021 1351  Last data filed at 2/26/2021 1245 Gross per 24 hour   Intake 1381.15 ml   Output 3275 ml   Net -1893.85 ml     Constitutional: alert, appears stated age and cooperative    Skin: Skin color, texture, turgor normal. No rashes or lesions    Head: Normocephalic, without obvious abnormality, atraumatic     Cardiovascular/Edema: regular rate and rhythm, S1, S2 normal, no murmur, click, rub or gallop    Respiratory: Lungs: clear to auscultation bilaterally    Abdomen: soft, non-tender; bowel sounds normal; no masses,  no organomegaly    Back: symmetric, no curvature. ROM normal. No CVA tenderness. Extremities: extremities normal, atraumatic, no cyanosis or edema    Neuro:  Grossly normal    CBC:   Recent Labs     02/24/21  1114 02/24/21  1114 02/25/21  1939 02/26/21  0055 02/26/21  0705   HGB 8.4*   < > 8.7* 8.3* 8.3*     --   --   --   --     < > = values in this interval not displayed.      BMP:    Recent Labs     02/24/21  0537 02/25/21  0741 02/26/21  0705   * 143 148*   K 4.6 4.9 4.0    102 101   CO2 37* 36* 41*   BUN 16 15 13   CREATININE 1.09* 0.93* 0.82   GLUCOSE 122* 123* 111*       Lab Results   Component Value Date    NITRU NEGATIVE 02/22/2021    COLORU YELLOW 02/22/2021    PHUR 5.5 02/22/2021    WBCUA 2 TO 5 02/21/2021    RBCUA 0 TO 2 02/21/2021    MUCUS NOT REPORTED 02/21/2021    TRICHOMONAS NOT REPORTED 02/21/2021    YEAST NOT REPORTED 02/21/2021    BACTERIA FEW 02/21/2021    CLARITYU Clear 01/31/2020    SPECGRAV 1.014 02/22/2021    LEUKOCYTESUR NEGATIVE 02/22/2021    UROBILINOGEN Normal 02/22/2021    BILIRUBINUR NEGATIVE 02/22/2021    BILIRUBINUR 0 01/31/2020    BLOODU Negative 01/31/2020    GLUCOSEU NEGATIVE 02/22/2021    KETUA NEGATIVE 02/22/2021    AMORPHOUS 1+ 02/21/2021     Urine Sodium:     Lab Results   Component Value Date    ANGÉLICA 82 02/23/2021     Urine Potassium:    Lab Results   Component Value Date    KUR 35.7 07/20/2020     Urine Chloride:    Lab Results   Component Value Date    CLUR 113 02/23/2021 Urine Creatinine:     Lab Results   Component Value Date    LABCREA 137.9 02/11/2021    LABCREA 134.5 02/11/2021     IMPRESSION/RECOMMENDATIONS:     1. Chronic kidney disease stage 3- renal function is stable. Transient oliguria likely due to hemodynamic fluxes. Plan:KVO IVF  Monitor urine output closely. .  Basic metabolic profile daily    2. Hypernatremia -  Oral Free water intake up to 1.5 L/day    3. Acute on chronic anemia. Hemoglobin 8.7 g and stable      4. History of chronic CHF with preserved ejection fraction-  Lasix 40 mg dly PO tomorrow. 1500 mls fluid restriction    5. Left   Pleural effusion-status post thoracentesis 1 L. Thank you very much for the courtesy of this consultation.     MD ROXIE Pina  Attending Nephrologist  2/26/2021 1:51 PM

## 2021-02-26 NOTE — PROGRESS NOTES
FAMILY MEDICINE  - PROGRESS NOTE    Date:  2/26/2021  Nancie Lees  [id number]      Chief Complaint   Patient presents with    Fall         Interval History:  not changed, she is sleeping soundly this am. No significant events overnight.       Subjective  Constitutional: positive for fatigue and overweight  Respiratory: positive for emphysema and shortness of breath  Cardiovascular: positive for irregular heart beat  Hematologic/lymphatic: positive for pallor  Behavioral/Psych: positive for anxiety and depression:    Objective:    BP (!) 114/54   Pulse 59   Temp 97.9 °F (36.6 °C) (Oral)   Resp 16   Ht 5' 8\" (1.727 m)   Wt 163 lb 2.3 oz (74 kg)   SpO2 96%   BMI 24.81 kg/m²   General appearance - normal appearing weight  Mental status - drowsy  Eyes - not examined  Ears - bilateral TM's and external ear canals normal  Nose - normal and patent, no erythema, discharge or polyps  Mouth - mucous membranes moist, pharynx normal without lesions  Neck - supple, no significant adenopathy  Lymphatics - no palpable lymphadenopathy, no hepatosplenomegaly  Chest - clear to auscultation, no wheezes, rales or rhonchi, symmetric air entry, decreased air entry noted posteriorly  Heart - irregularly irregular rhythm with rate 59  Abdomen - soft, nontender, nondistended, no masses or organomegaly  Breasts - not examined  Back exam - not examined  Neurological - neck supple without rigidity  Musculoskeletal - osteoarthritic changes noted in both hands  Extremities - peripheral pulses normal, no pedal edema, no clubbing or cyanosis  Skin - normal coloration and turgor, no rashes, no suspicious skin lesions noted    Data:   Medications:   Current Facility-Administered Medications   Medication Dose Route Frequency Provider Last Rate Last Admin    ferrous sulfate (IRON 325) tablet 325 mg  325 mg Oral TID  Eileen Pelletier MD   325 mg at 02/25/21 6598  metoprolol tartrate (LOPRESSOR) tablet 25 mg  25 mg Oral BID Susan Pride MD   25 mg at 02/25/21 2108    sodium chloride flush 0.9 % injection 10 mL  10 mL Intravenous 2 times per day Susan Pride MD   10 mL at 02/25/21 2132    sodium chloride flush 0.9 % injection 10 mL  10 mL Intravenous PRN Susan Pride MD        promethazine (PHENERGAN) tablet 12.5 mg  12.5 mg Oral Q6H PRN Susan Pride MD        Or    [facility]ron [facility] injection 4 mg  4 mg Intravenous Q6H PRN Susan Pride MD        acetaminophen (TYLENOL) tablet 650 mg  650 mg Oral Q6H PRN Susan Pride MD   650 mg at 02/25/21 2109    Or    acetaminophen (TYLENOL) suppository 650 mg  650 mg Rectal Q6H PRN Susan Pride MD        0.9 % sodium chloride infusion   Intravenous PRN Devin Locket, DO        pantoprazole (PROTONIX) injection 40 mg  40 mg Intravenous Daily WILLIE Rizzo - NP   40 mg at 02/25/21 1243    And    sodium chloride (PF) 0.9 % injection 10 mL  10 mL Intravenous Daily WILLIE Rizzo - NP   10 mL at 02/25/21 1243       Intake/Output Summary (Last 24 hours) at 2/26/2021 9265  Last data filed at 2/26/2021 0607  Gross per 24 hour   Intake 1151.15 ml   Output 2825 ml   Net -1673.85 ml     Recent Results (from the past 24 hour(s))   COVID-19, Rapid    Collection Time: 02/25/21  9:06 AM    Specimen: Nasopharyngeal Swab   Result Value Ref Range    Specimen Description . NASOPHARYNGEAL SWAB     SARS-CoV-2, Rapid DETECTED (A) Not Detected   Body fluid cell count    Collection Time: 02/25/21  2:30 PM   Result Value Ref Range    Color, Fluid CHUCHO     Appearance, Fluid CLOUDY     WBC, Fluid 458 /mm3    RBC, Fluid 7,129 /mm3    Specimen Type . PLEURAL FLUID    Glucose, Body Fluid    Collection Time: 02/25/21  2:30 PM   Result Value Ref Range    Specimen Type . PLEURAL FLUID     Glucose, Fluid 115 mg/dL   Lactate Dehydrogenase, Body Fluid Collection Time: 02/25/21  2:30 PM   Result Value Ref Range    Specimen Type . PLEURAL FLUID     LD, Fluid 55 U/L   pH, Body Fluid    Collection Time: 02/25/21  2:30 PM   Result Value Ref Range    Specimen Type . PLEURAL FLUID     pH, Fluid 8.0    Protein, Body Fluid    Collection Time: 02/25/21  2:30 PM   Result Value Ref Range    Specimen Type . PLEURAL FLUID     Total Protein, Body Fluid 1.4 g/dL   Culture, Body Fluid    Collection Time: 02/25/21  2:30 PM    Specimen: Pleural Fluid   Result Value Ref Range    Specimen Description . PLEURAL FLUID     Special Requests NOT REPORTED     Direct Exam MODERATE MONONUCLEAR WHITE BLOOD CELLS SEEN (A)     Direct Exam NO BACTERIA SEEN     Direct Exam       Gram stain made from cytocentrifuged specimen. Organisms and cells will be concentrated.     Culture PENDING    Differential, Body Fluid    Collection Time: 02/25/21  2:30 PM   Result Value Ref Range    Fluid Diff Comment TO BE REVIEWED BY PATHOLOGIST     Neutrophil Count, Fluid 6 (H) 0 %    Lymphocytes, Body Fluid 77 (H) 0 %    Monocyte Count, Fluid 17 (H) 0 %    Eos, Fluid      0 %    Basos, Fluid      0 %    Other Cells, Fluid      0 %   Hemoglobin and Hematocrit, Blood    Collection Time: 02/25/21  3:26 PM   Result Value Ref Range    Hemoglobin 8.6 (L) 12.0 - 16.0 g/dL    Hematocrit 27.6 (L) 36 - 46 %   Hemoglobin and Hematocrit, Blood    Collection Time: 02/25/21  7:39 PM   Result Value Ref Range    Hemoglobin 8.7 (L) 12.0 - 16.0 g/dL    Hematocrit 28.0 (L) 36 - 46 %   Hemoglobin and Hematocrit, Blood    Collection Time: 02/26/21 12:55 AM   Result Value Ref Range    Hemoglobin 8.3 (L) 12.0 - 16.0 g/dL    Hematocrit 26.6 (L) 36 - 46 %   Basic Metabolic Panel w/ Reflex to MG    Collection Time: 02/26/21  7:05 AM   Result Value Ref Range    Glucose 111 (H) 70 - 99 mg/dL    BUN 13 8 - 23 mg/dL    CREATININE 0.82 0.50 - 0.90 mg/dL    Bun/Cre Ratio NOT REPORTED 9 - 20    Calcium 8.6 8.6 - 10.4 mg/dL Sodium 148 (H) 135 - 144 mmol/L    Potassium 4.0 3.7 - 5.3 mmol/L    Chloride 101 98 - 107 mmol/L    CO2 41 (HH) 20 - 31 mmol/L    Anion Gap 6 (L) 9 - 17 mmol/L    GFR Non-African American >60 >60 mL/min    GFR African American >60 >60 mL/min    GFR Comment          GFR Staging NOT REPORTED    Hemoglobin and Hematocrit, Blood    Collection Time: 02/26/21  7:05 AM   Result Value Ref Range    Hemoglobin 8.3 (L) 12.0 - 16.0 g/dL    Hematocrit 26.8 (L) 36 - 46 %     -----------------------------------------------------------------  RAD:  EKG:  Micro:     Assessment & Plan:    Patient Active Problem List:     Acquired hypothyroidism     Hypertension     Primary osteoarthritis of right knee     A-fib (HCC)     Acute encephalopathy     SIRS (systemic inflammatory response syndrome) (HCC)     Normocytic anemia     Pulmonary hypertension (HCC)     COPD (chronic obstructive pulmonary disease) (HCC)     History of GI bleed     Constipation     HA (generalized anxiety disorder)     Lumbar radiculopathy     Lumbosacral spondylosis without myelopathy     Benign hypertension with CKD (chronic kidney disease) stage III     CKD (chronic kidney disease) stage 3, GFR 30-59 ml/min     Alcohol withdrawal syndrome with complication (HCC)     Moderate major depression (HCC)     PAD (peripheral artery disease) (HCC)     Acute kidney injury (HCC)     Obesity, Class I, BMI 30-34.9     Chronic diastolic CHF (congestive heart failure) (HCC)     GI bleed     Weight loss     Elevated alkaline phosphatase level     Thrombocytosis (HCC)     Iron deficiency anemia due to chronic blood loss     Iron malabsorption     Symptomatic anemia     Pneumonia due to organism     Anemia     Severe anemia     Overweight (BMI 25.0-29. 9)     Severe malnutrition ([address]     Renal failure     Acute renal failure (ARF) (HCC)     Acute cystitis without hematuria     Hypoxia     Acute respiratory failure with hypoxia (HCC)     Hypernatremia           Plan: -Recurrent severe anemia - Eliquis on hold, on Iron supplementation.  -Acute respiratory failure with COPD - on O2 per NC, Pulmonology following.  -Chronic diastolic CHF - stable. -Continue current treatments.  -Complete orders per chart.     See orders   Disposition:    Electronically signed by Bienvenido Cherry MD on 2/26/2021 at 8:07 AM

## 2021-02-26 NOTE — PROGRESS NOTES
BRONCHOSPASM/BRONCHOCONSTRICTION     [x]         IMPROVE AERATION/BREATH SOUNDS  [x]   ADMINISTER BRONCHODILATOR THERAPY AS APPROPRIATE  [x]   ASSESS BREATH SOUNDS  []   IMPLEMENT AEROSOL/MDI PROTOCOL  [x]   PATIENT EDUCATION AS NEEDED    Pt given scheduled tx. Pt tolerated well. Pt currently on 4lnc SpO2 98% decreased to 3lnc . Pt does not appear to be in any respiratory distress at this time.

## 2021-02-26 NOTE — PLAN OF CARE
Problem: Falls - Risk of:  Goal: Will remain free from falls  Description: Will remain free from falls  2/26/2021 0401 by Alexander Sheriff RN  Outcome: Ongoing  Note: Pt assessed as a fall risk this shift. Remains free from falls and accidental injury at this time. Fall precautions in place, including falling star sign and fall risk band on pt. Floor free from obstacles, and bed is locked and in lowest position. Adequate lighting provided. Pt encouraged to call before getting OOB for any need. Bed alarm activated. Will continue to monitor needs during hourly rounding, and reinforce education on use of call light. Problem: SAFETY  Goal: Free from accidental physical injury  2/26/2021 0401 by Alexander Sheriff RN  Outcome: Ongoing  Note: Pt assessed as a fall risk this shift. Remains free from falls and accidental injury at this time. Fall precautions in place, including falling star sign and fall risk band on pt. Floor free from obstacles, and bed is locked and in lowest position. Adequate lighting provided. Pt encouraged to call before getting OOB for any need. Bed alarm activated. Will continue to monitor needs during hourly rounding, and reinforce education on use of call light. Problem: DAILY CARE  Goal: Daily care needs are met  2/26/2021 0401 by Alexander Sheriff RN  Outcome: Ongoing  Note: Patient and staff currently meeting all patient's daily care needs. Patient encouraged to participate and complete all ADLs per self. Will continue to monitor for opportunities for patient to meet all ADLs per self. Problem: DAILY CARE  Goal: Daily care needs are met  2/26/2021 0401 by Alexander Sheriff RN  Outcome: Ongoing  Note: Patient and staff currently meeting all patient's daily care needs. Patient encouraged to participate and complete all ADLs per self. Will continue to monitor for opportunities for patient to meet all ADLs per self.        Problem: PAIN  Goal: Patient's pain/discomfort is manageable 2/26/2021 0401 by Emma Bradshaw RN  Outcome: Ongoing  Note: Pt medicated with pain medication prn. Assessed all pain characteristics including level, type, location, frequency, and onset. Non-pharmacologic interventions offered to pt as well. Pt states pain is tolerable at this time. Will continue to monitor. Problem: SKIN INTEGRITY  Goal: Skin integrity is maintained or improved  2/26/2021 0401 by Emma Bradshaw RN  Outcome: Ongoing  Note: Skin assessment complete. Turned and repositioned every two hours per self. Area kept free from moisture. Proper nourishment and fluids encouraged, as appropriate. Will continue to monitor for additional needs and changes in skin breakdown. Problem: Gas Exchange - Impaired  Goal: Absence of hypoxia  2/26/2021 0401 by Emma Bradshaw RN  Outcome: Ongoing  Note: Patient resp rate 16-18 breath/min; pulse ox 91-97% on 4L oxygen via nasal canula; lung sounds bilaterally clear with diminished bases; will continue to monitor for hypoxia.

## 2021-02-26 NOTE — PLAN OF CARE
Problem: Falls - Risk of:  Goal: Will remain free from falls  Description: Will remain free from falls  2/26/2021 1648 by Juve Crawley RN  Outcome: Ongoing  2/26/2021 0401 by Monisha Qureshi RN  Outcome: Ongoing  Note: Pt assessed as a fall risk this shift. Remains free from falls and accidental injury at this time. Fall precautions in place, including falling star sign and fall risk band on pt. Floor free from obstacles, and bed is locked and in lowest position. Adequate lighting provided. Pt encouraged to call before getting OOB for any need. Bed alarm activated. Will continue to monitor needs during hourly rounding, and reinforce education on use of call light. Goal: Absence of physical injury  Description: Absence of physical injury  Outcome: Ongoing     Problem: SAFETY  Goal: Free from accidental physical injury  2/26/2021 1648 by Juve Crawley RN  Outcome: Ongoing  2/26/2021 0401 by Monisha Qureshi RN  Outcome: Ongoing  Note: Pt assessed as a fall risk this shift. Remains free from falls and accidental injury at this time. Fall precautions in place, including falling star sign and fall risk band on pt. Floor free from obstacles, and bed is locked and in lowest position. Adequate lighting provided. Pt encouraged to call before getting OOB for any need. Bed alarm activated. Will continue to monitor needs during hourly rounding, and reinforce education on use of call light. Goal: Free from intentional harm  Outcome: Ongoing     Problem: DAILY CARE  Goal: Daily care needs are met  2/26/2021 1648 by Juve Crawley RN  Outcome: Ongoing  2/26/2021 0401 by Monisha Qureshi RN  Outcome: Ongoing  Note: Patient and staff currently meeting all patient's daily care needs. Patient encouraged to participate and complete all ADLs per self. Will continue to monitor for opportunities for patient to meet all ADLs per self.        Problem: PAIN  Goal: Patient's pain/discomfort is manageable  2/26/2021 1648 by Juve Crawley RN Outcome: Ongoing  2/26/2021 0401 by Cely Novak RN  Outcome: Ongoing  Note: Pt medicated with pain medication prn. Assessed all pain characteristics including level, type, location, frequency, and onset. Non-pharmacologic interventions offered to pt as well. Pt states pain is tolerable at this time. Will continue to monitor. Problem: SKIN INTEGRITY  Goal: Skin integrity is maintained or improved  2/26/2021 1648 by Binh Lucas RN  Outcome: Ongoing  2/26/2021 0401 by Cely Novak RN  Outcome: Ongoing  Note: Skin assessment complete. Turned and repositioned every two hours per self. Area kept free from moisture. Proper nourishment and fluids encouraged, as appropriate. Will continue to monitor for additional needs and changes in skin breakdown. Problem: KNOWLEDGE DEFICIT  Goal: Patient/S.O. demonstrates understanding of disease process, treatment plan, medications, and discharge instructions.   Outcome: Ongoing     Problem: DISCHARGE BARRIERS  Goal: Patient's continuum of care needs are met  Outcome: Ongoing     Problem: Nutrition  Goal: Optimal nutrition therapy  Description: Nutrition Problem #1: Inadequate oral intake  Intervention: Food and/or Nutrient Delivery: Continue Current Diet, Start Oral Nutrition Supplement  Nutritional Goals: po intake greater than 50%     Outcome: Ongoing     Problem: Pain:  Goal: Pain level will decrease  Description: Pain level will decrease  Outcome: Ongoing  Goal: Control of acute pain  Description: Control of acute pain  Outcome: Ongoing  Goal: Control of chronic pain  Description: Control of chronic pain  Outcome: Ongoing     Problem: Skin Integrity:  Goal: Will show no infection signs and symptoms  Description: Will show no infection signs and symptoms  Outcome: Ongoing  Goal: Absence of new skin breakdown  Description: Absence of new skin breakdown  Outcome: Ongoing     Problem: Airway Clearance - Ineffective  Goal: Achieve or maintain patent airway

## 2021-02-26 NOTE — PROGRESS NOTES
PULMONARY PROGRESS NOTE:    REASON FOR VISIT:Pl effusion, dyspnea  Interval History:    Shortness of Breath: yes, better  Cough: no  Sputum: no          Hemoptysis: no  Chest Pain: no  Fever: no                   Swelling Feet: yes   Headache: no                                           Nausea, Emesis, Abdominal Pain: no  Diarrhea: no         Constipation: no    Events since last visit:  Thoracentesis 2/25/21    PAST MEDICAL HISTORY:      Scheduled Meds:   ferrous sulfate  325 mg Oral TID WC    furosemide  20 mg Intravenous Daily    ipratropium-albuterol  1 ampule Inhalation Q4H WA    ascorbic acid  1,000 mg Oral Daily    atorvastatin  10 mg Oral Daily    busPIRone  10 mg Oral TID    dilTIAZem  120 mg Oral Daily    DULoxetine  60 mg Oral Daily    fluticasone  1 spray Each Nostril Daily    fluticasone  2 puff Inhalation BID    levothyroxine  125 mcg Oral Daily    lisinopril  2.5 mg Oral Lunch    metoprolol tartrate  25 mg Oral BID    sodium chloride flush  10 mL Intravenous 2 times per day    pantoprazole  40 mg Intravenous Daily    And    sodium chloride (PF)  10 mL Intravenous Daily     Continuous Infusions:   sodium chloride       PRN Meds:HYDROcodone-acetaminophen, acetaminophen, baclofen, clonazePAM, guaiFENesin, sodium chloride flush, promethazine **OR** ondansetron, acetaminophen **OR** acetaminophen, sodium chloride        PHYSICAL EXAMINATION:  BP (!) 111/95   Pulse 87   Temp 98.4 °F (36.9 °C) (Oral)   Resp 18   Ht 5' 8\" (1.727 m)   Wt 163 lb 2.3 oz (74 kg)   SpO2 97%   BMI 24.81 kg/m²     General : Awake, alert, confused   Neck  supple, no lymphadenopathy, JVD not raised  Heart  regular rhythm, S1 and S2 normal; no additional sounds heard  Lungs  Air Entry- fair bilaterally; breath sounds : vesicular;   rales/crackles - absent, 94% on 2L  Abdomen  soft, no tenderness  Upper Extremities  - no cyanosis, mottling; edema : absent Lower Extremities: no cyanosis, mottling; edema : absent    Current Laboratory, Radiologic, Microbiologic, and Diagnostic studies reviewed  Data ReviewCBC:   Recent Labs     02/24/21  1114 02/24/21  1114 02/25/21  1939 02/26/21  0055 02/26/21  0705   HGB 8.4*   < > 8.7* 8.3* 8.3*   HCT 28.6*   < > 28.0* 26.6* 26.8*     --   --   --   --     < > = values in this interval not displayed. BMP:   Recent Labs     02/24/21  0537 02/25/21  0741 02/26/21  0705   GLUCOSE 122* 123* 111*   * 143 148*   K 4.6 4.9 4.0   BUN 16 15 13   CREATININE 1.09* 0.93* 0.82   CALCIUM 8.1* 8.3* 8.6     ABGs: No results for input(s): PHART, PO2ART, BCU9EPN, YVD8DTN, BEART, V8OFYKLB, DJU5AQE in the last 72 hours.    PT/INR:  No results found for: PTINR    ASSESSMENT / PLAN:  Nasal congestion - humidify O2, sudafed  Acute hypoxic resp failure - O2  Left pl effusion - monitor, diuresis,   GI bleed - per GI service   Thoracentesis per IR 2/25/21- 1L fluid removed left side - transudate  Previous Covid positive 1/1/21  Ok for ECF from [address] [place] [address] if OK with GI    Electronically signed by Camilla De Luna on 02/26/21 at 12:19 PM

## 2021-02-27 LAB
ALLEN TEST: ABNORMAL
ANION GAP SERPL CALCULATED.3IONS-SCNC: ABNORMAL MMOL/L (ref 9–17)
BUN BLDV-MCNC: 12 MG/DL (ref 8–23)
BUN/CREAT BLD: ABNORMAL (ref 9–20)
CALCIUM SERPL-MCNC: 8.7 MG/DL (ref 8.6–10.4)
CARBOXYHEMOGLOBIN: 2.6 % (ref 0–5)
CHLORIDE BLD-SCNC: 100 MMOL/L (ref 98–107)
CO2: >45 MMOL/L (ref 20–31)
CREAT SERPL-MCNC: 0.76 MG/DL (ref 0.5–0.9)
FIO2: ABNORMAL
GFR AFRICAN AMERICAN: >60 ML/MIN
GFR NON-AFRICAN AMERICAN: >60 ML/MIN
GFR SERPL CREATININE-BSD FRML MDRD: ABNORMAL ML/MIN/{1.73_M2}
GFR SERPL CREATININE-BSD FRML MDRD: ABNORMAL ML/MIN/{1.73_M2}
GLUCOSE BLD-MCNC: 121 MG/DL (ref 70–99)
HCO3 ARTERIAL: 49.3 MMOL/L (ref 22–26)
HCT VFR BLD CALC: 25.7 % (ref 36–46)
HCT VFR BLD CALC: 26.6 % (ref 36–46)
HCT VFR BLD CALC: 28.2 % (ref 36–46)
HCT VFR BLD CALC: 29.2 % (ref 36–46)
HEMOGLOBIN: 7.9 G/DL (ref 12–16)
HEMOGLOBIN: 8.1 G/DL (ref 12–16)
HEMOGLOBIN: 8.5 G/DL (ref 12–16)
HEMOGLOBIN: 8.8 G/DL (ref 12–16)
METHEMOGLOBIN: 0.8 % (ref 0–1.9)
MODE: ABNORMAL
NEGATIVE BASE EXCESS, ART: ABNORMAL MMOL/L (ref 0–2)
NOTIFICATION TIME: ABNORMAL
NOTIFICATION: ABNORMAL
O2 DEVICE/FLOW/%: ABNORMAL
O2 SAT, ARTERIAL: 92.3 % (ref 95–98)
OXYHEMOGLOBIN: ABNORMAL % (ref 95–98)
PATIENT TEMP: 37
PCO2 ARTERIAL: 85.9 MMHG (ref 35–45)
PCO2, ART, TEMP ADJ: ABNORMAL (ref 35–45)
PEEP/CPAP: ABNORMAL
PH ARTERIAL: 7.37 (ref 7.35–7.45)
PH, ART, TEMP ADJ: ABNORMAL (ref 7.35–7.45)
PO2 ARTERIAL: 68.8 MMHG (ref 80–100)
PO2, ART, TEMP ADJ: ABNORMAL MMHG (ref 80–100)
POSITIVE BASE EXCESS, ART: 23.9 MMOL/L (ref 0–2)
POTASSIUM SERPL-SCNC: 3.9 MMOL/L (ref 3.7–5.3)
PSV: ABNORMAL
PT. POSITION: ABNORMAL
RESPIRATORY RATE: 20
SAMPLE SITE: ABNORMAL
SET RATE: ABNORMAL
SODIUM BLD-SCNC: 149 MMOL/L (ref 135–144)
TEXT FOR RESPIRATORY: ABNORMAL
TOTAL HB: ABNORMAL G/DL (ref 12–16)
TOTAL RATE: ABNORMAL
VT: ABNORMAL

## 2021-02-27 PROCEDURE — 2580000003 HC RX 258: Performed by: FAMILY MEDICINE

## 2021-02-27 PROCEDURE — 6370000000 HC RX 637 (ALT 250 FOR IP): Performed by: FAMILY MEDICINE

## 2021-02-27 PROCEDURE — 6370000000 HC RX 637 (ALT 250 FOR IP): Performed by: NURSE PRACTITIONER

## 2021-02-27 PROCEDURE — 94640 AIRWAY INHALATION TREATMENT: CPT

## 2021-02-27 PROCEDURE — 36600 WITHDRAWAL OF ARTERIAL BLOOD: CPT

## 2021-02-27 PROCEDURE — 2580000003 HC RX 258: Performed by: INTERNAL MEDICINE

## 2021-02-27 PROCEDURE — 2700000000 HC OXYGEN THERAPY PER DAY

## 2021-02-27 PROCEDURE — 6370000000 HC RX 637 (ALT 250 FOR IP): Performed by: INTERNAL MEDICINE

## 2021-02-27 PROCEDURE — 82805 BLOOD GASES W/O2 SATURATION: CPT

## 2021-02-27 PROCEDURE — 80048 BASIC METABOLIC PNL TOTAL CA: CPT

## 2021-02-27 PROCEDURE — 6360000002 HC RX W HCPCS: Performed by: NURSE PRACTITIONER

## 2021-02-27 PROCEDURE — 51702 INSERT TEMP BLADDER CATH: CPT

## 2021-02-27 PROCEDURE — 2580000003 HC RX 258: Performed by: NURSE PRACTITIONER

## 2021-02-27 PROCEDURE — 85018 HEMOGLOBIN: CPT

## 2021-02-27 PROCEDURE — 36415 COLL VENOUS BLD VENIPUNCTURE: CPT

## 2021-02-27 PROCEDURE — 94761 N-INVAS EAR/PLS OXIMETRY MLT: CPT

## 2021-02-27 PROCEDURE — C9113 INJ PANTOPRAZOLE SODIUM, VIA: HCPCS | Performed by: NURSE PRACTITIONER

## 2021-02-27 PROCEDURE — 99223 1ST HOSP IP/OBS HIGH 75: CPT | Performed by: INTERNAL MEDICINE

## 2021-02-27 PROCEDURE — 6360000002 HC RX W HCPCS: Performed by: INTERNAL MEDICINE

## 2021-02-27 PROCEDURE — 1200000000 HC SEMI PRIVATE

## 2021-02-27 PROCEDURE — 99232 SBSQ HOSP IP/OBS MODERATE 35: CPT | Performed by: FAMILY MEDICINE

## 2021-02-27 PROCEDURE — 85014 HEMATOCRIT: CPT

## 2021-02-27 RX ORDER — DEXTROSE MONOHYDRATE 50 MG/ML
INJECTION, SOLUTION INTRAVENOUS CONTINUOUS
Status: DISCONTINUED | OUTPATIENT
Start: 2021-02-27 | End: 2021-03-01

## 2021-02-27 RX ADMIN — CLONAZEPAM 0.5 MG: 1 TABLET ORAL at 14:50

## 2021-02-27 RX ADMIN — DILTIAZEM HYDROCHLORIDE 120 MG: 120 CAPSULE, COATED, EXTENDED RELEASE ORAL at 08:27

## 2021-02-27 RX ADMIN — BUSPIRONE HYDROCHLORIDE 10 MG: 10 TABLET ORAL at 14:50

## 2021-02-27 RX ADMIN — IPRATROPIUM BROMIDE AND ALBUTEROL SULFATE 1 AMPULE: 2.5; .5 SOLUTION RESPIRATORY (INHALATION) at 14:57

## 2021-02-27 RX ADMIN — ATORVASTATIN CALCIUM 10 MG: 10 TABLET, FILM COATED ORAL at 08:27

## 2021-02-27 RX ADMIN — DEXTROSE MONOHYDRATE: 50 INJECTION, SOLUTION INTRAVENOUS at 16:51

## 2021-02-27 RX ADMIN — FLUTICASONE PROPIONATE 1 SPRAY: 50 SPRAY, METERED NASAL at 08:33

## 2021-02-27 RX ADMIN — HYDROCODONE BITARTRATE AND ACETAMINOPHEN 1 TABLET: 5; 325 TABLET ORAL at 17:00

## 2021-02-27 RX ADMIN — SODIUM CHLORIDE, PRESERVATIVE FREE 10 ML: 5 INJECTION INTRAVENOUS at 20:54

## 2021-02-27 RX ADMIN — BUSPIRONE HYDROCHLORIDE 10 MG: 10 TABLET ORAL at 08:27

## 2021-02-27 RX ADMIN — IPRATROPIUM BROMIDE AND ALBUTEROL SULFATE 1 AMPULE: 2.5; .5 SOLUTION RESPIRATORY (INHALATION) at 11:57

## 2021-02-27 RX ADMIN — HYDROCODONE BITARTRATE AND ACETAMINOPHEN 1 TABLET: 5; 325 TABLET ORAL at 08:28

## 2021-02-27 RX ADMIN — METOPROLOL TARTRATE 25 MG: 25 TABLET, FILM COATED ORAL at 08:27

## 2021-02-27 RX ADMIN — LEVOTHYROXINE SODIUM 125 MCG: 125 TABLET ORAL at 06:21

## 2021-02-27 RX ADMIN — IPRATROPIUM BROMIDE AND ALBUTEROL SULFATE 1 AMPULE: 2.5; .5 SOLUTION RESPIRATORY (INHALATION) at 08:00

## 2021-02-27 RX ADMIN — CLONAZEPAM 0.5 MG: 1 TABLET ORAL at 20:52

## 2021-02-27 RX ADMIN — HYDROCODONE BITARTRATE AND ACETAMINOPHEN 1 TABLET: 5; 325 TABLET ORAL at 23:13

## 2021-02-27 RX ADMIN — FLUTICASONE PROPIONATE 2 PUFF: 220 AEROSOL, METERED RESPIRATORY (INHALATION) at 08:33

## 2021-02-27 RX ADMIN — FERROUS SULFATE TAB 325 MG (65 MG ELEMENTAL FE) 325 MG: 325 (65 FE) TAB at 08:28

## 2021-02-27 RX ADMIN — IRON SUCROSE 200 MG: 20 INJECTION, SOLUTION INTRAVENOUS at 17:42

## 2021-02-27 RX ADMIN — PANTOPRAZOLE SODIUM 40 MG: 40 INJECTION, POWDER, FOR SOLUTION INTRAVENOUS at 08:28

## 2021-02-27 RX ADMIN — FUROSEMIDE 40 MG: 40 TABLET ORAL at 08:28

## 2021-02-27 RX ADMIN — SODIUM CHLORIDE, PRESERVATIVE FREE 10 ML: 5 INJECTION INTRAVENOUS at 08:28

## 2021-02-27 RX ADMIN — DULOXETINE 60 MG: 60 CAPSULE, DELAYED RELEASE ORAL at 08:28

## 2021-02-27 RX ADMIN — IPRATROPIUM BROMIDE AND ALBUTEROL SULFATE 1 AMPULE: 2.5; .5 SOLUTION RESPIRATORY (INHALATION) at 19:40

## 2021-02-27 RX ADMIN — FERROUS SULFATE TAB 325 MG (65 MG ELEMENTAL FE) 325 MG: 325 (65 FE) TAB at 17:00

## 2021-02-27 RX ADMIN — OXYCODONE HYDROCHLORIDE AND ACETAMINOPHEN 1000 MG: 500 TABLET ORAL at 08:27

## 2021-02-27 RX ADMIN — BUSPIRONE HYDROCHLORIDE 10 MG: 10 TABLET ORAL at 20:52

## 2021-02-27 RX ADMIN — FLUTICASONE PROPIONATE 2 PUFF: 220 AEROSOL, METERED RESPIRATORY (INHALATION) at 20:55

## 2021-02-27 RX ADMIN — FERROUS SULFATE TAB 325 MG (65 MG ELEMENTAL FE) 325 MG: 325 (65 FE) TAB at 11:13

## 2021-02-27 RX ADMIN — SODIUM CHLORIDE 10 ML: 9 INJECTION INTRAMUSCULAR; INTRAVENOUS; SUBCUTANEOUS at 08:29

## 2021-02-27 ASSESSMENT — PAIN DESCRIPTION - PROGRESSION
CLINICAL_PROGRESSION: NOT CHANGED

## 2021-02-27 ASSESSMENT — PAIN SCALES - GENERAL
PAINLEVEL_OUTOF10: 8
PAINLEVEL_OUTOF10: 6
PAINLEVEL_OUTOF10: 10

## 2021-02-27 ASSESSMENT — PAIN DESCRIPTION - LOCATION: LOCATION: ANKLE

## 2021-02-27 ASSESSMENT — PAIN DESCRIPTION - ORIENTATION: ORIENTATION: RIGHT;LEFT

## 2021-02-27 NOTE — FLOWSHEET NOTE
Voice message left to Dr. Yared Morrison at this time in regards to ABG results. This RN spoke with Dr. Yared Morrison he stated to monitor patient sats, to keep patient oxygen saturations around 92%.

## 2021-02-27 NOTE — CONSULTS
Department of Internal Medicine  Nephrology Juan C Xie MD   Consult Note      SUBJECTIVE: This is a 70 y.o. female with a significant past medical history of Chronic atrial fibrillation, pulmonary hypertension, chronic recurrent anemia [s/p recent endoscopy showing AVMs requiring intermittent PRBC transfusions], COPD and chronic kidney disease stage II, who presented to the emergency department after falling at home. Laboratory studies revealed severe anemia with hemoglobin 6.8 g/dL and serum creatinine was 1.25 mg/dL. Nephrology consultation was called yesterday due to low urine output. Patient was given albumin and Lasix with prompt improvement in urine output. She feels well today and does not have shortness of breath or chest pain. Interval history:  Patient denies abdominal pain, rectal bleeding, nausea or vomiting. Renal function is improving with creatinine at baseline. Hemoglobin 8.5 and stable. Sodium trending up-149. Hco3>45.       Scheduled Meds:   iron sucrose  200 mg Intravenous Q24H    [Held by provider] furosemide  40 mg Oral Daily    ferrous sulfate  325 mg Oral TID WC    ipratropium-albuterol  1 ampule Inhalation Q4H WA    ascorbic acid  1,000 mg Oral Daily    atorvastatin  10 mg Oral Daily    busPIRone  10 mg Oral TID    dilTIAZem  120 mg Oral Daily    DULoxetine  60 mg Oral Daily    fluticasone  1 spray Each Nostril Daily    fluticasone  2 puff Inhalation BID    levothyroxine  125 mcg Oral Daily    lisinopril  2.5 mg Oral Lunch    metoprolol tartrate  25 mg Oral BID    sodium chloride flush  10 mL Intravenous 2 times per day    pantoprazole  40 mg Intravenous Daily    And    sodium chloride (PF)  10 mL Intravenous Daily     Continuous Infusions:   dextrose 50 mL/hr at 02/27/21 1651    sodium chloride PRN Meds:. HYDROcodone-acetaminophen, acetaminophen, baclofen, clonazePAM, guaiFENesin, sodium chloride flush, promethazine **OR** ondansetron, acetaminophen **OR** acetaminophen, sodium chloride    Family History   Problem Relation Age of Onset    Heart Disease Mother     COPD Mother     Emphysema Sister     Cancer Other         Cousin - breast cancer        Social History     Socioeconomic History    Marital status:      Spouse name: None    Number of children: None    Years of education: None    Highest education level: None   Occupational History    None   Social Needs    Financial resource strain: None    Food insecurity     Worry: None     Inability: None    Transportation needs     Medical: None     Non-medical: None   Tobacco Use    Smoking status: Former Smoker     Packs/day: 3.00     Years: 26.00     Pack years: 78.00     Types: Cigarettes    Smokeless tobacco: Never Used   Substance and Sexual Activity    Alcohol use: Not Currently     Comment: occ    Drug use: No    Sexual activity: None   Lifestyle    Physical activity     Days per week: None     Minutes per session: None    Stress: None   Relationships    Social connections     Talks on phone: None     Gets together: None     Attends Restorationism service: None     Active member of club or organization: None     Attends meetings of clubs or organizations: None     Relationship status: None    Intimate partner violence     Fear of current or ex partner: None     Emotionally abused: None     Physically abused: None     Forced sexual activity: None   Other Topics Concern    None   Social History Narrative    None     Review of systems: CNS - no headache or dizziness; Cardiac - no chest pain; Respiratory - no cough or shortness of breath; Gastrointestinal - Black tarry stools; no nausea, vomiting or diarrhea; Musculoskeletal - general body aches; Skin/Integument - no rashes.     Physical Exam: Lab Results   Component Value Date    KUR 35.7 07/20/2020     Urine Chloride:    Lab Results   Component Value Date    CLUR 113 02/23/2021     Urine Creatinine:     Lab Results   Component Value Date    LABCREA 137.9 02/11/2021    LABCREA 134.5 02/11/2021     IMPRESSION/RECOMMENDATIONS:     1. Chronic kidney disease stage 3- renal function is stable. Transient oliguria likely due to hemodynamic fluxes. Plan  Monitor urine output closely. .  Basic metabolic profile daily    2. Hypernatremia -  Oral Free water intake up to 1.5 L/day  Start IV D5W at 50 cc/hour. 3.Acute on chronic anemia. Hemoglobin 8.5 g and stable      4. History of chronic CHF with preserved ejection fraction-  Lasix 40 mg dly PO tomorrow. 1500 mls fluid restriction    5. Left   Pleural effusion-status post thoracentesis 1 L. 6.Metabolic alkalosis-check arterial blood gas    Thank you very much for the courtesy of this consultation.     MD ROXIE Wiggins  Attending Nephrologist  2/27/2021 5:13 PM

## 2021-02-27 NOTE — PLAN OF CARE
Problem: Falls - Risk of:  Goal: Will remain free from falls  Description: Will remain free from falls  Outcome: Met This Shift  Goal: Absence of physical injury  Description: Absence of physical injury  Outcome: Met This Shift     Problem: SAFETY  Goal: Free from accidental physical injury  Outcome: Met This Shift  Goal: Free from intentional harm  Outcome: Met This Shift     Problem: DAILY CARE  Goal: Daily care needs are met  Outcome: Met This Shift     Problem: PAIN  Goal: Patient's pain/discomfort is manageable  Outcome: Met This Shift     Problem: SKIN INTEGRITY  Goal: Skin integrity is maintained or improved  Outcome: Met This Shift     Problem: KNOWLEDGE DEFICIT  Goal: Patient/S.O. demonstrates understanding of disease process, treatment plan, medications, and discharge instructions. Outcome: Met This Shift     Problem: DISCHARGE BARRIERS  Goal: Patient's continuum of care needs are met  Outcome: Met This Shift     Problem: Pain:  Goal: Pain level will decrease  Description: Pain level will decrease  Outcome: Met This Shift  Goal: Control of acute pain  Description: Control of acute pain  Outcome: Met This Shift  Goal: Control of chronic pain  Description: Control of chronic pain  Outcome: Met This Shift     Problem: Skin Integrity:  Goal: Absence of new skin breakdown  Description: Absence of new skin breakdown  Outcome: Met This Shift     Problem: Airway Clearance - Ineffective  Goal: Achieve or maintain patent airway  Outcome: Met This Shift     Problem: Gas Exchange - Impaired  Goal: Absence of hypoxia  Outcome: Met This Shift     Problem: Breathing Pattern - Ineffective  Goal: Ability to achieve and maintain a regular respiratory rate  Outcome: Met This Shift     Problem:  Body Temperature -  Risk of, Imbalanced  Goal: Ability to maintain a body temperature within defined limits  Outcome: Met This Shift  Goal: Will regain or maintain usual level of consciousness  Outcome: Met This Shift Problem: Isolation Precautions - Risk of Spread of Infection  Goal: Prevent transmission of infection  Outcome: Met This Shift     Problem: Risk for Fluid Volume Deficit  Goal: Maintain absence of muscle cramping  Outcome: Met This Shift     Problem: Loneliness or Risk for Loneliness  Goal: Demonstrate positive use of time alone when socialization is not possible  Outcome: Met This Shift     Problem: Fatigue  Goal: Verbalize increase energy and improved vitality  Outcome: Met This Shift     Problem: Patient Education: Go to Patient Education Activity  Goal: Patient/Family Education  Outcome: Met This Shift     Problem: Nutrition  Goal: Optimal nutrition therapy  Description: Nutrition Problem #1: Inadequate oral intake  Intervention: Food and/or Nutrient Delivery: Continue Current Diet, Start Oral Nutrition Supplement  Nutritional Goals: po intake greater than 50%     Outcome: Ongoing     Problem: Skin Integrity:  Goal: Will show no infection signs and symptoms  Description: Will show no infection signs and symptoms  Outcome: Ongoing     Problem: Gas Exchange - Impaired  Goal: Promote optimal lung function  Outcome: Ongoing     Problem:  Body Temperature -  Risk of, Imbalanced  Goal: Complications related to the disease process, condition or treatment will be avoided or minimized  Outcome: Ongoing     Problem: Nutrition Deficits  Goal: Optimize nutritional status  Outcome: Ongoing     Problem: Risk for Fluid Volume Deficit  Goal: Maintain normal heart rhythm  Outcome: Ongoing  Goal: Maintain normal serum potassium, sodium, calcium, phosphorus, and pH  Outcome: Ongoing

## 2021-02-27 NOTE — PLAN OF CARE
Problem: Falls - Risk of:  Goal: Will remain free from falls  Description: Will remain free from falls  2/27/2021 1836 by Randolph Arteaga RN  Outcome: Ongoing  2/27/2021 1219 by Jess Nath RN  Outcome: Ongoing  2/27/2021 0832 by Ruthann Hernandez RN  Outcome: Met This Shift  Goal: Absence of physical injury  Description: Absence of physical injury  2/27/2021 1836 by Randolph Arteaga RN  Outcome: Ongoing  2/27/2021 0832 by Ruthann Hernandez RN  Outcome: Met This Shift     Problem: SAFETY  Goal: Free from accidental physical injury  2/27/2021 1836 by Randolph Arteaga RN  Outcome: Ongoing  2/27/2021 0832 by Ruthann Hernandez RN  Outcome: Met This Shift  Goal: Free from intentional harm  2/27/2021 1836 by Randolph Arteaga RN  Outcome: Ongoing  2/27/2021 0832 by Ruthann Hernandez RN  Outcome: Met This Shift     Problem: DAILY CARE  Goal: Daily care needs are met  2/27/2021 1836 by Randolph Arteaga RN  Outcome: Ongoing  2/27/2021 0832 by Ruthann Hernandez RN  Outcome: Met This Shift     Problem: PAIN  Goal: Patient's pain/discomfort is manageable  2/27/2021 1836 by Randolph Arteaga RN  Outcome: Ongoing  2/27/2021 1219 by Jess Nath RN  Outcome: Ongoing  2/27/2021 0832 by Ruthann Hernandez RN  Outcome: Met This Shift     Problem: SKIN INTEGRITY  Goal: Skin integrity is maintained or improved  2/27/2021 1836 by Randolph Arteaga RN  Outcome: Ongoing  2/27/2021 1219 by Jess Nath RN  Outcome: Ongoing  2/27/2021 0832 by Ruthann Hernandez RN  Outcome: Met This Shift     Problem: KNOWLEDGE DEFICIT  Goal: Patient/S.O. demonstrates understanding of disease process, treatment plan, medications, and discharge instructions.   2/27/2021 1836 by Randolph Arteaga RN  Outcome: Ongoing  2/27/2021 0832 by Ruthann Hernandez RN  Outcome: Met This Shift     Problem: DISCHARGE BARRIERS  Goal: Patient's continuum of care needs are met  2/27/2021 1836 by Randolph Arteaga RN  Outcome: Ongoing  2/27/2021 0832 by Ruthann Hernandez, WOODY  Outcome: Met This Shift     Problem: Nutrition Goal: Optimal nutrition therapy  Description: Nutrition Problem #1: Inadequate oral intake  Intervention: Food and/or Nutrient Delivery: Continue Current Diet, Start Oral Nutrition Supplement  Nutritional Goals: po intake greater than 50%     2/27/2021 1836 by Salma Lawrence RN  Outcome: Ongoing  2/27/2021 1219 by Laura Santos RN  Outcome: Ongoing  2/27/2021 0832 by Dave Magaña RN  Outcome: Ongoing     Problem: Pain:  Goal: Pain level will decrease  Description: Pain level will decrease  2/27/2021 1836 by Salma Lawrence RN  Outcome: Ongoing  2/27/2021 0832 by Dave Magaña RN  Outcome: Met This Shift  Goal: Control of acute pain  Description: Control of acute pain  2/27/2021 1836 by Salma Lawrence RN  Outcome: Ongoing  2/27/2021 0832 by Dave Magaña RN  Outcome: Met This Shift  Goal: Control of chronic pain  Description: Control of chronic pain  2/27/2021 1836 by Salma Lawrence RN  Outcome: Ongoing  2/27/2021 0832 by Dave Magaña RN  Outcome: Met This Shift     Problem: Skin Integrity:  Goal: Will show no infection signs and symptoms  Description: Will show no infection signs and symptoms  2/27/2021 1836 by Salma Lawrence RN  Outcome: Ongoing  2/27/2021 0832 by Dave Magaña RN  Outcome: Ongoing  Goal: Absence of new skin breakdown  Description: Absence of new skin breakdown  2/27/2021 1836 by Salma Lawrence RN  Outcome: Ongoing  2/27/2021 0832 by Dave Magaña RN  Outcome: Met This Shift     Problem: Airway Clearance - Ineffective  Goal: Achieve or maintain patent airway  2/27/2021 1836 by Salma Lawrence RN  Outcome: Ongoing  2/27/2021 0832 by Dave Magaña RN  Outcome: Met This Shift     Problem: Gas Exchange - Impaired  Goal: Absence of hypoxia  2/27/2021 1836 by Salma Lawrence RN  Outcome: Ongoing  2/27/2021 0832 by Dave Magaña RN  Outcome: Met This Shift  Goal: Promote optimal lung function  Patient maintained baseline levels on this shift.    2/27/2021 1836 by Salma Lawrence RN  Outcome: Ongoing 2/27/2021 0589 by Patric Morrow RN  Outcome: Ongoing     Problem: Breathing Pattern - Ineffective  Goal: Ability to achieve and maintain a regular respiratory rate  2/27/2021 1836 by Eldon Ndiaye RN  Outcome: Ongoing  2/27/2021 0832 by Patric Morrow RN  Outcome: Met This Shift     Problem:  Body Temperature -  Risk of, Imbalanced  Goal: Ability to maintain a body temperature within defined limits  2/27/2021 1836 by Eldon Ndiaye RN  Outcome: Ongoing  2/27/2021 0832 by Patric Morrow RN  Outcome: Met This Shift  Goal: Will regain or maintain usual level of consciousness  2/27/2021 1836 by Eldon Ndiaye RN  Outcome: Ongoing  2/27/2021 0832 by Patric Morrow RN  Outcome: Met This Shift  Goal: Complications related to the disease process, condition or treatment will be avoided or minimized  2/27/2021 1836 by Eldon Ndiaye RN  Outcome: Ongoing  2/27/2021 0832 by Patric Morrow RN  Outcome: Ongoing     Problem: Isolation Precautions - Risk of Spread of Infection  Goal: Prevent transmission of infection  2/27/2021 1836 by Eldon Ndiaye RN  Outcome: Ongoing  2/27/2021 0832 by Patric Morrow RN  Outcome: Met This Shift     Problem: Nutrition Deficits  Goal: Optimize nutritional status  2/27/2021 1836 by Eldon Ndiaye RN  Outcome: Ongoing  2/27/2021 0832 by Patirc Morrow RN  Outcome: Ongoing     Problem: Risk for Fluid Volume Deficit  Goal: Maintain normal heart rhythm  2/27/2021 1836 by Eldon dNiaye RN  Outcome: Ongoing  2/27/2021 0832 by Patric Morrow RN  Outcome: Ongoing  Goal: Maintain absence of muscle cramping  2/27/2021 1836 by Eldon Ndiaye RN  Outcome: Ongoing  2/27/2021 0832 by Patric Morrow RN  Outcome: Met This Shift  Goal: Maintain normal serum potassium, sodium, calcium, phosphorus, and pH  2/27/2021 1836 by Eldon Ndiaye RN  Outcome: Ongoing  2/27/2021 0832 by Patric Morrow RN  Outcome: Ongoing     Problem: Loneliness or Risk for Loneliness Goal: Demonstrate positive use of time alone when socialization is not possible  2/27/2021 1836 by Damián Allen RN  Outcome: Ongoing  2/27/2021 0832 by Tabitha Jessica RN  Outcome: Met This Shift     Problem: Fatigue  Goal: Verbalize increase energy and improved vitality  2/27/2021 1836 by Damián Allen RN  Outcome: Ongoing  2/27/2021 0832 by Tabitha Jessica RN  Outcome: Met This Shift     Problem: Patient Education: Go to Patient Education Activity  Goal: Patient/Family Education  2/27/2021 1836 by Damián Allen RN  Outcome: Ongoing  2/27/2021 0832 by Tabitha Jessica RN  Outcome: Met This Shift

## 2021-02-27 NOTE — PROGRESS NOTES
Pantoja discontinued at 1115. RN educated patient to notify staff when she needs to urinate. Patient verbalized understanding.

## 2021-02-27 NOTE — PLAN OF CARE
Problem: Falls - Risk of:  Goal: Will remain free from falls  Description: Will remain free from falls  2/27/2021 1219 by Barry Mcintyre RN  Outcome: Ongoing     Problem: PAIN  Goal: Patient's pain/discomfort is manageable  2/27/2021 1219 by Barry Mcintyre RN  Outcome: Ongoing     Problem: Nutrition  Goal: Optimal nutrition therapy  Description: Nutrition Problem #1: Inadequate oral intake  Intervention: Food and/or Nutrient Delivery: Continue Current Diet, Start Oral Nutrition Supplement  Nutritional Goals: po intake greater than 50%     2/27/2021 1219 by Barry Mcintyre RN  Outcome: Ongoing

## 2021-02-27 NOTE — CONSULTS
Today's Date: 2/27/2021  Patient Name: Nancie Lees  Date of admission: 2/21/2021  9:13 AM  Patient's age: 70 y.o., 1945  Admission Dx: GI bleed [K92.2]    Reason for Consult: management recommendations  Requesting Physician: Eileen Pelletier MD    CHIEF COMPLAINT:  Recurrent anemia     History Obtained From:  patient    HISTORY OF PRESENT ILLNESS:      The patient is a 70 y.o.  female who is admitted to the hospital for recurrent anemia. Pt is known to us with recurrent BHARATHI due to blood loss from GI bleeding. Exacerbated by anticoagulation. Eliquis stopped and she was started on IV iron. This is a note from our office from 2/15   DIAGNOSIS:   1. Iron deficiency anemia, malabsorption component   2. AV malformation  3. COPD  4. Anticoagulation due to afib, stopped due to recurrent GI bleeding       CURRENT THERAPY:  Plan for work up and IV iron    BRIEF CASE HISTORY:   Cecy Wang is a very pleasant 70 y.o. female who is referred to us for iron deficiency anemia. She reports she has been taking oral iron. She had knee replacement 3 years ago and required 3 units of blood prior to surgery. She was stable until 02/2020 when her HGB began to decrease again. She has had frequent hospitalizations in 2020 with COPD exacerbation and poor kidney function with bruised kidney. She has followed with nephrology in the past but no recent follow up and has pulmonology referral. She has had some compliance issues with showing up for appointments. She has poor sleep with breathing issues and feels very fatigued. She has had several falls this year and has needed family to come pick her up, her legs are weak. She reports she had some bleeding following EGD and colonoscopy, AV malformation was found. She is on Eliquis for Afib but has not followed up with cardiology in the last year. Past Medical History:   has a past medical history of A-fib ([facility] Acquired hypothyroidism, Acute encephalopathy, Acute kidney injury ([facility] [address] Anxiety, Benign hypertension with CKD (chronic kidney disease) stage III, Chronic back pain, CKD (chronic kidney disease) stage 3, GFR 30-59 ml/min, Constipation, COPD (chronic obstructive pulmonary disease) ([address] Cough, Depression, ETOH abuse, Former smoker, HA (generalized anxiety disorder), History of GI bleed, Hyperlipidemia, Hypertension, Hypothyroid, Leg pain, Normocytic anemia, Osteoarthritis, PAD (peripheral artery disease) ([facility] [address] Primary osteoarthritis, Pulmonary hypertension ([facility] [address] Pure hypercholesterolemia, SIRS (systemic inflammatory response syndrome) ([facility] [address] Urge incontinence, and Wheezing. Past Surgical History:   has a past surgical history that includes eye surgery (Bilateral); Colonoscopy; joint replacement; Upper gastrointestinal endoscopy (N/A, 12/2/2020); Colonoscopy (N/A, 12/2/2020); Upper gastrointestinal endoscopy (N/A, 12/23/2020); and Thoracentesis (12/26/2020). Medications:    Reviewed in Epic     Allergies:  Tizanidine    Social History:   reports that she has quit smoking. Her smoking use included cigarettes. She has a 78.00 pack-year smoking history. She has never used smokeless tobacco. She reports previous alcohol use. She reports that she does not use drugs. Family History: family history includes COPD in her mother; Cancer in an other family member; Emphysema in her sister; Heart Disease in her mother. REVIEW OF SYSTEMS:    Constitutional: No fever or chills.  No night sweats, no weight loss   Eyes: No eye discharge, double vision, or eye pain   HEENT: negative for sore mouth, sore throat, hoarseness and voice change   Respiratory: negative for cough , sputum, dyspnea, wheezing, hemoptysis, chest pain   Cardiovascular: negative for chest pain, dyspnea, palpitations, orthopnea, PND Gastrointestinal: negative for nausea, vomiting, diarrhea, constipation, abdominal pain, Dysphagia, hematemesis and hematochezia   Genitourinary: negative for frequency, dysuria, nocturia, urinary incontinence, and hematuria   Integument: negative for rash, skin lesions, bruises.    Hematologic/Lymphatic: negative for easy bruising, bleeding, lymphadenopathy, or petechiae   Endocrine: negative for heat or cold intolerance,weight changes, change in bowel habits and hair loss   Musculoskeletal: negative for myalgias, arthralgias, pain, joint swelling,and bone pain   Neurological: negative for headaches, dizziness, seizures, weakness, numbness    PHYSICAL EXAM:      BP (!) 106/59   Pulse 73   Temp 97.7 °F (36.5 °C)   Resp 18   Ht 5' 8\" (1.727 m)   Wt 176 lb 9.4 oz (80.1 kg)   SpO2 91%   BMI 26.85 kg/m²    Temp (24hrs), Av.7 °F (36.5 °C), Min:97.5 °F (36.4 °C), Max:98.1 °F (36.7 °C)    General appearance - well appearing, no in pain or distress   Mental status - alert and cooperative   Eyes - pupils equal and reactive, extraocular eye movements intact   Ears - bilateral TM's and external ear canals normal   Mouth - mucous membranes moist, pharynx normal without lesions   Neck - supple, no significant adenopathy   Lymphatics - no palpable lymphadenopathy, no hepatosplenomegaly   Chest - clear to auscultation, no wheezes, rales or rhonchi, symmetric air entry   Heart - normal rate, regular rhythm, normal S1, S2, no murmurs  Abdomen - soft, nontender, nondistended, no masses or organomegaly   Neurological - alert, oriented, normal speech, no focal findings or movement disorder noted   Musculoskeletal - no joint tenderness, deformity or swelling   Extremities - peripheral pulses normal, no pedal edema, no clubbing or cyanosis   Skin - normal coloration and turgor, no rashes, no suspicious skin lesions noted ,    DATA:    Labs:   CBC:   Recent Labs     21  0752 21  1241   HGB 8.8* 8.5* HCT 29.2* 28.2*     BMP:   Recent Labs     02/26/21  0705 02/27/21  0752   * 149*   K 4.0 3.9   CO2 41* >45*   BUN 13 12   CREATININE 0.82 0.76   LABGLOM >60 >60   GLUCOSE 111* 121*     PT/INR:   Recent Labs     02/25/21  0741   PROTIME 13.1   INR 1.0       IMAGING DATA:      Primary Problem  GI bleed    Active Hospital Problems    Diagnosis Date Noted    Hypernatremia [E87.0] 02/24/2021    Acute respiratory failure with hypoxia (HCC) [J96.01] 02/23/2021    Severe malnutrition ([facility] [address] [E43] 01/20/2021    Iron deficiency anemia due to chronic blood loss [D50.0] 12/18/2020    GI bleed [K92.2] 11/30/2020    Chronic diastolic CHF (congestive heart failure) ([facility]) [I50.32] 07/21/2020    Moderate major depression ([facility]) [F32.1] 06/17/2020    PAD (peripheral artery disease) ([facility]) [I73.9] 06/17/2020    CKD (chronic kidney disease) stage 3, GFR 30-59 ml/min [N18.30]     HA (generalized anxiety disorder) [F41.1] 12/13/2018    A-fib ([facility] [address] [I48.91] 09/13/2018    COPD (chronic obstructive pulmonary disease) ([facility] [address] [J44.9] 09/13/2018    Pulmonary hypertension ([facility] [address] [I27.20] 09/13/2018    Hypertension [I10]     Acquired hypothyroidism [E03.9] 01/18/2013         IMPRESSION:   1. Recurrent Iron def anemia   2. Anticoagulation (due to afib) stopped  3. AVM in the bowels     RECOMMENDATIONS:  1. Agree with holding eliquis  2. Needs more IV iron   3. Watch Hgb       Discussed with patient and Nurse. Thank you for asking us to see this patient.     ALEX CHAPA [facility] MD Derrick  Hematologist/Medical Oncologist  Cell: [phone]

## 2021-02-27 NOTE — PROGRESS NOTES
 lisinopril (PRINIVIL;ZESTRIL) tablet 2.5 mg  2.5 mg Oral Lunch Jaiden Bundy MD   2.5 mg at 02/26/21 1234    metoprolol tartrate (LOPRESSOR) tablet 25 mg  25 mg Oral BID Jaiden Bundy MD   25 mg at 02/26/21 2321    sodium chloride flush 0.9 % injection 10 mL  10 mL Intravenous 2 times per day Jaiden Bundy MD   10 mL at 02/26/21 2320    sodium chloride flush 0.9 % injection 10 mL  10 mL Intravenous PRN Jaiden Bundy MD        promethazine (PHENERGAN) tablet 12.5 mg  12.5 mg Oral Q6H PRN Jaiden Bundy MD        Or    ondansetron [facility]) injection 4 mg  4 mg Intravenous Q6H PRN Jaiden Bundy MD        acetaminophen (TYLENOL) tablet 650 mg  650 mg Oral Q6H PRN Jaiden Bundy MD   650 mg at 02/25/21 2109    Or    acetaminophen (TYLENOL) suppository 650 mg  650 mg Rectal Q6H PRN Jaiden Bundy MD        0.9 % sodium chloride infusion   Intravenous PRN Roro Garcia DO        pantoprazole (PROTONIX) injection 40 mg  40 mg Intravenous Daily Earla Limb, APRN - NP   40 mg at 02/26/21 0900    And    sodium chloride (PF) 0.9 % injection 10 mL  10 mL Intravenous Daily Earla Limb, APRN - NP   10 mL at 02/26/21 0900       Intake/Output Summary (Last 24 hours) at 2/27/2021 0750  Last data filed at 2/26/2021 2102  Gross per 24 hour   Intake 240 ml   Output 1750 ml   Net -1510 ml     Recent Results (from the past 24 hour(s))   Hemoglobin and Hematocrit, Blood    Collection Time: 02/26/21  1:40 PM   Result Value Ref Range    Hemoglobin 8.4 (L) 12.0 - 16.0 g/dL    Hematocrit 27.0 (L) 36 - 46 %   Hemoglobin and Hematocrit, Blood    Collection Time: 02/26/21  7:28 PM   Result Value Ref Range    Hemoglobin 8.1 (L) 12.0 - 16.0 g/dL    Hematocrit 26.9 (L) 36 - 46 %   Hemoglobin and Hematocrit, Blood    Collection Time: 02/27/21  2:01 AM   Result Value Ref Range    Hemoglobin 8.1 (L) 12.0 - 16.0 g/dL    Hematocrit 26.6 (L) 36 - 46 % -----------------------------------------------------------------  RAD:  EKG:  Micro:     Assessment & Plan:    Patient Active Problem List:     Acquired hypothyroidism     Hypertension     Primary osteoarthritis of right knee     A-fib (HCC)     Acute encephalopathy     SIRS (systemic inflammatory response syndrome) (HCC)     Normocytic anemia     Pulmonary hypertension (HCC)     COPD (chronic obstructive pulmonary disease) (HCC)     History of GI bleed     Constipation     HA (generalized anxiety disorder)     Lumbar radiculopathy     Lumbosacral spondylosis without myelopathy     Benign hypertension with CKD (chronic kidney disease) stage III     CKD (chronic kidney disease) stage 3, GFR 30-59 ml/min     Alcohol withdrawal syndrome with complication (HCC)     Moderate major depression (HCC)     PAD (peripheral artery disease) (HCC)     Acute kidney injury ([address]     Obesity, Class I, BMI 30-34.9     Chronic diastolic CHF (congestive heart failure) (HCC)     GI bleed     Weight loss     Elevated alkaline phosphatase level     Thrombocytosis (HCC)     Iron deficiency anemia due to chronic blood loss     Iron malabsorption     Symptomatic anemia     Pneumonia due to organism     Anemia     Severe anemia     Overweight (BMI 25.0-29. 9)     Severe malnutrition ([address]     Renal failure     Acute renal failure (ARF) (HCC)     Acute cystitis without hematuria     Hypoxia     Acute respiratory failure with hypoxia (HCC)     Hypernatremia           Plan:  -Recurrent severe anemia - Eliquis on hold, IV iron supplementation, Hematology following.  -Acute respiratory failure with COPD - on O2 per NC, Pulmonology managing.  -Chronic diastolic CHF - stable. -Continue current treatments.  -Complete orders per chart.     See orders   Disposition:    Electronically signed by Eileen Pelletier MD on 2/27/2021 at 7:50 AM

## 2021-02-27 NOTE — FLOWSHEET NOTE
Patient transferred to [facility] room 2064 at this time. Ambulated from wheelchair to bed. Patient orientated to room, encouraged to use call light for assistance. Safety measures initiated- bed in lowest position, personal items within reach, nonskid footwear in place. Patient denies further needs at this time, is currently having her lunch.

## 2021-02-28 ENCOUNTER — APPOINTMENT (OUTPATIENT)
Dept: GENERAL RADIOLOGY | Age: 76
DRG: 377 | End: 2021-02-28
Payer: MEDICARE

## 2021-02-28 LAB
ANION GAP SERPL CALCULATED.3IONS-SCNC: 4 MMOL/L (ref 9–17)
BUN BLDV-MCNC: 16 MG/DL (ref 8–23)
BUN/CREAT BLD: ABNORMAL (ref 9–20)
CALCIUM SERPL-MCNC: 8.7 MG/DL (ref 8.6–10.4)
CHLORIDE BLD-SCNC: 96 MMOL/L (ref 98–107)
CO2: 43 MMOL/L (ref 20–31)
CREAT SERPL-MCNC: 0.72 MG/DL (ref 0.5–0.9)
GFR AFRICAN AMERICAN: >60 ML/MIN
GFR NON-AFRICAN AMERICAN: >60 ML/MIN
GFR SERPL CREATININE-BSD FRML MDRD: ABNORMAL ML/MIN/{1.73_M2}
GFR SERPL CREATININE-BSD FRML MDRD: ABNORMAL ML/MIN/{1.73_M2}
GLUCOSE BLD-MCNC: 127 MG/DL (ref 70–99)
HCT VFR BLD CALC: 26.6 % (ref 36–46)
HCT VFR BLD CALC: 27.1 % (ref 36–46)
HCT VFR BLD CALC: 27.9 % (ref 36–46)
HEMOGLOBIN: 8.3 G/DL (ref 12–16)
HEMOGLOBIN: 8.4 G/DL (ref 12–16)
HEMOGLOBIN: 8.6 G/DL (ref 12–16)
POTASSIUM SERPL-SCNC: 4.1 MMOL/L (ref 3.7–5.3)
SODIUM BLD-SCNC: 143 MMOL/L (ref 135–144)

## 2021-02-28 PROCEDURE — 99232 SBSQ HOSP IP/OBS MODERATE 35: CPT | Performed by: FAMILY MEDICINE

## 2021-02-28 PROCEDURE — 2060000000 HC ICU INTERMEDIATE R&B

## 2021-02-28 PROCEDURE — 85018 HEMOGLOBIN: CPT

## 2021-02-28 PROCEDURE — 6370000000 HC RX 637 (ALT 250 FOR IP): Performed by: NURSE PRACTITIONER

## 2021-02-28 PROCEDURE — 2580000003 HC RX 258: Performed by: FAMILY MEDICINE

## 2021-02-28 PROCEDURE — 2700000000 HC OXYGEN THERAPY PER DAY

## 2021-02-28 PROCEDURE — 99232 SBSQ HOSP IP/OBS MODERATE 35: CPT | Performed by: INTERNAL MEDICINE

## 2021-02-28 PROCEDURE — 6360000002 HC RX W HCPCS: Performed by: INTERNAL MEDICINE

## 2021-02-28 PROCEDURE — C9113 INJ PANTOPRAZOLE SODIUM, VIA: HCPCS | Performed by: NURSE PRACTITIONER

## 2021-02-28 PROCEDURE — 6370000000 HC RX 637 (ALT 250 FOR IP): Performed by: FAMILY MEDICINE

## 2021-02-28 PROCEDURE — 94640 AIRWAY INHALATION TREATMENT: CPT

## 2021-02-28 PROCEDURE — 85014 HEMATOCRIT: CPT

## 2021-02-28 PROCEDURE — 71045 X-RAY EXAM CHEST 1 VIEW: CPT

## 2021-02-28 PROCEDURE — 94660 CPAP INITIATION&MGMT: CPT

## 2021-02-28 PROCEDURE — 80048 BASIC METABOLIC PNL TOTAL CA: CPT

## 2021-02-28 PROCEDURE — 36415 COLL VENOUS BLD VENIPUNCTURE: CPT

## 2021-02-28 PROCEDURE — 6360000002 HC RX W HCPCS: Performed by: NURSE PRACTITIONER

## 2021-02-28 PROCEDURE — 2580000003 HC RX 258: Performed by: NURSE PRACTITIONER

## 2021-02-28 PROCEDURE — 94761 N-INVAS EAR/PLS OXIMETRY MLT: CPT

## 2021-02-28 PROCEDURE — 2580000003 HC RX 258: Performed by: INTERNAL MEDICINE

## 2021-02-28 RX ORDER — FERROUS SULFATE 325(65) MG
325 TABLET ORAL
Qty: 90 TABLET | Refills: 3
Start: 2021-02-28

## 2021-02-28 RX ORDER — FUROSEMIDE 40 MG/1
40 TABLET ORAL DAILY
Status: DISCONTINUED | OUTPATIENT
Start: 2021-03-01 | End: 2021-03-02

## 2021-02-28 RX ORDER — FUROSEMIDE 40 MG/1
40 TABLET ORAL DAILY
Qty: 30 TABLET | Refills: 3
Start: 2021-03-01

## 2021-02-28 RX ADMIN — IPRATROPIUM BROMIDE AND ALBUTEROL SULFATE 1 AMPULE: 2.5; .5 SOLUTION RESPIRATORY (INHALATION) at 06:56

## 2021-02-28 RX ADMIN — ATORVASTATIN CALCIUM 10 MG: 10 TABLET, FILM COATED ORAL at 09:55

## 2021-02-28 RX ADMIN — FERROUS SULFATE TAB 325 MG (65 MG ELEMENTAL FE) 325 MG: 325 (65 FE) TAB at 09:55

## 2021-02-28 RX ADMIN — DULOXETINE 60 MG: 60 CAPSULE, DELAYED RELEASE ORAL at 09:55

## 2021-02-28 RX ADMIN — IRON SUCROSE 200 MG: 20 INJECTION, SOLUTION INTRAVENOUS at 18:33

## 2021-02-28 RX ADMIN — BUSPIRONE HYDROCHLORIDE 10 MG: 10 TABLET ORAL at 21:41

## 2021-02-28 RX ADMIN — HYDROCODONE BITARTRATE AND ACETAMINOPHEN 1 TABLET: 5; 325 TABLET ORAL at 05:54

## 2021-02-28 RX ADMIN — FERROUS SULFATE TAB 325 MG (65 MG ELEMENTAL FE) 325 MG: 325 (65 FE) TAB at 12:01

## 2021-02-28 RX ADMIN — IPRATROPIUM BROMIDE AND ALBUTEROL SULFATE 1 AMPULE: 2.5; .5 SOLUTION RESPIRATORY (INHALATION) at 19:43

## 2021-02-28 RX ADMIN — DILTIAZEM HYDROCHLORIDE 120 MG: 120 CAPSULE, COATED, EXTENDED RELEASE ORAL at 09:55

## 2021-02-28 RX ADMIN — IPRATROPIUM BROMIDE AND ALBUTEROL SULFATE 1 AMPULE: 2.5; .5 SOLUTION RESPIRATORY (INHALATION) at 10:24

## 2021-02-28 RX ADMIN — SODIUM CHLORIDE 10 ML: 9 INJECTION INTRAMUSCULAR; INTRAVENOUS; SUBCUTANEOUS at 09:58

## 2021-02-28 RX ADMIN — APIXABAN 2.5 MG: 2.5 TABLET, FILM COATED ORAL at 21:40

## 2021-02-28 RX ADMIN — FLUTICASONE PROPIONATE 2 PUFF: 220 AEROSOL, METERED RESPIRATORY (INHALATION) at 09:58

## 2021-02-28 RX ADMIN — BUSPIRONE HYDROCHLORIDE 10 MG: 10 TABLET ORAL at 18:31

## 2021-02-28 RX ADMIN — LISINOPRIL 2.5 MG: 5 TABLET ORAL at 12:01

## 2021-02-28 RX ADMIN — METOPROLOL TARTRATE 25 MG: 25 TABLET, FILM COATED ORAL at 09:55

## 2021-02-28 RX ADMIN — FLUTICASONE PROPIONATE 2 PUFF: 220 AEROSOL, METERED RESPIRATORY (INHALATION) at 21:41

## 2021-02-28 RX ADMIN — IPRATROPIUM BROMIDE AND ALBUTEROL SULFATE 1 AMPULE: 2.5; .5 SOLUTION RESPIRATORY (INHALATION) at 14:48

## 2021-02-28 RX ADMIN — OXYCODONE HYDROCHLORIDE AND ACETAMINOPHEN 1000 MG: 500 TABLET ORAL at 09:54

## 2021-02-28 RX ADMIN — FLUTICASONE PROPIONATE 1 SPRAY: 50 SPRAY, METERED NASAL at 09:58

## 2021-02-28 RX ADMIN — BUSPIRONE HYDROCHLORIDE 10 MG: 10 TABLET ORAL at 09:55

## 2021-02-28 RX ADMIN — FERROUS SULFATE TAB 325 MG (65 MG ELEMENTAL FE) 325 MG: 325 (65 FE) TAB at 18:31

## 2021-02-28 RX ADMIN — SODIUM CHLORIDE, PRESERVATIVE FREE 10 ML: 5 INJECTION INTRAVENOUS at 09:58

## 2021-02-28 RX ADMIN — CLONAZEPAM 0.5 MG: 1 TABLET ORAL at 12:01

## 2021-02-28 RX ADMIN — LEVOTHYROXINE SODIUM 125 MCG: 125 TABLET ORAL at 05:54

## 2021-02-28 RX ADMIN — METOPROLOL TARTRATE 25 MG: 25 TABLET, FILM COATED ORAL at 21:41

## 2021-02-28 RX ADMIN — CLONAZEPAM 0.5 MG: 1 TABLET ORAL at 21:41

## 2021-02-28 RX ADMIN — PANTOPRAZOLE SODIUM 40 MG: 40 INJECTION, POWDER, FOR SOLUTION INTRAVENOUS at 09:58

## 2021-02-28 ASSESSMENT — PAIN SCALES - GENERAL: PAINLEVEL_OUTOF10: 5

## 2021-02-28 NOTE — PROGRESS NOTES
Today's Date: 2/28/2021  Patient Name: Emilia Arias  Date of admission: 2/21/2021  9:13 AM  Patient's age: 70 y.o., 1945  Admission Dx: GI bleed [K92.2]    Reason for Consult: management recommendations  Requesting Physician: Shayna Montez MD    CHIEF COMPLAINT:  Recurrent anemia   Interim history  The patient is seen and evaluated. Shortness of breath is worsening. ABG showed elevated CO2 and she is started on BiPAP. She is being transferred to stepdown. Hemoglobin has been stable. She is received IV iron dose. She  Has no evidence of bleeding clinically. HISTORY OF PRESENT ILLNESS:      The patient is a 70 y.o.  female who is admitted to the hospital for worsening respiratory status. She is known to us with history of iron deficiency anemia requiring IV iron and transfusions. She has AV malformation and her anemia has been exacerbated because of anticoagulation. We have stopped her Eliquis because of recurrent GI bleeding. Indication for anticoagulation is atrial fibrillation. The patient is admitted and underwent thoracocentesis. Condition is slowly improving. It is thought that her pleural effusion is transudative and related to COPD/cardiac dysfunction. DIAGNOSIS:   1. Iron deficiency anemia, malabsorption component   2. AV malformation  3. COPD  4.  Anticoagulation due to afib, stopped due to recurrent GI bleeding       CURRENT THERAPY:  Plan for work up and IV iron    BRIEF CASE HISTORY: Nadiya Cabrera is a very pleasant 70 y.o. female who is referred to us for iron deficiency anemia. She reports she has been taking oral iron. She had knee replacement 3 years ago and required 3 units of blood prior to surgery. She was stable until 02/2020 when her HGB began to decrease again. She has had frequent hospitalizations in 2020 with COPD exacerbation and poor kidney function with bruised kidney. She has followed with nephrology in the past but no recent follow up and has pulmonology referral. She has had some compliance issues with showing up for appointments. She has poor sleep with breathing issues and feels very fatigued. She has had several falls this year and has needed family to come pick her up, her legs are weak. She reports she had some bleeding following EGD and colonoscopy, AV malformation was found. She is on Eliquis for Afib but has not followed up with cardiology in the last year. Past Medical History:   has a past medical history of A-fib ([address] Acquired hypothyroidism, Acute encephalopathy, Acute kidney injury ([address] Anxiety, Benign hypertension with CKD (chronic kidney disease) stage III, Chronic back pain, CKD (chronic kidney disease) stage 3, GFR 30-59 ml/min, Constipation, COPD (chronic obstructive pulmonary disease) ([address] Cough, Depression, ETOH abuse, Former smoker, HA (generalized anxiety disorder), History of GI bleed, Hyperlipidemia, Hypertension, Hypothyroid, Leg pain, Normocytic anemia, Osteoarthritis, PAD (peripheral artery disease) ([address] Primary osteoarthritis, Pulmonary hypertension ([address] Pure hypercholesterolemia, SIRS (systemic inflammatory response syndrome) ([address] Urge incontinence, and Wheezing. Past Surgical History:   has a past surgical history that includes eye surgery (Bilateral); Colonoscopy; joint replacement; Upper gastrointestinal endoscopy (N/A, 12/2/2020); Colonoscopy (N/A, 12/2/2020); Upper gastrointestinal endoscopy (N/A, 12/23/2020); and Thoracentesis (12/26/2020). Medications:    Reviewed in Epic     Allergies:  Tizanidine    Social History:   reports that she has quit smoking. Her smoking use included cigarettes. She has a 78.00 pack-year smoking history. She has never used smokeless tobacco. She reports previous alcohol use. She reports that she does not use drugs. Family History: family history includes COPD in her mother; Cancer in an other family member; Emphysema in her sister; Heart Disease in her mother. REVIEW OF SYSTEMS:    Constitutional: No fever or chills. No night sweats, no weight loss   Eyes: No eye discharge, double vision, or eye pain   HEENT: negative for sore mouth, sore throat, hoarseness and voice change   Respiratory: negative for cough , sputum, dyspnea, wheezing, hemoptysis, chest pain   Cardiovascular: negative for chest pain, dyspnea, palpitations, orthopnea, PND   Gastrointestinal: negative for nausea, vomiting, diarrhea, constipation, abdominal pain, Dysphagia, hematemesis and hematochezia   Genitourinary: negative for frequency, dysuria, nocturia, urinary incontinence, and hematuria   Integument: negative for rash, skin lesions, bruises.    Hematologic/Lymphatic: negative for easy bruising, bleeding, lymphadenopathy, or petechiae   Endocrine: negative for heat or cold intolerance,weight changes, change in bowel habits and hair loss   Musculoskeletal: negative for myalgias, arthralgias, pain, joint swelling,and bone pain   Neurological: negative for headaches, dizziness, seizures, weakness, numbness    PHYSICAL EXAM: /64   Pulse 95   Temp 97.7 °F (36.5 °C) (Oral)   Resp 16   Ht 5' 8\" (1.727 m)   Wt 176 lb 9.4 oz (80.1 kg)   SpO2 95%   BMI 26.85 kg/m²    Temp (24hrs), Av.9 °F (36.6 °C), Min:97.7 °F (36.5 °C), Max:98.2 °F (36.8 °C)    General appearance - well appearing, no in pain or distress   Mental status - alert and cooperative   Eyes - pupils equal and reactive, extraocular eye movements intact   Ears - bilateral TM's and external ear canals normal   Mouth - mucous membranes moist, pharynx normal without lesions   Neck - supple, no significant adenopathy   Lymphatics - no palpable lymphadenopathy, no hepatosplenomegaly   Chest - clear to auscultation, no wheezes, rales or rhonchi, symmetric air entry   Heart - normal rate, regular rhythm, normal S1, S2, no murmurs  Abdomen - soft, nontender, nondistended, no masses or organomegaly   Neurological - alert, oriented, normal speech, no focal findings or movement disorder noted   Musculoskeletal - no joint tenderness, deformity or swelling   Extremities - peripheral pulses normal, no pedal edema, no clubbing or cyanosis   Skin - normal coloration and turgor, no rashes, no suspicious skin lesions noted ,    DATA:    Labs:   CBC:   Recent Labs     21  0145 21  0724   HGB 8.4* 8.6*   HCT 27.1* 27.9*     BMP:   Recent Labs     21  0752 21  0145   * 143   K 3.9 4.1   CO2 >45* 43*   BUN 12 16   CREATININE 0.76 0.72   LABGLOM >60 >60   GLUCOSE 121* 127*     PT/INR:   No results for input(s): PROTIME, INR in the last 72 hours.     IMAGING DATA:      Primary Problem  GI bleed    Active Hospital Problems    Diagnosis Date Noted    Hypernatremia [E87.0] 2021    Acute respiratory failure with hypoxia ([address] [J96.01] 2021    Severe malnutrition ([address] [E43] 2021    Iron deficiency anemia due to chronic blood loss [D50.0] 2020    GI bleed [K92.2] 2020  Chronic diastolic CHF (congestive heart failure) (HCC) [I50.32] 07/21/2020    Moderate major depression (HCC) [F32.1] 06/17/2020    PAD (peripheral artery disease) (HCC) [I73.9] 06/17/2020    CKD (chronic kidney disease) stage 3, GFR 30-59 ml/min [N18.30]     HA (generalized anxiety disorder) [F41.1] 12/13/2018    A-fib ([facility] [address] [I48.91] 09/13/2018    COPD (chronic obstructive pulmonary disease) ([facility] [address] [J44.9] 09/13/2018    Pulmonary [facility] [I27.20] 09/13/2018    Hypertension [I10]     Acquired hypothyroidism [E03.9] 01/18/2013         IMPRESSION:   1. Recurrent Iron def anemia   2. Anticoagulation (due to afib) stopped  3. 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 Acute respiratory failure  5. History of CHF and COPD    RECOMMENDATIONS:  1. Hemoglobin is relatively stable and no evidence of active bleeding  2. Patient received a dose of IV iron  3. Okay to transfer to stepdown, will follow with primary team and pulmonary  4. Please inform us if there is any evidence of bleeding or acute drop in hemoglobin      Discussed with patient and Nurse. Thank you for asking us to see this patient.     Lillis Aschoff Al-Nsour,MD  Hematologist/Medical Oncologist  Cell: [phone]

## 2021-02-28 NOTE — PROGRESS NOTES
FAMILY MEDICINE  - PROGRESS NOTE    Date:  2/28/2021  Ashley Blandon  [id number]      Chief Complaint   Patient presents with    Fall         Interval History:  Improved, she has no new complaints. No significant events overnight.       Subjective  Constitutional: positive for fatigue and overweight  Respiratory: positive for emphysema and shortness of breath  Cardiovascular: positive for irregular heart beat  Hematologic/lymphatic: positive for pallor  Behavioral/Psych: positive for anxiety and depression:    Objective:    BP (!) 136/47   Pulse 90   Temp 97.8 °F (36.6 °C) (Oral)   Resp 16   Ht 5' 8\" (1.727 m)   Wt 176 lb 9.4 oz (80.1 kg)   SpO2 94%   BMI 26.85 kg/m²   General appearance - alert, well appearing, and in no distress and overweight  Mental status - alert, oriented to person, place, and time  Eyes - pupils equal and reactive, extraocular eye movements intact  Ears - hearing grossly normal bilaterally  Nose - normal and patent, no erythema, discharge or polyps  Mouth - mucous membranes moist, pharynx normal without lesions  Neck - supple, no significant adenopathy  Lymphatics - no palpable lymphadenopathy, no hepatosplenomegaly  Chest - clear to auscultation, no wheezes, rales or rhonchi, symmetric air entry, decreased air entry noted posteriorly  Heart - irregularly irregular rhythm with rate 90  Abdomen - soft, nontender, nondistended, no masses or organomegaly  Breasts - not examined  Back exam - not examined  Neurological - alert, oriented, normal speech, no focal findings or movement disorder noted  Musculoskeletal - osteoarthritic changes noted in both hands  Extremities - peripheral pulses normal, no pedal edema, no clubbing or cyanosis  Skin - normal coloration and turgor, no rashes, no suspicious skin lesions noted    Data:   Medications:   Current Facility-Administered Medications Medication Dose Route Frequency Provider Last Rate Last Admin    dextrose 5 % solution   Intravenous Continuous Aron Martinez MD 50 mL/hr at 02/27/21 1651 New Bag at 02/27/21 1651    iron sucrose (VENOFER) 200 mg in sodium chloride 0.9 % 100 mL IVPB  200 mg Intravenous Q24H Yohan Meza MD   Stopped at 02/27/21 2044    [Held by provider] furosemide (LASIX) tablet 40 mg  40 mg Oral Daily Aron Martinez MD   40 mg at 02/27/21 5830    ferrous sulfate (IRON 325) tablet 325 mg  325 mg Oral TID  Georgia Hylton MD   325 mg at 02/27/21 1700    HYDROcodone-acetaminophen (NORCO) 5-325 MG per tablet 1 tablet  1 tablet Oral Q6H PRN Georgia Hylton MD   1 tablet at 02/28/21 0554    ipratropium-albuterol (DUONEB) nebulizer solution 1 ampule  1 ampule Inhalation Q4H WA WILLIE Kirk - CNP   1 ampule at 02/28/21 0656    acetaminophen (TYLENOL) tablet 650 mg  650 mg Oral Q4H PRN Carmela Verdugo MD   650 mg at 02/24/21 2140    ascorbic acid (VITAMIN C) tablet 1,000 mg  1,000 mg Oral Daily Carmela Verdugo MD   1,000 mg at 02/27/21 0827    atorvastatin (LIPITOR) tablet 10 mg  10 mg Oral Daily Carmela Verdugo MD   10 mg at 02/27/21 0827    baclofen (LIORESAL) tablet 10 mg  10 mg Oral TID PRN Carmela Verdugo MD   10 mg at 02/25/21 2130    busPIRone (BUSPAR) tablet 10 mg  10 mg Oral TID Carmela Verdugo MD   10 mg at 02/27/21 2052    clonazePAM (KLONOPIN) tablet 0.5 mg  0.5 mg Oral TID PRN Carmela Verdugo MD   0.5 mg at 02/27/21 2052    dilTIAZem (CARDIZEM CD) extended release capsule 120 mg  120 mg Oral Daily Carmela Verdugo MD   120 mg at 02/27/21 0827    DULoxetine (CYMBALTA) extended release capsule 60 mg  60 mg Oral Daily Carmela Verdugo MD   60 mg at 02/27/21 0828    fluticasone (FLONASE) 50 MCG/ACT nasal spray 1 spray  1 spray Each Nostril Daily Carmela Verdugo MD   1 spray at 02/27/21 7755  fluticasone (FLOVENT HFA) 220 MCG/ACT inhaler 2 puff  2 puff Inhalation BID Maranda Tong MD   2 puff at 02/27/21 2055    guaiFENesin [facility]) extended release tablet 1,200 mg  1,200 mg Oral Q6H PRN Maranda Tong MD        levothyroxine (SYNTHROID) tablet 125 mcg  125 mcg Oral Daily Maranda Tong MD   125 mcg at 02/28/21 0554    lisinopril (PRINIVIL;ZESTRIL) tablet 2.5 mg  2.5 mg Oral Lunch Maranda Tong MD   2.5 mg at 02/26/21 1234    metoprolol tartrate (LOPRESSOR) tablet 25 mg  25 mg Oral BID Maranda Tong MD   25 mg at 02/27/21 0827    sodium chloride flush 0.9 % injection 10 mL  10 mL Intravenous 2 times per day Maranda Tong MD   10 mL at 02/27/21 2054    sodium chloride flush 0.9 % injection 10 mL  10 mL Intravenous PRN Maranda Tong MD        promethazine (PHENERGAN) tablet 12.5 mg  12.5 mg Oral Q6H PRN Maranda Tong MD        Or    ondansetron [facility]) injection 4 mg  4 mg Intravenous Q6H PRN Maranda Tong MD        acetaminophen (TYLENOL) tablet 650 mg  650 mg Oral Q6H PRN Maranda Tong MD   650 mg at 02/25/21 2109    Or    acetaminophen (TYLENOL) suppository 650 mg  650 mg Rectal Q6H PRN Maranda Tong MD        0.9 % sodium chloride infusion   Intravenous PRN Kortney Vincent DO        pantoprazole (PROTONIX) injection 40 mg  40 mg Intravenous Daily WILLIE Crawford NP   40 mg at 02/27/21 0676    And    sodium chloride (PF) 0.9 % injection 10 mL  10 mL Intravenous Daily WILLIE Crawford NP   10 mL at 02/27/21 0829       Intake/Output Summary (Last 24 hours) at 2/28/2021 0933  Last data filed at 2/28/2021 0557  Gross per 24 hour   Intake 663 ml   Output 750 ml   Net -87 ml     Recent Results (from the past 24 hour(s))   Hemoglobin and Hematocrit, Blood    Collection Time: 02/27/21 12:41 PM   Result Value Ref Range    Hemoglobin 8.5 (L) 12.0 - 16.0 g/dL    Hematocrit 28.2 (L) 36 - 46 % Arterial Blood Gases    Collection Time: 02/27/21  4:49 PM   Result Value Ref Range    pH, Arterial 7.367 7.350 - 7.450    pCO2, Arterial 85.9 (HH) 35.0 - 45.0 mmHg    pO2, Arterial 68.8 (L) 80.0 - 100.0 mmHg    HCO3, Arterial 49.3 (H) 22.0 - 26.0 mmol/L    Positive Base Excess, Art 23.9 (H) 0.0 - 2.0 mmol/L    Negative Base Excess, Art NOT REPORTED 0.0 - 2.0 mmol/L    O2 Sat, Arterial 92.3 (L) 95 - 98 %    Total Hb NOT REPORTED 12.0 - 16.0 g/dl    Oxyhemoglobin NOT REPORTED 95.0 - 98.0 %    Carboxyhemoglobin 2.6 0 - 5 %    Methemoglobin 0.8 0.0 - 1.9 %    Pt Temp 37.0     pH, Art, Temp Adj NOT REPORTED 7.350 - 7.450    pCO2, Art, Temp Adj NOT REPORTED 35.0 - 45.0    pO2, Art, Temp Adj NOT REPORTED 80.0 - 100.0 mmHg    O2 Device/Flow/% Cannula     Respiratory Rate 20     Avila Test PASS     Sample Site Right Radial Artery     Pt.  Position SUPINE     Mode NOT REPORTED     Set Rate NOT REPORTED     Total Rate NOT REPORTED     VT NOT REPORTED     FIO2 3LPM     Peep/Cpap NOT REPORTED     PSV NOT REPORTED     Text for Respiratory RESULTS GIVEN TO RN     NOTIFICATION NOT REPORTED     NOTIFICATION TIME NOT REPORTED    Hemoglobin and Hematocrit, Blood    Collection Time: 02/27/21  7:40 PM   Result Value Ref Range    Hemoglobin 7.9 (L) 12.0 - 16.0 g/dL    Hematocrit 25.7 (L) 36 - 46 %   Hemoglobin and Hematocrit, Blood    Collection Time: 02/28/21  1:45 AM   Result Value Ref Range    Hemoglobin 8.4 (L) 12.0 - 16.0 g/dL    Hematocrit 27.1 (L) 36 - 46 %   Basic Metabolic Panel w/ Reflex to MG    Collection Time: 02/28/21  1:45 AM   Result Value Ref Range    Glucose 127 (H) 70 - 99 mg/dL    BUN 16 8 - 23 mg/dL    CREATININE 0.72 0.50 - 0.90 mg/dL    Bun/Cre Ratio NOT REPORTED 9 - 20    Calcium 8.7 8.6 - 10.4 mg/dL    Sodium 143 135 - 144 mmol/L    Potassium 4.1 3.7 - 5.3 mmol/L    Chloride 96 (L) 98 - 107 mmol/L    CO2 43 (HH) 20 - 31 mmol/L    Anion Gap 4 (L) 9 - 17 mmol/L    GFR Non-African American >60 >60 mL/min

## 2021-02-28 NOTE — DISCHARGE INSTR - COC
Continuity of Care Form    Patient Name: Nancie Lees   :  1945  MRN:  [id number]    Admit date:  2021  Discharge date:  3/06/21    Code Status Order: Limited   Advance Directives:   Advance Care Flowsheet Documentation       Date/Time Healthcare Directive Type of Healthcare Directive Copy in [address] Agent's Name Healthcare Agent's Phone Number    [phone]  No, patient does not have an advance directive for healthcare treatment -- -- -- -- --            Admitting Physician:  Eileen Pelletier MD  PCP: WILLIE Maldonado - CNP    Discharging Nurse:   [address] Unit/Room#: 2101/2101-01  Discharging Unit Phone Number: [phone]    Emergency Contact:   Extended Emergency Contact Information  Primary Emergency Contact: Daniela Show  Home Phone: [phone]  Relation: Child  Secondary Emergency Contact: Daxjd Traylor  Home Phone: [phone]  Relation: Child    Past Surgical History:  Past Surgical History:   Procedure Laterality Date    COLONOSCOPY      COLONOSCOPY N/A 2020    COLONOSCOPY CONTROL OF BLEEDING performed by Rocky Peña MD at [address] [place] [address] Bilateral     cataracts    JOINT REPLACEMENT      Left knee on 18    THORACENTESIS  2020         UPPER GASTROINTESTINAL ENDOSCOPY N/A 2020    EGD performed by Rocky Peña MD at [address] [place] [address] 2020    PUSH ENTEROSCOPY/EGD CONTROL BLEEDING performed [facility] Rocky Pe[facility] MD at [facility]       Immunization History:   Immunization History   Administered Date(s) Administered    Influenza Vaccine, unspecified formulation 2018    Influenza Virus Vaccine 2017    Influenza, High Dose (Fluzone 65 yrs and older) 10/19/2012    Influenza, Quadv, IM, PF (6 mo and older Fluzone, Flulaval, Fluarix, and 3 yrs and older Afluria) 2020    Influenza, Triv, inactivated, subunit, adjuvanted, IM (Fluad 65 yrs and older) 09/09/2019    Pneumococcal Conjugate 13-valent ([id number]) 12/13/2018    Pneumococcal Polysaccharide ([id number]) 05/12/2015    Tetanus 01/01/1998    Tetanus Toxoid, absorbed 01/01/1998    Zoster Recombinant (Shingrix) 01/31/2020, 02/01/2020       Active Problems:  Patient Active Problem List   Diagnosis Code    Acquired hypothyroidism E03.9    Hypertension I10    Primary osteoarthritis of right knee M17.11    A-fib ([facility]) I48.91    Acute encephalopathy G93.40    SIRS (systemic inflammatory response syndrome) ([facility]) R65.10    Normocytic anemia D64.9    Pulmonary hypertension ([facility]) I27.20    COPD (chronic obstructive pulmonary disease) ([facility]) J44.9    History of GI bleed Z87.19    Constipation K59.00    HA (generalized anxiety disorder) F41.1    Lumbar radiculopathy M54.16    Lumbosacral spondylosis without myelopathy M47.817    Benign hypertension with CKD (chronic kidney disease) stage III I12.9, N18.30    CKD (chronic kidney disease) stage 3, GFR 30-59 ml/min N18.30    Alcohol withdrawal syndrome with complication ([facility]) E75.708    Moderate major depression ([facility]) F32.1    PAD (peripheral artery disease) ([facility]) I73.9    Acute kidney injury ([facility]) N17.9    Obesity, Class I, BMI 30-34.9 E66.9    Chronic diastolic CHF (congestive heart failure) ([facility]) I50.32    GI bleed K92.2    Weight loss R63.4    Elevated alkaline phosphatase level R74.8    Thrombocytosis ([facility]) D47.3    Iron deficiency anemia due to chronic blood loss D50.0    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 9) E66.3    Severe malnutrition ([facility]) E43    Renal failure N19    Acute renal failure (ARF) ([facility]) N17.9    Acute cystitis without hematuria N30.00    Hypoxia R09.02    Acute respiratory failure with hypoxia ([facility]) J96.01    Hypernatremia E87.0       Isolation/Infection:   Isolation            No Isolation          Patient Infection Status       Infection Onset Added Last Indicated Last Indicated By Review Planned Expiration Resolved Resolved By    None active    Resolved    COVID-19 02/25/21 02/25/21 02/25/21 COVID-19, Rapid   02/26/21 Jose Dennis RN    Recent positive 1/1/2021    COVID-19 Rule Out 02/25/21 02/25/21 02/25/21 COVID-19, Rapid (Ordered)   02/25/21 Rule-Out Test Resulted    COVID-19 Rule Out 12/18/20 12/18/20 12/19/20 COVID-19 (Ordered)   12/19/20 Rule-Out Test Resulted            Nurse Assessment:  Last Vital Signs: /60   Pulse 76   Temp 98.2 °F (36.8 °C) (Oral)   Resp 19   Ht 5' 8\" (1.727 m)   Wt 176 lb 9.4 oz (80.1 kg)   SpO2 95%   BMI 26.85 kg/m²     Last documented pain score (0-10 scale): Pain Level: 5  Last Weight:   Wt Readings from Last 1 Encounters:   02/27/21 176 lb 9.4 oz (80.1 kg)     Mental Status:  oriented, alert and coherent    IV Access:  - None    Nursing Mobility/ADLs:  Walking   Assisted  Transfer  Assisted  Bathing  Dependent  Dressing  Assisted  Toileting  Assisted  Feeding  Independent  Med Admin  Assisted  Med Delivery   whole    Wound Care Documentation and Therapy:        Elimination:  Continence:   · Bowel: Yes  · Bladder: Yes  Urinary Catheter: None   Colostomy/Ileostomy/Ileal Conduit: No       Date of Last BM:     Intake/Output Summary (Last 24 hours) at 2/28/2021 1725  Last data filed at 2/28/2021 0557  Gross per 24 hour   Intake 663 ml   Output 350 ml   Net 313 ml     I/O last 3 completed shifts: In: 663 [I.V.:663]  Out: 350 [Urine:350]    Safety Concerns:      At Risk for Falls    Impairments/Disabilities:      Vision    Nutrition Therapy:  Current Nutrition Therapy: oral diet, low fiber    Routes of Feeding: Oral  Liquids: no restriction  Daily Fluid Restriction: 1500ml  Last Modified Barium Swallow with Video (Video Swallowing Test): not done    Treatments at the Time of Hospital Discharge:   Respiratory Treatments:   Oxygen Therapy: 3 L NC. Ventilator:    - BiPAP   IPAP: 16 cmH20, CPAP/EPAP: 8 cmH2O only when sleeping and PRN    Rehab Therapies: Physical Therapy and Occupational Therapy  Weight Bearing Status/Restrictions: Other Medical Equipment (for information only, NOT a DME order): Other Treatments: skilled nursing assessment per protocol medication education     Patient's personal belongings (please select all that are sent with patient):  Glasses, dentures are at home and pt has not been wearing    RN SIGNATURE:  Electronically signed by Jos Oneill RN on 3/6/21 at 1:56 PM EST    CASE MANAGEMENT/SOCIAL WORK SECTION    Inpatient Status Date: 2/21/21    Readmission Risk Assessment Score:  Readmission Risk              Risk of Unplanned Readmission:        48           Discharging to Facility/ [address]  [address] [facility] [address]  Phone [phone] rep [phone]  · Fax [phone] fax:  [phone]  ·     Dialysis Facility (if applicable)   · Name:  · Address:  · Dialysis Schedule:  · Phone:  · Fax:    / signature: Electronically signed by Azeb Romero RN on 3/1/21 at 8:23 AM EST    PHYSICIAN SECTION    Prognosis: Fair    Condition at Discharge: Stable    Rehab Potential (if transferring to Rehab): Fair    Recommended Labs or Other Treatments After Discharge:     Physician Certification: I certify the above information and transfer of Mechelle Obrien  is necessary for the continuing treatment of the diagnosis listed and that she requires [facility] for greater 30 days.      Update Admission H&P: No change in H&P    PHYSICIAN SIGNATURE:  Electronically signed by Curtis Harris MD on 2/28/21 at 5:26 PM EST

## 2021-02-28 NOTE — PROGRESS NOTES
NONINVASIVE VENTILATION    PROVIDE OPTIMAL VENTILATION/ACCEPTABLE SPO2   IMPLEMENT NONINVASIVE VENTILATION PROTOCOL   MAINTAIN ACCEPTABLE SPO2   ASSESS SKIN INTEGRITY/BREAKDOWN SCORE   PATIENT EDUCATION AS NEEDED   BIPAP AS NEEDED    Pt put on bipap 14/8 35% rate 16 , tolerating well, transferred tp PCU.

## 2021-02-28 NOTE — PROGRESS NOTES
Patient found trying to take BiPAP mask off. Writer attempted to place mask back on patient, she refused, stating \"take it off, take it off\". Mask taken off and patient placed on nasal cannula. Patient complained of dry mouth. Water at bedside given to patient. Patient refused to \"wear that mask\" anymore. Nursing notified.   Spo2 between 88-90 on 4L NC.

## 2021-02-28 NOTE — CONSULTS
Department of Internal Medicine  Nephrology Radha Zimmerman MD   Consult Note      SUBJECTIVE: This is a 70 y.o. female with a significant past medical history of Chronic atrial fibrillation, pulmonary hypertension, chronic recurrent anemia [s/p recent endoscopy showing AVMs requiring intermittent PRBC transfusions], COPD and chronic kidney disease stage II, who presented to the emergency department after falling at home. Laboratory studies revealed severe anemia with hemoglobin 6.8 g/dL and serum creatinine was 1.25 mg/dL. Nephrology consultation was called yesterday due to low urine output. Patient was given albumin and Lasix with prompt improvement in urine output. She feels well today and does not have shortness of breath or chest pain. Interval history:  Patient denies abdominal pain, rectal bleeding, nausea or vomiting. Renal function is improving with creatinine at baseline. Transferred back to [facility] for MedSurg as patient had hypercapnic respiratory failure on ABG-patient on BiPAP- hemoglobin 8.3 and stable. serum sodium improving      Scheduled Meds:   iron sucrose  200 mg Intravenous Q24H    [Held by provider] furosemide  40 mg Oral Daily    ferrous sulfate  325 mg Oral TID     ipratropium-albuterol  1 ampule Inhalation Q4H WA    ascorbic acid  1,000 mg Oral Daily    atorvastatin  10 mg Oral Daily    busPIRone  10 mg Oral TID    dilTIAZem  120 mg Oral Daily    DULoxetine  60 mg Oral Daily    fluticasone  1 spray Each Nostril Daily    fluticasone  2 puff Inhalation BID    levothyroxine  125 mcg Oral Daily    lisinopril  2.5 mg Oral Lunch    metoprolol tartrate  25 mg Oral BID    sodium chloride flush  10 mL Intravenous 2 times per day    pantoprazole  40 mg Intravenous Daily    And    sodium chloride (PF)  10 mL Intravenous Daily     Continuous Infusions:   dextrose 50 mL/hr at 02/27/21 1651    sodium chloride VITALS:  /60   Pulse 76   Temp 98.2 °F (36.8 °C) (Oral)   Resp 23   Ht 5' 8\" (1.727 m)   Wt 176 lb 9.4 oz (80.1 kg)   SpO2 96%   BMI 26.85 kg/m²   24HR INTAKE/OUTPUT:      Intake/Output Summary (Last 24 hours) at 2/28/2021 1452  Last data filed at 2/28/2021 0557  Gross per 24 hour   Intake 663 ml   Output 350 ml   Net 313 ml     Constitutional: alert, appears stated age and cooperative    Skin: Skin color, texture, turgor normal. No rashes or lesions    Head: Normocephalic, without obvious abnormality, atraumatic     Cardiovascular/Edema: regular rate and rhythm, S1, S2 normal, no murmur, click, rub or gallop    Respiratory: Lungs: clear to auscultation bilaterally    Abdomen: soft, non-tender; bowel sounds normal; no masses,  no organomegaly    Back: symmetric, no curvature. ROM normal. No CVA tenderness.     Extremities: extremities normal, atraumatic, no cyanosis or edema    Neuro:  Grossly normal    CBC:   Recent Labs     02/28/21  0145 02/28/21  0724 02/28/21  1244   HGB 8.4* 8.6* 8.3*     BMP:    Recent Labs     02/26/21  0705 02/27/21  0752 02/28/21  0145   * 149* 143   K 4.0 3.9 4.1    100 96*   CO2 41* >45* 43*   BUN 13 12 16   CREATININE 0.82 0.76 0.72   GLUCOSE 111* 121* 127*       Lab Results   Component Value Date    NITRU NEGATIVE 02/22/2021    COLORU YELLOW 02/22/2021    PHUR 5.5 02/22/2021    WBCUA 2 TO 5 02/21/2021    RBCUA 0 TO 2 02/21/2021    MUCUS NOT REPORTED 02/21/2021    TRICHOMONAS NOT REPORTED 02/21/2021    YEAST NOT REPORTED 02/21/2021    BACTERIA FEW 02/21/2021    CLARITYU Clear 01/31/2020    SPECGRAV 1.014 02/22/2021    LEUKOCYTESUR NEGATIVE 02/22/2021    UROBILINOGEN Normal 02/22/2021    BILIRUBINUR NEGATIVE 02/22/2021    BILIRUBINUR 0 01/31/2020    BLOODU Negative 01/31/2020    GLUCOSEU NEGATIVE 02/22/2021    KETUA NEGATIVE 02/22/2021    AMORPHOUS 1+ 02/21/2021     Urine Sodium:     Lab Results   Component Value Date    ANGÉLICA 82 02/23/2021     Urine Potassium: Lab Results   Component Value Date    KUR 35.7 07/20/2020     Urine Chloride:    Lab Results   Component Value Date    CLUR 113 02/23/2021     Urine Creatinine:     Lab Results   Component Value Date    LABCREA 137.9 02/11/2021    LABCREA 134.5 02/11/2021     IMPRESSION/RECOMMENDATIONS:     1. Chronic kidney disease stage 3- renal function is stable. Transient oliguria likely due to hemodynamic fluxes. Plan  Monitor urine output closely. .  Basic metabolic profile daily    2. Hypernatremia -  Oral Free water intake up to 1.5 L/day      3. Acute on chronic anemia. Hemoglobin 8.5 g and stable      4. History of chronic CHF with preserved ejection fraction-  Continue Lasix 40 mg daily  1500 mls fluid restriction    5. Left   Pleural effusion-status post thoracentesis 1 L. 6.Metabolic alkalosis-primary metabolic alkalosis and compensatory from acute on chronic hypercapnia    Thank you very much for the courtesy of this consultation.     MD ROXIE Ma  Attending Nephrologist  2/28/2021 2:52 PM

## 2021-02-28 NOTE — CARE COORDINATION
ONGOING DISCHARGE PLANNING NOTE:    Writer reviewed LSW notes, and discharge plan is to go to [facility]. She is a bedhold. Per LSW notes from Friday - Plan remains for the patient to DC back to [facility]. Should the patient be ready over the weekend, please call admissions @ [phone] or call Emily Almonte in admissions at the [facility] @ [phone].     Electronically signed by Demond Borjas RN on 2/28/2021 at 11:36 AM

## 2021-02-28 NOTE — PROGRESS NOTES
PULMONARY PROGRESS NOTE:    REASON FOR VISIT:Pl effusion, dyspnea  Interval History:    Shortness of Breath: yes, better  Cough: no  Sputum: no          Hemoptysis: no  Chest Pain: no  Fever: no                   Swelling Feet: yes   Headache: no                                           Nausea, Emesis, Abdominal Pain: no  Diarrhea: no         Constipation: no  Lethargic; feels sleepy    Events since last visit:  Thoracentesis 2/25/21    PAST MEDICAL HISTORY:      Scheduled Meds:   iron sucrose  200 mg Intravenous Q24H    [Held by provider] furosemide  40 mg Oral Daily    ferrous sulfate  325 mg Oral TID WC    ipratropium-albuterol  1 ampule Inhalation Q4H WA    ascorbic acid  1,000 mg Oral Daily    atorvastatin  10 mg Oral Daily    busPIRone  10 mg Oral TID    dilTIAZem  120 mg Oral Daily    DULoxetine  60 mg Oral Daily    fluticasone  1 spray Each Nostril Daily    fluticasone  2 puff Inhalation BID    levothyroxine  125 mcg Oral Daily    lisinopril  2.5 mg Oral Lunch    metoprolol tartrate  25 mg Oral BID    sodium chloride flush  10 mL Intravenous 2 times per day    pantoprazole  40 mg Intravenous Daily    And    sodium chloride (PF)  10 mL Intravenous Daily     Continuous Infusions:   dextrose 50 mL/hr at 02/27/21 1651    sodium chloride       PRN Meds:HYDROcodone-acetaminophen, acetaminophen, baclofen, clonazePAM, guaiFENesin, sodium chloride flush, promethazine **OR** ondansetron, acetaminophen **OR** acetaminophen, sodium chloride        PHYSICAL EXAMINATION:  BP (!) 136/47   Pulse 90   Temp 97.8 °F (36.6 °C) (Oral)   Resp 16   Ht 5' 8\" (1.727 m)   Wt 176 lb 9.4 oz (80.1 kg)   SpO2 92%   BMI 26.85 kg/m²   afebrile  General : lethargic; falling asleep during visit  Neck  supple, no lymphadenopathy, JVD not raised  Heart  regular rhythm, S1 and S2 normal; no additional sounds heard  Lungs  Air Entry- fair bilaterally; breath sounds : vesicular; rales/crackles - absent, 945% on 3L  Abdomen  soft, no tenderness  Upper Extremities  - no cyanosis, mottling; edema : absent  Lower Extremities: no cyanosis, mottling; edema : absent    Current Laboratory, Radiologic, Microbiologic, and Diagnostic studies reviewed  Data ReviewCBC:   Recent Labs     02/27/21  1940 02/28/21  0145 02/28/21  0724   HGB 7.9* 8.4* 8.6*   HCT 25.7* 27.1* 27.9*     BMP:   Recent Labs     02/26/21  0705 02/27/21  0752 02/28/21  0145   GLUCOSE 111* 121* 127*   * 149* 143   K 4.0 3.9 4.1   BUN 13 12 16   CREATININE 0.82 0.76 0.72   CALCIUM 8.6 8.7 8.7     ABGs:   Recent Labs     02/27/21  1649   PHART 7.367   PO2ART 68.8*   GVY6CNN 85.9*   QGO4KUF 49.3*   [id number] 92.3*      PT/INR:  No results found for: PTINR    ASSESSMENT / PLAN:  Nasal congestion - humidify O2, sudafed  Acute hypoxic resp failure - O2  Left pl effusion - monitor, diuresis,   GI bleed - per GI service   Thoracentesis per IR 2/25/21- 1L fluid removed left side - transudate  Previous Covid positive 1/1/21  Chronic Hypercapnic resp failure - try BPAP  Transfer to progressive  D/W RN  Resume eliquis if OK with GI    Electronically signed by Jin Santos on 02/28/21 at 11:33 AM

## 2021-03-01 ENCOUNTER — APPOINTMENT (OUTPATIENT)
Dept: GENERAL RADIOLOGY | Age: 76
DRG: 377 | End: 2021-03-01
Payer: MEDICARE

## 2021-03-01 LAB
ABSOLUTE BANDS #: 0.07 K/UL (ref 0–1)
ABSOLUTE EOS #: 0 K/UL (ref 0–0.4)
ABSOLUTE IMMATURE GRANULOCYTE: ABNORMAL K/UL (ref 0–0.3)
ABSOLUTE LYMPH #: 0.27 K/UL (ref 1–4.8)
ABSOLUTE MONO #: 0.4 K/UL (ref 0.1–1.3)
ALLEN TEST: ABNORMAL
ANION GAP SERPL CALCULATED.3IONS-SCNC: 2 MMOL/L (ref 9–17)
BANDS: 1 % (ref 0–10)
BASOPHILS # BLD: 1 % (ref 0–2)
BASOPHILS ABSOLUTE: 0.07 K/UL (ref 0–0.2)
BUN BLDV-MCNC: 11 MG/DL (ref 8–23)
BUN/CREAT BLD: ABNORMAL (ref 9–20)
CALCIUM SERPL-MCNC: 9.1 MG/DL (ref 8.6–10.4)
CARBOXYHEMOGLOBIN: 2.8 % (ref 0–5)
CHLORIDE BLD-SCNC: 96 MMOL/L (ref 98–107)
CO2: 45 MMOL/L (ref 20–31)
CREAT SERPL-MCNC: 0.69 MG/DL (ref 0.5–0.9)
DIFFERENTIAL TYPE: ABNORMAL
EOSINOPHILS RELATIVE PERCENT: 0 % (ref 0–4)
FIO2: ABNORMAL
GFR AFRICAN AMERICAN: >60 ML/MIN
GFR NON-AFRICAN AMERICAN: >60 ML/MIN
GFR SERPL CREATININE-BSD FRML MDRD: ABNORMAL ML/MIN/{1.73_M2}
GFR SERPL CREATININE-BSD FRML MDRD: ABNORMAL ML/MIN/{1.73_M2}
GLUCOSE BLD-MCNC: 149 MG/DL (ref 70–99)
HCO3 ARTERIAL: 51.4 MMOL/L (ref 22–26)
HCT VFR BLD CALC: 26.4 % (ref 36–46)
HEMOGLOBIN: 8.3 G/DL (ref 12–16)
IMMATURE GRANULOCYTES: ABNORMAL %
LYMPHOCYTES # BLD: 4 % (ref 24–44)
MAGNESIUM: 2 MG/DL (ref 1.6–2.6)
MCH RBC QN AUTO: 31.9 PG (ref 26–34)
MCHC RBC AUTO-ENTMCNC: 31.4 G/DL (ref 31–37)
MCV RBC AUTO: 101.7 FL (ref 80–100)
METHEMOGLOBIN: 0.9 % (ref 0–1.9)
MODE: ABNORMAL
MONOCYTES # BLD: 6 % (ref 1–7)
MORPHOLOGY: ABNORMAL
NEGATIVE BASE EXCESS, ART: ABNORMAL MMOL/L (ref 0–2)
NOTIFICATION TIME: ABNORMAL
NOTIFICATION: ABNORMAL
NRBC AUTOMATED: ABNORMAL PER 100 WBC
O2 DEVICE/FLOW/%: ABNORMAL
O2 SAT, ARTERIAL: 84.6 % (ref 95–98)
OXYHEMOGLOBIN: ABNORMAL % (ref 95–98)
PATIENT TEMP: 37
PCO2 ARTERIAL: 92.4 MMHG (ref 35–45)
PCO2, ART, TEMP ADJ: ABNORMAL (ref 35–45)
PDW BLD-RTO: 21 % (ref 11.5–14.9)
PEEP/CPAP: ABNORMAL
PH ARTERIAL: 7.35 (ref 7.35–7.45)
PH, ART, TEMP ADJ: ABNORMAL (ref 7.35–7.45)
PLATELET # BLD: 260 K/UL (ref 150–450)
PLATELET ESTIMATE: ABNORMAL
PMV BLD AUTO: 8.3 FL (ref 6–12)
PO2 ARTERIAL: 52.3 MMHG (ref 80–100)
PO2, ART, TEMP ADJ: ABNORMAL MMHG (ref 80–100)
POSITIVE BASE EXCESS, ART: 25.8 MMOL/L (ref 0–2)
POTASSIUM SERPL-SCNC: 3.9 MMOL/L (ref 3.7–5.3)
PSV: ABNORMAL
PT. POSITION: ABNORMAL
RBC # BLD: 2.59 M/UL (ref 4–5.2)
RBC # BLD: ABNORMAL 10*6/UL
RESPIRATORY RATE: 20
SAMPLE SITE: ABNORMAL
SEG NEUTROPHILS: 88 % (ref 36–66)
SEGMENTED NEUTROPHILS ABSOLUTE COUNT: 5.89 K/UL (ref 1.3–9.1)
SET RATE: ABNORMAL
SODIUM BLD-SCNC: 143 MMOL/L (ref 135–144)
TEXT FOR RESPIRATORY: ABNORMAL
TOTAL HB: ABNORMAL G/DL (ref 12–16)
TOTAL RATE: ABNORMAL
VT: ABNORMAL
WBC # BLD: 6.7 K/UL (ref 3.5–11)
WBC # BLD: ABNORMAL 10*3/UL

## 2021-03-01 PROCEDURE — 6370000000 HC RX 637 (ALT 250 FOR IP): Performed by: NURSE PRACTITIONER

## 2021-03-01 PROCEDURE — 6370000000 HC RX 637 (ALT 250 FOR IP): Performed by: INTERNAL MEDICINE

## 2021-03-01 PROCEDURE — 2580000003 HC RX 258: Performed by: FAMILY MEDICINE

## 2021-03-01 PROCEDURE — 99232 SBSQ HOSP IP/OBS MODERATE 35: CPT | Performed by: FAMILY MEDICINE

## 2021-03-01 PROCEDURE — 82805 BLOOD GASES W/O2 SATURATION: CPT

## 2021-03-01 PROCEDURE — 99232 SBSQ HOSP IP/OBS MODERATE 35: CPT | Performed by: INTERNAL MEDICINE

## 2021-03-01 PROCEDURE — 94640 AIRWAY INHALATION TREATMENT: CPT

## 2021-03-01 PROCEDURE — 6370000000 HC RX 637 (ALT 250 FOR IP): Performed by: FAMILY MEDICINE

## 2021-03-01 PROCEDURE — 94761 N-INVAS EAR/PLS OXIMETRY MLT: CPT

## 2021-03-01 PROCEDURE — 36600 WITHDRAWAL OF ARTERIAL BLOOD: CPT

## 2021-03-01 PROCEDURE — 85025 COMPLETE CBC W/AUTO DIFF WBC: CPT

## 2021-03-01 PROCEDURE — 6360000002 HC RX W HCPCS: Performed by: INTERNAL MEDICINE

## 2021-03-01 PROCEDURE — 6360000002 HC RX W HCPCS: Performed by: NURSE PRACTITIONER

## 2021-03-01 PROCEDURE — 2060000000 HC ICU INTERMEDIATE R&B

## 2021-03-01 PROCEDURE — 2580000003 HC RX 258: Performed by: INTERNAL MEDICINE

## 2021-03-01 PROCEDURE — 2700000000 HC OXYGEN THERAPY PER DAY

## 2021-03-01 PROCEDURE — 83735 ASSAY OF MAGNESIUM: CPT

## 2021-03-01 PROCEDURE — 71045 X-RAY EXAM CHEST 1 VIEW: CPT

## 2021-03-01 PROCEDURE — 80048 BASIC METABOLIC PNL TOTAL CA: CPT

## 2021-03-01 PROCEDURE — C9113 INJ PANTOPRAZOLE SODIUM, VIA: HCPCS | Performed by: NURSE PRACTITIONER

## 2021-03-01 PROCEDURE — 36415 COLL VENOUS BLD VENIPUNCTURE: CPT

## 2021-03-01 PROCEDURE — 2580000003 HC RX 258: Performed by: NURSE PRACTITIONER

## 2021-03-01 PROCEDURE — 94660 CPAP INITIATION&MGMT: CPT

## 2021-03-01 RX ORDER — PANTOPRAZOLE SODIUM 40 MG/1
40 TABLET, DELAYED RELEASE ORAL
Status: DISCONTINUED | OUTPATIENT
Start: 2021-03-02 | End: 2021-03-08 | Stop reason: HOSPADM

## 2021-03-01 RX ORDER — MODAFINIL 200 MG/1
200 TABLET ORAL DAILY
Status: DISCONTINUED | OUTPATIENT
Start: 2021-03-01 | End: 2021-03-08 | Stop reason: HOSPADM

## 2021-03-01 RX ADMIN — SODIUM CHLORIDE, PRESERVATIVE FREE 10 ML: 5 INJECTION INTRAVENOUS at 22:00

## 2021-03-01 RX ADMIN — BUSPIRONE HYDROCHLORIDE 10 MG: 10 TABLET ORAL at 13:59

## 2021-03-01 RX ADMIN — SODIUM CHLORIDE 10 ML: 9 INJECTION INTRAMUSCULAR; INTRAVENOUS; SUBCUTANEOUS at 07:53

## 2021-03-01 RX ADMIN — ATORVASTATIN CALCIUM 10 MG: 10 TABLET, FILM COATED ORAL at 07:53

## 2021-03-01 RX ADMIN — METOPROLOL TARTRATE 25 MG: 25 TABLET, FILM COATED ORAL at 07:54

## 2021-03-01 RX ADMIN — BACLOFEN 10 MG: 10 TABLET ORAL at 21:59

## 2021-03-01 RX ADMIN — DULOXETINE 60 MG: 60 CAPSULE, DELAYED RELEASE ORAL at 07:53

## 2021-03-01 RX ADMIN — IPRATROPIUM BROMIDE AND ALBUTEROL SULFATE 1 AMPULE: 2.5; .5 SOLUTION RESPIRATORY (INHALATION) at 08:10

## 2021-03-01 RX ADMIN — FLUTICASONE PROPIONATE 2 PUFF: 220 AEROSOL, METERED RESPIRATORY (INHALATION) at 22:00

## 2021-03-01 RX ADMIN — APIXABAN 2.5 MG: 2.5 TABLET, FILM COATED ORAL at 07:53

## 2021-03-01 RX ADMIN — IRON SUCROSE 200 MG: 20 INJECTION, SOLUTION INTRAVENOUS at 17:50

## 2021-03-01 RX ADMIN — FERROUS SULFATE TAB 325 MG (65 MG ELEMENTAL FE) 325 MG: 325 (65 FE) TAB at 13:59

## 2021-03-01 RX ADMIN — APIXABAN 2.5 MG: 2.5 TABLET, FILM COATED ORAL at 22:00

## 2021-03-01 RX ADMIN — HYDROCODONE BITARTRATE AND ACETAMINOPHEN 1 TABLET: 5; 325 TABLET ORAL at 00:34

## 2021-03-01 RX ADMIN — SODIUM CHLORIDE, PRESERVATIVE FREE 10 ML: 5 INJECTION INTRAVENOUS at 07:54

## 2021-03-01 RX ADMIN — BUSPIRONE HYDROCHLORIDE 10 MG: 10 TABLET ORAL at 21:59

## 2021-03-01 RX ADMIN — IPRATROPIUM BROMIDE AND ALBUTEROL SULFATE 1 AMPULE: 2.5; .5 SOLUTION RESPIRATORY (INHALATION) at 18:33

## 2021-03-01 RX ADMIN — FLUTICASONE PROPIONATE 1 SPRAY: 50 SPRAY, METERED NASAL at 07:55

## 2021-03-01 RX ADMIN — HYDROCODONE BITARTRATE AND ACETAMINOPHEN 1 TABLET: 5; 325 TABLET ORAL at 22:00

## 2021-03-01 RX ADMIN — DILTIAZEM HYDROCHLORIDE 120 MG: 120 CAPSULE, COATED, EXTENDED RELEASE ORAL at 07:54

## 2021-03-01 RX ADMIN — IPRATROPIUM BROMIDE AND ALBUTEROL SULFATE 1 AMPULE: 2.5; .5 SOLUTION RESPIRATORY (INHALATION) at 11:52

## 2021-03-01 RX ADMIN — CLONAZEPAM 0.5 MG: 1 TABLET ORAL at 22:00

## 2021-03-01 RX ADMIN — LEVOTHYROXINE SODIUM 125 MCG: 125 TABLET ORAL at 04:57

## 2021-03-01 RX ADMIN — OXYCODONE HYDROCHLORIDE AND ACETAMINOPHEN 1000 MG: 500 TABLET ORAL at 07:53

## 2021-03-01 RX ADMIN — BUSPIRONE HYDROCHLORIDE 10 MG: 10 TABLET ORAL at 07:53

## 2021-03-01 RX ADMIN — MODAFINIL 200 MG: 200 TABLET ORAL at 13:59

## 2021-03-01 RX ADMIN — IPRATROPIUM BROMIDE AND ALBUTEROL SULFATE 1 AMPULE: 2.5; .5 SOLUTION RESPIRATORY (INHALATION) at 15:15

## 2021-03-01 RX ADMIN — PANTOPRAZOLE SODIUM 40 MG: 40 INJECTION, POWDER, FOR SOLUTION INTRAVENOUS at 07:53

## 2021-03-01 RX ADMIN — METOPROLOL TARTRATE 25 MG: 25 TABLET, FILM COATED ORAL at 21:59

## 2021-03-01 RX ADMIN — FERROUS SULFATE TAB 325 MG (65 MG ELEMENTAL FE) 325 MG: 325 (65 FE) TAB at 17:50

## 2021-03-01 RX ADMIN — FLUTICASONE PROPIONATE 2 PUFF: 220 AEROSOL, METERED RESPIRATORY (INHALATION) at 07:55

## 2021-03-01 RX ADMIN — FUROSEMIDE 40 MG: 40 TABLET ORAL at 07:53

## 2021-03-01 RX ADMIN — FERROUS SULFATE TAB 325 MG (65 MG ELEMENTAL FE) 325 MG: 325 (65 FE) TAB at 07:53

## 2021-03-01 ASSESSMENT — PAIN SCALES - GENERAL
PAINLEVEL_OUTOF10: 6
PAINLEVEL_OUTOF10: 4

## 2021-03-01 NOTE — PLAN OF CARE
Problem: Falls - Risk of:  Goal: Will remain free from falls  Description: Will remain free from falls  Outcome: Ongoing  Note: No falls this shift. Call light within reach and siderails x2. Bed in lowest position. Patient safety maintained. Problem: SAFETY  Goal: Free from accidental physical injury  Outcome: Ongoing  Note: No falls this shift. Call light within reach and siderails x2. Bed in lowest position. Patient safety maintained. Problem: DAILY CARE  Goal: Daily care needs are met  Outcome: Ongoing     Problem: PAIN  Goal: Patient's pain/discomfort is manageable  Outcome: Ongoing     Problem: SKIN INTEGRITY  Goal: Skin integrity is maintained or improved  Outcome: Ongoing  Note: Skin assessment as charted. No new areas of breakdown. Problem: KNOWLEDGE DEFICIT  Goal: Patient/S.O. demonstrates understanding of disease process, treatment plan, medications, and discharge instructions.   Outcome: Ongoing     Problem: DISCHARGE BARRIERS  Goal: Patient's continuum of care needs are met  Outcome: Ongoing  Note: Case management is following for further discharge needs      Problem: Nutrition  Goal: Optimal nutrition therapy  Description: Nutrition Problem #1: Inadequate oral intake  Intervention: Food and/or Nutrient Delivery: Continue Current Diet, Start Oral Nutrition Supplement  Nutritional Goals: po intake greater than 50%     Outcome: Ongoing     Problem: Pain:  Goal: Pain level will decrease  Description: Pain level will decrease  Outcome: Ongoing  Goal: Control of acute pain  Description: Control of acute pain  Outcome: Ongoing  Goal: Control of chronic pain  Description: Control of chronic pain  Outcome: Ongoing     Problem: Skin Integrity:  Goal: Will show no infection signs and symptoms  Description: Will show no infection signs and symptoms  Outcome: Ongoing  Goal: Absence of new skin breakdown  Description: Absence of new skin breakdown  Outcome: Ongoing Note: Skin assessment as charted. No new areas of breakdown. Problem: Airway Clearance - Ineffective  Goal: Achieve or maintain patent airway  Outcome: Ongoing  Note: Respiratory assessment as charted. No signs or symptoms of distress. Problem: Gas Exchange - Impaired  Goal: Absence of hypoxia  Outcome: Ongoing  Note: Respiratory assessment as charted. No signs or symptoms of distress. Goal: Promote optimal lung function  Outcome: Ongoing  Note: Respiratory assessment as charted. No signs or symptoms of distress. Problem: Breathing Pattern - Ineffective  Goal: Ability to achieve and maintain a regular respiratory rate  Outcome: Ongoing  Note: Respiratory assessment as charted. No signs or symptoms of distress. Problem:  Body Temperature -  Risk of, Imbalanced  Goal: Ability to maintain a body temperature within defined limits  Outcome: Ongoing  Goal: Will regain or maintain usual level of consciousness  Outcome: Ongoing  Goal: Complications related to the disease process, condition or treatment will be avoided or minimized  Outcome: Ongoing     Problem: Isolation Precautions - Risk of Spread of Infection  Goal: Prevent transmission of infection  Outcome: Ongoing     Problem: Nutrition Deficits  Goal: Optimize nutritional status  Outcome: Ongoing     Problem: Risk for Fluid Volume Deficit  Goal: Maintain normal heart rhythm  Outcome: Ongoing  Goal: Maintain absence of muscle cramping  Outcome: Ongoing  Goal: Maintain normal serum potassium, sodium, calcium, phosphorus, and pH  Outcome: Ongoing     Problem: Fatigue  Goal: Verbalize increase energy and improved vitality  Outcome: Ongoing     Problem: Patient Education: Go to Patient Education Activity  Goal: Patient/Family Education  Outcome: Ongoing

## 2021-03-01 NOTE — PROGRESS NOTES
Patient refusing to keep BiPAP on. States that she can't breathe in the mask. Dr. Sanjuanita Adams contacted via [address] [place] [address]. Okay to leave BiPAP off and keep patient on Nasal Cannula while she is awake and alert.

## 2021-03-01 NOTE — PROGRESS NOTES
BRONCHOSPASM/BRONCHOCONSTRICTION     [x]         IMPROVE AERATION/BREATH SOUNDS  [x]   ADMINISTER BRONCHODILATOR THERAPY AS APPROPRIATE  [x]   ASSESS BREATH SOUNDS  []   IMPLEMENT AEROSOL/MDI PROTOCOL  [x]   PATIENT EDUCATION AS NEEDED    PATIENT REFUSES TO WEAR BIPAP     [x] Risks and benefits explained to patient   [x] Patient refuses to wear Bipap stating \"I don't want to wear that. I can't handle it. \"  [x] Patient verbalizes understanding of information presented.

## 2021-03-01 NOTE — PLAN OF CARE
Problem: Falls - Risk of:  Goal: Will remain free from falls  Description: Will remain free from falls  Outcome: Ongoing     Problem: Falls - Risk of:  Goal: Will remain free from falls  Description: Will remain free from falls  Outcome: Ongoing     Problem: SAFETY  Goal: Free from accidental physical injury  Outcome: Ongoing     Problem: DAILY CARE  Goal: Daily care needs are met  Outcome: Ongoing     Problem: DAILY CARE  Goal: Daily care needs are met  Outcome: Ongoing     Problem: PAIN  Goal: Patient's pain/discomfort is manageable  Outcome: Ongoing     Problem: SKIN INTEGRITY  Goal: Skin integrity is maintained or improved  Outcome: Ongoing     Problem: SKIN INTEGRITY  Goal: Skin integrity is maintained or improved  Outcome: Ongoing     Problem: KNOWLEDGE DEFICIT  Goal: Patient/S.O. demonstrates understanding of disease process, treatment plan, medications, and discharge instructions.   Outcome: Ongoing     Problem: DISCHARGE BARRIERS  Goal: Patient's continuum of care needs are met  Outcome: Ongoing     Problem: Nutrition  Goal: Optimal nutrition therapy  Description: Nutrition Problem #1: Inadequate oral intake  Intervention: Food and/or Nutrient Delivery: Continue Current Diet, Start Oral Nutrition Supplement  Nutritional Goals: po intake greater than 50%     Outcome: Ongoing     Problem: Pain:  Goal: Pain level will decrease  Description: Pain level will decrease  Outcome: Ongoing  Goal: Control of acute pain  Description: Control of acute pain  Outcome: Ongoing  Goal: Control of chronic pain  Description: Control of chronic pain  Outcome: Ongoing     Problem: Pain:  Goal: Pain level will decrease  Description: Pain level will decrease  Outcome: Ongoing  Goal: Control of acute pain  Description: Control of acute pain  Outcome: Ongoing  Goal: Control of chronic pain  Description: Control of chronic pain  Outcome: Ongoing     Problem: Skin Integrity:  Goal: Will show no infection signs and symptoms Description: Will show no infection signs and symptoms  Outcome: Ongoing  Goal: Absence of new skin breakdown  Description: Absence of new skin breakdown  Outcome: Ongoing     Problem: Airway Clearance - Ineffective  Goal: Achieve or maintain patent airway  Outcome: Ongoing     Problem: Airway Clearance - Ineffective  Goal: Achieve or maintain patent airway  Outcome: Ongoing     Problem: Gas Exchange - Impaired  Goal: Absence of hypoxia  Outcome: Ongoing  Goal: Promote optimal lung function  Outcome: Ongoing     Problem: Breathing Pattern - Ineffective  Goal: Ability to achieve and maintain a regular respiratory rate  Outcome: Ongoing     Problem: Breathing Pattern - Ineffective  Goal: Ability to achieve and maintain a regular respiratory rate  Outcome: Ongoing     Problem:  Body Temperature -  Risk of, Imbalanced  Goal: Ability to maintain a body temperature within defined limits  Outcome: Ongoing  Goal: Will regain or maintain usual level of consciousness  Outcome: Ongoing  Goal: Complications related to the disease process, condition or treatment will be avoided or minimized  Outcome: Ongoing     Problem: Isolation Precautions - Risk of Spread of Infection  Goal: Prevent transmission of infection  Outcome: Ongoing     Problem: Nutrition Deficits  Goal: Optimize nutritional status  Outcome: Ongoing     Problem: Risk for Fluid Volume Deficit  Goal: Maintain normal heart rhythm  Outcome: Ongoing  Goal: Maintain absence of muscle cramping  Outcome: Ongoing  Goal: Maintain normal serum potassium, sodium, calcium, phosphorus, and pH  Outcome: Ongoing     Problem: Fatigue  Goal: Verbalize increase energy and improved vitality  Outcome: Ongoing     Problem: Patient Education: Go to Patient Education Activity  Goal: Patient/Family Education  Outcome: Ongoing

## 2021-03-01 NOTE — PROGRESS NOTES
ABGS drawn per order, pt alert and cooperative.  Pt on 2lpm cannula, results called to Doug Aldrich RN

## 2021-03-01 NOTE — PROGRESS NOTES
FAMILY MEDICINE  - PROGRESS NOTE    Date:  3/1/2021  Murtis Castleman  [id number]      Chief Complaint   Patient presents with    Fall         Interval History:  not changed, she is in good spirits this am. No significant events overnight.       Subjective  Constitutional: positive for fatigue and overweight  Respiratory: positive for emphysema and shortness of breath  Cardiovascular: positive for irregular heart beat  Hematologic/lymphatic: positive for pallor  Behavioral/Psych: positive for anxiety and depression:    Objective:    BP (!) 98/55   Pulse 68   Temp 97.9 °F (36.6 °C) (Oral)   Resp 20 Comment: RN recheck  Ht 5' 8\" (1.727 m)   Wt 185 lb 10 oz (84.2 kg)   SpO2 96%   BMI 28.22 kg/m²   General appearance - alert, well appearing, and in no distress and overweight  Mental status - alert, oriented to person, place, and time  Eyes - pupils equal and reactive, extraocular eye movements intact  Ears - hearing grossly normal bilaterally  Nose - normal and patent, no erythema, discharge or polyps  Mouth - mucous membranes moist, pharynx normal without lesions  Neck - supple, no significant adenopathy  Lymphatics - no palpable lymphadenopathy, no hepatosplenomegaly  Chest - decreased air entry noted bibasilar  Heart - irregularly irregular rhythm with rate 68  Abdomen - soft, nontender, nondistended, no masses or organomegaly  Breasts - not examined  Back exam - not examined  Neurological - alert, oriented, normal speech, no focal findings or movement disorder noted  Musculoskeletal - osteoarthritic changes noted in both hands  Extremities - peripheral pulses normal, no pedal edema, no clubbing or cyanosis  Skin - normal coloration and turgor, no rashes, no suspicious skin lesions noted    Data:   Medications:   Current Facility-Administered Medications   Medication Dose Route Frequency Provider Last Rate Last Admin  furosemide (LASIX) tablet 40 mg  40 mg Oral Daily Sol Emery MD        apixaban Elois Tran) tablet 2.5 mg  2.5 mg Oral BID Oscar Graff MD   2.5 mg at 02/28/21 2140    dextrose 5 % solution   Intravenous Continuous Jered Bermudez MD 50 mL/hr at 02/27/21 1651 New Bag at 02/27/21 1651    iron sucrose (VENOFER) 200 mg in sodium chloride 0.9 % 100 mL IVPB  200 mg Intravenous Q24H Dimple Ascencion Meza MD   Stopped at 02/28/21 2051    [Held by provider] furosemide (LASIX) tablet 40 mg  40 mg Oral Daily Jered Bermudez MD   40 mg at 02/27/21 0278    ferrous sulfate (IRON 325) tablet 325 mg  325 mg Oral TID  Oscar Graff MD   325 mg at 02/28/21 1831    HYDROcodone-acetaminophen (NORCO) 5-325 MG per tablet 1 tablet  1 tablet Oral Q6H PRN Oscar Graff MD   1 tablet at 03/01/21 0034    ipratropium-albuterol (DUONEB) nebulizer solution 1 ampule  1 ampule Inhalation Q4H WA Los jessie, APRN - CNP   1 ampule at 02/28/21 1943    acetaminophen (TYLENOL) tablet 650 mg  650 mg Oral Q4H PRN Radha Dickens MD   650 mg at 02/24/21 2140    ascorbic acid (VITAMIN C) tablet 1,000 mg  1,000 mg Oral Daily Radha Dickens MD   1,000 mg at 02/28/21 8172    atorvastatin (LIPITOR) tablet 10 mg  10 mg Oral Daily Radha Dickens MD   10 mg at 02/28/21 7870    baclofen (LIORESAL) tablet 10 mg  10 mg Oral TID PRN Radha Dickens MD   10 mg at 02/25/21 2130    busPIRone (BUSPAR) tablet 10 mg  10 mg Oral TID Radha Dickens MD   10 mg at 02/28/21 2141    clonazePAM (KLONOPIN) tablet 0.5 mg  0.5 mg Oral TID PRN Radha Dickens MD   0.5 mg at 02/28/21 2141    dilTIAZem (CARDIZEM CD) extended release capsule 120 mg  120 mg Oral Daily Radha Dickens MD   120 mg at 02/28/21 0955    DULoxetine (CYMBALTA) extended release capsule 60 mg  60 mg Oral Daily Radha Dickens MD   60 mg at 02/28/21 8869  fluticasone (FLONASE) 50 MCG/ACT nasal spray 1 spray  1 spray Each Nostril Daily Devyn Gordon MD   1 spray at 02/28/21 0958    fluticasone (FLOVENT HFA) 220 MCG/ACT inhaler 2 puff  2 puff Inhalation BID Devyn Gordon MD   2 puff at 02/28/21 2141    guaiFENesin [facility]) extended release tablet 1,200 mg  1,200 mg Oral Q6H PRN Devyn Gordon MD        levothyroxine (SYNTHROID) tablet 125 mcg  125 mcg Oral Daily Devyn Gordon MD   125 mcg at 03/01/21 0457    lisinopril (PRINIVIL;ZESTRIL) tablet 2.5 mg  2.5 mg Oral Lunch Devyn Gordon MD   2.5 mg at 02/28/21 1201    metoprolol tartrate (LOPRESSOR) tablet 25 mg  25 mg Oral BID Devyn Gordon MD   25 mg at 02/28/21 2141    sodium chloride flush 0.9 % injection 10 mL  10 mL Intravenous 2 times per day Devyn Gordon MD   10 mL at 02/28/21 0958    sodium chloride flush 0.9 % injection 10 mL  10 mL Intravenous PRN Devyn Gordon MD        promethazine (PHENERGAN) tablet 12.5 mg  12.5 mg Oral Q6H PRN Devyn Gordon MD        Or    ondansetron [facility]) injection 4 mg  4 mg Intravenous Q6H PRN Devyn Gordon MD        acetaminophen (TYLENOL) tablet 650 mg  650 mg Oral Q6H PRN Devyn Gordon MD   650 mg at 02/25/21 2109    Or    acetaminophen (TYLENOL) suppository 650 mg  650 mg Rectal Q6H PRN Devyn Gordon MD        0.9 % sodium chloride infusion   Intravenous PRN Naida Greenberg DO        pantoprazole (PROTONIX) injection 40 mg  40 mg Intravenous Daily Almer [place], APRN - NP   40 mg at 02/28/21 0390    And    sodium chloride (PF) 0.9 % injection 10 mL  10 mL Intravenous Daily Almer [place], APRN - NP   10 mL at 02/28/21 0958       Intake/Output Summary (Last 24 hours) at 3/1/2021 0604  Last data filed at 3/1/2021 0126  Gross per 24 hour   Intake    Output 200 ml   Net -200 ml     Recent Results (from the past 24 hour(s))   Hemoglobin and Hematocrit, Blood Collection Time: 02/28/21  7:24 AM   Result Value Ref Range    Hemoglobin 8.6 (L) 12.0 - 16.0 g/dL    Hematocrit 27.9 (L) 36 - 46 %   Hemoglobin and Hematocrit, Blood    Collection Time: 02/28/21 12:44 PM   Result Value Ref Range    Hemoglobin 8.3 (L) 12.0 - 16.0 g/dL    Hematocrit 26.6 (L) 36 - 46 %     -----------------------------------------------------------------  RAD:  EKG:  Micro:     Assessment & Plan:    Patient Active Problem List:     Acquired hypothyroidism     Hypertension     Primary osteoarthritis of right knee     A-fib (HCC)     Acute encephalopathy     SIRS (systemic inflammatory response syndrome) (HCC)     Normocytic anemia     Pulmonary hypertension (HCC)     COPD (chronic obstructive pulmonary disease) (HCC)     History of GI bleed     Constipation     HA (generalized anxiety disorder)     Lumbar radiculopathy     Lumbosacral spondylosis without myelopathy     Benign hypertension with CKD (chronic kidney disease) stage III     CKD (chronic kidney disease) stage 3, GFR 30-59 ml/min     Alcohol withdrawal syndrome with complication (HCC)     Moderate major depression (HCC)     PAD (peripheral artery disease) (HCC)     Acute kidney injury ([facility] [address]     Obesity, Class I, BMI 30-34.9     Chronic diastolic CHF (congestive heart failure) (HCC)     GI bleed     Weight loss     Elevated alkaline phosphatase level     Thrombocytosis (HCC)     Iron deficiency anemia due to chronic blood loss     Iron malabsorption     Symptomatic anemia     Pneumonia due to organism     Anemia     Severe anemia     Overweight (BMI 25.0-29. 9)     Severe malnutrition ([address]     Renal failure     Acute renal failure (ARF) (HCC)     Acute cystitis without hematuria     Hypoxia     Acute respiratory failure with hypoxia (HCC)     Hypernatremia           Plan:  -Recurrent iron deficiency anemia - hgb stable, iron supplementation, Hematology following.  -A. Fib. - resumed Eliquis at 2.5 mg BID. -Acute respiratory failure with COPD - on O2 per NC, Pulmonology managing. -D/C plan is to return to SNF.  -Continue current treatments.  -Complete orders per chart.     See orders   Disposition:    Electronically signed by Shayna Montez MD on 3/1/2021 at 6:04 AM

## 2021-03-01 NOTE — PROGRESS NOTES
PULMONARY PROGRESS NOTE:    REASON FOR VISIT:Pl effusion, dyspnea  Interval History:    Shortness of Breath: yes  Cough: no  Sputum: no          Hemoptysis: no  Chest Pain: no  Fever: no                   Swelling Feet: yes   Headache: no                                           Nausea, Emesis, Abdominal Pain: no  Diarrhea: no         Constipation: no  Lethargic; feels sleepy    Events since last visit:  Thoracentesis 2/25/21, poor compliance with BIPAP  Very sleepy this morning    PAST MEDICAL HISTORY:      Scheduled Meds:   [START ON 3/2/2021] pantoprazole  40 mg Oral QAM AC    modafinil  200 mg Oral Daily    furosemide  40 mg Oral Daily    apixaban  2.5 mg Oral BID    iron sucrose  200 mg Intravenous Q24H    [Held by provider] furosemide  40 mg Oral Daily    ferrous sulfate  325 mg Oral TID WC    ipratropium-albuterol  1 ampule Inhalation Q4H WA    ascorbic acid  1,000 mg Oral Daily    atorvastatin  10 mg Oral Daily    busPIRone  10 mg Oral TID    dilTIAZem  120 mg Oral Daily    DULoxetine  60 mg Oral Daily    fluticasone  1 spray Each Nostril Daily    fluticasone  2 puff Inhalation BID    levothyroxine  125 mcg Oral Daily    lisinopril  2.5 mg Oral Lunch    metoprolol tartrate  25 mg Oral BID    sodium chloride flush  10 mL Intravenous 2 times per day     Continuous Infusions:   dextrose 50 mL/hr at 02/27/21 1651    sodium chloride       PRN Meds:HYDROcodone-acetaminophen, acetaminophen, baclofen, clonazePAM, guaiFENesin, sodium chloride flush, promethazine **OR** ondansetron, acetaminophen **OR** acetaminophen, sodium chloride        PHYSICAL EXAMINATION:  BP (!) 122/53   Pulse 84   Temp 97.5 °F (36.4 °C) (Oral)   Resp 18   Ht 5' 8\" (1.727 m)   Wt 185 lb 10 oz (84.2 kg)   SpO2 92%   BMI 28.22 kg/m²   afebrile  General : lethargic  Neck  supple, no lymphadenopathy, JVD not raised  Heart  regular rhythm, S1 and S2 normal; no additional sounds heard Lungs  Air Entry- fair bilaterally; breath sounds : vesicular; 99% on 4L - 02 decreased  Abdomen  soft, no tenderness  Upper Extremities  - no cyanosis, mottling; edema : absent  Lower Extremities: no cyanosis, mottling; edema : absent    Current Laboratory, Radiologic, Microbiologic, and Diagnostic studies reviewed  Data ReviewCBC:   Recent Labs     02/28/21  0724 02/28/21  1244 03/01/21  0834   WBC  --   --  6.7   RBC  --   --  2.59*   HGB 8.6* 8.3* 8.3*   HCT 27.9* 26.6* 26.4*   PLT  --   --  260     BMP:   Recent Labs     02/27/21  0752 02/28/21  0145 03/01/21  0834   GLUCOSE 121* 127* 149*   * 143 143   K 3.9 4.1 3.9   BUN 12 16 11   CREATININE 0.76 0.72 0.69   CALCIUM 8.7 8.7 9.1     ABGs:   Recent Labs     02/27/21  1649   PHART 7.367   PO2ART 68.8*   ZCO2LOJ 85.9*   SYG4TFG 49.3*   H6CIXLIS 92.3*      PT/INR:  No results found for: PTINR    ASSESSMENT / PLAN:  Nasal congestion - humidify O2, sudafed  Acute hypoxic resp failure - O2  Left pl effusion - monitor, diuresis,   GI bleed - per GI service   Thoracentesis per IR 2/25/21- 1L fluid removed left side - transudate  Previous Covid positive 1/1/21  Chronic Hypercapnic resp failure - try BPAP - intolerant  ABG now  CXR  Now  Start provigil    Plan of care discussed with Dr Donnie Stokes  Electronically signed by Marco Hyde on 03/01/21 at 10:49 AM

## 2021-03-01 NOTE — CARE COORDINATION
Plan remains to have patient return to [place] [id number] when she is ready. Patient is a bed hold under her Medicaid insurance.

## 2021-03-01 NOTE — PROGRESS NOTES
Today's Date: 3/1/2021  Patient Name: Murtis Castleman  Date of admission: 2/21/2021  9:13 AM  Patient's age: 70 y.o., 1945  Admission Dx: GI bleed [K92.2]    Reason for Consult: management recommendations  Requesting Physician: Osman Turcios MD    CHIEF COMPLAINT:  Recurrent anemia   Interim history  The patient is seen and evaluated. Shortness of breath is worsening. Off BiPAP. Tolerated IV iron well, no active bleeding. Hemoglobin has been stable. HISTORY OF PRESENT ILLNESS:      The patient is a 70 y.o.  female who is admitted to the hospital for worsening respiratory status. She is known to us with history of iron deficiency anemia requiring IV iron and transfusions. She has AV malformation and her anemia has been exacerbated because of anticoagulation. We have stopped her Eliquis because of recurrent GI bleeding. Indication for anticoagulation is atrial fibrillation. The patient is admitted and underwent thoracocentesis. Condition is slowly improving. It is thought that her pleural effusion is transudative and related to COPD/cardiac dysfunction. DIAGNOSIS:   1. Iron deficiency anemia, malabsorption component   2. AV malformation  3. COPD  4.  Anticoagulation due to afib, stopped due to recurrent GI bleeding       CURRENT THERAPY:  Plan for work up and IV iron    BRIEF CASE HISTORY: Jm Garza is a very pleasant 70 y.o. female who is referred to us for iron deficiency anemia. She reports she has been taking oral iron. She had knee replacement 3 years ago and required 3 units of blood prior to surgery. She was stable until 02/2020 when her HGB began to decrease again. She has had frequent hospitalizations in 2020 with COPD exacerbation and poor kidney function with bruised kidney. She has followed with nephrology in the past but no recent follow up and has pulmonology referral. She has had some compliance issues with showing up for appointments. She has poor sleep with breathing issues and feels very fatigued. She has had several falls this year and has needed family to come pick her up, her legs are weak. She reports she had some bleeding following EGD and colonoscopy, AV malformation was found. She is on Eliquis for Afib but has not followed up with cardiology in the last year. Past Medical History:   has a past medical history of A-fib ([address] Acquired hypothyroidism, Acute encephalopathy, Acute kidney injury ([address] Anxiety, Benign hypertension with CKD (chronic kidney disease) stage III, Chronic back pain, CKD (chronic kidney disease) stage 3, GFR 30-59 ml/min, Constipation, COPD (chronic obstructive pulmonary disease) ([address] Cough, Depression, ETOH abuse, Former smoker, HA (generalized anxiety disorder), History of GI bleed, Hyperlipidemia, Hypertension, Hypothyroid, Leg pain, Normocytic anemia, Osteoarthritis, PAD (peripheral artery disease) ([address] Primary osteoarthritis, Pulmonary hypertension ([address] Pure hypercholesterolemia, SIRS (systemic inflammatory response syndrome) ([address] Urge incontinence, and Wheezing. Past Surgical History:   has a past surgical history that includes eye surgery (Bilateral); Colonoscopy; joint replacement; Upper gastrointestinal endoscopy (N/A, 12/2/2020); Colonoscopy (N/A, 12/2/2020); Upper gastrointestinal endoscopy (N/A, 12/23/2020); and Thoracentesis (12/26/2020). Medications:    Reviewed in Epic     Allergies:  Tizanidine    Social History:   reports that she has quit smoking. Her smoking use included cigarettes. She has a 78.00 pack-year smoking history. She has never used smokeless tobacco. She reports previous alcohol use. She reports that she does not use drugs. Family History: family history includes COPD in her mother; Cancer in an other family member; Emphysema in her sister; Heart Disease in her mother. REVIEW OF SYSTEMS:    Constitutional: No fever or chills. No night sweats, no weight loss   Eyes: No eye discharge, double vision, or eye pain   HEENT: negative for sore mouth, sore throat, hoarseness and voice change   Respiratory: negative for cough , sputum, dyspnea, wheezing, hemoptysis, chest pain   Cardiovascular: negative for chest pain, dyspnea, palpitations, orthopnea, PND   Gastrointestinal: negative for nausea, vomiting, diarrhea, constipation, abdominal pain, Dysphagia, hematemesis and hematochezia   Genitourinary: negative for frequency, dysuria, nocturia, urinary incontinence, and hematuria   Integument: negative for rash, skin lesions, bruises.    Hematologic/Lymphatic: negative for easy bruising, bleeding, lymphadenopathy, or petechiae   Endocrine: negative for heat or cold intolerance,weight changes, change in bowel habits and hair loss   Musculoskeletal: negative for myalgias, arthralgias, pain, joint swelling,and bone pain   Neurological: negative for headaches, dizziness, seizures, weakness, numbness    PHYSICAL EXAM: BP (!) 97/58   Pulse 96   Temp 97.8 °F (36.6 °C) (Oral)   Resp 18   Ht 5' 8\" (1.727 m)   Wt 185 lb 10 oz (84.2 kg)   SpO2 91%   BMI 28.22 kg/m²    Temp (24hrs), Av.8 °F (36.6 °C), Min:97.5 °F (36.4 °C), Max:98.1 °F (36.7 °C)    General appearance - well appearing, no in pain or distress   Mental status - alert and cooperative   Eyes - pupils equal and reactive, extraocular eye movements intact   Ears - bilateral TM's and external ear canals normal   Mouth - mucous membranes moist, pharynx normal without lesions   Neck - supple, no significant adenopathy   Lymphatics - no palpable lymphadenopathy, no hepatosplenomegaly   Chest - clear to auscultation, no wheezes, rales or rhonchi, symmetric air entry   Heart - normal rate, regular rhythm, normal S1, S2, no murmurs  Abdomen - soft, nontender, nondistended, no masses or organomegaly   Neurological - alert, oriented, normal speech, no focal findings or movement disorder noted   Musculoskeletal - no joint tenderness, deformity or swelling   Extremities - peripheral pulses normal, no pedal edema, no clubbing or cyanosis   Skin - normal coloration and turgor, no rashes, no suspicious skin lesions noted ,    DATA:    Labs:   CBC:   Recent Labs     21  1244 21  0834   WBC  --  6.7   HGB 8.3* 8.3*   HCT 26.6* 26.4*   PLT  --  260     BMP:   Recent Labs     21  0145 21  0834    143   K 4.1 3.9   CO2 43* 45*   BUN 16 11   CREATININE 0.72 0.69   LABGLOM >60 >60   GLUCOSE 127* 149*     PT/INR:   No results for input(s): PROTIME, INR in the last 72 hours.     IMAGING DATA:      Primary Problem  GI bleed    Active Hospital Problems    Diagnosis Date Noted    Hypernatremia [E87.0] 2021    Acute respiratory failure with hypoxia ([facility] [J96.01] 2021    Severe malnutrition ([facility] [E43] 2021    Iron deficiency anemia due to chronic blood loss [D50.0] 2020    GI bleed [K92.2] 2020

## 2021-03-01 NOTE — CONSULTS
Department of Internal Medicine  Nephrology Jens Moody MD   Consult Note      SUBJECTIVE: This is a 70 y.o. female with a significant past medical history of Chronic atrial fibrillation, pulmonary hypertension, chronic recurrent anemia [s/p recent endoscopy showing AVMs requiring intermittent PRBC transfusions], COPD and chronic kidney disease stage II, who presented to the emergency department after falling at home. Laboratory studies revealed severe anemia with hemoglobin 6.8 g/dL and serum creatinine was 1.25 mg/dL. Nephrology consultation was called yesterday due to low urine output. Patient was given albumin and Lasix with prompt improvement in urine output. She feels well today and does not have shortness of breath or chest pain. Interval history:  Patient denies abdominal pain, rectal bleeding, nausea or vomiting. Renal function is improving with creatinine at baseline. ABG this morning shows evidence of hypercapnic respiratory failure. CO2 of 92.       Scheduled Meds:   [START ON 3/2/2021] pantoprazole  40 mg Oral QAM AC    modafinil  200 mg Oral Daily    furosemide  40 mg Oral Daily    apixaban  2.5 mg Oral BID    iron sucrose  200 mg Intravenous Q24H    [Held by provider] furosemide  40 mg Oral Daily    ferrous sulfate  325 mg Oral TID WC    ipratropium-albuterol  1 ampule Inhalation Q4H WA    ascorbic acid  1,000 mg Oral Daily    atorvastatin  10 mg Oral Daily    busPIRone  10 mg Oral TID    dilTIAZem  120 mg Oral Daily    DULoxetine  60 mg Oral Daily    fluticasone  1 spray Each Nostril Daily    fluticasone  2 puff Inhalation BID    levothyroxine  125 mcg Oral Daily    lisinopril  2.5 mg Oral Lunch    metoprolol tartrate  25 mg Oral BID    sodium chloride flush  10 mL Intravenous 2 times per day     Continuous Infusions:   sodium chloride PRN Meds:. HYDROcodone-acetaminophen, acetaminophen, baclofen, clonazePAM, guaiFENesin, sodium chloride flush, promethazine **OR** ondansetron, acetaminophen **OR** acetaminophen, sodium chloride    Family History   Problem Relation Age of Onset    Heart Disease Mother     COPD Mother     Emphysema Sister     Cancer Other         Cousin - breast cancer        Social History     Socioeconomic History    Marital status:      Spouse name: None    Number of children: None    Years of education: None    Highest education level: None   Occupational History    None   Social Needs    Financial resource strain: None    Food insecurity     Worry: None     Inability: None    Transportation needs     Medical: None     Non-medical: None   Tobacco Use    Smoking status: Former Smoker     Packs/day: 3.00     Years: 26.00     Pack years: 78.00     Types: Cigarettes    Smokeless tobacco: Never Used   Substance and Sexual Activity    Alcohol use: Not Currently     Comment: occ    Drug use: No    Sexual activity: None   Lifestyle    Physical activity     Days per week: None     Minutes per session: None    Stress: None   Relationships    Social connections     Talks on phone: None     Gets together: None     Attends Buddhism service: None     Active member of club or organization: None     Attends meetings of clubs or organizations: None     Relationship status: None    Intimate partner violence     Fear of current or ex partner: None     Emotionally abused: None     Physically abused: None     Forced sexual activity: None   Other Topics Concern    None   Social History Narrative    None     Review of systems: CNS - no headache or dizziness; Cardiac - no chest pain; Respiratory - no cough or shortness of breath; Gastrointestinal - Black tarry stools; no nausea, vomiting or diarrhea; Musculoskeletal - general body aches; Skin/Integument - no rashes.     Physical Exam: VITALS:  BP (!) 97/58   Pulse 96   Temp 97.8 °F (36.6 °C) (Oral)   Resp 18   Ht 5' 8\" (1.727 m)   Wt 185 lb 10 oz (84.2 kg)   SpO2 90%   BMI 28.22 kg/m²   24HR INTAKE/OUTPUT:      Intake/Output Summary (Last 24 hours) at 3/1/2021 1512  Last data filed at 3/1/2021 1413  Gross per 24 hour   Intake 360 ml   Output 1100 ml   Net -740 ml     Constitutional: alert, appears stated age and cooperative    Skin: Skin color, texture, turgor normal. No rashes or lesions    Head: Normocephalic, without obvious abnormality, atraumatic     Cardiovascular/Edema: regular rate and rhythm, S1, S2 normal, no murmur, click, rub or gallop    Respiratory: Lungs: clear to auscultation bilaterally    Abdomen: soft, non-tender; bowel sounds normal; no masses,  no organomegaly    Back: symmetric, no curvature. ROM normal. No CVA tenderness.     Extremities: extremities normal, atraumatic, no cyanosis or edema    Neuro:  Grossly normal    CBC:   Recent Labs     02/28/21  0724 02/28/21  1244 03/01/21  0834   WBC  --   --  6.7   HGB 8.6* 8.3* 8.3*   PLT  --   --  260     BMP:    Recent Labs     02/27/21  0752 02/28/21  0145 03/01/21  0834   * 143 143   K 3.9 4.1 3.9    96* 96*   CO2 >45* 43* 45*   BUN 12 16 11   CREATININE 0.76 0.72 0.69   GLUCOSE 121* 127* 149*       Lab Results   Component Value Date    NITRU NEGATIVE 02/22/2021    COLORU YELLOW 02/22/2021    PHUR 5.5 02/22/2021    WBCUA 2 TO 5 02/21/2021    RBCUA 0 TO 2 02/21/2021    MUCUS NOT REPORTED 02/21/2021    TRICHOMONAS NOT REPORTED 02/21/2021    YEAST NOT REPORTED 02/21/2021    BACTERIA FEW 02/21/2021    CLARITYU Clear 01/31/2020    SPECGRAV 1.014 02/22/2021    LEUKOCYTESUR NEGATIVE 02/22/2021    UROBILINOGEN Normal 02/22/2021    BILIRUBINUR NEGATIVE 02/22/2021    BILIRUBINUR 0 01/31/2020    BLOODU Negative 01/31/2020    GLUCOSEU NEGATIVE 02/22/2021    KETUA NEGATIVE 02/22/2021    AMORPHOUS 1+ 02/21/2021     Urine Sodium:     Lab Results   Component Value Date ANGÉLICA 82 02/23/2021     Urine Potassium:    Lab Results   Component Value Date    KUR 35.7 07/20/2020     Urine Chloride:    Lab Results   Component Value Date    CLUR 113 02/23/2021     Urine Creatinine:     Lab Results   Component Value Date    LABCREA 137.9 02/11/2021    LABCREA 134.5 02/11/2021     IMPRESSION/RECOMMENDATIONS:     1. Chronic kidney disease stage 3- renal function is stable. Plan  Monitor urine output closely. .  Basic metabolic profile daily    2. Hypernatremia -  Oral Free water intake up to 1.5 L/day      3. Acute on chronic anemia. Hemoglobin 8.5 g and stable      4. History of chronic CHF with preserved ejection fraction-  Continue Lasix 40 mg daily  1500 mls fluid restriction    5. Left   Pleural effusion-status post thoracentesis 1 L. 6.Hypercapnic respiratory failure-pulmonary following -patient is on BiPAP      KVO IV fluids  Thank you very much for the courtesy of this consultation.     MD ROXIE Hawk  Attending Nephrologist  3/1/2021 3:12 PM

## 2021-03-01 NOTE — PROGRESS NOTES
Pt up to [facility] with assist, pt refusing to put BIPAP back on after returning to bed. SP02 currently 97% NC @ 4L.

## 2021-03-02 LAB
ABSOLUTE EOS #: 0.2 K/UL (ref 0–0.4)
ABSOLUTE IMMATURE GRANULOCYTE: ABNORMAL K/UL (ref 0–0.3)
ABSOLUTE LYMPH #: 0.52 K/UL (ref 1–4.8)
ABSOLUTE MONO #: 0.65 K/UL (ref 0.1–1.3)
ANION GAP SERPL CALCULATED.3IONS-SCNC: ABNORMAL MMOL/L (ref 9–17)
BASOPHILS # BLD: 1 % (ref 0–2)
BASOPHILS ABSOLUTE: 0.07 K/UL (ref 0–0.2)
BUN BLDV-MCNC: 11 MG/DL (ref 8–23)
BUN/CREAT BLD: ABNORMAL (ref 9–20)
CALCIUM SERPL-MCNC: 9.2 MG/DL (ref 8.6–10.4)
CHLORIDE BLD-SCNC: 94 MMOL/L (ref 98–107)
CO2: >45 MMOL/L (ref 20–31)
CREAT SERPL-MCNC: 0.71 MG/DL (ref 0.5–0.9)
DIFFERENTIAL TYPE: ABNORMAL
EOSINOPHILS RELATIVE PERCENT: 3 % (ref 0–4)
GFR AFRICAN AMERICAN: >60 ML/MIN
GFR NON-AFRICAN AMERICAN: >60 ML/MIN
GFR SERPL CREATININE-BSD FRML MDRD: ABNORMAL ML/MIN/{1.73_M2}
GFR SERPL CREATININE-BSD FRML MDRD: ABNORMAL ML/MIN/{1.73_M2}
GLUCOSE BLD-MCNC: 126 MG/DL (ref 70–99)
HCT VFR BLD CALC: 26.4 % (ref 36–46)
HEMOGLOBIN: 8.3 G/DL (ref 12–16)
IMMATURE GRANULOCYTES: ABNORMAL %
LYMPHOCYTES # BLD: 8 % (ref 24–44)
MCH RBC QN AUTO: 31.7 PG (ref 26–34)
MCHC RBC AUTO-ENTMCNC: 31.6 G/DL (ref 31–37)
MCV RBC AUTO: 100.5 FL (ref 80–100)
MONOCYTES # BLD: 10 % (ref 1–7)
MORPHOLOGY: ABNORMAL
NRBC AUTOMATED: ABNORMAL PER 100 WBC
PDW BLD-RTO: 21 % (ref 11.5–14.9)
PLATELET # BLD: 270 K/UL (ref 150–450)
PLATELET ESTIMATE: ABNORMAL
PMV BLD AUTO: 8.7 FL (ref 6–12)
POTASSIUM SERPL-SCNC: 3.6 MMOL/L (ref 3.7–5.3)
RBC # BLD: 2.62 M/UL (ref 4–5.2)
RBC # BLD: ABNORMAL 10*6/UL
SEG NEUTROPHILS: 78 % (ref 36–66)
SEGMENTED NEUTROPHILS ABSOLUTE COUNT: 5.06 K/UL (ref 1.3–9.1)
SODIUM BLD-SCNC: 146 MMOL/L (ref 135–144)
WBC # BLD: 6.5 K/UL (ref 3.5–11)
WBC # BLD: ABNORMAL 10*3/UL

## 2021-03-02 PROCEDURE — 6370000000 HC RX 637 (ALT 250 FOR IP): Performed by: INTERNAL MEDICINE

## 2021-03-02 PROCEDURE — 6360000002 HC RX W HCPCS: Performed by: INTERNAL MEDICINE

## 2021-03-02 PROCEDURE — 99232 SBSQ HOSP IP/OBS MODERATE 35: CPT | Performed by: FAMILY MEDICINE

## 2021-03-02 PROCEDURE — 6370000000 HC RX 637 (ALT 250 FOR IP): Performed by: FAMILY MEDICINE

## 2021-03-02 PROCEDURE — 94660 CPAP INITIATION&MGMT: CPT

## 2021-03-02 PROCEDURE — 99231 SBSQ HOSP IP/OBS SF/LOW 25: CPT | Performed by: INTERNAL MEDICINE

## 2021-03-02 PROCEDURE — 85025 COMPLETE CBC W/AUTO DIFF WBC: CPT

## 2021-03-02 PROCEDURE — 2580000003 HC RX 258: Performed by: FAMILY MEDICINE

## 2021-03-02 PROCEDURE — 36415 COLL VENOUS BLD VENIPUNCTURE: CPT

## 2021-03-02 PROCEDURE — 94761 N-INVAS EAR/PLS OXIMETRY MLT: CPT

## 2021-03-02 PROCEDURE — 94640 AIRWAY INHALATION TREATMENT: CPT

## 2021-03-02 PROCEDURE — 6370000000 HC RX 637 (ALT 250 FOR IP): Performed by: NURSE PRACTITIONER

## 2021-03-02 PROCEDURE — 80048 BASIC METABOLIC PNL TOTAL CA: CPT

## 2021-03-02 PROCEDURE — 2580000003 HC RX 258: Performed by: INTERNAL MEDICINE

## 2021-03-02 PROCEDURE — 2700000000 HC OXYGEN THERAPY PER DAY

## 2021-03-02 PROCEDURE — 2060000000 HC ICU INTERMEDIATE R&B

## 2021-03-02 RX ORDER — FUROSEMIDE 20 MG/1
20 TABLET ORAL DAILY
Status: DISCONTINUED | OUTPATIENT
Start: 2021-03-03 | End: 2021-03-08 | Stop reason: HOSPADM

## 2021-03-02 RX ADMIN — SODIUM CHLORIDE, PRESERVATIVE FREE 10 ML: 5 INJECTION INTRAVENOUS at 19:46

## 2021-03-02 RX ADMIN — IPRATROPIUM BROMIDE AND ALBUTEROL SULFATE 1 AMPULE: 2.5; .5 SOLUTION RESPIRATORY (INHALATION) at 14:24

## 2021-03-02 RX ADMIN — METOPROLOL TARTRATE 25 MG: 25 TABLET, FILM COATED ORAL at 07:20

## 2021-03-02 RX ADMIN — BUSPIRONE HYDROCHLORIDE 10 MG: 10 TABLET ORAL at 15:14

## 2021-03-02 RX ADMIN — FERROUS SULFATE TAB 325 MG (65 MG ELEMENTAL FE) 325 MG: 325 (65 FE) TAB at 07:20

## 2021-03-02 RX ADMIN — OXYCODONE HYDROCHLORIDE AND ACETAMINOPHEN 1000 MG: 500 TABLET ORAL at 07:24

## 2021-03-02 RX ADMIN — FERROUS SULFATE TAB 325 MG (65 MG ELEMENTAL FE) 325 MG: 325 (65 FE) TAB at 11:12

## 2021-03-02 RX ADMIN — LEVOTHYROXINE SODIUM 125 MCG: 125 TABLET ORAL at 05:59

## 2021-03-02 RX ADMIN — PANTOPRAZOLE SODIUM 40 MG: 40 TABLET, DELAYED RELEASE ORAL at 05:59

## 2021-03-02 RX ADMIN — HYDROCODONE BITARTRATE AND ACETAMINOPHEN 1 TABLET: 5; 325 TABLET ORAL at 11:12

## 2021-03-02 RX ADMIN — CLONAZEPAM 0.5 MG: 1 TABLET ORAL at 07:30

## 2021-03-02 RX ADMIN — IPRATROPIUM BROMIDE AND ALBUTEROL SULFATE 1 AMPULE: 2.5; .5 SOLUTION RESPIRATORY (INHALATION) at 10:48

## 2021-03-02 RX ADMIN — IPRATROPIUM BROMIDE AND ALBUTEROL SULFATE 1 AMPULE: 2.5; .5 SOLUTION RESPIRATORY (INHALATION) at 18:31

## 2021-03-02 RX ADMIN — MODAFINIL 200 MG: 200 TABLET ORAL at 07:20

## 2021-03-02 RX ADMIN — FLUTICASONE PROPIONATE 1 SPRAY: 50 SPRAY, METERED NASAL at 07:21

## 2021-03-02 RX ADMIN — APIXABAN 2.5 MG: 2.5 TABLET, FILM COATED ORAL at 19:43

## 2021-03-02 RX ADMIN — FLUTICASONE PROPIONATE 2 PUFF: 220 AEROSOL, METERED RESPIRATORY (INHALATION) at 07:22

## 2021-03-02 RX ADMIN — IRON SUCROSE 200 MG: 20 INJECTION, SOLUTION INTRAVENOUS at 17:17

## 2021-03-02 RX ADMIN — DULOXETINE 60 MG: 60 CAPSULE, DELAYED RELEASE ORAL at 07:20

## 2021-03-02 RX ADMIN — FLUTICASONE PROPIONATE 2 PUFF: 220 AEROSOL, METERED RESPIRATORY (INHALATION) at 19:43

## 2021-03-02 RX ADMIN — BUSPIRONE HYDROCHLORIDE 10 MG: 10 TABLET ORAL at 07:20

## 2021-03-02 RX ADMIN — IPRATROPIUM BROMIDE AND ALBUTEROL SULFATE 1 AMPULE: 2.5; .5 SOLUTION RESPIRATORY (INHALATION) at 07:51

## 2021-03-02 RX ADMIN — ATORVASTATIN CALCIUM 10 MG: 10 TABLET, FILM COATED ORAL at 07:20

## 2021-03-02 RX ADMIN — APIXABAN 2.5 MG: 2.5 TABLET, FILM COATED ORAL at 07:20

## 2021-03-02 RX ADMIN — FERROUS SULFATE TAB 325 MG (65 MG ELEMENTAL FE) 325 MG: 325 (65 FE) TAB at 15:14

## 2021-03-02 RX ADMIN — FUROSEMIDE 40 MG: 40 TABLET ORAL at 07:20

## 2021-03-02 RX ADMIN — LISINOPRIL 2.5 MG: 5 TABLET ORAL at 11:12

## 2021-03-02 RX ADMIN — METOPROLOL TARTRATE 25 MG: 25 TABLET, FILM COATED ORAL at 19:43

## 2021-03-02 RX ADMIN — BUSPIRONE HYDROCHLORIDE 10 MG: 10 TABLET ORAL at 19:43

## 2021-03-02 RX ADMIN — SODIUM CHLORIDE, PRESERVATIVE FREE 10 ML: 5 INJECTION INTRAVENOUS at 07:26

## 2021-03-02 RX ADMIN — DILTIAZEM HYDROCHLORIDE 120 MG: 120 CAPSULE, COATED, EXTENDED RELEASE ORAL at 07:20

## 2021-03-02 ASSESSMENT — PAIN SCALES - GENERAL: PAINLEVEL_OUTOF10: 8

## 2021-03-02 NOTE — PROGRESS NOTES
Pt o2  saturations in the low 80's. Nurse applied BiPAP. Saturations back up in the high 90's.  Pt sleeping with BiPAP on

## 2021-03-02 NOTE — PROGRESS NOTES
Today's Date: 3/2/2021  Patient Name: Murtis Castleman  Date of admission: 2/21/2021  9:13 AM  Patient's age: 70 y.o., 1945  Admission Dx: GI bleed [K92.2]    Reason for Consult: management recommendations  Requesting Physician: Osman Turcios MD    CHIEF COMPLAINT:  Recurrent anemia   Interim history  The patient is seen and evaluated. Continues to be struggling with BiPAP. She received some sedation to help with anxiety. Hemoglobin has been stable, no evidence of active bleeding. However her respiratory status has been worsening. HISTORY OF PRESENT ILLNESS:      The patient is a 70 y.o.  female who is admitted to the hospital for worsening respiratory status. She is known to us with history of iron deficiency anemia requiring IV iron and transfusions. She has AV malformation and her anemia has been exacerbated because of anticoagulation. We have stopped her Eliquis because of recurrent GI bleeding. Indication for anticoagulation is atrial fibrillation. The patient is admitted and underwent thoracocentesis. Condition is slowly improving. It is thought that her pleural effusion is transudative and related to COPD/cardiac dysfunction. DIAGNOSIS:   1. Iron deficiency anemia, malabsorption component   2. AV malformation  3. COPD  4.  Anticoagulation due to afib, stopped due to recurrent GI bleeding       CURRENT THERAPY:  Plan for work up and IV iron    BRIEF CASE HISTORY: Ana María Pederson is a very pleasant 70 y.o. female who is referred to us for iron deficiency anemia. She reports she has been taking oral iron. She had knee replacement 3 years ago and required 3 units of blood prior to surgery. She was stable until 02/2020 when her HGB began to decrease again. She has had frequent hospitalizations in 2020 with COPD exacerbation and poor kidney function with bruised kidney. She has followed with nephrology in the past but no recent follow up and has pulmonology referral. She has had some compliance issues with showing up for appointments. She has poor sleep with breathing issues and feels very fatigued. She has had several falls this year and has needed family to come pick her up, her legs are weak. She reports she had some bleeding following EGD and colonoscopy, AV malformation was found. She is on Eliquis for Afib but has not followed up with cardiology in the last year. Past Medical History:   has a past medical history of A-fib ([address] Acquired hypothyroidism, Acute encephalopathy, Acute kidney injury ([address] Anxiety, Benign hypertension with CKD (chronic kidney disease) stage III, Chronic back pain, CKD (chronic kidney disease) stage 3, GFR 30-59 ml/min, Constipation, COPD (chronic obstructive pulmonary disease) ([address] Cough, Depression, ETOH abuse, Former smoker, HA (generalized anxiety disorder), History of GI bleed, Hyperlipidemia, Hypertension, Hypothyroid, Leg pain, Normocytic anemia, Osteoarthritis, PAD (peripheral artery disease) ([address] Primary osteoarthritis, Pulmonary hypertension ([address] Pure hypercholesterolemia, SIRS (systemic inflammatory response syndrome) ([address] Urge incontinence, and Wheezing. Past Surgical History:   has a past surgical history that includes eye surgery (Bilateral); Colonoscopy; joint replacement; Upper gastrointestinal endoscopy (N/A, 12/2/2020); Colonoscopy (N/A, 12/2/2020); Upper gastrointestinal endoscopy (N/A, 12/23/2020); and Thoracentesis (12/26/2020). Medications:    Reviewed in Epic     Allergies:  Tizanidine    Social History:   reports that she has quit smoking. Her smoking use included cigarettes. She has a 78.00 pack-year smoking history. She has never used smokeless tobacco. She reports previous alcohol use. She reports that she does not use drugs. Family History: family history includes COPD in her mother; Cancer in an other family member; Emphysema in her sister; Heart Disease in her mother. REVIEW OF SYSTEMS:    Constitutional: No fever or chills. No night sweats, no weight loss   Eyes: No eye discharge, double vision, or eye pain   HEENT: negative for sore mouth, sore throat, hoarseness and voice change   Respiratory: negative for cough , sputum, dyspnea, wheezing, hemoptysis, chest pain   Cardiovascular: negative for chest pain, dyspnea, palpitations, orthopnea, PND   Gastrointestinal: negative for nausea, vomiting, diarrhea, constipation, abdominal pain, Dysphagia, hematemesis and hematochezia   Genitourinary: negative for frequency, dysuria, nocturia, urinary incontinence, and hematuria   Integument: negative for rash, skin lesions, bruises.    Hematologic/Lymphatic: negative for easy bruising, bleeding, lymphadenopathy, or petechiae   Endocrine: negative for heat or cold intolerance,weight changes, change in bowel habits and hair loss   Musculoskeletal: negative for myalgias, arthralgias, pain, joint swelling,and bone pain   Neurological: negative for headaches, dizziness, seizures, weakness, numbness    PHYSICAL EXAM: /66   Pulse 88   Temp (!) 89 °F (31.7 °C) (Oral)   Resp 21   Ht 5' 8\" (1.727 m)   Wt 175 lb 4.3 oz (79.5 kg)   SpO2 92%   BMI 26.65 kg/m²    Temp (24hrs), Av.9 °F (35.5 °C), Min:89 °F (31.7 °C), Max:98.3 °F (36.8 °C)    General appearance - well appearing, no in pain or distress   Mental status - alert and cooperative   Eyes - pupils equal and reactive, extraocular eye movements intact   Ears - bilateral TM's and external ear canals normal   Mouth - mucous membranes moist, pharynx normal without lesions   Neck - supple, no significant adenopathy   Lymphatics - no palpable lymphadenopathy, no hepatosplenomegaly   Chest - clear to auscultation, no wheezes, rales or rhonchi, symmetric air entry   Heart - normal rate, regular rhythm, normal S1, S2, no murmurs  Abdomen - soft, nontender, nondistended, no masses or organomegaly   Neurological - alert, oriented, normal speech, no focal findings or movement disorder noted   Musculoskeletal - no joint tenderness, deformity or swelling   Extremities - peripheral pulses normal, no pedal edema, no clubbing or cyanosis   Skin - normal coloration and turgor, no rashes, no suspicious skin lesions noted ,    DATA:    Labs:   CBC:   Recent Labs     21  0834 21  0631   WBC 6.7 6.5   HGB 8.3* 8.3*   HCT 26.4* 26.4*    270     BMP:   Recent Labs     21  0834 21  0631    146*   K 3.9 3.6*   CO2 45* >45*   BUN 11 11   CREATININE 0.69 0.71   LABGLOM >60 >60   GLUCOSE 149* 126*     PT/INR:   No results for input(s): PROTIME, INR in the last 72 hours.     IMAGING DATA:      Primary Problem  GI bleed    Active Hospital Problems    Diagnosis Date Noted    Hypernatremia [E87.0] 2021    Acute respiratory failure with hypoxia ([facility] [J96.01] 2021    Severe malnutrition ([facility] [E43] 2021    Iron deficiency anemia due to chronic blood loss [D50.0] 2020    GI bleed [K92.2] 2020  Chronic diastolic CHF (congestive heart failure) (HCC) [I50.32] 07/21/2020    Moderate major depression (HCC) [F32.1] 06/17/2020    PAD (peripheral artery disease) (HCC) [I73.9] 06/17/2020    CKD (chronic kidney disease) stage 3, GFR 30-59 ml/min [N18.30]     HA (generalized anxiety disorder) [F41.1] 12/13/2018    A-fib ([facility] [I48.91] 09/13/2018    COPD (chronic obstructive pulmonary disease) ([facility] [J44.9] 09/13/2018    Pulmonary hypertension ([facility] [I27.20] 09/13/2018    Hypertension [I10]     Acquired hypothyroidism [E03.9] 01/18/2013         IMPRESSION:   1. Recurrent Iron def anemia   2. Anticoagulation (due to afib) stopped  3. 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 Acute respiratory failure  5. History of CHF and COPD    RECOMMENDATIONS:  1. The patient hemoglobin is stable, however her respiratory status seems to be poor. 2. Consider discussions for goals of care. 3. Continue supportive care, hemoglobin is above 8, no need for transfusion  4. Off anticoagulation      Discussed with patient and Nurse. Thank you for asking us to see this patient.     ALEX CHAPA [facility] MD Derrick  Hematologist/Medical Oncologist  Cell: [phone]

## 2021-03-02 NOTE — PROGRESS NOTES
Pts bed alarm went off & stated she felt like she couldn't breath. Writer got pt to the side of the bed, no lung sound changes. Pt still felt short of breath sitting at the side of the bed & was getting panicky. Writer got SpO2, was into the low 80s. Writer adjusted Nasal Cannula position & had pt take deep breaths through her nose. After a few moments pt calmed down & SpO2 went back into the mid 90s. Pt requested to sit in the chair for the rest of the night. Writer helped pt to the chair & placed a personal alarm. No distress at this time, nasal cannula continued at 3L.

## 2021-03-02 NOTE — PROGRESS NOTES
Pt is refusing to wear Bipap mask now. \Pt stated it feels like she is suffocating & she panics. Writer educated pt on the importance. Mask removed & nasal cannula placed @3L at this time.

## 2021-03-02 NOTE — PROGRESS NOTES
Nurse reapplied bipap. Pt keeps removing it and trying to ambulate to bathroom. Bed alarm on. Call light within reach.  Bathroom offered

## 2021-03-02 NOTE — PLAN OF CARE
Problem: Falls - Risk of:  Goal: Will remain free from falls  Description: Will remain free from falls  3/2/2021 1316 by Jose Juan Jackson RN  Outcome: Ongoing     Problem: SAFETY  Goal: Free from accidental physical injury  3/2/2021 1316 by Jose Juan Jackson RN  Outcome: Ongoing     Problem: DAILY CARE  Goal: Daily care needs are met  3/2/2021 1316 by Jose Juan Jackson RN  Outcome: Ongoing     Problem: PAIN  Goal: Patient's pain/discomfort is manageable  3/2/2021 1316 by Jose Juan Jackson RN  Outcome: Ongoing     Problem: SKIN INTEGRITY  Goal: Skin integrity is maintained or improved  3/2/2021 1316 by Jose Juan Jackson RN  Outcome: Ongoing     Problem: KNOWLEDGE DEFICIT  Goal: Patient/S.O. demonstrates understanding of disease process, treatment plan, medications, and discharge instructions.   3/2/2021 1316 by Jose Juan Jackson RN  Outcome: Ongoing     Problem: DISCHARGE BARRIERS  Goal: Patient's continuum of care needs are met  3/2/2021 1316 by Jose Juan Jackson RN  Outcome: Ongoing     Problem: Nutrition  Goal: Optimal nutrition therapy  Description: Nutrition Problem #1: Inadequate oral intake  Intervention: Food and/or Nutrient Delivery: Continue Current Diet, Start Oral Nutrition Supplement  Nutritional Goals: po intake greater than 50%     3/2/2021 1316 by Jose Juan Jackson RN  Outcome: Ongoing     Problem: Pain:  Goal: Pain level will decrease  Description: Pain level will decrease  3/2/2021 1316 by Jose Juan Jackson RN  Outcome: Ongoing     Problem: Pain:  Goal: Control of acute pain  Description: Control of acute pain  3/2/2021 1316 by Jose Juan Jackson RN  Outcome: Ongoing     Problem: Pain:  Goal: Control of chronic pain  Description: Control of chronic pain  3/2/2021 1316 by Jose Juan Jackson RN  Outcome: Ongoing     Problem: Skin Integrity:  Goal: Will show no infection signs and symptoms  Description: Will show no infection signs and symptoms  3/2/2021 1316 by Jose Juan Jackson RN  Outcome: Ongoing     Problem: Skin Integrity: Goal: Absence of new skin breakdown  Description: Absence of new skin breakdown  3/2/2021 1316 by Simeon Montaño RN  Outcome: Ongoing     Problem: Airway Clearance - Ineffective  Goal: Achieve or maintain patent airway  3/2/2021 1316 by Simeon Montaño RN  Outcome: Ongoing     Problem: Gas Exchange - Impaired  Goal: Absence of hypoxia  3/2/2021 1316 by Simeon Montaño RN  Outcome: Ongoing     Problem: Gas Exchange - Impaired  Goal: Promote optimal lung function  3/2/2021 1316 by Simeon Montaño RN  Outcome: Ongoing     Problem: Breathing Pattern - Ineffective  Goal: Ability to achieve and maintain a regular respiratory rate  3/2/2021 1316 by Simeon Montaño RN  Outcome: Ongoing     Problem: Body Temperature -  Risk of, Imbalanced  Goal: Ability to maintain a body temperature within defined limits  3/2/2021 1316 by Simeon Montaño RN  Outcome: Ongoing     Problem: Body Temperature -  Risk of, Imbalanced  Goal: Will regain or maintain usual level of consciousness  3/2/2021 1316 by Simeon Montaño RN  Outcome: Ongoing     Problem:  Body Temperature -  Risk of, Imbalanced  Goal: Complications related to the disease process, condition or treatment will be avoided or minimized  3/2/2021 1316 by Simeon Montaño RN  Outcome: Ongoing     Problem: Isolation Precautions - Risk of Spread of Infection  Goal: Prevent transmission of infection  3/2/2021 1316 by Simeon Montaño RN  Outcome: Ongoing     Problem: Nutrition Deficits  Goal: Optimize nutritional status  3/2/2021 1316 by Simeon Montaño RN  Outcome: Ongoing     Problem: Risk for Fluid Volume Deficit  Goal: Maintain normal heart rhythm  3/2/2021 1316 by Simeon Montaño RN  Outcome: Ongoing     Problem: Risk for Fluid Volume Deficit  Goal: Maintain absence of muscle cramping  3/2/2021 1316 by Simeon Montaño RN  Outcome: Ongoing     Problem: Risk for Fluid Volume Deficit  Goal: Maintain normal serum potassium, sodium, calcium, phosphorus, and pH  3/2/2021 1316 by Simeon Montaño RN Outcome: Ongoing     Problem: Fatigue  Goal: Verbalize increase energy and improved vitality  3/2/2021 1316 by Jennifer Loredo RN  Outcome: Ongoing     Problem: Patient Education: Go to Patient Education Activity  Goal: Patient/Family Education  3/2/2021 1316 by Jennifer Loredo RN  Outcome: Ongoing

## 2021-03-02 NOTE — CONSULTS
Department of Internal Medicine  Nephrology Brittny Velazquez MD   Consult Note      SUBJECTIVE: This is a 70 y.o. female with a significant past medical history of Chronic atrial fibrillation, pulmonary hypertension, chronic recurrent anemia [s/p recent endoscopy showing AVMs requiring intermittent PRBC transfusions], COPD and chronic kidney disease stage II, who presented to the emergency department after falling at home. Laboratory studies revealed severe anemia with hemoglobin 6.8 g/dL and serum creatinine was 1.25 mg/dL. Nephrology consultation was called yesterday due to low urine output. Patient was given albumin and Lasix with prompt improvement in urine output. She feels well today and does not have shortness of breath or chest pain. Interval history:  Patient denies abdominal pain, rectal bleeding, nausea or vomiting. Renal function is improving with creatinine at baseline. Patient continues on BIPAP hypercapnic respiratory failure. CO2 of 92.       Scheduled Meds:   [START ON 3/3/2021] furosemide  20 mg Oral Daily    pantoprazole  40 mg Oral QAM AC    modafinil  200 mg Oral Daily    apixaban  2.5 mg Oral BID    iron sucrose  200 mg Intravenous Q24H    [Held by provider] furosemide  40 mg Oral Daily    ferrous sulfate  325 mg Oral TID WC    ipratropium-albuterol  1 ampule Inhalation Q4H WA    ascorbic acid  1,000 mg Oral Daily    atorvastatin  10 mg Oral Daily    busPIRone  10 mg Oral TID    dilTIAZem  120 mg Oral Daily    DULoxetine  60 mg Oral Daily    fluticasone  1 spray Each Nostril Daily    fluticasone  2 puff Inhalation BID    levothyroxine  125 mcg Oral Daily    lisinopril  2.5 mg Oral Lunch    metoprolol tartrate  25 mg Oral BID    sodium chloride flush  10 mL Intravenous 2 times per day     Continuous Infusions:   sodium chloride PRN Meds:. HYDROcodone-acetaminophen, acetaminophen, baclofen, clonazePAM, guaiFENesin, sodium chloride flush, promethazine **OR** ondansetron, acetaminophen **OR** acetaminophen, sodium chloride    Family History   Problem Relation Age of Onset    Heart Disease Mother     COPD Mother     Emphysema Sister     Cancer Other         Cousin - breast cancer        Social History     Socioeconomic History    Marital status:      Spouse name: None    Number of children: None    Years of education: None    Highest education level: None   Occupational History    None   Social Needs    Financial resource strain: None    Food insecurity     Worry: None     Inability: None    Transportation needs     Medical: None     Non-medical: None   Tobacco Use    Smoking status: Former Smoker     Packs/day: 3.00     Years: 26.00     Pack years: 78.00     Types: Cigarettes    Smokeless tobacco: Never Used   Substance and Sexual Activity    Alcohol use: Not Currently     Comment: occ    Drug use: No    Sexual activity: None   Lifestyle    Physical activity     Days per week: None     Minutes per session: None    Stress: None   Relationships    Social connections     Talks on phone: None     Gets together: None     Attends Taoism service: None     Active member of club or organization: None     Attends meetings of clubs or organizations: None     Relationship status: None    Intimate partner violence     Fear of current or ex partner: None     Emotionally abused: None     Physically abused: None     Forced sexual activity: None   Other Topics Concern    None   Social History Narrative    None     Review of systems: CNS - no headache or dizziness; Cardiac - no chest pain; Respiratory - no cough or shortness of breath; Gastrointestinal - Black tarry stools; no nausea, vomiting or diarrhea; Musculoskeletal - general body aches; Skin/Integument - no rashes.     Physical Exam: VITALS:  /66   Pulse 88   Temp (!) 89 °F (31.7 °C) (Oral)   Resp 20   Ht 5' 8\" (1.727 m)   Wt 175 lb 4.3 oz (79.5 kg)   SpO2 94%   BMI 26.65 kg/m²   24HR INTAKE/OUTPUT:      Intake/Output Summary (Last 24 hours) at 3/2/2021 1401  Last data filed at 3/2/2021 1152  Gross per 24 hour   Intake 360 ml   Output 1050 ml   Net -690 ml     Constitutional: alert, appears stated age and cooperative    Skin: Skin color, texture, turgor normal. No rashes or lesions    Head: Normocephalic, without obvious abnormality, atraumatic     Cardiovascular/Edema: regular rate and rhythm, S1, S2 normal, no murmur, click, rub or gallop    Respiratory: Lungs: clear to auscultation bilaterally    Abdomen: soft, non-tender; bowel sounds normal; no masses,  no organomegaly    Back: symmetric, no curvature. ROM normal. No CVA tenderness.     Extremities: extremities normal, atraumatic, no cyanosis or edema    Neuro:  Grossly normal    CBC:   Recent Labs     02/28/21  1244 03/01/21  0834 03/02/21  0631   WBC  --  6.7 6.5   HGB 8.3* 8.3* 8.3*   PLT  --  260 270     BMP:    Recent Labs     02/28/21  0145 03/01/21  0834 03/02/21  0631    143 146*   K 4.1 3.9 3.6*   CL 96* 96* 94*   CO2 43* 45* >45*   BUN 16 11 11   CREATININE 0.72 0.69 0.71   GLUCOSE 127* 149* 126*       Lab Results   Component Value Date    NITRU NEGATIVE 02/22/2021    COLORU YELLOW 02/22/2021    PHUR 5.5 02/22/2021    WBCUA 2 TO 5 02/21/2021    RBCUA 0 TO 2 02/21/2021    MUCUS NOT REPORTED 02/21/2021    TRICHOMONAS NOT REPORTED 02/21/2021    YEAST NOT REPORTED 02/21/2021    BACTERIA FEW 02/21/2021    CLARITYU Clear 01/31/2020    SPECGRAV 1.014 02/22/2021    LEUKOCYTESUR NEGATIVE 02/22/2021    UROBILINOGEN Normal 02/22/2021    BILIRUBINUR NEGATIVE 02/22/2021    BILIRUBINUR 0 01/31/2020    BLOODU Negative 01/31/2020    GLUCOSEU NEGATIVE 02/22/2021    KETUA NEGATIVE 02/22/2021    AMORPHOUS 1+ 02/21/2021     Urine Sodium:     Lab Results   Component Value Date ANGÉLICA 82 2021     Urine Potassium:    Lab Results   Component Value Date    KUR 35.7 2020     Urine Chloride:    Lab Results   Component Value Date    CLUR 113 2021     Urine Creatinine:     Lab Results   Component Value Date    LABCREA 137.9 2021    LABCREA 134.5 2021     IMPRESSION/RECOMMENDATIONS:     1. Chronic kidney disease stage 3- renal function is stable. Plan  Monitor urine output closely. .  Basic metabolic profile daily    2. Hypernatremia -  Oral Free water intake up to 1.5 L/day      3. Acute on chronic anemia. Hemoglobin 8.5 g and stable      4. History of chronic CHF with preserved ejection fraction-   lasix to 20 mg dly. 1500 mls fluid restriction    5. Left   Pleural effusion-status post thoracentesis 1 L. 6.Hypercapnic respiratory failure-pulmonary following -patient is on BiPAP-pulmonology following. 7.Metabolic alkalosis 2nd to acute hypercapnic respiratory failure with metabolic compensation-acetazolamide not indicated.     MD ROXIE Steinberg  Attending Nephrologist  3/2/2021 2:01 PM

## 2021-03-02 NOTE — PROGRESS NOTES
FAMILY MEDICINE  - PROGRESS NOTE    Date:  3/2/2021  Eli Art  [id number]      Chief Complaint   Patient presents with    Fall         Interval History:  not changed, her CXR shows that her pleural effusions are slowly increasing. She is not completely compliant with BiPap.        Subjective  Constitutional: positive for fatigue and overweight  Respiratory: positive for emphysema and shortness of breath  Cardiovascular: positive for irregular heart beat  Hematologic/lymphatic: positive for pallor  Behavioral/Psych: positive for anxiety and depression:    Objective:    BP (!) 128/55   Pulse 82   Temp 98.2 °F (36.8 °C) (Oral)   Resp 19   Ht 5' 8\" (1.727 m)   Wt 175 lb 4.3 oz (79.5 kg)   SpO2 94%   BMI 26.65 kg/m²   General appearance - alert, well appearing, and in no distress and overweight  Mental status - alert, oriented to person, place, and time  Eyes - pupils equal and reactive, extraocular eye movements intact  Ears - hearing grossly normal bilaterally  Nose - normal and patent, no erythema, discharge or polyps  Mouth - mucous membranes moist, pharynx normal without lesions  Neck - supple, no significant adenopathy  Lymphatics - no palpable lymphadenopathy, no hepatosplenomegaly  Chest - clear to auscultation, no wheezes, rales or rhonchi, symmetric air entry, decreased air entry noted posteriorly  Heart - irregularly irregular rhythm with rate 82  Abdomen - soft, nontender, nondistended, no masses or organomegaly  Breasts - not examined  Back exam - not examined  Neurological - alert, oriented, normal speech, no focal findings or movement disorder noted  Musculoskeletal - osteoarthritic changes noted in both hands  Extremities - peripheral pulses normal, no pedal edema, no clubbing or cyanosis  Skin - normal coloration and turgor, no rashes, no suspicious skin lesions noted    Data:   Medications:   Current Facility-Administered Medications Medication Dose Route Frequency Provider Last Rate Last Admin    pantoprazole (PROTONIX) tablet 40 mg  40 mg Oral QAM AC [facility], APRN - NP   40 mg at 03/02/21 0559    modafinil (PROVIGIL) tablet 200 mg  200 mg Oral Daily Freedom De La Rosa APRN - CNP   200 mg at 03/01/21 1359    furosemide (LASIX) tablet 40 mg  40 mg Oral Daily Eladio Rose MD   40 mg at 03/01/21 2185    apixaban (ELIQUIS) tablet 2.5 mg  2.5 mg Oral BID Trinda Lesch, MD   2.5 mg at 03/01/21 2200    iron sucrose (VENOFER) 200 mg in sodium chloride 0.9 % 100 mL IVPB  200 mg Intravenous Q24H Oliverio Meza MD   Stopped at 03/01/21 1900    [Held by provider] furosemide (LASIX) tablet 40 mg  40 mg Oral Daily Eladio Rose MD   40 mg at 02/27/21 5594    ferrous sulfate (IRON 325) tablet 325 mg  325 mg Oral TID  Trinda Lesch, MD   325 mg at 03/01/21 1750    HYDROcodone-acetaminophen (NORCO) 5-325 MG per tablet 1 tablet  1 tablet Oral Q6H PRN Trinda Lesch, MD   1 tablet at 03/01/21 2200    ipratropium-albuterol (DUONEB) nebulizer solution 1 ampule  1 ampule Inhalation Q4H WA Freedom De La Rosa APRN - CNP   1 ampule at 03/01/21 1833    acetaminophen (TYLENOL) tablet 650 mg  650 mg Oral Q4H PRN Asia Leon MD   650 mg at 02/24/21 2140    ascorbic acid (VITAMIN C) tablet 1,000 mg  1,000 mg Oral Daily Asia Leon MD   1,000 mg at 03/01/21 0753    atorvastatin (LIPITOR) tablet 10 mg  10 mg Oral Daily Asia Leon MD   10 mg at 03/01/21 0753    baclofen (LIORESAL) tablet 10 mg  10 mg Oral TID PRN Asia Leon MD   10 mg at 03/01/21 2159    busPIRone (BUSPAR) tablet 10 mg  10 mg Oral TID Asia Leon MD   10 mg at 03/01/21 2159    clonazePAM (KLONOPIN) tablet 0.5 mg  0.5 mg Oral TID PRN Asia Leon MD   0.5 mg at 03/01/21 2200    dilTIAZem (CARDIZEM CD) extended release capsule 120 mg  120 mg Oral Daily Asia Leon MD   120 mg at 03/01/21 9940  DULoxetine (CYMBALTA) extended release capsule 60 mg  60 mg Oral Daily Travon Davis MD   60 mg at 03/01/21 0753    fluticasone (FLONASE) 50 MCG/ACT nasal spray 1 spray  1 spray Each Nostril Daily Travon Davis MD   1 spray at 03/01/21 0755    fluticasone (FLOVENT HFA) 220 MCG/ACT inhaler 2 puff  2 puff Inhalation BID Travon Davis MD   2 puff at 03/01/21 2200    guaiFENesin [facility]) extended release tablet 1,200 mg  1,200 mg Oral Q6H PRN Travon Davis MD        levothyroxine (SYNTHROID) tablet 125 mcg  125 mcg Oral Daily Travon Davis MD   125 mcg at 03/02/21 0559    lisinopril (PRINIVIL;ZESTRIL) tablet 2.5 mg  2.5 mg Oral Lunch Travon Davis MD   2.5 mg at 02/28/21 1201    metoprolol tartrate (LOPRESSOR) tablet 25 mg  25 mg Oral BID Travon Davis MD   25 mg at 03/01/21 2159    sodium chloride flush 0.9 % injection 10 mL  10 mL Intravenous 2 times per day Travon Davis MD   10 mL at 03/01/21 2200    sodium chloride flush 0.9 % injection 10 mL  10 mL Intravenous PRN Travon Davis MD        promethazine (PHENERGAN) tablet 12.5 mg  12.5 mg Oral Q6H PRN Travon Davis MD        Or    ondansetron [facility]) injection 4 mg  4 mg Intravenous Q6H PRN Travon Davis MD        acetaminophen (TYLENOL) tablet 650 mg  650 mg Oral Q6H PRN Travon Davis MD   650 mg at 02/25/21 2109    Or    acetaminophen (TYLENOL) suppository 650 mg  650 mg Rectal Q6H PRN Travon Davis MD        0.9 % sodium chloride infusion   Intravenous PRN Carie Link DO           Intake/Output Summary (Last 24 hours) at 3/2/2021 0614  Last data filed at 3/2/2021 0559  Gross per 24 hour   Intake 600 ml   Output 1550 ml   Net -950 ml     Recent Results (from the past 24 hour(s))   Basic Metabolic Panel    Collection Time: 03/01/21  8:34 AM   Result Value Ref Range    Glucose 149 (H) 70 - 99 mg/dL    BUN 11 8 - 23 mg/dL CREATININE 0.69 0.50 - 0.90 mg/dL    Bun/Cre Ratio NOT REPORTED 9 - 20    Calcium 9.1 8.6 - 10.4 mg/dL    Sodium 143 135 - 144 mmol/L    Potassium 3.9 3.7 - 5.3 mmol/L    Chloride 96 (L) 98 - 107 mmol/L    CO2 45 (HH) 20 - 31 mmol/L    Anion Gap 2 (L) 9 - 17 mmol/L    GFR Non-African American >60 >60 mL/min    GFR African American >60 >60 mL/min    GFR Comment          GFR Staging NOT REPORTED    CBC Auto Differential    Collection Time: 03/01/21  8:34 AM   Result Value Ref Range    WBC 6.7 3.5 - 11.0 k/uL    RBC 2.59 (L) 4.0 - 5.2 m/uL    Hemoglobin 8.3 (L) 12.0 - 16.0 g/dL    Hematocrit 26.4 (L) 36 - 46 %    .7 (H) 80 - 100 fL    MCH 31.9 26 - 34 pg    MCHC 31.4 31 - 37 g/dL    RDW 21.0 (H) 11.5 - 14.9 %    Platelets 847 236 - 365 k/uL    MPV 8.3 6.0 - 12.0 fL    NRBC Automated NOT REPORTED per 100 WBC    Differential Type NOT REPORTED     Immature Granulocytes NOT REPORTED 0 %    Absolute Immature Granulocyte NOT REPORTED 0.00 - 0.30 k/uL    WBC Morphology NOT REPORTED     RBC Morphology NOT REPORTED     Platelet Estimate NOT REPORTED     Seg Neutrophils 88 (H) 36 - 66 %    Lymphocytes 4 (L) 24 - 44 %    Monocytes 6 1 - 7 %    Eosinophils % 0 0 - 4 %    Basophils 1 0 - 2 %    Bands 1 0 - 10 %    Segs Absolute 5.89 1.3 - 9.1 k/uL    Absolute Lymph # 0.27 (L) 1.0 - 4.8 k/uL    Absolute Mono # 0.40 0.1 - 1.3 k/uL    Absolute Eos # 0.00 0.0 - 0.4 k/uL    Basophils Absolute 0.07 0.0 - 0.2 k/uL    Absolute Bands # 0.07 0.0 - 1.0 k/uL    Morphology ANISOCYTOSIS PRESENT     Morphology 1+ ELLIPTOCYTES     Morphology 1+ TEARDROPS     Morphology MACROCYTOSIS PRESENT    Magnesium    Collection Time: 03/01/21  8:34 AM   Result Value Ref Range    Magnesium 2.0 1.6 - 2.6 mg/dL   Arterial Blood Gases    Collection Time: 03/01/21 11:35 AM   Result Value Ref Range    pH, Arterial 7.353 7.350 - 7.450    pCO2, Arterial 92.4 (HH) 35.0 - 45.0 mmHg    pO2, Arterial 52.3 (LL) 80.0 - 100.0 mmHg HCO3, Arterial 51.4 (H) 22.0 - 26.0 mmol/L    Positive Base Excess, Art 25.8 (H) 0.0 - 2.0 mmol/L    Negative Base Excess, Art NOT REPORTED 0.0 - 2.0 mmol/L    O2 Sat, Arterial 84.6 (LL) 95 - 98 %    Total Hb NOT REPORTED 12.0 - 16.0 g/dl    Oxyhemoglobin NOT REPORTED 95.0 - 98.0 %    Carboxyhemoglobin 2.8 0 - 5 %    Methemoglobin 0.9 0.0 - 1.9 %    Pt Temp 37.0     pH, Art, Temp Adj NOT REPORTED 7.350 - 7.450    pCO2, Art, Temp Adj NOT REPORTED 35.0 - 45.0    pO2, Art, Temp Adj NOT REPORTED 80.0 - 100.0 mmHg    O2 Device/Flow/% Cannula     Respiratory Rate 20     [place] Test PASS     Sample Site Right Radial Artery     Pt.  Position SEMI-FOWLERS     Mode NOT REPORTED     Set Rate NOT REPORTED     Total Rate NOT REPORTED     VT NOT REPORTED     FIO2 NOT REPORTED     Peep/Cpap NOT REPORTED     PSV NOT REPORTED     Text for Respiratory 2LPM CANNULA RESULTS TO WOODY CASTILLO     NOTIFICATION NOT REPORTED     NOTIFICATION TIME NOT REPORTED      -----------------------------------------------------------------  RAD:  EKG:  Micro:     Assessment & Plan:    Patient Active Problem List:     Acquired hypothyroidism     Hypertension     Primary osteoarthritis of right knee     A-fib (HCC)     Acute encephalopathy     SIRS (systemic inflammatory response syndrome) ([facility])     Normocytic anemia     Pulmonary hypertension (HCC)     COPD (chronic obstructive pulmonary disease) (HCC)     History of GI bleed     Constipation     HA (generalized anxiety disorder)     Lumbar radiculopathy     Lumbosacral spondylosis without myelopathy     Benign hypertension with CKD (chronic kidney disease) stage III     CKD (chronic kidney disease) stage 3, GFR 30-59 ml/min     Alcohol withdrawal syndrome with complication (HCC)     Moderate major depression (HCC)     PAD (peripheral artery disease) (HCC)     Acute kidney injury (HCC)     Obesity, Class I, BMI 30-34.9     Chronic diastolic CHF (congestive heart failure) (HCC)     GI bleed     Weight loss Elevated alkaline phosphatase level     Thrombocytosis (HCC)     Iron deficiency anemia due to chronic blood loss     Iron malabsorption     Symptomatic anemia     Pneumonia due to organism     Anemia     Severe anemia     Overweight (BMI 25.0-29. 9)     Severe malnutrition ([address]     Renal failure     Acute renal failure (ARF) (HCC)     Acute cystitis without hematuria     Hypoxia     Acute respiratory failure with hypoxia (HCC)     Hypernatremia           Plan:  -Recurrent iron deficiency anemia - hgb stable, on iron supplementation, Hematology following.  -A. Fib. - resumed Eliquis at a lower dose, 2.5 mg BID. -Acute respiratory failure with COPD - not improving, on O2 per NC by day, BiPap at night, Pulmonology managing. -D/C plan is to SNF when ready.  -Continue current treatments.  -Complete orders per chart.     See orders   Disposition:    Electronically signed by Heber Quezada MD on 3/2/2021 at 6:14 AM

## 2021-03-02 NOTE — PROGRESS NOTES
Pt has gradually gotten confused over the night, since about 9pm. Writer was told this is typical of her at night. Pt set bed alarm off & was trying to get out of bed after ripping O2 off. Writer & PCT talked pt back into bed & placed O2 back on.

## 2021-03-02 NOTE — PLAN OF CARE
Problem: Falls - Risk of:  Goal: Will remain free from falls  Description: Will remain free from falls  3/2/2021 0432 by Leatha Osei RN  Outcome: Ongoing  3/1/2021 1711 by Marty Wolfe RN  Outcome: Ongoing  Note: No falls this shift. Call light within reach and siderails x2. Bed in lowest position. Patient safety maintained. Problem: SAFETY  Goal: Free from accidental physical injury  3/2/2021 0432 by Leatha Osei RN  Outcome: Ongoing  3/1/2021 1711 by Marty Wolfe RN  Outcome: Ongoing  Note: No falls this shift. Call light within reach and siderails x2. Bed in lowest position. Patient safety maintained. Problem: DAILY CARE  Goal: Daily care needs are met  3/2/2021 0432 by Leatha Osei RN  Outcome: Ongoing  3/1/2021 1711 by Marty Wolfe RN  Outcome: Ongoing     Problem: PAIN  Goal: Patient's pain/discomfort is manageable  3/2/2021 0432 by Leatha Osei RN  Outcome: Ongoing  3/1/2021 1711 by Marty Wolfe RN  Outcome: Ongoing     Problem: SKIN INTEGRITY  Goal: Skin integrity is maintained or improved  3/2/2021 0432 by Leatha Osei RN  Outcome: Ongoing  3/1/2021 1711 by Marty Wolfe RN  Outcome: Ongoing  Note: Skin assessment as charted. No new areas of breakdown. Problem: KNOWLEDGE DEFICIT  Goal: Patient/S.O. demonstrates understanding of disease process, treatment plan, medications, and discharge instructions.   3/2/2021 0432 by Leatha Osei RN  Outcome: Ongoing  3/1/2021 1711 by Marty Wolfe RN  Outcome: Ongoing     Problem: DISCHARGE BARRIERS  Goal: Patient's continuum of care needs are met  3/2/2021 0432 by Leatha Osei RN  Outcome: Ongoing  3/1/2021 1711 by Marty Wolfe RN  Outcome: Ongoing  Note: Case management is following for further discharge needs      Problem: Nutrition  Goal: Optimal nutrition therapy  Description: Nutrition Problem #1: Inadequate oral intake 3/1/2021 1711 by Robin Thacker RN  Outcome: Ongoing  Note: Respiratory assessment as charted. No signs or symptoms of distress. Problem: Breathing Pattern - Ineffective  Goal: Ability to achieve and maintain a regular respiratory rate  3/2/2021 0432 by Lazaro Tyson RN  Outcome: Ongoing  3/1/2021 1711 by Robin Thacker RN  Outcome: Ongoing  Note: Respiratory assessment as charted. No signs or symptoms of distress. Problem:  Body Temperature -  Risk of, Imbalanced  Goal: Ability to maintain a body temperature within defined limits  3/2/2021 0432 by Lazaro Tyson RN  Outcome: Ongoing  3/1/2021 1711 by Robin Thacker RN  Outcome: Ongoing  Goal: Will regain or maintain usual level of consciousness  3/2/2021 0432 by Lazaro Tyson RN  Outcome: Ongoing  3/1/2021 1711 by Robin Thacker RN  Outcome: Ongoing  Goal: Complications related to the disease process, condition or treatment will be avoided or minimized  3/2/2021 0432 by Lazaro Tyson RN  Outcome: Ongoing  3/1/2021 1711 by Robin Thacker RN  Outcome: Ongoing     Problem: Isolation Precautions - Risk of Spread of Infection  Goal: Prevent transmission of infection  3/2/2021 0432 by Lazaro Tyson RN  Outcome: Ongoing  3/1/2021 1711 by Robin Thacker RN  Outcome: Ongoing     Problem: Nutrition Deficits  Goal: Optimize nutritional status  3/2/2021 0432 by Lazaro Tyson RN  Outcome: Ongoing  3/1/2021 1711 by Robin Thacker RN  Outcome: Ongoing     Problem: Risk for Fluid Volume Deficit  Goal: Maintain normal heart rhythm  3/2/2021 0432 by Lazaro Tyson RN  Outcome: Ongoing  3/1/2021 1711 by Robin Thacker RN  Outcome: Ongoing  Goal: Maintain absence of muscle cramping  3/2/2021 0432 by Lazaro Tyson RN  Outcome: Ongoing  3/1/2021 1711 by Robin Thacker RN  Outcome: Ongoing  Goal: Maintain normal serum potassium, sodium, calcium, phosphorus, and pH  3/2/2021 0432 by Lazaro Tyson RN  Outcome: Ongoing 3/1/2021 1711 by Leticia Marte RN  Outcome: Ongoing     Problem: Fatigue  Goal: Verbalize increase energy and improved vitality  3/2/2021 0432 by Tiago Norris RN  Outcome: Ongoing  3/1/2021 1711 by Leticia Marte RN  Outcome: Ongoing     Problem: Patient Education: Go to Patient Education Activity  Goal: Patient/Family Education  3/2/2021 0432 by Tiago Norris RN  Outcome: Ongoing  3/1/2021 1711 by Leticia Marte RN  Outcome: Ongoing

## 2021-03-02 NOTE — PROGRESS NOTES
PULMONARY PROGRESS NOTE:    REASON FOR VISIT:Pl effusion, dyspnea  Interval History:    Shortness of Breath: yes   Cough: no  Sputum: no          Hemoptysis: no  Chest Pain: no  Fever: no                   Swelling Feet: yes   Headache: no                                           Nausea, Emesis, Abdominal Pain: no  Diarrhea: no         Constipation: no    Events since last visit: ABG yesterday with pco2 92.4. Did not wear bipap overnight. Per RN notes she did wear this morning.      PAST MEDICAL HISTORY:      Scheduled Meds:   pantoprazole  40 mg Oral QAM AC    modafinil  200 mg Oral Daily    furosemide  40 mg Oral Daily    apixaban  2.5 mg Oral BID    iron sucrose  200 mg Intravenous Q24H    [Held by provider] furosemide  40 mg Oral Daily    ferrous sulfate  325 mg Oral TID WC    ipratropium-albuterol  1 ampule Inhalation Q4H WA    ascorbic acid  1,000 mg Oral Daily    atorvastatin  10 mg Oral Daily    busPIRone  10 mg Oral TID    dilTIAZem  120 mg Oral Daily    DULoxetine  60 mg Oral Daily    fluticasone  1 spray Each Nostril Daily    fluticasone  2 puff Inhalation BID    levothyroxine  125 mcg Oral Daily    lisinopril  2.5 mg Oral Lunch    metoprolol tartrate  25 mg Oral BID    sodium chloride flush  10 mL Intravenous 2 times per day     Continuous Infusions:   sodium chloride       PRN Meds:HYDROcodone-acetaminophen, acetaminophen, baclofen, clonazePAM, guaiFENesin, sodium chloride flush, promethazine **OR** ondansetron, acetaminophen **OR** acetaminophen, sodium chloride        PHYSICAL EXAMINATION:  /60   Pulse 84   Temp 98 °F (36.7 °C) (Oral)   Resp 18   Ht 5' 8\" (1.727 m)   Wt 175 lb 4.3 oz (79.5 kg)   SpO2 95%   BMI 26.65 kg/m²     General : Awake, alert, NAD   Neck  supple, no lymphadenopathy, JVD not raised  Heart  regular rhythm, S1 and S2 normal; no additional sounds heard  Lungs  Air Entry- fair bilaterally; breath sounds : vesicular; rales/crackles - absent, 96% on 4L  Abdomen  soft, no tenderness  Upper Extremities  - no cyanosis, mottling; edema : absent  Lower Extremities: no cyanosis, mottling; edema : mild     Current Laboratory, Radiologic, Microbiologic, and Diagnostic studies reviewed  Data ReviewCBC:   Recent Labs     02/28/21  1244 03/01/21  0834 03/02/21  0631   WBC  --  6.7 6.5   RBC  --  2.59* 2.62*   HGB 8.3* 8.3* 8.3*   HCT 26.6* 26.4* 26.4*   PLT  --  260 270     BMP:   Recent Labs     02/28/21  0145 03/01/21  0834 03/02/21  0631   GLUCOSE 127* 149* 126*    143 146*   K 4.1 3.9 3.6*   BUN 16 11 11   CREATININE 0.72 0.69 0.71   CALCIUM 8.7 9.1 9.2     ABGs:   Recent Labs     02/27/21  1649 03/01/21  1135   PHART 7.367 7.353   PO2ART 68.8* 52.3*   UTF8XLB 85.9* 92.4*   ITS3UOH 49.3* 51.4*   P6XFHTOT 92.3* 84.6*      PT/INR:  No results found for: PTINR    ASSESSMENT / PLAN:  Nasal congestion - humidify O2, sudafed  Acute hypoxic resp failure - O2  Left pl effusion - monitor, diuresis,   GI bleed - per GI service   Thoracentesis per IR 2/25/21- 1L fluid removed left side - transudate  Previous Covid positive 1/1/21  Chronic Hypercapnic resp failure - try BPAP - intolerant  CXR 3/1- slightly increased pleural effusions  Start provigil  Discussed with Dr. Julia Sevilla       Electronically signed by Doug Delgadillo on 03/02/21

## 2021-03-03 LAB
ABSOLUTE EOS #: 0.19 K/UL (ref 0–0.4)
ABSOLUTE IMMATURE GRANULOCYTE: ABNORMAL K/UL (ref 0–0.3)
ABSOLUTE LYMPH #: 0.45 K/UL (ref 1–4.8)
ABSOLUTE MONO #: 0.77 K/UL (ref 0.1–1.3)
ANION GAP SERPL CALCULATED.3IONS-SCNC: ABNORMAL MMOL/L (ref 9–17)
BASOPHILS # BLD: 1 % (ref 0–2)
BASOPHILS ABSOLUTE: 0.06 K/UL (ref 0–0.2)
BUN BLDV-MCNC: 11 MG/DL (ref 8–23)
BUN/CREAT BLD: ABNORMAL (ref 9–20)
CALCIUM SERPL-MCNC: 8.7 MG/DL (ref 8.6–10.4)
CHLORIDE BLD-SCNC: 90 MMOL/L (ref 98–107)
CO2: >45 MMOL/L (ref 20–31)
CREAT SERPL-MCNC: 0.65 MG/DL (ref 0.5–0.9)
CULTURE: ABNORMAL
DIFFERENTIAL TYPE: ABNORMAL
DIRECT EXAM: ABNORMAL
EOSINOPHILS RELATIVE PERCENT: 3 % (ref 0–4)
GFR AFRICAN AMERICAN: >60 ML/MIN
GFR NON-AFRICAN AMERICAN: >60 ML/MIN
GFR SERPL CREATININE-BSD FRML MDRD: ABNORMAL ML/MIN/{1.73_M2}
GFR SERPL CREATININE-BSD FRML MDRD: ABNORMAL ML/MIN/{1.73_M2}
GLUCOSE BLD-MCNC: 101 MG/DL (ref 70–99)
HCT VFR BLD CALC: 26.4 % (ref 36–46)
HEMOGLOBIN: 8.3 G/DL (ref 12–16)
IMMATURE GRANULOCYTES: ABNORMAL %
LYMPHOCYTES # BLD: 7 % (ref 24–44)
Lab: ABNORMAL
MCH RBC QN AUTO: 31.5 PG (ref 26–34)
MCHC RBC AUTO-ENTMCNC: 31.6 G/DL (ref 31–37)
MCV RBC AUTO: 99.8 FL (ref 80–100)
MONOCYTES # BLD: 12 % (ref 1–7)
MORPHOLOGY: ABNORMAL
NRBC AUTOMATED: ABNORMAL PER 100 WBC
PDW BLD-RTO: 21.2 % (ref 11.5–14.9)
PLATELET # BLD: 253 K/UL (ref 150–450)
PLATELET ESTIMATE: ABNORMAL
PMV BLD AUTO: 8 FL (ref 6–12)
POTASSIUM SERPL-SCNC: 3.2 MMOL/L (ref 3.7–5.3)
RBC # BLD: 2.65 M/UL (ref 4–5.2)
RBC # BLD: ABNORMAL 10*6/UL
SEG NEUTROPHILS: 77 % (ref 36–66)
SEGMENTED NEUTROPHILS ABSOLUTE COUNT: 4.93 K/UL (ref 1.3–9.1)
SODIUM BLD-SCNC: 144 MMOL/L (ref 135–144)
SPECIMEN DESCRIPTION: ABNORMAL
WBC # BLD: 6.4 K/UL (ref 3.5–11)
WBC # BLD: ABNORMAL 10*3/UL

## 2021-03-03 PROCEDURE — 94660 CPAP INITIATION&MGMT: CPT

## 2021-03-03 PROCEDURE — 6370000000 HC RX 637 (ALT 250 FOR IP): Performed by: NURSE PRACTITIONER

## 2021-03-03 PROCEDURE — 2700000000 HC OXYGEN THERAPY PER DAY

## 2021-03-03 PROCEDURE — 6370000000 HC RX 637 (ALT 250 FOR IP): Performed by: FAMILY MEDICINE

## 2021-03-03 PROCEDURE — 6370000000 HC RX 637 (ALT 250 FOR IP): Performed by: INTERNAL MEDICINE

## 2021-03-03 PROCEDURE — 6360000002 HC RX W HCPCS: Performed by: INTERNAL MEDICINE

## 2021-03-03 PROCEDURE — 2060000000 HC ICU INTERMEDIATE R&B

## 2021-03-03 PROCEDURE — 99232 SBSQ HOSP IP/OBS MODERATE 35: CPT | Performed by: FAMILY MEDICINE

## 2021-03-03 PROCEDURE — 80048 BASIC METABOLIC PNL TOTAL CA: CPT

## 2021-03-03 PROCEDURE — 2580000003 HC RX 258: Performed by: FAMILY MEDICINE

## 2021-03-03 PROCEDURE — 2580000003 HC RX 258: Performed by: INTERNAL MEDICINE

## 2021-03-03 PROCEDURE — 85025 COMPLETE CBC W/AUTO DIFF WBC: CPT

## 2021-03-03 PROCEDURE — 36415 COLL VENOUS BLD VENIPUNCTURE: CPT

## 2021-03-03 PROCEDURE — 94761 N-INVAS EAR/PLS OXIMETRY MLT: CPT

## 2021-03-03 PROCEDURE — 99232 SBSQ HOSP IP/OBS MODERATE 35: CPT | Performed by: INTERNAL MEDICINE

## 2021-03-03 PROCEDURE — 94640 AIRWAY INHALATION TREATMENT: CPT

## 2021-03-03 RX ORDER — FUROSEMIDE 10 MG/ML
40 INJECTION INTRAMUSCULAR; INTRAVENOUS ONCE
Status: COMPLETED | OUTPATIENT
Start: 2021-03-03 | End: 2021-03-03

## 2021-03-03 RX ORDER — POTASSIUM CHLORIDE 20 MEQ/1
40 TABLET, EXTENDED RELEASE ORAL PRN
Status: DISCONTINUED | OUTPATIENT
Start: 2021-03-03 | End: 2021-03-08 | Stop reason: HOSPADM

## 2021-03-03 RX ORDER — POTASSIUM CHLORIDE 20 MEQ/1
40 TABLET, EXTENDED RELEASE ORAL ONCE
Status: COMPLETED | OUTPATIENT
Start: 2021-03-03 | End: 2021-03-03

## 2021-03-03 RX ORDER — POTASSIUM CHLORIDE 7.45 MG/ML
10 INJECTION INTRAVENOUS PRN
Status: DISCONTINUED | OUTPATIENT
Start: 2021-03-03 | End: 2021-03-08 | Stop reason: HOSPADM

## 2021-03-03 RX ADMIN — HYDROCODONE BITARTRATE AND ACETAMINOPHEN 1 TABLET: 5; 325 TABLET ORAL at 00:10

## 2021-03-03 RX ADMIN — ATORVASTATIN CALCIUM 10 MG: 10 TABLET, FILM COATED ORAL at 09:35

## 2021-03-03 RX ADMIN — LISINOPRIL 2.5 MG: 5 TABLET ORAL at 13:00

## 2021-03-03 RX ADMIN — FUROSEMIDE 40 MG: 10 INJECTION, SOLUTION INTRAMUSCULAR; INTRAVENOUS at 15:53

## 2021-03-03 RX ADMIN — FLUTICASONE PROPIONATE 2 PUFF: 220 AEROSOL, METERED RESPIRATORY (INHALATION) at 22:07

## 2021-03-03 RX ADMIN — CLONAZEPAM 0.5 MG: 1 TABLET ORAL at 00:10

## 2021-03-03 RX ADMIN — SODIUM CHLORIDE, PRESERVATIVE FREE 10 ML: 5 INJECTION INTRAVENOUS at 22:08

## 2021-03-03 RX ADMIN — DULOXETINE 60 MG: 60 CAPSULE, DELAYED RELEASE ORAL at 09:35

## 2021-03-03 RX ADMIN — BUSPIRONE HYDROCHLORIDE 10 MG: 10 TABLET ORAL at 22:08

## 2021-03-03 RX ADMIN — IPRATROPIUM BROMIDE AND ALBUTEROL SULFATE 1 AMPULE: 2.5; .5 SOLUTION RESPIRATORY (INHALATION) at 15:21

## 2021-03-03 RX ADMIN — DILTIAZEM HYDROCHLORIDE 120 MG: 120 CAPSULE, COATED, EXTENDED RELEASE ORAL at 09:35

## 2021-03-03 RX ADMIN — METOPROLOL TARTRATE 25 MG: 25 TABLET, FILM COATED ORAL at 22:08

## 2021-03-03 RX ADMIN — IRON SUCROSE 200 MG: 20 INJECTION, SOLUTION INTRAVENOUS at 16:50

## 2021-03-03 RX ADMIN — BUSPIRONE HYDROCHLORIDE 10 MG: 10 TABLET ORAL at 13:00

## 2021-03-03 RX ADMIN — IPRATROPIUM BROMIDE AND ALBUTEROL SULFATE 1 AMPULE: 2.5; .5 SOLUTION RESPIRATORY (INHALATION) at 10:57

## 2021-03-03 RX ADMIN — POTASSIUM CHLORIDE 40 MEQ: 1500 TABLET, EXTENDED RELEASE ORAL at 10:26

## 2021-03-03 RX ADMIN — FERROUS SULFATE TAB 325 MG (65 MG ELEMENTAL FE) 325 MG: 325 (65 FE) TAB at 10:26

## 2021-03-03 RX ADMIN — APIXABAN 2.5 MG: 2.5 TABLET, FILM COATED ORAL at 22:08

## 2021-03-03 RX ADMIN — FUROSEMIDE 20 MG: 20 TABLET ORAL at 09:35

## 2021-03-03 RX ADMIN — METOPROLOL TARTRATE 25 MG: 25 TABLET, FILM COATED ORAL at 09:35

## 2021-03-03 RX ADMIN — FLUTICASONE PROPIONATE 1 SPRAY: 50 SPRAY, METERED NASAL at 09:46

## 2021-03-03 RX ADMIN — FERROUS SULFATE TAB 325 MG (65 MG ELEMENTAL FE) 325 MG: 325 (65 FE) TAB at 13:00

## 2021-03-03 RX ADMIN — IPRATROPIUM BROMIDE AND ALBUTEROL SULFATE 1 AMPULE: 2.5; .5 SOLUTION RESPIRATORY (INHALATION) at 19:26

## 2021-03-03 RX ADMIN — MODAFINIL 200 MG: 200 TABLET ORAL at 09:35

## 2021-03-03 RX ADMIN — BUSPIRONE HYDROCHLORIDE 10 MG: 10 TABLET ORAL at 09:35

## 2021-03-03 RX ADMIN — APIXABAN 2.5 MG: 2.5 TABLET, FILM COATED ORAL at 09:35

## 2021-03-03 RX ADMIN — POTASSIUM CHLORIDE 40 MEQ: 1500 TABLET, EXTENDED RELEASE ORAL at 16:50

## 2021-03-03 RX ADMIN — OXYCODONE HYDROCHLORIDE AND ACETAMINOPHEN 1000 MG: 500 TABLET ORAL at 09:35

## 2021-03-03 RX ADMIN — LEVOTHYROXINE SODIUM 125 MCG: 125 TABLET ORAL at 05:52

## 2021-03-03 RX ADMIN — IPRATROPIUM BROMIDE AND ALBUTEROL SULFATE 1 AMPULE: 2.5; .5 SOLUTION RESPIRATORY (INHALATION) at 07:17

## 2021-03-03 RX ADMIN — SODIUM CHLORIDE, PRESERVATIVE FREE 10 ML: 5 INJECTION INTRAVENOUS at 10:28

## 2021-03-03 RX ADMIN — FLUTICASONE PROPIONATE 2 PUFF: 220 AEROSOL, METERED RESPIRATORY (INHALATION) at 09:46

## 2021-03-03 RX ADMIN — PANTOPRAZOLE SODIUM 40 MG: 40 TABLET, DELAYED RELEASE ORAL at 05:52

## 2021-03-03 RX ADMIN — CLONAZEPAM 0.5 MG: 1 TABLET ORAL at 23:43

## 2021-03-03 RX ADMIN — HYDROCODONE BITARTRATE AND ACETAMINOPHEN 1 TABLET: 5; 325 TABLET ORAL at 05:58

## 2021-03-03 RX ADMIN — FERROUS SULFATE TAB 325 MG (65 MG ELEMENTAL FE) 325 MG: 325 (65 FE) TAB at 15:53

## 2021-03-03 RX ADMIN — HYDROCODONE BITARTRATE AND ACETAMINOPHEN 1 TABLET: 5; 325 TABLET ORAL at 22:08

## 2021-03-03 ASSESSMENT — PAIN - FUNCTIONAL ASSESSMENT: PAIN_FUNCTIONAL_ASSESSMENT: ACTIVITIES ARE NOT PREVENTED

## 2021-03-03 ASSESSMENT — PAIN DESCRIPTION - LOCATION: LOCATION: BACK

## 2021-03-03 ASSESSMENT — PAIN DESCRIPTION - PAIN TYPE
TYPE: ACUTE PAIN
TYPE: ACUTE PAIN

## 2021-03-03 ASSESSMENT — PAIN DESCRIPTION - ONSET
ONSET: ON-GOING

## 2021-03-03 ASSESSMENT — PAIN DESCRIPTION - PROGRESSION
CLINICAL_PROGRESSION: NOT CHANGED
CLINICAL_PROGRESSION: NOT CHANGED

## 2021-03-03 ASSESSMENT — PAIN DESCRIPTION - DESCRIPTORS
DESCRIPTORS: ACHING;CONSTANT
DESCRIPTORS: ACHING;CONSTANT

## 2021-03-03 ASSESSMENT — PAIN DESCRIPTION - ORIENTATION: ORIENTATION: LOWER

## 2021-03-03 NOTE — PLAN OF CARE
BRONCHOSPASM/BRONCHOCONSTRICTION   [x]  IMPROVE AERATION/BREATH SOUNDS  [x]   ADMINISTER BRONCHODILATOR THERAPY AS APPROPRIATE  [x]   ASSESS BREATH SOUNDS  []   IMPLEMENT AEROSOL/MDI PROTOCOL  [x]   PATIENT EDUCATION AS NEEDED    PROVIDE ADEQUATE OXYGENATION WITH ACCEPTABLE SP02/ABG'S  [x]  IDENTIFY APPROPRIATE OXYGEN THERAPY  [x]   MONITOR SP02/ABG'S AS NEEDED   [x]   PATIENT EDUCATION AS NEEDED      NONINVASIVE VENTILATION    PROVIDE OPTIMAL VENTILATION/ACCEPTABLE SPO2   IMPLEMENT NONINVASIVE VENTILATION PROTOCOL   MAINTAIN ACCEPTABLE SPO2   ASSESS SKIN INTEGRITY/BREAKDOWN SCORE   PATIENT EDUCATION AS NEEDED   BIPAP AS NEEDED

## 2021-03-03 NOTE — PLAN OF CARE
Nutrition Problem #1: Inadequate oral intake  Intervention: Food and/or Nutrient Delivery: Modify Current Diet, Continue Oral Nutrition Supplement  Nutritional Goals: po intake greater than 50%

## 2021-03-03 NOTE — PROGRESS NOTES
03/02/21 2208   NIV Type   NIV Started/Stopped On   Equipment Type V60   Mode Bilevel   Mask Type Full face mask   Mask Size Small   Settings/Measurements   IPAP 15 cmH20   CPAP/EPAP 8 cmH2O   Rate Ordered 16   Resp 18   Insp Rise Time (%) 2 %   FiO2  35 %   I Time/ I Time % 1 s   Vt Exhaled 333 mL   Minute Volume 8.8 Liters   Mask Leak (lpm) 39 lpm   Comfort Level Good   Using Accessory Muscles No   SpO2 94     Pt on bipap at this time and tolerating well.

## 2021-03-03 NOTE — PLAN OF CARE
Problem: Falls - Risk of:  Goal: Will remain free from falls  Description: Will remain free from falls  Outcome: Ongoing     Problem: SAFETY  Goal: Free from accidental physical injury  Outcome: Ongoing     Problem: DAILY CARE  Goal: Daily care needs are met  Outcome: Ongoing     Problem: PAIN  Goal: Patient's pain/discomfort is manageable  Outcome: Ongoing     Problem: SKIN INTEGRITY  Goal: Skin integrity is maintained or improved  Outcome: Ongoing     Problem: KNOWLEDGE DEFICIT  Goal: Patient/S.O. demonstrates understanding of disease process, treatment plan, medications, and discharge instructions.   Outcome: Ongoing     Problem: DISCHARGE BARRIERS  Goal: Patient's continuum of care needs are met  Outcome: Ongoing     Problem: Nutrition  Goal: Optimal nutrition therapy  Description: Nutrition Problem #1: Inadequate oral intake  Intervention: Food and/or Nutrient Delivery: Continue Current Diet, Start Oral Nutrition Supplement  Nutritional Goals: po intake greater than 50%     Outcome: Ongoing     Problem: Pain:  Goal: Pain level will decrease  Description: Pain level will decrease  Outcome: Ongoing     Problem: Pain:  Goal: Control of acute pain  Description: Control of acute pain  Outcome: Ongoing     Problem: Pain:  Goal: Control of chronic pain  Description: Control of chronic pain  Outcome: Ongoing     Problem: Skin Integrity:  Goal: Will show no infection signs and symptoms  Description: Will show no infection signs and symptoms  Outcome: Ongoing     Problem: Skin Integrity:  Goal: Absence of new skin breakdown  Description: Absence of new skin breakdown  Outcome: Ongoing     Problem: Airway Clearance - Ineffective  Goal: Achieve or maintain patent airway  Outcome: Ongoing     Problem: Gas Exchange - Impaired  Goal: Absence of hypoxia  Outcome: Ongoing     Problem: Gas Exchange - Impaired  Goal: Promote optimal lung function  Outcome: Ongoing     Problem: Breathing Pattern - Ineffective Goal: Ability to achieve and maintain a regular respiratory rate  Outcome: Ongoing     Problem: Body Temperature -  Risk of, Imbalanced  Goal: Ability to maintain a body temperature within defined limits  Outcome: Ongoing     Problem: Body Temperature -  Risk of, Imbalanced  Goal: Complications related to the disease process, condition or treatment will be avoided or minimized  Outcome: Ongoing     Problem:  Body Temperature -  Risk of, Imbalanced  Goal: Will regain or maintain usual level of consciousness  Outcome: Ongoing     Problem: Isolation Precautions - Risk of Spread of Infection  Goal: Prevent transmission of infection  Outcome: Ongoing     Problem: Nutrition Deficits  Goal: Optimize nutritional status  Outcome: Ongoing     Problem: Risk for Fluid Volume Deficit  Goal: Maintain normal heart rhythm  Outcome: Ongoing     Problem: Risk for Fluid Volume Deficit  Goal: Maintain absence of muscle cramping  Outcome: Ongoing     Problem: Risk for Fluid Volume Deficit  Goal: Maintain normal serum potassium, sodium, calcium, phosphorus, and pH  Outcome: Ongoing     Problem: Fatigue  Goal: Verbalize increase energy and improved vitality  Outcome: Ongoing     Problem: Patient Education: Go to Patient Education Activity  Goal: Patient/Family Education  Outcome: Ongoing

## 2021-03-03 NOTE — PROGRESS NOTES
Comprehensive Nutrition Assessment    Type and Reason for Visit:  Reassess    Nutrition Recommendations/Plan: Recommend liberalize diet as much as possible (Low Fiber diet with 1500 ml fluid restriction)  Recommend continue Ensure High Protein twice daily    Nutrition Assessment:  Pt states she has a poor appetite. Nursing documents intake less than 50% but pt has multiple snack in room. She states she likes the supplements and drinks them all. fluid restriction is now 1500 ml    Malnutrition Assessment:  Malnutrition Status: At risk for malnutrition (Comment)    Context:  Acute Illness     Findings of the 6 clinical characteristics of malnutrition:  Energy Intake:  Mild decrease in energy intake (Comment)  Weight Loss:  1 - 7.5% over 3 months(possibly due in part to fluid shifts)     Body Fat Loss:  No significant body fat loss     Muscle Mass Loss:  No significant muscle mass loss    Fluid Accumulation:  1 - Mild Extremities   Strength:  Not Performed    Estimated Daily Nutrient Needs:  Energy (kcal):  (Darke x 1.2) 4207-2890 kcal; Weight Used for Energy Requirements:  (74 kg)     Protein (g):  1.3g/kg= 80 g; Weight Used for Protein Requirements:  Ideal          Nutrition Related Findings:  mild edema to all extremities, Labs/Meds: Reviewed, BM 3/2      Wounds:  None       Current Nutrition Therapies:    Dietary Nutrition Supplements: Low Calorie High Protein Supplement  DIET LOW FIBER;  Carb Control: 4 carb choices (60 gms)/meal; Daily Fluid Restriction: 1500 ml    Anthropometric Measures:  · Height: 5' 8\" (172.7 cm)  · Current Body Weight: 175 lb (79.4 kg)   · Admission Body Weight: 162 lb (73.5 kg)    · Usual Body Weight: 178 lb (80.7 kg)(11/20)     · Ideal Body Weight: 140 lbs; BMI: 26.6   · BMI Categories: Normal Weight (BMI 22.0 to 24.9) age over 72       Nutrition Diagnosis: · Inadequate oral intake related to (poor appetite, present illness) as evidenced by poor intake prior to admission, weight loss    Nutrition Interventions:   Food and/or Nutrient Delivery:  Modify Current Diet, Continue Oral Nutrition Supplement  Nutrition Education/Counseling:  Education not indicated   Coordination of Nutrition Care:  Continue to monitor while inpatient    Goals:  po intake greater than 50%       Nutrition Monitoring and Evaluation:   Food/Nutrient Intake Outcomes:  Food and Nutrient Intake, Supplement Intake  Physical Signs/Symptoms Outcomes:  Biochemical Data, GI Status, Skin, Weight, Fluid Status or Edema     Discharge Planning:     Too soon to determine     Electronically signed by Evelina Landa RD, LD on 3/3/21 at 1:07 PM EST    Contact: [phone]

## 2021-03-03 NOTE — PROGRESS NOTES
Received call for Critical lab value CO2 >45. Called Dr. Julia Sevilla to inform of critical value. Message left. Will continue to monitor patient. Awaiting return response.

## 2021-03-03 NOTE — PROGRESS NOTES
PULMONARY PROGRESS NOTE:    REASON FOR VISIT:Pl effusion, dyspnea  Interval History:    Shortness of Breath: yes   Cough: no  Sputum: no          Hemoptysis: no  Chest Pain: no  Fever: no                   Swelling Feet: yes   Headache: no                                           Nausea, Emesis, Abdominal Pain: no  Diarrhea: no         Constipation: no    Events since last visit: tried BPAP overnight; feels mask suffoating her    PAST MEDICAL HISTORY:      Scheduled Meds:   furosemide  20 mg Oral Daily    pantoprazole  40 mg Oral QAM AC    modafinil  200 mg Oral Daily    apixaban  2.5 mg Oral BID    iron sucrose  200 mg Intravenous Q24H    [Held by provider] furosemide  40 mg Oral Daily    ferrous sulfate  325 mg Oral TID WC    ipratropium-albuterol  1 ampule Inhalation Q4H WA    ascorbic acid  1,000 mg Oral Daily    atorvastatin  10 mg Oral Daily    busPIRone  10 mg Oral TID    dilTIAZem  120 mg Oral Daily    DULoxetine  60 mg Oral Daily    fluticasone  1 spray Each Nostril Daily    fluticasone  2 puff Inhalation BID    levothyroxine  125 mcg Oral Daily    lisinopril  2.5 mg Oral Lunch    metoprolol tartrate  25 mg Oral BID    sodium chloride flush  10 mL Intravenous 2 times per day     Continuous Infusions:   sodium chloride       PRN Meds:potassium chloride **OR** potassium alternative oral replacement **OR** potassium chloride, HYDROcodone-acetaminophen, acetaminophen, baclofen, clonazePAM, guaiFENesin, sodium chloride flush, promethazine **OR** ondansetron, acetaminophen **OR** acetaminophen, sodium chloride        PHYSICAL EXAMINATION:  /63   Pulse 88   Temp 98.1 °F (36.7 °C) (Oral)   Resp 20   Ht 5' 8\" (1.727 m)   Wt 175 lb 0.7 oz (79.4 kg)   SpO2 98%   BMI 26.62 kg/m²     General : Awake, alert, NAD , sleepy if left alone  Neck  supple, no lymphadenopathy, JVD not raised  Heart  regular rhythm, S1 and S2 normal; no additional sounds heard  Lungs  Air Entry- fair bilaterally; breath sounds : vesicular;   rales/crackles - absent, 96% on 4L  Abdomen  soft, no tenderness  Upper Extremities  - no cyanosis, mottling; edema : absent  Lower Extremities: no cyanosis, mottling; edema : mild     Current Laboratory, Radiologic, Microbiologic, and Diagnostic studies reviewed  Data ReviewCBC:   Recent Labs     03/01/21  0834 03/02/21  0631 03/03/21  0534   WBC 6.7 6.5 6.4   RBC 2.59* 2.62* 2.65*   HGB 8.3* 8.3* 8.3*   HCT 26.4* 26.4* 26.4*    270 253     BMP:   Recent Labs     03/01/21  0834 03/02/21  0631 03/03/21  0534   GLUCOSE 149* 126* 101*    146* 144   K 3.9 3.6* 3.2*   BUN 11 11 11   CREATININE 0.69 0.71 0.65   CALCIUM 9.1 9.2 8.7     ABGs:   Recent Labs     03/01/21  1135   PHART 7.353   PO2ART 52.3*   [id number] 92.4*   DUL6VXJ 51.4*   [id number] 84.6*      PT/INR:  No results found for: PTINR    ASSESSMENT / PLAN:  Nasal congestion - humidify O2, sudafed  Acute hypoxic resp failure - O2  Left pl effusion - monitor, diuresis,   GI bleed - per GI service   Thoracentesis per IR 2/25/21- 1L fluid removed left side - transudate  Previous Covid positive 1/1/21  Chronic Hypercapnic resp failure - try BPAP - will try nasal mask  CXR 3/1- slightly increased pleural effusions  Start provigil    Electronically signed by Shara Shankar on 03/03/21 at 11:17 AM

## 2021-03-03 NOTE — CONSULTS
Department of Internal Medicine  Nephrology Jakub Munoz MD   Consult Note      SUBJECTIVE: This is a 70 y.o. female with a significant past medical history of Chronic atrial fibrillation, pulmonary hypertension, chronic recurrent anemia [s/p recent endoscopy showing AVMs requiring intermittent PRBC transfusions], COPD and chronic kidney disease stage II, who presented to the emergency department after falling at home. Laboratory studies revealed severe anemia with hemoglobin 6.8 g/dL and serum creatinine was 1.25 mg/dL. Nephrology consultation was called yesterday due to low urine output. Patient was given albumin and Lasix with prompt improvement in urine output. She feels well today and does not have shortness of breath or chest pain. Interval history:  Patient denies abdominal pain, rectal bleeding, nausea or vomiting. Renal function is improving with creatinine at baseline- not tolerating the BiPAP as pt  says she feels suffocated. He continues to have dyspnea and chest x-ray on 3/1/21showed pulmonary vascular congestion  Potassium is 3.2 renal function preserved .   Scheduled Meds:   furosemide  20 mg Oral Daily    pantoprazole  40 mg Oral QAM AC    modafinil  200 mg Oral Daily    apixaban  2.5 mg Oral BID    iron sucrose  200 mg Intravenous Q24H    [Held by provider] furosemide  40 mg Oral Daily    ferrous sulfate  325 mg Oral TID WC    ipratropium-albuterol  1 ampule Inhalation Q4H WA    ascorbic acid  1,000 mg Oral Daily    atorvastatin  10 mg Oral Daily    busPIRone  10 mg Oral TID    dilTIAZem  120 mg Oral Daily    DULoxetine  60 mg Oral Daily    fluticasone  1 spray Each Nostril Daily    fluticasone  2 puff Inhalation BID    levothyroxine  125 mcg Oral Daily    lisinopril  2.5 mg Oral Lunch    metoprolol tartrate  25 mg Oral BID    sodium chloride flush  10 mL Intravenous 2 times per day     Continuous Infusions:   sodium chloride Review of systems: CNS - no headache or dizziness; Cardiac - no chest pain; Respiratory - no cough or shortness of breath; Gastrointestinal - Black tarry stools; no nausea, vomiting or diarrhea; Musculoskeletal - general body aches; Skin/Integument - no rashes. Physical Exam:    VITALS:  BP (!) 111/47   Pulse 81   Temp 97.4 °F (36.3 °C) (Oral)   Resp 20   Ht 5' 8\" (1.727 m)   Wt 175 lb 0.7 oz (79.4 kg)   SpO2 94%   BMI 26.62 kg/m²   24HR INTAKE/OUTPUT:      Intake/Output Summary (Last 24 hours) at 3/3/2021 1630  Last data filed at 3/3/2021 1427  Gross per 24 hour   Intake 110 ml   Output 1375 ml   Net -1265 ml     Constitutional: alert, appears stated age and cooperative    Skin: Skin color, texture, turgor normal. No rashes or lesions    Head: Normocephalic, without obvious abnormality, atraumatic     Cardiovascular/Edema: regular rate and rhythm, S1, S2 normal, no murmur, click, rub or gallop    Respiratory: Lungs: clear to auscultation bilaterally    Abdomen: soft, non-tender; bowel sounds normal; no masses,  no organomegaly    Back: symmetric, no curvature. ROM normal. No CVA tenderness.     Extremities: extremities normal, atraumatic, no cyanosis or edema    Neuro:  Grossly normal    CBC:   Recent Labs     03/01/21  0834 03/02/21  0631 03/03/21  0534   WBC 6.7 6.5 6.4   HGB 8.3* 8.3* 8.3*    270 253     BMP:    Recent Labs     03/01/21  0834 03/02/21  0631 03/03/21  0534    146* 144   K 3.9 3.6* 3.2*   CL 96* 94* 90*   CO2 45* >45* >45*   BUN 11 11 11   CREATININE 0.69 0.71 0.65   GLUCOSE 149* 126* 101*       Lab Results   Component Value Date    NITRU NEGATIVE 02/22/2021    COLORU YELLOW 02/22/2021    PHUR 5.5 02/22/2021    WBCUA 2 TO 5 02/21/2021    RBCUA 0 TO 2 02/21/2021    MUCUS NOT REPORTED 02/21/2021    TRICHOMONAS NOT REPORTED 02/21/2021    YEAST NOT REPORTED 02/21/2021    BACTERIA FEW 02/21/2021    CLARITYU Clear 01/31/2020    SPECGRAV 1.014 02/22/2021 LEUKOCYTESUR NEGATIVE 02/22/2021    UROBILINOGEN Normal 02/22/2021    BILIRUBINUR NEGATIVE 02/22/2021    BILIRUBINUR 0 01/31/2020    BLOODU Negative 01/31/2020    GLUCOSEU NEGATIVE 02/22/2021    KETUA NEGATIVE 02/22/2021    AMORPHOUS 1+ 02/21/2021     Urine Sodium:     Lab Results   Component Value Date    ANGÉLICA 82 02/23/2021     Urine Potassium:    Lab Results   Component Value Date    KUR 35.7 07/20/2020     Urine Chloride:    Lab Results   Component Value Date    CLUR 113 02/23/2021     Urine Creatinine:     Lab Results   Component Value Date    LABCREA 137.9 02/11/2021    LABCREA 134.5 02/11/2021     IMPRESSION/RECOMMENDATIONS:     1. Chronic kidney disease stage 3- renal function is stable. Plan  Monitor urine output closely. .  Basic metabolic profile daily    2. Hypernatremia -  Oral Free water intake up to 1.5 L/day      3. Acute on chronic anemia. Hemoglobin 8.3 g and stable      4. Acute on  chronic CHF with preserved ejection fraction-   lasix to 20 mg dly. 1500 mls fluid restriction    5. Left   Pleural effusion-status post thoracentesis 1 L. On 2/25/21-transudative fluid    6. Hypercapnic respiratory failure-pulmonary following      7. Metabolic alkalosis 2nd to acute hypercapnic respiratory failure with metabolic compensation-acetazolamide not indicated. Plan  IV Lasix 40 mg x 1  Give 36 M EQ of potassium this morning-give additional p.o. potassium 40 now. Continue External catheter.       MD ROXIE Ma  Attending Nephrologist  3/3/2021 4:30 PM

## 2021-03-03 NOTE — PROGRESS NOTES
FAMILY MEDICINE  - PROGRESS NOTE    Date:  3/3/2021  Jay Marcum  [id number]      Chief Complaint   Patient presents with    Fall         Interval History:  not changed, no significant events overnight.       Subjective  Constitutional: positive for fatigue and overweight  Respiratory: positive for emphysema and shortness of breath  Cardiovascular: positive for irregular heart beat  Hematologic/lymphatic: positive for pallor  Behavioral/Psych: positive for anxiety and depression:    Objective:    /69   Pulse 72   Temp 98.1 °F (36.7 °C) (Axillary)   Resp 21   Ht 5' 8\" (1.727 m)   Wt 175 lb 0.7 oz (79.4 kg)   SpO2 94%   BMI 26.62 kg/m²   General appearance - alert, well appearing, and in no distress and overweight  Mental status - alert, oriented to person, place, and time  Eyes - pupils equal and reactive, extraocular eye movements intact  Ears - hearing grossly normal bilaterally  Nose - normal and patent, no erythema, discharge or polyps  Mouth - mucous membranes moist, pharynx normal without lesions  Neck - supple, no significant adenopathy  Lymphatics - no palpable lymphadenopathy, no hepatosplenomegaly  Chest - clear to auscultation, no wheezes, rales or rhonchi, symmetric air entry, decreased air entry noted posteriorly  Heart - irregularly irregular rhythm with rate 72  Abdomen - soft, nontender, nondistended, no masses or organomegaly  Breasts - not examined  Back exam - not examined  Neurological - alert, oriented, normal speech, no focal findings or movement disorder noted  Musculoskeletal - osteoarthritic changes noted in both hands  Extremities - peripheral pulses normal, no pedal edema, no clubbing or cyanosis  Skin - normal coloration and turgor, no rashes, no suspicious skin lesions noted    Data:   Medications:   Current Facility-Administered Medications   Medication Dose Route Frequency Provider Last Rate Last Admin  furosemide (LASIX) tablet 20 mg  20 mg Oral Daily Doretha Emery MD        pantoprazole (PROTONIX) tablet 40 mg  40 mg Oral QAM  WILLIE Mckinney - NP   40 mg at 03/02/21 0559    modafinil (PROVIGIL) tablet 200 mg  200 mg Oral Daily Ector Ruano, APRN - CNP   200 mg at 03/02/21 0720    apixaban (ELIQUIS) tablet 2.5 mg  2.5 mg Oral BID Shukri Tyler MD   2.5 mg at 03/02/21 1943    iron sucrose (VENOFER) 200 mg in sodium chloride 0.9 % 100 mL IVPB  200 mg Intravenous Q24H Kassandra Meza MD   Stopped at 03/02/21 1743    [Held by provider] furosemide (LASIX) tablet 40 mg  40 mg Oral Daily Levern Castillo Patty Heimlich, MD   40 mg at 02/27/21 9016    ferrous sulfate (IRON 325) tablet 325 mg  325 mg Oral TID  Shukri Tyler MD   325 mg at 03/02/21 1514    HYDROcodone-acetaminophen (NORCO) 5-325 MG per tablet 1 tablet  1 tablet Oral Q6H PRN Shukri Tyler MD   1 tablet at 03/03/21 0010    ipratropium-albuterol (DUONEB) nebulizer solution 1 ampule  1 ampule Inhalation Q4H WA Ector Ruano APRN - CNP   1 ampule at 03/02/21 1831    acetaminophen (TYLENOL) tablet 650 mg  650 mg Oral Q4H PRN Aneudy Edmondson MD   650 mg at 02/24/21 2140    ascorbic acid (VITAMIN C) tablet 1,000 mg  1,000 mg Oral Daily Aneudy Edmondson MD   1,000 mg at 03/02/21 5017    atorvastatin (LIPITOR) tablet 10 mg  10 mg Oral Daily Aneudy Edmondson MD   10 mg at 03/02/21 0720    baclofen (LIORESAL) tablet 10 mg  10 mg Oral TID PRN Aneudy Edmondson MD   10 mg at 03/01/21 2159    busPIRone (BUSPAR) tablet 10 mg  10 mg Oral TID Aneudy Edmondson MD   10 mg at 03/02/21 1943    clonazePAM (KLONOPIN) tablet 0.5 mg  0.5 mg Oral TID PRN Aneudy Edmondson MD   0.5 mg at 03/03/21 0010    dilTIAZem (CARDIZEM CD) extended release capsule 120 mg  120 mg Oral Daily Aneudy Edmondson MD   120 mg at 03/02/21 5518 Pulmonary hypertension (HCC)     COPD (chronic obstructive pulmonary disease) (HCC)     History of GI bleed     Constipation     HA (generalized anxiety disorder)     Lumbar radiculopathy     Lumbosacral spondylosis without myelopathy     Benign hypertension with CKD (chronic kidney disease) stage III     CKD (chronic kidney disease) stage 3, GFR 30-59 ml/min     Alcohol withdrawal syndrome with complication (HCC)     Moderate major depression (HCC)     PAD (peripheral artery disease) (HCC)     Acute kidney injury ([facility] [address]     Obesity, Class I, BMI 30-34.9     Chronic diastolic CHF (congestive heart failure) (HCC)     GI bleed     Weight loss     Elevated alkaline phosphatase level     Thrombocytosis (HCC)     Iron deficiency anemia due to chronic blood loss     Iron malabsorption     Symptomatic anemia     Pneumonia due to organism     Anemia     Severe anemia     Overweight (BMI 25.0-29. 9)     Severe malnutrition ([facility] [address]     Renal failure     Acute renal failure (ARF) (HCC)     Acute cystitis without hematuria     Hypoxia     Acute respiratory failure with hypoxia (HCC)     Hypernatremia           Plan:  -Acute respiratory failure with COPD - on O2 per NC by day, BiPap at night, Pulmonology following.  -Recurrent iron deficiency anemia - hgb stable, Hematology following.  -A. Fib. - tolerating Eliquis 2.5 mg BID. -D/C plan is to SNF when ready.  -Continue current treatments.  -Complete orders per chart.     See orders   Disposition:    Electronically signed by Susan Loyola MD on 3/3/2021 at 5:51 AM

## 2021-03-03 NOTE — PLAN OF CARE
Problem: Falls - Risk of:  Goal: Will remain free from falls  Description: Will remain free from falls  3/3/2021 1355 by Cherylene Otter, RN  Outcome: Ongoing  3/3/2021 1355 by Cherylene Otter, RN  Outcome: Ongoing  3/3/2021 0318 by Humberto Lopez RN  Outcome: Ongoing     Problem: SAFETY  Goal: Free from accidental physical injury  3/3/2021 1355 by Cherylene Otter, RN  Outcome: Ongoing  3/3/2021 1355 by Cherylene Otter, RN  Outcome: Ongoing  3/3/2021 0318 by Humberto Lopez RN  Outcome: Ongoing     Problem: DAILY CARE  Goal: Daily care needs are met  3/3/2021 1355 by Cherylene Otter, RN  Outcome: Ongoing  3/3/2021 1355 by Cherylene Otter, RN  Outcome: Ongoing  3/3/2021 0318 by Humberto Lopez RN  Outcome: Ongoing     Problem: PAIN  Goal: Patient's pain/discomfort is manageable  3/3/2021 1355 by Cherylene Otter, RN  Outcome: Ongoing  3/3/2021 1355 by Cherylene Otter, RN  Outcome: Ongoing  3/3/2021 0318 by Humberto Lopez RN  Outcome: Ongoing     Problem: SKIN INTEGRITY  Goal: Skin integrity is maintained or improved  3/3/2021 1355 by Cherylene Otter, RN  Outcome: Ongoing  3/3/2021 1355 by Cherylene Otter, RN  Outcome: Ongoing  3/3/2021 0318 by Humberto Lopez RN  Outcome: Ongoing     Problem: KNOWLEDGE DEFICIT  Goal: Patient/S.O. demonstrates understanding of disease process, treatment plan, medications, and discharge instructions.   3/3/2021 1355 by Cherylene Otter, RN  Outcome: Ongoing  3/3/2021 1355 by Cherylene Otter, RN  Outcome: Ongoing  3/3/2021 0318 by Humberto Lopez RN  Outcome: Ongoing     Problem: DISCHARGE BARRIERS  Goal: Patient's continuum of care needs are met  3/3/2021 1355 by Cherylene Otter, RN  Outcome: Ongoing  3/3/2021 1355 by Cherylene Otter, RN  Outcome: Ongoing  3/3/2021 0318 by Humberto Lopez RN  Outcome: Ongoing     Problem: Nutrition  Goal: Optimal nutrition therapy  Description: Nutrition Problem #1: Inadequate oral intake Intervention: Food and/or Nutrient Delivery: Continue Current Diet, Start Oral Nutrition Supplement  Nutritional Goals: po intake greater than 50%     3/3/2021 1355 by Manisha Patino RN  Outcome: Ongoing  3/3/2021 1355 by Manisha Patino RN  Outcome: Ongoing  3/3/2021 1310 by Lizandro Olivo RD, LD  Note: Nutrition Problem #1: Inadequate oral intake  Intervention: Food and/or Nutrient Delivery: Modify Current Diet, Continue Oral Nutrition Supplement  Nutritional Goals: po intake greater than 50%   3/3/2021 0318 by Moira Kruse RN  Outcome: Ongoing     Problem: Pain:  Description: Pain management should include both nonpharmacologic and pharmacologic interventions.   Goal: Pain level will decrease  Description: Pain level will decrease  3/3/2021 1355 by Manisha Patino RN  Outcome: Ongoing  3/3/2021 1355 by Manisha Patino RN  Outcome: Ongoing  3/3/2021 0318 by Moira Kruse RN  Outcome: Ongoing  Goal: Control of acute pain  Description: Control of acute pain  3/3/2021 1355 by Manisha Patino RN  Outcome: Ongoing  3/3/2021 1355 by Manisha Patino RN  Outcome: Ongoing  3/3/2021 0318 by Moira Kruse RN  Outcome: Ongoing  Goal: Control of chronic pain  Description: Control of chronic pain  3/3/2021 1355 by Manisha Patino RN  Outcome: Ongoing  3/3/2021 1355 by Manisha Patino RN  Outcome: Ongoing  3/3/2021 0318 by Moira Kruse RN  Outcome: Ongoing     Problem: Skin Integrity:  Goal: Will show no infection signs and symptoms  Description: Will show no infection signs and symptoms  3/3/2021 1355 by Manisha Patino RN  Outcome: Ongoing  3/3/2021 1355 by Manisha Patino RN  Outcome: Ongoing  3/3/2021 0318 by Moira Kruse RN  Outcome: Ongoing  Goal: Absence of new skin breakdown  Description: Absence of new skin breakdown  3/3/2021 1355 by Manisha Patino RN  Outcome: Ongoing  3/3/2021 1355 by Manisha Patino RN  Outcome: Ongoing  3/3/2021 0318 by Moira Kruse RN  Outcome: Ongoing Problem: Airway Clearance - Ineffective  Goal: Achieve or maintain patent airway  3/3/2021 1355 by Angeles Saleem RN  Outcome: Ongoing  3/3/2021 1355 by Angeles Saleem RN  Outcome: Ongoing  3/3/2021 0318 by Yomaira Gleason RN  Outcome: Ongoing     Problem: Gas Exchange - Impaired  Goal: Absence of hypoxia  3/3/2021 1355 by Angeles Saleem RN  Outcome: Ongoing  3/3/2021 1355 by Angeles Saleem RN  Outcome: Ongoing  3/3/2021 0318 by Yomaira Gleason RN  Outcome: Ongoing  Goal: Promote optimal lung function  3/3/2021 1355 by Angeles Saleem RN  Outcome: Ongoing  3/3/2021 1355 by Angeles Saleem RN  Outcome: Ongoing  3/3/2021 0318 by Yomaira Gleason RN  Outcome: Ongoing     Problem: Breathing Pattern - Ineffective  Goal: Ability to achieve and maintain a regular respiratory rate  3/3/2021 1355 by Angeles Saleem RN  Outcome: Ongoing  3/3/2021 1355 by Angeles Saleem RN  Outcome: Ongoing  3/3/2021 0318 by Yomaira Gleason RN  Outcome: Ongoing     Problem:  Body Temperature -  Risk of, Imbalanced  Goal: Ability to maintain a body temperature within defined limits  3/3/2021 1355 by Angeles Saleem RN  Outcome: Ongoing  3/3/2021 1355 by Angeles Saleem RN  Outcome: Ongoing  3/3/2021 0318 by Yomaira Gleason RN  Outcome: Ongoing  Goal: Will regain or maintain usual level of consciousness  3/3/2021 1355 by Angeles Saleem RN  Outcome: Ongoing  3/3/2021 1355 by Angeles Saleem RN  Outcome: Ongoing  3/3/2021 0318 by Yomaira Gleason RN  Outcome: Ongoing  Goal: Complications related to the disease process, condition or treatment will be avoided or minimized  3/3/2021 1355 by Angeles Saleem RN  Outcome: Ongoing  3/3/2021 1355 by Angeles Saleem RN  Outcome: Ongoing  3/3/2021 0318 by Yomaira Gleason RN  Outcome: Ongoing     Problem: Isolation Precautions - Risk of Spread of Infection  Goal: Prevent transmission of infection  3/3/2021 1355 by Angeles Saleem RN  Outcome: Ongoing 3/3/2021 1355 by Manisha Patino RN  Outcome: Ongoing  3/3/2021 0318 by Moira Kruse RN  Outcome: Ongoing     Problem: Nutrition Deficits  Goal: Optimize nutritional status  3/3/2021 1355 by Manisha Patino RN  Outcome: Ongoing  3/3/2021 1355 by Manisha Patino RN  Outcome: Ongoing  3/3/2021 0318 by Moira Kruse RN  Outcome: Ongoing     Problem: Risk for Fluid Volume Deficit  Goal: Maintain normal heart rhythm  3/3/2021 1355 by Manisha Patino RN  Outcome: Ongoing  3/3/2021 1355 by Manisha Patino RN  Outcome: Ongoing  3/3/2021 0318 by Moira Kruse RN  Outcome: Ongoing  Goal: Maintain absence of muscle cramping  3/3/2021 1355 by Manisha Patino RN  Outcome: Ongoing  3/3/2021 1355 by Manisha Patino RN  Outcome: Ongoing  3/3/2021 0318 by Moira Kruse RN  Outcome: Ongoing  Goal: Maintain normal serum potassium, sodium, calcium, phosphorus, and pH  3/3/2021 1355 by Manisha Patino RN  Outcome: Ongoing  3/3/2021 1355 by Manisha Patino RN  Outcome: Ongoing  3/3/2021 0318 by Moira Kruse RN  Outcome: Ongoing     Problem: Fatigue  Goal: Verbalize increase energy and improved vitality  3/3/2021 1355 by Manisha Patino RN  Outcome: Ongoing  3/3/2021 1355 by Manisha Patino RN  Outcome: Ongoing  3/3/2021 0318 by Moira Kruse RN  Outcome: Ongoing     Problem: Patient Education: Go to Patient Education Activity  Goal: Patient/Family Education  3/3/2021 1355 by Manisha Patino RN  Outcome: Ongoing  3/3/2021 1355 by Manisha Patino RN  Outcome: Ongoing  3/3/2021 0318 by Moira Kruse RN  Outcome: Ongoing     Problem: Breathing Pattern - Ineffective:  Goal: Ability to achieve and maintain a regular respiratory rate will improve  Description: Ability to achieve and maintain a regular respiratory rate will improve  Outcome: Ongoing

## 2021-03-03 NOTE — PROGRESS NOTES
Received return call from Dr. Toni Pierce. No new orders at this time. Will continue to monitor patient.

## 2021-03-04 LAB
ABSOLUTE EOS #: 0.2 K/UL (ref 0–0.4)
ABSOLUTE IMMATURE GRANULOCYTE: ABNORMAL K/UL (ref 0–0.3)
ABSOLUTE LYMPH #: 0.6 K/UL (ref 1–4.8)
ABSOLUTE MONO #: 0.9 K/UL (ref 0.1–1.3)
ANION GAP SERPL CALCULATED.3IONS-SCNC: ABNORMAL MMOL/L (ref 9–17)
BASOPHILS # BLD: 1 % (ref 0–2)
BASOPHILS ABSOLUTE: 0.1 K/UL (ref 0–0.2)
BUN BLDV-MCNC: 12 MG/DL (ref 8–23)
BUN/CREAT BLD: ABNORMAL (ref 9–20)
CALCIUM SERPL-MCNC: 9 MG/DL (ref 8.6–10.4)
CHLORIDE BLD-SCNC: 89 MMOL/L (ref 98–107)
CO2: >45 MMOL/L (ref 20–31)
CREAT SERPL-MCNC: 0.68 MG/DL (ref 0.5–0.9)
DIFFERENTIAL TYPE: ABNORMAL
EOSINOPHILS RELATIVE PERCENT: 2 % (ref 0–4)
GFR AFRICAN AMERICAN: >60 ML/MIN
GFR NON-AFRICAN AMERICAN: >60 ML/MIN
GFR SERPL CREATININE-BSD FRML MDRD: ABNORMAL ML/MIN/{1.73_M2}
GFR SERPL CREATININE-BSD FRML MDRD: ABNORMAL ML/MIN/{1.73_M2}
GLUCOSE BLD-MCNC: 116 MG/DL (ref 70–99)
GLUCOSE BLD-MCNC: 120 MG/DL (ref 65–105)
HCT VFR BLD CALC: 28.8 % (ref 36–46)
HEMOGLOBIN: 9 G/DL (ref 12–16)
IMMATURE GRANULOCYTES: ABNORMAL %
LYMPHOCYTES # BLD: 6 % (ref 24–44)
MCH RBC QN AUTO: 31.7 PG (ref 26–34)
MCHC RBC AUTO-ENTMCNC: 31.2 G/DL (ref 31–37)
MCV RBC AUTO: 101.7 FL (ref 80–100)
MONOCYTES # BLD: 10 % (ref 1–7)
NRBC AUTOMATED: ABNORMAL PER 100 WBC
PDW BLD-RTO: 21.4 % (ref 11.5–14.9)
PLATELET # BLD: 291 K/UL (ref 150–450)
PLATELET ESTIMATE: ABNORMAL
PMV BLD AUTO: 8.6 FL (ref 6–12)
POTASSIUM SERPL-SCNC: 3.9 MMOL/L (ref 3.7–5.3)
RBC # BLD: 2.83 M/UL (ref 4–5.2)
RBC # BLD: ABNORMAL 10*6/UL
SEG NEUTROPHILS: 81 % (ref 36–66)
SEGMENTED NEUTROPHILS ABSOLUTE COUNT: 8 K/UL (ref 1.3–9.1)
SODIUM BLD-SCNC: 143 MMOL/L (ref 135–144)
WBC # BLD: 9.8 K/UL (ref 3.5–11)
WBC # BLD: ABNORMAL 10*3/UL

## 2021-03-04 PROCEDURE — 85025 COMPLETE CBC W/AUTO DIFF WBC: CPT

## 2021-03-04 PROCEDURE — 94761 N-INVAS EAR/PLS OXIMETRY MLT: CPT

## 2021-03-04 PROCEDURE — 2700000000 HC OXYGEN THERAPY PER DAY

## 2021-03-04 PROCEDURE — 6370000000 HC RX 637 (ALT 250 FOR IP): Performed by: NURSE PRACTITIONER

## 2021-03-04 PROCEDURE — 2580000003 HC RX 258: Performed by: FAMILY MEDICINE

## 2021-03-04 PROCEDURE — 2580000003 HC RX 258: Performed by: INTERNAL MEDICINE

## 2021-03-04 PROCEDURE — 99232 SBSQ HOSP IP/OBS MODERATE 35: CPT | Performed by: INTERNAL MEDICINE

## 2021-03-04 PROCEDURE — 99232 SBSQ HOSP IP/OBS MODERATE 35: CPT | Performed by: FAMILY MEDICINE

## 2021-03-04 PROCEDURE — 36415 COLL VENOUS BLD VENIPUNCTURE: CPT

## 2021-03-04 PROCEDURE — 6370000000 HC RX 637 (ALT 250 FOR IP): Performed by: FAMILY MEDICINE

## 2021-03-04 PROCEDURE — 6360000002 HC RX W HCPCS: Performed by: INTERNAL MEDICINE

## 2021-03-04 PROCEDURE — 94660 CPAP INITIATION&MGMT: CPT

## 2021-03-04 PROCEDURE — 6370000000 HC RX 637 (ALT 250 FOR IP): Performed by: INTERNAL MEDICINE

## 2021-03-04 PROCEDURE — 82947 ASSAY GLUCOSE BLOOD QUANT: CPT

## 2021-03-04 PROCEDURE — 94640 AIRWAY INHALATION TREATMENT: CPT

## 2021-03-04 PROCEDURE — 80048 BASIC METABOLIC PNL TOTAL CA: CPT

## 2021-03-04 PROCEDURE — 2060000000 HC ICU INTERMEDIATE R&B

## 2021-03-04 RX ORDER — POTASSIUM CHLORIDE 20 MEQ/1
40 TABLET, EXTENDED RELEASE ORAL ONCE
Status: COMPLETED | OUTPATIENT
Start: 2021-03-04 | End: 2021-03-04

## 2021-03-04 RX ORDER — CETIRIZINE HYDROCHLORIDE 10 MG/1
10 TABLET ORAL DAILY
Status: DISCONTINUED | OUTPATIENT
Start: 2021-03-04 | End: 2021-03-08 | Stop reason: HOSPADM

## 2021-03-04 RX ORDER — FUROSEMIDE 10 MG/ML
40 INJECTION INTRAMUSCULAR; INTRAVENOUS ONCE
Status: COMPLETED | OUTPATIENT
Start: 2021-03-04 | End: 2021-03-04

## 2021-03-04 RX ADMIN — PANTOPRAZOLE SODIUM 40 MG: 40 TABLET, DELAYED RELEASE ORAL at 06:40

## 2021-03-04 RX ADMIN — FLUTICASONE PROPIONATE 2 PUFF: 220 AEROSOL, METERED RESPIRATORY (INHALATION) at 21:48

## 2021-03-04 RX ADMIN — LEVOTHYROXINE SODIUM 125 MCG: 125 TABLET ORAL at 06:40

## 2021-03-04 RX ADMIN — BUSPIRONE HYDROCHLORIDE 10 MG: 10 TABLET ORAL at 21:48

## 2021-03-04 RX ADMIN — METOPROLOL TARTRATE 25 MG: 25 TABLET, FILM COATED ORAL at 08:24

## 2021-03-04 RX ADMIN — BUSPIRONE HYDROCHLORIDE 10 MG: 10 TABLET ORAL at 14:46

## 2021-03-04 RX ADMIN — SODIUM CHLORIDE, PRESERVATIVE FREE 10 ML: 5 INJECTION INTRAVENOUS at 08:24

## 2021-03-04 RX ADMIN — APIXABAN 2.5 MG: 2.5 TABLET, FILM COATED ORAL at 08:23

## 2021-03-04 RX ADMIN — FLUTICASONE PROPIONATE 2 PUFF: 220 AEROSOL, METERED RESPIRATORY (INHALATION) at 08:24

## 2021-03-04 RX ADMIN — POTASSIUM CHLORIDE 40 MEQ: 1500 TABLET, EXTENDED RELEASE ORAL at 14:46

## 2021-03-04 RX ADMIN — SODIUM CHLORIDE, PRESERVATIVE FREE 10 ML: 5 INJECTION INTRAVENOUS at 21:54

## 2021-03-04 RX ADMIN — BUSPIRONE HYDROCHLORIDE 10 MG: 10 TABLET ORAL at 08:23

## 2021-03-04 RX ADMIN — CLONAZEPAM 0.5 MG: 1 TABLET ORAL at 23:25

## 2021-03-04 RX ADMIN — IRON SUCROSE 200 MG: 20 INJECTION, SOLUTION INTRAVENOUS at 17:12

## 2021-03-04 RX ADMIN — CETIRIZINE HYDROCHLORIDE 10 MG: 10 TABLET, FILM COATED ORAL at 10:48

## 2021-03-04 RX ADMIN — FUROSEMIDE 40 MG: 10 INJECTION, SOLUTION INTRAMUSCULAR; INTRAVENOUS at 14:47

## 2021-03-04 RX ADMIN — FERROUS SULFATE TAB 325 MG (65 MG ELEMENTAL FE) 325 MG: 325 (65 FE) TAB at 17:12

## 2021-03-04 RX ADMIN — IPRATROPIUM BROMIDE AND ALBUTEROL SULFATE 1 AMPULE: 2.5; .5 SOLUTION RESPIRATORY (INHALATION) at 19:36

## 2021-03-04 RX ADMIN — FERROUS SULFATE TAB 325 MG (65 MG ELEMENTAL FE) 325 MG: 325 (65 FE) TAB at 13:01

## 2021-03-04 RX ADMIN — IPRATROPIUM BROMIDE AND ALBUTEROL SULFATE 1 AMPULE: 2.5; .5 SOLUTION RESPIRATORY (INHALATION) at 11:56

## 2021-03-04 RX ADMIN — LISINOPRIL 2.5 MG: 5 TABLET ORAL at 13:01

## 2021-03-04 RX ADMIN — METOPROLOL TARTRATE 25 MG: 25 TABLET, FILM COATED ORAL at 21:48

## 2021-03-04 RX ADMIN — DILTIAZEM HYDROCHLORIDE 120 MG: 120 CAPSULE, COATED, EXTENDED RELEASE ORAL at 08:23

## 2021-03-04 RX ADMIN — MODAFINIL 200 MG: 200 TABLET ORAL at 08:24

## 2021-03-04 RX ADMIN — DULOXETINE 60 MG: 60 CAPSULE, DELAYED RELEASE ORAL at 08:23

## 2021-03-04 RX ADMIN — ATORVASTATIN CALCIUM 10 MG: 10 TABLET, FILM COATED ORAL at 08:23

## 2021-03-04 RX ADMIN — OXYCODONE HYDROCHLORIDE AND ACETAMINOPHEN 1000 MG: 500 TABLET ORAL at 08:23

## 2021-03-04 RX ADMIN — FERROUS SULFATE TAB 325 MG (65 MG ELEMENTAL FE) 325 MG: 325 (65 FE) TAB at 08:23

## 2021-03-04 RX ADMIN — FUROSEMIDE 20 MG: 20 TABLET ORAL at 08:23

## 2021-03-04 RX ADMIN — IPRATROPIUM BROMIDE AND ALBUTEROL SULFATE 1 AMPULE: 2.5; .5 SOLUTION RESPIRATORY (INHALATION) at 15:19

## 2021-03-04 RX ADMIN — FLUTICASONE PROPIONATE 1 SPRAY: 50 SPRAY, METERED NASAL at 08:24

## 2021-03-04 RX ADMIN — IPRATROPIUM BROMIDE AND ALBUTEROL SULFATE 1 AMPULE: 2.5; .5 SOLUTION RESPIRATORY (INHALATION) at 08:24

## 2021-03-04 ASSESSMENT — PAIN DESCRIPTION - PROGRESSION
CLINICAL_PROGRESSION: NOT CHANGED
CLINICAL_PROGRESSION: NOT CHANGED

## 2021-03-04 ASSESSMENT — PAIN SCALES - GENERAL: PAINLEVEL_OUTOF10: 0

## 2021-03-04 NOTE — PROGRESS NOTES
Today's Date: 3/3/2021  Patient Name: Savannah Nyhan  Date of admission: 2/21/2021  9:13 AM  Patient's age: 70 y.o., 1945  Admission Dx: GI bleed [K92.2]    Reason for Consult: management recommendations  Requesting Physician: Oscar Graff MD    CHIEF COMPLAINT:  Recurrent anemia   Interim history  The patient is seen and evaluated. Continues to be struggling with BiPAP. They are looking for a different mask. HISTORY OF PRESENT ILLNESS:      The patient is a 70 y.o.  female who is admitted to the hospital for worsening respiratory status. She is known to us with history of iron deficiency anemia requiring IV iron and transfusions. She has AV malformation and her anemia has been exacerbated because of anticoagulation. We have stopped her Eliquis because of recurrent GI bleeding. Indication for anticoagulation is atrial fibrillation. The patient is admitted and underwent thoracocentesis. Condition is slowly improving. It is thought that her pleural effusion is transudative and related to COPD/cardiac dysfunction. DIAGNOSIS:   1. Iron deficiency anemia, malabsorption component   2. AV malformation  3. COPD  4.  Anticoagulation due to afib, stopped due to recurrent GI bleeding       CURRENT THERAPY:  Plan for work up and IV iron    BRIEF CASE HISTORY: Charlette Maciel is a very pleasant 70 y.o. female who is referred to us for iron deficiency anemia. She reports she has been taking oral iron. She had knee replacement 3 years ago and required 3 units of blood prior to surgery. She was stable until 02/2020 when her HGB began to decrease again. She has had frequent hospitalizations in 2020 with COPD exacerbation and poor kidney function with bruised kidney. She has followed with nephrology in the past but no recent follow up and has pulmonology referral. She has had some compliance issues with showing up for appointments. She has poor sleep with breathing issues and feels very fatigued. She has had several falls this year and has needed family to come pick her up, her legs are weak. She reports she had some bleeding following EGD and colonoscopy, AV malformation was found. She is on Eliquis for Afib but has not followed up with cardiology in the last year. Past Medical History:   has a past medical history of A-fib ([address] Acquired hypothyroidism, Acute encephalopathy, Acute kidney injury ([address] Anxiety, Benign hypertension with CKD (chronic kidney disease) stage III, Chronic back pain, CKD (chronic kidney disease) stage 3, GFR 30-59 ml/min, Constipation, COPD (chronic obstructive pulmonary disease) ([address] Cough, Depression, ETOH abuse, Former smoker, HA (generalized anxiety disorder), History of GI bleed, Hyperlipidemia, Hypertension, Hypothyroid, Leg pain, Normocytic anemia, Osteoarthritis, PAD (peripheral artery disease) ([address] Primary osteoarthritis, Pulmonary hypertension ([address] Pure hypercholesterolemia, SIRS (systemic inflammatory response syndrome) ([address] Urge incontinence, and Wheezing. Past Surgical History:   has a past surgical history that includes eye surgery (Bilateral); Colonoscopy; joint replacement; Upper gastrointestinal endoscopy (N/A, 12/2/2020); Colonoscopy (N/A, 12/2/2020); Upper gastrointestinal endoscopy (N/A, 12/23/2020); and Thoracentesis (12/26/2020). Medications:    Reviewed in Epic     Allergies:  Tizanidine    Social History:   reports that she has quit smoking. Her smoking use included cigarettes. She has a 78.00 pack-year smoking history. She has never used smokeless tobacco. She reports previous alcohol use. She reports that she does not use drugs. Family History: family history includes COPD in her mother; Cancer in an other family member; Emphysema in her sister; Heart Disease in her mother. REVIEW OF SYSTEMS:    Constitutional: No fever or chills. No night sweats, no weight loss   Eyes: No eye discharge, double vision, or eye pain   HEENT: negative for sore mouth, sore throat, hoarseness and voice change   Respiratory: negative for cough , sputum, dyspnea, wheezing, hemoptysis, chest pain   Cardiovascular: negative for chest pain, dyspnea, palpitations, orthopnea, PND   Gastrointestinal: negative for nausea, vomiting, diarrhea, constipation, abdominal pain, Dysphagia, hematemesis and hematochezia   Genitourinary: negative for frequency, dysuria, nocturia, urinary incontinence, and hematuria   Integument: negative for rash, skin lesions, bruises.    Hematologic/Lymphatic: negative for easy bruising, bleeding, lymphadenopathy, or petechiae   Endocrine: negative for heat or cold intolerance,weight changes, change in bowel habits and hair loss   Musculoskeletal: negative for myalgias, arthralgias, pain, joint swelling,and bone pain   Neurological: negative for headaches, dizziness, seizures, weakness, numbness    PHYSICAL EXAM: BP (!) 111/47   Pulse 81   Temp 97.4 °F (36.3 °C) (Oral)   Resp 20   Ht 5' 8\" (1.727 m)   Wt 175 lb 0.7 oz (79.4 kg)   SpO2 94%   BMI 26.62 kg/m²    Temp (24hrs), Av °F (36.7 °C), Min:97.4 °F (36.3 °C), Max:98.2 °F (36.8 °C)    General appearance - well appearing, no in pain or distress   Mental status - alert and cooperative   Eyes - pupils equal and reactive, extraocular eye movements intact   Ears - bilateral TM's and external ear canals normal   Mouth - mucous membranes moist, pharynx normal without lesions   Neck - supple, no significant adenopathy   Lymphatics - no palpable lymphadenopathy, no hepatosplenomegaly   Chest - clear to auscultation, no wheezes, rales or rhonchi, symmetric air entry   Heart - normal rate, regular rhythm, normal S1, S2, no murmurs  Abdomen - soft, nontender, nondistended, no masses or organomegaly   Neurological - alert, oriented, normal speech, no focal findings or movement disorder noted   Musculoskeletal - no joint tenderness, deformity or swelling   Extremities - peripheral pulses normal, no pedal edema, no clubbing or cyanosis   Skin - normal coloration and turgor, no rashes, no suspicious skin lesions noted ,    DATA:    Labs:   CBC:   Recent Labs     21  0631 21  0534   WBC 6.5 6.4   HGB 8.3* 8.3*   HCT 26.4* 26.4*    253     BMP:   Recent Labs     21  0631 21  0534   * 144   K 3.6* 3.2*   CO2 >45* >45*   BUN 11 11   CREATININE 0.71 0.65   LABGLOM >60 >60   GLUCOSE 126* 101*     PT/INR:   No results for input(s): PROTIME, INR in the last 72 hours.     IMAGING DATA:      Primary Problem  GI bleed    Active Hospital Problems    Diagnosis Date Noted    Hypernatremia [E87.0] 2021    Acute respiratory failure with hypoxia ([address] [J96.01] 2021    Severe malnutrition ([address] [E43] 2021    Iron deficiency anemia due to chronic blood loss [D50.0] 2020    GI bleed [K92.2] 2020  Chronic diastolic CHF (congestive heart failure) (HCC) [I50.32] 07/21/2020    Moderate major depression (HCC) [F32.1] 06/17/2020    PAD (peripheral artery disease) (HCC) [I73.9] 06/17/2020    CKD (chronic kidney disease) stage 3, GFR 30-59 ml/min [N18.30]     HA (generalized anxiety disorder) [F41.1] 12/13/2018    A-fib ([facility] [address] [I48.91] 09/13/2018    COPD (chronic obstructive pulmonary disease) ([facility] [address] [J44.9] 09/13/2018    Pulmonary hypertension ([facility] [address] [I27.20] 09/13/2018    Hypertension [I10]     Acquired hypothyroidism [E03.9] 01/18/2013         IMPRESSION:   1. Recurrent Iron def anemia   2. Anticoagulation (due to afib) stopped  3. 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 Acute respiratory failure  5. History of CHF and COPD    RECOMMENDATIONS:  1. The patient hemoglobin is stable, no drop since eliquis was stopped. 2. Pt wants to continue aggressive care   3. Continue supportive care, hemoglobin is above 8, no need for transfusion  4. Off anticoagulation      Discussed with patient and Nurse. Thank you for asking us to see this patient.     ALEX CHAPA [facility] MD Derrick  Hematologist/Medical Oncologist  Cell: [phone]

## 2021-03-04 NOTE — PLAN OF CARE
Problem: Falls - Risk of:  Goal: Will remain free from falls  Description: Will remain free from falls  Outcome: Met This Shift     Problem: SAFETY  Goal: Free from accidental physical injury  Outcome: Met This Shift     Problem: DAILY CARE  Goal: Daily care needs are met  Outcome: Met This Shift     Problem: PAIN  Goal: Patient's pain/discomfort is manageable  Outcome: Met This Shift     Problem: SKIN INTEGRITY  Goal: Skin integrity is maintained or improved  Outcome: Met This Shift     Problem: KNOWLEDGE DEFICIT  Goal: Patient/S.O. demonstrates understanding of disease process, treatment plan, medications, and discharge instructions.   Outcome: Met This Shift     Problem: DISCHARGE BARRIERS  Goal: Patient's continuum of care needs are met  Outcome: Met This Shift     Problem: Nutrition  Goal: Optimal nutrition therapy  Description: Nutrition Problem #1: Inadequate oral intake  Intervention: Food and/or Nutrient Delivery: Continue Current Diet, Start Oral Nutrition Supplement  Nutritional Goals: po intake greater than 50%     Outcome: Met This Shift     Problem: Pain:  Goal: Pain level will decrease  Description: Pain level will decrease  Outcome: Met This Shift  Goal: Control of acute pain  Description: Control of acute pain  Outcome: Met This Shift  Goal: Control of chronic pain  Description: Control of chronic pain  Outcome: Met This Shift     Problem: Skin Integrity:  Goal: Will show no infection signs and symptoms  Description: Will show no infection signs and symptoms  Outcome: Met This Shift  Goal: Absence of new skin breakdown  Description: Absence of new skin breakdown  Outcome: Met This Shift     Problem: Airway Clearance - Ineffective  Goal: Achieve or maintain patent airway  Outcome: Met This Shift     Problem: Gas Exchange - Impaired  Goal: Absence of hypoxia  Outcome: Met This Shift  Goal: Promote optimal lung function  Outcome: Met This Shift     Problem: Breathing Pattern - Ineffective Goal: Ability to achieve and maintain a regular respiratory rate  Outcome: Met This Shift     Problem: Body Temperature -  Risk of, Imbalanced  Goal: Ability to maintain a body temperature within defined limits  Outcome: Met This Shift  Goal: Will regain or maintain usual level of consciousness  Outcome: Met This Shift  Goal: Complications related to the disease process, condition or treatment will be avoided or minimized  Outcome: Met This Shift     Problem: Isolation Precautions - Risk of Spread of Infection  Goal: Prevent transmission of infection  Outcome: Met This Shift     Problem: Nutrition Deficits  Goal: Optimize nutritional status  Outcome: Met This Shift     Problem: Risk for Fluid Volume Deficit  Goal: Maintain normal heart rhythm  Outcome: Met This Shift  Goal: Maintain absence of muscle cramping  Outcome: Met This Shift     Problem: Patient Education: Go to Patient Education Activity  Goal: Patient/Family Education  Outcome: Met This Shift     Problem: Risk for Fluid Volume Deficit  Goal: Maintain normal serum potassium, sodium, calcium, phosphorus, and pH  Outcome: Ongoing     Problem: Fatigue  Goal: Verbalize increase energy and improved vitality  Outcome: Ongoing     Problem: Breathing Pattern - Ineffective:  Goal: Ability to achieve and maintain a regular respiratory rate will improve  Description: Ability to achieve and maintain a regular respiratory rate will improve  Outcome: Ongoing  Note: Refuses to wear her bipap at night despite educating patient. Dr. Ivone Velazquez made aware this morning of her critical Co2 of greater than 45. NNO were obtained.

## 2021-03-04 NOTE — PROGRESS NOTES
PULMONARY PROGRESS NOTE:    REASON FOR VISIT:Pl effusion, dyspnea  Interval History:    Shortness of Breath: yes  Cough: no  Sputum: no          Hemoptysis: no  Chest Pain: no  Fever: no                   Swelling Feet: no  Headache: no                                           Nausea, Emesis, Abdominal Pain: no  Diarrhea: no         Constipation: no    Events since last visit: Wore bipap for one hour overnight. She reports she didn't like the nasal mask because it made her dry, have post nasal drip, and a sore throat.      PAST MEDICAL HISTORY:      Scheduled Meds:   cetirizine  10 mg Oral Daily    furosemide  20 mg Oral Daily    pantoprazole  40 mg Oral QAM AC    modafinil  200 mg Oral Daily    iron sucrose  200 mg Intravenous Q24H    [Held by provider] furosemide  40 mg Oral Daily    ferrous sulfate  325 mg Oral TID WC    ipratropium-albuterol  1 ampule Inhalation Q4H WA    ascorbic acid  1,000 mg Oral Daily    atorvastatin  10 mg Oral Daily    busPIRone  10 mg Oral TID    dilTIAZem  120 mg Oral Daily    DULoxetine  60 mg Oral Daily    fluticasone  1 spray Each Nostril Daily    fluticasone  2 puff Inhalation BID    levothyroxine  125 mcg Oral Daily    lisinopril  2.5 mg Oral Lunch    metoprolol tartrate  25 mg Oral BID    sodium chloride flush  10 mL Intravenous 2 times per day     Continuous Infusions:   sodium chloride       PRN Meds:phenol, potassium chloride **OR** potassium alternative oral replacement **OR** potassium chloride, HYDROcodone-acetaminophen, acetaminophen, baclofen, clonazePAM, guaiFENesin, sodium chloride flush, promethazine **OR** ondansetron, acetaminophen **OR** acetaminophen, sodium chloride        PHYSICAL EXAMINATION:  /60   Pulse 90   Temp 97.6 °F (36.4 °C) (Axillary)   Resp 16   Ht 5' 8\" (1.727 m)   Wt 176 lb 5.9 oz (80 kg)   SpO2 94%   BMI 26.82 kg/m²     General : Awake, alert, oriented   Neck  supple, no lymphadenopathy, JVD not raised  Heart  regular rhythm, S1 and S2 normal; no additional sounds heard  Lungs  Air Entry- fair bilaterally; breath sounds : vesicular;   rales/crackles - absent, 94% on 4L  Abdomen  soft, no tenderness  Upper Extremities  - no cyanosis, mottling; edema : absent  Lower Extremities: no cyanosis, mottling; edema : mild    Current Laboratory, Radiologic, Microbiologic, and Diagnostic studies reviewed  Data ReviewCBC:   Recent Labs     03/02/21  0631 03/03/21  0534 03/04/21  0513   WBC 6.5 6.4 9.8   RBC 2.62* 2.65* 2.83*   HGB 8.3* 8.3* 9.0*   HCT 26.4* 26.4* 28.8*    253 291     BMP:   Recent Labs     03/02/21  0631 03/03/21  0534 03/04/21  0513   GLUCOSE 126* 101* 116*   * 144 143   K 3.6* 3.2* 3.9   BUN 11 11 12   CREATININE 0.71 0.65 0.68   CALCIUM 9.2 8.7 9.0     ABGs:   Recent Labs     03/01/21  1135   PHART 7.353   PO2ART 52.3*   AIW1EFC 92.4*   YLH9DUM 51.4*   J9LWRKOE 84.6*      PT/INR:  No results found for: PTINR    ASSESSMENT / PLAN:  Nasal congestion - humidify O2, sudafed  Acute hypoxic resp failure - O2  Left pl effusion - monitor, diuresis,   GI bleed - per GI service   Thoracentesis per IR 2/25/21- 1L fluid removed left side - transudate  Previous Covid positive 1/1/21  Chronic Hypercapnic resp failure - try BPAP -doesn't like it, will try nasal mask  CXR 3/1- slightly increased pleural effusions  Start provigil  Add chloraseptic spray and certirizine  Encouraged patient to wear bipap more frequently  Discussed with RN to encourage bipap   Discussed with Dr. Dany Rosas       Electronically signed by Suhail Black on 03/04/21

## 2021-03-04 NOTE — PLAN OF CARE
Problem: Falls - Risk of:  Goal: Will remain free from falls  Description: Will remain free from falls  3/4/2021 1604 by Zenobia Schaffer RN  Outcome: Ongoing  3/4/2021 0929 by Nikki Parmar RN  Outcome: Met This Shift     Problem: SAFETY  Goal: Free from accidental physical injury  3/4/2021 1604 by Zenobia Schaffer RN  Outcome: Ongoing  3/4/2021 0929 by Nikki Parmar RN  Outcome: Met This Shift     Problem: DAILY CARE  Goal: Daily care needs are met  3/4/2021 1604 by Zenobia Schaffer RN  Outcome: Ongoing  3/4/2021 0929 by Nikki Parmar RN  Outcome: Met This Shift     Problem: PAIN  Goal: Patient's pain/discomfort is manageable  3/4/2021 1604 by Zenobia Schaffer RN  Outcome: Ongoing  3/4/2021 0929 by Nikki Parmar RN  Outcome: Met This Shift     Problem: SKIN INTEGRITY  Goal: Skin integrity is maintained or improved  3/4/2021 1604 by Zenobia Schaffer RN  Outcome: Ongoing  3/4/2021 0929 by Nikki Parmar RN  Outcome: Met This Shift     Problem: KNOWLEDGE DEFICIT  Goal: Patient/S.O. demonstrates understanding of disease process, treatment plan, medications, and discharge instructions.   3/4/2021 1604 by Zenobia Schaffer RN  Outcome: Ongoing  3/4/2021 0929 by Nikki Parmar RN  Outcome: Met This Shift     Problem: DISCHARGE BARRIERS  Goal: Patient's continuum of care needs are met  3/4/2021 1604 by Zenobia Schaffer RN  Outcome: Ongoing  3/4/2021 0929 by Nikki Parmar RN  Outcome: Met This Shift     Problem: Nutrition  Goal: Optimal nutrition therapy  Description: Nutrition Problem #1: Inadequate oral intake  Intervention: Food and/or Nutrient Delivery: Continue Current Diet, Start Oral Nutrition Supplement  Nutritional Goals: po intake greater than 50%     3/4/2021 1604 by Zenobia Schaffer RN  Outcome: Ongoing  3/4/2021 0929 by Nikki Parmar RN  Outcome: Met This Shift     Problem: Pain:  Goal: Pain level will decrease  Description: Pain level will decrease  3/4/2021 1604 by Zenobia Schaffer RN  Outcome: Ongoing  3/4/2021 0929 by Kacey Leal RN  Outcome: Met This Shift  Goal: Control of acute pain  Description: Control of acute pain  3/4/2021 1604 by David Gregory RN  Outcome: Ongoing  3/4/2021 0929 by Kacey Leal RN  Outcome: Met This Shift  Goal: Control of chronic pain  Description: Control of chronic pain  3/4/2021 1604 by David Gregory RN  Outcome: Ongoing  3/4/2021 0929 by Kacey Leal RN  Outcome: Met This Shift     Problem: Skin Integrity:  Goal: Will show no infection signs and symptoms  Description: Will show no infection signs and symptoms  3/4/2021 1604 by David Gregory RN  Outcome: Ongoing  3/4/2021 0929 by Kacey Leal RN  Outcome: Met This Shift  Goal: Absence of new skin breakdown  Description: Absence of new skin breakdown  3/4/2021 1604 by David Gregory RN  Outcome: Ongoing  3/4/2021 0929 by Kacey Leal RN  Outcome: Met This Shift     Problem: Airway Clearance - Ineffective  Goal: Achieve or maintain patent airway  3/4/2021 1604 by David Gregory RN  Outcome: Ongoing  3/4/2021 0929 by Kacey Leal RN  Outcome: Met This Shift     Problem: Gas Exchange - Impaired  Goal: Absence of hypoxia  3/4/2021 1604 by David Gregory RN  Outcome: Ongoing  3/4/2021 0929 by Kacey Leal RN  Outcome: Met This Shift  Goal: Promote optimal lung function  3/4/2021 1604 by David Gregory RN  Outcome: Ongoing  3/4/2021 0929 by Kacey Leal RN  Outcome: Met This Shift     Problem: Breathing Pattern - Ineffective  Goal: Ability to achieve and maintain a regular respiratory rate  3/4/2021 1604 by David Gregory RN  Outcome: Ongoing  3/4/2021 0929 by Kacey Leal RN  Outcome: Met This Shift     Problem:  Body Temperature -  Risk of, Imbalanced  Goal: Ability to maintain a body temperature within defined limits  3/4/2021 1604 by David Gregory RN  Outcome: Ongoing  3/4/2021 0929 by Kacey Leal RN  Outcome: Met This Shift  Goal: Will regain or maintain usual level of consciousness  3/4/2021 1604 by David Gregory RN  Outcome: Ongoing  3/4/2021 0929 by Venus Sulliavn RN  Outcome: Met This Shift  Goal: Complications related to the disease process, condition or treatment will be avoided or minimized  3/4/2021 1604 by Juliana Landon RN  Outcome: Ongoing  3/4/2021 0929 by Venus Sullivan RN  Outcome: Met This Shift     Problem: Isolation Precautions - Risk of Spread of Infection  Goal: Prevent transmission of infection  3/4/2021 1604 by Juliana Landon RN  Outcome: Ongoing  3/4/2021 0929 by Venus Sullivan RN  Outcome: Met This Shift     Problem: Nutrition Deficits  Goal: Optimize nutritional status  3/4/2021 1604 by Juliana Landon RN  Outcome: Ongoing  3/4/2021 0929 by Venus Sullivan RN  Outcome: Met This Shift     Problem: Risk for Fluid Volume Deficit  Goal: Maintain normal heart rhythm  3/4/2021 1604 by Juliana Landon RN  Outcome: Ongoing  3/4/2021 0929 by Venus Sullivan RN  Outcome: Met This Shift  Goal: Maintain absence of muscle cramping  3/4/2021 1604 by Juliana Landon RN  Outcome: Ongoing  3/4/2021 0929 by Venus Sullivan RN  Outcome: Met This Shift  Goal: Maintain normal serum potassium, sodium, calcium, phosphorus, and pH  3/4/2021 1604 by Juliana Landon RN  Outcome: Ongoing  3/4/2021 0929 by Venus Sullivan RN  Outcome: Ongoing     Problem: Fatigue  Goal: Verbalize increase energy and improved vitality  3/4/2021 1604 by Juliana Landon RN  Outcome: Ongoing  3/4/2021 0929 by Venus Sullivan RN  Outcome: Ongoing     Problem: Patient Education: Go to Patient Education Activity  Goal: Patient/Family Education  3/4/2021 1604 by Juliana Landon RN  Outcome: Ongoing  3/4/2021 0929 by Venus Sullivan RN  Outcome: Met This Shift     Problem: Breathing Pattern - Ineffective:  Goal: Ability to achieve and maintain a regular respiratory rate will improve  Description: Ability to achieve and maintain a regular respiratory rate will improve  3/4/2021 1604 by Juliana Landon RN  Outcome: Ongoing  3/4/2021 0929 by Venus Sullivan RN  Outcome: Ongoing Note: Refuses to wear her bipap at night despite educating patient. Dr. Deny Huffman made aware this morning of her critical Co2 of greater than 45. NNO were obtained.

## 2021-03-04 NOTE — PROGRESS NOTES
BRONCHOSPASM/BRONCHOCONSTRICTION     [x]         IMPROVE AERATION/BREATH SOUNDS  [x]   ADMINISTER BRONCHODILATOR THERAPY AS APPROPRIATE  [x]   ASSESS BREATH SOUNDS  []   IMPLEMENT AEROSOL/MDI PROTOCOL  [x]   PATIENT EDUCATION AS NEEDED    NONINVASIVE VENTILATION    PROVIDE OPTIMAL VENTILATION/ACCEPTABLE SPO2   IMPLEMENT NONINVASIVE VENTILATION PROTOCOL   MAINTAIN ACCEPTABLE SPO2   ASSESS SKIN INTEGRITY/BREAKDOWN SCORE   PATIENT EDUCATION AS NEEDED   BIPAP AS NEEDED        PROVIDE ADEQUATE OXYGENATION WITH ACCEPTABLE SP02/ABG'S    [x]  IDENTIFY APPROPRIATE OXYGEN THERAPY  [x]   MONITOR SP02/ABG'S AS NEEDED   [x]   PATIENT EDUCATION AS NEEDED    Pt given scheduled tx. Pt tolerated well. Currently on 4lnc SpO2 94% diminshed breath sounds through out.  Pt did wear bipap overnight for about 4 hours

## 2021-03-04 NOTE — PROGRESS NOTES
Notified Dr. Galina Castro of critical CO2 greater than 39. Patient refusing to wear bipap at night. Was able to wear for total of 1 hour. Educated patient on importance of wearing the bipap and rationale for wearing it. Received no further orders at this time.

## 2021-03-04 NOTE — CONSULTS
Department of Internal Medicine  Nephrology Huber Garcia MD   Consult Note      SUBJECTIVE: This is a 70 y.o. female with a significant past medical history of Chronic atrial fibrillation, pulmonary hypertension, chronic recurrent anemia [s/p recent endoscopy showing AVMs requiring intermittent PRBC transfusions], COPD and chronic kidney disease stage II, who presented to the emergency department after falling at home. Laboratory studies revealed severe anemia with hemoglobin 6.8 g/dL and serum creatinine was 1.25 mg/dL. Nephrology consultation was called yesterday due to low urine output. Patient was given albumin and Lasix with prompt improvement in urine output. She feels well today and does not have shortness of breath or chest pain. Interval history:  Patient denies abdominal pain, rectal bleeding, nausea or vomiting. Patient still requiring BiPAP intermittently. Received IV Lasix 40 mg yesterday with good diuresis.         Scheduled Meds:   cetirizine  10 mg Oral Daily    furosemide  20 mg Oral Daily    pantoprazole  40 mg Oral QAM AC    modafinil  200 mg Oral Daily    iron sucrose  200 mg Intravenous Q24H    [Held by provider] furosemide  40 mg Oral Daily    ferrous sulfate  325 mg Oral TID WC    ipratropium-albuterol  1 ampule Inhalation Q4H WA    ascorbic acid  1,000 mg Oral Daily    atorvastatin  10 mg Oral Daily    busPIRone  10 mg Oral TID    dilTIAZem  120 mg Oral Daily    DULoxetine  60 mg Oral Daily    fluticasone  1 spray Each Nostril Daily    fluticasone  2 puff Inhalation BID    levothyroxine  125 mcg Oral Daily    lisinopril  2.5 mg Oral Lunch    metoprolol tartrate  25 mg Oral BID    sodium chloride flush  10 mL Intravenous 2 times per day     Continuous Infusions:   sodium chloride PRN Meds:.phenol, potassium chloride **OR** potassium alternative oral replacement **OR** potassium chloride, HYDROcodone-acetaminophen, acetaminophen, baclofen, clonazePAM, guaiFENesin, sodium chloride flush, promethazine **OR** ondansetron, acetaminophen **OR** acetaminophen, sodium chloride    Family History   Problem Relation Age of Onset    Heart Disease Mother     COPD Mother     Emphysema Sister     Cancer Other         Cousin - breast cancer        Social History     Socioeconomic History    Marital status:      Spouse name: None    Number of children: None    Years of education: None    Highest education level: None   Occupational History    None   Social Needs    Financial resource strain: None    Food insecurity     Worry: None     Inability: None    Transportation needs     Medical: None     Non-medical: None   Tobacco Use    Smoking status: Former Smoker     Packs/day: 3.00     Years: 26.00     Pack years: 78.00     Types: Cigarettes    Smokeless tobacco: Never Used   Substance and Sexual Activity    Alcohol use: Not Currently     Comment: occ    Drug use: No    Sexual activity: None   Lifestyle    Physical activity     Days per week: None     Minutes per session: None    Stress: None   Relationships    Social connections     Talks on phone: None     Gets together: None     Attends Jew service: None     Active member of club or organization: None     Attends meetings of clubs or organizations: None     Relationship status: None    Intimate partner violence     Fear of current or ex partner: None     Emotionally abused: None     Physically abused: None     Forced sexual activity: None   Other Topics Concern    None   Social History Narrative    None Review of systems: CNS - no headache or dizziness; Cardiac - no chest pain; Respiratory - no cough or shortness of breath; Gastrointestinal - Black tarry stools; no nausea, vomiting or diarrhea; Musculoskeletal - general body aches; Skin/Integument - no rashes. Physical Exam:    VITALS:  /60   Pulse 90   Temp 97.6 °F (36.4 °C) (Axillary)   Resp 16   Ht 5' 8\" (1.727 m)   Wt 176 lb 5.9 oz (80 kg)   SpO2 94%   BMI 26.82 kg/m²   24HR INTAKE/OUTPUT:      Intake/Output Summary (Last 24 hours) at 3/4/2021 1254  Last data filed at 3/4/2021 0500  Gross per 24 hour   Intake 220 ml   Output 1700 ml   Net -1480 ml     Constitutional: alert, appears stated age and cooperative    Skin: Skin color, texture, turgor normal. No rashes or lesions    Head: Normocephalic, without obvious abnormality, atraumatic     Cardiovascular/Edema: regular rate and rhythm, S1, S2 normal, no murmur, click, rub or gallop    Respiratory: Lungs: clear to auscultation bilaterally    Abdomen: soft, non-tender; bowel sounds normal; no masses,  no organomegaly    Back: symmetric, no curvature. ROM normal. No CVA tenderness.     Extremities: extremities normal, atraumatic, no cyanosis or edema    Neuro:  Grossly normal    CBC:   Recent Labs     03/02/21  0631 03/03/21  0534 03/04/21  0513   WBC 6.5 6.4 9.8   HGB 8.3* 8.3* 9.0*    253 291     BMP:    Recent Labs     03/02/21  0631 03/03/21  0534 03/04/21  0513   * 144 143   K 3.6* 3.2* 3.9   CL 94* 90* 89*   CO2 >45* >45* >45*   BUN 11 11 12   CREATININE 0.71 0.65 0.68   GLUCOSE 126* 101* 116*       Lab Results   Component Value Date    NITRU NEGATIVE 02/22/2021    COLORU YELLOW 02/22/2021    PHUR 5.5 02/22/2021    WBCUA 2 TO 5 02/21/2021    RBCUA 0 TO 2 02/21/2021    MUCUS NOT REPORTED 02/21/2021    TRICHOMONAS NOT REPORTED 02/21/2021    YEAST NOT REPORTED 02/21/2021    BACTERIA FEW 02/21/2021    CLARITYU Clear 01/31/2020    SPECGRAV 1.014 02/22/2021 LEUKOCYTESUR NEGATIVE 02/22/2021    UROBILINOGEN Normal 02/22/2021    BILIRUBINUR NEGATIVE 02/22/2021    BILIRUBINUR 0 01/31/2020    BLOODU Negative 01/31/2020    GLUCOSEU NEGATIVE 02/22/2021    KETUA NEGATIVE 02/22/2021    AMORPHOUS 1+ 02/21/2021     Urine Sodium:     Lab Results   Component Value Date    ANGÉLICA 82 02/23/2021     Urine Potassium:    Lab Results   Component Value Date    KUR 35.7 07/20/2020     Urine Chloride:    Lab Results   Component Value Date    CLUR 113 02/23/2021     Urine Creatinine:     Lab Results   Component Value Date    LABCREA 137.9 02/11/2021    LABCREA 134.5 02/11/2021     IMPRESSION/RECOMMENDATIONS:     1. Chronic kidney disease stage 3- renal function is stable. 2. Hypernatremia -  Oral Free water intake up to 1.5 L/day    3. Acute on chronic anemia. Hemoglobin 9.0 g and stable      4. Acute on  chronic CHF with preserved ejection fraction-  1500 mls fluid restriction    5. Left   Pleural effusion-status post thoracentesis 1 L. On 2/25/21-transudative fluid    6. Hypercapnic respiratory failure-pulmonary following      7. Metabolic alkalosis 2nd to acute hypercapnic respiratory failure with metabolic compensation-acetazolamide not indicated. Plan  IV Lasix 40 mg x 1 + 40 M EQ of potassium Chloride  Continue External catheter.   Strict I's and O's  Pulmonology recommendations noted      MD ROXIE Bynum  Attending Nephrologist  3/4/2021 12:54 PM

## 2021-03-04 NOTE — CARE COORDINATION
Plan remains for the patient to return to [facility]. She continues to be a bed hold under her [employer] (Pending).   The patient can return whenever she is ready/DC'd

## 2021-03-04 NOTE — PROGRESS NOTES
Today's Date: 3/4/2021  Patient Name: Gui Davila  Date of admission: 2/21/2021  9:13 AM  Patient's age: 70 y.o., 1945  Admission Dx: GI bleed [K92.2]    Reason for Consult: management recommendations  Requesting Physician: Nette Barlow MD    CHIEF COMPLAINT:  Recurrent anemia   Interim history  The patient is seen and evaluated. Continues to be struggling with BiPAP. I found out that primary care have started her on Eliquis again. We will discontinue that and contact Dr. Jack Zavala:      The patient is a 70 y.o.  female who is admitted to the hospital for worsening respiratory status. She is known to us with history of iron deficiency anemia requiring IV iron and transfusions. She has AV malformation and her anemia has been exacerbated because of anticoagulation. We have stopped her Eliquis because of recurrent GI bleeding. Indication for anticoagulation is atrial fibrillation. The patient is admitted and underwent thoracocentesis. Condition is slowly improving. It is thought that her pleural effusion is transudative and related to COPD/cardiac dysfunction. DIAGNOSIS:   1. Iron deficiency anemia, malabsorption component   2. AV malformation  3. COPD  4. Anticoagulation due to afib, stopped due to recurrent GI bleeding       CURRENT THERAPY:  Plan for work up and IV iron    BRIEF CASE HISTORY:   Jess Wagoner is a very pleasant 70 y.o. female who is referred to us for iron deficiency anemia. She reports she has been taking oral iron. She had knee replacement 3 years ago and required 3 units of blood prior to surgery. She was stable until 02/2020 when her HGB began to decrease again. She has had frequent hospitalizations in 2020 with COPD exacerbation and poor kidney function with bruised kidney.  She has followed with nephrology in the past but no recent follow up and has pulmonology referral. She has had some compliance issues with showing up for appointments. She has poor sleep with breathing issues and feels very fatigued. She has had several falls this year and has needed family to come pick her up, her legs are weak. She reports she had some bleeding following EGD and colonoscopy, AV malformation was found. She is on Eliquis for Afib but has not followed up with cardiology in the last year. Past Medical History:   has a past medical history of A-fib ([facility] Acquired hypothyroidism, Acute encephalopathy, Acute kidney injury ([facility] [address] Anxiety, Benign hypertension with CKD (chronic kidney disease) stage III, Chronic back pain, CKD (chronic kidney disease) stage 3, GFR 30-59 ml/min, Constipation, COPD (chronic obstructive pulmonary disease) ([facility] [address] Cough, Depression, ETOH abuse, Former smoker, HA (generalized anxiety disorder), History of GI bleed, Hyperlipidemia, Hypertension, Hypothyroid, Leg pain, Normocytic anemia, Osteoarthritis, PAD (peripheral artery disease) ([facility] [address] Primary osteoarthritis, Pulmonary hypertension ([facility] [address] Pure hypercholesterolemia, SIRS (systemic inflammatory response syndrome) ([facility] [address] Urge incontinence, and Wheezing. Past Surgical History:   has a past surgical history that includes eye surgery (Bilateral); Colonoscopy; joint replacement; Upper gastrointestinal endoscopy (N/A, 12/2/2020); Colonoscopy (N/A, 12/2/2020); Upper gastrointestinal endoscopy (N/A, 12/23/2020); and Thoracentesis (12/26/2020). Medications:    Reviewed in Epic     Allergies:  Tizanidine    Social History:   reports that she has quit smoking. Her smoking use included cigarettes. She has a 78.00 pack-year smoking history. She has never used smokeless tobacco. She reports previous alcohol use. She reports that she does not use drugs. Family History: family history includes COPD in her mother; Cancer in an other family member; Emphysema in her sister; Heart Disease in her mother. REVIEW OF SYSTEMS:    Constitutional: No fever or chills.  No night sweats, no weight loss   Eyes: No eye discharge, double vision, or eye pain   HEENT: negative for sore mouth, sore throat, hoarseness and voice change   Respiratory: negative for cough , sputum, dyspnea, wheezing, hemoptysis, chest pain   Cardiovascular: negative for chest pain, dyspnea, palpitations, orthopnea, PND   Gastrointestinal: negative for nausea, vomiting, diarrhea, constipation, abdominal pain, Dysphagia, hematemesis and hematochezia   Genitourinary: negative for frequency, dysuria, nocturia, urinary incontinence, and hematuria   Integument: negative for rash, skin lesions, bruises.    Hematologic/Lymphatic: negative for easy bruising, bleeding, lymphadenopathy, or petechiae   Endocrine: negative for heat or cold intolerance,weight changes, change in bowel habits and hair loss   Musculoskeletal: negative for myalgias, arthralgias, pain, joint swelling,and bone pain   Neurological: negative for headaches, dizziness, seizures, weakness, numbness    PHYSICAL EXAM:      BP (!) 119/54   Pulse 89   Temp 98.5 °F (36.9 °C) (Oral)   Resp 18   Ht 5' 8\" (1.727 m)   Wt 176 lb 5.9 oz (80 kg)   SpO2 98%   BMI 26.82 kg/m²    Temp (24hrs), Av.2 °F (36.8 °C), Min:97.6 °F (36.4 °C), Max:98.6 °F (37 °C)    General appearance - well appearing, no in pain or distress   Mental status - alert and cooperative   Eyes - pupils equal and reactive, extraocular eye movements intact   Ears - bilateral TM's and external ear canals normal   Mouth - mucous membranes moist, pharynx normal without lesions   Neck - supple, no significant adenopathy   Lymphatics - no palpable lymphadenopathy, no hepatosplenomegaly   Chest - clear to auscultation, no wheezes, rales or rhonchi, symmetric air entry   Heart - normal rate, regular rhythm, normal S1, S2, no murmurs  Abdomen - soft, nontender, nondistended, no masses or organomegaly   Neurological - alert, oriented, normal speech, no focal findings or movement disorder noted Musculoskeletal - no joint tenderness, deformity or swelling   Extremities - peripheral pulses normal, no pedal edema, no clubbing or cyanosis   Skin - normal coloration and turgor, no rashes, no suspicious skin lesions noted ,    DATA:    Labs:   CBC:   Recent Labs     03/03/21  0534 03/04/21  0513   WBC 6.4 9.8   HGB 8.3* 9.0*   HCT 26.4* 28.8*    291     BMP:   Recent Labs     03/03/21  0534 03/04/21  0513    143   K 3.2* 3.9   CO2 >45* >45*   BUN 11 12   CREATININE 0.65 0.68   LABGLOM >60 >60   GLUCOSE 101* 116*     PT/INR:   No results for input(s): PROTIME, INR in the last 72 hours. IMAGING DATA:      Primary Problem  GI bleed    Active Hospital Problems    Diagnosis Date Noted    Hypernatremia [E87.0] 02/24/2021    Acute respiratory failure with hypoxia ([facility] [address] [J96.01] 02/23/2021    Severe malnutrition ([facility] [address] [E43] 01/20/2021    Iron deficiency anemia due to chronic blood loss [D50.0] 12/18/2020    GI bleed [K92.2] 11/30/2020    Chronic diastolic CHF (congestive heart failure) (HCC) [I50.32] 07/21/2020    Moderate major depression (HCC) [F32.1] 06/17/2020    PAD (peripheral artery disease) ([facility]) [I73.9] 06/17/2020    CKD (chronic kidney disease) stage 3, GFR 30-59 ml/min [N18.30]     HA (generalized anxiety disorder) [F41.1] 12/13/2018    A-fib ([facility] [address] [I48.91] 09/13/2018    COPD (chronic obstructive pulmonary disease) ([facility] [address] [J44.9] 09/13/2018    Pulmonary hypertension ([facility] [address] [I27.20] 09/13/2018    Hypertension [I10]     Acquired hypothyroidism [E03.9] 01/18/2013         IMPRESSION:   1. Recurrent Iron def anemia   2. Anticoagulation (due to afib) stopped  3. 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 Acute respiratory failure  5. History of CHF and COPD    RECOMMENDATIONS:  1. The patient hemoglobin is stable, no drop and no active bleeding  2. Pt wants to continue aggressive care   3. Continue supportive care, hemoglobin is above 8, no need for transfusion  4.  Off anticoagulation, will inform  Arvin Campos      Discussed with patient and Nurse. Thank you for asking us to see this patient.     ALEX CHAPA [facility] MD Derrick  Hematologist/Medical Oncologist  Cell: [phone]

## 2021-03-04 NOTE — PROGRESS NOTES
FAMILY MEDICINE  - PROGRESS NOTE    Date:  3/4/2021  Gurvinder Median  [id number]      Chief Complaint   Patient presents with    Fall         Interval History:  not changed, she is still having trouble finding a BiPap mask that fits comfortably. No significant events overnight.       Subjective  Constitutional: positive for fatigue and overweight  Respiratory: positive for emphysema and shortness of breath  Cardiovascular: positive for irregular heart beat  Hematologic/lymphatic: positive for pallor  Behavioral/Psych: positive for anxiety and depression:    Objective:    BP (!) 121/59   Pulse 68   Temp 98.1 °F (36.7 °C) (Oral)   Resp 17   Ht 5' 8\" (1.727 m)   Wt 176 lb 5.9 oz (80 kg)   SpO2 93%   BMI 26.82 kg/m²   General appearance - alert, well appearing, and in no distress and overweight  Mental status - alert, oriented to person, place, and time  Eyes - pupils equal and reactive, extraocular eye movements intact  Ears - hearing grossly normal bilaterally  Nose - normal and patent, no erythema, discharge or polyps  Mouth - mucous membranes moist, pharynx normal without lesions  Neck - supple, no significant adenopathy  Lymphatics - no palpable lymphadenopathy, no hepatosplenomegaly  Chest - clear to auscultation, no wheezes, rales or rhonchi, symmetric air entry, decreased air entry noted posteriorly  Heart - irregularly irregular rhythm with rate 68  Abdomen - soft, nontender, nondistended, no masses or organomegaly  Breasts - not examined  Back exam -osteriorly not examined  Neurological - alert, oriented, normal speech, no focal findings or movement disorder noted  Musculoskeletal - osteoarthritic changes noted in both hands  Extremities - peripheral pulses normal, no pedal edema, no clubbing or cyanosis  Skin - normal coloration and turgor, no rashes, no suspicious skin lesions noted    Data:   Medications:   Current Facility-Administered Medications Medication Dose Route Frequency Provider Last Rate Last Admin    potassium chloride (KLOR-CON M) extended release tablet 40 mEq  40 mEq Oral PRN Naheed Scott MD   40 mEq at 03/03/21 1026    Or    potassium bicarb-citric acid (EFFER-K) effervescent tablet 40 mEq  40 mEq Oral PRN Naheed Scott MD        Or    potassium chloride 10 mEq/100 mL IVPB (Peripheral Line)  10 mEq Intravenous PRN Naheed Scott MD        furosemide (LASIX) tablet 20 mg  20 mg Oral Daily Radha Zimmerman MD   20 mg at 03/03/21 0935    pantoprazole (PROTONIX) tablet 40 mg  40 mg Oral QAM AC WILLIE Siddiqui NP   40 mg at 03/03/21 0552    modafinil (PROVIGIL) tablet 200 mg  200 mg Oral Daily WILLIE Price CNP   200 mg at 03/03/21 0935    apixaban (ELIQUIS) tablet 2.5 mg  2.5 mg Oral BID Naheed Scott MD   2.5 mg at 03/03/21 2208    iron sucrose (VENOFER) 200 mg in sodium chloride 0.9 % 100 mL IVPB  200 mg Intravenous Q24H Tabatha Meza MD   Stopped at 03/03/21 2028    [Held by provider] furosemide (LASIX) tablet 40 mg  40 mg Oral Daily Latoya Garnica MD   40 mg at 02/27/21 1944    ferrous sulfate (IRON 325) tablet 325 mg  325 mg Oral TID  Naheed Scott MD   325 mg at 03/03/21 1553    HYDROcodone-acetaminophen (NORCO) 5-325 MG per tablet 1 tablet  1 tablet Oral Q6H PRN Naheed Scott MD   1 tablet at 03/03/21 2208    ipratropium-albuterol (DUONEB) nebulizer solution 1 ampule  1 ampule Inhalation Q4H WA WILLIE Price CNP   1 ampule at 03/03/21 1926    acetaminophen (TYLENOL) tablet 650 mg  650 mg Oral Q4H PRN Travon Davis MD   650 mg at 02/24/21 2140    ascorbic acid (VITAMIN C) tablet 1,000 mg  1,000 mg Oral Daily Travon Davis MD   1,000 mg at 03/03/21 0935    atorvastatin (LIPITOR) tablet 10 mg  10 mg Oral Daily Travon Davis MD   10 mg at 03/03/21 0935    baclofen (LIORESAL) tablet 10 mg  10 mg Oral TID PRN Travon Davis MD   10 mg at 03/01/21 2159    busPIRone (BUSPAR) tablet 10 mg  10 mg Oral TID Lorenzo Angeles MD   10 mg at 03/03/21 2208    clonazePAM (KLONOPIN) tablet 0.5 mg  0.5 mg Oral TID PRN Lorenzo Angeles MD   0.5 mg at 03/03/21 2343    dilTIAZem (CARDIZEM CD) extended release capsule 120 mg  120 mg Oral Daily Lorenzo Angeles MD   120 mg at 03/03/21 0935    DULoxetine (CYMBALTA) extended release capsule 60 mg  60 mg Oral Daily Lorenzo Angeles MD   60 mg at 03/03/21 0935    fluticasone (FLONASE) 50 MCG/ACT nasal spray 1 spray  1 spray Each Nostril Daily Lorenzo Angeles MD   1 spray at 03/03/21 0946    fluticasone (FLOVENT HFA) 220 MCG/ACT inhaler 2 puff  2 puff Inhalation BID Lorenzo Angeles MD   2 puff at 03/03/21 2207    guaiFENesin ([address]) extended release tablet 1,200 mg  1,200 mg Oral Q6H PRN Lorenzo Angeles MD        levothyroxine (SYNTHROID) tablet 125 mcg  125 mcg Oral Daily Lorenzo Angeles MD   125 mcg at 03/03/21 0552    lisinopril (PRINIVIL;ZESTRIL) tablet 2.5 mg  2.5 mg Oral Lunch Lorenzo Angeles MD   2.5 mg at 03/03/21 1300    metoprolol tartrate (LOPRESSOR) tablet 25 mg  25 mg Oral BID Lorenzo Angeles MD   25 mg at 03/03/21 2208    sodium chloride flush 0.9 % injection 10 mL  10 mL Intravenous 2 times per day Lorenzo Angeles MD   10 mL at 03/03/21 2208    sodium chloride flush 0.9 % injection 10 mL  10 mL Intravenous PRN Lorenzo Angeles MD        promethazine (PHENERGAN) tablet 12.5 mg  12.5 mg Oral Q6H PRN Lorenzo Angeles MD        Or    ondansetron [facility]) injection 4 mg  4 mg Intravenous Q6H PRN Lorenzo Angeles MD        acetaminophen (TYLENOL) tablet 650 mg  650 mg Oral Q6H PRN Lorenzo Angeles MD   650 mg at 02/25/21 2109    Or    acetaminophen (TYLENOL) suppository 650 mg  650 mg Rectal Q6H PRN Lorenzo Angeles MD        0.9 % sodium chloride infusion   Intravenous PRN Eladio Ford DO           Intake/Output Summary (Last 24 hours) at 3/4/2021 0601  Last data filed at 3/3/2021 2211  Gross per 24 hour   Intake 10 ml   Output 1400 ml   Net -1390 ml     Recent Results (from the past 24 hour(s))   Basic Metabolic Panel    Collection Time: 03/04/21  5:13 AM   Result Value Ref Range    Glucose 116 (H) 70 - 99 mg/dL    BUN 12 8 - 23 mg/dL    CREATININE 0.68 0.50 - 0.90 mg/dL    Bun/Cre Ratio NOT REPORTED 9 - 20    Calcium 9.0 8.6 - 10.4 mg/dL    Sodium 143 135 - 144 mmol/L    Potassium 3.9 3.7 - 5.3 mmol/L    Chloride 89 (L) 98 - 107 mmol/L    CO2 >45 (HH) 20 - 31 mmol/L    Anion Gap Unable to calculate anion gap due to CO2 >45. 9 - 17 mmol/L    GFR Non-African American >60 >60 mL/min    GFR African American >60 >60 mL/min    GFR Comment          GFR Staging NOT REPORTED    CBC Auto Differential    Collection Time: 03/04/21  5:13 AM   Result Value Ref Range    WBC 9.8 3.5 - 11.0 k/uL    RBC 2.83 (L) 4.0 - 5.2 m/uL    Hemoglobin 9.0 (L) 12.0 - 16.0 g/dL    Hematocrit 28.8 (L) 36 - 46 %    .7 (H) 80 - 100 fL    MCH 31.7 26 - 34 pg    MCHC 31.2 31 - 37 g/dL    RDW 21.4 (H) 11.5 - 14.9 %    Platelets 743 706 - 296 k/uL    MPV 8.6 6.0 - 12.0 fL    NRBC Automated NOT REPORTED per 100 WBC    Differential Type NOT REPORTED     Immature Granulocytes NOT REPORTED 0 %    Absolute Immature Granulocyte NOT REPORTED 0.00 - 0.30 k/uL    WBC Morphology NOT REPORTED     RBC Morphology NOT REPORTED     Platelet Estimate NOT REPORTED     Seg Neutrophils 81 (H) 36 - 66 %    Lymphocytes 6 (L) 24 - 44 %    Monocytes 10 (H) 1 - 7 %    Eosinophils % 2 0 - 4 %    Basophils 1 0 - 2 %    Segs Absolute 8.00 1.3 - 9.1 k/uL    Absolute Lymph # 0.60 (L) 1.0 - 4.8 k/uL    Absolute Mono # 0.90 0.1 - 1.3 k/uL    Absolute Eos # 0.20 0.0 - 0.4 k/uL    Basophils Absolute 0.10 0.0 - 0.2 k/uL     -----------------------------------------------------------------  RAD:  EKG:  Micro:     Assessment & Plan:    Patient Active Problem List:     Acquired hypothyroidism Hypertension     Primary osteoarthritis of right knee     A-fib (HCC)     Acute encephalopathy     SIRS (systemic inflammatory response syndrome) (HCC)     Normocytic anemia     Pulmonary hypertension (HCC)     COPD (chronic obstructive pulmonary disease) (HCC)     History of GI bleed     Constipation     HA (generalized anxiety disorder)     Lumbar radiculopathy     Lumbosacral spondylosis without myelopathy     Benign hypertension with CKD (chronic kidney disease) stage III     CKD (chronic kidney disease) stage 3, GFR 30-59 ml/min     Alcohol withdrawal syndrome with complication (HCC)     Moderate major depression (HCC)     PAD (peripheral artery disease) (HCC)     Acute kidney injury ([address]     Obesity, Class I, BMI 30-34.9     Chronic diastolic CHF (congestive heart failure) (HCC)     GI bleed     Weight loss     Elevated alkaline phosphatase level     Thrombocytosis (HCC)     Iron deficiency anemia due to chronic blood loss     Iron malabsorption     Symptomatic anemia     Pneumonia due to organism     Anemia     Severe anemia     Overweight (BMI 25.0-29. 9)     Severe malnutrition ([address]     Renal failure     Acute renal failure (ARF) (HCC)     Acute cystitis without hematuria     Hypoxia     Acute respiratory failure with hypoxia (HCC)     Hypernatremia           Plan:  -Acute respiratory failure with COPD - on O2 per NC by day and BiPap at night, Pulmonology managing.  -Recurrent iron deficiency anemia - hgb stable, on iron supplementation, Hematology following.  -A. Fib. - Eliquis held per Hematology. -D/C plan is to SNF when ready.  -Continue current treatments.  -Complete orders per chart.     See orders   Disposition:    Electronically signed by Shayna Montez MD on 3/4/2021 at 6:01 AM

## 2021-03-05 LAB
ABSOLUTE EOS #: 0.15 K/UL (ref 0–0.4)
ABSOLUTE IMMATURE GRANULOCYTE: ABNORMAL K/UL (ref 0–0.3)
ABSOLUTE LYMPH #: 0.54 K/UL (ref 1–4.8)
ABSOLUTE MONO #: 0.69 K/UL (ref 0.1–1.3)
ANION GAP SERPL CALCULATED.3IONS-SCNC: ABNORMAL MMOL/L (ref 9–17)
BASOPHILS # BLD: 1 % (ref 0–2)
BASOPHILS ABSOLUTE: 0.08 K/UL (ref 0–0.2)
BUN BLDV-MCNC: 13 MG/DL (ref 8–23)
BUN/CREAT BLD: ABNORMAL (ref 9–20)
CALCIUM SERPL-MCNC: 8.9 MG/DL (ref 8.6–10.4)
CHLORIDE BLD-SCNC: 93 MMOL/L (ref 98–107)
CO2: >45 MMOL/L (ref 20–31)
CREAT SERPL-MCNC: 0.69 MG/DL (ref 0.5–0.9)
DIFFERENTIAL TYPE: ABNORMAL
EOSINOPHILS RELATIVE PERCENT: 2 % (ref 0–4)
GFR AFRICAN AMERICAN: >60 ML/MIN
GFR NON-AFRICAN AMERICAN: >60 ML/MIN
GFR SERPL CREATININE-BSD FRML MDRD: ABNORMAL ML/MIN/{1.73_M2}
GFR SERPL CREATININE-BSD FRML MDRD: ABNORMAL ML/MIN/{1.73_M2}
GLUCOSE BLD-MCNC: 101 MG/DL (ref 70–99)
HCT VFR BLD CALC: 27.2 % (ref 36–46)
HEMOGLOBIN: 8.6 G/DL (ref 12–16)
IMMATURE GRANULOCYTES: ABNORMAL %
LYMPHOCYTES # BLD: 7 % (ref 24–44)
MCH RBC QN AUTO: 32.2 PG (ref 26–34)
MCHC RBC AUTO-ENTMCNC: 31.7 G/DL (ref 31–37)
MCV RBC AUTO: 101.7 FL (ref 80–100)
MONOCYTES # BLD: 9 % (ref 1–7)
MORPHOLOGY: ABNORMAL
MORPHOLOGY: ABNORMAL
NRBC AUTOMATED: ABNORMAL PER 100 WBC
PDW BLD-RTO: 20.9 % (ref 11.5–14.9)
PLATELET # BLD: 280 K/UL (ref 150–450)
PLATELET ESTIMATE: ABNORMAL
PMV BLD AUTO: 8.9 FL (ref 6–12)
POTASSIUM SERPL-SCNC: 3.8 MMOL/L (ref 3.7–5.3)
RBC # BLD: 2.67 M/UL (ref 4–5.2)
RBC # BLD: ABNORMAL 10*6/UL
SEG NEUTROPHILS: 81 % (ref 36–66)
SEGMENTED NEUTROPHILS ABSOLUTE COUNT: 6.24 K/UL (ref 1.3–9.1)
SODIUM BLD-SCNC: 148 MMOL/L (ref 135–144)
WBC # BLD: 7.7 K/UL (ref 3.5–11)
WBC # BLD: ABNORMAL 10*3/UL

## 2021-03-05 PROCEDURE — 6370000000 HC RX 637 (ALT 250 FOR IP): Performed by: NURSE PRACTITIONER

## 2021-03-05 PROCEDURE — 6370000000 HC RX 637 (ALT 250 FOR IP): Performed by: INTERNAL MEDICINE

## 2021-03-05 PROCEDURE — 2060000000 HC ICU INTERMEDIATE R&B

## 2021-03-05 PROCEDURE — 94640 AIRWAY INHALATION TREATMENT: CPT

## 2021-03-05 PROCEDURE — 2580000003 HC RX 258: Performed by: FAMILY MEDICINE

## 2021-03-05 PROCEDURE — 2580000003 HC RX 258: Performed by: INTERNAL MEDICINE

## 2021-03-05 PROCEDURE — 80048 BASIC METABOLIC PNL TOTAL CA: CPT

## 2021-03-05 PROCEDURE — 6360000002 HC RX W HCPCS: Performed by: INTERNAL MEDICINE

## 2021-03-05 PROCEDURE — 2700000000 HC OXYGEN THERAPY PER DAY

## 2021-03-05 PROCEDURE — 94660 CPAP INITIATION&MGMT: CPT

## 2021-03-05 PROCEDURE — 85025 COMPLETE CBC W/AUTO DIFF WBC: CPT

## 2021-03-05 PROCEDURE — 6370000000 HC RX 637 (ALT 250 FOR IP): Performed by: FAMILY MEDICINE

## 2021-03-05 PROCEDURE — 99232 SBSQ HOSP IP/OBS MODERATE 35: CPT | Performed by: FAMILY MEDICINE

## 2021-03-05 PROCEDURE — 36415 COLL VENOUS BLD VENIPUNCTURE: CPT

## 2021-03-05 PROCEDURE — 94761 N-INVAS EAR/PLS OXIMETRY MLT: CPT

## 2021-03-05 PROCEDURE — 99232 SBSQ HOSP IP/OBS MODERATE 35: CPT | Performed by: INTERNAL MEDICINE

## 2021-03-05 PROCEDURE — 99222 1ST HOSP IP/OBS MODERATE 55: CPT | Performed by: NURSE PRACTITIONER

## 2021-03-05 RX ORDER — MODAFINIL 200 MG/1
200 TABLET ORAL DAILY
Qty: 30 TABLET | Refills: 0
Start: 2021-03-06 | End: 2021-03-06

## 2021-03-05 RX ORDER — CETIRIZINE HYDROCHLORIDE 10 MG/1
10 TABLET ORAL DAILY
Qty: 30 TABLET | Refills: 0
Start: 2021-03-06

## 2021-03-05 RX ORDER — PANTOPRAZOLE SODIUM 40 MG/1
40 TABLET, DELAYED RELEASE ORAL
Qty: 30 TABLET | Refills: 3
Start: 2021-03-06

## 2021-03-05 RX ADMIN — FLUTICASONE PROPIONATE 2 PUFF: 220 AEROSOL, METERED RESPIRATORY (INHALATION) at 09:14

## 2021-03-05 RX ADMIN — FERROUS SULFATE TAB 325 MG (65 MG ELEMENTAL FE) 325 MG: 325 (65 FE) TAB at 09:12

## 2021-03-05 RX ADMIN — FERROUS SULFATE TAB 325 MG (65 MG ELEMENTAL FE) 325 MG: 325 (65 FE) TAB at 16:26

## 2021-03-05 RX ADMIN — DULOXETINE 60 MG: 60 CAPSULE, DELAYED RELEASE ORAL at 09:12

## 2021-03-05 RX ADMIN — SODIUM CHLORIDE, PRESERVATIVE FREE 10 ML: 5 INJECTION INTRAVENOUS at 09:13

## 2021-03-05 RX ADMIN — LEVOTHYROXINE SODIUM 125 MCG: 125 TABLET ORAL at 05:16

## 2021-03-05 RX ADMIN — IPRATROPIUM BROMIDE AND ALBUTEROL SULFATE 1 AMPULE: 2.5; .5 SOLUTION RESPIRATORY (INHALATION) at 11:10

## 2021-03-05 RX ADMIN — DILTIAZEM HYDROCHLORIDE 120 MG: 120 CAPSULE, COATED, EXTENDED RELEASE ORAL at 09:12

## 2021-03-05 RX ADMIN — BUSPIRONE HYDROCHLORIDE 10 MG: 10 TABLET ORAL at 09:12

## 2021-03-05 RX ADMIN — FERROUS SULFATE TAB 325 MG (65 MG ELEMENTAL FE) 325 MG: 325 (65 FE) TAB at 11:53

## 2021-03-05 RX ADMIN — IPRATROPIUM BROMIDE AND ALBUTEROL SULFATE 1 AMPULE: 2.5; .5 SOLUTION RESPIRATORY (INHALATION) at 19:41

## 2021-03-05 RX ADMIN — MODAFINIL 200 MG: 200 TABLET ORAL at 09:12

## 2021-03-05 RX ADMIN — BUSPIRONE HYDROCHLORIDE 10 MG: 10 TABLET ORAL at 16:26

## 2021-03-05 RX ADMIN — FLUTICASONE PROPIONATE 1 SPRAY: 50 SPRAY, METERED NASAL at 09:13

## 2021-03-05 RX ADMIN — ATORVASTATIN CALCIUM 10 MG: 10 TABLET, FILM COATED ORAL at 09:12

## 2021-03-05 RX ADMIN — CLONAZEPAM 0.5 MG: 1 TABLET ORAL at 20:33

## 2021-03-05 RX ADMIN — SODIUM CHLORIDE, PRESERVATIVE FREE 10 ML: 5 INJECTION INTRAVENOUS at 20:33

## 2021-03-05 RX ADMIN — FLUTICASONE PROPIONATE 2 PUFF: 220 AEROSOL, METERED RESPIRATORY (INHALATION) at 20:33

## 2021-03-05 RX ADMIN — OXYCODONE HYDROCHLORIDE AND ACETAMINOPHEN 1000 MG: 500 TABLET ORAL at 09:12

## 2021-03-05 RX ADMIN — PANTOPRAZOLE SODIUM 40 MG: 40 TABLET, DELAYED RELEASE ORAL at 05:16

## 2021-03-05 RX ADMIN — CLONAZEPAM 0.5 MG: 1 TABLET ORAL at 13:12

## 2021-03-05 RX ADMIN — IPRATROPIUM BROMIDE AND ALBUTEROL SULFATE 1 AMPULE: 2.5; .5 SOLUTION RESPIRATORY (INHALATION) at 15:20

## 2021-03-05 RX ADMIN — METOPROLOL TARTRATE 25 MG: 25 TABLET, FILM COATED ORAL at 20:33

## 2021-03-05 RX ADMIN — METOPROLOL TARTRATE 25 MG: 25 TABLET, FILM COATED ORAL at 09:12

## 2021-03-05 RX ADMIN — BUSPIRONE HYDROCHLORIDE 10 MG: 10 TABLET ORAL at 20:33

## 2021-03-05 RX ADMIN — IPRATROPIUM BROMIDE AND ALBUTEROL SULFATE 1 AMPULE: 2.5; .5 SOLUTION RESPIRATORY (INHALATION) at 07:27

## 2021-03-05 RX ADMIN — FUROSEMIDE 20 MG: 20 TABLET ORAL at 09:12

## 2021-03-05 RX ADMIN — CETIRIZINE HYDROCHLORIDE 10 MG: 10 TABLET, FILM COATED ORAL at 09:12

## 2021-03-05 RX ADMIN — IRON SUCROSE 200 MG: 20 INJECTION, SOLUTION INTRAVENOUS at 17:33

## 2021-03-05 ASSESSMENT — PAIN SCALES - GENERAL: PAINLEVEL_OUTOF10: 0

## 2021-03-05 NOTE — PROGRESS NOTES
FAMILY MEDICINE  - PROGRESS NOTE    Date:  3/5/2021  Jaqui Hand  [id number]      Chief Complaint   Patient presents with    Fall         Interval History:  not changed, she has no new complaints. She does okay when she manages to wear the BiPap. Hematology wants her Eliquis held.       Subjective  Constitutional: positive for fatigue and overweight  Respiratory: positive for emphysema and shortness of breath  Cardiovascular: positive for irregular heart beat  Hematologic/lymphatic: positive for pallor  Behavioral/Psych: positive for anxiety and depression:    Objective:    /61   Pulse 85   Temp 97.9 °F (36.6 °C) (Axillary)   Resp 20   Ht 5' 8\" (1.727 m)   Wt 176 lb 9.4 oz (80.1 kg)   SpO2 98%   BMI 26.85 kg/m²   General appearance - alert, well appearing, and in no distress and overweight  Mental status - alert, oriented to person, place, and time  Eyes - pupils equal and reactive, extraocular eye movements intact  Ears - not examined  Nose - normal and patent, no erythema, discharge or polyps  Mouth - mucous membranes moist, pharynx normal without lesions  Neck - supple, no significant adenopathy  Lymphatics - no palpable lymphadenopathy, no hepatosplenomegaly  Chest - clear to auscultation, no wheezes, rales or rhonchi, symmetric air entry, decreased air entry noted posteriorly  Heart - irregularly irregular rhythm with rate 85  Abdomen - soft, nontender, nondistended, no masses or organomegaly  Breasts - not examined  Back exam - not examined  Neurological - alert, oriented, normal speech, no focal findings or movement disorder noted  Musculoskeletal - osteoarthritic changes noted in both hands  Extremities - peripheral pulses normal, no pedal edema, no clubbing or cyanosis  Skin - normal coloration and turgor, no rashes, no suspicious skin lesions noted    Data:   Medications:   Current Facility-Administered Medications   Medication Dose Route Frequency Provider Last Rate Last Admin    phenol 1.4 % mouth spray 1 spray  1 spray Mouth/Throat Q2H PRN WILLIE Heath CNP        cetirizine (ZYRTEC) tablet 10 mg  10 mg Oral Daily WILLIE Heath CNP   10 mg at 03/04/21 1048    potassium chloride (KLOR-CON M) extended release tablet 40 mEq  40 mEq Oral PRN Curtis Harris MD   40 mEq at 03/03/21 1026    Or    potassium bicarb-citric acid (EFFER-K) effervescent tablet 40 mEq  40 mEq Oral PRN Curtis Harris MD        Or    potassium chloride 10 mEq/100 mL IVPB (Peripheral Line)  10 mEq Intravenous PRN Curtis Harris MD        furosemide (LASIX) tablet 20 mg  20 mg Oral Daily Sasha Emery MD   20 mg at 03/04/21 3814    pantoprazole (PROTONIX) tablet 40 mg  40 mg Oral QAM WILLIE Styles NP   40 mg at 03/05/21 0516    modafinil (PROVIGIL) tablet 200 mg  200 mg Oral Daily WILLIE Crowe CNP   200 mg at 03/04/21 9903    iron sucrose (VENOFER) 200 mg in sodium chloride 0.9 % 100 mL IVPB  200 mg Intravenous Q24H [place] MD Derrick   Stopped at 03/04/21 1833    [Held by provider] furosemide (LASIX) tablet 40 mg  40 mg Oral Daily Sasha Gutierrez MD   40 mg at 02/27/21 2831    ferrous sulfate (IRON 325) tablet 325 mg  325 mg Oral TID  Curtis Harris MD   325 mg at 03/04/21 1712    HYDROcodone-acetaminophen (NORCO) 5-325 MG per tablet 1 tablet  1 tablet Oral Q6H PRN Curtis Harris MD   1 tablet at 03/03/21 2208    ipratropium-albuterol (DUONEB) nebulizer solution 1 ampule  1 ampule Inhalation Q4H WA WILLIE Crowe CNP   1 ampule at 03/05/21 6614    acetaminophen (TYLENOL) tablet 650 mg  650 mg Oral Q4H PRN Geo Smith MD   650 mg at 02/24/21 2140    ascorbic acid (VITAMIN C) tablet 1,000 mg  1,000 mg Oral Daily Geo Smith MD   1,000 mg at 03/04/21 7394    atorvastatin (LIPITOR) tablet 10 mg  10 mg Oral Daily Geo Smith MD   10 mg at 03/04/21 7983    chloride infusion   Intravenous PRN Roseanna Hashimoto, DO           Intake/Output Summary (Last 24 hours) at 3/5/2021 0759  Last data filed at 3/5/2021 0523  Gross per 24 hour   Intake 390 ml   Output 2700 ml   Net -2310 ml     Recent Results (from the past 24 hour(s))   POC Glucose Fingerstick    Collection Time: 03/04/21  4:45 PM   Result Value Ref Range    POC Glucose 120 (H) 65 - 105 mg/dL   Basic Metabolic Panel    Collection Time: 03/05/21  4:47 AM   Result Value Ref Range    Glucose 101 (H) 70 - 99 mg/dL    BUN 13 8 - 23 mg/dL    CREATININE 0.69 0.50 - 0.90 mg/dL    Bun/Cre Ratio NOT REPORTED 9 - 20    Calcium 8.9 8.6 - 10.4 mg/dL    Sodium 148 (H) 135 - 144 mmol/L    Potassium 3.8 3.7 - 5.3 mmol/L    Chloride 93 (L) 98 - 107 mmol/L    CO2 >45 (HH) 20 - 31 mmol/L    Anion Gap Unable to calculate anion gap due to CO2 >45. 9 - 17 mmol/L    GFR Non-African American >60 >60 mL/min    GFR African American >60 >60 mL/min    GFR Comment          GFR Staging NOT REPORTED    CBC Auto Differential    Collection Time: 03/05/21  4:47 AM   Result Value Ref Range    WBC 7.7 3.5 - 11.0 k/uL    RBC 2.67 (L) 4.0 - 5.2 m/uL    Hemoglobin 8.6 (L) 12.0 - 16.0 g/dL    Hematocrit 27.2 (L) 36 - 46 %    .7 (H) 80 - 100 fL    MCH 32.2 26 - 34 pg    MCHC 31.7 31 - 37 g/dL    RDW 20.9 (H) 11.5 - 14.9 %    Platelets 626 832 - 247 k/uL    MPV 8.9 6.0 - 12.0 fL    NRBC Automated NOT REPORTED per 100 WBC    Differential Type NOT REPORTED     Immature Granulocytes NOT REPORTED 0 %    Absolute Immature Granulocyte NOT REPORTED 0.00 - 0.30 k/uL    WBC Morphology NOT REPORTED     RBC Morphology NOT REPORTED     Platelet Estimate NOT REPORTED     Seg Neutrophils 81 (H) 36 - 66 %    Lymphocytes 7 (L) 24 - 44 %    Monocytes 9 (H) 1 - 7 %    Eosinophils % 2 0 - 4 %    Basophils 1 0 - 2 %    Segs Absolute 6.24 1.3 - 9.1 k/uL    Absolute Lymph # 0.54 (L) 1.0 - 4.8 k/uL    Absolute Mono # 0.69 0.1 - 1.3 k/uL    Absolute Eos # 0.15 0.0 - 0.4 k/uL Basophils Absolute 0.08 0.0 - 0.2 k/uL    Morphology ANISOCYTOSIS PRESENT     Morphology SLT TEARDROPS      -----------------------------------------------------------------  RAD:  EKG:  Micro:     Assessment & Plan:    Patient Active Problem List:     Acquired hypothyroidism     Hypertension     Primary osteoarthritis of right knee     A-fib (HCC)     Acute encephalopathy     SIRS (systemic inflammatory response syndrome) (HCC)     Normocytic anemia     Pulmonary hypertension (HCC)     COPD (chronic obstructive pulmonary disease) (HCC)     History of GI bleed     Constipation     HA (generalized anxiety disorder)     Lumbar radiculopathy     Lumbosacral spondylosis without myelopathy     Benign hypertension with CKD (chronic kidney disease) stage III     CKD (chronic kidney disease) stage 3, GFR 30-59 ml/min     Alcohol withdrawal syndrome with complication (HCC)     Moderate major depression (HCC)     PAD (peripheral artery disease) (HCC)     Acute kidney injury ([address]     Obesity, Class I, BMI 30-34.9     Chronic diastolic CHF (congestive heart failure) (HCC)     GI bleed     Weight loss     Elevated alkaline phosphatase level     Thrombocytosis (HCC)     Iron deficiency anemia due to chronic blood loss     Iron malabsorption     Symptomatic anemia     Pneumonia due to organism     Anemia     Severe anemia     Overweight (BMI 25.0-29. 9)     Severe malnutrition ([address]     Renal failure     Acute renal failure (ARF) (HCC)     Acute cystitis without hematuria     Hypoxia     Acute respiratory failure with hypoxia (HCC)     Hypernatremia           Plan:  -Acute on chronic respiratory failure with COPD - stable on O2 per NC by day, BiPap by night, Pulmonology managing.  -Recurrent iron deficiency anemia - hgb stable, on iron supplementation and Eliquis to be held until follows up with Hematology as an outpatient. -A. Fib. - rate controlled.   -D/C to SNF in am.  -Continue current treatments.  -Complete orders per chart.    See orders   Disposition:    Electronically signed by Eileen Pelletier MD on 3/5/2021 at 7:59 AM

## 2021-03-05 NOTE — CONSULTS
Palliative Care Inpatient Consult    NAME:  Eli Art  MEDICAL RECORD NUMBER:  [id number]  AGE: 70 y.o. GENDER: female  : 1945  TODAY'S DATE:  3/5/2021    Reasons for Consultation:    Symptom and/or pain management  Provision of information regarding PC and/or hospice philosophies  Complex, time-intensive communication and interdisciplinary psychosocial support  Clarification of goals of care and/or assistance with difficult decision-making  Guidance in regards to resources and transition(s)    Members of PC team contributing to this consultation are :  Lyndon Beach, Palliative Care CNP  History of Present Illness     The patient is a 70 y.o. Non-/non  female who presents with Fall      Referred to Palliative Care by   [x] Physician   [] Nursing  [] Family Request   [] Other:       She was admitted to the primary service for GI bleed [K92.2]. Her hospital course has been associated with GI bleed, Acute hypoxic respiratory failure, Left Pleural Effusion, hypernatremia, and Acute on chronic BHARATHI. The patient has a complicated medical history and has been hospitalized since 2021  9:13 AM.  I reviewed patient's EMR, spoke to patient and patient's daughter-in-law to collect history. Patient has a history of HTN, COVID-19, anxiety/depression, OA, HLD, COPD, CKD, anemia, EtOH abuse, former smoker, PAD, pulmonary hypertension, GI bleed/AV malformation, atrial fibrillation, and hypothyroidism. Patient was from nursing home, and had unwitnessed fall. RN and next room heard patient fall, and found her on the ground sitting on the floor with her back against the bed. It was not known if patient hit her head. On arrival patient had reported shortness of breath with exertion, decreased appetite, and weight loss. Patient was recently discharged on  from UTI/acute renal failure. Patient's admitting labs included; hemoglobin 6.8, creatinine 1.25, CO2 37, and troponin 21.   Patient CT head, CT facial bones, and CT cervical spine was negative. Patient's chest x-ray revealed small moderate left lung base opacity may represent a pleural effusion and atelectasis with underlying pneumonia not excluded. Patient reports home O2 use of 2 L nasal cannula. Nephrology consulted for decreased urine output/chronic kidney disease, and patient was given albumin and Lasix with improvement. GI was consulted and recommended outpatient capsule study. Patient was given 1 unit of packed red blood cells. Pulmonary consulted, and patient tolerating BiPAP in the p.m when anxiety is controlled. Patient underwent thoracentesis on 2/25 and 1 L was drained from left side. Hematology consulted for anemia, and patient follows outpatient for IV iron transfusions. Last visit on 2/15 patient's Eliquis was stopped due to patient's anemia. Patient has limited code with resuscitative meds only. Palliative care consulted for review of goals. 3/5 pertinent labs include; CO2 greater than 45, sodium 148, hemoglobin 8.6.     Active Hospital Problems    Diagnosis Date Noted    Hypernatremia [E87.0] 02/24/2021    Acute respiratory failure with hypoxia (HCC) [J96.01] 02/23/2021    Severe malnutrition ([facility] [E43] 01/20/2021    Iron deficiency anemia due to chronic blood loss [D50.0] 12/18/2020    GI bleed [K92.2] 11/30/2020    Chronic diastolic CHF (congestive heart failure) (HCC) [I50.32] 07/21/2020    Moderate major depression (HCC) [F32.1] 06/17/2020    PAD (peripheral artery disease) (HCC) [I73.9] 06/17/2020    CKD (chronic kidney disease) stage 3, GFR 30-59 ml/min [N18.30]     HA (generalized anxiety disorder) [F41.1] 12/13/2018    A-fib ([facility] [I48.91] 09/13/2018    COPD (chronic obstructive pulmonary disease) ([facility] [J44.9] 09/13/2018    Pulmonary hypertension ([facility] [I27.20] 09/13/2018    Hypertension [I10]     Acquired hypothyroidism [E03.9] 01/18/2013       PAST MEDICAL HISTORY      Diagnosis Date    pantoprazole  40 mg Oral QAM AC    modafinil  200 mg Oral Daily    iron sucrose  200 mg Intravenous Q24H    ferrous sulfate  325 mg Oral TID WC    ipratropium-albuterol  1 ampule Inhalation Q4H WA    ascorbic acid  1,000 mg Oral Daily    atorvastatin  10 mg Oral Daily    busPIRone  10 mg Oral TID    dilTIAZem  120 mg Oral Daily    DULoxetine  60 mg Oral Daily    fluticasone  1 spray Each Nostril Daily    fluticasone  2 puff Inhalation BID    levothyroxine  125 mcg Oral Daily    lisinopril  2.5 mg Oral Lunch    metoprolol tartrate  25 mg Oral BID    sodium chloride flush  10 mL Intravenous 2 times per day     phenol, potassium chloride **OR** potassium alternative oral replacement **OR** potassium chloride, HYDROcodone-acetaminophen, baclofen, clonazePAM, guaiFENesin, sodium chloride flush, promethazine **OR** ondansetron, acetaminophen **OR** acetaminophen, sodium chloride  IV Drips/Infusions   sodium chloride       Home Medications  No current facility-administered medications on file prior to encounter.       Current Outpatient Medications on File Prior to Encounter   Medication Sig Dispense Refill    albuterol sulfate HFA (PROAIR HFA) 108 (90 Base) MCG/ACT inhaler Inhale 2 puffs into the lungs every 6 hours as needed for Wheezing      fluticasone (FLOVENT HFA) 110 MCG/ACT inhaler Inhale 1 puff into the lungs 2 times daily      busPIRone (BUSPAR) 10 MG tablet Take 10 mg by mouth 3 times daily      fluticasone (FLOVENT HFA) 220 MCG/ACT inhaler Inhale 2 puffs into the lungs 2 times daily      guaiFENesin (MUCINEX) 600 MG extended release tablet Take 1,200 mg by mouth every 6 hours as needed for Congestion      albuterol (PROVENTIL) (2.5 MG/3ML) 0.083% nebulizer solution Take 2.5 mg by nebulization 2 times daily       acetaminophen (TYLENOL) 325 MG tablet Take 650 mg by mouth every 4 hours as needed for Pain Not to exceed 3gm/24hrs      atorvastatin (LIPITOR) 10 MG tablet TAKE 1 TABLET BY MOUTH ONCE DAILY 90 tablet 1    lisinopril (PRINIVIL;ZESTRIL) 2.5 MG tablet Take 1 tablet by mouth Daily with lunch 30 tablet 3    metoprolol tartrate (LOPRESSOR) 25 MG tablet Take 1 tablet by mouth 2 times daily 60 tablet 3    Oxygen Concentrator continuous      tamsulosin (FLOMAX) 0.4 MG capsule TAKE 1 CAPSULE BY MOUTH DAILY 30 capsule 3    omeprazole (PRILOSEC) 40 MG delayed release capsule TAKE 1 CAPSULE BY MOUTH EVERY MORNING BEFORE BREAKFAST 90 capsule 1    DULoxetine (CYMBALTA) 60 MG extended release capsule Take 1 capsule by mouth daily 90 capsule 1    baclofen (LIORESAL) 10 MG tablet TAKE ONE TABLET BY MOUTH THREE TIMES A DAY AS NEEDED. 270 tablet 3    Lift Chair MISC Use daily for comfort 1 each 0    levothyroxine (SYNTHROID) 125 MCG tablet Take 1 tablet by mouth daily 30 tablet 5    clonazePAM (KLONOPIN) 0.5 MG tablet Take 1 tablet by mouth 3 times daily as needed for Anxiety for up to 30 days.  90 tablet 0    dilTIAZem (CARDIZEM CD) 120 MG extended release capsule Take 1 capsule by mouth daily 30 capsule 5    fluticasone (FLONASE) 50 MCG/ACT nasal spray USE ONE SPRAY TO EACH NOSTRIL ONCE ADAY 16 g 2    Respiratory Therapy Supplies (NEBULIZER/TUBING/MOUTHPIECE) KIT Use every 4-6 hours prn for copd 1 kit 0    Ascorbic Acid (C-1000 PO) Take 1 capsule by mouth daily          Data         BP (!) 88/43   Pulse 96   Temp 99 °F (37.2 °C) (Oral)   Resp 17   Ht 5' 8\" (1.727 m)   Wt 176 lb 9.4 oz (80.1 kg)   SpO2 95%   BMI 26.85 kg/m²     Wt Readings from Last 3 Encounters:   03/05/21 176 lb 9.4 oz (80.1 kg)   02/15/21 177 lb (80.3 kg)   02/10/21 173 lb 1 oz (78.5 kg)        Code Status: Limited     ADVANCED CARE PLANNING:  Patient has capacity for medical decisions: yes  Health Care Power of : no  Living Will: no     Personal, Social, and Family History  Marital Status:   Living situation:nursing home  Importance of mickey/Denominational/spiritual beliefs: [] Very [] Somewhat [] Not Psychological Distress: mild  Does patient understand diagnosis/treatment? yes  Does caregiver understand diagnosis/treatment? yes      Assessment        REVIEW OF SYSTEMS  Constitutional: no fever, no chills or weight loss  Eyes: no eye pain or blurred vision  ENT: no hearing loss, congestion, or difficulty swallowing   Respiratory: no wheezing, chest tightness, +shortness of breath with exertion  Cardiovascular: no chest pain or pressure, no palpitations, no diaphoresis   Gastrointestinal: no nausea, vomiting, abdominal pain, diarrhea or constipation, no melena   Genitourinary: no dysuria, frequency,hematuria , or nocturia   Musculoskeletal: no myalgias or arthralgias, no back pain   Skin: no rashes or sores +itching BLE  Neurological: no focal weakness, numbness, tingling, or headache, no seizures  Psych: +anxiety    PHYSICAL ASSESSMENT:  Constitutional: Alert and oriented to person, place, and time. Head: Normocephalic and atraumatic. Eyes: EOM are normal. Pupils are equal, round   Neck: Normal range of motion. Neck supple. No tracheal deviation present. Cardiovascular: Normal rate and irregular rhythm, S1, S2, no murmur   Pulmonary/Chest: Effort normal and breath sounds diminshed. No rales or wheezes. Abdomen: Soft. No tenderness, not distended, no ascites, no organomegaly   Musculoskeletal: Normal range of motion. No edema lower ext. Neurological: CN II-XII grossly intact, no focal neurological deficits   Skin: Normal turgor, no bleeding, no bruising +scabs to BLE/dry skin    Palliative Performance Scale:  ___60%  Ambulation reduced; Significant disease; Can't do hobbies/housework; intake normal or reduced; occasional assist; LOC full/confusion  _x__50%  Mainly sit/lie;  Extensive disease; Can't do any work; Considerable assist; intake normal or reduced; LOC full/confusion  ___40%  Mainly in bed; Extensive disease; Mainly assist; intake normal or reduced; LOC full/confusion   ___30%  Bed Bound; Extensive disease; Total care; intake reduced; LOCfull/confusion  ___20%  Bed Bound; Extensive disease; Total care; intake minimal; Drowsy/coma  ___10%  Bed Bound; Extensive disease; Total care; Mouth care only; Drowsy/coma  ___0       Death      Plan      Palliative Interaction: I visited patient, and she was sitting up in bed awake. Patient's daughter-in-law at bedside. I introduced myself to both of them, and the palliative role. I discussed patient's chronic history, and current hospitalized problems with each. Patient reports since December 2020 she has been hospitalized frequently, and having decreased quality of life. She reports prior to this being independent in her home. Patient discusses having the surgery 3 years ago, and then struggling with anemia since then. Daughter-in-law reports patient having atrial fibrillation a few months after her knee surgery, and required Eliquis blood thinner. Patient is presently seeing hematology for iron transfusions, and AC is being held due to continued anemia. I discussed HC POA/LW, and patient does not have either. Patient is , and has 2 biological children Babrie Alcocer and Eduardo concepcion. She reports being close to Barbie Alcocer, which is daughter-in-law's  that is at bedside. She reports Barbie Alcocer, and his brother Eduardo concepcion not getting along. We discussed [place] next of kin, and how the state would look to both children as decision-makers when she is unable to make decisions. Daughter-in-law, and patient discussed this, and patient has decided that she would not want her 1 son that is less involved to make decisions for her. I discussed filling out HC POA/LW, and patient is agreeable. I placed spiritual care consult, and paged Francisco Javier Rodriguez via perfect serve, and she is agreeable to come see patient to assist with filling out. I discussed patient's CODE STATUS, and explained all 3 code classifications in detail.   Patient reports not wanting CPR, and wanting to [facility] naturally. \"  I also discussed intubation, and patient reports that she would want everything done up until that point but would not want to be intubated. Patient reports never being intubated in her past.  We discussed intubation, and how sometimes this can be temporary, and sometimes patients are unable to come off. We discussed patient's chronic COPD, and home oxygen use, and patient reports if she got to the point where she she needed a ventilator, she states,\" I would just want to be kept comfortable. \"     I discussed palliative care versus hospice care and the differences. Patient reports she wants to live, and is hoping that her quality of life will improve. I explained outpatient palliative care options, and daughter-in-law is interested in this, and patient is agreeable. Patient reports hoping to do rehab, and then come home. Daughter in law would like a home based company that also has home care and therapy. We discussed [place] living, and Gr[place]z options. I offered patient much emotional support, and she was appreciative of my visit and provided information. I filled out DNR form, and patient signed. I placed order, and updated RN. Education/support to family  Education/support to patient  Discharge planning/helping to coordinate care  Communications with primary service  Providing support for coping/adaptation/distress of family  Providing support for coping/adaptation/distress of patient  Discussing meaning/purpose   Decisional capacity assessed  Continue with current plan of care  Clarification of medical condition to patient and family  Code status clarified: Full Code  Code status clarified: [facility]  Code status clarified: [facility]  Palliative care orders introduced  Validating patient/family distress  Patient is interested in completing [facility]. POA/, so spiritual care was consulted for assistance. Patient reports not wanting CPR or intubation.   I reviewed code classifications, and patient elected for DNR CCA no intubation. DNR form filled out, and placed on chart. Order placed in computer, and [facility] RN updated. Family given information on palliative care outpatient options. Patient possibly being discharged back to nursing home today. Principle Problem/Diagnosis:  GI bleed    Additional Assessments:   Principal Problem:    GI bleed  Active Problems:    Acquired hypothyroidism    Hypertension    A-fib (HCC)    Pulmonary hypertension (HCC)    COPD (chronic obstructive pulmonary disease) (HCC)    HA (generalized anxiety disorder)    CKD (chronic kidney disease) stage 3, GFR 30-59 ml/min    Moderate major depression (HCC)    PAD (peripheral artery disease) (HCC)    Chronic diastolic CHF (congestive heart failure) (HCC)    Iron deficiency anemia due to chronic blood loss    Severe malnutrition (HCC)    Acute respiratory failure with hypoxia (HCC)    Hypernatremia  Resolved Problems:    * No resolved hospital problems. *      1- Symptom management/ pain control     Pain Assessment:  The patient is not having any pain. Anxiety:  chest pain, fatigue, insomnia, irritable, shortness of breath- Buspar 10mg TID and Klonopin PRN                          Dyspnea:  acute dyspnea and chronic dyspnea- wears 2LNC at home and presently on 3LNC. Fatigue:  generalized weakness    Other: We feel the patient symptoms are being controlled. her current regimen is reviewed by myself and discussed with the staff.      2- Goals of care evaluation   The patient goals of care are live longer, improve or maintain function/quality of life and support for family/caregiver   Goals of care discussed with:    [] Patient independently    [x] Patient and Family    [] Family or Healthcare DPOA independently    [] Unable to discuss with patient, family/DPOA not present    3- Code Status  Limited    4- Other recommendations   - We will continue to provide comfort and support to the patient

## 2021-03-05 NOTE — CARE COORDINATION
Patient continues to be a bed hold with [facility]. The facility continues to follow and can accept when she is ready/DC'd.

## 2021-03-05 NOTE — PROGRESS NOTES
PULMONARY PROGRESS NOTE:    REASON FOR VISIT:Pl effusion, dyspnea  Interval History:    Shortness of Breath: yes  Cough: +, some congestion+  Sputum: no          Hemoptysis: no  Chest Pain: no  Fever: no                   Swelling Feet: no  Headache: no                                           Nausea, Emesis, Abdominal Pain: no  Diarrhea: no         Constipation: no    Events since last visit: wore BPAP all night;     PAST MEDICAL HISTORY:      Scheduled Meds:   cetirizine  10 mg Oral Daily    furosemide  20 mg Oral Daily    pantoprazole  40 mg Oral QAM AC    modafinil  200 mg Oral Daily    iron sucrose  200 mg Intravenous Q24H    [Held by provider] furosemide  40 mg Oral Daily    ferrous sulfate  325 mg Oral TID WC    ipratropium-albuterol  1 ampule Inhalation Q4H WA    ascorbic acid  1,000 mg Oral Daily    atorvastatin  10 mg Oral Daily    busPIRone  10 mg Oral TID    dilTIAZem  120 mg Oral Daily    DULoxetine  60 mg Oral Daily    fluticasone  1 spray Each Nostril Daily    fluticasone  2 puff Inhalation BID    levothyroxine  125 mcg Oral Daily    lisinopril  2.5 mg Oral Lunch    metoprolol tartrate  25 mg Oral BID    sodium chloride flush  10 mL Intravenous 2 times per day     Continuous Infusions:   sodium chloride       PRN Meds:phenol, potassium chloride **OR** potassium alternative oral replacement **OR** potassium chloride, HYDROcodone-acetaminophen, acetaminophen, baclofen, clonazePAM, guaiFENesin, sodium chloride flush, promethazine **OR** ondansetron, acetaminophen **OR** acetaminophen, sodium chloride        PHYSICAL EXAMINATION:  BP (!) 108/57   Pulse 97   Temp 98.4 °F (36.9 °C) (Oral)   Resp 19   Ht 5' 8\" (1.727 m)   Wt 176 lb 9.4 oz (80.1 kg)   SpO2 98%   BMI 26.85 kg/m²     General : Awake, alert, oriented   Neck  supple, no lymphadenopathy, JVD not raised  Heart  regular rhythm, S1 and S2 normal; no additional sounds heard  Lungs  Air Entry- fair bilaterally; breath sounds : vesicular;   rales/crackles - absent, 94% on 4L  Abdomen  soft, no tenderness  Upper Extremities  - no cyanosis, mottling; edema : absent  Lower Extremities: no cyanosis, mottling; edema : mild    Current Laboratory, Radiologic, Microbiologic, and Diagnostic studies reviewed  Data ReviewCBC:   Recent Labs     03/03/21  0534 03/04/21  0513 03/05/21  0447   WBC 6.4 9.8 7.7   RBC 2.65* 2.83* 2.67*   HGB 8.3* 9.0* 8.6*   HCT 26.4* 28.8* 27.2*    291 280     BMP:   Recent Labs     03/03/21  0534 03/04/21  0513 03/05/21  0447   GLUCOSE 101* 116* 101*    143 148*   K 3.2* 3.9 3.8   BUN 11 12 13   CREATININE 0.65 0.68 0.69   CALCIUM 8.7 9.0 8.9     ABGs:   No results for input(s): PHART, PO2ART, CCO4XII, RPD9RRZ, BEART, Z6EJQOMM, AND0NZE in the last 72 hours.    PT/INR:  No results found for: PTINR    ASSESSMENT / PLAN:  Nasal congestion - humidify O2, sudafed  Acute hypoxic resp failure - O2  Left pl effusion - monitor, diuresis,   GI bleed - per GI service   Thoracentesis per IR 2/25/21- 1L fluid removed left side - transudate  Previous Covid positive 1/1/21  Chronic Hypercapnic resp failure - try BPAP -doesn't like it, will try nasal mask  CXR 3/1- slightly increased pleural effusions  Start provigil  Add chloraseptic spray and certirizine  Encouraged patient to wear bipap more frequently  Discussed with RN to encourage bipap   OK for SNF from Pulm, will need BPAP at facility      Electronically signed by Laureano Tyson on 03/05/21 at 9:31 AM

## 2021-03-05 NOTE — ACP (ADVANCE CARE PLANNING)
Advance Care Planning     Advance Care Planning Activator (Inpatient)  Conversation Note      Date of ACP Conversation: 3/5/21    Conversation Conducted with: Patient with Decision Making Capacity    ACP Activator: Benita Matson    *When Decision Maker makes decisions on behalf of the incapacitated patient: Decision Maker is asked to consider and make decisions based on patient values, known preferences, or best interests. Health Care Decision Maker:     Current Designated Health Care Decision Maker:   Primary Decision Maker: Nancy Keith Child - [phone]    Secondary Decision Maker: Zoraida Maile - Other - [phone]  Care Preferences    Ventilation: \"If you were in your present state of health and suddenly became very ill and were unable to breathe on your own, what would your preference be about the use of a ventilator (breathing machine) if it were available to you? \"      Would the patient desire the use of ventilator (breathing machine)?: Yes    \"If your health worsens and it becomes clear that your chance of recovery is unlikely, what would your preference be about the use of a ventilator (breathing machine) if it were available to you? \"     Would the patient desire the use of ventilator (breathing machine)?: No      Resuscitation  \"CPR works best to restart the heart when there is a sudden event, like a heart attack, in someone who is otherwise healthy. Unfortunately, CPR does not typically restart the heart for people who have serious health conditions or who are very sick. \"    \"In the event your heart stopped as a result of an underlying serious health condition, would you want attempts to be made to restart your heart (answer \"yes\" for attempt to resuscitate) or would you prefer a natural death (answer \"no\" for do not attempt to resuscitate)? \" No      NOTE: If the patient has a valid advance directive AND now provides care preference(s) that are inconsistent with that prior directive, advise the patient to consider either: creating a new advance directive that complies with state-specific requirements; or, if that is not possible, orally revoking that prior directive in accordance with state-specific requirements, which must be documented in the EHR. [x] Yes   [] No   Educated Patient / Solmon Grand regarding differences between Advance Directives and portable DNR orders.     Length of ACP Conversation in minutes: 61      Conversation Outcomes:  [x] ACP discussion completed  [] Existing advance directive reviewed with patient; no changes to patient's previously recorded wishes  [x] New Advance Directive completed  [] Portable Do Not Rescitate prepared for Provider review and signature  [] POLST/POST/MOLST/MOST prepared for Provider review and signature

## 2021-03-05 NOTE — CARE COORDINATION
ERICA received info that this patient might be able to DC on this date. ERICA checked to see if the patient has a BIPAP already at the facility and ERICA called S[facility] Gale[facility] to see if she could find out. AT this point, it does not appear that she has a BIPAP and they would not be able to get one in at this time. ERICA also asked about two medications to know if a script was needed. The meds were Klonipin and Provigil. ERICA learned that both of these would need scripts. ERICA did get the name of the Pharmacy and the phone number for the doctor to be able to order it through the facility pharmacy. [facility] Phone number, [phone]. ERICA did provide this to [place] clinical lead. ERICA informed Henrique Zayas RN Clinical lead of all of the above information. The patient will be able to DC tomorrow as the facility will be able to get the BIPAP tomorrow.

## 2021-03-05 NOTE — PROGRESS NOTES
Today's Date: 3/5/2021  Patient Name: Gurvinder Median  Date of admission: 2/21/2021  9:13 AM  Patient's age: 70 y.o., 1945  Admission Dx: GI bleed [K92.2]    Reason for Consult: management recommendations  Requesting Physician: Guanaco Rice MD    CHIEF COMPLAINT:  Recurrent anemia   Interim history  The patient is seen and evaluated. Doing well  No bleeding, hemoglobin stable      HISTORY OF PRESENT ILLNESS:      The patient is a 70 y.o.  female who is admitted to the hospital for worsening respiratory status. She is known to us with history of iron deficiency anemia requiring IV iron and transfusions. She has AV malformation and her anemia has been exacerbated because of anticoagulation. We have stopped her Eliquis because of recurrent GI bleeding. Indication for anticoagulation is atrial fibrillation. The patient is admitted and underwent thoracocentesis. Condition is slowly improving. It is thought that her pleural effusion is transudative and related to COPD/cardiac dysfunction. DIAGNOSIS:   1. Iron deficiency anemia, malabsorption component   2. AV malformation  3. COPD  4. Anticoagulation due to afib, stopped due to recurrent GI bleeding       CURRENT THERAPY:  Plan for work up and IV iron    BRIEF CASE HISTORY:   Last Valdovinos is a very pleasant 70 y.o. female who is referred to us for iron deficiency anemia. She reports she has been taking oral iron. She had knee replacement 3 years ago and required 3 units of blood prior to surgery. She was stable until 02/2020 when her HGB began to decrease again. She has had frequent hospitalizations in 2020 with COPD exacerbation and poor kidney function with bruised kidney. She has followed with nephrology in the past but no recent follow up and has pulmonology referral. She has had some compliance issues with showing up for appointments. She has poor sleep with breathing issues and feels very fatigued.  She has had several falls this year and has needed family to come pick her up, her legs are weak. She reports she had some bleeding following EGD and colonoscopy, AV malformation was found. She is on Eliquis for Afib but has not followed up with cardiology in the last year. Past Medical History:   has a past medical history of A-fib ([facility] [address] Acquired hypothyroidism, Acute encephalopathy, Acute kidney injury ([facility] [address] Anxiety, Benign hypertension with CKD (chronic kidney disease) stage III, Chronic back pain, CKD (chronic kidney disease) stage 3, GFR 30-59 ml/min, Constipation, COPD (chronic obstructive pulmonary disease) ([address] Cough, Depression, ETOH abuse, Former smoker, HA (generalized anxiety disorder), History of GI bleed, Hyperlipidemia, Hypertension, Hypothyroid, Leg pain, Normocytic anemia, Osteoarthritis, PAD (peripheral artery disease) ([facility] [address] Primary osteoarthritis, Pulmonary hypertension ([facility] [address] Pure hypercholesterolemia, SIRS (systemic inflammatory response syndrome) ([facility] [address] Urge incontinence, and Wheezing. Past Surgical History:   has a past surgical history that includes eye surgery (Bilateral); Colonoscopy; joint replacement; Upper gastrointestinal endoscopy (N/A, 12/2/2020); Colonoscopy (N/A, 12/2/2020); Upper gastrointestinal endoscopy (N/A, 12/23/2020); and Thoracentesis (12/26/2020). Medications:    Reviewed in Epic     Allergies:  Tizanidine    Social History:   reports that she has quit smoking. Her smoking use included cigarettes. She has a 78.00 pack-year smoking history. She has never used smokeless tobacco. She reports previous alcohol use. She reports that she does not use drugs. Family History: family history includes COPD in her mother; Cancer in an other family member; Emphysema in her sister; Heart Disease in her mother. REVIEW OF SYSTEMS:    Constitutional: No fever or chills.  No night sweats, no weight loss   Eyes: No eye discharge, double vision, or eye pain   HEENT: negative for sore mouth, sore throat, hoarseness and voice change   Respiratory: negative for cough , sputum, dyspnea, wheezing, hemoptysis, chest pain   Cardiovascular: negative for chest pain, dyspnea, palpitations, orthopnea, PND   Gastrointestinal: negative for nausea, vomiting, diarrhea, constipation, abdominal pain, Dysphagia, hematemesis and hematochezia   Genitourinary: negative for frequency, dysuria, nocturia, urinary incontinence, and hematuria   Integument: negative for rash, skin lesions, bruises.    Hematologic/Lymphatic: negative for easy bruising, bleeding, lymphadenopathy, or petechiae   Endocrine: negative for heat or cold intolerance,weight changes, change in bowel habits and hair loss   Musculoskeletal: negative for myalgias, arthralgias, pain, joint swelling,and bone pain   Neurological: negative for headaches, dizziness, seizures, weakness, numbness    PHYSICAL EXAM:      BP (!) 88/43   Pulse 96   Temp 99 °F (37.2 °C) (Oral)   Resp 22   Ht 5' 8\" (1.727 m)   Wt 176 lb 9.4 oz (80.1 kg)   SpO2 92%   BMI 26.85 kg/m²    Temp (24hrs), Av.3 °F (36.8 °C), Min:97.7 °F (36.5 °C), Max:99 °F (37.2 °C)    General appearance - well appearing, no in pain or distress   Mental status - alert and cooperative   Eyes - pupils equal and reactive, extraocular eye movements intact   Ears - bilateral TM's and external ear canals normal   Mouth - mucous membranes moist, pharynx normal without lesions   Neck - supple, no significant adenopathy   Lymphatics - no palpable lymphadenopathy, no hepatosplenomegaly   Chest - clear to auscultation, no wheezes, rales or rhonchi, symmetric air entry   Heart - normal rate, regular rhythm, normal S1, S2, no murmurs  Abdomen - soft, nontender, nondistended, no masses or organomegaly   Neurological - alert, oriented, normal speech, no focal findings or movement disorder noted   Musculoskeletal - no joint tenderness, deformity or swelling   Extremities - peripheral pulses normal, no pedal edema, no clubbing or cyanosis   Skin - normal coloration and turgor, no rashes, no suspicious skin lesions noted ,    DATA:    Labs:   CBC:   Recent Labs     03/04/21  0513 03/05/21 0447   WBC 9.8 7.7   HGB 9.0* 8.6*   HCT 28.8* 27.2*    280     BMP:   Recent Labs     03/04/21  0513 03/05/21 0447    148*   K 3.9 3.8   CO2 >45* >45*   BUN 12 13   CREATININE 0.68 0.69   LABGLOM >60 >60   GLUCOSE 116* 101*     PT/INR:   No results for input(s): PROTIME, INR in the last 72 hours. IMAGING DATA:      Primary Problem  GI bleed    Active Hospital Problems    Diagnosis Date Noted    Hypernatremia [E87.0] 02/24/2021    Acute respiratory failure with hypoxia ([facility] [address] [J96.01] 02/23/2021    Severe malnutrition ([facility] [address] [E43] 01/20/2021    Iron deficiency anemia due to chronic blood loss [D50.0] 12/18/2020    GI bleed [K92.2] 11/30/2020    Chronic diastolic CHF (congestive heart failure) (HCC) [I50.32] 07/21/2020    Moderate major depression (HCC) [F32.1] 06/17/2020    PAD (peripheral artery disease) ([facility]) [I73.9] 06/17/2020    CKD (chronic kidney disease) stage 3, GFR 30-59 ml/min [N18.30]     HA (generalized anxiety disorder) [F41.1] 12/13/2018    A-fib ([facility] [address] [I48.91] 09/13/2018    COPD (chronic obstructive pulmonary disease) ([facility] [address] [J44.9] 09/13/2018    Pulmonary hypertension ([facility] [address] [I27.20] 09/13/2018    Hypertension [I10]     Acquired hypothyroidism [E03.9] 01/18/2013         IMPRESSION:   1. Recurrent Iron def anemia   2. Anticoagulation (due to afib) stopped  3. 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 Acute respiratory failure  5. History of CHF and COPD    RECOMMENDATIONS:  1. The patient hemoglobin is stable, no drop and no active bleeding  2. Pt wants to continue aggressive care   Okay to discharge from my perspective    Discussed with patient and Nurse. Thank you for asking us to see this patient.     ALEX CHAPA [facility] MD Derrick  Hematologist/Medical Oncologist  Cell: [phone]

## 2021-03-05 NOTE — PROGRESS NOTES
Department of Internal Medicine  Nephrology [facility], MD   Consult Note      SUBJECTIVE: This is a 70 y.o. female with a significant past medical history of Chronic atrial fibrillation, pulmonary hypertension, chronic recurrent anemia [s/p recent endoscopy showing AVMs requiring intermittent PRBC transfusions], COPD and chronic kidney disease stage II, who presented to the emergency department after falling at home. Laboratory studies revealed severe anemia with hemoglobin 6.8 g/dL and serum creatinine was 1.25 mg/dL. Nephrology consultation was called yesterday due to low urine output. Patient was given albumin and Lasix with prompt improvement in urine output. She feels well today and does not have shortness of breath or chest pain.     Interval history:  Patient seen and examined, no acute events overnight  Patient denies any chest pain nausea vomiting no dizziness  Patient still requiring BiPAP during night  Nonoliguric good urine output 2.7 L yesterday in 24 hours on IV Lasix          Scheduled Meds:   cetirizine  10 mg Oral Daily    furosemide  20 mg Oral Daily    pantoprazole  40 mg Oral QAM AC    modafinil  200 mg Oral Daily    iron sucrose  200 mg Intravenous Q24H    [Held by provider] furosemide  40 mg Oral Daily    ferrous sulfate  325 mg Oral TID WC    ipratropium-albuterol  1 ampule Inhalation Q4H WA    ascorbic acid  1,000 mg Oral Daily    atorvastatin  10 mg Oral Daily    busPIRone  10 mg Oral TID    dilTIAZem  120 mg Oral Daily    DULoxetine  60 mg Oral Daily    fluticasone  1 spray Each Nostril Daily    fluticasone  2 puff Inhalation BID    levothyroxine  125 mcg Oral Daily    lisinopril  2.5 mg Oral Lunch    metoprolol tartrate  25 mg Oral BID    sodium chloride flush  10 mL Intravenous 2 times per day     Continuous Infusions:   sodium chloride       PRN Meds:.phenol, potassium chloride **OR** potassium alternative oral replacement **OR** potassium chloride, (Oral)   Resp 18   Ht 5' 8\" (1.727 m)   Wt 176 lb 9.4 oz (80.1 kg)   SpO2 93%   BMI 26.85 kg/m²   24HR INTAKE/OUTPUT:      Intake/Output Summary (Last 24 hours) at 3/5/2021 1140  Last data filed at 3/5/2021 0846  Gross per 24 hour   Intake 510 ml   Output 2700 ml   Net -2190 ml     Constitutional: alert, appears stated age and cooperative    Skin: Skin color, texture, turgor normal. No rashes or lesions    Head: Normocephalic, without obvious abnormality, atraumatic     Cardiovascular/Edema: regular rate and rhythm, S1, S2 normal, no murmur, click, rub or gallop    Respiratory: Lungs: clear to auscultation bilaterally    Abdomen: soft, non-tender; bowel sounds normal; no masses,  no organomegaly    Back: symmetric, no curvature. ROM normal. No CVA tenderness.     Extremities: extremities normal, atraumatic, no cyanosis or edema    Neuro:  Grossly normal    CBC:   Recent Labs     03/03/21  0534 03/04/21  0513 03/05/21  0447   WBC 6.4 9.8 7.7   HGB 8.3* 9.0* 8.6*    291 280     BMP:    Recent Labs     03/03/21  0534 03/04/21  0513 03/05/21  0447    143 148*   K 3.2* 3.9 3.8   CL 90* 89* 93*   CO2 >45* >45* >45*   BUN 11 12 13   CREATININE 0.65 0.68 0.69   GLUCOSE 101* 116* 101*       Lab Results   Component Value Date    NITRU NEGATIVE 02/22/2021    COLORU YELLOW 02/22/2021    PHUR 5.5 02/22/2021    WBCUA 2 TO 5 02/21/2021    RBCUA 0 TO 2 02/21/2021    MUCUS NOT REPORTED 02/21/2021    TRICHOMONAS NOT REPORTED 02/21/2021    YEAST NOT REPORTED 02/21/2021    BACTERIA FEW 02/21/2021    CLARITYU Clear 01/31/2020    SPECGRAV 1.014 02/22/2021    LEUKOCYTESUR NEGATIVE 02/22/2021    UROBILINOGEN Normal 02/22/2021    BILIRUBINUR NEGATIVE 02/22/2021    BILIRUBINUR 0 01/31/2020    BLOODU Negative 01/31/2020    GLUCOSEU NEGATIVE 02/22/2021    KETUA NEGATIVE 02/22/2021    AMORPHOUS 1+ 02/21/2021     Urine Sodium:     Lab Results   Component Value Date    ANGÉLICA 82 02/23/2021     Urine Potassium:    Lab Results Component Value Date    KUR 35.7 07/20/2020     Urine Chloride:    Lab Results   Component Value Date    CLUR 113 02/23/2021     Urine Creatinine:     Lab Results   Component Value Date    LABCREA 137.9 02/11/2021    LABCREA 134.5 02/11/2021     IMPRESSION/RECOMMENDATIONS:     1. Chronic kidney disease stage 3- renal function is stable. 2. Hypernatremia -  Oral Free water intake up to 1.5 L/day    3. Acute on chronic anemia. Hemoglobin 9.0 g and stable      4. Acute on  chronic CHF with preserved ejection fraction-  1500 mls fluid restriction    5. Left   Pleural effusion-status post thoracentesis 1 L. On 2/25/21-transudative fluid    6. Hypercapnic respiratory failure-pulmonary following     hypercapnic respiratory failure with metabolic compensation, plus metabolic alkalosis-contraction alkalosis    Plan  Change to oral lasix  Strict I's and O's  Pulmonology recommendations noted      Emmie Thapa MD   Attending Nephrologist  3/5/2021 11:40 AM

## 2021-03-05 NOTE — PLAN OF CARE
Problem: Falls - Risk of:  Goal: Will remain free from falls  Description: Will remain free from falls  3/5/2021 0355 by Jeremy Gonzalez RN  Outcome: Ongoing  Note: Pt assessed as a fall risk this shift. Remains free from falls and accidental injury at this time. Fall precautions in place, including falling star sign and fall risk band on pt. Floor free from obstacles, and bed is locked and in lowest position. Adequate lighting provided. Pt encouraged to call before getting OOB for any need. Bed alarm activated. Needs monitored during hourly rounding     Problem: DAILY CARE  Goal: Daily care needs are met  3/5/2021 0355 by Jeremy Gonzalez RN  Outcome: Ongoing  Note: Patient and staff currently meeting all patient's daily care needs. Patient encouraged to participate and complete all ADLs per self. Problem: Gas Exchange - Impaired  Goal: Absence of hypoxia  3/5/2021 0355 by Jeremy Gonzalez RN  Outcome: Ongoing  Note: Pt on 4L NC during wakeful hours with SPO2 >90%. Pt wearing BiPAP during sleeping periods with SPO2 >90%. Pt attempted multiple times to remove BiPAP mask, RN educated pt on importance of wearing BiPAP with her high Co2 levels.

## 2021-03-05 NOTE — PROGRESS NOTES
Spoke with dr Marilyn Wilcox regarding pt bp 88/43. Received parameter order for hold lisinopril for sbp under 100. Ok to give klonopin still.

## 2021-03-06 ENCOUNTER — APPOINTMENT (OUTPATIENT)
Dept: GENERAL RADIOLOGY | Age: 76
DRG: 377 | End: 2021-03-06
Payer: MEDICARE

## 2021-03-06 LAB
ABSOLUTE EOS #: 0.2 K/UL (ref 0–0.4)
ABSOLUTE IMMATURE GRANULOCYTE: ABNORMAL K/UL (ref 0–0.3)
ABSOLUTE LYMPH #: 0.7 K/UL (ref 1–4.8)
ABSOLUTE MONO #: 0.9 K/UL (ref 0.1–1.3)
ANION GAP SERPL CALCULATED.3IONS-SCNC: ABNORMAL MMOL/L (ref 9–17)
BASOPHILS # BLD: 1 % (ref 0–2)
BASOPHILS ABSOLUTE: 0.1 K/UL (ref 0–0.2)
BUN BLDV-MCNC: 20 MG/DL (ref 8–23)
BUN/CREAT BLD: ABNORMAL (ref 9–20)
CALCIUM SERPL-MCNC: 8.4 MG/DL (ref 8.6–10.4)
CHLORIDE BLD-SCNC: 94 MMOL/L (ref 98–107)
CO2: >45 MMOL/L (ref 20–31)
CREAT SERPL-MCNC: 0.82 MG/DL (ref 0.5–0.9)
DIFFERENTIAL TYPE: ABNORMAL
EOSINOPHILS RELATIVE PERCENT: 3 % (ref 0–4)
GFR AFRICAN AMERICAN: >60 ML/MIN
GFR NON-AFRICAN AMERICAN: >60 ML/MIN
GFR SERPL CREATININE-BSD FRML MDRD: ABNORMAL ML/MIN/{1.73_M2}
GFR SERPL CREATININE-BSD FRML MDRD: ABNORMAL ML/MIN/{1.73_M2}
GLUCOSE BLD-MCNC: 105 MG/DL (ref 70–99)
HCT VFR BLD CALC: 26.6 % (ref 36–46)
HEMOGLOBIN: 8.4 G/DL (ref 12–16)
IMMATURE GRANULOCYTES: ABNORMAL %
LYMPHOCYTES # BLD: 8 % (ref 24–44)
MCH RBC QN AUTO: 32.5 PG (ref 26–34)
MCHC RBC AUTO-ENTMCNC: 31.6 G/DL (ref 31–37)
MCV RBC AUTO: 102.8 FL (ref 80–100)
MONOCYTES # BLD: 9 % (ref 1–7)
NRBC AUTOMATED: ABNORMAL PER 100 WBC
PDW BLD-RTO: 21.4 % (ref 11.5–14.9)
PLATELET # BLD: 301 K/UL (ref 150–450)
PLATELET ESTIMATE: ABNORMAL
PMV BLD AUTO: 8.6 FL (ref 6–12)
POTASSIUM SERPL-SCNC: 3.8 MMOL/L (ref 3.7–5.3)
RBC # BLD: 2.59 M/UL (ref 4–5.2)
RBC # BLD: ABNORMAL 10*6/UL
SEG NEUTROPHILS: 79 % (ref 36–66)
SEGMENTED NEUTROPHILS ABSOLUTE COUNT: 7.5 K/UL (ref 1.3–9.1)
SODIUM BLD-SCNC: 145 MMOL/L (ref 135–144)
WBC # BLD: 9.4 K/UL (ref 3.5–11)
WBC # BLD: ABNORMAL 10*3/UL

## 2021-03-06 PROCEDURE — 6370000000 HC RX 637 (ALT 250 FOR IP): Performed by: INTERNAL MEDICINE

## 2021-03-06 PROCEDURE — 6370000000 HC RX 637 (ALT 250 FOR IP): Performed by: NURSE PRACTITIONER

## 2021-03-06 PROCEDURE — 6370000000 HC RX 637 (ALT 250 FOR IP): Performed by: FAMILY MEDICINE

## 2021-03-06 PROCEDURE — 85025 COMPLETE CBC W/AUTO DIFF WBC: CPT

## 2021-03-06 PROCEDURE — 2580000003 HC RX 258: Performed by: FAMILY MEDICINE

## 2021-03-06 PROCEDURE — 80048 BASIC METABOLIC PNL TOTAL CA: CPT

## 2021-03-06 PROCEDURE — 71046 X-RAY EXAM CHEST 2 VIEWS: CPT

## 2021-03-06 PROCEDURE — 36415 COLL VENOUS BLD VENIPUNCTURE: CPT

## 2021-03-06 PROCEDURE — 2060000000 HC ICU INTERMEDIATE R&B

## 2021-03-06 PROCEDURE — 94640 AIRWAY INHALATION TREATMENT: CPT

## 2021-03-06 PROCEDURE — 99232 SBSQ HOSP IP/OBS MODERATE 35: CPT | Performed by: INTERNAL MEDICINE

## 2021-03-06 PROCEDURE — 99232 SBSQ HOSP IP/OBS MODERATE 35: CPT | Performed by: FAMILY MEDICINE

## 2021-03-06 PROCEDURE — 6360000002 HC RX W HCPCS: Performed by: INTERNAL MEDICINE

## 2021-03-06 PROCEDURE — 2580000003 HC RX 258: Performed by: INTERNAL MEDICINE

## 2021-03-06 PROCEDURE — 94660 CPAP INITIATION&MGMT: CPT

## 2021-03-06 RX ORDER — HYDROCODONE BITARTRATE AND ACETAMINOPHEN 5; 325 MG/1; MG/1
1 TABLET ORAL EVERY 6 HOURS PRN
Qty: 12 TABLET | Refills: 0 | Status: SHIPPED | OUTPATIENT
Start: 2021-03-06 | End: 2021-03-09

## 2021-03-06 RX ORDER — MODAFINIL 200 MG/1
200 TABLET ORAL DAILY
Qty: 3 TABLET | Refills: 0 | Status: SHIPPED | OUTPATIENT
Start: 2021-03-06 | End: 2021-03-09

## 2021-03-06 RX ORDER — CLONAZEPAM 0.5 MG/1
0.5 TABLET ORAL 3 TIMES DAILY PRN
Qty: 10 TABLET | Refills: 0 | Status: SHIPPED | OUTPATIENT
Start: 2021-03-06 | End: 2021-04-14

## 2021-03-06 RX ADMIN — FERROUS SULFATE TAB 325 MG (65 MG ELEMENTAL FE) 325 MG: 325 (65 FE) TAB at 09:18

## 2021-03-06 RX ADMIN — CETIRIZINE HYDROCHLORIDE 10 MG: 10 TABLET, FILM COATED ORAL at 09:17

## 2021-03-06 RX ADMIN — MODAFINIL 200 MG: 200 TABLET ORAL at 11:14

## 2021-03-06 RX ADMIN — FUROSEMIDE 20 MG: 20 TABLET ORAL at 09:17

## 2021-03-06 RX ADMIN — ATORVASTATIN CALCIUM 10 MG: 10 TABLET, FILM COATED ORAL at 09:18

## 2021-03-06 RX ADMIN — FERROUS SULFATE TAB 325 MG (65 MG ELEMENTAL FE) 325 MG: 325 (65 FE) TAB at 17:22

## 2021-03-06 RX ADMIN — DILTIAZEM HYDROCHLORIDE 120 MG: 120 CAPSULE, COATED, EXTENDED RELEASE ORAL at 09:17

## 2021-03-06 RX ADMIN — BUSPIRONE HYDROCHLORIDE 10 MG: 10 TABLET ORAL at 14:31

## 2021-03-06 RX ADMIN — GUAIFENESIN 1200 MG: 600 TABLET, EXTENDED RELEASE ORAL at 20:35

## 2021-03-06 RX ADMIN — LEVOTHYROXINE SODIUM 125 MCG: 125 TABLET ORAL at 05:57

## 2021-03-06 RX ADMIN — DULOXETINE 60 MG: 60 CAPSULE, DELAYED RELEASE ORAL at 09:17

## 2021-03-06 RX ADMIN — LISINOPRIL 2.5 MG: 5 TABLET ORAL at 14:31

## 2021-03-06 RX ADMIN — SODIUM CHLORIDE, PRESERVATIVE FREE 10 ML: 5 INJECTION INTRAVENOUS at 20:34

## 2021-03-06 RX ADMIN — IPRATROPIUM BROMIDE AND ALBUTEROL SULFATE 1 AMPULE: 2.5; .5 SOLUTION RESPIRATORY (INHALATION) at 14:37

## 2021-03-06 RX ADMIN — FERROUS SULFATE TAB 325 MG (65 MG ELEMENTAL FE) 325 MG: 325 (65 FE) TAB at 14:31

## 2021-03-06 RX ADMIN — BACLOFEN 10 MG: 10 TABLET ORAL at 20:34

## 2021-03-06 RX ADMIN — IPRATROPIUM BROMIDE AND ALBUTEROL SULFATE 1 AMPULE: 2.5; .5 SOLUTION RESPIRATORY (INHALATION) at 18:49

## 2021-03-06 RX ADMIN — FLUTICASONE PROPIONATE 2 PUFF: 220 AEROSOL, METERED RESPIRATORY (INHALATION) at 09:24

## 2021-03-06 RX ADMIN — OXYCODONE HYDROCHLORIDE AND ACETAMINOPHEN 1000 MG: 500 TABLET ORAL at 09:18

## 2021-03-06 RX ADMIN — BUSPIRONE HYDROCHLORIDE 10 MG: 10 TABLET ORAL at 09:17

## 2021-03-06 RX ADMIN — HYDROCODONE BITARTRATE AND ACETAMINOPHEN 1 TABLET: 5; 325 TABLET ORAL at 00:56

## 2021-03-06 RX ADMIN — METOPROLOL TARTRATE 25 MG: 25 TABLET, FILM COATED ORAL at 20:35

## 2021-03-06 RX ADMIN — PANTOPRAZOLE SODIUM 40 MG: 40 TABLET, DELAYED RELEASE ORAL at 05:57

## 2021-03-06 RX ADMIN — SODIUM CHLORIDE, PRESERVATIVE FREE 10 ML: 5 INJECTION INTRAVENOUS at 11:15

## 2021-03-06 RX ADMIN — HYDROCODONE BITARTRATE AND ACETAMINOPHEN 1 TABLET: 5; 325 TABLET ORAL at 11:13

## 2021-03-06 RX ADMIN — IRON SUCROSE 200 MG: 20 INJECTION, SOLUTION INTRAVENOUS at 18:28

## 2021-03-06 RX ADMIN — IPRATROPIUM BROMIDE AND ALBUTEROL SULFATE 1 AMPULE: 2.5; .5 SOLUTION RESPIRATORY (INHALATION) at 10:50

## 2021-03-06 RX ADMIN — FLUTICASONE PROPIONATE 2 PUFF: 220 AEROSOL, METERED RESPIRATORY (INHALATION) at 20:35

## 2021-03-06 RX ADMIN — IPRATROPIUM BROMIDE AND ALBUTEROL SULFATE 1 AMPULE: 2.5; .5 SOLUTION RESPIRATORY (INHALATION) at 06:02

## 2021-03-06 RX ADMIN — CLONAZEPAM 0.5 MG: 1 TABLET ORAL at 20:34

## 2021-03-06 RX ADMIN — BUSPIRONE HYDROCHLORIDE 10 MG: 10 TABLET ORAL at 20:35

## 2021-03-06 ASSESSMENT — PAIN SCALES - GENERAL
PAINLEVEL_OUTOF10: 6
PAINLEVEL_OUTOF10: 7
PAINLEVEL_OUTOF10: 0
PAINLEVEL_OUTOF10: 0

## 2021-03-06 ASSESSMENT — PAIN - FUNCTIONAL ASSESSMENT: PAIN_FUNCTIONAL_ASSESSMENT: ACTIVITIES ARE NOT PREVENTED

## 2021-03-06 ASSESSMENT — PAIN DESCRIPTION - ONSET: ONSET: ON-GOING

## 2021-03-06 ASSESSMENT — PAIN DESCRIPTION - PROGRESSION: CLINICAL_PROGRESSION: NOT CHANGED

## 2021-03-06 ASSESSMENT — PAIN DESCRIPTION - DESCRIPTORS: DESCRIPTORS: ACHING

## 2021-03-06 ASSESSMENT — PAIN DESCRIPTION - FREQUENCY: FREQUENCY: INTERMITTENT

## 2021-03-06 ASSESSMENT — PAIN DESCRIPTION - LOCATION
LOCATION: BACK
LOCATION: ANKLE

## 2021-03-06 ASSESSMENT — PAIN DESCRIPTION - PAIN TYPE: TYPE: CHRONIC PAIN

## 2021-03-06 NOTE — PROGRESS NOTES
Pulmonary Critical Care Progress Note  Porsha Cutler MD     Patient seen for the follow up of acute hypoxic respiratory failure, COPD, bilateral pleural effusions, atelectasis, pulmonary hypertension, chronic kidney disease    Subjective:  Patient did not use BiPAP last night because of the mask issues. This morning she is using BiPAP. She denies any chest pain. Her shortness of breath is better. She has occasional cough. Examination:  Vitals: /67   Pulse 88   Temp 97.7 °F (36.5 °C) (Oral)   Resp 26   Ht 5' 8\" (1.727 m)   Wt 168 lb 14 oz (76.6 kg)   SpO2 95%   BMI 25.68 kg/m²   General appearance:  alert and cooperative with exam, EOMI  Neck: No JVD  Lungs: Moderate air exchange, decreased breath sounds at bases with occasional crackles  Heart: regular rate and rhythm, S1, S2 normal, no gallop  Abdomen: Soft, non tender, + BS  Extremities: no cyanosis or clubbing.  No significant edema, no skin rash or petechiae    LABs:  CBC:   Recent Labs     03/05/21  0447 03/06/21  0549   WBC 7.7 9.4   HGB 8.6* 8.4*   HCT 27.2* 26.6*    301     BMP:   Recent Labs     03/05/21  0447 03/06/21  0549   * 145*   K 3.8 3.8   CO2 >45* >45*   BUN 13 20   CREATININE 0.69 0.82   LABGLOM >60 >60   GLUCOSE 101* 105*       Lab Results   Component Value Date    PHART 7.353 03/01/2021    FDA2PPP 92.4 03/01/2021    PO2ART 52.3 03/01/2021    CYB7ZIW 51.4 03/01/2021    NBEA NOT REPORTED 03/01/2021    PBEA 25.8 03/01/2021    R0WYCMQK 84.6 03/01/2021    FIO2 NOT REPORTED 03/01/2021     Radiology:  3/6/21:  Bilateral pleural effusion with vascular congestion      Impression:  · Bilateral pleural effusion  · Basilar atelectasis  · Acute hypoxic respiratory failure  · COPD  · Chronic kidney disease  · Mild pulmonary hypertension, most likely secondary by echo done on 7/6/2020    Recommendations:  · Albuterol and Ipratropium Q 4 hours and prn  · Flovent HFA  · Modafinil 200 mg daily  · Labs: CBC and BMP in am  · BiPAP now and while asleep.   · Continue oral diuresis  · X-ray chest now to reevaluate pleural effusion and pulmonary edema before discharging, discussed with RN  · Incentive spirometry every hour while awake  · 2 liters/min via nasal cannula  · DVT prophylaxis with EPC cuffs  · Will follow with you    Wendy Hernandez MD, [place]  Pulmonary Critical Care and Sleep Medicine,  [facility]  Cell: [phone]  Office: [phone]

## 2021-03-06 NOTE — CARE COORDINATION
Social work; rep fer checked and the bipap has not been delivered yet. So pt cannot be set up today until that comes. Placed [employer] on will call. Paperwork on unit. [employer] for Abelino Mcmahan [phone] Fax: [phone]. Report [phone].  Elliott og

## 2021-03-06 NOTE — PLAN OF CARE
of, Imbalanced  Goal: Ability to maintain a body temperature within defined limits  Outcome: Ongoing  Goal: Will regain or maintain usual level of consciousness  Outcome: Ongoing  Goal: Complications related to the disease process, condition or treatment will be avoided or minimized  Outcome: Ongoing     Problem: Isolation Precautions - Risk of Spread of Infection  Goal: Prevent transmission of infection  Outcome: Ongoing     Problem: Nutrition Deficits  Goal: Optimize nutritional status  Outcome: Ongoing     Problem: Risk for Fluid Volume Deficit  Goal: Maintain normal heart rhythm  Outcome: Ongoing  Goal: Maintain absence of muscle cramping  Outcome: Ongoing  Goal: Maintain normal serum potassium, sodium, calcium, phosphorus, and pH  Outcome: Ongoing     Problem: Fatigue  Goal: Verbalize increase energy and improved vitality  Outcome: Ongoing     Problem: Patient Education: Go to Patient Education Activity  Goal: Patient/Family Education  Outcome: Ongoing     Problem: Breathing Pattern - Ineffective:  Goal: Ability to achieve and maintain a regular respiratory rate will improve  Description: Ability to achieve and maintain a regular respiratory rate will improve  Outcome: Ongoing     Pt resting comfortable in bed at this time. No distress noted. Assessment remains as charted.

## 2021-03-06 NOTE — PROGRESS NOTES
FAMILY MEDICINE  - PROGRESS NOTE    Date:  3/6/2021  Levell Cooks  [id number]      Chief Complaint   Patient presents with    Fall         Interval History:  not changed, she is still trying to get adjusted to a BiPap mask that she can tolerate. Awaiting the SNF to get a BiPap for the patient to use while she is admitted there so that she can be discharged.       Subjective  Constitutional: positive for fatigue and overweight  Respiratory: positive for emphysema and shortness of breath  Cardiovascular: positive for irregular heart beat  Hematologic/lymphatic: positive for pallor  Behavioral/Psych: positive for anxiety and depression:    Objective:    /70   Pulse 86   Temp 98.2 °F (36.8 °C) (Oral)   Resp 22   Ht 5' 8\" (1.727 m)   Wt 168 lb 14 oz (76.6 kg)   SpO2 95%   BMI 25.68 kg/m²   General appearance - alert, well appearing, and in no distress and overweight  Mental status - alert, oriented to person, place, and time  Eyes - pupils equal and reactive, extraocular eye movements intact  Ears - hearing grossly normal bilaterally  Nose - normal and patent, no erythema, discharge or polyps  Mouth - mucous membranes moist, pharynx normal without lesions  Neck - supple, no significant adenopathy  Lymphatics - no palpable lymphadenopathy, no hepatosplenomegaly  Chest - clear to auscultation, no wheezes, rales or rhonchi, symmetric air entry, decreased air entry noted posteriorly  Heart - irregularly irregular rhythm with rate 86  Abdomen - soft, nontender, nondistended, no masses or organomegaly  Breasts - not examined  Back exam - not examined  Neurological - alert, oriented, normal speech, no focal findings or movement disorder noted  Musculoskeletal - osteoarthritic changes noted in both hands  Extremities - peripheral pulses normal, no pedal edema, no clubbing or cyanosis  Skin - normal coloration and turgor, no rashes, no suspicious skin lesions noted Data:   Medications:   Current Facility-Administered Medications   Medication Dose Route Frequency Provider Last Rate Last Admin    phenol 1.4 % mouth spray 1 spray  1 spray Mouth/Throat Q2H PRN WILLIE Armstrong CNP        cetirizine (ZYRTEC) tablet 10 mg  10 mg Oral Daily WILLIE Armstrong CNP   10 mg at 03/05/21 0912    potassium chloride (KLOR-CON M) extended release tablet 40 mEq  40 mEq Oral PRN Roberto Osman MD   40 mEq at 03/03/21 1026    Or    potassium bicarb-citric acid (EFFER-K) effervescent tablet 40 mEq  40 mEq Oral PRN Roberto Osman MD        Or    potassium chloride 10 mEq/100 mL IVPB (Peripheral Line)  10 mEq Intravenous PRN Roberto Osman MD        furosemide (LASIX) tablet 20 mg  20 mg Oral Daily Mya Emery MD   20 mg at 03/05/21 0912    pantoprazole (PROTONIX) tablet 40 mg  40 mg Oral QAM  Twila Bamberger, APRN - NP   40 mg at 03/06/21 0557    modafinil (PROVIGIL) tablet 200 mg  200 mg Oral Daily WILLIE Valle CNP   200 mg at 03/05/21 0912    iron sucrose (VENOFER) 200 mg in sodium chloride 0.9 % 100 mL IVPB  200 mg Intravenous Q24H Tez Meza MD   Stopped at 03/05/21 1833    ferrous sulfate (IRON 325) tablet 325 mg  325 mg Oral TID  Roberto Osman MD   325 mg at 03/05/21 1626    HYDROcodone-acetaminophen (NORCO) 5-325 MG per tablet 1 tablet  1 tablet Oral Q6H PRN Roberto Osman MD   1 tablet at 03/06/21 0056    ipratropium-albuterol (DUONEB) nebulizer solution 1 ampule  1 ampule Inhalation Q4H WA WILLIE Valle CNP   1 ampule at 03/06/21 0602    ascorbic acid (VITAMIN C) tablet 1,000 mg  1,000 mg Oral Daily Cris Carreno MD   1,000 mg at 03/05/21 0912    atorvastatin (LIPITOR) tablet 10 mg  10 mg Oral Daily Cris Carreno MD   10 mg at 03/05/21 0912    baclofen (LIORESAL) tablet 10 mg  10 mg Oral TID PRN Cris Carreno MD   10 mg at 03/01/21 1613    busPIRone (BUSPAR) tablet 10 mg  10 mg 0557  Gross per 24 hour   Intake 860 ml   Output 1100 ml   Net -240 ml     Recent Results (from the past 24 hour(s))   Basic Metabolic Panel    Collection Time: 03/06/21  5:49 AM   Result Value Ref Range    Glucose 105 (H) 70 - 99 mg/dL    BUN 20 8 - 23 mg/dL    CREATININE 0.82 0.50 - 0.90 mg/dL    Bun/Cre Ratio NOT REPORTED 9 - 20    Calcium 8.4 (L) 8.6 - 10.4 mg/dL    Sodium 145 (H) 135 - 144 mmol/L    Potassium 3.8 3.7 - 5.3 mmol/L    Chloride 94 (L) 98 - 107 mmol/L    CO2 >45 (HH) 20 - 31 mmol/L    Anion Gap Unable to calculate anion gap due to CO2 >45. 9 - 17 mmol/L    GFR Non-African American >60 >60 mL/min    GFR African American >60 >60 mL/min    GFR Comment          GFR Staging NOT REPORTED    CBC Auto Differential    Collection Time: 03/06/21  5:49 AM   Result Value Ref Range    WBC 9.4 3.5 - 11.0 k/uL    RBC 2.59 (L) 4.0 - 5.2 m/uL    Hemoglobin 8.4 (L) 12.0 - 16.0 g/dL    Hematocrit 26.6 (L) 36 - 46 %    .8 (H) 80 - 100 fL    MCH 32.5 26 - 34 pg    MCHC 31.6 31 - 37 g/dL    RDW 21.4 (H) 11.5 - 14.9 %    Platelets 740 500 - 635 k/uL    MPV 8.6 6.0 - 12.0 fL    NRBC Automated NOT REPORTED per 100 WBC    Differential Type NOT REPORTED     Immature Granulocytes NOT REPORTED 0 %    Absolute Immature Granulocyte NOT REPORTED 0.00 - 0.30 k/uL    WBC Morphology NOT REPORTED     RBC Morphology NOT REPORTED     Platelet Estimate NOT REPORTED     Seg Neutrophils 79 (H) 36 - 66 %    Lymphocytes 8 (L) 24 - 44 %    Monocytes 9 (H) 1 - 7 %    Eosinophils % 3 0 - 4 %    Basophils 1 0 - 2 %    Segs Absolute 7.50 1.3 - 9.1 k/uL    Absolute Lymph # 0.70 (L) 1.0 - 4.8 k/uL    Absolute Mono # 0.90 0.1 - 1.3 k/uL    Absolute Eos # 0.20 0.0 - 0.4 k/uL    Basophils Absolute 0.10 0.0 - 0.2 k/uL     -----------------------------------------------------------------  RAD:  EKG:  Micro:     Assessment & Plan:    Patient Active Problem List:     Acquired hypothyroidism     Hypertension     Primary osteoarthritis of right knee A-fib ([facility]     Acute encephalopathy     SIRS (systemic inflammatory response syndrome) (HCC)     Normocytic anemia     Pulmonary hypertension (HCC)     COPD (chronic obstructive pulmonary disease) (HCC)     History of GI bleed     Constipation     HA (generalized anxiety disorder)     Lumbar radiculopathy     Lumbosacral spondylosis without myelopathy     Benign hypertension with CKD (chronic kidney disease) stage III     CKD (chronic kidney disease) stage 3, GFR 30-59 ml/min     Alcohol withdrawal syndrome with complication (HCC)     Moderate major depression (HCC)     PAD (peripheral artery disease) (HCC)     Acute kidney injury ([facility] [address]     Obesity, Class I, BMI 30-34.9     Chronic diastolic CHF (congestive heart failure) (HCC)     GI bleed     Weight loss     Elevated alkaline phosphatase level     Thrombocytosis (HCC)     Iron deficiency anemia due to chronic blood loss     Iron malabsorption     Symptomatic anemia     Pneumonia due to organism     Anemia     Severe anemia     Overweight (BMI 25.0-29. 9)     Severe malnutrition ([facility] [address]     Renal failure     Acute renal failure (ARF) (HCC)     Acute cystitis without hematuria     Hypoxia     Acute respiratory failure with hypoxia (HCC)     Hypernatremia           Plan:  -Chronic respiratory failure with COPD - stable on O2 per NC by day, BiPap by night, Pulmonology managing.  -Recurrent iron deficiency anemia - hgb stable, Hematology following.  -A. Fib. - rate controlled.  -Okay to D/C to SNF if they obtain BiPap.  -Continue current treatments.  -Complete orders per chart.     See orders   Disposition:    Electronically signed by Lyndon Jaime MD on 3/6/2021 at 8:05 AM

## 2021-03-06 NOTE — CARE COORDINATION
Social work: spoke to liaison Katina who thinks it will be fine to send pt back. She is checking on the bipap delivery time. Will ask [facility] to place on will call until the time pt can arrive is certain. Will need palmira completed. Rx needed for any controlled meds ordered at discharge.   Silvia og

## 2021-03-06 NOTE — CARE COORDINATION
Social work: spoke to [employer] but actual nurses station did not answer phone to confirm if bipap has been delivered for pt to return Calling national rep next [phone]. Will fax request also fax [phone]. Recalling snf again.   Nicole og

## 2021-03-06 NOTE — PROGRESS NOTES
Today's Date: 3/6/2021  Patient Name: Michael Vela  Date of admission: 2/21/2021  9:13 AM  Patient's age: 70 y.o., 1945  Admission Dx: GI bleed [K92.2]    Reason for Consult: management recommendations  Requesting Physician: Viktoria Hebert MD    CHIEF COMPLAINT:  Recurrent anemia   SUBJECTIVE:  . The patient was seen and examined. Clinically stable. No events overnight. No recent active bleeding. No new events. BRIEF CASE HISTORY:    The patient is a 70 y.o.  female who is admitted to the hospital for worsening respiratory status. She is known to us with history of iron deficiency anemia requiring IV iron and transfusions. She has AV malformation and her anemia has been exacerbated because of anticoagulation. We have stopped her Eliquis because of recurrent GI bleeding. Indication for anticoagulation is atrial fibrillation. The patient is admitted and underwent thoracocentesis. Condition is slowly improving. It is thought that her pleural effusion is transudative and related to COPD/cardiac dysfunction. DIAGNOSIS:   1. Iron deficiency anemia, malabsorption component   2. AV malformation  3. COPD  4. Anticoagulation due to afib, stopped due to recurrent GI bleeding       CURRENT THERAPY:  Plan for work up and IV iron    BRIEF CASE HISTORY:   Teodora Heck is a very pleasant 70 y.o. female who is referred to us for iron deficiency anemia. She reports she has been taking oral iron. She had knee replacement 3 years ago and required 3 units of blood prior to surgery. She was stable until 02/2020 when her HGB began to decrease again. She has had frequent hospitalizations in 2020 with COPD exacerbation and poor kidney function with bruised kidney. She has followed with nephrology in the past but no recent follow up and has pulmonology referral. She has had some compliance issues with showing up for appointments. She has poor sleep with breathing issues and feels very fatigued.  She has had several falls this year and has needed family to come pick her up, her legs are weak. She reports she had some bleeding following EGD and colonoscopy, AV malformation was found. She is on Eliquis for Afib but has not followed up with cardiology in the last year. Past Medical History:   has a past medical history of A-fib ([address] Acquired hypothyroidism, Acute encephalopathy, Acute kidney injury ([address] Anxiety, Benign hypertension with CKD (chronic kidney disease) stage III, Chronic back pain, CKD (chronic kidney disease) stage 3, GFR 30-59 ml/min, Constipation, COPD (chronic obstructive pulmonary disease) ([address] Cough, Depression, ETOH abuse, Former smoker, HA (generalized anxiety disorder), History of GI bleed, Hyperlipidemia, Hypertension, Hypothyroid, Leg pain, Normocytic anemia, Osteoarthritis, PAD (peripheral artery disease) ([address] Primary osteoarthritis, Pulmonary hypertension ([facility] Pure hypercholesterolemia, SIRS (systemic inflammatory response syndrome) ([facility] [address] Urge incontinence, and Wheezing. Past Surgical History:   has a past surgical history that includes eye surgery (Bilateral); Colonoscopy; joint replacement; Upper gastrointestinal endoscopy (N/A, 12/2/2020); Colonoscopy (N/A, 12/2/2020); Upper gastrointestinal endoscopy (N/A, 12/23/2020); and Thoracentesis (12/26/2020). Medications:    Reviewed in Epic     Allergies:  Tizanidine    Social History:   reports that she has quit smoking. Her smoking use included cigarettes. She has a 78.00 pack-year smoking history. She has never used smokeless tobacco. She reports previous alcohol use. She reports that she does not use drugs. Family History: family history includes COPD in her mother; Cancer in an other family member; Emphysema in her sister; Heart Disease in her mother. REVIEW OF SYSTEMS:    Constitutional: No fever or chills.  No night sweats, no weight loss   Eyes: No eye discharge, double vision, or eye pain   HEENT: negative for sore mouth, sore throat, hoarseness and voice change   Respiratory: negative for cough , sputum, dyspnea, wheezing, hemoptysis, chest pain   Cardiovascular: negative for chest pain, dyspnea, palpitations, orthopnea, PND   Gastrointestinal: negative for nausea, vomiting, diarrhea, constipation, abdominal pain, Dysphagia, hematemesis and hematochezia   Genitourinary: negative for frequency, dysuria, nocturia, urinary incontinence, and hematuria   Integument: negative for rash, skin lesions, bruises.    Hematologic/Lymphatic: negative for easy bruising, bleeding, lymphadenopathy, or petechiae   Endocrine: negative for heat or cold intolerance,weight changes, change in bowel habits and hair loss   Musculoskeletal: negative for myalgias, arthralgias, pain, joint swelling,and bone pain   Neurological: negative for headaches, dizziness, seizures, weakness, numbness    PHYSICAL EXAM:      /67   Pulse 88   Temp 97.7 °F (36.5 °C) (Oral)   Resp 20   Ht 5' 8\" (1.727 m)   Wt 168 lb 14 oz (76.6 kg)   SpO2 95%   BMI 25.68 kg/m²    Temp (24hrs), Av.9 °F (36.6 °C), Min:97.7 °F (36.5 °C), Max:98.2 °F (36.8 °C)    General appearance - well appearing, no in pain or distress   Mental status - alert and cooperative   Eyes - pupils equal and reactive, extraocular eye movements intact   Ears - bilateral TM's and external ear canals normal   Mouth - mucous membranes moist, pharynx normal without lesions   Neck - supple, no significant adenopathy   Lymphatics - no palpable lymphadenopathy, no hepatosplenomegaly   Chest - clear to auscultation, no wheezes, rales or rhonchi, symmetric air entry   Heart - normal rate, regular rhythm, normal S1, S2, no murmurs  Abdomen - soft, nontender, nondistended, no masses or organomegaly   Neurological - alert, oriented, normal speech, no focal findings or movement disorder noted   Musculoskeletal - no joint tenderness, deformity or swelling   Extremities - peripheral pulses normal, no pedal edema, no clubbing or cyanosis   Skin - normal coloration and turgor, no rashes, no suspicious skin lesions noted ,    DATA:    Labs:   CBC:   Recent Labs     03/05/21  0447 03/06/21  0549   WBC 7.7 9.4   HGB 8.6* 8.4*   HCT 27.2* 26.6*    301     BMP:   Recent Labs     03/05/21  0447 03/06/21  0549   * 145*   K 3.8 3.8   CO2 >45* >45*   BUN 13 20   CREATININE 0.69 0.82   LABGLOM >60 >60   GLUCOSE 101* 105*     PT/INR:   No results for input(s): PROTIME, INR in the last 72 hours. IMAGING DATA:      Primary Problem  GI bleed    Active Hospital Problems    Diagnosis Date Noted    Hypernatremia [E87.0] 02/24/2021    Acute respiratory failure with hypoxia ([facility] [address] [J96.01] 02/23/2021    Severe malnutrition ([facility] [address] [E43] 01/20/2021    Iron deficiency anemia due to chronic blood loss [D50.0] 12/18/2020    GI bleed [K92.2] 11/30/2020    Chronic diastolic CHF (congestive heart failure) (HCC) [I50.32] 07/21/2020    Moderate major depression (HCC) [F32.1] 06/17/2020    PAD (peripheral artery disease) ([facility]) [I73.9] 06/17/2020    CKD (chronic kidney disease) stage 3, GFR 30-59 ml/min [N18.30]     HA (generalized anxiety disorder) [F41.1] 12/13/2018    A-fib ([facility] [I48.91] 09/13/2018    COPD (chronic obstructive pulmonary disease) ([facility] [J44.9] 09/13/2018    Pulmonary hypertension ([facility] [I27.20] 09/13/2018    Hypertension [I10]     Acquired hypothyroidism [E03.9] 01/18/2013         IMPRESSION:   1. Recurrent Iron def anemia   2. Anticoagulation (due to afib) stopped  3. 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 Acute respiratory failure  5. History of CHF and COPD    RECOMMENDATIONS:  1. The patient hemoglobin is stable, no drop and no active bleeding  2. Plan for possible discharge today. 3. Labs will be monitored as outpatient.                             [address] [facility]                            This note is created with the assistance of a speech recognition program.  While intending to generate a document that actually reflects the content of the visit, the document can still have some errors including those of syntax and sound a like substitutions which may escape proof reading. It such instances, actual meaning can be extrapolated by contextual diversion.

## 2021-03-07 LAB
ABSOLUTE EOS #: 0.21 K/UL (ref 0–0.4)
ABSOLUTE IMMATURE GRANULOCYTE: ABNORMAL K/UL (ref 0–0.3)
ABSOLUTE LYMPH #: 0.74 K/UL (ref 1–4.8)
ABSOLUTE MONO #: 1.17 K/UL (ref 0.1–1.3)
ANION GAP SERPL CALCULATED.3IONS-SCNC: 5 MMOL/L (ref 9–17)
BASOPHILS # BLD: 1 % (ref 0–2)
BASOPHILS ABSOLUTE: 0.11 K/UL (ref 0–0.2)
BUN BLDV-MCNC: 21 MG/DL (ref 8–23)
BUN/CREAT BLD: ABNORMAL (ref 9–20)
CALCIUM SERPL-MCNC: 8.8 MG/DL (ref 8.6–10.4)
CHLORIDE BLD-SCNC: 96 MMOL/L (ref 98–107)
CO2: 42 MMOL/L (ref 20–31)
CREAT SERPL-MCNC: 0.8 MG/DL (ref 0.5–0.9)
DIFFERENTIAL TYPE: ABNORMAL
EOSINOPHILS RELATIVE PERCENT: 2 % (ref 0–4)
GFR AFRICAN AMERICAN: >60 ML/MIN
GFR NON-AFRICAN AMERICAN: >60 ML/MIN
GFR SERPL CREATININE-BSD FRML MDRD: ABNORMAL ML/MIN/{1.73_M2}
GFR SERPL CREATININE-BSD FRML MDRD: ABNORMAL ML/MIN/{1.73_M2}
GLUCOSE BLD-MCNC: 97 MG/DL (ref 70–99)
HCT VFR BLD CALC: 28.8 % (ref 36–46)
HEMOGLOBIN: 8.9 G/DL (ref 12–16)
IMMATURE GRANULOCYTES: ABNORMAL %
LYMPHOCYTES # BLD: 7 % (ref 24–44)
MCH RBC QN AUTO: 32.4 PG (ref 26–34)
MCHC RBC AUTO-ENTMCNC: 30.8 G/DL (ref 31–37)
MCV RBC AUTO: 105.1 FL (ref 80–100)
MONOCYTES # BLD: 11 % (ref 1–7)
MORPHOLOGY: ABNORMAL
NRBC AUTOMATED: ABNORMAL PER 100 WBC
PDW BLD-RTO: 21.8 % (ref 11.5–14.9)
PLATELET # BLD: 336 K/UL (ref 150–450)
PLATELET ESTIMATE: ABNORMAL
PMV BLD AUTO: 8.6 FL (ref 6–12)
POTASSIUM SERPL-SCNC: 4.3 MMOL/L (ref 3.7–5.3)
RBC # BLD: 2.74 M/UL (ref 4–5.2)
RBC # BLD: ABNORMAL 10*6/UL
SEG NEUTROPHILS: 79 % (ref 36–66)
SEGMENTED NEUTROPHILS ABSOLUTE COUNT: 8.37 K/UL (ref 1.3–9.1)
SODIUM BLD-SCNC: 143 MMOL/L (ref 135–144)
WBC # BLD: 10.6 K/UL (ref 3.5–11)
WBC # BLD: ABNORMAL 10*3/UL

## 2021-03-07 PROCEDURE — 6370000000 HC RX 637 (ALT 250 FOR IP): Performed by: NURSE PRACTITIONER

## 2021-03-07 PROCEDURE — 6370000000 HC RX 637 (ALT 250 FOR IP): Performed by: FAMILY MEDICINE

## 2021-03-07 PROCEDURE — 6370000000 HC RX 637 (ALT 250 FOR IP): Performed by: INTERNAL MEDICINE

## 2021-03-07 PROCEDURE — 94640 AIRWAY INHALATION TREATMENT: CPT

## 2021-03-07 PROCEDURE — 6360000002 HC RX W HCPCS: Performed by: INTERNAL MEDICINE

## 2021-03-07 PROCEDURE — 99233 SBSQ HOSP IP/OBS HIGH 50: CPT | Performed by: FAMILY MEDICINE

## 2021-03-07 PROCEDURE — 2580000003 HC RX 258: Performed by: INTERNAL MEDICINE

## 2021-03-07 PROCEDURE — 2060000000 HC ICU INTERMEDIATE R&B

## 2021-03-07 PROCEDURE — 80048 BASIC METABOLIC PNL TOTAL CA: CPT

## 2021-03-07 PROCEDURE — 2580000003 HC RX 258: Performed by: FAMILY MEDICINE

## 2021-03-07 PROCEDURE — 85025 COMPLETE CBC W/AUTO DIFF WBC: CPT

## 2021-03-07 PROCEDURE — 99232 SBSQ HOSP IP/OBS MODERATE 35: CPT | Performed by: INTERNAL MEDICINE

## 2021-03-07 PROCEDURE — 36415 COLL VENOUS BLD VENIPUNCTURE: CPT

## 2021-03-07 RX ORDER — FUROSEMIDE 10 MG/ML
20 INJECTION INTRAMUSCULAR; INTRAVENOUS ONCE
Status: COMPLETED | OUTPATIENT
Start: 2021-03-07 | End: 2021-03-07

## 2021-03-07 RX ADMIN — CLONAZEPAM 0.5 MG: 1 TABLET ORAL at 20:20

## 2021-03-07 RX ADMIN — FERROUS SULFATE TAB 325 MG (65 MG ELEMENTAL FE) 325 MG: 325 (65 FE) TAB at 11:53

## 2021-03-07 RX ADMIN — SODIUM CHLORIDE, PRESERVATIVE FREE 10 ML: 5 INJECTION INTRAVENOUS at 09:28

## 2021-03-07 RX ADMIN — HYDROCODONE BITARTRATE AND ACETAMINOPHEN 1 TABLET: 5; 325 TABLET ORAL at 00:23

## 2021-03-07 RX ADMIN — IPRATROPIUM BROMIDE AND ALBUTEROL SULFATE 1 AMPULE: 2.5; .5 SOLUTION RESPIRATORY (INHALATION) at 14:47

## 2021-03-07 RX ADMIN — FLUTICASONE PROPIONATE 2 PUFF: 220 AEROSOL, METERED RESPIRATORY (INHALATION) at 09:28

## 2021-03-07 RX ADMIN — IPRATROPIUM BROMIDE AND ALBUTEROL SULFATE 1 AMPULE: 2.5; .5 SOLUTION RESPIRATORY (INHALATION) at 10:49

## 2021-03-07 RX ADMIN — ATORVASTATIN CALCIUM 10 MG: 10 TABLET, FILM COATED ORAL at 07:55

## 2021-03-07 RX ADMIN — PANTOPRAZOLE SODIUM 40 MG: 40 TABLET, DELAYED RELEASE ORAL at 06:23

## 2021-03-07 RX ADMIN — FERROUS SULFATE TAB 325 MG (65 MG ELEMENTAL FE) 325 MG: 325 (65 FE) TAB at 17:38

## 2021-03-07 RX ADMIN — BACLOFEN 10 MG: 10 TABLET ORAL at 04:42

## 2021-03-07 RX ADMIN — FUROSEMIDE 20 MG: 10 INJECTION, SOLUTION INTRAMUSCULAR; INTRAVENOUS at 09:45

## 2021-03-07 RX ADMIN — CLONAZEPAM 0.5 MG: 1 TABLET ORAL at 14:41

## 2021-03-07 RX ADMIN — DILTIAZEM HYDROCHLORIDE 120 MG: 120 CAPSULE, COATED, EXTENDED RELEASE ORAL at 07:55

## 2021-03-07 RX ADMIN — BUSPIRONE HYDROCHLORIDE 10 MG: 10 TABLET ORAL at 14:32

## 2021-03-07 RX ADMIN — FERROUS SULFATE TAB 325 MG (65 MG ELEMENTAL FE) 325 MG: 325 (65 FE) TAB at 07:55

## 2021-03-07 RX ADMIN — FLUTICASONE PROPIONATE 2 PUFF: 220 AEROSOL, METERED RESPIRATORY (INHALATION) at 20:20

## 2021-03-07 RX ADMIN — BUSPIRONE HYDROCHLORIDE 10 MG: 10 TABLET ORAL at 07:55

## 2021-03-07 RX ADMIN — GUAIFENESIN 1200 MG: 600 TABLET, EXTENDED RELEASE ORAL at 20:20

## 2021-03-07 RX ADMIN — SODIUM CHLORIDE, PRESERVATIVE FREE 10 ML: 5 INJECTION INTRAVENOUS at 20:21

## 2021-03-07 RX ADMIN — HYDROCODONE BITARTRATE AND ACETAMINOPHEN 1 TABLET: 5; 325 TABLET ORAL at 09:45

## 2021-03-07 RX ADMIN — MODAFINIL 200 MG: 200 TABLET ORAL at 07:56

## 2021-03-07 RX ADMIN — CLONAZEPAM 0.5 MG: 1 TABLET ORAL at 04:42

## 2021-03-07 RX ADMIN — FLUTICASONE PROPIONATE 1 SPRAY: 50 SPRAY, METERED NASAL at 09:28

## 2021-03-07 RX ADMIN — IPRATROPIUM BROMIDE AND ALBUTEROL SULFATE 1 AMPULE: 2.5; .5 SOLUTION RESPIRATORY (INHALATION) at 18:51

## 2021-03-07 RX ADMIN — IRON SUCROSE 200 MG: 20 INJECTION, SOLUTION INTRAVENOUS at 18:20

## 2021-03-07 RX ADMIN — CETIRIZINE HYDROCHLORIDE 10 MG: 10 TABLET, FILM COATED ORAL at 07:56

## 2021-03-07 RX ADMIN — OXYCODONE HYDROCHLORIDE AND ACETAMINOPHEN 1000 MG: 500 TABLET ORAL at 07:56

## 2021-03-07 RX ADMIN — LEVOTHYROXINE SODIUM 125 MCG: 125 TABLET ORAL at 06:23

## 2021-03-07 RX ADMIN — METOPROLOL TARTRATE 25 MG: 25 TABLET, FILM COATED ORAL at 20:20

## 2021-03-07 RX ADMIN — BUSPIRONE HYDROCHLORIDE 10 MG: 10 TABLET ORAL at 20:20

## 2021-03-07 RX ADMIN — DULOXETINE 60 MG: 60 CAPSULE, DELAYED RELEASE ORAL at 07:55

## 2021-03-07 RX ADMIN — FUROSEMIDE 20 MG: 20 TABLET ORAL at 07:56

## 2021-03-07 RX ADMIN — METOPROLOL TARTRATE 25 MG: 25 TABLET, FILM COATED ORAL at 07:55

## 2021-03-07 RX ADMIN — BACLOFEN 10 MG: 10 TABLET ORAL at 20:20

## 2021-03-07 ASSESSMENT — PAIN DESCRIPTION - LOCATION: LOCATION: ANKLE

## 2021-03-07 ASSESSMENT — PAIN SCALES - GENERAL
PAINLEVEL_OUTOF10: 6
PAINLEVEL_OUTOF10: 4

## 2021-03-07 NOTE — PROGRESS NOTES
FAMILY MEDICINE  - PROGRESS NOTE    Date:  3/7/2021  Mechelle Obrien  [id number]      Chief Complaint   Patient presents with    Fall         Interval History:  not changed, patient sleeping soundly. No significant events overnight. Awaiting for the SNF to obtain a BiPap for the patient to use before she is discharged.       Subjective  Constitutional: positive for fatigue and overweight  Eyes: positive for contacts/glasses  Ears, nose, mouth, throat, and face: negative  Respiratory: positive for emphysema and shortness of breath  Cardiovascular: positive for irregular heart beat  Gastrointestinal: negative  Musculoskeletal:positive for arthralgias, muscle weakness and myalgias  Neurological: negative  Behavioral/Psych: positive for anxiety  Endocrine: negative:    Objective:    BP (!) 117/55   Pulse 96   Temp 97.5 °F (36.4 °C) (Oral)   Resp 22   Ht 5' 8\" (1.727 m)   Wt 167 lb 15.9 oz (76.2 kg)   SpO2 94%   BMI 25.54 kg/m²   General appearance - overweight  Mental status - drowsy  Eyes - not examined  Ears - bilateral TM's and external ear canals normal  Nose - normal and patent, no erythema, discharge or polyps  Mouth - mucous membranes moist, pharynx normal without lesions  Neck - supple, no significant adenopathy  Lymphatics - no palpable lymphadenopathy, no hepatosplenomegaly  Chest - clear to auscultation, no wheezes, rales or rhonchi, symmetric air entry, decreased air entry noted posteriorly  Heart - irregularly irregular rhythm with rate 96  Abdomen - soft, nontender, nondistended, no masses or organomegaly  Breasts - not examined  Back exam - not examined  Neurological - neck supple without rigidity  Musculoskeletal - osteoarthritic changes noted in both hands  Extremities - peripheral pulses normal, no pedal edema, no clubbing or cyanosis  Skin - normal coloration and turgor, no rashes, no suspicious skin lesions noted    Data:   Medications: Current Facility-Administered Medications   Medication Dose Route Frequency Provider Last Rate Last Admin    phenol 1.4 % mouth spray 1 spray  1 spray Mouth/Throat Q2H PRN WILLIE Duffy CNP        cetirizine (ZYRTEC) tablet 10 mg  10 mg Oral Daily WILLIE Duffy CNP   10 mg at 03/07/21 0756    potassium chloride (KLOR-CON M) extended release tablet 40 mEq  40 mEq Oral PRN Naheed Scott MD   40 mEq at 03/03/21 1026    Or    potassium bicarb-citric acid (EFFER-K) effervescent tablet 40 mEq  40 mEq Oral PRN Naheed Scott MD        Or    potassium chloride 10 mEq/100 mL IVPB (Peripheral Line)  10 mEq Intravenous PRN Naheed Scott MD        furosemide (LASIX) tablet 20 mg  20 mg Oral Daily Rosealee Half Da MD Tory   20 mg at 03/07/21 0756    pantoprazole (PROTONIX) tablet 40 mg  40 mg Oral QAM AC WILLIE Siddiqui NP   40 mg at 03/07/21 9473    modafinil (PROVIGIL) tablet 200 mg  200 mg Oral Daily WILLIE Price CNP   200 mg at 03/07/21 0756    iron sucrose (VENOFER) 200 mg in sodium chloride 0.9 % 100 mL IVPB  200 mg Intravenous Q24H Tabatha Meza MD   Stopped at 03/06/21 1928    ferrous sulfate (IRON 325) tablet 325 mg  325 mg Oral TID  Naheed Scott MD   325 mg at 03/07/21 0755    HYDROcodone-acetaminophen (NORCO) 5-325 MG per tablet 1 tablet  1 tablet Oral Q6H PRN Naheed Scott MD   1 tablet at 03/07/21 0023    ipratropium-albuterol (DUONEB) nebulizer solution 1 ampule  1 ampule Inhalation Q4H WA WILLIE Price CNP   1 ampule at 03/06/21 1849    ascorbic acid (VITAMIN C) tablet 1,000 mg  1,000 mg Oral Daily Travon Davis MD   1,000 mg at 03/07/21 0756    atorvastatin (LIPITOR) tablet 10 mg  10 mg Oral Daily Travon Davis MD   10 mg at 03/07/21 0755    baclofen (LIORESAL) tablet 10 mg  10 mg Oral TID PRN Travon Davis MD   10 mg at 03/07/21 0442    busPIRone (BUSPAR) tablet 10 mg  10 mg Oral TID Cachorro Flores Neelima Buck MD   10 mg at 03/07/21 0755    clonazePAM (KLONOPIN) tablet 0.5 mg  0.5 mg Oral TID PRN Devyn Gordon MD   0.5 mg at 03/07/21 0442    dilTIAZem (CARDIZEM CD) extended release capsule 120 mg  120 mg Oral Daily Devyn Gordon MD   120 mg at 03/07/21 0755    DULoxetine (CYMBALTA) extended release capsule 60 mg  60 mg Oral Daily Devyn Gordon MD   60 mg at 03/07/21 0755    fluticasone (FLONASE) 50 MCG/ACT nasal spray 1 spray  1 spray Each Nostril Daily Devyn Gordon MD   1 spray at 03/05/21 0913    fluticasone (FLOVENT HFA) 220 MCG/ACT inhaler 2 puff  2 puff Inhalation BID Devyn Gordon MD   2 puff at 03/06/21 2035    guaiFENesin [facility]) extended release tablet 1,200 mg  1,200 mg Oral Q6H PRN Devyn Gordon MD   1,200 mg at 03/06/21 2035    levothyroxine (SYNTHROID) tablet 125 mcg  125 mcg Oral Daily Devyn Gordon MD   125 mcg at 03/07/21 3578    lisinopril (PRINIVIL;ZESTRIL) tablet 2.5 mg  2.5 mg Oral Lunch Brenda Whitaker MD   2.5 mg at 03/06/21 1431    metoprolol tartrate (LOPRESSOR) tablet 25 mg  25 mg Oral BID Devyn Gordon MD   25 mg at 03/07/21 0755    sodium chloride flush 0.9 % injection 10 mL  10 mL Intravenous 2 times per day Devyn Gordon MD   10 mL at 03/06/21 2034    sodium chloride flush 0.9 % injection 10 mL  10 mL Intravenous PRN Devyn Gordon MD        promethazine (PHENERGAN) tablet 12.5 mg  12.5 mg Oral Q6H PRN Devyn Gordon MD        Or    ondansetron [facility]) injection 4 mg  4 mg Intravenous Q6H PRN Devyn Gordon MD        acetaminophen (TYLENOL) tablet 650 mg  650 mg Oral Q6H PRN Devyn Gordon MD   650 mg at 02/25/21 2109    Or    acetaminophen (TYLENOL) suppository 650 mg  650 mg Rectal Q6H PRN Devyn Gordon MD        0.9 % sodium chloride infusion   Intravenous PRN Naida Greenberg DO           Intake/Output Summary (Last 24 hours) at 3/7/2021 0813  Last data filed at 3/7/2021 0628  Gross per 24 hour   Intake 120 ml   Output 900 ml   Net -780 ml     Recent Results (from the past 24 hour(s))   Basic Metabolic Panel    Collection Time: 03/07/21  5:27 AM   Result Value Ref Range    Glucose 97 70 - 99 mg/dL    BUN 21 8 - 23 mg/dL    CREATININE 0.80 0.50 - 0.90 mg/dL    Bun/Cre Ratio NOT REPORTED 9 - 20    Calcium 8.8 8.6 - 10.4 mg/dL    Sodium 143 135 - 144 mmol/L    Potassium 4.3 3.7 - 5.3 mmol/L    Chloride 96 (L) 98 - 107 mmol/L    CO2 42 (HH) 20 - 31 mmol/L    Anion Gap 5 (L) 9 - 17 mmol/L    GFR Non-African American >60 >60 mL/min    GFR African American >60 >60 mL/min    GFR Comment          GFR Staging NOT REPORTED    CBC Auto Differential    Collection Time: 03/07/21  5:27 AM   Result Value Ref Range    WBC 10.6 3.5 - 11.0 k/uL    RBC 2.74 (L) 4.0 - 5.2 m/uL    Hemoglobin 8.9 (L) 12.0 - 16.0 g/dL    Hematocrit 28.8 (L) 36 - 46 %    .1 (H) 80 - 100 fL    MCH 32.4 26 - 34 pg    MCHC 30.8 (L) 31 - 37 g/dL    RDW 21.8 (H) 11.5 - 14.9 %    Platelets 088 090 - 051 k/uL    MPV 8.6 6.0 - 12.0 fL    NRBC Automated NOT REPORTED per 100 WBC    Differential Type NOT REPORTED     Immature Granulocytes NOT REPORTED 0 %    Absolute Immature Granulocyte NOT REPORTED 0.00 - 0.30 k/uL    WBC Morphology NOT REPORTED     RBC Morphology NOT REPORTED     Platelet Estimate NOT REPORTED     Seg Neutrophils 79 (H) 36 - 66 %    Lymphocytes 7 (L) 24 - 44 %    Monocytes 11 (H) 1 - 7 %    Eosinophils % 2 0 - 4 %    Basophils 1 0 - 2 %    Segs Absolute 8.37 1.3 - 9.1 k/uL    Absolute Lymph # 0.74 (L) 1.0 - 4.8 k/uL    Absolute Mono # 1.17 0.1 - 1.3 k/uL    Absolute Eos # 0.21 0.0 - 0.4 k/uL    Basophils Absolute 0.11 0.0 - 0.2 k/uL    Morphology ANISOCYTOSIS PRESENT     Morphology MACROCYTOSIS PRESENT     Morphology 1+ POLYCHROMASIA      -----------------------------------------------------------------  RAD:  EKG:  Micro:     Assessment & Plan:    Patient Active Problem List:     Acquired hypothyroidism     Hypertension     Primary osteoarthritis of right knee     A-fib (HCC)     Acute encephalopathy     SIRS (systemic inflammatory response syndrome) (HCC)     Normocytic anemia     Pulmonary hypertension (HCC)     COPD (chronic obstructive pulmonary disease) (HCC)     History of GI bleed     Constipation     HA (generalized anxiety disorder)     Lumbar radiculopathy     Lumbosacral spondylosis without myelopathy     Benign hypertension with CKD (chronic kidney disease) stage III     CKD (chronic kidney disease) stage 3, GFR 30-59 ml/min     Alcohol withdrawal syndrome with complication (HCC)     Moderate major depression (HCC)     PAD (peripheral artery disease) (HCC)     Acute kidney injury ([address]     Obesity, Class I, BMI 30-34.9     Chronic diastolic CHF (congestive heart failure) (HCC)     GI bleed     Weight loss     Elevated alkaline phosphatase level     Thrombocytosis (HCC)     Iron deficiency anemia due to chronic blood loss     Iron malabsorption     Symptomatic anemia     Pneumonia due to organism     Anemia     Severe anemia     Overweight (BMI 25.0-29. 9)     Severe malnutrition ([address]     Renal failure     Acute renal failure (ARF) (HCC)     Acute cystitis without hematuria     Hypoxia     Acute respiratory failure with hypoxia (HCC)     Hypernatremia           Plan:  -Chronic respiratory failure with COPD - stable on O2 per NC and BiPap, Pulmonology managing.  -Recurrent iron deficiency anemia - hgb stable, Hematology following.  -A. Fib. - rate controlled.  -Okay to D/C to SNF once they obtain a BiPap.  -Continue current treatments.  -Complete orders per chart.     See orders   Disposition:    Electronically signed by Garret Fuentes MD on 3/7/2021 at 8:13 AM

## 2021-03-07 NOTE — CARE COORDINATION
DISCHARGE PLANNING NOTE:      LSW is following for patient to discharge to [facility]. Call report to [phone]. They can accept patient when ready. Facility is waiting on delivery of bipap. Katina is rep for bipap and can be reached at [phone]. DALIA IS SIGNED AND COMPLETED. Will continue to follow.

## 2021-03-07 NOTE — PLAN OF CARE
Problem: Falls - Risk of:  Goal: Will remain free from falls  Description: Will remain free from falls  3/7/2021 1635 by Kalli Gomez RN  Outcome: MET  Pt very high fall risk. Frequently attempts to get up     Problem: DAILY CARE  Goal: Daily care needs are met  3/7/2021 1635 by Kalli Gomez RN  Outcome: Ongoing     Problem: PAIN  Goal: Patient's pain/discomfort is manageable  3/7/2021 1635 by Kalli Gomez RN  Outcome: Ongoing   Assessed on rounds. Non pharm and pharm utilized. Problem: SAFETY  Goal: Free from accidental physical injury  3/7/2021 1635 by Kalli Gomez RN  Outcome: Ongoing   Pt poor safety awareness. Disoriented. Needs frequent reorienting. Alarm on. Pt high fall risk. Problem: SKIN INTEGRITY  Goal: Skin integrity is maintained or improved  3/7/2021 1635 by Kalli Gomez RN  Outcome: Ongoing   Head to toe charted. No new signs of breakdown    Problem: DISCHARGE BARRIERS  Goal: Patient's continuum of care needs are met  3/7/2021 1635 by Kalli Gomez RN  Outcome: Ongoing   Awaiting cpap to be available at facility to d/c. Awaiting pulm ok to d/c . Problem: Nutrition  Goal: Optimal nutrition therapy  Description: Nutrition Problem #1: Inadequate oral intake  Intervention: Food and/or Nutrient Delivery: Continue Current Diet, Start Oral Nutrition Supplement  Nutritional Goals: po intake greater than 50%     3/7/2021 1635 by Kalli Gomez RN  Outcome: Ongoing   Encourage intake of meals. Pt intake poor today d/t lethargy. Problem: Pain:  Goal: Pain level will decrease  Description: Pain level will decrease  3/7/2021 1635 by Kalli Gomez RN  Outcome: Ongoing   Assessed on rounds. Non pharm and meds utilized. Problem: Gas Exchange - Impaired  Goal: Absence of hypoxia  3/7/2021 1635 by Kalli Gomez RN  Outcome: Ongoing   Pt on bpap during sleep. Pt extra lethargic d/t refused to wear most of the night. Pt wearing bpap during day naps.

## 2021-03-07 NOTE — PROGRESS NOTES
Department of Internal Medicine  Nephrology [facility], MD   Consult Note      SUBJECTIVE: This is a 70 y.o. female with a significant past medical history of Chronic atrial fibrillation, pulmonary hypertension, chronic recurrent anemia [s/p recent endoscopy showing AVMs requiring intermittent PRBC transfusions], COPD and chronic kidney disease stage II, who presented to the emergency department after falling at home. Laboratory studies revealed severe anemia with hemoglobin 6.8 g/dL and serum creatinine was 1.25 mg/dL. Nephrology consultation was called yesterday due to low urine output. Patient was given albumin and Lasix with prompt improvement in urine output. She feels well today and does not have shortness of breath or chest pain. Interval history:  Patient seen and examined,  Patient lethargic, responsive to painful stimuli on BIPAP  Noted to be hypoxic  Nonoliguric   Received IV Lasix today.           Scheduled Meds:   cetirizine  10 mg Oral Daily    furosemide  20 mg Oral Daily    pantoprazole  40 mg Oral QAM AC    modafinil  200 mg Oral Daily    iron sucrose  200 mg Intravenous Q24H    ferrous sulfate  325 mg Oral TID WC    ipratropium-albuterol  1 ampule Inhalation Q4H WA    ascorbic acid  1,000 mg Oral Daily    atorvastatin  10 mg Oral Daily    busPIRone  10 mg Oral TID    dilTIAZem  120 mg Oral Daily    DULoxetine  60 mg Oral Daily    fluticasone  1 spray Each Nostril Daily    fluticasone  2 puff Inhalation BID    levothyroxine  125 mcg Oral Daily    lisinopril  2.5 mg Oral Lunch    metoprolol tartrate  25 mg Oral BID    sodium chloride flush  10 mL Intravenous 2 times per day     Continuous Infusions:   sodium chloride       PRN Meds:.phenol, potassium chloride **OR** potassium alternative oral replacement **OR** potassium chloride, HYDROcodone-acetaminophen, baclofen, clonazePAM, guaiFENesin, sodium chloride flush, promethazine **OR** ondansetron, acetaminophen **OR** acetaminophen, sodium chloride    Family History   Problem Relation Age of Onset    Heart Disease Mother     COPD Mother     Emphysema Sister     Cancer Other         Cousin - breast cancer        Social History     Socioeconomic History    Marital status:      Spouse name: None    Number of children: None    Years of education: None    Highest education level: None   Occupational History    None   Social Needs    Financial resource strain: None    Food insecurity     Worry: None     Inability: None    Transportation needs     Medical: None     Non-medical: None   Tobacco Use    Smoking status: Former Smoker     Packs/day: 3.00     Years: 26.00     Pack years: 78.00     Types: Cigarettes    Smokeless tobacco: Never Used   Substance and Sexual Activity    Alcohol use: Not Currently     Comment: occ    Drug use: No    Sexual activity: None   Lifestyle    Physical activity     Days per week: None     Minutes per session: None    Stress: None   Relationships    Social connections     Talks on phone: None     Gets together: None     Attends Christianity service: None     Active member of club or organization: None     Attends meetings of clubs or organizations: None     Relationship status: None    Intimate partner violence     Fear of current or ex partner: None     Emotionally abused: None     Physically abused: None     Forced sexual activity: None   Other Topics Concern    None   Social History Narrative    None     Review of systems: CNS - no headache or dizziness; Cardiac - no chest pain; Respiratory - no cough or shortness of breath; Gastrointestinal - Black tarry stools; no nausea, vomiting or diarrhea; Musculoskeletal - general body aches; Skin/Integument - no rashes.     Physical Exam:    VITALS:  BP (!) 100/56   Pulse 88   Temp 97.7 °F (36.5 °C) (Axillary)   Resp 20   Ht 5' 8\" (1.727 m)   Wt 167 lb 15.9 oz (76.2 kg)   SpO2 94%   BMI 25.54 kg/m²   24HR INTAKE/OUTPUT: Intake/Output Summary (Last 24 hours) at 3/7/2021 1430  Last data filed at 3/7/2021 9979  Gross per 24 hour   Intake    Output 900 ml   Net -900 ml     Constitutional: alert, appears stated age and cooperative    Skin: Skin color, texture, turgor normal. No rashes or lesions    Head: Normocephalic, without obvious abnormality, atraumatic     Cardiovascular/Edema: regular rate and rhythm, S1, S2 normal, no murmur, click, rub or gallop    Respiratory: Lungs: clear to auscultation bilaterally    Abdomen: soft, non-tender; bowel sounds normal; no masses,  no organomegaly    Back: symmetric, no curvature. ROM normal. No CVA tenderness.     Extremities: extremities normal, atraumatic, no cyanosis or edema    Neuro:  Grossly normal    CBC:   Recent Labs     03/05/21 0447 03/06/21  0549 03/07/21  0527   WBC 7.7 9.4 10.6   HGB 8.6* 8.4* 8.9*    301 336     BMP:    Recent Labs     03/05/21 0447 03/06/21  0549 03/07/21  0527   * 145* 143   K 3.8 3.8 4.3   CL 93* 94* 96*   CO2 >45* >45* 42*   BUN 13 20 21   CREATININE 0.69 0.82 0.80   GLUCOSE 101* 105* 97       Lab Results   Component Value Date    NITRU NEGATIVE 02/22/2021    COLORU YELLOW 02/22/2021    PHUR 5.5 02/22/2021    WBCUA 2 TO 5 02/21/2021    RBCUA 0 TO 2 02/21/2021    MUCUS NOT REPORTED 02/21/2021    TRICHOMONAS NOT REPORTED 02/21/2021    YEAST NOT REPORTED 02/21/2021    BACTERIA FEW 02/21/2021    CLARITYU Clear 01/31/2020    SPECGRAV 1.014 02/22/2021    LEUKOCYTESUR NEGATIVE 02/22/2021    UROBILINOGEN Normal 02/22/2021    BILIRUBINUR NEGATIVE 02/22/2021    BILIRUBINUR 0 01/31/2020    BLOODU Negative 01/31/2020    GLUCOSEU NEGATIVE 02/22/2021    KETUA NEGATIVE 02/22/2021    AMORPHOUS 1+ 02/21/2021     Urine Sodium:     Lab Results   Component Value Date    ANGÉLICA 82 02/23/2021     Urine Potassium:    Lab Results   Component Value Date    KUR 35.7 07/20/2020     Urine Chloride:    Lab Results   Component Value Date    CLUR 113 02/23/2021     Urine Creatinine:     Lab Results   Component Value Date    LABCREA 137.9 02/11/2021    LABCREA 134.5 02/11/2021     IMPRESSION/RECOMMENDATIONS:     1. Chronic kidney disease stage 2- renal function is stable. 2. Hypernatremia -  Oral Free water intake up to 1.5 L/day    3. Acute on chronic anemia. Hemoglobin 8.9 g and stable      4. Acute on  chronic CHF with preserved ejection fraction-  1500 mls fluid restriction    5. Left   Pleural effusion-status post thoracentesis 1 L. On 2/25/21-transudative fluid    6. Hypercapnic respiratory failure-pulmonary following     hypercapnic respiratory failure with metabolic compensation, plus metabolic alkalosis-contraction alkalosis    Plan  continue oral lasix, IV Lasix given today  Strict I's and Niharika Mcmanus MD   Attending Nephrologist  3/7/2021 2:30 PM

## 2021-03-07 NOTE — PLAN OF CARE
Problem: Falls - Risk of:  Goal: Will remain free from falls  Description: Will remain free from falls  3/7/2021 0640 by Adan Vasquez RN  Outcome: Ongoing     Problem: SAFETY  Goal: Free from accidental physical injury  3/7/2021 0640 by Adan Vasquez RN  Outcome: Ongoing     Problem: DAILY CARE  Goal: Daily care needs are met  3/7/2021 0640 by Adan Vasquez RN  Outcome: Ongoing     Problem: PAIN  Goal: Patient's pain/discomfort is manageable  3/7/2021 0640 by Adan Vasquez RN  Outcome: Ongoing     Problem: SKIN INTEGRITY  Goal: Skin integrity is maintained or improved  3/7/2021 0640 by Adan Vasquez RN  Outcome: Ongoing     Problem: KNOWLEDGE DEFICIT  Goal: Patient/S.O. demonstrates understanding of disease process, treatment plan, medications, and discharge instructions.   3/7/2021 0640 by Adan Vasquez RN  Outcome: Ongoing     Problem: DISCHARGE BARRIERS  Goal: Patient's continuum of care needs are met  3/7/2021 0640 by Adan Vasquez RN  Outcome: Ongoing     Problem: Nutrition  Goal: Optimal nutrition therapy  Description: Nutrition Problem #1: Inadequate oral intake  Intervention: Food and/or Nutrient Delivery: Continue Current Diet, Start Oral Nutrition Supplement  Nutritional Goals: po intake greater than 50%     3/7/2021 0640 by Adan Vasquez RN  Outcome: Ongoing     Problem: Pain:  Goal: Pain level will decrease  Description: Pain level will decrease  3/7/2021 0640 by Adan Vasquez RN  Outcome: Ongoing     Problem: Pain:  Goal: Control of acute pain  Description: Control of acute pain  3/7/2021 0640 by Adan Vasquez RN  Outcome: Ongoing     Problem: Pain:  Goal: Control of chronic pain  Description: Control of chronic pain  3/7/2021 0640 by Adan Vasquez RN  Outcome: Ongoing     Problem: Skin Integrity:  Goal: Will show no infection signs and symptoms  Description: Will show no infection signs and symptoms  3/7/2021 0640 by Adan Vasquez RN  Outcome: Ongoing     Problem: Skin Integrity:  Goal: Absence of new skin breakdown  Description: Absence of new skin breakdown  3/7/2021 0640 by Altagracia Briscoe RN  Outcome: Ongoing     Problem: Airway Clearance - Ineffective  Goal: Achieve or maintain patent airway  3/7/2021 0640 by Altagracia Briscoe RN  Outcome: Ongoing     Problem: Gas Exchange - Impaired  Goal: Absence of hypoxia  3/7/2021 0640 by Altagracia Briscoe RN  Outcome: Ongoing     Problem: Gas Exchange - Impaired  Goal: Promote optimal lung function  3/7/2021 0640 by Altagracia Briscoe RN  Outcome: Ongoing     Problem: Breathing Pattern - Ineffective  Goal: Ability to achieve and maintain a regular respiratory rate  3/7/2021 0640 by Altagracia Briscoe RN  Outcome: Ongoing     Problem: Body Temperature -  Risk of, Imbalanced  Goal: Ability to maintain a body temperature within defined limits  3/7/2021 0640 by Altagracia Briscoe RN  Outcome: Ongoing     Problem: Body Temperature -  Risk of, Imbalanced  Goal: Will regain or maintain usual level of consciousness  3/7/2021 0640 by Altagrcaia Briscoe RN  Outcome: Ongoing     Problem:  Body Temperature -  Risk of, Imbalanced  Goal: Complications related to the disease process, condition or treatment will be avoided or minimized  3/7/2021 0640 by Altagracia Briscoe RN  Outcome: Ongoing     Problem: Isolation Precautions - Risk of Spread of Infection  Goal: Prevent transmission of infection  3/7/2021 0640 by Altagracia Briscoe RN  Outcome: Ongoing     Problem: Nutrition Deficits  Goal: Optimize nutritional status  3/7/2021 0640 by Altagracia Briscoe RN  Outcome: Ongoing     Problem: Risk for Fluid Volume Deficit  Goal: Maintain normal heart rhythm  3/7/2021 0640 by Altagracia Briscoe RN  Outcome: Ongoing     Problem: Risk for Fluid Volume Deficit  Goal: Maintain absence of muscle cramping  3/7/2021 0640 by Altagracia Briscoe RN  Outcome: Ongoing     Problem: Risk for Fluid Volume Deficit  Goal: Maintain normal serum potassium, sodium, calcium, phosphorus, and pH  3/7/2021 0640 by Eddie Gooden RN  Outcome: Ongoing     Problem: Fatigue  Goal: Verbalize increase energy and improved vitality  3/7/2021 0640 by Eddie Gooden RN  Outcome: Ongoing     Problem: Patient Education: Go to Patient Education Activity  Goal: Patient/Family Education  3/7/2021 0640 by Eddie Gooden RN  Outcome: Ongoing     Problem: Breathing Pattern - Ineffective:  Goal: Ability to achieve and maintain a regular respiratory rate will improve  Description: Ability to achieve and maintain a regular respiratory rate will improve  3/7/2021 0640 by Eddie Gooden RN  Outcome: Ongoing

## 2021-03-07 NOTE — PROGRESS NOTES
Today's Date: 3/7/2021  Patient Name: Fani Jaime  Date of admission: 2/21/2021  9:13 AM  Patient's age: 70 y.o., 1945  Admission Dx: GI bleed [K92.2]    Reason for Consult: management recommendations  Requesting Physician: Claire Hurtado MD    CHIEF COMPLAINT:  Recurrent anemia   SUBJECTIVE:  . The patient was seen and examined. resp distress. She is on CPAP. No recent active bleeding. No new events. BRIEF CASE HISTORY:    The patient is a 70 y.o.  female who is admitted to the hospital for worsening respiratory status. She is known to us with history of iron deficiency anemia requiring IV iron and transfusions. She has AV malformation and her anemia has been exacerbated because of anticoagulation. We have stopped her Eliquis because of recurrent GI bleeding. Indication for anticoagulation is atrial fibrillation. The patient is admitted and underwent thoracocentesis. Condition is slowly improving. It is thought that her pleural effusion is transudative and related to COPD/cardiac dysfunction. DIAGNOSIS:   1. Iron deficiency anemia, malabsorption component   2. AV malformation  3. COPD  4. Anticoagulation due to afib, stopped due to recurrent GI bleeding       CURRENT THERAPY:  Plan for work up and IV iron    BRIEF CASE HISTORY:   Kal Martinez is a very pleasant 70 y.o. female who is referred to us for iron deficiency anemia. She reports she has been taking oral iron. She had knee replacement 3 years ago and required 3 units of blood prior to surgery. She was stable until 02/2020 when her HGB began to decrease again. She has had frequent hospitalizations in 2020 with COPD exacerbation and poor kidney function with bruised kidney. She has followed with nephrology in the past but no recent follow up and has pulmonology referral. She has had some compliance issues with showing up for appointments. She has poor sleep with breathing issues and feels very fatigued.  She has had several falls this year and has needed family to come pick her up, her legs are weak. She reports she had some bleeding following EGD and colonoscopy, AV malformation was found. She is on Eliquis for Afib but has not followed up with cardiology in the last year. Past Medical History:   has a past medical history of A-fib ([facility] [address] Acquired hypothyroidism, Acute encephalopathy, Acute kidney injury ([address] Anxiety, Benign hypertension with CKD (chronic kidney disease) stage III, Chronic back pain, CKD (chronic kidney disease) stage 3, GFR 30-59 ml/min, Constipation, COPD (chronic obstructive pulmonary disease) ([address] Cough, Depression, ETOH abuse, Former smoker, HA (generalized anxiety disorder), History of GI bleed, Hyperlipidemia, Hypertension, Hypothyroid, Leg pain, Normocytic anemia, Osteoarthritis, PAD (peripheral artery disease) ([facility] [address] Primary osteoarthritis, Pulmonary hypertension ([facility] [address] Pure hypercholesterolemia, SIRS (systemic inflammatory response syndrome) ([facility] [address] Urge incontinence, and Wheezing. Past Surgical History:   has a past surgical history that includes eye surgery (Bilateral); Colonoscopy; joint replacement; Upper gastrointestinal endoscopy (N/A, 12/2/2020); Colonoscopy (N/A, 12/2/2020); Upper gastrointestinal endoscopy (N/A, 12/23/2020); and Thoracentesis (12/26/2020). Medications:    Reviewed in Epic     Allergies:  Tizanidine    Social History:   reports that she has quit smoking. Her smoking use included cigarettes. She has a 78.00 pack-year smoking history. She has never used smokeless tobacco. She reports previous alcohol use. She reports that she does not use drugs. Family History: family history includes COPD in her mother; Cancer in an other family member; Emphysema in her sister; Heart Disease in her mother. REVIEW OF SYSTEMS:    Constitutional: No fever or chills.  No night sweats, no weight loss   Eyes: No eye discharge, double vision, or eye pain   HEENT: negative for sore mouth, sore throat, hoarseness and voice change   Respiratory: as above  Cardiovascular: negative for chest pain, dyspnea, palpitations, orthopnea, PND   Gastrointestinal: negative for nausea, vomiting, diarrhea, constipation, abdominal pain, Dysphagia, hematemesis and hematochezia   Genitourinary: negative for frequency, dysuria, nocturia, urinary incontinence, and hematuria   Integument: negative for rash, skin lesions, bruises. Hematologic/Lymphatic: negative for easy bruising, bleeding, lymphadenopathy, or petechiae   Endocrine: negative for heat or cold intolerance,weight changes, change in bowel habits and hair loss   Musculoskeletal: negative for myalgias, arthralgias, pain, joint swelling,and bone pain   Neurological: negative for headaches, dizziness, seizures, weakness, numbness    PHYSICAL EXAM:      BP (!) 100/56   Pulse 88   Temp 97.7 °F (36.5 °C) (Axillary)   Resp 20   Ht 5' 8\" (1.727 m)   Wt 167 lb 15.9 oz (76.2 kg)   SpO2 94%   BMI 25.54 kg/m²    Temp (24hrs), Av.8 °F (36.6 °C), Min:97.5 °F (36.4 °C), Max:98.2 °F (36.8 °C)    General appearance - ill appearing, no in pain. CPAP  Mental status -sleeping but arousable.   Eyes - pupils equal and reactive, extraocular eye movements intact   Ears - bilateral TM's and external ear canals normal   Mouth - mucous membranes moist, pharynx normal without lesions   Neck - supple, no significant adenopathy   Lymphatics - no palpable lymphadenopathy, no hepatosplenomegaly   Chest - clear to auscultation, no wheezes, rales or rhonchi, symmetric air entry   Heart - normal rate, regular rhythm, normal S1, S2, no murmurs  Abdomen - soft, nontender, nondistended, no masses or organomegaly   Neurological - alert, oriented, normal speech, no focal findings or movement disorder noted   Musculoskeletal - no joint tenderness, deformity or swelling   Extremities - peripheral pulses normal, no pedal edema, no clubbing or cyanosis   Skin - normal coloration and turgor, no rashes, no suspicious skin lesions noted ,    DATA:    Labs:   CBC:   Recent Labs     03/06/21  0549 03/07/21  0527   WBC 9.4 10.6   HGB 8.4* 8.9*   HCT 26.6* 28.8*    336     BMP:   Recent Labs     03/06/21  0549 03/07/21  0527   * 143   K 3.8 4.3   CO2 >45* 42*   BUN 20 21   CREATININE 0.82 0.80   LABGLOM >60 >60   GLUCOSE 105* 97     PT/INR:   No results for input(s): PROTIME, INR in the last 72 hours. IMAGING DATA:      Primary Problem  GI bleed    Active Hospital Problems    Diagnosis Date Noted    Hypernatremia [E87.0] 02/24/2021    Acute respiratory failure with hypoxia ([facility] [J96.01] 02/23/2021    Severe malnutrition ([facility] [address] [E43] 01/20/2021    Iron deficiency anemia due to chronic blood loss [D50.0] 12/18/2020    GI bleed [K92.2] 11/30/2020    Chronic diastolic CHF (congestive heart failure) (HCC) [I50.32] 07/21/2020    Moderate major depression (HCC) [F32.1] 06/17/2020    PAD (peripheral artery disease) (HCC) [I73.9] 06/17/2020    CKD (chronic kidney disease) stage 3, GFR 30-59 ml/min [N18.30]     HA (generalized anxiety disorder) [F41.1] 12/13/2018    A-fib ([facility] [I48.91] 09/13/2018    COPD (chronic obstructive pulmonary disease) ([facility] [J44.9] 09/13/2018    Pulmonary hypertension ([facility] [I27.20] 09/13/2018    Hypertension [I10]     Acquired hypothyroidism [E03.9] 01/18/2013         IMPRESSION:   1. Recurrent Iron def anemia   2. Anticoagulation (due to afib) stopped  3. 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 Acute respiratory failure  5. History of CHF and COPD    RECOMMENDATIONS:  1. The patient hemoglobin is stable, no drop and no active bleeding  2. resp distress. Omn CPAP  3.  Labs will be monitored                             [facility]                            This note is created with the assistance of a speech recognition program.  While intending to generate a document that actually reflects the content of the visit, the document can still have some errors including those of syntax and sound a like substitutions which may escape proof reading. It such instances, actual meaning can be extrapolated by contextual diversion.

## 2021-03-07 NOTE — PROGRESS NOTES
Pulmonary Critical Care Progress Note  Ruddy Marin MD     Patient seen for the follow up of acute hypoxic respiratory failure, COPD, bilateral pleural effusions, atelectasis, pulmonary hypertension, chronic kidney disease    Subjective:  Patient is BiPAP last night. This morning she is on oxygen by nasal cannula. She looks slightly short of breath more than yesterday. She does have dry cough. She denies any chest pain. She is tolerating orals. Examination:  Vitals: BP (!) 117/55   Pulse 96   Temp 97.5 °F (36.4 °C) (Oral)   Resp 22   Ht 5' 8\" (1.727 m)   Wt 167 lb 15.9 oz (76.2 kg)   SpO2 94%   BMI 25.54 kg/m²   General appearance:  alert and cooperative with exam, EOMI  Neck: No JVD  Lungs: Moderate air exchange, decreased breath sounds at bases with occasional crackles  Heart: regular rate and rhythm, S1, S2 normal, no gallop  Abdomen: Soft, non tender, + BS  Extremities: no cyanosis or clubbing.  No significant edema, no skin rash or petechiae    LABs:  CBC:   Recent Labs     03/06/21  0549 03/07/21  0527   WBC 9.4 10.6   HGB 8.4* 8.9*   HCT 26.6* 28.8*    336     BMP:   Recent Labs     03/06/21  0549 03/07/21  0527   * 143   K 3.8 4.3   CO2 >45* 42*   BUN 20 21   CREATININE 0.82 0.80   LABGLOM >60 >60   GLUCOSE 105* 97       Lab Results   Component Value Date    PHART 7.353 03/01/2021    TZO6ZHO 92.4 03/01/2021    PO2ART 52.3 03/01/2021    LBE1AYX 51.4 03/01/2021    NBEA NOT REPORTED 03/01/2021    PBEA 25.8 03/01/2021    M2ZNZOMS 84.6 03/01/2021    FIO2 NOT REPORTED 03/01/2021     Radiology:  3/1/21:  Bilateral pleural effusion with vascular congestion    X-ray chest: 3/6/2021  Worsening pulmonary edema/effusions      Impression:  · Bilateral pleural effusion  · Pulmonary edema  · Basilar atelectasis  · Acute hypoxic respiratory failure  · COPD  · Chronic kidney disease  · Mild pulmonary hypertension, most likely secondary by echo done on 7/6/2020    Recommendations:  · Albuterol and Ipratropium Q 4 hours and prn  · Flovent HFA  · Modafinil 200 mg daily  · Labs: CBC and BMP in am  · BiPAP now and while asleep.   · IV Lasix x1 and continue oral diuresis  · Consider thoracentesis in the morning per IR before discharging patient Incentive spirometry every hour while awake  · 2 liters/min via nasal cannula  · DVT prophylaxis with EPC cuffs  · Discussed with RN  · Will follow with you    Babak Lomas MD, [facility]  Pulmonary Critical Care and Sleep Medicine,  [facility], [place]  Cell: [phone]  Office: [phone]

## 2021-03-08 ENCOUNTER — APPOINTMENT (OUTPATIENT)
Dept: INTERVENTIONAL RADIOLOGY/VASCULAR | Age: 76
DRG: 377 | End: 2021-03-08
Payer: MEDICARE

## 2021-03-08 ENCOUNTER — APPOINTMENT (OUTPATIENT)
Dept: GENERAL RADIOLOGY | Age: 76
DRG: 377 | End: 2021-03-08
Payer: MEDICARE

## 2021-03-08 VITALS
TEMPERATURE: 98.2 F | HEART RATE: 77 BPM | OXYGEN SATURATION: 95 % | HEIGHT: 68 IN | BODY MASS INDEX: 24.22 KG/M2 | WEIGHT: 159.83 LBS | SYSTOLIC BLOOD PRESSURE: 110 MMHG | RESPIRATION RATE: 20 BRPM | DIASTOLIC BLOOD PRESSURE: 51 MMHG

## 2021-03-08 LAB
ABSOLUTE EOS #: 0.19 K/UL (ref 0–0.4)
ABSOLUTE IMMATURE GRANULOCYTE: ABNORMAL K/UL (ref 0–0.3)
ABSOLUTE LYMPH #: 0.67 K/UL (ref 1–4.8)
ABSOLUTE MONO #: 0.95 K/UL (ref 0.1–1.3)
ALBUMIN FLUID: 1.4 G/DL
ANION GAP SERPL CALCULATED.3IONS-SCNC: 5 MMOL/L (ref 9–17)
APPEARANCE FLUID: NORMAL
BASOPHILS # BLD: 1 % (ref 0–2)
BASOPHILS ABSOLUTE: 0.1 K/UL (ref 0–0.2)
BUN BLDV-MCNC: 19 MG/DL (ref 8–23)
BUN/CREAT BLD: ABNORMAL (ref 9–20)
CALCIUM SERPL-MCNC: 9 MG/DL (ref 8.6–10.4)
CHLORIDE BLD-SCNC: 96 MMOL/L (ref 98–107)
CO2: 43 MMOL/L (ref 20–31)
COLOR FLUID: NORMAL
CREAT SERPL-MCNC: 0.72 MG/DL (ref 0.5–0.9)
DIFFERENTIAL TYPE: ABNORMAL
EOSINOPHILS RELATIVE PERCENT: 2 % (ref 0–4)
GFR AFRICAN AMERICAN: >60 ML/MIN
GFR NON-AFRICAN AMERICAN: >60 ML/MIN
GFR SERPL CREATININE-BSD FRML MDRD: ABNORMAL ML/MIN/{1.73_M2}
GFR SERPL CREATININE-BSD FRML MDRD: ABNORMAL ML/MIN/{1.73_M2}
GLUCOSE BLD-MCNC: 98 MG/DL (ref 70–99)
HCT VFR BLD CALC: 28.5 % (ref 36–46)
HEMOGLOBIN: 8.9 G/DL (ref 12–16)
IMMATURE GRANULOCYTES: ABNORMAL %
INR BLD: 1
LACTATE DEHYDROGENASE, FLUID: 91 U/L
LYMPHOCYTES # BLD: 7 % (ref 24–44)
MCH RBC QN AUTO: 32.2 PG (ref 26–34)
MCHC RBC AUTO-ENTMCNC: 31.1 G/DL (ref 31–37)
MCV RBC AUTO: 103.6 FL (ref 80–100)
MONOCYTES # BLD: 10 % (ref 1–7)
MORPHOLOGY: ABNORMAL
MORPHOLOGY: ABNORMAL
NRBC AUTOMATED: ABNORMAL PER 100 WBC
PARTIAL THROMBOPLASTIN TIME: 34.1 SEC (ref 24–36)
PDW BLD-RTO: 21.4 % (ref 11.5–14.9)
PH FLUID: 8
PLATELET # BLD: 309 K/UL (ref 150–450)
PLATELET ESTIMATE: ABNORMAL
PMV BLD AUTO: 8.5 FL (ref 6–12)
POTASSIUM SERPL-SCNC: 3.8 MMOL/L (ref 3.7–5.3)
PROTHROMBIN TIME: 13 SEC (ref 11.8–14.6)
RBC # BLD: 2.75 M/UL (ref 4–5.2)
RBC # BLD: ABNORMAL 10*6/UL
RBC FLUID: NORMAL /MM3
SEG NEUTROPHILS: 80 % (ref 36–66)
SEGMENTED NEUTROPHILS ABSOLUTE COUNT: 7.59 K/UL (ref 1.3–9.1)
SODIUM BLD-SCNC: 144 MMOL/L (ref 135–144)
SPECIMEN TYPE: NORMAL
TOTAL PROTEIN, BODY FLUID: 2.3 G/DL
WBC # BLD: 9.5 K/UL (ref 3.5–11)
WBC # BLD: ABNORMAL 10*3/UL
WBC FLUID: 550 /MM3

## 2021-03-08 PROCEDURE — 6370000000 HC RX 637 (ALT 250 FOR IP): Performed by: FAMILY MEDICINE

## 2021-03-08 PROCEDURE — 2580000003 HC RX 258: Performed by: FAMILY MEDICINE

## 2021-03-08 PROCEDURE — 87070 CULTURE OTHR SPECIMN AEROBIC: CPT

## 2021-03-08 PROCEDURE — 94640 AIRWAY INHALATION TREATMENT: CPT

## 2021-03-08 PROCEDURE — 6370000000 HC RX 637 (ALT 250 FOR IP): Performed by: NURSE PRACTITIONER

## 2021-03-08 PROCEDURE — 6370000000 HC RX 637 (ALT 250 FOR IP): Performed by: INTERNAL MEDICINE

## 2021-03-08 PROCEDURE — 94660 CPAP INITIATION&MGMT: CPT

## 2021-03-08 PROCEDURE — C1729 CATH, DRAINAGE: HCPCS

## 2021-03-08 PROCEDURE — 85025 COMPLETE CBC W/AUTO DIFF WBC: CPT

## 2021-03-08 PROCEDURE — 83986 ASSAY PH BODY FLUID NOS: CPT

## 2021-03-08 PROCEDURE — 94761 N-INVAS EAR/PLS OXIMETRY MLT: CPT

## 2021-03-08 PROCEDURE — 85610 PROTHROMBIN TIME: CPT

## 2021-03-08 PROCEDURE — 36415 COLL VENOUS BLD VENIPUNCTURE: CPT

## 2021-03-08 PROCEDURE — 84157 ASSAY OF PROTEIN OTHER: CPT

## 2021-03-08 PROCEDURE — 99232 SBSQ HOSP IP/OBS MODERATE 35: CPT | Performed by: INTERNAL MEDICINE

## 2021-03-08 PROCEDURE — 71045 X-RAY EXAM CHEST 1 VIEW: CPT

## 2021-03-08 PROCEDURE — 2700000000 HC OXYGEN THERAPY PER DAY

## 2021-03-08 PROCEDURE — 87075 CULTR BACTERIA EXCEPT BLOOD: CPT

## 2021-03-08 PROCEDURE — 80048 BASIC METABOLIC PNL TOTAL CA: CPT

## 2021-03-08 PROCEDURE — 83615 LACTATE (LD) (LDH) ENZYME: CPT

## 2021-03-08 PROCEDURE — 32555 ASPIRATE PLEURA W/ IMAGING: CPT | Performed by: RADIOLOGY

## 2021-03-08 PROCEDURE — 82042 OTHER SOURCE ALBUMIN QUAN EA: CPT

## 2021-03-08 PROCEDURE — 88108 CYTOPATH CONCENTRATE TECH: CPT

## 2021-03-08 PROCEDURE — 99239 HOSP IP/OBS DSCHRG MGMT >30: CPT | Performed by: FAMILY MEDICINE

## 2021-03-08 PROCEDURE — 87205 SMEAR GRAM STAIN: CPT

## 2021-03-08 PROCEDURE — 0W9B3ZZ DRAINAGE OF LEFT PLEURAL CAVITY, PERCUTANEOUS APPROACH: ICD-10-PCS | Performed by: RADIOLOGY

## 2021-03-08 PROCEDURE — 85730 THROMBOPLASTIN TIME PARTIAL: CPT

## 2021-03-08 RX ORDER — DEXMEDETOMIDINE HYDROCHLORIDE 4 UG/ML
.2-1.4 INJECTION, SOLUTION INTRAVENOUS CONTINUOUS
Status: CANCELLED | OUTPATIENT
Start: 2021-03-08

## 2021-03-08 RX ORDER — FERROUS SULFATE 325(65) MG
325 TABLET ORAL
Status: DISCONTINUED | OUTPATIENT
Start: 2021-03-08 | End: 2021-03-08 | Stop reason: HOSPADM

## 2021-03-08 RX ADMIN — IPRATROPIUM BROMIDE AND ALBUTEROL SULFATE 1 AMPULE: 2.5; .5 SOLUTION RESPIRATORY (INHALATION) at 10:46

## 2021-03-08 RX ADMIN — DULOXETINE 60 MG: 60 CAPSULE, DELAYED RELEASE ORAL at 09:31

## 2021-03-08 RX ADMIN — HYDROCODONE BITARTRATE AND ACETAMINOPHEN 1 TABLET: 5; 325 TABLET ORAL at 09:29

## 2021-03-08 RX ADMIN — ATORVASTATIN CALCIUM 10 MG: 10 TABLET, FILM COATED ORAL at 09:30

## 2021-03-08 RX ADMIN — MODAFINIL 200 MG: 200 TABLET ORAL at 09:29

## 2021-03-08 RX ADMIN — BUSPIRONE HYDROCHLORIDE 10 MG: 10 TABLET ORAL at 09:30

## 2021-03-08 RX ADMIN — FUROSEMIDE 20 MG: 20 TABLET ORAL at 09:30

## 2021-03-08 RX ADMIN — FLUTICASONE PROPIONATE 1 SPRAY: 50 SPRAY, METERED NASAL at 09:30

## 2021-03-08 RX ADMIN — LEVOTHYROXINE SODIUM 125 MCG: 125 TABLET ORAL at 06:16

## 2021-03-08 RX ADMIN — IPRATROPIUM BROMIDE AND ALBUTEROL SULFATE 1 AMPULE: 2.5; .5 SOLUTION RESPIRATORY (INHALATION) at 15:25

## 2021-03-08 RX ADMIN — BUSPIRONE HYDROCHLORIDE 10 MG: 10 TABLET ORAL at 16:06

## 2021-03-08 RX ADMIN — METOPROLOL TARTRATE 25 MG: 25 TABLET, FILM COATED ORAL at 09:30

## 2021-03-08 RX ADMIN — CETIRIZINE HYDROCHLORIDE 10 MG: 10 TABLET, FILM COATED ORAL at 09:30

## 2021-03-08 RX ADMIN — DILTIAZEM HYDROCHLORIDE 120 MG: 120 CAPSULE, COATED, EXTENDED RELEASE ORAL at 09:29

## 2021-03-08 RX ADMIN — IPRATROPIUM BROMIDE AND ALBUTEROL SULFATE 1 AMPULE: 2.5; .5 SOLUTION RESPIRATORY (INHALATION) at 07:15

## 2021-03-08 RX ADMIN — PANTOPRAZOLE SODIUM 40 MG: 40 TABLET, DELAYED RELEASE ORAL at 06:16

## 2021-03-08 RX ADMIN — FLUTICASONE PROPIONATE 2 PUFF: 220 AEROSOL, METERED RESPIRATORY (INHALATION) at 09:30

## 2021-03-08 RX ADMIN — OXYCODONE HYDROCHLORIDE AND ACETAMINOPHEN 1000 MG: 500 TABLET ORAL at 09:30

## 2021-03-08 RX ADMIN — SODIUM CHLORIDE, PRESERVATIVE FREE 10 ML: 5 INJECTION INTRAVENOUS at 09:30

## 2021-03-08 RX ADMIN — FERROUS SULFATE TAB 325 MG (65 MG ELEMENTAL FE) 325 MG: 325 (65 FE) TAB at 16:08

## 2021-03-08 RX ADMIN — FERROUS SULFATE TAB 325 MG (65 MG ELEMENTAL FE) 325 MG: 325 (65 FE) TAB at 12:18

## 2021-03-08 ASSESSMENT — PAIN SCALES - GENERAL: PAINLEVEL_OUTOF10: 7

## 2021-03-08 NOTE — FLOWSHEET NOTE
03/08/21 1150   Encounter Summary   Services provided to: Patient   Referral/Consult From: Palliative Care   Continue Visiting   (3/8/21)   Complexity of Encounter Moderate   Length of Encounter 15 minutes   Spiritual Assessment Completed Yes   Spiritual/Latter-day   Type Spiritual support   Assessment Calm; Approachable   Intervention Prayer   Outcome Receptive; Expressed gratitude

## 2021-03-08 NOTE — PROGRESS NOTES
PULMONARY PROGRESS NOTE:    REASON FOR VISIT:Pl effusion, dyspnea  Interval History:    Shortness of Breath: yes  Cough: +  Sputum: no          Hemoptysis: no  Chest Pain: no  Fever: no                   Swelling Feet: no  Headache: no                                           Nausea, Emesis, Abdominal Pain: no  Diarrhea: no         Constipation: no    Events since last visit: none    PAST MEDICAL HISTORY:      Scheduled Meds:   ferrous sulfate  325 mg Oral TID WC    cetirizine  10 mg Oral Daily    furosemide  20 mg Oral Daily    pantoprazole  40 mg Oral QAM AC    modafinil  200 mg Oral Daily    iron sucrose  200 mg Intravenous Q24H    ipratropium-albuterol  1 ampule Inhalation Q4H WA    ascorbic acid  1,000 mg Oral Daily    atorvastatin  10 mg Oral Daily    busPIRone  10 mg Oral TID    dilTIAZem  120 mg Oral Daily    DULoxetine  60 mg Oral Daily    fluticasone  1 spray Each Nostril Daily    fluticasone  2 puff Inhalation BID    levothyroxine  125 mcg Oral Daily    lisinopril  2.5 mg Oral Lunch    metoprolol tartrate  25 mg Oral BID    sodium chloride flush  10 mL Intravenous 2 times per day     Continuous Infusions:   sodium chloride       PRN Meds:phenol, potassium chloride **OR** potassium alternative oral replacement **OR** potassium chloride, HYDROcodone-acetaminophen, baclofen, clonazePAM, guaiFENesin, sodium chloride flush, promethazine **OR** ondansetron, acetaminophen **OR** acetaminophen, sodium chloride        PHYSICAL EXAMINATION:  BP 99/81   Pulse 99   Temp 98.3 °F (36.8 °C) (Oral)   Resp 20   Ht 5' 8\" (1.727 m)   Wt 159 lb 13.3 oz (72.5 kg)   SpO2 94%   BMI 24.30 kg/m²   afebrile  General : Awake, alert, a little sleepy  Neck  supple, no lymphadenopathy, JVD not raised  Heart  regular rhythm, S1 and S2 normal; no additional sounds heard  Lungs  Air Entry- fair bilaterally; breath sounds : diminished, 91% on 2 l nc  Abdomen  soft, no tenderness  Upper Extremities  - no cyanosis, mottling; edema : absent  Lower Extremities: no cyanosis, mottling; edema : absent    Current Laboratory, Radiologic, Microbiologic, and Diagnostic studies reviewed  Data ReviewCBC:   Recent Labs     03/06/21  0549 03/07/21  0527 03/08/21  0503   WBC 9.4 10.6 9.5   RBC 2.59* 2.74* 2.75*   HGB 8.4* 8.9* 8.9*   HCT 26.6* 28.8* 28.5*    336 309     BMP:   Recent Labs     03/06/21  0549 03/07/21  0527 03/08/21  0503   GLUCOSE 105* 97 98   * 143 144   K 3.8 4.3 3.8   BUN 20 21 19   CREATININE 0.82 0.80 0.72   CALCIUM 8.4* 8.8 9.0     ABGs:   No results for input(s): PHART, PO2ART, ZSB5EFO, TGS4AHL, BEART, I2GIBFLE, HVR7QKM in the last 72 hours.    PT/INR:  No results found for: PTINR    ASSESSMENT / PLAN:  Nasal congestion - humidify O2, sudafed  Acute hypoxic resp failure - O2  Left pl effusion - monitor, diuresis  GI bleed - per GI service  Thoracentesis per IR 2/25/21- 1L fluid removed left side - transudate  Previous Covid positive 1/1/21  Chronic Hypercapnic resp failure -  BIPAP  For thoracentesis today    Plan of care discussed with Dr Dulce Blanton  Electronically signed by Alondra Tineo on 03/08/21 at 12:39 PM

## 2021-03-08 NOTE — CARE COORDINATION
ERICA received info that this patient can DC back to [place] [id number] on this date. ERICA set up transportation and informed the staff here, the facility, and ERICA also called her son Grzegorz Toribio and informed him of the DC/transport time. The patient is Scheduled to be picked up at 5:15-5:30 pm via stretcher by [employer]. ERICA did request [facility] however, they could not come until much later on in the evening. The number for report is [phone].

## 2021-03-08 NOTE — PROGRESS NOTES
FAMILY MEDICINE  - PROGRESS NOTE    Date:  3/8/2021  Ashley Blandon  [id number]      Chief Complaint   Patient presents with    Fall         Interval History:  not changed, she has no new complaints. We are awaiting the [facility] to obtain a BiPap for the patient.       Subjective  Constitutional: positive for fatigue  Eyes: negative  Ears, nose, mouth, throat, and face: negative  Respiratory: positive for emphysema and shortness of breath  Cardiovascular: positive for irregular heart beat  Gastrointestinal: negative  Musculoskeletal:positive for arthralgias, muscle weakness and myalgias  Neurological: negative  Behavioral/Psych: positive for anxiety  Endocrine: negative:    Objective:    /63   Pulse 85   Temp 97.9 °F (36.6 °C) (Axillary)   Resp 16   Ht 5' 8\" (1.727 m)   Wt 159 lb 13.3 oz (72.5 kg)   SpO2 98%   BMI 24.30 kg/m²   General appearance - alert, well appearing, and in no distress  Mental status - alert, oriented to person, place, and time  Eyes - pupils equal and reactive, extraocular eye movements intact  Ears - hearing grossly normal bilaterally  Nose - normal and patent, no erythema, discharge or polyps  Mouth - mucous membranes moist, pharynx normal without lesions  Neck - supple, no significant adenopathy  Lymphatics - no palpable lymphadenopathy, no hepatosplenomegaly  Chest - clear to auscultation, no wheezes, rales or rhonchi, symmetric air entry, decreased air entry noted posteriorly  Heart - irregularly irregular rhythm with rate 85  Abdomen - soft, nontender, nondistended, no masses or organomegaly  Breasts - not examined  Back exam - not examined  Neurological - alert, oriented, normal speech, no focal findings or movement disorder noted  Musculoskeletal - osteoarthritic changes noted in both hands  Extremities - peripheral pulses normal, no pedal edema, no clubbing or cyanosis  Skin - normal coloration and turgor, no rashes, no suspicious skin lesions noted    Data:   Medications:   Current Facility-Administered Medications   Medication Dose Route Frequency Provider Last Rate Last Admin    phenol 1.4 % mouth spray 1 spray  1 spray Mouth/Throat Q2H PRN WILLIE Recio CNP        cetirizine (ZYRTEC) tablet 10 mg  10 mg Oral Daily WILLIE Recio CNP   10 mg at 03/07/21 0756    potassium chloride (KLOR-CON M) extended release tablet 40 mEq  40 mEq Oral PRN Zayda Ly MD   40 mEq at 03/03/21 1026    Or    potassium bicarb-citric acid (EFFER-K) effervescent tablet 40 mEq  40 mEq Oral PRN Zayda Ly MD        Or    potassium chloride 10 mEq/100 mL IVPB (Peripheral Line)  10 mEq Intravenous PRN Zayda Ly MD        furosemide (LASIX) tablet 20 mg  20 mg Oral Daily Chaz Emery MD   20 mg at 03/07/21 0756    pantoprazole (PROTONIX) tablet 40 mg  40 mg Oral QAM WILLIE Hopper NP   40 mg at 03/07/21 2668    modafinil (PROVIGIL) tablet 200 mg  200 mg Oral Daily WILLIE Carias CNP   200 mg at 03/07/21 0756    iron sucrose (VENOFER) 200 mg in sodium chloride 0.9 % 100 mL IVPB  200 mg Intravenous Q24H Ramon Meza MD   Stopped at 03/07/21 1920    ferrous sulfate (IRON 325) tablet 325 mg  325 mg Oral TID  Zayda Ly MD   325 mg at 03/07/21 1738    HYDROcodone-acetaminophen (NORCO) 5-325 MG per tablet 1 tablet  1 tablet Oral Q6H PRN Zayda Ly MD   1 tablet at 03/07/21 0945    ipratropium-albuterol (DUONEB) nebulizer solution 1 ampule  1 ampule Inhalation Q4H WA WILLIE Carias CNP   1 ampule at 03/07/21 1851    ascorbic acid (VITAMIN C) tablet 1,000 mg  1,000 mg Oral Daily Jad Singh MD   1,000 mg at 03/07/21 0756    atorvastatin (LIPITOR) tablet 10 mg  10 mg Oral Daily Jad Singh MD   10 mg at 03/07/21 0755    baclofen (LIORESAL) tablet 10 mg  10 mg Oral TID PRN Jad Singh MD   10 mg at 03/07/21 2020    busPIRone (BUSPAR) tablet 10 mg  10 mg Oral TID Yarely Romero MD   10 mg at 03/07/21 2020    clonazePAM (KLONOPIN) tablet 0.5 mg  0.5 mg Oral TID PRN Yarely Romero MD   0.5 mg at 03/07/21 2020    dilTIAZem (CARDIZEM CD) extended release capsule 120 mg  120 mg Oral Daily Yarely Romero MD   120 mg at 03/07/21 0755    DULoxetine (CYMBALTA) extended release capsule 60 mg  60 mg Oral Daily Yarely Romero MD   60 mg at 03/07/21 0755    fluticasone (FLONASE) 50 MCG/ACT nasal spray 1 spray  1 spray Each Nostril Daily Yarely Romero MD   1 spray at 03/07/21 0928    fluticasone (FLOVENT HFA) 220 MCG/ACT inhaler 2 puff  2 puff Inhalation BID Yarely Romero MD   2 puff at 03/07/21 2020    guaiFENesin [facility]) extended release tablet 1,200 mg  1,200 mg Oral Q6H PRN Yarely Romero MD   1,200 mg at 03/07/21 2020    levothyroxine (SYNTHROID) tablet 125 mcg  125 mcg Oral Daily Yarely Romero MD   125 mcg at 03/07/21 3709    lisinopril (PRINIVIL;ZESTRIL) tablet 2.5 mg  2.5 mg Oral Lunch Susan Loyola MD   2.5 mg at 03/06/21 1431    metoprolol tartrate (LOPRESSOR) tablet 25 mg  25 mg Oral BID Yarely Romero MD   25 mg at 03/07/21 2020    sodium chloride flush 0.9 % injection 10 mL  10 mL Intravenous 2 times per day Yarely Romero MD   10 mL at 03/07/21 2021    sodium chloride flush 0.9 % injection 10 mL  10 mL Intravenous PRN Yarely Romero MD        promethazine (PHENERGAN) tablet 12.5 mg  12.5 mg Oral Q6H PRN Yarely Romero MD        Or    ondansetron [facility]) injection 4 mg  4 mg Intravenous Q6H PRN Yarely Romero MD        acetaminophen (TYLENOL) tablet 650 mg  650 mg Oral Q6H PRN Yarely Romero MD   650 mg at 02/25/21 2109    Or    acetaminophen (TYLENOL) suppository 650 mg  650 mg Rectal Q6H PRN Yarely Romero MD        0.9 % sodium chloride infusion   Intravenous PRN Tony Almaraz DO           Intake/Output Summary (Last 24 hours) at 3/8/2021 7180  Last data filed at 3/7/2021 2000  Gross per 24 hour   Intake 240 ml   Output 950 ml   Net -710 ml     Recent Results (from the past 24 hour(s))   Basic Metabolic Panel    Collection Time: 03/08/21  5:03 AM   Result Value Ref Range    Glucose 98 70 - 99 mg/dL    BUN 19 8 - 23 mg/dL    CREATININE 0.72 0.50 - 0.90 mg/dL    Bun/Cre Ratio NOT REPORTED 9 - 20    Calcium 9.0 8.6 - 10.4 mg/dL    Sodium 144 135 - 144 mmol/L    Potassium 3.8 3.7 - 5.3 mmol/L    Chloride 96 (L) 98 - 107 mmol/L    CO2 43 (HH) 20 - 31 mmol/L    Anion Gap 5 (L) 9 - 17 mmol/L    GFR Non-African American >60 >60 mL/min    GFR African American >60 >60 mL/min    GFR Comment          GFR Staging NOT REPORTED    CBC Auto Differential    Collection Time: 03/08/21  5:03 AM   Result Value Ref Range    WBC 9.5 3.5 - 11.0 k/uL    RBC 2.75 (L) 4.0 - 5.2 m/uL    Hemoglobin 8.9 (L) 12.0 - 16.0 g/dL    Hematocrit 28.5 (L) 36 - 46 %    .6 (H) 80 - 100 fL    MCH 32.2 26 - 34 pg    MCHC 31.1 31 - 37 g/dL    RDW 21.4 (H) 11.5 - 14.9 %    Platelets 507 590 - 594 k/uL    MPV 8.5 6.0 - 12.0 fL    NRBC Automated NOT REPORTED per 100 WBC    Differential Type NOT REPORTED     Seg Neutrophils PENDING %    Lymphocytes PENDING %    Monocytes PENDING %    Eosinophils % PENDING %    Basophils PENDING %    Immature Granulocytes NOT REPORTED 0 %    Segs Absolute PENDING k/uL    Absolute Lymph # PENDING k/uL    Absolute Mono # PENDING k/uL    Absolute Eos # PENDING k/uL    Basophils Absolute PENDING 0.0 - 0.2 k/uL    Absolute Immature Granulocyte NOT REPORTED 0.00 - 0.30 k/uL    WBC Morphology NOT REPORTED     RBC Morphology NOT REPORTED     Platelet Estimate NOT REPORTED      -----------------------------------------------------------------  RAD:  EKG:  Micro:     Assessment & Plan:    Patient Active Problem List:     Acquired hypothyroidism     Hypertension     Primary osteoarthritis of right knee     A-fib (HCC)     Acute encephalopathy SIRS (systemic inflammatory response syndrome) (HCC)     Normocytic anemia     Pulmonary hypertension (HCC)     COPD (chronic obstructive pulmonary disease) (HCC)     History of GI bleed     Constipation     HA (generalized anxiety disorder)     Lumbar radiculopathy     Lumbosacral spondylosis without myelopathy     Benign hypertension with CKD (chronic kidney disease) stage III     CKD (chronic kidney disease) stage 3, GFR 30-59 ml/min     Alcohol withdrawal syndrome with complication (HCC)     Moderate major depression (HCC)     PAD (peripheral artery disease) (HCC)     Acute kidney injury ([facility] [address]     Obesity, Class I, BMI 30-34.9     Chronic diastolic CHF (congestive heart failure) (HCC)     GI bleed     Weight loss     Elevated alkaline phosphatase level     Thrombocytosis (HCC)     Iron deficiency anemia due to chronic blood loss     Iron malabsorption     Symptomatic anemia     Pneumonia due to organism     Anemia     Severe anemia     Overweight (BMI 25.0-29. 9)     Severe malnutrition ([facility] [address]     Renal failure     Acute renal failure (ARF) (HCC)     Acute cystitis without hematuria     Hypoxia     Acute respiratory failure with hypoxia (HCC)     Hypernatremia           Plan:  -Chronic respiratory failure with COPD - stable on O2 per NC and BiPap, Pulmonology following.  -Recurrent iron deficiency anemia - hgb stable, Hematology following.  -A. Fib. - rate controlled.  -Okay to D/C to SNF once they obtain a BiPap.  -Continue current treatments.  -Complete orders per chart.     See orders   Disposition:    Electronically signed by Kika Panda MD on 3/8/2021 at 6:09 AM

## 2021-03-08 NOTE — PLAN OF CARE
Problem: Falls - Risk of:  Goal: Will remain free from falls  Description: Will remain free from falls  3/8/2021 0440 by Yannick Gutierrez RN  Outcome: Ongoing  Note: Patient remains free of incidence/ injury. Bed remains in low position. Bed alarm on. Problem: PAIN  Goal: Patient's pain/discomfort is manageable  3/8/2021 0440 by Yannick Gutierrez RN  Outcome: Ongoing  Note: Pt denies need for prn pain medication this shift. Problem: SKIN INTEGRITY  Goal: Skin integrity is maintained or improved  3/8/2021 0440 by Yannick Gutierrez RN  Outcome: Ongoing  Note: No new occurrence of skin breakdown noted during this shift. Waffle mattress in use. Pt turns self. Problem: Skin Integrity:  Goal: Will show no infection signs and symptoms  Description: Will show no infection signs and symptoms  3/8/2021 0440 by Yannick Gutierrez RN  Outcome: Ongoing  Note: Patient displays no new signs of infection during this shift.

## 2021-03-08 NOTE — PROGRESS NOTES
BRONCHOSPASM/BRONCHOCONSTRICTION     [x]         IMPROVE AERATION/BREATH SOUNDS  [x]   ADMINISTER BRONCHODILATOR THERAPY AS APPROPRIATE  [x]   ASSESS BREATH SOUNDS  [x]   PATIENT EDUCATION AS NEEDED    NONINVASIVE VENTILATION    PROVIDE OPTIMAL VENTILATION/ACCEPTABLE SPO2   IMPLEMENT NONINVASIVE VENTILATION PROTOCOL   MAINTAIN ACCEPTABLE SPO2   ASSESS SKIN INTEGRITY/BREAKDOWN SCORE   PATIENT EDUCATION AS NEEDED   BIPAP AS NEEDED    [x]  IDENTIFY APPROPRIATE OXYGEN THERAPY  [x]   MONITOR SP02/ABG'S AS NEEDED   [x]   PATIENT EDUCATION AS NEEDED    Pt given scheduled tx. Pt tolerated well. Pt currently on 3LNC  SpO2 100% will decrease to [facility] (home level) . Pt did not wear bipap overnight. Pt appears to be in no respiratory distress at this time.  Will continue to monitor

## 2021-03-08 NOTE — PROGRESS NOTES
Department of Internal Medicine  Nephrology Dani Pozo MD   Progress Note      Interval history: Patient was seen and examined today and she does not have any new complaints. She is awake and alert and not in respiratory distress. History of presenting illness: This is a 70 y.o. female with a significant past medical history of Chronic atrial fibrillation, pulmonary hypertension, chronic recurrent anemia [s/p recent endoscopy showing AVMs requiring intermittent PRBC transfusions], COPD and chronic kidney disease stage II, who presented to the emergency department after falling at home. Laboratory studies revealed severe anemia with hemoglobin 6.8 g/dL and serum creatinine was 1.25 mg/dL. Nephrology consultation was called yesterday due to low urine output. Patient was given albumin and Lasix with prompt improvement in urine output. She feels well today and does not have shortness of breath or chest pain.     Scheduled Meds:   ferrous sulfate  325 mg Oral TID WC    cetirizine  10 mg Oral Daily    furosemide  20 mg Oral Daily    pantoprazole  40 mg Oral QAM AC    modafinil  200 mg Oral Daily    iron sucrose  200 mg Intravenous Q24H    ipratropium-albuterol  1 ampule Inhalation Q4H WA    ascorbic acid  1,000 mg Oral Daily    atorvastatin  10 mg Oral Daily    busPIRone  10 mg Oral TID    dilTIAZem  120 mg Oral Daily    DULoxetine  60 mg Oral Daily    fluticasone  1 spray Each Nostril Daily    fluticasone  2 puff Inhalation BID    levothyroxine  125 mcg Oral Daily    lisinopril  2.5 mg Oral Lunch    metoprolol tartrate  25 mg Oral BID    sodium chloride flush  10 mL Intravenous 2 times per day     Continuous Infusions:   sodium chloride         Physical Exam:    VITALS:  /71   Pulse 99   Temp 98.3 °F (36.8 °C) (Oral)   Resp 18   Ht 5' 8\" (1.727 m)   Wt 159 lb 13.3 oz (72.5 kg)   SpO2 91%   BMI 24.30 kg/m²   24HR INTAKE/OUTPUT:      Intake/Output Summary (Last 24 hours) Value Date    LABCREA 137.9 02/11/2021    LABCREA 134.5 02/11/2021     IMPRESSION/RECOMMENDATIONS:     1. Acute kidney injury - resolved. 2.  Acute on chronic anemia. Hemoglobin 8.9 g and stable    3. Left pleural effusion - status post thoracentesis 1 L on 2/25/21 consistent with transudative fluid. Continue furosemide 20 mg p.o. daily    Prognosis is guarded.       Randy Snell MD FACP  Attending Nephrologist  3/8/2021 10:17 AM

## 2021-03-08 NOTE — PROGRESS NOTES
Today's Date: 3/8/2021  Patient Name: Michael Vela  Date of admission: 2/21/2021  9:13 AM  Patient's age: 70 y.o., 1945  Admission Dx: GI bleed [K92.2]    Reason for Consult: management recommendations  Requesting Physician: Viktoria Hebert MD    CHIEF COMPLAINT:  Recurrent anemia   SUBJECTIVE:  . The patient was seen and examined. resp distress. She is on CPAP. No recent active bleeding. No new events. BRIEF CASE HISTORY:    The patient is a 70 y.o.  female who is admitted to the hospital for worsening respiratory status. She is known to us with history of iron deficiency anemia requiring IV iron and transfusions. She has AV malformation and her anemia has been exacerbated because of anticoagulation. We have stopped her Eliquis because of recurrent GI bleeding. Indication for anticoagulation is atrial fibrillation. The patient is admitted and underwent thoracocentesis. Condition is slowly improving. It is thought that her pleural effusion is transudative and related to COPD/cardiac dysfunction. DIAGNOSIS:   1. Iron deficiency anemia, malabsorption component   2. AV malformation  3. COPD  4. Anticoagulation due to afib, stopped due to recurrent GI bleeding       CURRENT THERAPY:  Plan for work up and IV iron    BRIEF CASE HISTORY:   Teodora Heck is a very pleasant 70 y.o. female who is referred to us for iron deficiency anemia. She reports she has been taking oral iron. She had knee replacement 3 years ago and required 3 units of blood prior to surgery. She was stable until 02/2020 when her HGB began to decrease again. She has had frequent hospitalizations in 2020 with COPD exacerbation and poor kidney function with bruised kidney. She has followed with nephrology in the past but no recent follow up and has pulmonology referral. She has had some compliance issues with showing up for appointments. She has poor sleep with breathing issues and feels very fatigued.  She has had several falls this year and has needed family to come pick her up, her legs are weak. She reports she had some bleeding following EGD and colonoscopy, AV malformation was found. She is on Eliquis for Afib but has not followed up with cardiology in the last year. Past Medical History:   has a past medical history of A-fib ([facility] [address] Acquired hypothyroidism, Acute encephalopathy, Acute kidney injury ([address] Anxiety, Benign hypertension with CKD (chronic kidney disease) stage III, Chronic back pain, CKD (chronic kidney disease) stage 3, GFR 30-59 ml/min, Constipation, COPD (chronic obstructive pulmonary disease) ([address] Cough, Depression, ETOH abuse, Former smoker, HA (generalized anxiety disorder), History of GI bleed, Hyperlipidemia, Hypertension, Hypothyroid, Leg pain, Normocytic anemia, Osteoarthritis, PAD (peripheral artery disease) ([facility] [address] Primary osteoarthritis, Pulmonary hypertension ([facility] [address] Pure hypercholesterolemia, SIRS (systemic inflammatory response syndrome) ([facility] [address] Urge incontinence, and Wheezing. Past Surgical History:   has a past surgical history that includes eye surgery (Bilateral); Colonoscopy; joint replacement; Upper gastrointestinal endoscopy (N/A, 12/2/2020); Colonoscopy (N/A, 12/2/2020); Upper gastrointestinal endoscopy (N/A, 12/23/2020); and Thoracentesis (12/26/2020). Medications:    Reviewed in Epic     Allergies:  Tizanidine    Social History:   reports that she has quit smoking. Her smoking use included cigarettes. She has a 78.00 pack-year smoking history. She has never used smokeless tobacco. She reports previous alcohol use. She reports that she does not use drugs. Family History: family history includes COPD in her mother; Cancer in an other family member; Emphysema in her sister; Heart Disease in her mother. REVIEW OF SYSTEMS:    Constitutional: No fever or chills.  No night sweats, no weight loss   Eyes: No eye discharge, double vision, or eye pain   HEENT: negative for sore mouth, sore throat, hoarseness and voice change   Respiratory: as above  Cardiovascular: negative for chest pain, dyspnea, palpitations, orthopnea, PND   Gastrointestinal: negative for nausea, vomiting, diarrhea, constipation, abdominal pain, Dysphagia, hematemesis and hematochezia   Genitourinary: negative for frequency, dysuria, nocturia, urinary incontinence, and hematuria   Integument: negative for rash, skin lesions, bruises. Hematologic/Lymphatic: negative for easy bruising, bleeding, lymphadenopathy, or petechiae   Endocrine: negative for heat or cold intolerance,weight changes, change in bowel habits and hair loss   Musculoskeletal: negative for myalgias, arthralgias, pain, joint swelling,and bone pain   Neurological: negative for headaches, dizziness, seizures, weakness, numbness    PHYSICAL EXAM:      BP 99/81   Pulse 99   Temp 98.3 °F (36.8 °C) (Oral)   Resp 20   Ht 5' 8\" (1.727 m)   Wt 159 lb 13.3 oz (72.5 kg)   SpO2 94%   BMI 24.30 kg/m²    Temp (24hrs), Av °F (36.7 °C), Min:97.7 °F (36.5 °C), Max:98.3 °F (36.8 °C)    General appearance - ill appearing, no in pain. CPAP  Mental status -sleeping but arousable.   Eyes - pupils equal and reactive, extraocular eye movements intact   Ears - bilateral TM's and external ear canals normal   Mouth - mucous membranes moist, pharynx normal without lesions   Neck - supple, no significant adenopathy   Lymphatics - no palpable lymphadenopathy, no hepatosplenomegaly   Chest - clear to auscultation, no wheezes, rales or rhonchi, symmetric air entry   Heart - normal rate, regular rhythm, normal S1, S2, no murmurs  Abdomen - soft, nontender, nondistended, no masses or organomegaly   Neurological - alert, oriented, normal speech, no focal findings or movement disorder noted   Musculoskeletal - no joint tenderness, deformity or swelling   Extremities - peripheral pulses normal, no pedal edema, no clubbing or cyanosis   Skin - normal coloration and turgor, no rashes, no suspicious skin lesions noted ,    DATA:    Labs:   CBC:   Recent Labs     03/07/21  0527 03/08/21  0503   WBC 10.6 9.5   HGB 8.9* 8.9*   HCT 28.8* 28.5*    309     BMP:   Recent Labs     03/07/21  0527 03/08/21  0503    144   K 4.3 3.8   CO2 42* 43*   BUN 21 19   CREATININE 0.80 0.72   LABGLOM >60 >60   GLUCOSE 97 98     PT/INR:   Recent Labs     03/08/21  1222   PROTIME 13.0   INR 1.0       IMAGING DATA:      Primary Problem  GI bleed    Active Hospital Problems    Diagnosis Date Noted    Hypernatremia [E87.0] 02/24/2021    Acute respiratory failure with hypoxia (HCC) [J96.01] 02/23/2021    Severe malnutrition ([facility] [address] [E43] 01/20/2021    Iron deficiency anemia due to chronic blood loss [D50.0] 12/18/2020    GI bleed [K92.2] 11/30/2020    Chronic diastolic CHF (congestive heart failure) (HCC) [I50.32] 07/21/2020    Moderate major depression (HCC) [F32.1] 06/17/2020    PAD (peripheral artery disease) (HCC) [I73.9] 06/17/2020    CKD (chronic kidney disease) stage 3, GFR 30-59 ml/min [N18.30]     HA (generalized anxiety disorder) [F41.1] 12/13/2018    [facility] [I48.91] 09/13/2018    COPD (chronic obstructive pulmonary disease) ([facility] [J44.9] 09/13/2018    Pulmonary hypertension ([facility] [I27.20] 09/13/2018    Hypertension [I10]     Acquired hypothyroidism [E03.9] 01/18/2013         IMPRESSION:   1. Recurrent Iron def anemia   2. Anticoagulation (due to afib) stopped  3. 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 Acute respiratory failure  5. History of CHF and COPD    RECOMMENDATIONS:  1. The patient hemoglobin is stable, no drop and no active bleeding  2. resp distress. On CPAP  3.  Labs will be monitored                             [facility]                            This note is created with the assistance of a speech recognition program.  While intending to generate a document that actually reflects the content of the visit, the document can still have some errors including those of syntax and sound a like substitutions which may escape proof reading. It such instances, actual meaning can be extrapolated by contextual diversion.

## 2021-03-09 LAB
BASO FLUID: ABNORMAL %
EOSINOPHIL FLUID: ABNORMAL %
FLUID DIFF COMMENT: ABNORMAL
LYMPHOCYTES, BODY FLUID: 77 %
MONOCYTE, FLUID: 20 %
NEUTROPHIL, FLUID: 3 %
OTHER CELLS FLUID: ABNORMAL %

## 2021-03-09 NOTE — DISCHARGE SUMMARY
[facility] Discharge Summary      Patient ID: Nancie Lees    MRN: [id number]     Acct:  [de-identified]       Patient's PCP: WILLIE Maldonado CNP    Admit Date: 2/21/2021     Discharge Date: 3/8/2021      Admitting Physician: Eileen Pelletier MD    Discharge Physician: Eileen Pelletier MD     Discharge Diagnoses:    Primary Problem  GI bleed    Active Hospital Problems    Diagnosis Date Noted    Hypernatremia [E87.0] 02/24/2021    Acute respiratory failure with hypoxia ([facility] [J96.01] 02/23/2021    Severe malnutrition ([facility] [E43] 01/20/2021    Iron deficiency anemia due to chronic blood loss [D50.0] 12/18/2020    GI bleed [K92.2] 11/30/2020    Chronic diastolic CHF (congestive heart failure) ([facility] [I50.32] 07/21/2020    Moderate major depression ([facility] [F32.1] 06/17/2020    PAD (peripheral artery disease) ([facility] [I73.9] 06/17/2020    CKD (chronic kidney disease) stage 3, GFR 30-59 ml/min [N18.30]     HA (generalized anxiety disorder) [F41.1] 12/13/2018    [facility] [I48.91] 09/13/2018    COPD (chronic obstructive pulmonary disease) ([facility] [J44.9] 09/13/2018    Pulmonary hypertension ([facility] [I27.20] 09/13/2018    Hypertension [I10]     Acquired hypothyroidism [E03.9] 01/18/2013     Past Medical History:   Diagnosis Date    A-fib ([facility]     Acquired hypothyroidism     Acute encephalopathy     Acute kidney injury ([facility]     Anxiety     Benign hypertension with CKD (chronic kidney disease) stage III     Chronic back pain     CKD (chronic kidney disease) stage 3, GFR 30-59 ml/min     Constipation     COPD (chronic obstructive pulmonary disease) (HCC)     Cough     Depression     ETOH abuse     Former smoker     3 packs a day for 26 years    HA (generalized anxiety disorder)     History of GI bleed     Hyperlipidemia     Hypertension     Hypothyroid 1/18/2013    Leg pain     Normocytic anemia     Osteoarthritis     [facility]     Primary osteoarthritis     Pulmonary hypertension (HCC)     Pure hypercholesterolemia     SIRS (systemic inflammatory response syndrome) (HCC)     Urge incontinence     Wheezing      The patient was seen and examined on day of discharge and this discharge summary is in conjunction with any daily progress note from day of discharge. Code Status:  Prior    Hospital Course: uncomplicated, extended due to needing BiPap at the SNF. Consults:  pulmonary/intensive care, GI, nephrology, hematology/oncology and palliative care    Significant Diagnostic Studies: as above, and as follows: see chart, thoracentesis x 2    Treatments: as above    Disposition: SNF    Discharged Condition: Stable    Follow Up:  WILLIE Osullivan CNP in two weeks after discharge from SNF. Discharge Medications:    Bonnie Jones \"La Chung\"   Home Medication Instructions [facility]:[id number]    Printed on:03/09/21 4977   Medication Information                      acetaminophen (TYLENOL) 325 MG tablet  Take 650 mg by mouth every 4 hours as needed for Pain Not to exceed 3gm/24hrs             albuterol (PROVENTIL) (2.5 MG/3ML) 0.083% nebulizer solution  Take 2.5 mg by nebulization 2 times daily              albuterol sulfate HFA (PROAIR HFA) 108 (90 Base) MCG/ACT inhaler  Inhale 2 puffs into the lungs every 6 hours as needed for Wheezing             Ascorbic Acid (C-1000 PO)  Take 1 capsule by mouth daily              atorvastatin (LIPITOR) 10 MG tablet  TAKE 1 TABLET BY MOUTH ONCE DAILY             baclofen (LIORESAL) 10 MG tablet  TAKE ONE TABLET BY MOUTH THREE TIMES A DAY AS NEEDED. busPIRone (BUSPAR) 10 MG tablet  Take 10 mg by mouth 3 times daily             cetirizine (ZYRTEC) 10 MG tablet  Take 1 tablet by mouth daily             clonazePAM (KLONOPIN) 0.5 MG tablet  Take 1 tablet by mouth 3 times daily as needed for Anxiety for up to 3 days.              dilTIAZem (CARDIZEM CD) 120 MG extended release capsule  Take 1 discharge plan.       Electronically signed by Bienvenido Cherry MD on 3/9/2021 at 3:59 PM

## 2021-03-14 LAB
CULTURE: ABNORMAL
DIRECT EXAM: ABNORMAL
Lab: ABNORMAL
SPECIMEN DESCRIPTION: ABNORMAL

## 2021-03-18 ENCOUNTER — TELEPHONE (OUTPATIENT)
Dept: OTHER | Facility: CLINIC | Age: 76
End: 2021-03-18

## 2021-03-18 ENCOUNTER — HOSPITAL ENCOUNTER (EMERGENCY)
Age: 76
Discharge: HOME | End: 2021-03-18
Attending: EMERGENCY MEDICINE
Payer: MEDICARE

## 2021-03-18 VITALS
DIASTOLIC BLOOD PRESSURE: 61 MMHG | HEART RATE: 101 BPM | BODY MASS INDEX: 25.03 KG/M2 | TEMPERATURE: 98.3 F | OXYGEN SATURATION: 96 % | SYSTOLIC BLOOD PRESSURE: 129 MMHG | HEIGHT: 67 IN | RESPIRATION RATE: 17 BRPM

## 2021-03-18 DIAGNOSIS — N30.00 ACUTE CYSTITIS WITHOUT HEMATURIA: Primary | ICD-10-CM

## 2021-03-18 LAB
-: ABNORMAL
ABSOLUTE EOS #: 0.1 K/UL (ref 0–0.4)
ABSOLUTE IMMATURE GRANULOCYTE: ABNORMAL K/UL (ref 0–0.3)
ABSOLUTE LYMPH #: 0.5 K/UL (ref 1–4.8)
ABSOLUTE MONO #: 0.5 K/UL (ref 0.1–1.3)
ALBUMIN SERPL-MCNC: 3.5 G/DL (ref 3.5–5.2)
ALBUMIN/GLOBULIN RATIO: ABNORMAL (ref 1–2.5)
ALLEN TEST: ABNORMAL
ALP BLD-CCNC: 138 U/L (ref 35–104)
ALT SERPL-CCNC: 17 U/L (ref 5–33)
AMORPHOUS: ABNORMAL
ANION GAP SERPL CALCULATED.3IONS-SCNC: 4 MMOL/L (ref 9–17)
AST SERPL-CCNC: 19 U/L
BACTERIA: ABNORMAL
BASOPHILS # BLD: 3 % (ref 0–2)
BASOPHILS ABSOLUTE: 0.2 K/UL (ref 0–0.2)
BILIRUB SERPL-MCNC: 0.21 MG/DL (ref 0.3–1.2)
BILIRUBIN URINE: NEGATIVE
BUN BLDV-MCNC: 24 MG/DL (ref 8–23)
BUN/CREAT BLD: ABNORMAL (ref 9–20)
CALCIUM SERPL-MCNC: 8.8 MG/DL (ref 8.6–10.4)
CARBOXYHEMOGLOBIN: 4.2 % (ref 0–5)
CASTS UA: ABNORMAL /LPF
CASTS UA: ABNORMAL /LPF
CHLORIDE BLD-SCNC: 100 MMOL/L (ref 98–107)
CO2: 39 MMOL/L (ref 20–31)
COLOR: YELLOW
COMMENT UA: ABNORMAL
CREAT SERPL-MCNC: 1.39 MG/DL (ref 0.5–0.9)
CRYSTALS, UA: ABNORMAL /HPF
DIFFERENTIAL TYPE: ABNORMAL
EOSINOPHILS RELATIVE PERCENT: 2 % (ref 0–4)
EPITHELIAL CELLS UA: ABNORMAL /HPF
FIO2: ABNORMAL
GFR AFRICAN AMERICAN: 45 ML/MIN
GFR NON-AFRICAN AMERICAN: 37 ML/MIN
GFR SERPL CREATININE-BSD FRML MDRD: ABNORMAL ML/MIN/{1.73_M2}
GFR SERPL CREATININE-BSD FRML MDRD: ABNORMAL ML/MIN/{1.73_M2}
GLUCOSE BLD-MCNC: 102 MG/DL (ref 70–99)
GLUCOSE URINE: NEGATIVE
HCO3 VENOUS: 40.3 MMOL/L (ref 24–30)
HCT VFR BLD CALC: 27.2 % (ref 36–46)
HEMOGLOBIN: 8.7 G/DL (ref 12–16)
IMMATURE GRANULOCYTES: ABNORMAL %
KETONES, URINE: NEGATIVE
LEUKOCYTE ESTERASE, URINE: NEGATIVE
LYMPHOCYTES # BLD: 10 % (ref 24–44)
MCH RBC QN AUTO: 33.3 PG (ref 26–34)
MCHC RBC AUTO-ENTMCNC: 31.9 G/DL (ref 31–37)
MCV RBC AUTO: 104.4 FL (ref 80–100)
METHEMOGLOBIN: 0 % (ref 0–1.9)
MODE: ABNORMAL
MONOCYTES # BLD: 9 % (ref 1–7)
MUCUS: ABNORMAL
NEGATIVE BASE EXCESS, VEN: ABNORMAL MMOL/L (ref 0–2)
NITRITE, URINE: NEGATIVE
NOTIFICATION TIME: ABNORMAL
NOTIFICATION: ABNORMAL
NRBC AUTOMATED: ABNORMAL PER 100 WBC
O2 DEVICE/FLOW/%: ABNORMAL
O2 SAT, VEN: 80.5 % (ref 60–85)
OTHER OBSERVATIONS UA: ABNORMAL
OXYHEMOGLOBIN: ABNORMAL % (ref 95–98)
PATIENT TEMP: 37
PCO2, VEN, TEMP ADJ: ABNORMAL MMHG (ref 39–55)
PCO2, VEN: 64.6 (ref 39–55)
PDW BLD-RTO: 18.8 % (ref 11.5–14.9)
PEEP/CPAP: ABNORMAL
PH UA: 5.5 (ref 5–8)
PH VENOUS: 7.4 (ref 7.32–7.42)
PH, VEN, TEMP ADJ: ABNORMAL (ref 7.32–7.42)
PLATELET # BLD: 363 K/UL (ref 150–450)
PLATELET ESTIMATE: ABNORMAL
PMV BLD AUTO: 8.2 FL (ref 6–12)
PO2, VEN, TEMP ADJ: ABNORMAL MMHG (ref 30–50)
PO2, VEN: 43.7 (ref 30–50)
POSITIVE BASE EXCESS, VEN: 15.5 MMOL/L (ref 0–2)
POTASSIUM SERPL-SCNC: 4.5 MMOL/L (ref 3.7–5.3)
PROTEIN UA: ABNORMAL
PSV: ABNORMAL
PT. POSITION: ABNORMAL
RBC # BLD: 2.61 M/UL (ref 4–5.2)
RBC # BLD: ABNORMAL 10*6/UL
RBC UA: ABNORMAL /HPF
RENAL EPITHELIAL, UA: ABNORMAL /HPF
RESPIRATORY RATE: ABNORMAL
SAMPLE SITE: ABNORMAL
SEG NEUTROPHILS: 76 % (ref 36–66)
SEGMENTED NEUTROPHILS ABSOLUTE COUNT: 4.2 K/UL (ref 1.3–9.1)
SET RATE: ABNORMAL
SODIUM BLD-SCNC: 143 MMOL/L (ref 135–144)
SPECIFIC GRAVITY UA: 1.02 (ref 1–1.03)
TEXT FOR RESPIRATORY: ABNORMAL
TOTAL HB: ABNORMAL G/DL (ref 12–16)
TOTAL PROTEIN: 6.7 G/DL (ref 6.4–8.3)
TOTAL RATE: ABNORMAL
TRICHOMONAS: ABNORMAL
TURBIDITY: CLEAR
URINE HGB: NEGATIVE
UROBILINOGEN, URINE: NORMAL
VT: ABNORMAL
WBC # BLD: 5.5 K/UL (ref 3.5–11)
WBC # BLD: ABNORMAL 10*3/UL
WBC UA: ABNORMAL /HPF
YEAST: ABNORMAL

## 2021-03-18 PROCEDURE — 80053 COMPREHEN METABOLIC PANEL: CPT

## 2021-03-18 PROCEDURE — 6370000000 HC RX 637 (ALT 250 FOR IP): Performed by: EMERGENCY MEDICINE

## 2021-03-18 PROCEDURE — 81001 URINALYSIS AUTO W/SCOPE: CPT

## 2021-03-18 PROCEDURE — 2580000003 HC RX 258: Performed by: EMERGENCY MEDICINE

## 2021-03-18 PROCEDURE — 85025 COMPLETE CBC W/AUTO DIFF WBC: CPT

## 2021-03-18 PROCEDURE — 82805 BLOOD GASES W/O2 SATURATION: CPT

## 2021-03-18 PROCEDURE — 82800 BLOOD PH: CPT

## 2021-03-18 PROCEDURE — 36415 COLL VENOUS BLD VENIPUNCTURE: CPT

## 2021-03-18 PROCEDURE — 99285 EMERGENCY DEPT VISIT HI MDM: CPT

## 2021-03-18 RX ORDER — CEPHALEXIN 500 MG/1
500 CAPSULE ORAL 2 TIMES DAILY
Qty: 20 CAPSULE | Refills: 0 | Status: SHIPPED | OUTPATIENT
Start: 2021-03-18 | End: 2021-03-28

## 2021-03-18 RX ORDER — CEPHALEXIN 250 MG/1
500 CAPSULE ORAL ONCE
Status: COMPLETED | OUTPATIENT
Start: 2021-03-18 | End: 2021-03-18

## 2021-03-18 RX ORDER — 0.9 % SODIUM CHLORIDE 0.9 %
1000 INTRAVENOUS SOLUTION INTRAVENOUS ONCE
Status: COMPLETED | OUTPATIENT
Start: 2021-03-18 | End: 2021-03-18

## 2021-03-18 RX ADMIN — CEPHALEXIN 500 MG: 250 CAPSULE ORAL at 21:27

## 2021-03-18 RX ADMIN — SODIUM CHLORIDE 1000 ML: 9 INJECTION, SOLUTION INTRAVENOUS at 17:38

## 2021-03-18 NOTE — ED NOTES
Bed: B  Expected date:   Expected time:   Means of arrival:   Comments:  WOODY Martinez  03/18/21 9075

## 2021-03-18 NOTE — TELEPHONE ENCOUNTER
Writer contacted  Dr. Hector Mccann to inform of 30 day readmission risk. Dr. Hector Mccann informed writer of possible readmission.

## 2021-03-18 NOTE — ED PROVIDER NOTES
[address] ED  eMERGENCY dEPARTMENT eNCOUnter      Pt Name: Lizette Jerome  MRN: [id number]  [place] 1945  Date of evaluation: 3/18/21      CHIEF COMPLAINT       Chief Complaint   Patient presents with    Shortness of Breath     100% 3L NC         HISTORY OF PRESENT ILLNESS    Lizette Jerome is a 70 y.o. female who presents complaining of altered mental status. Patient was sent in from the nursing home due to altered mental status and low oxygen levels. Patient is very confused and not able to answer any questions appropriately for me. EMS states that there vital signs showed that she was a little hypoxic on 3 L but that is what they had her on but they do not know if that is normal for her or not. Patient reportedly has no history of dementia but evidently according to the son she has been like this before when her hemoglobin has been low. There is no reported fevers at the nursing home.       REVIEW OF SYSTEMS       Review of Systems   Unable to perform ROS: Mental status change       PAST MEDICAL HISTORY     Past Medical History:   Diagnosis Date    A-fib [facility])     Acquired hypothyroidism     Acute encephalopathy     [facility]     Anxiety     Benign hypertension with CKD (chronic kidney disease) stage III     Chronic back pain     CKD (chronic kidney disease) stage 3, GFR 30-59 ml/min     Constipation     COPD (chronic obstructive pulmonary disease) (HCC)     Cough     Depression     ETOH abuse     Former smoker     3 packs a day for 26 years    HA (generalized anxiety disorder)     History of GI bleed     Hyperlipidemia     Hypertension     Hypothyroid 1/18/2013    Leg pain     Normocytic anemia     Osteoarthritis     PAD (peripheral artery disease) (HCC)     Primary osteoarthritis     Pulmonary hypertension ([facility]     Pure hypercholesterolemia     SIRS (systemic inflammatory response syndrome) (HCC)     Urge incontinence     Wheezing        SURGICAL Findings: No erythema or rash. Neurological:      Mental Status: She is alert. She is disoriented. Cranial Nerves: No cranial nerve deficit. Sensory: No sensory deficit. Motor: No weakness or abnormal muscle tone. MEDICAL DECISION MAKING:     Patient appears altered. We will do a full Alterman status work-up. Patient's end-tidal CO2 was extremely high and her oxygen was little low so get an ABG this could all be related to hypercapnia and she may need to be put on BiPAP if she will tolerate it. DIAGNOSTIC RESULTS     EKG: All EKG's are interpreted by the Emergency Department Physician who either signs or Co-signs this chart in the absence of a cardiologist.        RADIOLOGY:All plain film, CT, MRI, and formal ultrasound images (except ED bedside ultrasound)are read by the radiologist and interpretations are directly viewed by the emergency physician. No results found. LABS: All lab results were reviewed bycurlyf, and all abnormals are listed below.   Labs Reviewed   COMPREHENSIVE METABOLIC PANEL - Abnormal; Notable for the following components:       Result Value    Glucose 102 (*)     BUN 24 (*)     CREATININE 1.39 (*)     CO2 39 (*)     Anion Gap 4 (*)     Alkaline Phosphatase 138 (*)     Total Bilirubin 0.21 (*)     GFR Non- 37 (*)     GFR  45 (*)     All other components within normal limits   CBC WITH AUTO DIFFERENTIAL - Abnormal; Notable for the following components:    RBC 2.61 (*)     Hemoglobin 8.7 (*)     Hematocrit 27.2 (*)     .4 (*)     RDW 18.8 (*)     Seg Neutrophils 76 (*)     Lymphocytes 10 (*)     Monocytes 9 (*)     Basophils 3 (*)     Absolute Lymph # 0.50 (*)     All other components within normal limits   URINE RT REFLEX TO CULTURE - Abnormal; Notable for the following components:    Protein, UA TRACE (*)     All other components within normal limits   BLOOD GAS, VENOUS - Abnormal; Notable for the following components:    pCO2, Mirza 64.6 (*)     HCO3, Venous 40.3 (*)     Positive Base Excess, Mirza 15.5 (*)     All other components within normal limits   MICROSCOPIC URINALYSIS - Abnormal; Notable for the following components:    Bacteria, UA MANY (*)     All other components within normal limits         EMERGENCY DEPARTMENT COURSE:   Vitals:    Vitals:    03/18/21 1545 03/18/21 1830 03/18/21 2000   BP: (!) 103/47 130/63 130/73   Pulse: 106     Resp: 17     Temp: 97.8 °F (36.6 °C)     TempSrc: Oral     SpO2: 100% 98% 100%   Height: 5' 7\" (1.702 m)         The patient was given the following medications while in the emergency department:     Orders Placed This Encounter   Medications    0.9 % sodium chloride bolus    cephALEXin (KEFLEX) capsule 500 mg     Order Specific Question:   Antimicrobial Indications     Answer:   Urinary Tract Infection       -------------------------  9:01 PM EDT  Patient has been evaluated and is medically cleared. I spoke with Dr. Dona Campos who is the PCP and she wants me to go ahead and treat her for probable UTI with the bacteria in the urine and send her back to the nursing home. CRITICAL CARE:   None    CONSULTS:  IP CONSULT TO PRIMARY CARE PROVIDER    PROCEDURES:  None    FINAL IMPRESSION      1. Acute cystitis without hematuria          DISPOSITION/PLAN   DISPOSITION Decision To Discharge 03/18/2021 08:51:57 PM      PATIENT REFERRED TO:  No follow-up provider specified.     DISCHARGE MEDICATIONS:  Current Discharge Medication List          (Please note that portions of this note were completed with a voice recognition program.  Efforts were made to edit the dictations but occasionally words are mis-transcribed.)    dAam Romero MD  Attending Dixie Marvin MD  03/18/21 2101

## 2021-03-19 ENCOUNTER — TELEPHONE (OUTPATIENT)
Dept: OTHER | Facility: CLINIC | Age: 76
End: 2021-03-19

## 2021-03-19 NOTE — ED PROVIDER NOTES
Patient was to be discharged home with prescription for antibiotic, prescription was not ordered by prior physician, will order prescription for Keflex for home      Bonnie Ulloa DO  03/18/21 9188

## 2021-03-19 NOTE — ED NOTES
Pt unable to use bedpan. Dr. Claudette Bottom notified or order straight cath, pt agreeable.      [facility]  03/18/21 2019

## 2021-03-19 NOTE — ED NOTES
Pt placed on cardiac monitor.      [facility], [address] [place] [address]  03/18/21 2024

## 2021-04-14 ENCOUNTER — APPOINTMENT (OUTPATIENT)
Dept: CT IMAGING | Age: 76
DRG: 189 | End: 2021-04-14
Payer: MEDICARE

## 2021-04-14 ENCOUNTER — APPOINTMENT (OUTPATIENT)
Dept: GENERAL RADIOLOGY | Age: 76
DRG: 189 | End: 2021-04-14
Payer: MEDICARE

## 2021-04-14 ENCOUNTER — HOSPITAL ENCOUNTER (INPATIENT)
Age: 76
LOS: 1 days | Discharge: HOSPICE | DRG: 189 | End: 2021-04-15
Attending: STUDENT IN AN ORGANIZED HEALTH CARE EDUCATION/TRAINING PROGRAM | Admitting: FAMILY MEDICINE
Payer: MEDICARE

## 2021-04-14 DIAGNOSIS — S00.83XA CONTUSION OF FACE, INITIAL ENCOUNTER: ICD-10-CM
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
## 2021-04-14 DIAGNOSIS — S01.81XA FACIAL LACERATION, INITIAL ENCOUNTER: ICD-10-CM

## 2021-04-14 PROBLEM — I95.9 HYPOTENSION: Status: ACTIVE | Noted: 2021-04-14

## 2021-04-14 LAB
ALLEN TEST: ABNORMAL
ANION GAP SERPL CALCULATED.3IONS-SCNC: 8 MMOL/L (ref 9–17)
BLOOD BANK SPECIMEN: ABNORMAL
BUN BLDV-MCNC: 24 MG/DL (ref 8–23)
CARBOXYHEMOGLOBIN: ABNORMAL %
CHLORIDE BLD-SCNC: 97 MMOL/L (ref 98–107)
CO2: 40 MMOL/L (ref 20–31)
CREAT SERPL-MCNC: 2.81 MG/DL (ref 0.5–0.9)
ETHANOL PERCENT: <0.01 %
ETHANOL: <10 MG/DL
FIO2: ABNORMAL
GFR AFRICAN AMERICAN: 20 ML/MIN
GFR NON-AFRICAN AMERICAN: 16 ML/MIN
GFR SERPL CREATININE-BSD FRML MDRD: ABNORMAL ML/MIN/{1.73_M2}
GFR SERPL CREATININE-BSD FRML MDRD: ABNORMAL ML/MIN/{1.73_M2}
GLUCOSE BLD-MCNC: 88 MG/DL (ref 70–99)
HCG QUALITATIVE: ABNORMAL
HCO3 VENOUS: ABNORMAL MMOL/L (ref 24–30)
HCT VFR BLD CALC: 23.8 % (ref 36–46)
HEMOGLOBIN: 7.5 G/DL (ref 12–16)
INR BLD: 1.1
MCH RBC QN AUTO: 33.1 PG (ref 26–34)
MCHC RBC AUTO-ENTMCNC: 31.5 G/DL (ref 31–37)
MCV RBC AUTO: 105 FL (ref 80–100)
METHEMOGLOBIN: ABNORMAL %
MODE: ABNORMAL
NEGATIVE BASE EXCESS, VEN: ABNORMAL MMOL/L (ref 0–2)
NOTIFICATION TIME: ABNORMAL
NOTIFICATION: ABNORMAL
NRBC AUTOMATED: ABNORMAL PER 100 WBC
O2 DEVICE/FLOW/%: ABNORMAL
O2 SAT, VEN: ABNORMAL %
OXYHEMOGLOBIN: ABNORMAL % (ref 95–98)
PARTIAL THROMBOPLASTIN TIME: 29.6 SEC (ref 24–36)
PATIENT TEMP: ABNORMAL
PCO2, VEN, TEMP ADJ: ABNORMAL MMHG (ref 39–55)
PCO2, VEN: ABNORMAL (ref 39–55)
PDW BLD-RTO: 16.1 % (ref 11.5–14.9)
PEEP/CPAP: ABNORMAL
PH VENOUS: ABNORMAL (ref 7.32–7.42)
PH, VEN, TEMP ADJ: ABNORMAL (ref 7.32–7.42)
PLATELET # BLD: 222 K/UL (ref 150–450)
PMV BLD AUTO: 8.8 FL (ref 6–12)
PO2, VEN, TEMP ADJ: ABNORMAL MMHG (ref 30–50)
PO2, VEN: ABNORMAL (ref 30–50)
POSITIVE BASE EXCESS, VEN: ABNORMAL MMOL/L (ref 0–2)
POTASSIUM SERPL-SCNC: 4.3 MMOL/L (ref 3.7–5.3)
PROTHROMBIN TIME: 14.7 SEC (ref 11.8–14.6)
PSV: ABNORMAL
PT. POSITION: ABNORMAL
RBC # BLD: 2.27 M/UL (ref 4–5.2)
RESPIRATORY RATE: ABNORMAL
SAMPLE SITE: ABNORMAL
SET RATE: ABNORMAL
SODIUM BLD-SCNC: 145 MMOL/L (ref 135–144)
TEXT FOR RESPIRATORY: ABNORMAL
TOTAL HB: ABNORMAL G/DL (ref 12–16)
TOTAL RATE: ABNORMAL
TSH SERPL DL<=0.05 MIU/L-ACNC: 1.58 MIU/L (ref 0.3–5)
VT: ABNORMAL
WBC # BLD: 4.6 K/UL (ref 3.5–11)

## 2021-04-14 PROCEDURE — 90714 TD VACC NO PRESV 7 YRS+ IM: CPT | Performed by: STUDENT IN AN ORGANIZED HEALTH CARE EDUCATION/TRAINING PROGRAM

## 2021-04-14 PROCEDURE — 84443 ASSAY THYROID STIM HORMONE: CPT

## 2021-04-14 PROCEDURE — 82805 BLOOD GASES W/O2 SATURATION: CPT

## 2021-04-14 PROCEDURE — 72125 CT NECK SPINE W/O DYE: CPT

## 2021-04-14 PROCEDURE — 84703 CHORIONIC GONADOTROPIN ASSAY: CPT

## 2021-04-14 PROCEDURE — 85730 THROMBOPLASTIN TIME PARTIAL: CPT

## 2021-04-14 PROCEDURE — 72128 CT CHEST SPINE W/O DYE: CPT

## 2021-04-14 PROCEDURE — 6360000002 HC RX W HCPCS: Performed by: STUDENT IN AN ORGANIZED HEALTH CARE EDUCATION/TRAINING PROGRAM

## 2021-04-14 PROCEDURE — 74176 CT ABD & PELVIS W/O CONTRAST: CPT

## 2021-04-14 PROCEDURE — 71045 X-RAY EXAM CHEST 1 VIEW: CPT

## 2021-04-14 PROCEDURE — 90471 IMMUNIZATION ADMIN: CPT | Performed by: STUDENT IN AN ORGANIZED HEALTH CARE EDUCATION/TRAINING PROGRAM

## 2021-04-14 PROCEDURE — 99285 EMERGENCY DEPT VISIT HI MDM: CPT

## 2021-04-14 PROCEDURE — 96374 THER/PROPH/DIAG INJ IV PUSH: CPT

## 2021-04-14 PROCEDURE — 36415 COLL VENOUS BLD VENIPUNCTURE: CPT

## 2021-04-14 PROCEDURE — 72131 CT LUMBAR SPINE W/O DYE: CPT

## 2021-04-14 PROCEDURE — 82947 ASSAY GLUCOSE BLOOD QUANT: CPT

## 2021-04-14 PROCEDURE — 70450 CT HEAD/BRAIN W/O DYE: CPT

## 2021-04-14 PROCEDURE — 85610 PROTHROMBIN TIME: CPT

## 2021-04-14 PROCEDURE — 96361 HYDRATE IV INFUSION ADD-ON: CPT

## 2021-04-14 PROCEDURE — 85027 COMPLETE CBC AUTOMATED: CPT

## 2021-04-14 PROCEDURE — G0480 DRUG TEST DEF 1-7 CLASSES: HCPCS

## 2021-04-14 PROCEDURE — 80051 ELECTROLYTE PANEL: CPT

## 2021-04-14 PROCEDURE — 84520 ASSAY OF UREA NITROGEN: CPT

## 2021-04-14 PROCEDURE — 2060000000 HC ICU INTERMEDIATE R&B

## 2021-04-14 PROCEDURE — 93005 ELECTROCARDIOGRAM TRACING: CPT | Performed by: STUDENT IN AN ORGANIZED HEALTH CARE EDUCATION/TRAINING PROGRAM

## 2021-04-14 PROCEDURE — 72170 X-RAY EXAM OF PELVIS: CPT

## 2021-04-14 PROCEDURE — 82565 ASSAY OF CREATININE: CPT

## 2021-04-14 PROCEDURE — 2580000003 HC RX 258: Performed by: STUDENT IN AN ORGANIZED HEALTH CARE EDUCATION/TRAINING PROGRAM

## 2021-04-14 RX ORDER — DIVALPROEX SODIUM 250 MG/1
250 TABLET, DELAYED RELEASE ORAL 2 TIMES DAILY
COMMUNITY

## 2021-04-14 RX ORDER — SODIUM CHLORIDE 0.9 % (FLUSH) 0.9 %
5-40 SYRINGE (ML) INJECTION PRN
Status: DISCONTINUED | OUTPATIENT
Start: 2021-04-14 | End: 2021-04-15 | Stop reason: HOSPADM

## 2021-04-14 RX ORDER — ACETAMINOPHEN 325 MG/1
650 TABLET ORAL EVERY 4 HOURS PRN
Status: DISCONTINUED | OUTPATIENT
Start: 2021-04-14 | End: 2021-04-15 | Stop reason: HOSPADM

## 2021-04-14 RX ORDER — SODIUM CHLORIDE 9 MG/ML
25 INJECTION, SOLUTION INTRAVENOUS PRN
Status: DISCONTINUED | OUTPATIENT
Start: 2021-04-14 | End: 2021-04-15 | Stop reason: HOSPADM

## 2021-04-14 RX ORDER — IPRATROPIUM BROMIDE AND ALBUTEROL SULFATE 2.5; .5 MG/3ML; MG/3ML
1 SOLUTION RESPIRATORY (INHALATION) 3 TIMES DAILY
COMMUNITY

## 2021-04-14 RX ORDER — SODIUM CHLORIDE 0.9 % (FLUSH) 0.9 %
5-40 SYRINGE (ML) INJECTION EVERY 12 HOURS SCHEDULED
Status: DISCONTINUED | OUTPATIENT
Start: 2021-04-15 | End: 2021-04-15 | Stop reason: HOSPADM

## 2021-04-14 RX ORDER — HYDROCODONE BITARTRATE AND ACETAMINOPHEN 5; 325 MG/1; MG/1
1 TABLET ORAL EVERY 6 HOURS PRN
COMMUNITY

## 2021-04-14 RX ORDER — NALOXONE HYDROCHLORIDE 1 MG/ML
0.4 INJECTION INTRAMUSCULAR; INTRAVENOUS; SUBCUTANEOUS ONCE
Status: COMPLETED | OUTPATIENT
Start: 2021-04-14 | End: 2021-04-14

## 2021-04-14 RX ORDER — LORAZEPAM 0.5 MG/1
0.5 TABLET ORAL
COMMUNITY

## 2021-04-14 RX ORDER — MORPHINE SULFATE 100 MG/5ML
5 SOLUTION ORAL EVERY 4 HOURS PRN
COMMUNITY

## 2021-04-14 RX ORDER — IPRATROPIUM BROMIDE AND ALBUTEROL SULFATE 2.5; .5 MG/3ML; MG/3ML
1 SOLUTION RESPIRATORY (INHALATION) EVERY 4 HOURS PRN
COMMUNITY

## 2021-04-14 RX ORDER — TETANUS AND DIPHTHERIA TOXOIDS ADSORBED 2; 2 [LF]/.5ML; [LF]/.5ML
0.5 INJECTION INTRAMUSCULAR ONCE
Status: COMPLETED | OUTPATIENT
Start: 2021-04-14 | End: 2021-04-14

## 2021-04-14 RX ORDER — SODIUM CHLORIDE, SODIUM LACTATE, POTASSIUM CHLORIDE, CALCIUM CHLORIDE 600; 310; 30; 20 MG/100ML; MG/100ML; MG/100ML; MG/100ML
INJECTION, SOLUTION INTRAVENOUS CONTINUOUS
Status: DISCONTINUED | OUTPATIENT
Start: 2021-04-14 | End: 2021-04-14

## 2021-04-14 RX ADMIN — SODIUM CHLORIDE, POTASSIUM CHLORIDE, SODIUM LACTATE AND CALCIUM CHLORIDE: 600; 310; 30; 20 INJECTION, SOLUTION INTRAVENOUS at 16:00

## 2021-04-14 RX ADMIN — TETANUS AND DIPHTHERIA TOXOIDS ADSORBED 0.5 ML: 2; 2 INJECTION INTRAMUSCULAR at 16:36

## 2021-04-14 RX ADMIN — NALOXONE HYDROCHLORIDE 0.4 MG: 1 INJECTION PARENTERAL at 16:34

## 2021-04-14 ASSESSMENT — PAIN SCALES - GENERAL: PAINLEVEL_OUTOF10: 0

## 2021-04-14 NOTE — ED TRIAGE NOTES
Mode of arrival (squad #, walk in, police, etc) : squad         Chief complaint(s): Fall, head injury        Arrival Note (brief scenario, treatment PTA, etc). : Pt states she fell at her nursing home and she is not sure how. Pt denies dizziness. Pt has a small hematoma forming on her left forehead and two lacerations to the inside of her top lip. Pt is currently denying dizziness or pain. Pt found to be hypotensive with triage, RN will attempt to call nursing home for baseline vitals. C= \"Have you ever felt that you should Cut down on your drinking? \"  No  A= \"Have people Annoyed you by criticizing your drinking? \"  No  G= \"Have you ever felt bad or Guilty about your drinking? \"  No  E= \"Have you ever had a drink as an Eye-opener first thing in the morning to steady your nerves or to help a hangover? \"  No      Deferred []      Reason for deferring: N/A    *If yes to two or more: probable alcohol abuse. *

## 2021-04-14 NOTE — FLOWSHEET NOTE
Patient sleeping; [place] visit with son Nirmala Thurman and Hayde Evansjaylene; son provided medical update and stated patient is going to be admitted;  family desiring assistance and support regarding palliative care/hospice choices; writer made a referral to Alyssa Escalante, Palliative Care RN in this regard; facilitated communication between family and patient's nurse, Kalli Morrissey, per family's request; listening presence and support;      04/14/21 1919   Encounter Summary   Services provided to: Patient and family together   Referral/Consult From: Multi-disciplinary team   Support System Children   Continue Visiting   (4/13/21)   Complexity of Encounter Moderate   Length of Encounter 30 minutes   Spiritual Assessment Completed Yes   Crisis   Type Trauma   Assessment Approachable; Anxious; Hopeful;Coping;Helplessness   Intervention Active listening;Explored feelings, thoughts, concerns;Sustaining presence/ Ministry of presence; Discussed illness/injury and it's impact   Outcome Expressed gratitude;Engaged in conversation;Expressed feelings/needs/concerns;Coping; Hopeful;Receptive

## 2021-04-14 NOTE — CONSULTS
medications    Medication Sig Start Date End Date Taking? Authorizing Provider   clonazePAM (KLONOPIN) 0.5 MG tablet Take 1 tablet by mouth 3 times daily as needed for Anxiety for up to 3 days.  3/6/21 3/18/21  Nick Benton MD   cetirizine (ZYRTEC) 10 MG tablet Take 1 tablet by mouth daily 3/6/21   Nick Benton MD   pantoprazole (PROTONIX) 40 MG tablet Take 1 tablet by mouth every morning (before breakfast) 3/6/21   Nick Benton MD   furosemide (LASIX) 40 MG tablet Take 1 tablet by mouth daily 3/1/21   Nick Benton MD   ferrous sulfate (IRON 325) 325 (65 Fe) MG tablet Take 1 tablet by mouth 3 times daily (with meals) 2/28/21   Nick Benton MD   albuterol sulfate HFA (PROAIR HFA) 108 (90 Base) MCG/ACT inhaler Inhale 2 puffs into the lungs every 6 hours as needed for Wheezing    Historical Provider, MD   fluticasone (FLOVENT HFA) 110 MCG/ACT inhaler Inhale 1 puff into the lungs 2 times daily    Historical Provider, MD   busPIRone (BUSPAR) 10 MG tablet Take 10 mg by mouth 3 times daily    Historical Provider, MD   fluticasone (FLOVENT HFA) 220 MCG/ACT inhaler Inhale 2 puffs into the lungs 2 times daily    Historical Provider, MD   guaiFENesin (MUCINEX) 600 MG extended release tablet Take 1,200 mg by mouth every 6 hours as needed for Congestion    Historical Provider, MD   albuterol (PROVENTIL) (2.5 MG/3ML) 0.083% nebulizer solution Take 2.5 mg by nebulization 2 times daily     Historical Provider, MD   acetaminophen (TYLENOL) 325 MG tablet Take 650 mg by mouth every 4 hours as needed for Pain Not to exceed 3gm/24hrs    Historical Provider, MD   atorvastatin (LIPITOR) 10 MG tablet TAKE 1 TABLET BY MOUTH ONCE DAILY 1/11/21   Eugune Blind, APRN - CNP   lisinopril (PRINIVIL;ZESTRIL) 2.5 MG tablet Take 1 tablet by mouth Daily with lunch 12/30/20   Philip Paz MD   metoprolol tartrate (LOPRESSOR) 25 MG tablet Take 1 tablet by mouth 2 times daily 12/29/20   Philip Paz MD   Oxygen Concentrator continuous    Historical Provider, MD   tamsulosin (FLOMAX) 0.4 MG capsule TAKE 1 CAPSULE BY MOUTH DAILY 12/15/20   Rupali Ordonez, WILLIE - CNP   omeprazole (PRILOSEC) 40 MG delayed release capsule TAKE 1 CAPSULE BY MOUTH EVERY MORNING BEFORE BREAKFAST 12/14/20   Rupali Ordonez, WILLIE - CNP   DULoxetine (CYMBALTA) 60 MG extended release capsule Take 1 capsule by mouth daily 12/14/20   Sol Mew, WILLIE - CNP   baclofen (LIORESAL) 10 MG tablet TAKE ONE TABLET BY MOUTH THREE TIMES A DAY AS NEEDED. 11/30/20   Sol MewWILLIE - CNP   Lift Chair MISC Use daily for comfort 11/30/20   Sol Mew, WILLIE - CNP   levothyroxine (SYNTHROID) 125 MCG tablet Take 1 tablet by mouth daily 11/30/20   Sol Mew, WILLIE - CNP   clonazePAM (KLONOPIN) 0.5 MG tablet Take 1 tablet by mouth 3 times daily as needed for Anxiety for up to 30 days. 11/17/20 2/15/21  Sol Mew, WILLIE - BOBY   dilTIAZem (CARDIZEM CD) 120 MG extended release capsule Take 1 capsule by mouth daily 10/19/20   Sol Mew, APRN - BOBY   fluticasone [facility]) 50 MCG/ACT nasal spray USE ONE SPRAY TO EACH NOSTRIL ONCE ADAY 9/4/20   WILLIE Prater - CNP   Respiratory Therapy Supplies (NEBULIZER/TUBING/MOUTHPIECE) KIT Use every 4-6 hours prn for copd 6/15/20   Sol Mepillo, WILLIE - CNP   Ascorbic Acid (C-1000 PO) Take 1 capsule by mouth daily     Historical Provider, MD     Allergies  is allergic to tizanidine. Family History  family history includes COPD in her mother; Cancer in an other family member; Emphysema in her sister; Heart Disease in her mother. Social History   reports that she has quit smoking. Her smoking use included cigarettes. She has a 78.00 pack-year smoking history. She has never used smokeless tobacco. She reports previous alcohol use. She reports that she does not use drugs. Review of Systems:  Patient is lethargic. Detailed review of system is not feasible.   Please refer to chart. OBJECTIVE:   VITALS:  height is 5' 5\" (1.651 m) and weight is 150 lb (68 kg). Her oral temperature is 98 °F (36.7 °C). Her blood pressure is 78/47 (abnormal) and her pulse is 56. Her respiration is 16 and oxygen saturation is 98%. CONSTITUTIONAL: Lethargic but arousable. SKIN: Skin color, texture, turgor normal. No rashes or lesions. LYMPH: no cervical nodes, no inguinal nodes  HEENT: Lower lip laceration. No active bleeding., Head is normocephalic, atraumatic. EOMI, PERRLA  NECK: Supple, symmetrical, trachea midline, no adenopathy, thyroid symmetric, not enlarged and no tenderness, skin normal and c-collar placed. CHEST/LUNGS: chest symmetric with normal A/P diameter, normal respiratory rate and rhythm, lungs clear to auscultation without wheezes, rales or rhonchi. No accessory muscle use. Scars None   CARDIOVASCULAR: Heart regular rate and rhythm Normal S1 and S2. . Carotid and femoral pulses 2+/4 and equal bilaterally  ABDOMEN: Soft nondistended nontender  RECTAL: deferred, not clinically indicated  NEUROLOGIC: Patient is lethargic. Detailed neurological examination was deferred.   EXTREMITIES: no cyanosis, no clubbing and no edema    LABS:   CBC with Differential:    Lab Results   Component Value Date    WBC 4.6 04/14/2021    RBC 2.27 04/14/2021    RBC 4.50 02/18/2012    HGB 7.5 04/14/2021    HCT 23.8 04/14/2021     04/14/2021     02/18/2012    .0 04/14/2021    MCH 33.1 04/14/2021    MCHC 31.5 04/14/2021    RDW 16.1 04/14/2021    LYMPHOPCT 10 03/18/2021    MONOPCT 9 03/18/2021    BASOPCT 3 03/18/2021    MONOSABS 0.50 03/18/2021    LYMPHSABS 0.50 03/18/2021    EOSABS 0.10 03/18/2021    BASOSABS 0.20 03/18/2021    DIFFTYPE NOT REPORTED 03/18/2021     BMP:    Lab Results   Component Value Date     04/14/2021    K 4.3 04/14/2021    CL 97 04/14/2021    CO2 40 04/14/2021    BUN 24 04/14/2021    LABALBU 3.5 03/18/2021    LABALBU 4.5 02/18/2012    CREATININE 2.81 04/14/2021 CALCIUM 8.8 03/18/2021    GFRAA 20 04/14/2021    LABGLOM 16 04/14/2021    GLUCOSE 88 04/14/2021    GLUCOSE 113 02/18/2012     Hepatic Function Panel:    Lab Results   Component Value Date    ALKPHOS 138 03/18/2021    ALT 17 03/18/2021    AST 19 03/18/2021    PROT 6.7 03/18/2021    BILITOT 0.21 03/18/2021    BILIDIR 0.16 02/02/2021    IBILI 0.23 02/02/2021    LABALBU 3.5 03/18/2021    LABALBU 4.5 02/18/2012     Calcium:    Lab Results   Component Value Date    CALCIUM 8.8 03/18/2021     Magnesium:    Lab Results   Component Value Date    MG 2.0 03/01/2021     Phosphorus:    Lab Results   Component Value Date    PHOS 4.1 03/16/2020     PT/INR:    Lab Results   Component Value Date    PROTIME 14.7 04/14/2021    INR 1.1 04/14/2021     ABG:    Lab Results   Component Value Date    PHART 7.353 03/01/2021    DZH1WEE 92.4 03/01/2021    PO2ART 52.3 03/01/2021    YBC9FJN 51.4 03/01/2021    V1IXVPJT 84.6 03/01/2021     Urine Culture:  No components found for: CURINE  Blood Culture:  No components found for: CBLOOD, CFUNGUSBL  Stool Culture:  No components found for: CSTOOL    RADIOLOGY:   I have personally reviewed the following films:  Xr Pelvis (1-2 Views)    Result Date: 4/14/2021  EXAMINATION: ONE XRAY VIEW OF THE PELVIS 4/14/2021 4:27 pm COMPARISON: None. HISTORY: ORDERING SYSTEM PROVIDED HISTORY: fall TECHNOLOGIST PROVIDED HISTORY: fall Reason for Exam: fall. trauma Acuity: Unknown Type of Exam: Initial Mechanism of Injury: fall. trauma FINDINGS: Osteopenia complicates assessment. Both femoral heads are well circumscribed and situated within the acetabula. Mild degenerative changes are present in the hips and SI joints. Moderate arthritic changes are present in the included lower lumbar spine. Gas overlies the sacrum limiting assessment. Osteopenia and degenerative changes without acute fracture or dislocation. RECOMMENDATION: Follow-up studies as clinically indicated.      Ct Head Wo Contrast    Result Date: 4/14/2021  EXAMINATION: CT OF THE HEAD WITHOUT CONTRAST  4/14/2021 4:51 pm TECHNIQUE: CT of the head was performed without the administration of intravenous contrast. Dose modulation, iterative reconstruction, and/or weight based adjustment of the mA/kV was utilized to reduce the radiation dose to as low as reasonably achievable. COMPARISON: None. HISTORY: ORDERING SYSTEM PROVIDED HISTORY: Nathan Miner AMS TECHNOLOGIST PROVIDED HISTORY: Nathan Miner AMS Decision Support Exception->Emergency Medical Condition (MA) Reason for Exam: Fall, AMS, unknown trauma Acuity: Unknown Type of Exam: Unknown FINDINGS: BRAIN/VENTRICLES: There is no acute intracranial hemorrhage, mass effect or midline shift. No abnormal extra-axial fluid collection. The gray-white differentiation is maintained without evidence of an acute infarct. There is mild ventricular dilatation with proportionate sulcal prominence. ORBITS: The visualized portion of the orbits demonstrate no acute abnormality. SINUSES: The visualized paranasal sinuses and mastoid air cells demonstrate no acute abnormality. SOFT TISSUES/SKULL:  No acute abnormality of the visualized skull or soft tissues. Age-related volume loss No acute intracranial abnormality     Ct Cervical Spine Wo Contrast    Result Date: 4/14/2021  EXAMINATION: CT OF THE CERVICAL SPINE WITHOUT CONTRAST 4/14/2021 4:51 pm TECHNIQUE: CT of the cervical spine was performed without the administration of intravenous contrast. Multiplanar reformatted images are provided for review. Dose modulation, iterative reconstruction, and/or weight based adjustment of the mA/kV was utilized to reduce the radiation dose to as low as reasonably achievable.  COMPARISON: 02/21/2021 HISTORY: ORDERING SYSTEM PROVIDED HISTORY: Fall SH, midline tenderness TECHNOLOGIST PROVIDED HISTORY: Fall SH, midline tenderness Decision Support Exception->Emergency Medical Condition (MA) Reason for Exam: Fall, unknown when this occurred Acuity: HISTORY: fall, hypotensive TECHNOLOGIST PROVIDED HISTORY: fall, hypotensive Decision Support Exception->Emergency Medical Condition (MA) Reason for Exam: Fall, unknown when this occurred Acuity: Unknown Type of Exam: Unknown FINDINGS: BONES/ALIGNMENT: There is normal alignment of the spine except for slight anterior subluxation at L5-S1. The vertebral body heights are maintained. No osseous destructive lesion is seen. DEGENERATIVE CHANGES: Vacuum disc phenomena at L3-4 and L4-5. Spondylosis. Moderate trefoil type spinal stenosis at L2 through L5 due to a circumferential disc marginal osteophyte and hypertrophic facet disease. Moderate neural foraminal narrowing bilaterally at L5-S1. Well corticated avulsion fractures of the transverse processes at L2 and L3 of the left SOFT TISSUES/RETROPERITONEUM: No paraspinal mass is seen. Avulsion fractures L1 and L2 transverse processes on the left appear remote. Multilevel spinal stenosis. MRI would be more sensitive. No acute fracture noted otherwise. Xr Chest Portable    Result Date: 4/14/2021  EXAMINATION: ONE XRAY VIEW OF THE CHEST 4/14/2021 4:27 pm COMPARISON: March 8 2021 chest HISTORY: ORDERING SYSTEM PROVIDED HISTORY: bradycardia TECHNOLOGIST PROVIDED HISTORY: bradycardia Reason for Exam: bradycardia Acuity: Unknown Type of Exam: Unknown Additional signs and symptoms: bradycardia FINDINGS: Stable cardiomegaly Persistent small right with increase in now moderate left pleural effusion. New moderate prominence of left upper lung markings and mild streaky right perihilar density.   Otherwise clear right lung     Interval increase in now moderate left and persistent small right pleural effusions New moderate prominence of left upper lung markings which may related to layering of pleural fluid and/or asymmetric congestion     Ct Chest Abdomen Pelvis Wo Contrast    Result Date: 4/14/2021  EXAMINATION: CT OF THE CHEST, ABDOMEN, AND PELVIS WITHOUT CONTRAST 4/14/2021 focal destruction. There is haziness in the subcutaneous soft tissues with an anasarca type appearance. 1.  Bilateral pleural effusions large on the right and moderate on the left. 2.  Cardiomegaly and pericardial effusion. 3.  Small amount of free fluid in the pelvis. No bowel obstruction, gallstones or appendicitis. No urinary obstruction. 4.  Anasarca type appearance with edema in the soft tissues. IMPRESSION:   1. History of fall. Lower lip laceration. Trauma alert called and responded within 10 minutes. Patient will be getting CT scan of the head neck chest abdomen pelvis. CODE STATUS noted. Patient is leaning more towards hospice I was told. 2. CT scan of the chest abdomen and pelvis showed pleural effusion cardiomegaly pericardial effusion. Small amount of free fluid in the pelvis. No bowel obstruction. No evidence of appendicitis. No urinary obstruction. Anasarca. 3. CT of the lumbar spine showed avulsion fracture L1 and L2 transverse process appears remote. No acute fracture otherwise noted. 4. CT of the thoracic spine showed no evidence for fracture or malalignment. 5. CT of the C-spine shows no acute abnormality of the C-spine. 6. CT of the head shows age-related volume loss. No acute intracranial abnormality. 7. X-ray of the pelvis revealed no evidence of acute fracture. Chest x-ray revealed pleural effusions. Congestion. 8. Blood work revealed elevated creatinine. Potassium is normal.  WBC count is normal.  Hemoglobin is 7.5. Platelet count is adequate. does not have any pertinent problems on file. PLAN:   1. After all the trauma work-up was completed I received a phone call from the emergency room physician. Patient does not want any invasive monitoring. She does not want any pressors. She is leaning towards hospice care and she will be admitted to medical service and hospice will be consulted.   Again there are no acute traumatic injuries at this

## 2021-04-14 NOTE — ED PROVIDER NOTES
[facility]  Emergency Department Encounter  Emergency Medicine Resident     Pt Name:La Monaco  MRN: [id number]  Birthdate 1945  Date of evaluation: 4/14/21  PCP:  WILLIE Ramsey CNP    CHIEF COMPLAINT       Chief Complaint   Patient presents with    Fall    Head Injury       HISTORY OF PRESENT ILLNESS  (Location/Symptom, Timing/Onset, Context/Setting, Quality, Duration, Modifying Factors, Severity.)      Stephon Donnelly is a 78 y.o. female who presents with witnessed fall at nursing facility however unknown situation leading up to fall. History of heart failure with preserved ejection fraction, A. candido previously on anticoagulation however has been discontinued due to anemia and GI bleeds. No reported loss of consciousness, did strike her head, reportedly contusion to the face and lip laceration with bleeding controlled on scene. Question of patient's CODE STATUS as she does not want to be in the hospital and is reportedly following with palliative care. Patient is a poor historian providing limited history, notes she took a Percocet for the pain prior to arrival.  Patient has no complaints or like no interventions however is unclear if patient has decision-making capacity.     PAST MEDICAL / SURGICAL / SOCIAL / FAMILY HISTORY      has a past medical history of A-fib ([address] Acquired hypothyroidism, Acute encephalopathy, Acute kidney injury ([address] Anxiety, Benign hypertension with CKD (chronic kidney disease) stage III, Chronic back pain, CKD (chronic kidney disease) stage 3, GFR 30-59 ml/min, Constipation, COPD (chronic obstructive pulmonary disease) ([address] Cough, Depression, ETOH abuse, Former smoker, HA (generalized anxiety disorder), History of GI bleed, Hyperlipidemia, Hypertension, Hypothyroid, Leg pain, Normocytic anemia, Osteoarthritis, PAD (peripheral artery disease) ([address] Primary osteoarthritis, Pulmonary hypertension ([address] Pure hypercholesterolemia, SIRS (systemic cancer       Allergies:  Tizanidine    Home Medications:  Prior to Admission medications    Medication Sig Start Date End Date Taking? Authorizing Provider   LORazepam (ATIVAN) 0.5 MG tablet Take 0.5 mg by mouth every 2 hours as needed for Anxiety (agitation). Yes Historical Provider, MD   divalproex (DEPAKOTE) 250 MG DR tablet Take 250 mg by mouth 2 times daily   Yes Historical Provider, MD   ipratropium-albuterol (DUONEB) 0.5-2.5 (3) MG/3ML SOLN nebulizer solution Inhale 1 vial into the lungs every 4 hours as needed for Shortness of Breath   Yes Historical Provider, MD   ipratropium-albuterol (DUONEB) 0.5-2.5 (3) MG/3ML SOLN nebulizer solution Inhale 1 vial into the lungs 3 times daily   Yes Historical Provider, MD   morphine sulfate 20 MG/ML concentrated oral solution Take 5 mg by mouth every 4 hours as needed for Pain. Yes Historical Provider, MD   HYDROcodone-acetaminophen (NORCO) 5-325 MG per tablet Take 1 tablet by mouth every 6 hours as needed for Pain.    Yes Historical Provider, MD   cetirizine (ZYRTEC) 10 MG tablet Take 1 tablet by mouth daily 3/6/21  Yes Nick Benton MD   pantoprazole (PROTONIX) 40 MG tablet Take 1 tablet by mouth every morning (before breakfast) 3/6/21  Yes Nick Benton MD   furosemide (LASIX) 40 MG tablet Take 1 tablet by mouth daily 3/1/21  Yes Nick Benton MD   ferrous sulfate (IRON 325) 325 (65 Fe) MG tablet Take 1 tablet by mouth 3 times daily (with meals) 2/28/21  Yes Nick Benton MD   albuterol sulfate HFA (PROAIR HFA) 108 (90 Base) MCG/ACT inhaler Inhale 2 puffs into the lungs every 6 hours as needed for Wheezing   Yes Historical Provider, MD   busPIRone (BUSPAR) 10 MG tablet Take 10 mg by mouth 3 times daily   Yes Historical Provider, MD   fluticasone (FLOVENT HFA) 220 MCG/ACT inhaler Inhale 2 puffs into the lungs 2 times daily   Yes Historical Provider, MD   guaiFENesin (MUCINEX) 600 MG extended release tablet Take 1,200 mg by mouth every 6 hours as needed for Congestion   Yes Historical Provider, MD   acetaminophen (TYLENOL) 325 MG tablet Take 650 mg by mouth every 4 hours as needed for Pain Not to exceed 3gm/24hrs   Yes Historical Provider, MD   atorvastatin (LIPITOR) 10 MG tablet TAKE 1 TABLET BY MOUTH ONCE DAILY 1/11/21  Yes Burnis Favorite, APRN - CNP   lisinopril (PRINIVIL;ZESTRIL) 2.5 MG tablet Take 1 tablet by mouth Daily with lunch 12/30/20  Yes Diana Olivier MD   metoprolol tartrate (LOPRESSOR) 25 MG tablet Take 1 tablet by mouth 2 times daily 12/29/20  Yes Diana Olivier MD   tamsulosin (FLOMAX) 0.4 MG capsule TAKE 1 CAPSULE BY MOUTH DAILY 12/15/20  Yes Burnis Favorite, APRN - CNP   DULoxetine (CYMBALTA) 60 MG extended release capsule Take 1 capsule by mouth daily 12/14/20  Yes Burnis Favorite, APRN - CNP   baclofen (LIORESAL) 10 MG tablet TAKE ONE TABLET BY MOUTH THREE TIMES A DAY AS NEEDED. 11/30/20  Yes Burnis Favorite, APRN - CNP   levothyroxine (SYNTHROID) 125 MCG tablet Take 1 tablet by mouth daily 11/30/20  Yes Burnis Favorite, APRN - CNP   dilTIAZem (CARDIZEM CD) 120 MG extended release capsule Take 1 capsule by mouth daily 10/19/20  Yes Burnis Favorite, APRN - CNP   fluticasone (FLONASE) 50 MCG/ACT nasal spray USE ONE SPRAY TO EACH NOSTRIL ONCE ADAY 9/4/20  Yes Lauren Marin, APRN - CNP   ascorbic acid (C-1000) 1000 MG tablet Take 1,000 mg by mouth daily    Yes Historical Provider, MD   Oxygen Concentrator continuous    Historical Provider, MD   Lift Chair MISC Use daily for comfort 11/30/20   Burnis Favorite, APRN - CNP   Respiratory Therapy Supplies (NEBULIZER/TUBING/MOUTHPIECE) KIT Use every 4-6 hours prn for copd 6/15/20   Burnis Favorite, APRN - CNP       REVIEW OF SYSTEMS    (2-9 systems for level 4, 10 or more for level 5)      Review of Systems   Constitutional: Negative for chills and fever. HENT: Negative for nosebleeds, sore throat and trouble swallowing.     Respiratory: Negative for shortness of breath. Cardiovascular: Negative for chest pain. Gastrointestinal: Negative for abdominal pain, constipation, diarrhea, nausea and vomiting. Genitourinary: Negative for dysuria and vaginal discharge. Musculoskeletal: Negative for arthralgias and myalgias. Skin: Positive for wound (lip). Neurological: Negative for light-headedness and headaches. Psychiatric/Behavioral: Positive for confusion. Negative for behavioral problems. PHYSICAL EXAM   (up to 7 for level 4, 8 or more for level 5)      INITIAL VITALS:   BP (!) 84/34   Pulse 73   Temp 97.1 °F (36.2 °C) (Axillary)   Resp 22   Ht 5' 5\" (1.651 m)   Wt 183 lb 10.3 oz (83.3 kg)   SpO2 92%   BMI 30.56 kg/m²     Physical Exam  Vitals signs and nursing note reviewed. Constitutional:       General: She is not in acute distress. Appearance: Normal appearance. She is well-developed. She is not diaphoretic. HENT:      Head: Normocephalic. Comments: Ecchymosis to the forehead     Right Ear: External ear normal.      Left Ear: External ear normal.      Nose: Nose normal.      Mouth/Throat:      Pharynx: Uvula midline. No oropharyngeal exudate. Eyes:      General:         Right eye: No discharge. Left eye: No discharge. Conjunctiva/sclera: Conjunctivae normal.      Pupils: Pupils are equal, round, and reactive to light. Neck:      Musculoskeletal: Normal range of motion. Cardiovascular:      Rate and Rhythm: Bradycardia present. Rhythm irregular. Heart sounds: Normal heart sounds. No murmur. Pulmonary:      Effort: Pulmonary effort is normal. No respiratory distress. Abdominal:      Palpations: Abdomen is soft. Tenderness: There is no abdominal tenderness. Musculoskeletal: Normal range of motion. General: No deformity. Skin:     General: Skin is warm and dry. Capillary Refill: Capillary refill takes less than 2 seconds. Neurological:      General: No focal deficit present.       Mental Status: She is alert. Cranial Nerves: No cranial nerve deficit. Comments: Intermittently alert and oriented with intermittently confused speech. Limited participation in exam         DIFFERENTIAL  DIAGNOSIS     PLAN (LABS / IMAGING / EKG):  Orders Placed This Encounter   Procedures    CT Head WO Contrast    CT Cervical Spine WO Contrast    XR CHEST PORTABLE    XR PELVIS (1-2 VIEWS)    CT THORACIC SPINE WO CONTRAST    CT LUMBAR SPINE WO CONTRAST    CT CHEST ABDOMEN PELVIS WO CONTRAST    Trauma Panel    TSH w/reflex to FT4    Urinalysis Reflex to Culture    DIET GENERAL;    Vital signs per unit routine    Notify physician    Notify physician    Up with assistance    Place intermittent pneumatic compression device    Telemetry Monitoring    Turn or assist with turn approximately every 2 hours if patient is unable to turn self. Remind patient to turn if necessary.     Assess skin per unit guidelines    Pad/offload medical devices    Maintain HOB at the lowest elevation consistent with medical plan of care    Use lift equipment for lifting patient    Maintain heels off of bed at all times    Change foam dressing for prevention -- see instructions below    Minimize layers under patient    Limited Code    Inpatient consult to Primary Care Provider    Inpatient consult to Cardiology    Inpatient consult to Palliative Care    Inpatient consult to Social Work    EKG 12 Lead    Place CT Compatible peripheral IV    PATIENT STATUS (FROM ED OR OR/PROCEDURAL) Inpatient       MEDICATIONS ORDERED:  Orders Placed This Encounter   Medications    DISCONTD: lactated ringers infusion    diptheria-tetanus toxoids (DECAVAC) 2-2 LF/0.5ML injection 0.5 mL    naloxone (NARCAN) injection 0.4 mg    sodium chloride flush 0.9 % injection 5-40 mL    sodium chloride flush 0.9 % injection 5-40 mL    0.9 % sodium chloride infusion    acetaminophen (TYLENOL) tablet 650 mg       DDX: Cardiogenic syncope versus mechanical fall versus ICH versus facial trauma versus other    DIAGNOSTIC RESULTS / EMERGENCY DEPARTMENT COURSE / MDM     LABS:  Results for orders placed or performed during the hospital encounter of 04/14/21   Trauma Panel   Result Value Ref Range    Ethanol <10 <10 mg/dL    Ethanol percent <0.010 %    Blood Bank Specimen Unable to perform testing: No specimen received. BUN 24 (H) 8 - 23 mg/dL    WBC 4.6 3.5 - 11.0 k/uL    RBC 2.27 (L) 4.0 - 5.2 m/uL    Hemoglobin 7.5 (L) 12.0 - 16.0 g/dL    Hematocrit 23.8 (L) 36 - 46 %    .0 (H) 80 - 100 fL    MCH 33.1 26 - 34 pg    MCHC 31.5 31 - 37 g/dL    RDW 16.1 (H) 11.5 - 14.9 %    Platelets 335 532 - 643 k/uL    MPV 8.8 6.0 - 12.0 fL    NRBC Automated NOT REPORTED per 100 WBC    CREATININE 2.81 (H) 0.50 - 0.90 mg/dL    GFR Non- 16 (L) >60 mL/min    GFR African American 20 (L) >60 mL/min    GFR Comment          GFR Staging NOT REPORTED     Glucose 88 70 - 99 mg/dL    hCG Qual Unable to perform testing: No specimen received. NEGATIVE    Sodium 145 (H) 135 - 144 mmol/L    Potassium 4.3 3.7 - 5.3 mmol/L    Chloride 97 (L) 98 - 107 mmol/L    CO2 40 (H) 20 - 31 mmol/L    Anion Gap 8 (L) 9 - 17 mmol/L    Protime 14.7 (H) 11.8 - 14.6 sec    INR 1.1     PTT 29.6 24.0 - 36.0 sec    pH, Mirza Unable to perform testing: No specimen received. 7.320 - 7.420    pCO2, Mirza Unable to perform testing: No specimen received. 39.0 - 55.0    pO2, Mirza Unable to perform testing: No specimen received. 30 - 50    HCO3, Venous Unable to perform testing: No specimen received. 24.0 - 30.0 mmol/L    Positive Base Excess, Mirza Unable to perform testing: No specimen received. 0.0 - 2.0 mmol/L    Negative Base Excess, Mirza Unable to perform testing: No specimen received. 0.0 - 2.0 mmol/L    O2 Sat, Mirza Unable to perform testing: No specimen received. %    Total Hb Unable to perform testing: No specimen received.  12.0 - 16.0 g/dl    Oxyhemoglobin Unable to perform fall TECHNOLOGIST PROVIDED HISTORY: fall Reason for Exam: fall. trauma Acuity: Unknown Type of Exam: Initial Mechanism of Injury: fall. trauma FINDINGS: Osteopenia complicates assessment. Both femoral heads are well circumscribed and situated within the acetabula. Mild degenerative changes are present in the hips and SI joints. Moderate arthritic changes are present in the included lower lumbar spine. Gas overlies the sacrum limiting assessment. Osteopenia and degenerative changes without acute fracture or dislocation. RECOMMENDATION: Follow-up studies as clinically indicated. Ct Head Wo Contrast    Result Date: 4/14/2021  EXAMINATION: CT OF THE HEAD WITHOUT CONTRAST  4/14/2021 4:51 pm TECHNIQUE: CT of the head was performed without the administration of intravenous contrast. Dose modulation, iterative reconstruction, and/or weight based adjustment of the mA/kV was utilized to reduce the radiation dose to as low as reasonably achievable. COMPARISON: None. HISTORY: ORDERING SYSTEM PROVIDED HISTORY: [facility] TECHNOLOGIST PROVIDED HISTORY: [facility] Decision Support Exception->Emergency Medical Condition (MA) Reason for Exam: Fall, AMS, unknown trauma Acuity: Unknown Type of Exam: Unknown FINDINGS: BRAIN/VENTRICLES: There is no acute intracranial hemorrhage, mass effect or midline shift. No abnormal extra-axial fluid collection. The gray-white differentiation is maintained without evidence of an acute infarct. There is mild ventricular dilatation with proportionate sulcal prominence. ORBITS: The visualized portion of the orbits demonstrate no acute abnormality. SINUSES: The visualized paranasal sinuses and mastoid air cells demonstrate no acute abnormality. SOFT TISSUES/SKULL:  No acute abnormality of the visualized skull or soft tissues.      Age-related volume loss No acute intracranial abnormality     Ct Cervical Spine Wo Contrast    Result Date: 4/14/2021  EXAMINATION: CT OF THE CERVICAL SPINE WITHOUT CONTRAST 4/14/2021 4:51 pm TECHNIQUE: CT of the cervical spine was performed without the administration of intravenous contrast. Multiplanar reformatted images are provided for review. Dose modulation, iterative reconstruction, and/or weight based adjustment of the mA/kV was utilized to reduce the radiation dose to as low as reasonably achievable. COMPARISON: 02/21/2021 HISTORY: ORDERING SYSTEM PROVIDED HISTORY: Fall SH, midline tenderness TECHNOLOGIST PROVIDED HISTORY: Fall SH, midline tenderness Decision Support Exception->Emergency Medical Condition (MA) Reason for Exam: Fall, unknown when this occurred Acuity: Unknown Type of Exam: Unknown FINDINGS: BONES/ALIGNMENT: There is no acute fracture or traumatic malalignment. DEGENERATIVE CHANGES: Similar multilevel moderate degenerative disc disease and facet arthropathy. SOFT TISSUES: Mild emphysema. Partially imaged left pleural effusion. No acute abnormality of the cervical spine. Ct Thoracic Spine Wo Contrast    Result Date: 4/14/2021  EXAMINATION: CT OF THE THORACIC SPINE WITHOUT CONTRAST  4/14/2021 4:51 pm: TECHNIQUE: CT of the thoracic spine was performed without the administration of intravenous contrast. Multiplanar reformatted images are provided for review. Dose modulation, iterative reconstruction, and/or weight based adjustment of the mA/kV was utilized to reduce the radiation dose to as low as reasonably achievable. COMPARISON: None. HISTORY: ORDERING SYSTEM PROVIDED HISTORY: fall, hypotensive TECHNOLOGIST PROVIDED HISTORY: fall, hypotensive Reason for Exam: Fall, unknown when this occurred Acuity: Unknown Type of Exam: Unknown FINDINGS: BONES/ALIGNMENT: There is normal alignment of the spine. The vertebral body heights are maintained. No osseous destructive lesion is seen. DEGENERATIVE CHANGES: No gross spinal canal stenosis or bony neural foraminal narrowing of the thoracic spine. SOFT TISSUES: Bilateral pleural effusions are noted. Please correlate with CT chest report     No evidence for fracture or malalignment of the thoracic spine. Ct Lumbar Spine Wo Contrast    Result Date: 4/14/2021  EXAMINATION: CT OF THE LUMBAR SPINE WITHOUT CONTRAST  4/14/2021 TECHNIQUE: CT of the lumbar spine was performed without the administration of intravenous contrast. Multiplanar reformatted images are provided for review. Dose modulation, iterative reconstruction, and/or weight based adjustment of the mA/kV was utilized to reduce the radiation dose to as low as reasonably achievable. COMPARISON: November 19 lumbar spine radiograph HISTORY: ORDERING SYSTEM PROVIDED HISTORY: fall, hypotensive TECHNOLOGIST PROVIDED HISTORY: fall, hypotensive Decision Support Exception->Emergency Medical Condition (MA) Reason for Exam: Fall, unknown when this occurred Acuity: Unknown Type of Exam: Unknown FINDINGS: BONES/ALIGNMENT: There is normal alignment of the spine except for slight anterior subluxation at L5-S1. The vertebral body heights are maintained. No osseous destructive lesion is seen. DEGENERATIVE CHANGES: Vacuum disc phenomena at L3-4 and L4-5. Spondylosis. Moderate trefoil type spinal stenosis at L2 through L5 due to a circumferential disc marginal osteophyte and hypertrophic facet disease. Moderate neural foraminal narrowing bilaterally at L5-S1. Well corticated avulsion fractures of the transverse processes at L2 and L3 of the left SOFT TISSUES/RETROPERITONEUM: No paraspinal mass is seen. Avulsion fractures L1 and L2 transverse processes on the left appear remote. Multilevel spinal stenosis. MRI would be more sensitive. No acute fracture noted otherwise.      Xr Chest Portable    Result Date: 4/14/2021  EXAMINATION: ONE XRAY VIEW OF THE CHEST 4/14/2021 4:27 pm COMPARISON: March 8 2021 chest HISTORY: ORDERING SYSTEM PROVIDED HISTORY: bradycardia TECHNOLOGIST PROVIDED HISTORY: bradycardia Reason for Exam: bradycardia Acuity: Unknown Type of Exam: Unknown Additional signs and symptoms: bradycardia FINDINGS: Stable cardiomegaly Persistent small right with increase in now moderate left pleural effusion. New moderate prominence of left upper lung markings and mild streaky right perihilar density. Otherwise clear right lung     Interval increase in now moderate left and persistent small right pleural effusions New moderate prominence of left upper lung markings which may related to layering of pleural fluid and/or asymmetric congestion     Ct Chest Abdomen Pelvis Wo Contrast    Result Date: 4/14/2021  EXAMINATION: CT OF THE CHEST, ABDOMEN, AND PELVIS WITHOUT CONTRAST 4/14/2021 4:51 pm TECHNIQUE: CT of the chest, abdomen and pelvis was performed without the administration of intravenous contrast. Multiplanar reformatted images are provided for review. Dose modulation, iterative reconstruction, and/or weight based adjustment of the mA/kV was utilized to reduce the radiation dose to as low as reasonably achievable. COMPARISON: None. HISTORY: ORDERING SYSTEM PROVIDED HISTORY: trauma, hypotensive TECHNOLOGIST PROVIDED HISTORY: trauma, hypotensive Reason for Exam: Fall, unknown when this occurred Acuity: Unknown Type of Exam: Unknown FINDINGS: Chest: Mediastinum: Motion artifact complicates assessment. Mediastinal adenopathy is noted. Moderate cardiomegaly is present with pericardial effusion of 6.8 mm depth. Esophagus is mildly dilated and air-filled. Lungs/pleura: Large left pleural effusion is noted with compressive atelectasis at the left base. Small right effusion with dependent atelectasis is noted. No extrapleural air or suspicious nodules are noted. Soft Tissues/Bones: No acute osseous or soft tissue abnormality. Multilevel spondylosis is noted. No axillary adenopathy. Abdomen/Pelvis: Organs: Liver, spleen, pancreas, gallbladder, adrenals and kidneys demonstrate no acute abnormality.  GI/Bowel: No free air, bowel obstruction or bowel wall thickening is noted. Scattered colonic diverticula are present without acute diverticulitis. Pelvis: No evidence of appendicitis. Small amount of free fluid is present in the pelvis. No pelvic adenopathy. Shotty inguinal lymph nodes are present. Uterus is deviated to the right. Peritoneum/Retroperitoneum: No aortic aneurysm, retroperitoneal mass, retroperitoneal or mesenteric adenopathy is seen. Mild calcified plaque is present infrarenal abdominal aorta and iliac arteries. Bones/Soft Tissues: No acute osseous abnormality. Significant facet changes are present in the lower lumbar spine. Multilevel degenerative changes are evident. No focal destruction. There is haziness in the subcutaneous soft tissues with an anasarca type appearance. 1.  Bilateral pleural effusions large on the right and moderate on the left. 2.  Cardiomegaly and pericardial effusion. 3.  Small amount of free fluid in the pelvis. No bowel obstruction, gallstones or appendicitis. No urinary obstruction. 4.  Anasarca type appearance with edema in the soft tissues. EKG  EKG Interpretation    Interpreted by me    Rhythm: Atrial fibrillation  Rate: normal  Axis: normal  Ectopy: none  Conduction: normal  ST Segments: no acute change  T Waves: no acute change  Q Waves: none    Clinical Impression: Atrial fibrillation    All EKG's are interpreted by the Emergency Department Physician who either signs or Co-signs this chart in the absence of a cardiologist.    EMERGENCY DEPARTMENT COURSE:  Patient found seated upright in bed, intermittently participating in exam.  Intermittently confused speech. Physical exam notable for periodic irregularly irregular bradycardia with hypotension, systolics in the high 36B and low 80s. IV access obtained and started gentle fluids given history of CHF, trauma alert called due to the unstable vitals. Patient placed in c-collar, bedside E fast ultrasound performed without acute findings.   Trauma pan scan obtained without acute findings, notable for pericardial effusion, bilateral pleural effusions, and small amount of free fluid in the pelvis more consistent with history of CHF than acute intra-abdominal bleeding or solid organ injury. As patient had taken Percocet prior to arrival dosed with Narcan given the inconsistent mental status ,without significant improvement. Patient son available at bedside, notes this has been patient's normal behavior as of recently and they have been working with palliative care in the process of getting her into hospice. Family unclear if patient's current CODE STATUS but through conversations with the patient both today and prior family would like to change CODE STATUS to DNR CCA without the use of pressors. At this time patient is not capable of making decisions for herself at her next of kin who is her son elected to change CODE STATUS, appropriate paperwork filled out witnessed by RN. Given the difficulty with overcoming outpatient hurdles for setting patient up with hospice and concern for repeat injury if patient were to be discharged patient to be admitted to medicine, trauma following, for goals of care discussion and likely to set up home hospice for discharge. Discussed repairing the lip laceration and given the change in CODE STATUS and goals of care son did not want to have laceration repair performed at this time. This is reasonable given the size of the laceration and location and is in alignment with patient's goals of care. Discussed case with Dr. Jamilah Harris who agreed to admit the patient. Patient consult placed to cardiology regarding patient's heart failure, hypotension, bradycardia, and for prognostication. Cardiology to follow along. Admission plan discussed with patient's family who is in agreement. Educated on likely hospitalization course with all questions answered to family satisfaction.     PROCEDURES:  None    CONSULTS:  IP CONSULT TO PRIMARY CARE

## 2021-04-14 NOTE — FLOWSHEET NOTE
Escorted patient's son to patient's room; son provided medical update; present during conversation between medical staff, patient and son regarding patient's wishes for treatment; patient engages in conversation and states she \"wants to go back\" to the facility without any treatment; son is patient's HCPOA, which is on file in patient's chart; son unsure what to because he is not sure patient understands what she is saying; discussed with son medical treatment vs continued hospice care in an ECF, in home hospice and hospice care in an in-patient hospice facility; son wants to discuss these options with his wife, and patient's alternate HCPOA; patient's daughter-in-law on her way to the hospital in this regard; listening presence and support;     04/14/21 1821   Encounter Summary   Services provided to: Patient and family together   Referral/Consult From: Multi-disciplinary team   Support System Children   Continue Visiting   (4/14/21)   Complexity of Encounter Moderate   Length of Encounter 1 hour   Spiritual Assessment Completed Yes   Advance Care Planning Yes   Crisis   Type Trauma   Assessment Approachable;Tearful;Helplessness   Intervention Active listening;Explored feelings, thoughts, concerns;Sustaining presence/ Ministry of presence; Discussed death;Discussed illness/injury and it's impact   Outcome Expressed gratitude;Engaged in conversation;Expressed feelings/needs/concerns;Coping;Tearful; Hopeful;Receptive

## 2021-04-14 NOTE — ED NOTES
RN and pts son got pt up to bedside commode to attempt urine collection. Pt sat on commode for approx 10 minutes and stated she was unable to go. Pt stated she wanted to ambulate to the restroom, RN explained that it is too unsafe for her to walk with her blood pressure as low as it is. Pt agreeable and assisted back to bed. MD notified.      Fiona Chapa  04/14/21 1945       Jyothi Beck  04/14/21 1946

## 2021-04-14 NOTE — ED NOTES
RN and MD at bedside discussing pts code status for this hospital admission. Family is wishing for pt to be DNR-CCA with no pressure support. Paperwork is signed and band was placed on pt.       Lissett Cates  04/14/21 1946       Jyothi Beck  04/14/21 1950

## 2021-04-14 NOTE — FLOWSHEET NOTE
Writer responded to Trauma Alert; patient receiving medical care; got medical update from nurseAzucena; per request of nurse, Adrienne [address] called patient's son, Noel Hoang; son stated patient lives at a facility and fell; son explained that patient was \"in hospice\" at an [facility], but son does not want patient to continue hospice care; son  would like \"everything done at this time\"; communicated son's medical wishes to patient's nurse, Azucena; son stated he just got off of work and will be coming to the hospital within the hour;      04/14/21 1630   Encounter Summary   Services provided to: Patient and family together   Referral/Consult From: Multi-disciplinary team   Support System Children   Continue Visiting   (4/13/21)   Complexity of Encounter Moderate   Length of Encounter 15 minutes   Spiritual Assessment Completed Yes   Crisis   Type Trauma   Assessment Approachable; Anxious; Coping;Helplessness   Intervention Active listening;Explored feelings, thoughts, concerns;Sustaining presence/ Ministry of presence; Discussed illness/injury and it's impact   Outcome Expressed gratitude;Engaged in conversation;Expressed feelings/needs/concerns;Coping; Hopeful;Receptive

## 2021-04-15 VITALS
HEART RATE: 89 BPM | WEIGHT: 183.64 LBS | DIASTOLIC BLOOD PRESSURE: 46 MMHG | OXYGEN SATURATION: 95 % | SYSTOLIC BLOOD PRESSURE: 95 MMHG | HEIGHT: 65 IN | BODY MASS INDEX: 30.6 KG/M2 | TEMPERATURE: 97.5 F | RESPIRATION RATE: 14 BRPM

## 2021-04-15 PROBLEM — N18.30 ACUTE RENAL FAILURE SUPERIMPOSED ON STAGE 3 CHRONIC KIDNEY DISEASE (HCC): Status: ACTIVE | Noted: 2021-02-10

## 2021-04-15 LAB
EKG ATRIAL RATE: 441 BPM
EKG Q-T INTERVAL: 400 MS
EKG QRS DURATION: 62 MS
EKG QTC CALCULATION (BAZETT): 412 MS
EKG R AXIS: 68 DEGREES
EKG T AXIS: 100 DEGREES
EKG VENTRICULAR RATE: 64 BPM

## 2021-04-15 PROCEDURE — 6360000002 HC RX W HCPCS: Performed by: FAMILY MEDICINE

## 2021-04-15 PROCEDURE — 2580000003 HC RX 258: Performed by: STUDENT IN AN ORGANIZED HEALTH CARE EDUCATION/TRAINING PROGRAM

## 2021-04-15 PROCEDURE — 93010 ELECTROCARDIOGRAM REPORT: CPT | Performed by: INTERNAL MEDICINE

## 2021-04-15 RX ORDER — LORAZEPAM 2 MG/ML
1 INJECTION INTRAMUSCULAR EVERY 4 HOURS PRN
Status: DISCONTINUED | OUTPATIENT
Start: 2021-04-15 | End: 2021-04-15 | Stop reason: HOSPADM

## 2021-04-15 RX ORDER — MORPHINE SULFATE 2 MG/ML
2 INJECTION, SOLUTION INTRAMUSCULAR; INTRAVENOUS
Status: DISCONTINUED | OUTPATIENT
Start: 2021-04-15 | End: 2021-04-15 | Stop reason: HOSPADM

## 2021-04-15 RX ADMIN — MORPHINE SULFATE 2 MG: 2 INJECTION, SOLUTION INTRAMUSCULAR; INTRAVENOUS at 15:17

## 2021-04-15 RX ADMIN — Medication 10 ML: at 11:06

## 2021-04-15 ASSESSMENT — PAIN SCALES - GENERAL
PAINLEVEL_OUTOF10: 0
PAINLEVEL_OUTOF10: 0

## 2021-04-15 ASSESSMENT — ENCOUNTER SYMPTOMS
TROUBLE SWALLOWING: 0
SHORTNESS OF BREATH: 0
DIARRHEA: 0
NAUSEA: 0
CONSTIPATION: 0
SORE THROAT: 0
ABDOMINAL PAIN: 0
VOMITING: 0

## 2021-04-15 NOTE — PROGRESS NOTES
Spoke with Dr. Chanel Bolton regarding Dr. Feliciano Orellana recommendation of a Neurology consult, Will wait for Palliative Care to see patient.

## 2021-04-15 NOTE — CARE COORDINATION
CASE MANAGEMENT NOTE:    Admission Date:  4/14/2021 Stephon Donnelly is a 78 y.o.  female    Admitted for : Hypotension [I95.9]    Current Residence/ Living Arrangements:  in nursing home     From [facility] with hospice care         Discharge Plan:  4/15/21 - [address] Medicare -  Patient is form [facility] with hospice care,  Palliative care following,  Patient on a non rebreather. Will follow for needs.                 Electronically signed by: Taylor Cotton RN on 4/15/2021 at 11:51 AM

## 2021-04-15 NOTE — CONSULTS
[facility]      Date of Admission:  4/14/2021    Date of Consultation:  4/15/2021    PCP:  WILLIE Bourgeois CNP      REASON FOR CONSULT:      HISTORY OF PRESENT ILLNESS:  Angélica Regalado is a 78 y.o. female who presents with  A fall at [facility] and was confused and jumping out of bed earlier prior to my evaluation, per the charge nurse report. At the time of my evaluation, patient is nonresponsive and laying supine position with eyes closed and not in ache any acute distress. Son and daughter-in-law at bedside who told me that patient has been incoherent since December 19, 2021 and especially worse for the past 3-4 days. Patient's code status was switched over to [facility] arrest and palliative care consult is pending. Unable to obtain any history from the patient. PMH:   has a past medical history of A-fib ([address] Acquired hypothyroidism, Acute encephalopathy, Acute kidney injury ([address] Anxiety, Benign hypertension with CKD (chronic kidney disease) stage III, Chronic back pain, CKD (chronic kidney disease) stage 3, GFR 30-59 ml/min, Constipation, COPD (chronic obstructive pulmonary disease) ([address] Cough, Depression, ETOH abuse, Former smoker, HA (generalized anxiety disorder), History of GI bleed, Hyperlipidemia, Hypertension, Hypothyroid, Leg pain, Normocytic anemia, Osteoarthritis, PAD (peripheral artery disease) ([address] Primary osteoarthritis, Pulmonary hypertension ([address] Pure hypercholesterolemia, SIRS (systemic inflammatory response syndrome) ([address] Urge incontinence, and Wheezing. PSH:   has a past surgical history that includes eye surgery (Bilateral); Colonoscopy; joint replacement; Upper gastrointestinal endoscopy (N/A, 12/2/2020); Colonoscopy (N/A, 12/2/2020); Upper gastrointestinal endoscopy (N/A, 12/23/2020); and Thoracentesis (12/26/2020). Allergies:     Allergies   Allergen Reactions    Tizanidine Other (See Comments) Congestion   Yes Historical Provider, MD   acetaminophen (TYLENOL) 325 MG tablet Take 650 mg by mouth every 4 hours as needed for Pain Not to exceed 3gm/24hrs   Yes Historical Provider, MD   atorvastatin (LIPITOR) 10 MG tablet TAKE 1 TABLET BY MOUTH ONCE DAILY 1/11/21  Yes WILLIE Escalona CNP   lisinopril (PRINIVIL;ZESTRIL) 2.5 MG tablet Take 1 tablet by mouth Daily with lunch 12/30/20  Yes Priyanka Torres MD   metoprolol tartrate (LOPRESSOR) 25 MG tablet Take 1 tablet by mouth 2 times daily 12/29/20  Yes Priyanka Torres MD   tamsulosin (FLOMAX) 0.4 MG capsule TAKE 1 CAPSULE BY MOUTH DAILY 12/15/20  Yes WILLIE Escalona CNP   DULoxetine (CYMBALTA) 60 MG extended release capsule Take 1 capsule by mouth daily 12/14/20  Yes WILLIE Escalona CNP   baclofen (LIORESAL) 10 MG tablet TAKE ONE TABLET BY MOUTH THREE TIMES A DAY AS NEEDED. 11/30/20  Yes WILLIE Escalona CNP   levothyroxine (SYNTHROID) 125 MCG tablet Take 1 tablet by mouth daily 11/30/20  Yes WILLIE Escalona CNP   dilTIAZem (CARDIZEM CD) 120 MG extended release capsule Take 1 capsule by mouth daily 10/19/20  Yes WILLIE Escalona CNP   fluticasone (FLONASE) 50 MCG/ACT nasal spray USE ONE SPRAY TO EACH NOSTRIL ONCE ADAY 9/4/20  Yes WILLIE Gifford CNP   ascorbic acid (C-1000) 1000 MG tablet Take 1,000 mg by mouth daily    Yes Historical Provider, MD   Oxygen Concentrator continuous    Historical Provider, MD   Lift Chair MISC Use daily for comfort 11/30/20   WILLIE Escalona CNP   Respiratory Therapy Supplies (NEBULIZER/TUBING/MOUTHPIECE) KIT Use every 4-6 hours prn for copd 6/15/20   Suzie [address] Meds:    Current Facility-Administered Medications   Medication Dose Route Frequency Provider Last Rate Last Admin    sodium chloride flush 0.9 % injection 5-40 mL  5-40 mL Intravenous 2 times per day Lon Krishna MD        sodium chloride flush 0.9 % injection 5-40 mL  5-40 mL Intravenous PRN Stacey Rodriguez MD        0.9 % sodium chloride infusion  25 mL Intravenous PRN Stacey Rodriguez MD        acetaminophen (TYLENOL) tablet 650 mg  650 mg Oral Q4H PRN Stacey Rodriguez MD           Social History:    Social History     Socioeconomic History    Marital status:      Spouse name: None    Number of children: None    Years of education: None    Highest education level: None   Occupational History    None   Social Needs    Financial resource strain: None    Food insecurity     Worry: None     Inability: None    Transportation needs     Medical: None     Non-medical: None   Tobacco Use    Smoking status: Former Smoker     Packs/day: 3.00     Years: 26.00     Pack years: 78.00     Types: Cigarettes    Smokeless tobacco: Never Used   Substance and Sexual Activity    Alcohol use: Not Currently     Comment: occ    Drug use: No    Sexual activity: None   Lifestyle    Physical activity     Days per week: None     Minutes per session: None    Stress: None   Relationships    Social connections     Talks on phone: None     Gets together: None     Attends [employer]ian service: None     Active member of club or organization: None     Attends meetings of clubs or organizations: None     Relationship status: None    Intimate partner violence     Fear of current or ex partner: None     Emotionally abused: None     Physically abused: None     Forced sexual activity: None   Other Topics Concern    None   Social History Narrative    None       Family History:    Family History   Problem Relation Age of Onset    Heart Disease Mother     COPD Mother     Emphysema Sister     Cancer Other         Cousin - breast cancer       Review of Systems:      · Constitutional: no weight loss or gain, no fever or chills. · Eyes: No new visual changes. · ENT: No new hearing loss. · Cardiovascular: see HPI. · Respiratory: No cough. No hemoptysis. · Gastrointestinal: No abdominal pain, no blood in stools. · Genitourinary: No hematuria or increased frequency. · Musculoskeletal:  No new gait disturbance. · Integumentary: No rash or pruritis. · Neurological: No headache. No seizures or recent CVA/TIA. · Psychiatric: No anxiety or depression. · Hematologic/Lymphatic: No abnormal bruising or bleeding. · Allergic/Immunologic: No new allergies. Physical Exam   Vital Signs: BP (!) 89/43   Pulse 83   Temp 97.9 °F (36.6 °C) (Axillary)   Resp 14   Ht 5' 5\" (1.651 m)   Wt 183 lb 10.3 oz (83.3 kg)   SpO2 100%   BMI 30.56 kg/m²  O2 Flow Rate (L/min): 15 L/min     Admission Weight: 150 lb (68 kg)     General appearance:   Elderly female who is laying in bed resting comfortably and nonresponsive verbally with eyes closed. Head: Normocephalic, atraumatic. Neck: no JVD, no carotid bruits, no thyromegaly. Chest:  [address] air entry bilaterally. Cardiac:  Irregularly irregular rate and rhythm, no S3 gallop, +murmur, no rubs or clicks. Abdomen: Soft. Extremities: No leg edema. Neurologic:  nonresponsive. Skin:  Warm and dry. EKG:      reviewed. Labs:      CBC:   Recent Labs     04/14/21  1550   WBC 4.6   HGB 7.5*   HCT 23.8*   .0*        BMP:   Recent Labs     04/14/21  1550   *   K 4.3   CL 97*   CO2 40*   BUN 24*   CREATININE 2.81*     PT/INR:   Recent Labs     04/14/21  1550   PROTIME 14.7*   INR 1.1     APTT:   Recent Labs     04/14/21  1550   APTT 29.6       Recent Results (from the past 24 hour(s))   Trauma Panel    Collection Time: 04/14/21  3:50 PM   Result Value Ref Range    Ethanol <10 <10 mg/dL    Ethanol percent <0.010 %    Blood Bank Specimen Unable to perform testing: No specimen received.      BUN 24 (H) 8 - 23 mg/dL    WBC 4.6 3.5 - 11.0 k/uL    RBC 2.27 (L) 4.0 - 5.2 m/uL    Hemoglobin 7.5 (L) 12.0 - 16.0 g/dL    Hematocrit 23.8 (L) 36 - 46 %    .0 (H) 80 - 100 fL    MCH 33.1 26 - 34 pg    MCHC 31.5 31 - 37 g/dL    RDW 16.1 (H) 11.5 - 14.9 %    Platelets 651 190 - 726 k/uL    MPV 8.8 6.0 - 12.0 fL    NRBC Automated NOT REPORTED per 100 WBC    CREATININE 2.81 (H) 0.50 - 0.90 mg/dL    GFR Non- 16 (L) >60 mL/min    GFR African American 20 (L) >60 mL/min    GFR Comment          GFR Staging NOT REPORTED     Glucose 88 70 - 99 mg/dL    hCG Qual Unable to perform testing: No specimen received. NEGATIVE    Sodium 145 (H) 135 - 144 mmol/L    Potassium 4.3 3.7 - 5.3 mmol/L    Chloride 97 (L) 98 - 107 mmol/L    CO2 40 (H) 20 - 31 mmol/L    Anion Gap 8 (L) 9 - 17 mmol/L    Protime 14.7 (H) 11.8 - 14.6 sec    INR 1.1     PTT 29.6 24.0 - 36.0 sec    pH, Mirza Unable to perform testing: No specimen received. 7.320 - 7.420    pCO2, Mirza Unable to perform testing: No specimen received. 39.0 - 55.0    pO2, Mirza Unable to perform testing: No specimen received. 30 - 50    HCO3, Venous Unable to perform testing: No specimen received. 24.0 - 30.0 mmol/L    Positive Base Excess, Mirza Unable to perform testing: No specimen received. 0.0 - 2.0 mmol/L    Negative Base Excess, Mirza Unable to perform testing: No specimen received. 0.0 - 2.0 mmol/L    O2 Sat, Mirza Unable to perform testing: No specimen received. %    Total Hb Unable to perform testing: No specimen received. 12.0 - 16.0 g/dl    Oxyhemoglobin Unable to perform testing: No specimen received. 95.0 - 98.0 %    Carboxyhemoglobin Unable to perform testing: No specimen received. %    Methemoglobin Unable to perform testing: No specimen received. %    Pt Temp Unable to perform testing: No specimen received. pH, Mirza, Temp Adj Unable to perform testing: No specimen received. 7.320 - 7.420    pCO2, Mirza, Temp Adj Unable to perform testing: No specimen received. 39.0 - 55.0 mmHg    pO2, Mirza, Temp Adj Unable to perform testing: No specimen received. 30.0 - 50.0 mmHg    O2 Device/Flow/% Unable to perform testing: No specimen received.      Respiratory Rate Unable to perform testing: No specimen received. Avila Test Unable to perform testing: No specimen received. Sample Site Unable to perform testing: No specimen received. Pt. Position Unable to perform testing: No specimen received. Mode Unable to perform testing: No specimen received. Set Rate Unable to perform testing: No specimen received. Total Rate Unable to perform testing: No specimen received. VT Unable to perform testing: No specimen received. FIO2 Unable to perform testing: No specimen received. Peep/Cpap Unable to perform testing: No specimen received. PSV Unable to perform testing: No specimen received. Text for Respiratory Unable to perform testing: No specimen received. NOTIFICATION Unable to perform testing: No specimen received. NOTIFICATION TIME Unable to perform testing: No specimen received. TSH w/reflex to FT4    Collection Time: 04/14/21  3:50 PM   Result Value Ref Range    TSH 1.58 0.30 - 5.00 mIU/L   EKG 12 Lead    Collection Time: 04/14/21  7:08 PM   Result Value Ref Range    Ventricular Rate 64 BPM    Atrial Rate 441 BPM    QRS Duration 62 ms    Q-T Interval 400 ms    QTc Calculation (Bazett) 412 ms    R Axis 68 degrees    T Axis 100 degrees         CT of chest 04/14/2021:    Impression:     1.  Bilateral pleural effusions large on the right and moderate on the left. 2.  Cardiomegaly and pericardial effusion. 3.  Small amount of free fluid in the pelvis.  No bowel obstruction,   gallstones or appendicitis.  No urinary obstruction. 4.  Anasarca type appearance with edema in the soft tissues. 2 D ECHOCARDIOGRAM:      Ordered. IMPRESSION:    Altered mental status following fall at [facility]. Pericardial effusion, bilateral pleural effusions large on right and moderate on the left on abnormal CT of the chest 04/14/2021. Chronic, persistent atrial fibrillation. [facility] Arrest code status.     Other problems as charted. REC/PLAN:      As ordered. Discussed with son Barry Jacob and daughter-in-law Yamilka Buys at bedside. It appears there would like to proceed on with palliative care and may favor [facility] code status and hospice care. Will hold off on any further treatments from cardiac standpoint except a 2D echocardiogram was ordered which can be canceled if the family decides to proceed on with hospice care. Suggest Neurology consultation, if no plans for hospice care etc.. No plans for any further cardiac workup at this time. Overall very grim prognosis and family members understand. Discussed with the charge nurse personally at bedside. Will follow. Electronically signed by Annmarie Beltran MD, [facility] 3:22 PM:    Spoke with the charge nurse Eduardo edmonds who told me that the family decided on in-patient Hospice care. Advised to discontinue Telemetry. Will sign off. Thanks. Annmarie Beltran MD, [facility]      PLEASE NOTE:  This progress note was completed using a voice transcription system. Every effort was made to ensure accuracy. However, inadvertent computerized transcription errors may be present.

## 2021-04-15 NOTE — FLOWSHEET NOTE
SC visit with son Maya Ramirez and KELSEY Lutz; patient non-responsive; got update from [facility] from hospice and [place], [place]; family talked about patient's life and family relationship dynamics; prayer welcomed; listening presence and support;      04/15/21 154   Encounter Summary   Services provided to: Patient and family together   Referral/Consult From: [address] Children;Family members   Continue Visiting   (4/15/21)   Complexity of Encounter Moderate   Length of Encounter 30 minutes   Spiritual Assessment Completed Yes   Grief and Life Adjustment   Type Palliative care   Assessment Approachable;Tearful; Anxious; Hopeful;Coping;Helplessness; Anticipatory grief   Intervention Active listening;Explored feelings, thoughts, concerns;Prayer;Sustaining presence/ Ministry of presence; Discussed belief system/[employer]yb[employer]ian practices/mickey;End of life care; Discussed illness/injury and it's impact   Outcome Comfort;Expressed gratitude;Engaged in conversation;Expressed feelings/needs/concerns;Coping; Hopeful;Receptive

## 2021-04-15 NOTE — ED PROVIDER NOTES
complaint, past medical history, past surgical history, allergies, medications, social and family history as documented unless otherwise noted.     Ruma Hill MD  Attending Emergency Physician            Ruma Hill MD  04/14/21 2121

## 2021-04-15 NOTE — CARE COORDINATION
ERICA spoke with Omayra from Palliative care who reported that there is a Hospice meeting for this patient today at 1:00 pm.

## 2021-04-15 NOTE — PROGRESS NOTES
Spoke with Dr. Fanta Rodriguez about pt. Plan for inpatient hospice, pt.  To discharge to [address] [place] [address] at [phone]

## 2021-04-15 NOTE — H&P
Family Medicine Admit Note    PCP: Pretty Kilpatrick, WILLIE - CNP    Date of Admission: 4/14/2021    Date of Service: Pt seen/examined on 4/15/2021 and Admitted to Inpatient     Chief Complaint:  fall      History Of Present Illness: The patient is a 78 y.o. female who presents to [facility] after fall at nursing home. Patient was following with palliative care prior to admission and did not want to be in the hospital. This morning, patient is minimally responsive with low BP. Family has been notified. Patient was made a DNRCC-A by family last night, but patient has been clear prior to admission that she did not want interventions such as pressors or ventilatory support.      Past Medical History:        Diagnosis Date    A-fib [facility])     Acquired hypothyroidism     Acute encephalopathy     [facility]     Anxiety     Benign hypertension with CKD (chronic kidney disease) stage III     Chronic back pain     CKD (chronic kidney disease) stage 3, GFR 30-59 ml/min     Constipation     COPD [facility]chronic obstructive pulmonary disease) ([facility]     Cough     Depression     ETOH abuse     Former smoker     3 packs a day for 26 years    HA (generalized anxiety disorder)     History of GI bleed     Hyperlipidemia     Hypertension     Hypothyroid 1/18/2013    Leg pain     Normocytic anemia     Osteoarthritis     PAD (peripheral artery disease) (HCC)     Primary osteoarthritis     Pulmonary hypertension ([facility]     Pure hypercholesterolemia     SIRS (systemic inflammatory response syndrome) ([facility])     Urge incontinence     Wheezing        Past Surgical History:        Procedure Laterality Date    COLONOSCOPY      COLONOSCOPY N/A 12/2/2020    COLONOSCOPY CONTROL OF BLEEDING performed by Vamshi Mota MD at [address] [place] [address] Bilateral     cataracts    JOINT REPLACEMENT      Left knee on 9-11-18    THORACENTESIS  12/26/2020         UPPER GASTROINTESTINAL ENDOSCOPY N/A inhaler Inhale 2 puffs into the lungs 2 times daily   Yes Historical Provider, MD   guaiFENesin (MUCINEX) 600 MG extended release tablet Take 1,200 mg by mouth every 6 hours as needed for Congestion   Yes Historical Provider, MD   acetaminophen (TYLENOL) 325 MG tablet Take 650 mg by mouth every 4 hours as needed for Pain Not to exceed 3gm/24hrs   Yes Historical Provider, MD   atorvastatin (LIPITOR) 10 MG tablet TAKE 1 TABLET BY MOUTH ONCE DAILY 1/11/21  Yes WILLIE Jesus CNP   lisinopril (PRINIVIL;ZESTRIL) 2.5 MG tablet Take 1 tablet by mouth Daily with lunch 12/30/20  Yes Angella Welch MD   metoprolol tartrate (LOPRESSOR) 25 MG tablet Take 1 tablet by mouth 2 times daily 12/29/20  Yes Angella Welch MD   tamsulosin (FLOMAX) 0.4 MG capsule TAKE 1 CAPSULE BY MOUTH DAILY 12/15/20  Yes WILLIE Jesus CNP   DULoxetine (CYMBALTA) 60 MG extended release capsule Take 1 capsule by mouth daily 12/14/20  Yes WILLIE Jesus CNP   baclofen (LIORESAL) 10 MG tablet TAKE ONE TABLET BY MOUTH THREE TIMES A DAY AS NEEDED. 11/30/20  Yes WILLIE Jesus CNP   levothyroxine (SYNTHROID) 125 MCG tablet Take 1 tablet by mouth daily 11/30/20  Yes WILLIE Jesus CNP   dilTIAZem (CARDIZEM CD) 120 MG extended release capsule Take 1 capsule by mouth daily 10/19/20  Yes WILLIE Jesus CNP   fluticasone (FLONASE) 50 MCG/ACT nasal spray USE ONE SPRAY TO EACH NOSTRIL ONCE ADAY 9/4/20  Yes WILLIE Leon CNP   ascorbic acid (C-1000) 1000 MG tablet Take 1,000 mg by mouth daily    Yes Historical Provider, MD   Oxygen Concentrator continuous    Historical Provider, MD   Lift Chair MISC Use daily for comfort 11/30/20   WILLIE Jesus CNP   Respiratory Therapy Supplies (NEBULIZER/TUBING/MOUTHPIECE) KIT Use every 4-6 hours prn for copd 6/15/20   WILLIE Jesus CNP       Allergies:  Tizanidine    Social History:  The patient currently lives at nursing facility    TOBACCO:   reports that she has quit smoking. Her smoking use included cigarettes. She has a 78.00 pack-year smoking history. She has never used smokeless tobacco.  ETOH:   reports previous alcohol use. Family History:          Problem Relation Age of Onset    Heart Disease Mother     COPD Mother     Emphysema Sister     Cancer Other         Cousin - breast cancer       PHYSICAL EXAM:    BP (!) 89/43   Pulse 83   Temp 97.9 °F (36.6 °C) (Axillary)   Resp 14   Ht 5' 5\" (1.651 m)   Wt 183 lb 10.3 oz (83.3 kg)   SpO2 100%   BMI 30.56 kg/m²     General appearance: No apparent distress appears stated age and cooperative. HEENT Normal cephalic, atraumatic without obvious deformity. Neck: Supple, No jugular venous distention/bruits. Lungs: Clear to auscultation, bilaterally without rales/wheezes/rhonchi with good respiratory effort. Heart: Regular rate and rhythm with Normal S1/S2 without murmurs, rubs or gallops  Abdomen: Soft, non-tender or non-distended without rigidity or guarding and positive bowel sounds all four quadrants. Extremities: No edema bilaterally. Skin: Skin color, texture, turgor normal.  No rashes or lesions. Neurologic:   Mental status: Alert, oriented, thought content appropriate. CXR:  I have reviewed the CXR with the following interpretation: Interval increase in now moderate left and persistent small right pleural   effusions     New moderate prominence of left upper lung markings which may related to   layering of pleural fluid and/or asymmetric congestion   EKG:  I have reviewed the EKG with the following interpretation: atrial fibrillation    XR pelvis:Osteopenia and degenerative changes without acute fracture or dislocation    CT head: Age-related volume loss     No acute intracranial abnormality     CT C-spine:No acute abnormality of the cervical spine. CT Thoracic spine:No evidence for fracture or malalignment of the thoracic spine. CT lumbar spine: Avulsion fractures L1 and L2 transverse processes on the left appear remote. Multilevel spinal stenosis.  MRI would be more sensitive.  No acute fracture   noted otherwise. CT Abd/pelvis: 1.  Bilateral pleural effusions large on the right and moderate on the left. 2.  Cardiomegaly and pericardial effusion. 3.  Small amount of free fluid in the pelvis.  No bowel obstruction,   gallstones or appendicitis.  No urinary obstruction. 4.  Anasarca type appearance with edema in the soft tissues. CBC   Recent Labs     04/14/21  1550   WBC 4.6   HGB 7.5*   HCT 23.8*         RENAL  Recent Labs     04/14/21  1550   *   K 4.3   CL 97*   CO2 40*   BUN 24*   CREATININE 2.81*     LFT'S  No results for input(s): AST, ALT, ALB, BILIDIR, BILITOT, ALKPHOS in the last 72 hours. COAG  Recent Labs     04/14/21  1550   INR 1.1     CARDIAC ENZYMES  No results for input(s): CKTOTAL, CKMB, CKMBINDEX, TROPONINI in the last 72 hours.     U/A:    Lab Results   Component Value Date    NITRITE Negative 01/31/2020    COLORU YELLOW 03/18/2021    WBCUA 2 TO 5 03/18/2021    RBCUA 0 TO 2 03/18/2021    MUCUS NOT REPORTED 03/18/2021    BACTERIA MANY 03/18/2021    CLARITYU Clear 01/31/2020    SPECGRAV 1.017 03/18/2021    LEUKOCYTESUR NEGATIVE 03/18/2021    BLOODU Negative 01/31/2020    GLUCOSEU NEGATIVE 03/18/2021    AMORPHOUS NOT REPORTED 03/18/2021       ABG    Lab Results   Component Value Date    VUX3JOG 51.4 03/01/2021    C1LUHRKG 84.6 03/01/2021    PHART 7.353 03/01/2021    JBU3TON 92.4 03/01/2021    PO2ART 52.3 03/01/2021           Active Hospital Problems    Diagnosis Date Noted    Hypotension [I95.9] 04/14/2021    Acute respiratory failure with hypoxia (HCC) [J96.01] 02/23/2021    Acute renal failure superimposed on stage 3 chronic kidney disease (HCC) [N17.9, N18.30] 02/10/2021    Chronic diastolic CHF (congestive heart failure) ([address] [I50.32] 07/21/2020    Benign hypertension with CKD (chronic kidney disease) stage III [I12.9, N18.30]     [facility]fib ([facility] [I48.91] 09/13/2018         ASSESSMENT/PLAN:    Hypotension and acute respiratory failure s/p fall in SNF - DNR-CC-A, but patient does not want pressors or assisted ventilation. Family is on the way. Palliative care to work with family to clarify goal for patient.       DVT Prophylaxis: no chemoprophylaxis d/t terminal condition  Diet: DIET GENERAL;  Code Status: Limited      Dispo - admitted      Maile Murguia MD, FAAFP  4/15/2021, 8:09 AM

## 2021-04-15 NOTE — PROGRESS NOTES
ILYA Coello called writer into room d/t low oxygen level and change of status. Pt. Oxygen level 85% on 5L of O2 nasal cannula with labored breathing, pt. Not responding at this time. . Pt. Placed on a venti-mask with improvement to high 80%. PT. Then placed on a nonrebreather 15L of O2 with SpO2 of 100. Writer contacted pt. Son, Kedar Fabian, pt. Is DNR-CCA without intubation. Dr. Steve Kim into assess pt.  Will reach out to pallitive per Dr. Temitope Jasso request.

## 2021-04-15 NOTE — PROGRESS NOTES
Patient seen and examined. Afebrile, hypotensive, O2 sats in lower 80's this morning. Patient placed on nonrebreather. No AM labs. Per chart patient not wanting pressors or invasive monitoring. Code status DNR-CCA no intubation noted. Patient minimally responsive this morning, does not respond to verbal stimuli. Per RN earlier this morning patient did open her eyes momentarily to her name. No surgical issues at this time. Palliative consulted. Continue medical management.     Maeve Mahmood PA-C  [facility]

## 2021-04-15 NOTE — CONSULTS
..    Palliative Care Inpatient Consult    NAME:  Dalia Syed  MEDICAL RECORD NUMBER:  [id number]  AGE: 78 y.o. GENDER: female  : 1945  TODAY'S DATE:  4/15/2021    Reasons for Consultation:    Provision of information regarding PC and/or hospice philosophies  Complex, time-intensive communication and interdisciplinary psychosocial support  Clarification of goals of care and/or assistance with difficult decision-making  Guidance in regards to resources and transition(s)    Code Status:     Members of PC team contributing to this consultation are : Omayra Warner R.N. History of Present Illness     The patient is a 78 y.o. Non-/non  female who presents with Fall and Head Injury    Referred to Palliative Care by   [x] Physician   [] Nursing  [] Family Request   [] Other:       She was admitted to the primary service for Hypotension [I95.9]. Her hospital course has been associated with Hypotension. The patient has a complicated medical history and has been hospitalized since 2021  3:17 PM.    Active Hospital Problems    Diagnosis Date Noted    Hypotension [I95.9] 2021    Acute respiratory failure with hypoxia (HCC) [J96.01] 2021    Acute renal failure superimposed on stage 3 chronic kidney disease ([facility] [N17.9, N18.30] 02/10/2021    Chronic diastolic CHF (congestive heart failure) ([facility] [I50.32] 2020    Benign hypertension with CKD (chronic kidney disease) stage III [I12.9, N18.30]     [facility] ([email or web address] [I48.91] 2018       Data        Code Status: Limited     ADVANCED CARE PLANNING:  Patient has capacity for medical decisions: no  Health Care Power of :yes  Living Will: yes     Personal, Social, and Family History  Marital Status:   Living situation:nursing home  Samantha/Spiritual History:  Not known  Psychological Distress: Patient does not communicate  Does patient understand diagnosis/treatment?  Patient does not communicate  Does family/caregiver understand diagnosis/treatment? yes    Assessment        Palliative Performance Scale:    ___100% Full ambulation; normal activity and work; no evidence of disease; able to do own self care; normal intake; fully conscious  ___90% Full ambulation; normal activity and work; some evidence of disease; able to do own self care; normal intake; fully conscious  ___80% Full ambulation; normal activity with effort; some evidence of disease; able to do own self care; normal or reduced intake; fully conscious  ___70% Ambulation reduced; unable to perform normal job/work; significant disease; able to do own self care; normal or reduced intake; fully conscious  ___60%  Ambulation reduced; cannot do hobbies/housework; significant disease; occasional assist; intake normal or reduced; fully conscious/some confusion  ___50%  Mainly sit/lie; can't do any work; extensive disease; considerable assist; intake normal or reduced; fully conscious/some confusion  _X40%  Mainly in bed; extensive disease; mainly assist; intake normal or reduced; fully conscious/ some confusion   ___30%  Bed bound; extensive disease; total care; intake reduced; fully conscious/some confusion  ___20%  Bed bound; extensive disease; total care; intake minimal; drowsy/coma  ___10%  Bed bound; extensive disease; total care; mouth care only; drowsy/coma  ___0       Death       Risk Assessments:    Jerome Risk Score: [unfilled]    Readmission Risk Score: 47        1 Year Mortality Risk Score: @1YEARMORTALITYRISK@     Plan        Palliative Interaction:Patient is in bed and is opening eyes per bedside nurse Dorn Lefort. She also updates me that the patient's BP was in the 90's and earlier this am is was in the 00'R to 08'I systolic. Per Dorn Lefort , the patient had hospice care at the nursing home. Dorn Lefort tells me that the family is unsure of what to do, and the primary care physician wants me to talk with the family to confirm their goals of care.      I reach out to the patient's son Austin Rider at [phone]. I introduce myself and my palliative care role to aRdha Barlow and his wife Mery. We talk about the patient, and her medical journey and what her life is like at the nursing home. Radha Barlow states\" she hates it, and she is always falling. \" He states\" she is not coherent and she gets up and is trying to walk , and she can't. \" Radha Barlow states\" do I give her a chance to get cured here and send her back. \"   We talk about her chronic medical conditions COPD, CHF and pulmonary hypertension, and I tell Radha Barlow that these are chronic and progressive diseases and that she will not be cured and that these diseases progress and continue to progress. They as well greatly affect her quality of life. He asks about the [facility] and I go over the details of that. Radha Barlow asks about pressors and what it would look like in the ICU, and I explain all that. He states that she would not want to be on a machine, and Radha Barlow states\" she has told me that she does not want that. \"   Radha Barlow states my Mom has said \" I don't want to live like this , and I don't want to be like this.'     I then ask Radha Barlow about hospice care again, and making his Mom a [facility] and have the focus be comfort. Radha Barlow states\" we had [facility] [address] at the nursing home, and they do not have a building. \" I tell him that [facility] has 2 buildings and he states\" yes that one in [place] is closest to us. \"   He states\" I know that my Mom does not want to life like this, but I don't want to put her down like a dog. \"   We talk again about dignity for his Mom, honoring her wishes and offering her the best quality of life. \"   Radha Barlow then states, \" yes let's meet with hospice of [facility]. \"   I tell Radha Barlow that we need to change his Mom's code status to [facility], and then we can focus on not aggressive treatment, but comfort and allowing his Mom to live her life out with the best care. \"   Radha Barlow is agreeable to a Hospice meeting and with changing the code status to [facility]. Westley Dawson states\" yes my Mom has told me recently that she wants to be with her dog and he is . \" He states\" I think she knows. \"     I call Hospice and set a meeting for 1 pm today with Hospice of [address] [place] [address]. I update nurse Alyssa Mojica to have the code status order placed for a [facility] and son Junior Rich is agreeable. Mabel Duggan know the the Hospice meeting is set for 1 pm.     I update Westley Dawson of the time of 1 pm for the Hospice meeting, and He states that he will go home for a bit and be back for the meeting. I offer son Westley Dawson much emotional support and he is very appreciative. Education/support to family  Providing support for coping/adaptation/distress of family  Discussing meaning/purpose   Continue with current plan of care  Code status clarified: [facility]  Palliative care orders introduced  Provided information about hospice  Validating patient/family distress  Continued communication updates  Recognizing, reflecting, and empathizing with family members' anticipatory grief  I get an update from bedside nurse Alyssa Mojica. I have a lenghty discussion with the patient's son Junior Rich, and the decision is made to honor his Mom's wishes. Westley Dawson agrees to a [facility] code status change and to meet with Hospice of [facility] at 1 pm today. Principle Problem/Diagnosis:  Hypotension [I95.9]    Goals of care evaluation:  The patient goals of care are provide comfort care/support/palliation/relieve suffering   Goals of care discussed with:    [] Patient independently    [] Patient and Family    [x] Family or Healthcare DPOA independently    [] Unable to discuss with patient, family/DPOA not present    Code Status  Limited    Other recommendations:  Please call with any palliative questions or concerns. Palliative Care Team is available via perfect serve or via phone - [phone]. Palliative Care will continue to follow Ms. Paz's care as needed.       Thank you for allowing Palliative Care to participate in the care of Ms. Paz .     Electronically signed by   Lamin Alexander RN  Palliative Care Team  on 4/15/2021 at 10:52 AM    Palliative care office: [phone]

## 2021-06-07 NOTE — PROGRESS NOTES
Insurance and information updated please start PA   Pt discharged at this time. Pt transported via [facility] to [employer]. Pt son here upon discharge and updated on plan. All personal belongings sent with pt to facility.

## 2024-06-02 NOTE — CARE COORDINATION
This patient is a bed hold under her Medicaid insurance and therefore she can return to the facility whenever she is ready/DC'd. normal... Erythromycin Counseling:  I discussed with the patient the risks of erythromycin including but not limited to GI upset, allergic reaction, drug rash, diarrhea, increase in liver enzymes, and yeast infections.

## (undated) DEVICE — GAUZE,SPONGE,4"X4",16PLY,XRAY,STRL,LF: Brand: MEDLINE

## (undated) DEVICE — CANNULA NSL AD L2IN ETCO2 SAMP SFT CRUSH RESIST FEM AIRLFE

## (undated) DEVICE — ENDO KIT W/SYRINGE: Brand: MEDLINE INDUSTRIES, INC.

## (undated) DEVICE — DEFENDO AIR WATER SUCTION AND BIOPSY VALVE KIT FOR  OLYMPUS: Brand: DEFENDO AIR/WATER/SUCTION AND BIOPSY VALVE

## (undated) DEVICE — FIAPC® PROBE W/ FILTER 2200 A OD 2.3MM/6.9FR; L 2.2M/7.2FT: Brand: ERBE

## (undated) DEVICE — ERBE NESSY® OMEGA PLATE USA (85+23)CM² , WITH CABLE 3 M: Brand: ERBE

## (undated) DEVICE — BITEBLOCK 54FR W/ DENT RIM BLOX